# Patient Record
Sex: FEMALE | Race: BLACK OR AFRICAN AMERICAN | Employment: FULL TIME | URBAN - METROPOLITAN AREA
[De-identification: names, ages, dates, MRNs, and addresses within clinical notes are randomized per-mention and may not be internally consistent; named-entity substitution may affect disease eponyms.]

---

## 2018-08-30 ENCOUNTER — HOSPITAL ENCOUNTER (OUTPATIENT)
Dept: ULTRASOUND IMAGING | Age: 58
Discharge: HOME | End: 2018-08-30
Attending: FAMILY MEDICINE
Payer: COMMERCIAL

## 2018-08-30 DIAGNOSIS — R10.13 ABDOMINAL PAIN, EPIGASTRIC: ICD-10-CM

## 2018-08-30 PROCEDURE — 76700 US EXAM ABDOM COMPLETE: CPT

## 2021-07-29 ENCOUNTER — TRANSCRIBE ORDER (OUTPATIENT)
Dept: SCHEDULING | Age: 61
End: 2021-07-29

## 2021-07-29 DIAGNOSIS — R10.13 ABDOMINAL PAIN, EPIGASTRIC: Primary | ICD-10-CM

## 2022-08-23 ENCOUNTER — TELEPHONE (OUTPATIENT)
Dept: ORTHOPEDIC SURGERY | Age: 62
End: 2022-08-23

## 2022-08-23 NOTE — TELEPHONE ENCOUNTER
Workers Comp Information:    AdjusterLyndee Stamp  [id number]  [id number]    [address] Case MgrPmisty Vivar  [id number] or [id number]    DOI-04/22/22  Injury Type: Back  Claim# [id number]    Ashanti Turner To:   [address] [facility], [address] [place] [address]

## 2022-08-24 ENCOUNTER — NURSE NAVIGATOR (OUTPATIENT)
Dept: CASE MANAGEMENT | Age: 62
End: 2022-08-24

## 2022-08-24 NOTE — PROGRESS NOTES
[address]  Breast Navigator Encounter    Name:    Daniel Noguera  Age:    59 y.o. Diagnosis:    LEFT breast cancer    Interdisciplinary Team:  Med-Onc:      Surg-Onc:    Dr. Mccord Cross:      Plastics:      :      Nurse Navigator:  Yumiko Deshpande, RN, BSN, [facility]      Encounter type:  []Patient Initiated  []Navigator Follow-up []Pre-op  []Post-op  []Check-in Prior to First Treatment []Treatment Modality Change  [x]Initial Navigator Encounter []Other:       Narrative: Attempted to reach out to patient prior to her appointment next week. She was not available, so I left a message asking her to call me back at her convenience. I L/M that I was trying to get her scheduled for a breast MRI on Friday.                                 Yumiko Deshpande RN, BSN, [facility]  Oncology Breast Navigator     [facility] [address]  [address] [facility] 22.  W: [phone]  F: [phone]  [email or web address]  Good Help to Those in [facility]

## 2022-08-29 ENCOUNTER — NURSE NAVIGATOR (OUTPATIENT)
Dept: CASE MANAGEMENT | Age: 62
End: 2022-08-29

## 2022-08-29 ENCOUNTER — OFFICE VISIT (OUTPATIENT)
Dept: SURGERY | Age: 62
End: 2022-08-29
Payer: COMMERCIAL

## 2022-08-29 VITALS
SYSTOLIC BLOOD PRESSURE: 139 MMHG | HEART RATE: 81 BPM | BODY MASS INDEX: 26.62 KG/M2 | WEIGHT: 141 LBS | DIASTOLIC BLOOD PRESSURE: 67 MMHG | HEIGHT: 61 IN

## 2022-08-29 DIAGNOSIS — C50.412 MALIGNANT NEOPLASM OF UPPER-OUTER QUADRANT OF LEFT BREAST IN FEMALE, ESTROGEN RECEPTOR NEGATIVE (HCC): Primary | ICD-10-CM

## 2022-08-29 DIAGNOSIS — Z17.1 MALIGNANT NEOPLASM OF UPPER-OUTER QUADRANT OF LEFT BREAST IN FEMALE, ESTROGEN RECEPTOR NEGATIVE (HCC): Primary | ICD-10-CM

## 2022-08-29 PROCEDURE — 99205 OFFICE O/P NEW HI 60 MIN: CPT | Performed by: SURGERY

## 2022-08-29 PROCEDURE — 76642 ULTRASOUND BREAST LIMITED: CPT | Performed by: SURGERY

## 2022-08-29 RX ORDER — PAROXETINE HYDROCHLORIDE 20 MG/1
TABLET, FILM COATED ORAL
COMMUNITY

## 2022-08-29 RX ORDER — HYDROCHLOROTHIAZIDE 25 MG/1
12.5 TABLET ORAL DAILY
COMMUNITY
Start: 2022-08-15 | End: 2022-10-26

## 2022-08-29 RX ORDER — GUAIFENESIN 100 MG/5ML
81 LIQUID (ML) ORAL DAILY
COMMUNITY
End: 2022-09-27

## 2022-08-29 RX ORDER — AMLODIPINE BESYLATE 5 MG/1
5 TABLET ORAL EVERY EVENING
COMMUNITY
Start: 2022-06-28 | End: 2022-10-26

## 2022-08-29 RX ORDER — GLUCOSAMINE/CHONDR SU A SOD 750-600 MG
10000 TABLET ORAL
COMMUNITY
End: 2022-09-26

## 2022-08-29 RX ORDER — ROSUVASTATIN CALCIUM 10 MG/1
10 TABLET, COATED ORAL
COMMUNITY
Start: 2022-08-15

## 2022-08-29 NOTE — H&P (VIEW-ONLY)
HISTORY OF PRESENT ILLNESS  Bree Pineda is a 59 y.o. female. HPI  NEW patient consult referred by  Dr. Adán Martínez for LEFT breast cancer. Cancer found on screening mammogram. She is able to feel a lump and pain since her biopsy. She is able to feel multiple little knots that are painful. Breast cancer:  22 - LEFT breast biopsy @3:00, LEFT IDC, gr 2-3, ER 0%, OH 0%, HER2  negative. Ki-67 85%      Family History:  Denies FH of breast or ovarian cancer.       Mammogram, [facility],  22, BIRADS 0  1 cm irregular shaped mass, LEFT breast @ 3:00, middle depth     Past Medical History:   Diagnosis Date    HTN (hypertension) 2011     Past Surgical History:   Procedure Laterality Date    HX MYOMECTOMY  5/2/96    x 1     Social History     Socioeconomic History    Marital status: SINGLE     Spouse name: Not on file    Number of children: Not on file    Years of education: Not on file    Highest education level: Not on file   Occupational History    Not on file   Tobacco Use    Smoking status: Never    Smokeless tobacco: Not on file   Substance and Sexual Activity    Alcohol use: No    Drug use: Not on file    Sexual activity: Not on file   Other Topics Concern    Not on file   Social History Narrative    Not on file     Social Determinants of Health     Financial Resource Strain: Not on file   Food Insecurity: Not on file   Transportation Needs: Not on file   Physical Activity: Not on file   Stress: Not on file   Social Connections: Not on file   Intimate Partner Violence: Not on file   Housing Stability: Not on file     OB History          2    Para   0    Term                AB   2    Living             SAB        IAB        Ectopic        Molar        Multiple        Live Births              Obstetric Comments   Menarche , LMP 35, # of children 0, age of 1st delivery na, Hysterectomy/oophorectomy no/no, Breast bx yes, history of breast feeding na, BCP yes, Hormone therapy yes                Current Outpatient Medications:     amLODIPine (NORVASC) 5 mg tablet, , Disp: , Rfl:     hydroCHLOROthiazide (HYDRODIURIL) 25 mg tablet, 12.5 mg., Disp: , Rfl:     PARoxetine (PAXIL) 20 mg tablet, paroxetine 20 mg tablet  Take 1 tablet every day by oral route., Disp: , Rfl:     rosuvastatin (CRESTOR) 10 mg tablet, , Disp: , Rfl:     aspirin 81 mg chewable tablet, Take 81 mg by mouth daily. , Disp: , Rfl:     Biotin 2,500 mcg cap, Take 10,000 mcg by mouth., Disp: , Rfl:     multivitamin, tx-iron-ca-min (THERA-M w/ IRON) 9 mg iron-400 mcg tab tablet, Take 1 Tablet by mouth daily. , Disp: , Rfl:     meclizine (ANTIVERT) 25 mg tablet, Take 1 Tab by mouth three (3) times daily as needed for Dizziness. , Disp: 20 Tab, Rfl: 0    FLUoxetine (PROZAC) 10 mg capsule, , Disp: , Rfl: 1  Allergies   Allergen Reactions    Sulfa (Sulfonamide Antibiotics) Unknown (comments)        Review of Systems   Constitutional:  Positive for weight loss. Musculoskeletal:  Positive for back pain. Psychiatric/Behavioral:  The patient is nervous/anxious. All other systems reviewed and are negative. Physical Exam  Vitals and nursing note reviewed. Chest:   Breasts:     Right: No swelling, bleeding, inverted nipple, mass, nipple discharge, skin change, tenderness or axillary adenopathy. Left: No swelling, bleeding, inverted nipple, mass, nipple discharge, skin change, tenderness or axillary adenopathy. Lymphadenopathy:      Upper Body:      Right upper body: No axillary adenopathy. Left upper body: No axillary adenopathy. BREAST ULTRASOUND, Preop planning  Indication:preop planning  left Breast 3:00    Technique:   The area was scanned using a high-frequency linear-array near-field transducer  Findings: 1 cm  irregular mass with dropout  Impression: Biopsy site visible with ultrasound  Disposition:  Will schedule lumpectomy with sentinel lymph node biopsy   ASSESSMENT and PLAN    ICD-10-CM ICD-9-CM    1. Malignant neoplasm of upper-outer quadrant of left breast in female, estrogen receptor negative ([facility] [address]  C50.412 174.4 MRI BREAST BI W WO CONT    Z17.1 V86.1       58 yo female with T1 N0 triple negative  I have reviewed the imaging and pathology with her and she was given copies of these reports. 90 minutes were spent face-to-face with the patient during this encounter and 90% of that time was spent on counseling and coordination of care. 1. Discussed lumpectomy and radiation vs mastectomy. Discussed reconstruction. MRI ordered to see if patient is a candidate for a lumpectomy. 2. Discussed sentinel lymph node biopsy. 3. Discussed external beam radiation. 4. Discussed hormone therapy. 5. Discussed the possibility of chemotherapy.       Refer to med onc may need adjuvant chemo  Plan is mri, left us guided lumpectomy, sln biopsy

## 2022-08-29 NOTE — PROGRESS NOTES
HISTORY OF PRESENT ILLNESS  Yamilet Zurita is a 59 y.o. female. HPI  NEW patient consult referred by  Dr. Peri Ramos for LEFT breast cancer. Cancer found on screening mammogram. She is able to feel a lump and pain since her biopsy. She is able to feel multiple little knots that are painful. Breast cancer:  22 - LEFT breast biopsy @3:00, LEFT IDC, gr 2-3, ER 0%, RI 0%, HER2  negative. Ki-67 85%      Family History:  Denies FH of breast or ovarian cancer.       Mammogram, [facility],  22, BIRADS 0  1 cm irregular shaped mass, LEFT breast @ 3:00, middle depth     Past Medical History:   Diagnosis Date    HTN (hypertension) 2011     Past Surgical History:   Procedure Laterality Date    HX MYOMECTOMY  5/2/96    x 1     Social History     Socioeconomic History    Marital status: SINGLE     Spouse name: Not on file    Number of children: Not on file    Years of education: Not on file    Highest education level: Not on file   Occupational History    Not on file   Tobacco Use    Smoking status: Never    Smokeless tobacco: Not on file   Substance and Sexual Activity    Alcohol use: No    Drug use: Not on file    Sexual activity: Not on file   Other Topics Concern    Not on file   Social History Narrative    Not on file     Social Determinants of Health     Financial Resource Strain: Not on file   Food Insecurity: Not on file   Transportation Needs: Not on file   Physical Activity: Not on file   Stress: Not on file   Social Connections: Not on file   Intimate Partner Violence: Not on file   Housing Stability: Not on file     OB History          2    Para   0    Term                AB   2    Living             SAB        IAB        Ectopic        Molar        Multiple        Live Births              Obstetric Comments   Menarche , LMP 35, # of children 0, age of 1st delivery na, Hysterectomy/oophorectomy no/no, Breast bx yes, history of breast feeding na, BCP yes, Hormone therapy yes                Current Outpatient Medications:     amLODIPine (NORVASC) 5 mg tablet, , Disp: , Rfl:     hydroCHLOROthiazide (HYDRODIURIL) 25 mg tablet, 12.5 mg., Disp: , Rfl:     PARoxetine (PAXIL) 20 mg tablet, paroxetine 20 mg tablet  Take 1 tablet every day by oral route., Disp: , Rfl:     rosuvastatin (CRESTOR) 10 mg tablet, , Disp: , Rfl:     aspirin 81 mg chewable tablet, Take 81 mg by mouth daily. , Disp: , Rfl:     Biotin 2,500 mcg cap, Take 10,000 mcg by mouth., Disp: , Rfl:     multivitamin, tx-iron-ca-min (THERA-M w/ IRON) 9 mg iron-400 mcg tab tablet, Take 1 Tablet by mouth daily. , Disp: , Rfl:     meclizine (ANTIVERT) 25 mg tablet, Take 1 Tab by mouth three (3) times daily as needed for Dizziness. , Disp: 20 Tab, Rfl: 0    FLUoxetine (PROZAC) 10 mg capsule, , Disp: , Rfl: 1  Allergies   Allergen Reactions    Sulfa (Sulfonamide Antibiotics) Unknown (comments)        Review of Systems   Constitutional:  Positive for weight loss. Musculoskeletal:  Positive for back pain. Psychiatric/Behavioral:  The patient is nervous/anxious. All other systems reviewed and are negative. Physical Exam  Vitals and nursing note reviewed. Chest:   Breasts:     Right: No swelling, bleeding, inverted nipple, mass, nipple discharge, skin change, tenderness or axillary adenopathy. Left: No swelling, bleeding, inverted nipple, mass, nipple discharge, skin change, tenderness or axillary adenopathy. Lymphadenopathy:      Upper Body:      Right upper body: No axillary adenopathy. Left upper body: No axillary adenopathy. BREAST ULTRASOUND, Preop planning  Indication:preop planning  left Breast 3:00    Technique:   The area was scanned using a high-frequency linear-array near-field transducer  Findings: 1 cm  irregular mass with dropout  Impression: Biopsy site visible with ultrasound  Disposition:  Will schedule lumpectomy with sentinel lymph node biopsy   ASSESSMENT and PLAN    ICD-10-CM ICD-9-CM    1. Malignant neoplasm of upper-outer quadrant of left breast in female, estrogen receptor negative ([facility] [address]  C50.412 174.4 MRI BREAST BI W WO CONT    Z17.1 V86.1       60 yo female with T1 N0 triple negative  I have reviewed the imaging and pathology with her and she was given copies of these reports. 90 minutes were spent face-to-face with the patient during this encounter and 90% of that time was spent on counseling and coordination of care. 1. Discussed lumpectomy and radiation vs mastectomy. Discussed reconstruction. MRI ordered to see if patient is a candidate for a lumpectomy. 2. Discussed sentinel lymph node biopsy. 3. Discussed external beam radiation. 4. Discussed hormone therapy. 5. Discussed the possibility of chemotherapy.       Refer to med onc may need adjuvant chemo  Plan is mri, left us guided lumpectomy, sln biopsy

## 2022-08-30 ENCOUNTER — NURSE NAVIGATOR (OUTPATIENT)
Dept: CASE MANAGEMENT | Age: 62
End: 2022-08-30

## 2022-08-30 NOTE — PROGRESS NOTES
[address] [facility]   Breast Navigator Encounter        Name:    Dom Delay  Age:    59 y.o. Diagnosis:     LEFT breast cancer    E-mailed to patient at [email or web address] multiple resources including sisters network, triple negative breast cancer foundation, support groups, information on VBCF, etc.                               Monica Rivera RN, BSN, [address] [place] [address], [facility] 22.  W: [phone]  F: [phone]  [email or web address]  Good Help to Those in [facility]

## 2022-08-30 NOTE — PROGRESS NOTES
[address]  Breast Navigator Encounter      Name:    Delmar Hutchins  Age:    59 y.o. Diagnosis:    LEFT breast cancer    Attempted to reach patient today to assist with MRI scheduling. She was not available. I left a message with options of 9/1 at [address] [place] [address] Road at 12:45 or 9/6 at 10:30 at [address] [place] [address]. I asked her to call [address]. RN back at [phone] and she can assist with scheduling her MRI.                               Misty Etienne RN, BSN, [facility]  Oncology Breast Navigator     [address]  [address] [facility] 22.  W: [phone]  F: [phone]  [email or web address]  Good Help to Those in [facility]

## 2022-08-30 NOTE — PROGRESS NOTES
[address]  Breast Navigator Encounter    Name:    Rick Hartmann  Age:    59 y.o. Diagnosis:     LEFT breast cancer    Interdisciplinary Team:  Med-Onc:    Dr. Ruby Barbour  Surg-Onc:    Dr. Lana Ahumada:      Plastics:      :      Nurse Navigator:  Alena Frank RN, BSN, [facility]      Encounter type:  []Patient Initiated  [x]Navigator Follow-up []Pre-op  []Post-op  []Check-in Prior to First Treatment []Treatment Modality Change  []Initial Navigator Encounter []Other:       Narrative:      Met patient when she was in the office today for her breast talk. She felt like all of her questions were answered by Dr. Christina Mishra. Went over the plan of lumpectomy, but consult with Dr. Ruby Barbour prior to her surgery to discuss adjuvant treatment. She understands that she will hear from a  with this appt. Also told that she will receive a call from Gary Friedman regarding setting up her breast MRI. She has not told any family about her diagnosis. Pointed out the article, \"How to Tell Friends and Family About your Breast Cancer Diagnosis. \"  I told her to keep her explanation simple in that she has stage 1 breast cancer, will have surgery, may need further treatment, but should be fine. She likes this idea of just presenting the facts and what she knows for now. Provided the patient with my contact information and discussed my role in her care. I will continue to follow the patient. Referrals/Handouts:     Business card, [employer].                             Alena Frank RN, BSN, [facility]  Oncology Breast Navigator     [address]  [address] [facility].  W: [phone]  F: [phone]  [email or web address]  Good Help to Those in [facility]

## 2022-09-01 ENCOUNTER — DOCUMENTATION ONLY (OUTPATIENT)
Dept: SURGERY | Age: 62
End: 2022-09-01

## 2022-09-01 NOTE — PROGRESS NOTES
I called Dr. Manuelito Chacon office ([phone]) for appointment consult, I had to leave a voicemail message for them to return my call.

## 2022-09-07 ENCOUNTER — OFFICE VISIT (OUTPATIENT)
Dept: ORTHOPEDIC SURGERY | Age: 62
End: 2022-09-07
Payer: OTHER MISCELLANEOUS

## 2022-09-07 VITALS — BODY MASS INDEX: 26.62 KG/M2 | WEIGHT: 141 LBS | HEIGHT: 61 IN

## 2022-09-07 DIAGNOSIS — M54.50 CHRONIC BILATERAL LOW BACK PAIN WITHOUT SCIATICA: Primary | ICD-10-CM

## 2022-09-07 DIAGNOSIS — G89.29 CHRONIC BILATERAL LOW BACK PAIN WITHOUT SCIATICA: Primary | ICD-10-CM

## 2022-09-07 PROCEDURE — 99204 OFFICE O/P NEW MOD 45 MIN: CPT | Performed by: STUDENT IN AN ORGANIZED HEALTH CARE EDUCATION/TRAINING PROGRAM
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
## 2022-09-08 ENCOUNTER — DOCUMENTATION ONLY (OUTPATIENT)
Dept: SURGERY | Age: 62
End: 2022-09-08

## 2022-09-08 NOTE — PROGRESS NOTES
Patient has an appointment with Dr. Chevy Nazario on 9/16/2022 at 2:00 pm. Dr. Stella Rider office will call patient with appointment details.

## 2022-09-13 ENCOUNTER — NURSE NAVIGATOR (OUTPATIENT)
Dept: CASE MANAGEMENT | Age: 62
End: 2022-09-13

## 2022-09-13 NOTE — PROGRESS NOTES
[employer]  Breast Navigator Encounter    Name:    Conor Cordero  Age:    59 y.o. Diagnosis:     LEFT breast cancer    Interdisciplinary Team:  Med-Onc:     Dr. Guicho Scherer  Surg-Onc:     Dr. Stephenie Nicolew:        Plastics:        :        Nurse Navigator:  Alberta Dixon, RN, BSN, [facility]      Encounter type:  []Patient Initiated  [x]Navigator Follow-up []Pre-op  []Post-op  []Check-in Prior to First Treatment []Treatment Modality Change  []Initial Navigator Encounter []Other:       Narrative:     Saw patient calling in, but I could not get to the phone in time. She did not leave a message. I called her back, but she did not answer. I L/M confirming her two upcoming appts - Friday 9/16 at 2:00 pm with Dr. Guicho Scherer, medical oncology and Monday 9/19 at 12:15 pm at [facility] for her breast MRI. I provided the locations and times for her. I asked her to call me back if she had further questions. ADDENDUM:  Patient called back asking for who would complete paperwork for \"leave from work. \"  I called her back, but she did not answer. I left a message letting her know that her Dr. Guicho Scherer would complete this for any time of for chemotherapy and Dr. Lenny Cleary would complete this for any time off for surgery.                       Alberta Dixon RN, BSN, [facility]  Oncology Breast Navigator     [place]  [address] [place] [address] 22.  W: [phone]  F: [phone]  [email or web address]  Good Help to Those in [facility]

## 2022-09-15 NOTE — PROGRESS NOTES
Cancer Ida at [address], [address], [address] [facility]: [phone]  F: [phone]    Reason for Visit:   Rick Hartmann is a 59 y.o. female who is seen in consultation at the request of Dr. Christina Mishra for evaluation of left breast cancer-ER 0%, MS 0%, HER2/radha negative. Treatment History:   None from us yet    History of Present Illness:   Patient is a 61-year-old female seen for left breast cancer. She had a screening mammogram on 7/20/2022 which showed a 1 cm irregular mass in the left breast at 3 o'clock position. 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 Ki-67 85%. No FH of breast, ovarian, pancreatic or Prostate malignancies  She takes Paxil for depression. No new HA, has no SOB. She is scheduled for surgery on Oct 4. She works with special needs     Past Medical History:   Diagnosis Date    HTN (hypertension) 7/18/2011      Past Surgical History:   Procedure Laterality Date    HX MYOMECTOMY  5/2/96    x 1      Social History     Tobacco Use    Smoking status: Never     Passive exposure: Never    Smokeless tobacco: Never   Substance Use Topics    Alcohol use: Never      No family history on file. Current Outpatient Medications   Medication Sig    amLODIPine (NORVASC) 5 mg tablet     hydroCHLOROthiazide (HYDRODIURIL) 25 mg tablet 12.5 mg. PARoxetine (PAXIL) 20 mg tablet paroxetine 20 mg tablet   Take 1 tablet every day by oral route. rosuvastatin (CRESTOR) 10 mg tablet     aspirin 81 mg chewable tablet Take 81 mg by mouth daily. Biotin 2,500 mcg cap Take 10,000 mcg by mouth.    multivitamin, tx-iron-ca-min (THERA-M w/ IRON) 9 mg iron-400 mcg tab tablet Take 1 Tablet by mouth daily. FLUoxetine (PROZAC) 10 mg capsule  (Patient not taking: Reported on 8/29/2022)    meclizine (ANTIVERT) 25 mg tablet Take 1 Tab by mouth three (3) times daily as needed for Dizziness.      No current facility-administered medications for this visit. Allergies   Allergen Reactions    Sulfa (Sulfonamide Antibiotics) Unknown (comments)        Review of Systems: A complete review of systems was obtained, negative except as described above. Physical Exam:   Visit Vitals  /80   Pulse 77   Temp 97 °F (36.1 °C)   Resp 17   Ht 5' 1\" (1.549 m)   Wt 144 lb (65.3 kg)   SpO2 97%   BMI 27.21 kg/m²     ECOG PS: 0  General: No distress  Eyes: PERRLA, anicteric sclerae  HENT: Atraumatic  Neck: Supple  Lymphatic: No cervical, supraclavicular, or inguinal adenopathy  Respiratory: normal respiratory effort  Breast: Left breast with no masses, adenopathy, R with inverted nipple. CV: Normal rate, regular rhythm, no murmurs, no peripheral edema  GI: Soft, nontender  no splenomegaly  MS: Normal gait and station. Digits without clubbing or cyanosis. Skin: No rashes, ecchymoses, or petechiae. Normal temperature, turgor, and texture. Psych: Alert, oriented, appropriate affect, normal judgment/insight    Results:     Lab Results   Component Value Date/Time    WBC 6.8 08/04/2015 12:46 AM    HGB 14.0 08/04/2015 12:46 AM    HCT 40.6 08/04/2015 12:46 AM    PLATELET 723 89/33/9412 12:46 AM    MCV 87.1 08/04/2015 12:46 AM    ABS. NEUTROPHILS 2.3 08/04/2015 12:46 AM     Lab Results   Component Value Date/Time    Sodium 139 08/04/2015 12:46 AM    Potassium 3.5 08/04/2015 12:46 AM    Chloride 102 08/04/2015 12:46 AM    CO2 26 08/04/2015 12:46 AM    Glucose 120 (H) 08/04/2015 12:46 AM    BUN 16 08/04/2015 12:46 AM    Creatinine 0.76 08/04/2015 12:46 AM    GFR est AA >60 08/04/2015 12:46 AM    GFR est non-AA >60 08/04/2015 12:46 AM    Calcium 9.0 08/04/2015 12:46 AM     No results found for: TBILI, ALT, AP, TP, ALB, GLOB    External   Records reviewed and summarized above. Pathology report(s) reviewed above. Radiology report(s) reviewed above.   MRI pending     Assessment:   1) Left breast cancer-    1 cm mass on mammogram  Palpated a lump  ER 0%, CA 0%, HER2/radha negative  Ki 67 was 85%. MRI breast pending  Genetic testing pending    [id number]  AJCC 8th ed-Stage IB    Discussed pathology and the natural history of triple negative breast cancer. Characterized by higher relapse rates after the first 3 years of Dx, can recur locally or in distant organs. Adjuvant chemotherapy is estimated to decrease the risk of recurrence by 25-30%, breast cancer mortality by 20% and overall mortality by 20%. In general for most TNBC that are > 1 cm or N+ after surgery I recommend AC-T though for lower risk disease that is < 1 cm and node negative TC is comparable ( ABC trial). Clinical trial list reviewed- none applicable  Genetic testing recommended     2) HTN    3) Depression    4) Hyperlipidemia    5) Psychosocial  Coping well       Plan:     MRI pending  [facility] genetics  Surgery with marie Rodrigues Mc at the time of surgery  She will go ahead and take the new COVID booster  See me 3 weeks post op      I appreciate the opportunity to participate in [address]. [address] [facility].     Signed By: Giuseppe Thompson MD

## 2022-09-16 ENCOUNTER — OFFICE VISIT (OUTPATIENT)
Dept: ONCOLOGY | Age: 62
End: 2022-09-16
Payer: COMMERCIAL

## 2022-09-16 ENCOUNTER — TELEPHONE (OUTPATIENT)
Dept: SURGERY | Age: 62
End: 2022-09-16

## 2022-09-16 VITALS
SYSTOLIC BLOOD PRESSURE: 137 MMHG | OXYGEN SATURATION: 97 % | WEIGHT: 144 LBS | HEART RATE: 77 BPM | RESPIRATION RATE: 17 BRPM | TEMPERATURE: 97 F | DIASTOLIC BLOOD PRESSURE: 80 MMHG | HEIGHT: 61 IN | BODY MASS INDEX: 27.19 KG/M2

## 2022-09-16 DIAGNOSIS — C50.012 BILATERAL MALIGNANT NEOPLASM INVOLVING BOTH NIPPLE AND AREOLA IN FEMALE, UNSPECIFIED ESTROGEN RECEPTOR STATUS (HCC): Primary | ICD-10-CM

## 2022-09-16 DIAGNOSIS — C50.011 BILATERAL MALIGNANT NEOPLASM INVOLVING BOTH NIPPLE AND AREOLA IN FEMALE, UNSPECIFIED ESTROGEN RECEPTOR STATUS (HCC): Primary | ICD-10-CM

## 2022-09-16 PROCEDURE — 99205 OFFICE O/P NEW HI 60 MIN: CPT | Performed by: INTERNAL MEDICINE

## 2022-09-16 NOTE — PROGRESS NOTES
Elizabeth Wilson is a 59 y.o. female  Chief Complaint   Patient presents with    New Patient    Breast Cancer     1. Have you been to the ER, urgent care clinic since your last visit? Hospitalized since your last visit? No    2. Have you seen or consulted any other health care providers outside of the [address] [place] [address] since your last visit? Include any pap smears or colon screening.  No

## 2022-09-16 NOTE — TELEPHONE ENCOUNTER
Called patient back. She needs letter for work. Letter written and she will  at [facility] on Monday.

## 2022-09-19 ENCOUNTER — TELEPHONE (OUTPATIENT)
Dept: ONCOLOGY | Age: 62
End: 2022-09-19

## 2022-09-19 ENCOUNTER — HOSPITAL ENCOUNTER (OUTPATIENT)
Dept: MRI IMAGING | Age: 62
Discharge: HOME | End: 2022-09-19
Attending: SURGERY
Payer: COMMERCIAL

## 2022-09-19 VITALS — WEIGHT: 144 LBS | BODY MASS INDEX: 27.21 KG/M2

## 2022-09-19 DIAGNOSIS — Z17.1 MALIGNANT NEOPLASM OF UPPER-OUTER QUADRANT OF LEFT BREAST IN FEMALE, ESTROGEN RECEPTOR NEGATIVE (HCC): ICD-10-CM

## 2022-09-19 DIAGNOSIS — C50.412 MALIGNANT NEOPLASM OF UPPER-OUTER QUADRANT OF LEFT BREAST IN FEMALE, ESTROGEN RECEPTOR NEGATIVE (HCC): ICD-10-CM

## 2022-09-19 PROCEDURE — A9576 INJ PROHANCE MULTIPACK: HCPCS

## 2022-09-19 PROCEDURE — 74011250636 HC RX REV CODE- 250/636

## 2022-09-19 PROCEDURE — 74011000250 HC RX REV CODE- 250: Performed by: SURGERY

## 2022-09-19 PROCEDURE — 77049 MRI BREAST C-+ W/CAD BI: CPT

## 2022-09-19 PROCEDURE — 74011000258 HC RX REV CODE- 258: Performed by: SURGERY

## 2022-09-19 RX ORDER — SODIUM CHLORIDE 0.9 % (FLUSH) 0.9 %
10 SYRINGE (ML) INJECTION
Status: COMPLETED | OUTPATIENT
Start: 2022-09-19 | End: 2022-09-19

## 2022-09-19 RX ADMIN — SODIUM CHLORIDE 100 ML: 9 INJECTION, SOLUTION INTRAVENOUS at 13:00

## 2022-09-19 RX ADMIN — SODIUM CHLORIDE, PRESERVATIVE FREE 10 ML: 5 INJECTION INTRAVENOUS at 13:00

## 2022-09-19 RX ADMIN — GADOTERIDOL 13 ML: 279.3 INJECTION, SOLUTION INTRAVENOUS at 13:00

## 2022-09-19 NOTE — TELEPHONE ENCOUNTER
Patient arrived to office   Patient requesting cancellation of Ambry kit; she does not wish to have this done   Test cancelled per patient request

## 2022-09-20 ENCOUNTER — NURSE NAVIGATOR (OUTPATIENT)
Dept: CASE MANAGEMENT | Age: 62
End: 2022-09-20

## 2022-09-20 ENCOUNTER — DOCUMENTATION ONLY (OUTPATIENT)
Dept: SURGERY | Age: 62
End: 2022-09-20

## 2022-09-20 NOTE — PROGRESS NOTES
The patient called stating she received a call yesterday from Dr. Sydni Dobbins and was not able to answer it. The patient is requesting a return call. I did not see where a phone call was made to the patient?

## 2022-09-21 ENCOUNTER — TELEPHONE (OUTPATIENT)
Dept: ONCOLOGY | Age: 62
End: 2022-09-21

## 2022-09-21 NOTE — TELEPHONE ENCOUNTER
Called Ailyn Solorio  Discussed the MRI results and recommendations for Neoadjuvant chemotherapy instead  She was appreciative and is hoping to hear from Dr. Sabino Medina about a port placement

## 2022-09-21 NOTE — PROGRESS NOTES
[address]  Breast Navigator Encounter    Name:    Richard Leiva  Age:    59 y.o. Diagnosis:    LEFT breast cancer    Patient called upset because she missed Dr. Paul Barrera call today with results of her MRI. I let her know that she would reach out to her again tomorrow. Says that Dr. Dinora Pena office also called her and moved her appointment up, so she thinks something is wrong. I told her I would check with Dr. Dinora Pena office tomorrow to see the reason for the change. I suggested that it might be to go over a different plan for her. Decided not to do genetic testing because she has no children. I told her there were other reasons to do the genetic testing, namely to find out if she has a mutation that would make her change her surgical decision. I also told her that sometimes with triple negative breast cancer a mutation may change the systemic treatment. Says that she will consider this. She was tearful on the phone. I told her to check in with me later tomorrow if she has not heard anything from me or the physicians' offices. She was appreciative.                               Meghan Hummel, RN, BSN, [facility]  Oncology Breast Navigator     [address]  [address] [facility], [facility], [address]  W: [phone]  F: [phone]  [email or web address]  Good Help to Those in [facility]

## 2022-09-22 ENCOUNTER — DOCUMENTATION ONLY (OUTPATIENT)
Dept: SURGERY | Age: 62
End: 2022-09-22

## 2022-09-22 NOTE — PROGRESS NOTES
UPDATED Patient Surgery Information Sheet        Patient Name:  Jorge Luis Salgado  Surgery Date:  September 27, 2022  Type of Surgery:  PORT PLACEMENT   Time of Surgery:  1:30 PM  Pre-Procedure required? No    Pre-Operative Testing Department will call prior to surgery to determine if you need to come in and will schedule if needed. Arrival Time on the day of Surgery:    Hospital:  [facility] [address] [address]    Check in is located:  Tricia [address] entrance and make an immediate left and check in at patient access. YOU MAY NOT GET A REMINDER CALL FROM THE HOSPITAL    Pre-Operative Instructions:      \" Nothing to eat or drink after midnight the night before surgery  \" Do not wear makeup, lotion, deodorant, perfume  \" Please have a  to take you home from the hospital, failure to have a  could result in canceled surgery  \" All valuables should be left at home, the hospital Is not responsible for valuables  \" Special Instructions if needed:     **SPOKE WITH PATIENT PRE-OP INSTRUCTIONS GIVEN, STATED SHE WANTED TO SPEAK WITH SOMEONE WHO'S HAD THIS PROCEDURE DONE. INSTRUCTED HER TO CALL NURSE NAVIGATOR JUANIS, PATIENT OKAYED.     Follow up appointment for post-operative visit with provider :   October 11, 2022    [facility] [address] [facility] [place]  [phone]

## 2022-09-23 ENCOUNTER — NURSE NAVIGATOR (OUTPATIENT)
Dept: CASE MANAGEMENT | Age: 62
End: 2022-09-23

## 2022-09-23 NOTE — PROGRESS NOTES
[facility]  Breast Navigator Encounter    Name:    Sergei Rea  Age:    59 y.o. Diagnosis:    LEFT breast cancer    Interdisciplinary Team:  Med-Onc:    Dr. Wenceslao Ko   Surg-Onc:    Dr. Rakan Solorio:        Plastics:        :        Nurse Navigator:  Heidi Romeo RN, BSN, CBCN      Encounter type:  [x]Patient Initiated  []Navigator Follow-up []Pre-op  []Post-op  []Check-in Prior to First Treatment []Treatment Modality Change  []Initial Navigator Encounter []Other:       Narrative:    Called today to discuss plan. This changed after her MRI and her initial appt with Dr. Wenceslao Ko. MRI unfortunately showed another suspicious area and the initial tumor measured larger than initially thought. I explained that chemotherapy is done first because she needs to have chemotherapy, and we don't want to delay this in any way if there are issues with surgical healing, etc.  I discussed with her that chemotherapy would be 4-5 months, and that she may also have some immunotherapy. I told her some treatments could be weekly with others being a couple of weeks apart. She will see Dr. Severa Marking about [facility] through treatment. I told her the surgery in early October has been cancelled; lumpectomy will come later after chemotherapy. She has been very anxious about the changes to treatment. I told her that sometimes after MRI that plans will change because of additional findings on the MRI. She understands. Went over what happens at the hospital when she gets her port placed. It is currently scheduled for 1:30 pm on Tuesday, but I told her to expect a call from pre-op on Monday afternoon with the time to arrive at the hospital.                  She will bring her sister with her to the appt with Dr. Wenceslao Ko on Wed, and her sister will take her to the hospital on Tuesday for her PAC insertion.       I had previously sent her information about support groups, etc., and she states that she definitely needs support and to talk to other breast cancer survivors. I re-sent the e-mail to her with the information on the support groups, Living Beyond Breast Cancer and the Triple Negative Breast [address] [facility] [address]. She felt much better after our conversation. I told her to call me anytime she has any questions or concerns. Referrals/Handouts:      Information on support groups, Living Beyond Breast Cancer, Triple Negative Breast Cancer Foundation.                            Dominik Sharp RN, BSN, [facility]  Oncology Breast Navigator     [address] [place] [address], [facility].  W: [phone]  F: [phone]  [email or web address]  Good Help to Those in [facility]

## 2022-09-26 ENCOUNTER — HOSPITAL ENCOUNTER (OUTPATIENT)
Dept: PREADMISSION TESTING | Age: 62
Discharge: HOME | End: 2022-09-26
Payer: COMMERCIAL

## 2022-09-26 ENCOUNTER — ANESTHESIA EVENT (OUTPATIENT)
Dept: MEDSURG UNIT | Age: 62
End: 2022-09-26
Payer: COMMERCIAL

## 2022-09-26 VITALS
DIASTOLIC BLOOD PRESSURE: 74 MMHG | WEIGHT: 143.3 LBS | HEIGHT: 60 IN | RESPIRATION RATE: 18 BRPM | SYSTOLIC BLOOD PRESSURE: 120 MMHG | TEMPERATURE: 98.6 F | BODY MASS INDEX: 28.13 KG/M2 | HEART RATE: 71 BPM

## 2022-09-26 LAB
ANION GAP SERPL CALC-SCNC: 6 MMOL/L (ref 5–15)
BASOPHILS # BLD: 0 K/UL (ref 0–0.1)
BASOPHILS NFR BLD: 1 % (ref 0–1)
BUN SERPL-MCNC: 17 MG/DL (ref 6–20)
BUN/CREAT SERPL: 18 (ref 12–20)
CALCIUM SERPL-MCNC: 9.6 MG/DL (ref 8.5–10.1)
CHLORIDE SERPL-SCNC: 106 MMOL/L (ref 97–108)
CO2 SERPL-SCNC: 28 MMOL/L (ref 21–32)
CREAT SERPL-MCNC: 0.92 MG/DL (ref 0.55–1.02)
DIFFERENTIAL METHOD BLD: NORMAL
EOSINOPHIL # BLD: 0.2 K/UL (ref 0–0.4)
EOSINOPHIL NFR BLD: 5 % (ref 0–7)
ERYTHROCYTE [DISTWIDTH] IN BLOOD BY AUTOMATED COUNT: 12.5 % (ref 11.5–14.5)
GLUCOSE SERPL-MCNC: 130 MG/DL (ref 65–100)
HCT VFR BLD AUTO: 40.7 % (ref 35–47)
HGB BLD-MCNC: 13.2 G/DL (ref 11.5–16)
IMM GRANULOCYTES # BLD AUTO: 0 K/UL (ref 0–0.04)
IMM GRANULOCYTES NFR BLD AUTO: 0 % (ref 0–0.5)
LYMPHOCYTES # BLD: 2 K/UL (ref 0.8–3.5)
LYMPHOCYTES NFR BLD: 43 % (ref 12–49)
MCH RBC QN AUTO: 28.8 PG (ref 26–34)
MCHC RBC AUTO-ENTMCNC: 32.4 G/DL (ref 30–36.5)
MCV RBC AUTO: 88.9 FL (ref 80–99)
MONOCYTES # BLD: 0.4 K/UL (ref 0–1)
MONOCYTES NFR BLD: 9 % (ref 5–13)
NEUTS SEG # BLD: 2 K/UL (ref 1.8–8)
NEUTS SEG NFR BLD: 42 % (ref 32–75)
NRBC # BLD: 0 K/UL (ref 0–0.01)
NRBC BLD-RTO: 0 PER 100 WBC
PLATELET # BLD AUTO: 271 K/UL (ref 150–400)
PMV BLD AUTO: 9.4 FL (ref 8.9–12.9)
POTASSIUM SERPL-SCNC: 3.6 MMOL/L (ref 3.5–5.1)
RBC # BLD AUTO: 4.58 M/UL (ref 3.8–5.2)
SODIUM SERPL-SCNC: 140 MMOL/L (ref 136–145)
WBC # BLD AUTO: 4.7 K/UL (ref 3.6–11)

## 2022-09-26 PROCEDURE — 36415 COLL VENOUS BLD VENIPUNCTURE: CPT

## 2022-09-26 PROCEDURE — 80048 BASIC METABOLIC PNL TOTAL CA: CPT

## 2022-09-26 PROCEDURE — 93005 ELECTROCARDIOGRAM TRACING: CPT

## 2022-09-26 PROCEDURE — 85025 COMPLETE CBC W/AUTO DIFF WBC: CPT

## 2022-09-26 NOTE — PERIOP NOTES
[address] INSTRUCTIONS    Surgery Date:   9/27/22    Your surgeon's office or [facility] staff will call you between 4 PM- 8 PM the day before surgery with your arrival time. If your surgery is on a Monday, you will receive a call the preceding Friday. Please report to [address] Patient Access/Admitting on the 1st floor. Bring your insurance card, photo identification, and any copayment ( if applicable). If you are going home the same day of your surgery, you must have a responsible adult to drive you home. You need to have a responsible adult to stay with you the first 24 hours after surgery and you should not drive a car for 24 hours following your surgery. Do NOT eat any solid foods after midnight the night before surgery including candy, mint or gum. You may drink clear liquids from midnight until 1 hour prior to your arrival. You may drink up to 12 ounces at one time every 4 hours. Please note special instructions, if applicable, below for medications. Do NOT drink alcohol or smoke 24 hours before surgery. STOP smoking for 14 days prior as it helps with breathing and healing after surgery. If you are being admitted to the hospital, please leave personal belongings/luggage in your car until you have an assigned hospital room number. Please wear comfortable clothes. Wear your glasses instead of contacts. We ask that all money, jewelry and valuables be left at home. Wear no make up, particularly mascara, the day of surgery. All body piercings, rings, and jewelry need to be removed and left at home. Please remove any nail polish or artifical nails from your fingernails. Please wear your hair loose or down. Please no pony-tails, buns, or any metal hair accessories. If you shower the morning of surgery, please do not apply any lotions or powders afterwards. You may wear deodorant, unless having breast surgery. Do not shave any body area within 24 hours of your surgery.   Please follow all instructions to avoid any potential surgical cancellation. Should your physical condition change, (i.e. fever, cold, flu, etc.) please notify your surgeon as soon as possible. It is important to be on time. If a situation occurs where you may be delayed, please call:  [phone] / [phone] on the day of surgery. The Preadmission Testing staff can be reached at [phone]. Special instructions: NONE      Current Outpatient Medications   Medication Sig    amLODIPine (NORVASC) 5 mg tablet Take 5 mg by mouth every evening. hydroCHLOROthiazide (HYDRODIURIL) 25 mg tablet Take 12.5 mg by mouth daily. PARoxetine (PAXIL) 20 mg tablet paroxetine 20 mg tablet   Take 1 tablet every day by oral route. rosuvastatin (CRESTOR) 10 mg tablet Take 10 mg by mouth nightly. aspirin 81 mg chewable tablet Take 81 mg by mouth daily. multivitamin, tx-iron-ca-min (THERA-M w/ IRON) 9 mg iron-400 mcg tab tablet Take 1 Tablet by mouth daily. No current facility-administered medications for this encounter. YOU MUST ONLY TAKE THESE MEDICATIONS THE MORNING OF SURGERY WITH A SIP OF WATER: PAXIL  MEDICATIONS TO TAKE THE MORNING OF SURGERY ONLY IF NEEDED: NONE  HOLD these prescription medications BEFORE Surgery: HOLD ASPIRIN STARTING Friday PER MD, DO NOT TAKE HYDROCHLOROTHIAZIDE MORNING OF SURGERY  Ask your surgeon/prescribing physician about when/if to STOP taking these medications: NONE  Stop all vitamins, herbal medicines and Aspirin containing products 7 days prior to surgery. Stop any non-steroidal anti-inflammatory drugs (i.e. Ibuprofen, Naproxen, Advil, Aleve) 3 days before surgery. You may take Tylenol. If you are currently taking Plavix, Coumadin,or any other blood-thinning/anticoagulant medication contact your prescribing physician for instructions. Eating and Drinking Before Surgery    You may eat a regular dinner at the usual time on the day before your surgery.   Do NOT eat any solid foods after midnight unless your arrival time at the hospital is 3pm or later. You may drink clear liquids only from 12 midnight until 1 hours prior to your arrival time at the hospital on the day of your surgery. Do NOT drink alcohol. Clear liquids include:  Water  Fruit juices without pulp( i.e. apple juice)  Carbonated beverages  Black coffee (no cream/milk)  Tea (no cream/milk)  Gatorade  You may drink up to 12-16 ounces at one time every 4 hours between the hours of midnight and 1 hour before your arrival time at the hospital. Example- if your arrival time at the hospital is 6am, you may drink 12-16 ounces of clear liquids no later than 5am.  If your arrival time at the hospital is 3pm or later, you may eat a light breakfast before 8am.  A light breakfast includes: Toast or bagel (no butter)  Black coffee (no cream/milk)  Tea (no cream/milk)  Fruit juices without pulp ( i.e. apple juice)  Do NOT eat meat, eggs, vegetables or fruit  If you have any questions, please contact your surgeon's office. Preventing Infections Before and After - Your Surgery    IMPORTANT INSTRUCTIONS    You play an important role in your health and preparation for surgery. To reduce the germs on your skin you will need to shower with CHG soap (Chorhexidine gluconate 4%) two times before surgery. CHG soap (Hibiclens, Hex-A-Clens or store brand)  CHG soap will be provided at your Preadmission Testing (PAT) appointment. If you do not have a PAT appointment before surgery, you may arrange to  CHG soap from our office or purchase CHG soap at a pharmacy, grocery or department store. You need to purchase TWO 4 ounce bottles to use for your 2 showers. Steps to follow:  Charlynn Britany your hair with your normal shampoo and your body with regular soap and rinse well to remove shampoo and soap from your skin. Wet a clean washcloth and turn off the shower.   Put CHG soap on washcloth and apply to your entire body from the neck down. Do not use on your head, face or private parts(genitals). Do not use CHG soap on open sores, wounds or areas of skin irritation. Wash you body gently for 5 minutes. Do not wash your skin too hard. This soap does not create lather. Pay special attention to your underarms and from your belly button to your feet. Turn the shower back on and rinse well to get CHG soap off your body. Pat your skin dry with a clean, dry towel. Do not apply lotions or moisturizer. Put on clean clothes and sleep on fresh bed sheets and do not allow pets to sleep with you. Shower with CHG soap 2 times before your surgery  The evening before your surgery  The morning of your surgery      Tips to help prevent infections after your surgery:  Protect your surgical wound from germs:  Hand washing is the most important thing you and your caregivers can do to prevent infections. Keep your bandage clean and dry! Do not touch your surgical wound. Use clean, freshly washed towels and washcloths every time you shower; do not share bath linens with others. Until your surgical wound is healed, wear clothing and sleep on bed linens each day that are clean and freshly washed. Do not allow pets to sleep in your bed with you or touch your surgical wound. Do not smoke - smoking delays wound healing. This may be a good time to stop smoking. If you have diabetes, it is important for you to manage your blood sugar levels properly before your surgery as well as after your surgery. Poorly managed blood sugar levels slow down wound healing and prevent you from healing completely. Patient Information Regarding COVID Restrictions      Day of Procedure    Please park in the parking deck or any designated visitor parking lot.   Enter the facility through the Main Entrance of the hospital.  On the day of surgery, please provide the cell phone number of the person who will be waiting for you to the Patient Access representative at the time of registration. Please wear a mask on the day of your procedure. We are now allowing two designated visitors per stay. Pediatric patients may have 2 designated visitors. These two people may come in with you on the day of your procedure. The designated visitor must also wear a mask. Once your procedure and the immediate recovery period is completed, a nurse in the recovery area will contact your designated visitor to inform them of your room number or to otherwise review other pertinent information regarding your care. Social distancing practices are to be adhered to in waiting areas and the cafeteria. The patient was contacted  in person. She verbalized understanding of all instructions does not  need reinforcement.

## 2022-09-26 NOTE — PROGRESS NOTES
[facility] at [address], [address] [place] [address] Road, [place]: [phone]  F: [phone]    Reason for Visit:   Daniel Noguera is a 59 y.o. female who is seen for follow up of left breast cancer-ER 0%, LA 0%, HER2/radha negative. Treatment History:   None from us yet    History of Present Illness:   Patient is a 67-year-old female seen for left breast cancer. She had a screening mammogram on 7/20/2022 which showed a 1 cm irregular mass in the left breast at 3 o'clock position. Biopsy on 8/2/2022 showed invasive ductal carcinoma, grade 2-3, ER 0%, LA 0%, HER2/radha negative. Ki-67 85%. She is seen after MRI breast , declined genetic testing. No FH of breast, ovarian, pancreatic or Prostate malignancies  She takes Paxil for depression. No new HA, has no SOB. She works with special needs     Past Medical History:   Diagnosis Date    HTN (hypertension) 7/18/2011      Past Surgical History:   Procedure Laterality Date    HX MYOMECTOMY  5/2/96    x 1      Social History     Tobacco Use    Smoking status: Never     Passive exposure: Never    Smokeless tobacco: Never   Substance Use Topics    Alcohol use: Never      No family history on file. Current Outpatient Medications   Medication Sig    amLODIPine (NORVASC) 5 mg tablet     hydroCHLOROthiazide (HYDRODIURIL) 25 mg tablet 12.5 mg. PARoxetine (PAXIL) 20 mg tablet paroxetine 20 mg tablet   Take 1 tablet every day by oral route. rosuvastatin (CRESTOR) 10 mg tablet     aspirin 81 mg chewable tablet Take 81 mg by mouth daily. Biotin 2,500 mcg cap Take 10,000 mcg by mouth.    multivitamin, tx-iron-ca-min (THERA-M w/ IRON) 9 mg iron-400 mcg tab tablet Take 1 Tablet by mouth daily. FLUoxetine (PROZAC) 10 mg capsule  (Patient not taking: No sig reported)    meclizine (ANTIVERT) 25 mg tablet Take 1 Tab by mouth three (3) times daily as needed for Dizziness.      No current facility-administered medications for this visit. Allergies   Allergen Reactions    Sulfa (Sulfonamide Antibiotics) Unknown (comments)        Review of Systems: A complete review of systems was obtained, negative except as described above. Physical Exam:   Visit Vitals  BP (!) 149/80 (BP 1 Location: Left upper arm, BP Patient Position: Sitting)   Pulse 69   Temp 97.7 °F (36.5 °C) (Temporal)   Ht 5' (1.524 m)   Wt 145 lb (65.8 kg)   SpO2 94%   BMI 28.32 kg/m²       ECOG PS: 0  General: No distress  Eyes: PERRLA, anicteric sclerae  HENT: Atraumatic  Neck: Supple  Lymphatic: No cervical, supraclavicular, or inguinal adenopathy  Respiratory: normal respiratory effort  Breast: Left breast with no masses, adenopathy, R with inverted nipple. CV: Normal rate, regular rhythm, no murmurs, no peripheral edema  GI: Soft, nontender  no splenomegaly  MS: Normal gait and station. Digits without clubbing or cyanosis. Skin: No rashes, ecchymoses, or petechiae. Normal temperature, turgor, and texture. Psych: Alert, oriented, appropriate affect, normal judgment/insight    Results:     Lab Results   Component Value Date/Time    WBC 6.8 08/04/2015 12:46 AM    HGB 14.0 08/04/2015 12:46 AM    HCT 40.6 08/04/2015 12:46 AM    PLATELET 230 81/74/3972 12:46 AM    MCV 87.1 08/04/2015 12:46 AM    ABS. NEUTROPHILS 2.3 08/04/2015 12:46 AM     Lab Results   Component Value Date/Time    Sodium 139 08/04/2015 12:46 AM    Potassium 3.5 08/04/2015 12:46 AM    Chloride 102 08/04/2015 12:46 AM    CO2 26 08/04/2015 12:46 AM    Glucose 120 (H) 08/04/2015 12:46 AM    BUN 16 08/04/2015 12:46 AM    Creatinine 0.76 08/04/2015 12:46 AM    GFR est AA >60 08/04/2015 12:46 AM    GFR est non-AA >60 08/04/2015 12:46 AM    Calcium 9.0 08/04/2015 12:46 AM     No results found for: TBILI, ALT, AP, TP, ALB, GLOB    External   Records reviewed and summarized above. Pathology report(s) reviewed above. Radiology report(s) reviewed above.   MRI  9/2022    LEFT BREAST: Conglomerate mass enhancement in the left breast at 4-5 o'clock,  combined dimension 3.7 x 1.9 x 1.4 cm. This is slightly more extensive than the  sonographic and mammographic findings. No lymphadenopathy. Assessment:   1) Left breast cancer-    1 cm mass on mammogram  Palpated a lump  ER 0%, OH 0%, HER2/radha negative  Ki 67 was 85%. MRI breast with a 3.7 cm Left breast mass  Genetic testing pending    [id number]  AJCC 8th ed-Stage IIA    Discussed pathology and the natural history of triple negative breast cancer. Characterized by higher relapse rates after the first 3 years of Dx, can recur locally or in distant organs. Adjuvant chemotherapy is estimated to decrease the risk of recurrence by 25-30%, breast cancer mortality by 20% and overall mortality by 20%. Review of MRI shows a T2 primary. We discussed NACT. Recommended chemotherapy + Keytruda based on the KEYNOTE-522 trial ( taxol, carboplatin and Gerre Labrador followed by [facility] as preoperative treatment, followed by surgery, and then adjuvant pembrolizumab for another nine cycles (27 weeks) after surgery). The trial demonstrated that the addition of keytuda  to NACT raised the overall pCR rate from 51 to 65 percent,  improved 36-month EFS (85 percent with pembrolizumab versus 77 percent with placebo), with a 37 percent reduction in events (HR 0.63, 95% CI 0.48-0.82)  We discussed the chemotherapy regimen, it's logistics, and potential toxicities in detail. Potential side effects include, but are not limited to, nausea, vomiting, diarrhea, taste changes, myelosuppression, infection, fatigue, allergic reactions, rash, edema, neuropathy, and rarely, death. The patient asked several well thought out questions which I answered to the best of my ability and to their apparent satisfaction. The patient has given consent for chemotherapy.     I also discussed the potential for severe immune mediated side effects such as colitis, dermatitis, hepatitis, pneumonitis, hemolysis, Adrenal crisis. Most reversible with steroids but some can be life threatening. Clinical trial list reviewed- none applicable  Genetic testing recommended     2) HTN    3) Depression    4) Hyperlipidemia    5) Psychosocial  Coping well       Plan:     Approval for Carboplatin taxol and keytruda followed by ddAC and Keytruda   Antiemetics per protocol  ECHO  [facility]  She will go ahead and take the new COVID booster    RTC 1 week for C1 Day 1     I appreciate the opportunity to participate in [facility].     Signed By: Mainor Lai MD

## 2022-09-27 ENCOUNTER — APPOINTMENT (OUTPATIENT)
Dept: GENERAL RADIOLOGY | Age: 62
End: 2022-09-27
Attending: SURGERY
Payer: COMMERCIAL

## 2022-09-27 ENCOUNTER — DOCUMENTATION ONLY (OUTPATIENT)
Dept: SURGERY | Age: 62
End: 2022-09-27

## 2022-09-27 ENCOUNTER — HOSPITAL ENCOUNTER (OUTPATIENT)
Age: 62
Setting detail: OUTPATIENT SURGERY
Discharge: HOME | End: 2022-09-27
Attending: SURGERY | Admitting: SURGERY
Payer: COMMERCIAL

## 2022-09-27 ENCOUNTER — ANESTHESIA (OUTPATIENT)
Dept: MEDSURG UNIT | Age: 62
End: 2022-09-27
Payer: COMMERCIAL

## 2022-09-27 VITALS
DIASTOLIC BLOOD PRESSURE: 63 MMHG | HEART RATE: 72 BPM | SYSTOLIC BLOOD PRESSURE: 121 MMHG | BODY MASS INDEX: 28.13 KG/M2 | RESPIRATION RATE: 13 BRPM | WEIGHT: 143.3 LBS | HEIGHT: 60 IN | OXYGEN SATURATION: 99 % | TEMPERATURE: 97.7 F

## 2022-09-27 DIAGNOSIS — C50.912 MALIGNANT NEOPLASM OF LEFT FEMALE BREAST, UNSPECIFIED ESTROGEN RECEPTOR STATUS, UNSPECIFIED SITE OF BREAST (HCC): ICD-10-CM

## 2022-09-27 DIAGNOSIS — G89.18 PAIN FOLLOWING SURGERY OR PROCEDURE: Primary | ICD-10-CM

## 2022-09-27 LAB
ATRIAL RATE: 66 BPM
CALCULATED P AXIS, ECG09: 60 DEGREES
CALCULATED R AXIS, ECG10: -10 DEGREES
CALCULATED T AXIS, ECG11: 77 DEGREES
DIAGNOSIS, 93000: NORMAL
P-R INTERVAL, ECG05: 164 MS
Q-T INTERVAL, ECG07: 402 MS
QRS DURATION, ECG06: 82 MS
QTC CALCULATION (BEZET), ECG08: 421 MS
VENTRICULAR RATE, ECG03: 66 BPM

## 2022-09-27 PROCEDURE — 76937 US GUIDE VASCULAR ACCESS: CPT | Performed by: SURGERY

## 2022-09-27 PROCEDURE — 74011250636 HC RX REV CODE- 250/636: Performed by: SURGERY

## 2022-09-27 PROCEDURE — 77030010507 HC ADH SKN DERMBND J&J -B: Performed by: SURGERY

## 2022-09-27 PROCEDURE — C1788 PORT, INDWELLING, IMP: HCPCS | Performed by: SURGERY

## 2022-09-27 PROCEDURE — 77001 FLUOROGUIDE FOR VEIN DEVICE: CPT | Performed by: SURGERY

## 2022-09-27 PROCEDURE — 76210000036 HC AMBSU PH I REC 1.5 TO 2 HR: Performed by: SURGERY

## 2022-09-27 PROCEDURE — 71045 X-RAY EXAM CHEST 1 VIEW: CPT

## 2022-09-27 PROCEDURE — 77030002986 HC SUT PROL J&J -A: Performed by: SURGERY

## 2022-09-27 PROCEDURE — 2709999900 HC NON-CHARGEABLE SUPPLY: Performed by: SURGERY

## 2022-09-27 PROCEDURE — 77030011267 HC ELECTRD BLD COVD -A: Performed by: SURGERY

## 2022-09-27 PROCEDURE — 74011250636 HC RX REV CODE- 250/636: Performed by: ANESTHESIOLOGY

## 2022-09-27 PROCEDURE — 74011000250 HC RX REV CODE- 250: Performed by: SURGERY

## 2022-09-27 PROCEDURE — 76030000001 HC AMB SURG OR TIME 1 TO 1.5: Performed by: SURGERY

## 2022-09-27 PROCEDURE — 77030031139 HC SUT VCRL2 J&J -A: Performed by: SURGERY

## 2022-09-27 PROCEDURE — 74011000250 HC RX REV CODE- 250: Performed by: NURSE ANESTHETIST, CERTIFIED REGISTERED

## 2022-09-27 PROCEDURE — 36561 INSERT TUNNELED CV CATH: CPT | Performed by: SURGERY

## 2022-09-27 PROCEDURE — 77030002933 HC SUT MCRYL J&J -A: Performed by: SURGERY

## 2022-09-27 PROCEDURE — 74011250637 HC RX REV CODE- 250/637: Performed by: ANESTHESIOLOGY

## 2022-09-27 PROCEDURE — 74011250636 HC RX REV CODE- 250/636: Performed by: NURSE ANESTHETIST, CERTIFIED REGISTERED

## 2022-09-27 PROCEDURE — 76060000062 HC AMB SURG ANES 1 TO 1.5 HR: Performed by: SURGERY

## 2022-09-27 DEVICE — POWERPORT IMPLANTABLE PORT WITH ATTACHABLE 8F CHRONOFLEX OPEN-ENDED SINGLE-LUMEN VENOUS CATHETER INTERMEDIATE KIT (WITH SUTURE PLUGS)
Type: IMPLANTABLE DEVICE | Site: CHEST | Status: FUNCTIONAL
Brand: POWERPORT, CHRONOFLEX

## 2022-09-27 RX ORDER — SODIUM CHLORIDE, SODIUM LACTATE, POTASSIUM CHLORIDE, CALCIUM CHLORIDE 600; 310; 30; 20 MG/100ML; MG/100ML; MG/100ML; MG/100ML
1000 INJECTION, SOLUTION INTRAVENOUS CONTINUOUS
Status: DISCONTINUED | OUTPATIENT
Start: 2022-09-27 | End: 2022-09-27 | Stop reason: HOSPADM

## 2022-09-27 RX ORDER — SODIUM CHLORIDE, SODIUM LACTATE, POTASSIUM CHLORIDE, CALCIUM CHLORIDE 600; 310; 30; 20 MG/100ML; MG/100ML; MG/100ML; MG/100ML
INJECTION, SOLUTION INTRAVENOUS
Status: DISCONTINUED | OUTPATIENT
Start: 2022-09-27 | End: 2022-09-27 | Stop reason: HOSPADM

## 2022-09-27 RX ORDER — CEFAZOLIN SODIUM 1 G/3ML
INJECTION, POWDER, FOR SOLUTION INTRAMUSCULAR; INTRAVENOUS AS NEEDED
Status: DISCONTINUED | OUTPATIENT
Start: 2022-09-27 | End: 2022-09-27 | Stop reason: HOSPADM

## 2022-09-27 RX ORDER — MORPHINE SULFATE 2 MG/ML
2 INJECTION, SOLUTION INTRAMUSCULAR; INTRAVENOUS
Status: DISCONTINUED | OUTPATIENT
Start: 2022-09-27 | End: 2022-09-27 | Stop reason: HOSPADM

## 2022-09-27 RX ORDER — HYDROMORPHONE HYDROCHLORIDE 2 MG/1
2 TABLET ORAL
Qty: 15 TABLET | Refills: 0 | Status: SHIPPED | OUTPATIENT
Start: 2022-09-27 | End: 2022-10-04

## 2022-09-27 RX ORDER — FENTANYL CITRATE 50 UG/ML
50 INJECTION, SOLUTION INTRAMUSCULAR; INTRAVENOUS AS NEEDED
Status: DISCONTINUED | OUTPATIENT
Start: 2022-09-27 | End: 2022-09-27 | Stop reason: HOSPADM

## 2022-09-27 RX ORDER — DIPHENHYDRAMINE HYDROCHLORIDE 50 MG/ML
12.5 INJECTION, SOLUTION INTRAMUSCULAR; INTRAVENOUS AS NEEDED
Status: DISCONTINUED | OUTPATIENT
Start: 2022-09-27 | End: 2022-09-27 | Stop reason: HOSPADM

## 2022-09-27 RX ORDER — ONDANSETRON 2 MG/ML
4 INJECTION INTRAMUSCULAR; INTRAVENOUS AS NEEDED
Status: DISCONTINUED | OUTPATIENT
Start: 2022-09-27 | End: 2022-09-27 | Stop reason: HOSPADM

## 2022-09-27 RX ORDER — HYDROCODONE BITARTRATE AND ACETAMINOPHEN 5; 325 MG/1; MG/1
1 TABLET ORAL AS NEEDED
Status: DISCONTINUED | OUTPATIENT
Start: 2022-09-27 | End: 2022-09-27 | Stop reason: HOSPADM

## 2022-09-27 RX ORDER — PROPOFOL 10 MG/ML
INJECTION, EMULSION INTRAVENOUS AS NEEDED
Status: DISCONTINUED | OUTPATIENT
Start: 2022-09-27 | End: 2022-09-27 | Stop reason: HOSPADM

## 2022-09-27 RX ORDER — SODIUM CHLORIDE 9 MG/ML
25 INJECTION, SOLUTION INTRAVENOUS CONTINUOUS
Status: DISCONTINUED | OUTPATIENT
Start: 2022-09-27 | End: 2022-09-27 | Stop reason: HOSPADM

## 2022-09-27 RX ORDER — ALBUTEROL SULFATE 0.83 MG/ML
2.5 SOLUTION RESPIRATORY (INHALATION) AS NEEDED
Status: DISCONTINUED | OUTPATIENT
Start: 2022-09-27 | End: 2022-09-27 | Stop reason: HOSPADM

## 2022-09-27 RX ORDER — GLYCOPYRROLATE 0.2 MG/ML
0.2 INJECTION INTRAMUSCULAR; INTRAVENOUS
Status: DISCONTINUED | OUTPATIENT
Start: 2022-09-27 | End: 2022-09-27 | Stop reason: HOSPADM

## 2022-09-27 RX ORDER — HEPARIN SODIUM 200 [USP'U]/100ML
INJECTION, SOLUTION INTRAVENOUS
Status: COMPLETED | OUTPATIENT
Start: 2022-09-27 | End: 2022-09-27

## 2022-09-27 RX ORDER — MIDAZOLAM HYDROCHLORIDE 1 MG/ML
1 INJECTION, SOLUTION INTRAMUSCULAR; INTRAVENOUS AS NEEDED
Status: DISCONTINUED | OUTPATIENT
Start: 2022-09-27 | End: 2022-09-27 | Stop reason: HOSPADM

## 2022-09-27 RX ORDER — MIDAZOLAM HYDROCHLORIDE 1 MG/ML
0.5 INJECTION, SOLUTION INTRAMUSCULAR; INTRAVENOUS
Status: DISCONTINUED | OUTPATIENT
Start: 2022-09-27 | End: 2022-09-27 | Stop reason: HOSPADM

## 2022-09-27 RX ORDER — HYDROMORPHONE HYDROCHLORIDE 1 MG/ML
0.2 INJECTION, SOLUTION INTRAMUSCULAR; INTRAVENOUS; SUBCUTANEOUS
Status: DISCONTINUED | OUTPATIENT
Start: 2022-09-27 | End: 2022-09-27 | Stop reason: HOSPADM

## 2022-09-27 RX ORDER — LIDOCAINE HYDROCHLORIDE 20 MG/ML
INJECTION, SOLUTION EPIDURAL; INFILTRATION; INTRACAUDAL; PERINEURAL AS NEEDED
Status: DISCONTINUED | OUTPATIENT
Start: 2022-09-27 | End: 2022-09-27 | Stop reason: HOSPADM

## 2022-09-27 RX ORDER — ACETAMINOPHEN 325 MG/1
650 TABLET ORAL ONCE
Status: COMPLETED | OUTPATIENT
Start: 2022-09-27 | End: 2022-09-27

## 2022-09-27 RX ORDER — LIDOCAINE HYDROCHLORIDE 10 MG/ML
0.1 INJECTION, SOLUTION EPIDURAL; INFILTRATION; INTRACAUDAL; PERINEURAL AS NEEDED
Status: DISCONTINUED | OUTPATIENT
Start: 2022-09-27 | End: 2022-09-27 | Stop reason: HOSPADM

## 2022-09-27 RX ORDER — FENTANYL CITRATE 50 UG/ML
25 INJECTION, SOLUTION INTRAMUSCULAR; INTRAVENOUS
Status: DISCONTINUED | OUTPATIENT
Start: 2022-09-27 | End: 2022-09-27 | Stop reason: HOSPADM

## 2022-09-27 RX ORDER — PROPOFOL 10 MG/ML
INJECTION, EMULSION INTRAVENOUS
Status: DISCONTINUED | OUTPATIENT
Start: 2022-09-27 | End: 2022-09-27 | Stop reason: HOSPADM

## 2022-09-27 RX ORDER — ROPIVACAINE HYDROCHLORIDE 5 MG/ML
30 INJECTION, SOLUTION EPIDURAL; INFILTRATION; PERINEURAL AS NEEDED
Status: DISCONTINUED | OUTPATIENT
Start: 2022-09-27 | End: 2022-09-27 | Stop reason: HOSPADM

## 2022-09-27 RX ADMIN — PROPOFOL 20 MG: 10 INJECTION, EMULSION INTRAVENOUS at 14:27

## 2022-09-27 RX ADMIN — PROPOFOL 20 MG: 10 INJECTION, EMULSION INTRAVENOUS at 14:25

## 2022-09-27 RX ADMIN — PROPOFOL 75 MCG/KG/MIN: 10 INJECTION, EMULSION INTRAVENOUS at 14:05

## 2022-09-27 RX ADMIN — PROPOFOL 50 MG: 10 INJECTION, EMULSION INTRAVENOUS at 14:05

## 2022-09-27 RX ADMIN — CEFAZOLIN 2 G: 330 INJECTION, POWDER, FOR SOLUTION INTRAMUSCULAR; INTRAVENOUS at 14:13

## 2022-09-27 RX ADMIN — MIDAZOLAM 2 MG: 1 INJECTION INTRAMUSCULAR; INTRAVENOUS at 13:12

## 2022-09-27 RX ADMIN — SODIUM CHLORIDE, POTASSIUM CHLORIDE, SODIUM LACTATE AND CALCIUM CHLORIDE: 600; 310; 30; 20 INJECTION, SOLUTION INTRAVENOUS at 13:58

## 2022-09-27 RX ADMIN — ACETAMINOPHEN 650 MG: 325 TABLET, FILM COATED ORAL at 12:38

## 2022-09-27 RX ADMIN — LIDOCAINE HYDROCHLORIDE 60 MG: 20 INJECTION, SOLUTION EPIDURAL; INFILTRATION; INTRACAUDAL; PERINEURAL at 14:05

## 2022-09-27 NOTE — ANESTHESIA PREPROCEDURE EVALUATION
Relevant Problems   PERSONAL HX & FAMILY HX OF CANCER   (+) 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 [facility])       Anesthetic History   No history of anesthetic complications            Review of Systems / Medical History  Patient summary reviewed, nursing notes reviewed and pertinent labs reviewed    Pulmonary  Within defined limits                 Neuro/Psych         Psychiatric history     Cardiovascular    Hypertension              Exercise tolerance: >4 METS     GI/Hepatic/Renal     GERD: well controlled           Endo/Other        Cancer     Other Findings              Physical Exam    Airway  Mallampati: II  TM Distance: > 6 cm  Neck ROM: normal range of motion   Mouth opening: Normal     Cardiovascular  Regular rate and rhythm,  S1 and S2 normal,  no murmur, click, rub, or gallop             Dental  No notable dental hx       Pulmonary  Breath sounds clear to auscultation               Abdominal  GI exam deferred       Other Findings            Anesthetic Plan    ASA: 2  Anesthesia type: MAC            Anesthetic plan and risks discussed with: Patient

## 2022-09-27 NOTE — ANESTHESIA POSTPROCEDURE EVALUATION
Post-Anesthesia Evaluation and Assessment    Patient: Neelima Cain MRN: [id number]  SSN: [id number]    YOB: 1960  Age: 59 y.o. Sex: female      I have evaluated the patient and they are stable and ready for discharge from the PACU. Cardiovascular Function/Vital Signs  Visit Vitals  /66   Pulse 64   Temp 36.5 °C (97.7 °F)   Resp 14   Ht 5' (1.524 m)   Wt 65 kg (143 lb 4.8 oz)   SpO2 98%   BMI 27.99 kg/m²       Patient is status post MAC anesthesia for Procedure(s):  PORT PLACEMENT. Nausea/Vomiting: None    Postoperative hydration reviewed and adequate. Pain:  Pain Scale 1: Numeric (0 - 10) (09/27/22 1210)  Pain Intensity 1: 0 (09/27/22 1210)   Managed    Neurological Status:   Neuro (WDL): Within Defined Limits (09/27/22 1217)   At baseline    Mental Status, Level of Consciousness: Alert and  oriented to person, place, and time    Pulmonary Status:   O2 Device: None (09/27/22 1510)   Adequate oxygenation and airway patent    Complications related to anesthesia: None    Post-anesthesia assessment completed. No concerns    Signed By: Neil Swain MD     September 27, 2022              Procedure(s):  PORT PLACEMENT. MAC    <[email or web address]>    INITIAL Post-op Vital signs:   Vitals Value Taken Time   /61 09/27/22 1525   Temp 36.5 °C (97.7 °F) 09/27/22 1510   Pulse 74 09/27/22 1528   Resp 16 09/27/22 1528   SpO2 98 % 09/27/22 1528   Vitals shown include unvalidated device data.

## 2022-09-27 NOTE — DISCHARGE INSTRUCTIONS
Discharge Instructions from Dr. Raj Caldera may shower, but no hot tubs, swimming pools, or baths until your incision is healed. No heavy lifting with the affected extremity (nothing greater than 5 pounds), and limit its use for the next 4-5 days. You may use an ice pack for comfort for the next couple of days, but do not place ice directly on the skin. Rather, use a towel or clothing to serve as a barrier between skin and ice to prevent injury. If I placed a drain, follow the drain instructions provided, especially as you keep a record of the drain output. Follow medication instructions carefully. No aspirin, ibuprofen or aleve. May take tylenol. You will have bruising and swelling  Watch for signs of infection as listed below. Redness  Swelling  Drainage from the incision or from your nipple that appears infected  Fever over 101.5 degrees for consecutive readings, or over 99.5 if you are currently undergoing chemotherapy. Call our office (number is below) for a follow-up appointment.   If you have any problems, our phone number is [phone]

## 2022-09-27 NOTE — BRIEF OP NOTE
Brief Postoperative Note    Patient: Delmar Hutchins  YOB: 1960  MRN: [id number]    Date of Procedure: 9/27/2022     Pre-Op Diagnosis: LEFT BREAST CANCER    Post-Op Diagnosis: Same as preoperative diagnosis. Procedure(s):  PORT PLACEMENT    Surgeon(s): Lakshmi Soares MD    Surgical Assistant: None    Anesthesia: MAC     Estimated Blood Loss (mL): Minimal    Complications: None    Specimens: * No specimens in log *     Implants:   Implant Name Type Inv.  Item Serial No.  Lot No. LRB No. Used Action   PORT ATTACH CATH POWERPORT 8FR --  - [id number]  PORT ATTACH CATH POWERPORT 8FR --   [employer]WD [id number] Right 1 Implanted       Drains: * No LDAs found *    Findings: port in position    Electronically Signed by Gretel Soares MD on 9/27/2022 at 3:04 PM  Dictated stat

## 2022-09-27 NOTE — INTERVAL H&P NOTE
Update History & Physical    The Patient's History and Physical of August 29, 2022  Port insertion was reviewed with the patient and I examined the patient. There was no change. The surgical site was confirmed by the patient and me. Plan:  The risk, benefits, expected outcome, and alternative to the recommended procedure have been discussed with the patient. Patient understands and wants to proceed with the procedure.     Electronically signed by Lorenzo Brown MD on 9/27/2022 at 1:45 PM

## 2022-09-27 NOTE — ROUTINE PROCESS
Patient: Laura Gupta MRN: [id number]  SSN: [id number]   YOB: 1960  Age: 59 y.o. Sex: female     Patient is status post Procedure(s):  PORT PLACEMENT. Surgeon(s) and Role:     Donald Meraz MD - Primary    Local/Dose/Irrigation:  SEE MAR                  Peripheral IV 09/27/22 Posterior;Right Hand (Active)   Site Assessment Clean, dry, & intact 09/27/22 1305   Phlebitis Assessment 0 09/27/22 1305   Infiltration Assessment 0 09/27/22 1305   Dressing Status Clean, dry, & intact 09/27/22 1305   Dressing Type Transparent 09/27/22 1305   Hub Color/Line Status Blue; Infusing 09/27/22 1305                           Dressing/Packing:  Incision 09/27/22 Chest Right-Dressing/Treatment:  (DERMABOND) (09/27/22 1300)    Splint/Cast:  ]    Other:

## 2022-09-27 NOTE — PROGRESS NOTES
[address] [facility] called stating the patient's hydromorphone needs to be changed to every 4-6 hours not to exceed 6 tabs per day due to the insurance guidelines. This change was given to the pharmacy in order for the patient to receive her pain medication. Dr. Gómez Leone made aware.

## 2022-09-27 NOTE — PERIOP NOTES
Pt anxious and tearful in preop. 2 mg of versed given per Dr. Gabino Wilkerson order. See MAR. Monitoring patient until she goes to the OR.

## 2022-09-27 NOTE — OP NOTES
[address]   OPERATIVE REPORT    Name:  Margarette Timmons  MR#:  [id number]  :  1960  ACCOUNT #:  [de-identified]  DATE OF SERVICE:  2022    PREOPERATIVE DIAGNOSIS:  Left breast cancer, need intravenous access for neoadjuvant chemotherapy. POSTOPERATIVE DIAGNOSIS:  Left breast cancer, need intravenous access for neoadjuvant chemotherapy. PROCEDURE PERFORMED:  Right IJ ultrasound-guided port placement. SURGEON:  Radha Miranda MD    ASSISTANT:  None. ANESTHESIA:  MAC.    COMPLICATIONS:  None. SPECIMENS REMOVED:  None. IMPLANTS:  8-[employer]. DRAINS:  None. ESTIMATED BLOOD LOSS:  Minimal.    FINDINGS:  Port in good position. INDICATIONS:  This is a 68-year-old female who needed a port for neoadjuvant chemotherapy. PROCEDURE:  The patient was seen in the preop holding area where surgical site was marked by surgeon. Informed consent was obtained. She was taken to the operating room and laid in supine position where MAC anesthesia was induced. The patient was prepped and draped in the usual fashion. She was placed in Trendelenburg position. Attempt was made to access the right subclavian with 18-gauge introducer needle. This was unable to be done and therefore was converted to a right IJ ultrasound-guided port placement. 5 mL of local anesthetic was injected into the neck skin. 18-gauge introducer needle was used to access the right internal jugular vein under direct ultrasound guidance. The syringe was removed. The wires threaded through the needle and the needle was removed. The wire was going toward the SVC and right atrium on fluoro. Next, incision was made inferior to the clavicle with a 15-blade after injection of 20 mL of local anesthetic. Bovie cautery was used to create a port pocket. The tunneling device was used to tunnel up to the wire insertion site. A stab incision was made with an 11-blade.   The dilator sheath was threaded over the wire. The inner cannula wire removed. The catheter was clamped and threaded through the sheath and torn away, and then the catheter was tunneled down to the port pocket site and the tip was adjusted to be at the SVC and right atrium on fluoroscopy. Catheter was cut, threaded onto the port, and then the port was secured in the pocket on either side with 3-0 Prolene. This aspirated and flushed well with injectable saline solution and final heparin flush. Bovie cautery was used to obtain hemostasis. Another 10 mL of local anesthetic was injected into the port site and neck. The incision was closed with interrupted 3-0 Vicryl and the skin with 4-0 subcuticular Monocryl. Skin glue was placed on the incision. All sponge and needle counts were correct. The patient went to Recovery in stable condition.       MD MIGUEL [email or web address]  D:  09/27/2022 15:07  T:  09/27/2022 15:34  JOB #:  [id number]

## 2022-09-28 ENCOUNTER — OFFICE VISIT (OUTPATIENT)
Dept: ONCOLOGY | Age: 62
End: 2022-09-28
Payer: COMMERCIAL

## 2022-09-28 ENCOUNTER — TELEPHONE (OUTPATIENT)
Dept: SURGERY | Age: 62
End: 2022-09-28

## 2022-09-28 ENCOUNTER — DOCUMENTATION ONLY (OUTPATIENT)
Dept: ONCOLOGY | Age: 62
End: 2022-09-28

## 2022-09-28 ENCOUNTER — NURSE NAVIGATOR (OUTPATIENT)
Dept: CASE MANAGEMENT | Age: 62
End: 2022-09-28

## 2022-09-28 VITALS
SYSTOLIC BLOOD PRESSURE: 149 MMHG | HEIGHT: 60 IN | HEART RATE: 69 BPM | DIASTOLIC BLOOD PRESSURE: 80 MMHG | BODY MASS INDEX: 28.47 KG/M2 | TEMPERATURE: 97.7 F | WEIGHT: 145 LBS | OXYGEN SATURATION: 94 %

## 2022-09-28 DIAGNOSIS — C50.011 BILATERAL MALIGNANT NEOPLASM INVOLVING BOTH NIPPLE AND AREOLA IN FEMALE, UNSPECIFIED ESTROGEN RECEPTOR STATUS (HCC): Primary | ICD-10-CM

## 2022-09-28 DIAGNOSIS — C50.012 BILATERAL MALIGNANT NEOPLASM INVOLVING BOTH NIPPLE AND AREOLA IN FEMALE, UNSPECIFIED ESTROGEN RECEPTOR STATUS (HCC): Primary | ICD-10-CM

## 2022-09-28 PROCEDURE — 99215 OFFICE O/P EST HI 40 MIN: CPT | Performed by: INTERNAL MEDICINE

## 2022-09-28 RX ORDER — PROCHLORPERAZINE MALEATE 5 MG
5 TABLET ORAL
Qty: 30 TABLET | Refills: 3 | Status: SHIPPED | OUTPATIENT
Start: 2022-09-28

## 2022-09-28 RX ORDER — ONDANSETRON 8 MG/1
8 TABLET, ORALLY DISINTEGRATING ORAL
Qty: 30 TABLET | Refills: 3 | Status: SHIPPED | OUTPATIENT
Start: 2022-09-28

## 2022-09-28 RX ORDER — LIDOCAINE AND PRILOCAINE 25; 25 MG/G; MG/G
CREAM TOPICAL AS NEEDED
Qty: 30 G | Refills: 3 | Status: SHIPPED | OUTPATIENT
Start: 2022-09-28

## 2022-09-28 RX ORDER — DEXAMETHASONE 4 MG/1
TABLET ORAL
Qty: 30 TABLET | Refills: 0 | Status: SHIPPED | OUTPATIENT
Start: 2022-09-28

## 2022-09-28 NOTE — PROGRESS NOTES
Pharmacy Note- Chemotherapy Education    Ajay Almanzar is a  59 y. o.female  diagnosed with breast cancer here today for chemotherapy counseling and consent. Ms. Boston Moore is being treated with CARBOplatin, PACLitaxel, DOXOrubicin, cyclophosphamide, pembrolizumab. Provided education on CARBOplatin, PACLitaxel, DOXOrubicin, cyclophosphamide, pembrolizumab and premedications - fosaprepitant, palonosetron, dexamethasone, diphenhydramine, famotidine. Provided Ms. Boston Moore with a handout on use of cryotherapy for prevention of mouth sores and peripheral neuropathy. Side effects of chemotherapy reviewed included s/s infection, anemia, appetite changes, thrombocytopenia, fatigue, hair loss/alopecia, bone pain, skin and nail changes, diarrhea/constipation, infusion reactions, peripheral neuropathy, urine discoloration and cardiac toxicity. Ms. Boston Moore was provided information regarding the risks of immune-mediated adverse reactions secondary to pembrolizumab that may require interruption/delay of treatment and possible use of corticosteroids. These reactions include, but are not limited to: colitis (diarrhea or severe abdominal pain); hepatitis (jaundice, severe nausea, or vomiting, easy bruising, and/or bleeding); hypophysitis (persistent or unusual headache, extreme weakness, dizziness/fainting, and/or vision changes); dermatitis (skin rash, mouth sores, skin blisters, and/or skin peeling); ocular toxicity (uveitis, iritis, and/or episcleritis); neuropathy (motor or sensory); hyper/hypothyroidism. Patient given ways to manage these side effects and when to contact office. [employer] Handout of medications provided to patient. Ms. Boston Moore verbalized understanding of the information presented and all of the patient's questions were answered.     Catrachito Bañuelos, PharmD, [facility], [address] in place: No  Recommendation Provided To: Patient/Caregiver: 1 via In person    Intervention Accepted By: Patient/Caregiver: 1  Time Spent (min): 45

## 2022-09-28 NOTE — TELEPHONE ENCOUNTER
Patient came into [facility] office requesting cream for her port site because she is having discomfort. I tried to call patient but no one answered. She does not have a voice mail set up.       She already has EMLA cream prescribed my the medical oncologist.

## 2022-09-28 NOTE — PROGRESS NOTES
Dom Grider is a 59 y.o. female  Chief Complaint   Patient presents with    Follow-up      left breast cancer-ER 0%, NC 0%, HER2/radha negative. 1. Have you been to the ER, urgent care clinic since your last visit? Hospitalized since your last visit? No    2. Have you seen or consulted any other health care providers outside of the [address] [place] [address] since your last visit? Include any pap smears or colon screening.  No

## 2022-09-29 NOTE — PROGRESS NOTES
[employer]  Breast Navigator Encounter    Name:    Jody Stanley  Age:    59 y.o. Diagnosis:    LEFT breast cancer    Interdisciplinary Team:  Med-Onc:     Dr. Chevy Nazario  Surg-Onc:     Dr. Jean-Pierre Sifuentes:        Plastics:        :        Nurse Navigator:  Fatoumata Sosa RN, BSN, [facility]      Encounter type:  []Patient Initiated  [x]Navigator Follow-up []Pre-op  []Post-op  []Check-in Prior to First Treatment []Treatment Modality Change  []Initial Navigator Encounter []Other:       Narrative:     Saw patient at the time of her visit with Dr. Chevy Nazario. She is very anxious about chemo and about her port, which was just inserted yesterday. Having  a lot of pain at the port site. Wants to make sure that it looks okay. I looked at the port site, and it looks great. Surgical glue is intact. Recommended ice to the port site, pain med which she now has on hand. I reassured her that the pain will get better and better each day. Talked briefly about chemotherapy, and provided support. She is worried about the treatment and also trying to work during treatment. I let her know that she will have to start treatment and see how she does because every patient is different. Her sister Garrick Phillips) could not come with her today. She asked if I can speak to her, and I advised her to have her sister call, and I will be happy to answer her questions. I will continue to follow her. Referrals/Handouts:     Breast cancer pins for patient and her sister.                             Fatoumata Sosa RN, BSN, [facility]  Oncology Breast Navigator     [employer]  [address] [place] [address] 22.  W: [phone]  F: [phone]  [email or web address]  Good Help to Those in [facility]

## 2022-09-30 ENCOUNTER — TELEPHONE (OUTPATIENT)
Dept: SURGERY | Age: 62
End: 2022-09-30

## 2022-09-30 ENCOUNTER — TELEPHONE (OUTPATIENT)
Dept: ONCOLOGY | Age: 62
End: 2022-09-30

## 2022-09-30 ENCOUNTER — TELEPHONE (OUTPATIENT)
Dept: ORTHOPEDIC SURGERY | Age: 62
End: 2022-09-30

## 2022-09-30 NOTE — TELEPHONE ENCOUNTER
[facility] pt HIPAA verified. Made pt aware of ECHO appt scheduled at [facility] per pt request. 10/5 arrival time of 0830. Pt would also like information on cold cap and support groups. I advised pt that I will send a message to our SW and also have information sent to her on the cold cap. Pt voiced understanding and has no questions or concerns at this time.

## 2022-09-30 NOTE — TELEPHONE ENCOUNTER
[employer]  Oncology Social Work  Encounter     Patient Name:      Medical History:     Advance Directives:    Narrative: SW reviewed [address] who has provided pt with email regarding 2 support groups- SW will see if she received and get email for upcoming [employer]. Per 9/23/22 ONN note - Xochilt Parra had previously sent her information about support groups, etc., and she states that she definitely needs support and to talk to other breast cancer survivors. I re-sent the e-mail to her with the information on the support groups, Living Beyond Breast Cancer and the Triple Negative Breast [address].     Barriers to Care:     Plan:    Referral/Handouts:   Support Groups referral

## 2022-09-30 NOTE — TELEPHONE ENCOUNTER
[address]  Oncology Social Work  Encounter     Patient Name:  Ember Barclay    Medical History: dx TNBC    Advance Directives:    Narrative: PT anxious-not enough $ for anti nausea meds. SW will provide her with gas cards when she comes to ED [facility] for Echo on Oct 5- pt has SW direct number.        Barriers to Care:     Plan:    Referral/Handouts:     Financial/Medication assistance referral     Support Groups referral

## 2022-09-30 NOTE — TELEPHONE ENCOUNTER
Called patient because she left a message with the hello messages. She wanted to someone to call her back about her surgery. I called her back and there was no answer. Advised her to call back if it was urgent. If not, to call us back Monday morning.

## 2022-10-03 ENCOUNTER — TELEPHONE (OUTPATIENT)
Dept: ORTHOPEDIC SURGERY | Age: 62
End: 2022-10-03

## 2022-10-03 NOTE — TELEPHONE ENCOUNTER
Followed up with Mrs. Emiliana Kramer about her physical therapy visits and if she went to get her MRI done. Mrs. Emiliana Kramer stated that she hasn't been able to do either one yet. Informed patient to contact her Lonie Hidden , Aguila Haines, to get those things completed. Patient acknowledged.

## 2022-10-05 ENCOUNTER — HOSPITAL ENCOUNTER (OUTPATIENT)
Dept: NON INVASIVE DIAGNOSTICS | Age: 62
Discharge: HOME | End: 2022-10-05
Attending: INTERNAL MEDICINE
Payer: COMMERCIAL

## 2022-10-05 VITALS
DIASTOLIC BLOOD PRESSURE: 80 MMHG | SYSTOLIC BLOOD PRESSURE: 149 MMHG | HEIGHT: 60 IN | WEIGHT: 145.06 LBS | BODY MASS INDEX: 28.48 KG/M2

## 2022-10-05 DIAGNOSIS — C50.011 BILATERAL MALIGNANT NEOPLASM INVOLVING BOTH NIPPLE AND AREOLA IN FEMALE, UNSPECIFIED ESTROGEN RECEPTOR STATUS (HCC): ICD-10-CM

## 2022-10-05 DIAGNOSIS — C50.012 BILATERAL MALIGNANT NEOPLASM INVOLVING BOTH NIPPLE AND AREOLA IN FEMALE, UNSPECIFIED ESTROGEN RECEPTOR STATUS (HCC): ICD-10-CM

## 2022-10-05 LAB
ECHO AV AREA PEAK VELOCITY: 1.8 CM2
ECHO AV AREA VTI: 2 CM2
ECHO AV AREA/BSA PEAK VELOCITY: 1.1 CM2/M2
ECHO AV AREA/BSA VTI: 1.2 CM2/M2
ECHO AV MEAN GRADIENT: 2 MMHG
ECHO AV MEAN VELOCITY: 0.6 M/S
ECHO AV PEAK GRADIENT: 3 MMHG
ECHO AV PEAK VELOCITY: 0.9 M/S
ECHO AV VELOCITY RATIO: 1
ECHO AV VTI: 17.2 CM
ECHO LA DIAMETER INDEX: 1.96 CM/M2
ECHO LA DIAMETER: 3.2 CM
ECHO LA VOL 4C: 25 ML (ref 22–52)
ECHO LA VOLUME INDEX A4C: 15 ML/M2 (ref 16–34)
ECHO LV E' LATERAL VELOCITY: 9 CM/S
ECHO LV E' SEPTAL VELOCITY: 6 CM/S
ECHO LV EDV A4C: 41 ML
ECHO LV EDV INDEX A4C: 25 ML/M2
ECHO LV EJECTION FRACTION A4C: 37 %
ECHO LV ESV A4C: 26 ML
ECHO LV ESV INDEX A4C: 16 ML/M2
ECHO LV FRACTIONAL SHORTENING: 23 % (ref 28–44)
ECHO LV INTERNAL DIMENSION DIASTOLE INDEX: 2.64 CM/M2
ECHO LV INTERNAL DIMENSION DIASTOLIC: 4.3 CM (ref 3.9–5.3)
ECHO LV INTERNAL DIMENSION SYSTOLIC INDEX: 2.02 CM/M2
ECHO LV INTERNAL DIMENSION SYSTOLIC: 3.3 CM
ECHO LV IVSD: 0.8 CM (ref 0.6–0.9)
ECHO LV MASS 2D: 105.3 G (ref 67–162)
ECHO LV MASS INDEX 2D: 64.6 G/M2 (ref 43–95)
ECHO LV POSTERIOR WALL DIASTOLIC: 0.8 CM (ref 0.6–0.9)
ECHO LV RELATIVE WALL THICKNESS RATIO: 0.37
ECHO LVOT AREA: 2 CM2
ECHO LVOT AV VTI INDEX: 1.06
ECHO LVOT DIAM: 1.6 CM
ECHO LVOT MEAN GRADIENT: 2 MMHG
ECHO LVOT PEAK GRADIENT: 3 MMHG
ECHO LVOT PEAK VELOCITY: 0.9 M/S
ECHO LVOT STROKE VOLUME INDEX: 22.4 ML/M2
ECHO LVOT SV: 36.6 ML
ECHO LVOT VTI: 18.2 CM
ECHO MV A VELOCITY: 0.8 M/S
ECHO MV AREA PHT: 4.6 CM2
ECHO MV AREA VTI: 1.7 CM2
ECHO MV E DECELERATION TIME (DT): 208 MS
ECHO MV E VELOCITY: 0.64 M/S
ECHO MV E/A RATIO: 0.8
ECHO MV E/E' LATERAL: 7.11
ECHO MV E/E' RATIO (AVERAGED): 8.89
ECHO MV E/E' SEPTAL: 10.67
ECHO MV LVOT VTI INDEX: 1.18
ECHO MV MAX VELOCITY: 0.8 M/S
ECHO MV MEAN GRADIENT: 1 MMHG
ECHO MV MEAN VELOCITY: 0.5 M/S
ECHO MV PEAK GRADIENT: 2 MMHG
ECHO MV PRESSURE HALF TIME (PHT): 47.4 MS
ECHO MV VTI: 21.5 CM
ECHO PV MAX VELOCITY: 1 M/S
ECHO PV PEAK GRADIENT: 4 MMHG
ECHO RV FREE WALL PEAK S': 10 CM/S
ECHO RV TAPSE: 1.3 CM (ref 1.7–?)

## 2022-10-05 PROCEDURE — 93306 TTE W/DOPPLER COMPLETE: CPT

## 2022-10-11 ENCOUNTER — OFFICE VISIT (OUTPATIENT)
Dept: SURGERY | Age: 62
End: 2022-10-11
Payer: COMMERCIAL

## 2022-10-11 VITALS — BODY MASS INDEX: 28.51 KG/M2 | WEIGHT: 146 LBS

## 2022-10-11 DIAGNOSIS — C50.412 MALIGNANT NEOPLASM OF UPPER-OUTER QUADRANT OF LEFT BREAST IN FEMALE, ESTROGEN RECEPTOR NEGATIVE (HCC): Primary | ICD-10-CM

## 2022-10-11 DIAGNOSIS — Z17.1 MALIGNANT NEOPLASM OF UPPER-OUTER QUADRANT OF LEFT BREAST IN FEMALE, ESTROGEN RECEPTOR NEGATIVE (HCC): Primary | ICD-10-CM

## 2022-10-11 PROCEDURE — 99024 POSTOP FOLLOW-UP VISIT: CPT | Performed by: NURSE PRACTITIONER

## 2022-10-11 NOTE — PROGRESS NOTES
[facility] [place] at [address], [address], [facility]: [phone]  F: [phone]    Reason for Visit:   Dania Gonzalez is a 59 y.o. female who is seen for follow up of left breast cancer-ER 0%, FL 0%, HER2/radha negative. Treatment History:   10/1/22: Carboplatin taxol and keytruda followed by ddAC and Keytruda     History of Present Illness:   Patient is a 49-year-old female seen for left breast cancer. She had a screening mammogram on 7/20/2022 which showed a 1 cm irregular mass in the left breast at 3 o'clock position. 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 Ki-67 85%. Declined genetic testing. She comes today for cycle 1 day 1 of Carboplatin taxol and keytruda followed by ddAC and Keytruda. She feels well. No complaints today. No FH of breast, ovarian, pancreatic or Prostate malignancies  She works with special needs     Past Medical History:   Diagnosis Date    Cancer ([facility] [address]     BREAST CANCER    GERD (gastroesophageal reflux disease)     HTN (hypertension) 07/18/2011    Psychiatric disorder     ANIXETY AND DEPRESSION    Thyroid disease     HYPOTHYROID      Past Surgical History:   Procedure Laterality Date    HX COLONOSCOPY      HX MYOMECTOMY  05/02/1996    x 1    HX WISDOM TEETH EXTRACTION        Social History     Tobacco Use    Smoking status: Never     Passive exposure: Never    Smokeless tobacco: Never   Substance Use Topics    Alcohol use: Never      Family History   Problem Relation Age of Onset    Heart Failure Mother     Cancer Father     Diabetes Sister     Kidney Disease Sister     Anesth Problems Neg Hx      Current Outpatient Medications   Medication Sig    dexAMETHasone (DECADRON) 4 mg tablet Take 2 tabs (8mg) once daily on days 2 & 3 of chemotherapy    ondansetron (ZOFRAN ODT) 8 mg disintegrating tablet Take 1 Tablet by mouth every eight (8) hours as needed for Nausea or Vomiting. prochlorperazine (Compazine) 5 mg tablet Take 1 Tablet by mouth every six (6) hours as needed for Nausea or Vomiting.    lidocaine-prilocaine (EMLA) topical cream Apply  to affected area as needed for Pain. Apply a thick layer over port a cath 30-60 minutes prior to port access    amLODIPine (NORVASC) 5 mg tablet Take 5 mg by mouth every evening. hydroCHLOROthiazide (HYDRODIURIL) 25 mg tablet Take 12.5 mg by mouth daily. PARoxetine (PAXIL) 20 mg tablet paroxetine 20 mg tablet   Take 1 tablet every day by oral route. rosuvastatin (CRESTOR) 10 mg tablet Take 10 mg by mouth nightly. multivitamin, tx-iron-ca-min (THERA-M w/ IRON) 9 mg iron-400 mcg tab tablet Take 1 Tablet by mouth daily. No current facility-administered medications for this visit. Allergies   Allergen Reactions    Sulfa (Sulfonamide Antibiotics) Itching, Swelling and Unknown (comments)        Review of Systems: A complete review of systems was obtained, negative except as described above. Physical Exam:   Visit Vitals  BP (!) 145/74   Pulse 68   Temp 97 °F (36.1 °C)   Resp 17   Ht 5' (1.524 m)   Wt 147 lb (66.7 kg)   BMI 28.71 kg/m²       ECOG PS: 0  General: No distress  Eyes: PERRL, anicteric sclerae  HENT: Atraumatic  Neck: Supple  Lymphatic: No cervical, supraclavicular, or inguinal adenopathy  Respiratory: normal respiratory effort  Breast: Left breast with no masses, adenopathy, R with inverted nipple. CV: Normal rate, regular rhythm, no murmurs, no peripheral edema  GI: Soft, nontender  no splenomegaly  MS: Normal gait and station. Digits without clubbing or cyanosis. Skin: No rashes, ecchymoses, or petechiae. Normal temperature, turgor, and texture.   Psych: Alert, oriented, appropriate affect, normal judgment/insight    Results:     Lab Results   Component Value Date/Time    WBC 4.5 10/12/2022 09:02 AM    HGB 12.6 10/12/2022 09:02 AM    HCT 38.9 10/12/2022 09:02 AM    PLATELET 228 58/65/1058 09:02 AM    MCV 90.5 10/12/2022 09:02 AM    ABS. NEUTROPHILS 2.2 10/12/2022 09:02 AM     Lab Results   Component Value Date/Time    Sodium 140 09/26/2022 03:26 PM    Potassium 3.6 09/26/2022 03:26 PM    Chloride 106 09/26/2022 03:26 PM    CO2 28 09/26/2022 03:26 PM    Glucose 130 (H) 09/26/2022 03:26 PM    BUN 17 09/26/2022 03:26 PM    Creatinine 0.92 09/26/2022 03:26 PM    GFR est AA >60 09/26/2022 03:26 PM    GFR est non-AA >60 09/26/2022 03:26 PM    Calcium 9.6 09/26/2022 03:26 PM     No results found for: TBILI, ALT, AP, TP, ALB, GLOB    External   Records reviewed and summarized above. Pathology report(s) reviewed above. Radiology report(s) reviewed above. MRI  9/2022    LEFT BREAST: Conglomerate mass enhancement in the left breast at 4-5 o'clock,  combined dimension 3.7 x 1.9 x 1.4 cm. This is slightly more extensive than the  sonographic and mammographic findings. No lymphadenopathy. Assessment:   1) Left breast cancer  1 cm mass on mammogram  Palpated a lump  ER 0%, ME 0%, HER2/radha negative  Ki 67 was 85%. MRI breast with a 3.7 cm Left breast mass    [id number]  AJCC 8th ed-Stage IIA    Discussed pathology and the natural history of triple negative breast cancer. Characterized by higher relapse rates after the first 3 years of Dx, can recur locally or in distant organs. Adjuvant chemotherapy is estimated to decrease the risk of recurrence by 25-30%, breast cancer mortality by 20% and overall mortality by 20%. Review of MRI shows a T2 primary. We discussed NACT. Recommended chemotherapy + Keytruda based on the KEYNOTE-522 trial (taxol, carboplatin and [place] followed by [facility] as preoperative treatment, followed by surgery, and then adjuvant pembrolizumab for another nine cycles (27 weeks) after surgery). Today is cycle 1. See below regarding low EF.      Clinical trial list reviewed- none applicable  Genetic testing recommended     2) HTN    3) Depression    4) Hyperlipidemia    5) Low EF   She will start [facility] 1/2023 and will need a repeat ECHO prior to this with EF at least > 50%   Dr David Lamar is her cardiologist - will make an urgent apt for evaluation     6) Encounter for high risk medications like chemotherapy and immunotherapy  Reviewed side effects, anti-emetics  Labs pending   ECHO reviewed - see # 5     Plan:     Proceed today with cycle 1 day 1 of Carboplatin AUC 5 taxol and keytruda followed by ddAC (pending repeat ECHO) and Keytruda   Labs to include cbc with diff, cmp prior to infusions; TSH every 6 weeks  Zofran / compazine PRN  Dexamethasone on days 2 & 3 of chemotherapy   [facility]    RTC in 2 weeks     I appreciate the opportunity to participate in Ms. [address] [facility]. I personally saw and evaluated the patient and performed the key components of medical decision making. The history, physical exam, and documentation were performed by Cruz Crawley NP. I reviewed and verified the above documentation and modified it as needed.     Signed By: Dolores Foster MD

## 2022-10-11 NOTE — PROGRESS NOTES
HISTORY OF PRESENT ILLNESS  Brooklyn Albert is a 59 y.o. female. HPI Established patient presents for a post-op visit. Breast history -  Referring - Dr. Anaya December - [address]  7/20/2022 - BSmammogram 3D at [address] - showed a 1cm irregular mass in the LEFT breast at 3 o'clock position. 8/2/2022 - LEFT breast core biopsy - invasive ductal carcinoma, grade 2-3, ER 0%, MN 0%, HER2/radha negative. 9/27/22 - port placement - Dr. Manuelito Erazo - Dr. Buckley Sacks    Family history -   No FH of breast, ovarian, pancreatic or prostate malignancies  Patient declined genetic testing. ROS    Physical Exam  Chest:         Visit Vitals  Wt 146 lb (66.2 kg)   BMI 28.51 kg/m²       ASSESSMENT and PLAN    ICD-10-CM ICD-9-CM    1. Malignant neoplasm of upper-outer quadrant of left breast in female, estrogen receptor negative (HCC)  C50.412 174.4     Z17.1 V86.1           Port site - healing well. Has follow-up with Dr. Buckley Sacks to start infusions. Follow-up with Dr. Jim Rea in 6-8 weeks to assess response. She is comfortable with this plan. All questions answered and she stated understanding.

## 2022-10-12 ENCOUNTER — HOSPITAL ENCOUNTER (OUTPATIENT)
Dept: INFUSION THERAPY | Age: 62
Discharge: HOME | End: 2022-10-12
Payer: COMMERCIAL

## 2022-10-12 ENCOUNTER — DOCUMENTATION ONLY (OUTPATIENT)
Dept: ONCOLOGY | Age: 62
End: 2022-10-12

## 2022-10-12 ENCOUNTER — OFFICE VISIT (OUTPATIENT)
Dept: ONCOLOGY | Age: 62
End: 2022-10-12
Payer: COMMERCIAL

## 2022-10-12 VITALS
WEIGHT: 147 LBS | BODY MASS INDEX: 28.86 KG/M2 | HEIGHT: 60 IN | DIASTOLIC BLOOD PRESSURE: 74 MMHG | RESPIRATION RATE: 17 BRPM | SYSTOLIC BLOOD PRESSURE: 145 MMHG | HEART RATE: 68 BPM | TEMPERATURE: 97 F

## 2022-10-12 VITALS
BODY MASS INDEX: 28.86 KG/M2 | DIASTOLIC BLOOD PRESSURE: 76 MMHG | TEMPERATURE: 97.3 F | SYSTOLIC BLOOD PRESSURE: 115 MMHG | HEART RATE: 86 BPM | RESPIRATION RATE: 18 BRPM | HEIGHT: 60 IN | WEIGHT: 147 LBS

## 2022-10-12 DIAGNOSIS — C50.011 BILATERAL MALIGNANT NEOPLASM INVOLVING BOTH NIPPLE AND AREOLA IN FEMALE, UNSPECIFIED ESTROGEN RECEPTOR STATUS (HCC): Primary | ICD-10-CM

## 2022-10-12 DIAGNOSIS — Z51.11 ENCOUNTER FOR ANTINEOPLASTIC CHEMOTHERAPY: ICD-10-CM

## 2022-10-12 DIAGNOSIS — Z95.828 PORT-A-CATH IN PLACE: ICD-10-CM

## 2022-10-12 DIAGNOSIS — C50.012 BILATERAL MALIGNANT NEOPLASM INVOLVING BOTH NIPPLE AND AREOLA IN FEMALE, UNSPECIFIED ESTROGEN RECEPTOR STATUS (HCC): Primary | ICD-10-CM

## 2022-10-12 LAB
ALBUMIN SERPL-MCNC: 3.9 G/DL (ref 3.5–5)
ALBUMIN/GLOB SERPL: 1.1 {RATIO} (ref 1.1–2.2)
ALP SERPL-CCNC: 138 U/L (ref 45–117)
ALT SERPL-CCNC: 61 U/L (ref 12–78)
ANION GAP SERPL CALC-SCNC: 5 MMOL/L (ref 5–15)
AST SERPL-CCNC: 57 U/L (ref 15–37)
BASOPHILS # BLD: 0 K/UL (ref 0–0.1)
BASOPHILS NFR BLD: 0 % (ref 0–1)
BILIRUB SERPL-MCNC: 0.3 MG/DL (ref 0.2–1)
BUN SERPL-MCNC: 16 MG/DL (ref 6–20)
BUN/CREAT SERPL: 19 (ref 12–20)
CALCIUM SERPL-MCNC: 9.1 MG/DL (ref 8.5–10.1)
CHLORIDE SERPL-SCNC: 107 MMOL/L (ref 97–108)
CO2 SERPL-SCNC: 28 MMOL/L (ref 21–32)
CORTIS SERPL-MCNC: 6.5 UG/DL
CREAT SERPL-MCNC: 0.84 MG/DL (ref 0.55–1.02)
DIFFERENTIAL METHOD BLD: NORMAL
EOSINOPHIL # BLD: 0.2 K/UL (ref 0–0.4)
EOSINOPHIL NFR BLD: 5 % (ref 0–7)
ERYTHROCYTE [DISTWIDTH] IN BLOOD BY AUTOMATED COUNT: 12.4 % (ref 11.5–14.5)
GLOBULIN SER CALC-MCNC: 3.5 G/DL (ref 2–4)
GLUCOSE SERPL-MCNC: 96 MG/DL (ref 65–100)
HBV SURFACE AB SER QL: NONREACTIVE
HBV SURFACE AB SER-ACNC: <3.1 MIU/ML
HBV SURFACE AG SER QL: <0.1 INDEX
HBV SURFACE AG SER QL: NEGATIVE
HCT VFR BLD AUTO: 38.9 % (ref 35–47)
HGB BLD-MCNC: 12.6 G/DL (ref 11.5–16)
IMM GRANULOCYTES # BLD AUTO: 0 K/UL (ref 0–0.04)
IMM GRANULOCYTES NFR BLD AUTO: 0 % (ref 0–0.5)
LYMPHOCYTES # BLD: 1.4 K/UL (ref 0.8–3.5)
LYMPHOCYTES NFR BLD: 32 % (ref 12–49)
MCH RBC QN AUTO: 29.3 PG (ref 26–34)
MCHC RBC AUTO-ENTMCNC: 32.4 G/DL (ref 30–36.5)
MCV RBC AUTO: 90.5 FL (ref 80–99)
MONOCYTES # BLD: 0.5 K/UL (ref 0–1)
MONOCYTES NFR BLD: 12 % (ref 5–13)
NEUTS SEG # BLD: 2.2 K/UL (ref 1.8–8)
NEUTS SEG NFR BLD: 51 % (ref 32–75)
NRBC # BLD: 0 K/UL (ref 0–0.01)
NRBC BLD-RTO: 0 PER 100 WBC
PLATELET # BLD AUTO: 255 K/UL (ref 150–400)
PMV BLD AUTO: 9.2 FL (ref 8.9–12.9)
POTASSIUM SERPL-SCNC: 3.9 MMOL/L (ref 3.5–5.1)
PROT SERPL-MCNC: 7.4 G/DL (ref 6.4–8.2)
RBC # BLD AUTO: 4.3 M/UL (ref 3.8–5.2)
SODIUM SERPL-SCNC: 140 MMOL/L (ref 136–145)
T3FREE SERPL-MCNC: 3.7 PG/ML (ref 2.2–4)
T4 FREE SERPL-MCNC: 1.1 NG/DL (ref 0.8–1.5)
TSH SERPL DL<=0.05 MIU/L-ACNC: 0.26 UIU/ML (ref 0.36–3.74)
WBC # BLD AUTO: 4.5 K/UL (ref 3.6–11)

## 2022-10-12 PROCEDURE — 77030012965 HC NDL HUBR BBMI -A

## 2022-10-12 PROCEDURE — 82533 TOTAL CORTISOL: CPT

## 2022-10-12 PROCEDURE — 96375 TX/PRO/DX INJ NEW DRUG ADDON: CPT

## 2022-10-12 PROCEDURE — 86706 HEP B SURFACE ANTIBODY: CPT

## 2022-10-12 PROCEDURE — 74011250636 HC RX REV CODE- 250/636: Performed by: INTERNAL MEDICINE

## 2022-10-12 PROCEDURE — 84439 ASSAY OF FREE THYROXINE: CPT

## 2022-10-12 PROCEDURE — 36415 COLL VENOUS BLD VENIPUNCTURE: CPT

## 2022-10-12 PROCEDURE — 96413 CHEMO IV INFUSION 1 HR: CPT

## 2022-10-12 PROCEDURE — 99215 OFFICE O/P EST HI 40 MIN: CPT | Performed by: INTERNAL MEDICINE

## 2022-10-12 PROCEDURE — 96417 CHEMO IV INFUS EACH ADDL SEQ: CPT

## 2022-10-12 PROCEDURE — 87340 HEPATITIS B SURFACE AG IA: CPT

## 2022-10-12 PROCEDURE — 96367 TX/PROPH/DG ADDL SEQ IV INF: CPT

## 2022-10-12 PROCEDURE — 86704 HEP B CORE ANTIBODY TOTAL: CPT

## 2022-10-12 PROCEDURE — 74011000250 HC RX REV CODE- 250: Performed by: INTERNAL MEDICINE

## 2022-10-12 PROCEDURE — 74011000258 HC RX REV CODE- 258: Performed by: INTERNAL MEDICINE

## 2022-10-12 PROCEDURE — 84481 FREE ASSAY (FT-3): CPT

## 2022-10-12 PROCEDURE — 85025 COMPLETE CBC W/AUTO DIFF WBC: CPT

## 2022-10-12 PROCEDURE — 80053 COMPREHEN METABOLIC PANEL: CPT

## 2022-10-12 PROCEDURE — 84443 ASSAY THYROID STIM HORMONE: CPT

## 2022-10-12 RX ORDER — HEPARIN 100 UNIT/ML
500 SYRINGE INTRAVENOUS AS NEEDED
Status: CANCELLED
Start: 2022-10-12

## 2022-10-12 RX ORDER — SODIUM CHLORIDE 9 MG/ML
5-250 INJECTION, SOLUTION INTRAVENOUS AS NEEDED
Status: DISPENSED | OUTPATIENT
Start: 2022-10-12 | End: 2022-10-12

## 2022-10-12 RX ORDER — PALONOSETRON 0.05 MG/ML
0.25 INJECTION, SOLUTION INTRAVENOUS ONCE
Status: COMPLETED | OUTPATIENT
Start: 2022-10-12 | End: 2022-10-12

## 2022-10-12 RX ORDER — ACETAMINOPHEN 325 MG/1
650 TABLET ORAL AS NEEDED
Status: CANCELLED
Start: 2022-10-12

## 2022-10-12 RX ORDER — EPINEPHRINE 1 MG/ML
0.3 INJECTION, SOLUTION, CONCENTRATE INTRAVENOUS AS NEEDED
Status: ACTIVE | OUTPATIENT
Start: 2022-10-12 | End: 2022-10-12

## 2022-10-12 RX ORDER — DIPHENHYDRAMINE HYDROCHLORIDE 50 MG/ML
25 INJECTION, SOLUTION INTRAMUSCULAR; INTRAVENOUS AS NEEDED
Status: ACTIVE | OUTPATIENT
Start: 2022-10-12 | End: 2022-10-12

## 2022-10-12 RX ORDER — DIPHENHYDRAMINE HYDROCHLORIDE 50 MG/ML
50 INJECTION, SOLUTION INTRAMUSCULAR; INTRAVENOUS ONCE
Status: CANCELLED
Start: 2022-10-12 | End: 2022-10-12

## 2022-10-12 RX ORDER — HYDROCORTISONE SODIUM SUCCINATE 100 MG/2ML
100 INJECTION, POWDER, FOR SOLUTION INTRAMUSCULAR; INTRAVENOUS AS NEEDED
Status: CANCELLED | OUTPATIENT
Start: 2022-10-12

## 2022-10-12 RX ORDER — SODIUM CHLORIDE 0.9 % (FLUSH) 0.9 %
5-40 SYRINGE (ML) INJECTION AS NEEDED
Status: CANCELLED | OUTPATIENT
Start: 2022-10-12

## 2022-10-12 RX ORDER — ACETAMINOPHEN 325 MG/1
650 TABLET ORAL AS NEEDED
Status: ACTIVE | OUTPATIENT
Start: 2022-10-12 | End: 2022-10-12

## 2022-10-12 RX ORDER — PALONOSETRON 0.05 MG/ML
0.25 INJECTION, SOLUTION INTRAVENOUS ONCE
Status: CANCELLED | OUTPATIENT
Start: 2022-10-12 | End: 2022-10-12

## 2022-10-12 RX ORDER — SODIUM CHLORIDE 9 MG/ML
5-40 INJECTION INTRAMUSCULAR; INTRAVENOUS; SUBCUTANEOUS AS NEEDED
Status: ACTIVE | OUTPATIENT
Start: 2022-10-12 | End: 2022-10-12

## 2022-10-12 RX ORDER — DIPHENHYDRAMINE HYDROCHLORIDE 50 MG/ML
50 INJECTION, SOLUTION INTRAMUSCULAR; INTRAVENOUS AS NEEDED
Status: ACTIVE | OUTPATIENT
Start: 2022-10-12 | End: 2022-10-12

## 2022-10-12 RX ORDER — DIPHENHYDRAMINE HYDROCHLORIDE 50 MG/ML
50 INJECTION, SOLUTION INTRAMUSCULAR; INTRAVENOUS AS NEEDED
Status: CANCELLED
Start: 2022-10-12

## 2022-10-12 RX ORDER — SODIUM CHLORIDE 9 MG/ML
5-40 INJECTION INTRAMUSCULAR; INTRAVENOUS; SUBCUTANEOUS AS NEEDED
Status: CANCELLED | OUTPATIENT
Start: 2022-10-12

## 2022-10-12 RX ORDER — ALBUTEROL SULFATE 0.83 MG/ML
2.5 SOLUTION RESPIRATORY (INHALATION) AS NEEDED
Status: CANCELLED
Start: 2022-10-12

## 2022-10-12 RX ORDER — SODIUM CHLORIDE 9 MG/ML
5-250 INJECTION, SOLUTION INTRAVENOUS AS NEEDED
Status: CANCELLED | OUTPATIENT
Start: 2022-10-12

## 2022-10-12 RX ORDER — DIPHENHYDRAMINE HYDROCHLORIDE 50 MG/ML
50 INJECTION, SOLUTION INTRAMUSCULAR; INTRAVENOUS ONCE
Status: COMPLETED | OUTPATIENT
Start: 2022-10-12 | End: 2022-10-12

## 2022-10-12 RX ORDER — HYDROCORTISONE SODIUM SUCCINATE 100 MG/2ML
100 INJECTION, POWDER, FOR SOLUTION INTRAMUSCULAR; INTRAVENOUS AS NEEDED
Status: ACTIVE | OUTPATIENT
Start: 2022-10-12 | End: 2022-10-12

## 2022-10-12 RX ORDER — DIPHENHYDRAMINE HYDROCHLORIDE 50 MG/ML
25 INJECTION, SOLUTION INTRAMUSCULAR; INTRAVENOUS AS NEEDED
Status: CANCELLED
Start: 2022-10-12

## 2022-10-12 RX ORDER — ONDANSETRON 2 MG/ML
8 INJECTION INTRAMUSCULAR; INTRAVENOUS AS NEEDED
Status: CANCELLED | OUTPATIENT
Start: 2022-10-12

## 2022-10-12 RX ORDER — SODIUM CHLORIDE 0.9 % (FLUSH) 0.9 %
5-40 SYRINGE (ML) INJECTION AS NEEDED
Status: DISPENSED | OUTPATIENT
Start: 2022-10-12 | End: 2022-10-12

## 2022-10-12 RX ORDER — EPINEPHRINE 1 MG/ML
0.3 INJECTION, SOLUTION, CONCENTRATE INTRAVENOUS AS NEEDED
Status: CANCELLED | OUTPATIENT
Start: 2022-10-12

## 2022-10-12 RX ORDER — ONDANSETRON 2 MG/ML
8 INJECTION INTRAMUSCULAR; INTRAVENOUS AS NEEDED
Status: ACTIVE | OUTPATIENT
Start: 2022-10-12 | End: 2022-10-12

## 2022-10-12 RX ORDER — ALBUTEROL SULFATE 0.83 MG/ML
2.5 SOLUTION RESPIRATORY (INHALATION) AS NEEDED
Status: ACTIVE | OUTPATIENT
Start: 2022-10-12 | End: 2022-10-12

## 2022-10-12 RX ORDER — HEPARIN 100 UNIT/ML
500 SYRINGE INTRAVENOUS AS NEEDED
Status: DISPENSED | OUTPATIENT
Start: 2022-10-12 | End: 2022-10-12

## 2022-10-12 RX ADMIN — FAMOTIDINE 20 MG: 10 INJECTION INTRAVENOUS at 13:13

## 2022-10-12 RX ADMIN — DIPHENHYDRAMINE HYDROCHLORIDE 50 MG: 50 INJECTION, SOLUTION INTRAMUSCULAR; INTRAVENOUS at 13:13

## 2022-10-12 RX ADMIN — PALONOSETRON 0.25 MG: 0.05 INJECTION, SOLUTION INTRAVENOUS at 13:13

## 2022-10-12 RX ADMIN — SODIUM CHLORIDE 200 MG: 9 INJECTION, SOLUTION INTRAVENOUS at 12:40

## 2022-10-12 RX ADMIN — PACLITAXEL 134 MG: 6 INJECTION, SOLUTION INTRAVENOUS at 14:19

## 2022-10-12 RX ADMIN — CARBOPLATIN 486 MG: 10 INJECTION, SOLUTION INTRAVENOUS at 15:24

## 2022-10-12 RX ADMIN — FOSAPREPITANT DIMEGLUMINE 150 MG: 150 INJECTION, POWDER, LYOPHILIZED, FOR SOLUTION INTRAVENOUS at 13:35

## 2022-10-12 RX ADMIN — DEXAMETHASONE SODIUM PHOSPHATE 12 MG: 4 INJECTION, SOLUTION INTRAMUSCULAR; INTRAVENOUS at 14:00

## 2022-10-12 NOTE — PROGRESS NOTES
Dakota Cardenas is a 59 y.o. female  Chief Complaint   Patient presents with    Follow-up    Breast Cancer     1. Have you been to the ER, urgent care clinic since your last visit? Hospitalized since your last visit? No    2. Have you seen or consulted any other health care providers outside of the [address] [place] [address] since your last visit? Include any pap smears or colon screening.  No

## 2022-10-12 NOTE — Clinical Note
Rosanne - Pt needs urgent apt with her cardiologist Dr. Syed Finnegan due to her recent ECHO showing low EF and us trying to give her cardiotoxic med. Can you send them our office note as well as her recent ECHO she had with us? Thanks!  Then call pt with apt date/ time

## 2022-10-12 NOTE — PROGRESS NOTES
Pharmacy Note- Chemotherapy Education     Nancykenyetta Rai is a  59 y. o.female  diagnosed with breast cancer here today for Cycle 1, Day 1 chemotherapy counseling. Ms. Bonnie Carreon is being treated with CARBOplatin, PACLitaxel, DOXOrubicin, cyclophosphamide, pembrolizumab. Provided education on CARBOplatin, PACLitaxel, DOXOrubicin, cyclophosphamide, pembrolizumab and premedications - fosaprepitant, palonosetron, dexamethasone, diphenhydramine, famotidine. Provided Ms. Bonnie Carreon with a handout and calendar and reviewed home supportive care regimen. Reviewed side effects of chemotherapy to included s/s infection, anemia, appetite changes, thrombocytopenia, fatigue, hair loss/alopecia, bone pain, skin and nail changes, diarrhea/constipation, infusion reactions, peripheral neuropathy, urine discoloration and cardiac toxicity. Ms. Bonnie Carreon was provided information regarding the risks of immune-mediated adverse reactions secondary to pembrolizumab that may require interruption/delay of treatment and possible use of corticosteroids. These reactions include, but are not limited to: colitis (diarrhea or severe abdominal pain); hepatitis (jaundice, severe nausea, or vomiting, easy bruising, and/or bleeding); hypophysitis (persistent or unusual headache, extreme weakness, dizziness/fainting, and/or vision changes); dermatitis (skin rash, mouth sores, skin blisters, and/or skin peeling); ocular toxicity (uveitis, iritis, and/or episcleritis); neuropathy (motor or sensory); hyper/hypothyroidism. Patient given ways to manage these side effects and when to contact office. Ms. Bonnie Carreon verbalized understanding of the information presented and all of the patient's questions were answered.      Balbir Parada, PharmD, [facility], [address] [place] [address] in place: No  Recommendation Provided To: Patient/Caregiver: 1 via In person    Intervention Accepted By: Patient/Caregiver: 1  Time Spent (min): 30

## 2022-10-12 NOTE — PROGRESS NOTES
1036  Appt with cardiologist made for tomorrow 10/13 arrive at 1400 with Dr. Pearley Boxer. Appt info given to Meliza ([facility]) to give to pt.

## 2022-10-12 NOTE — PROGRESS NOTES
[facility] Chemo Progress Note    Date: 2022    Name: Joaquin Marshall    MRN: [id number]         : 1960    Ms. Ignacio Ramirez Arrived ambulatory and in no distress for cycle 1 day 1 of Keytruda/Carbo/taxol. Assessment was completed and documented in flowsheets. No acute concerns at this time. Port accessed without difficulty by access RN, labs drawn and processed. Follow Up: Proceed with treatment    Chemotherapy Flowsheet 10/12/2022   Cycle C1   Date 10/12/2022   Drug / Regimen Taxol/Carbo/Keytruda   Pre Meds given   Notes given         Ms. Obrien's vitals were reviewed. Patient Vitals for the past 12 hrs:   Temp Pulse Resp BP   10/12/22 1601 -- 86 -- 115/76   10/12/22 0859 97.3 °F (36.3 °C) 68 18 (!) 145/74         Lab results were obtained and reviewed. Labs within parameter for treatment. Recent Results (from the past 12 hour(s))   TSH 3RD GENERATION    Collection Time: 10/12/22  9:02 AM   Result Value Ref Range    TSH 0.26 (L) 0.36 - 3.74 uIU/mL   HEP B SURFACE AG    Collection Time: 10/12/22  9:02 AM   Result Value Ref Range    Hepatitis B surface Ag <0.10 Index    Hep B surface Ag Interp. Negative NEG     HEP B SURFACE AB    Collection Time: 10/12/22  9:02 AM   Result Value Ref Range    Hepatitis B surface Ab <3.10 mIU/mL    Hep B surface Ab Interp. NONREACTIVE NR     CBC WITH AUTOMATED DIFF    Collection Time: 10/12/22  9:02 AM   Result Value Ref Range    WBC 4.5 3.6 - 11.0 K/uL    RBC 4.30 3.80 - 5.20 M/uL    HGB 12.6 11.5 - 16.0 g/dL    HCT 38.9 35.0 - 47.0 %    MCV 90.5 80.0 - 99.0 FL    MCH 29.3 26.0 - 34.0 PG    MCHC 32.4 30.0 - 36.5 g/dL    RDW 12.4 11.5 - 14.5 %    PLATELET 504 213 - 943 K/uL    MPV 9.2 8.9 - 12.9 FL    NRBC 0.0 0  WBC    ABSOLUTE NRBC 0.00 0.00 - 0.01 K/uL    NEUTROPHILS 51 32 - 75 %    LYMPHOCYTES 32 12 - 49 %    MONOCYTES 12 5 - 13 %    EOSINOPHILS 5 0 - 7 %    BASOPHILS 0 0 - 1 %    IMMATURE GRANULOCYTES 0 0.0 - 0.5 %    ABS.  NEUTROPHILS 2.2 1.8 - 8.0 K/UL ABS. LYMPHOCYTES 1.4 0.8 - 3.5 K/UL    ABS. MONOCYTES 0.5 0.0 - 1.0 K/UL    ABS. EOSINOPHILS 0.2 0.0 - 0.4 K/UL    ABS. BASOPHILS 0.0 0.0 - 0.1 K/UL    ABS. IMM. GRANS. 0.0 0.00 - 0.04 K/UL    DF AUTOMATED     METABOLIC PANEL, COMPREHENSIVE    Collection Time: 10/12/22  9:02 AM   Result Value Ref Range    Sodium 140 136 - 145 mmol/L    Potassium 3.9 3.5 - 5.1 mmol/L    Chloride 107 97 - 108 mmol/L    CO2 28 21 - 32 mmol/L    Anion gap 5 5 - 15 mmol/L    Glucose 96 65 - 100 mg/dL    BUN 16 6 - 20 MG/DL    Creatinine 0.84 0.55 - 1.02 MG/DL    BUN/Creatinine ratio 19 12 - 20      eGFR >60 >60 ml/min/1.73m2    Calcium 9.1 8.5 - 10.1 MG/DL    Bilirubin, total 0.3 0.2 - 1.0 MG/DL    ALT (SGPT) 61 12 - 78 U/L    AST (SGOT) 57 (H) 15 - 37 U/L    Alk. phosphatase 138 (H) 45 - 117 U/L    Protein, total 7.4 6.4 - 8.2 g/dL    Albumin 3.9 3.5 - 5.0 g/dL    Globulin 3.5 2.0 - 4.0 g/dL    A-G Ratio 1.1 1.1 - 2.2     CORTISOL    Collection Time: 10/12/22  9:02 AM   Result Value Ref Range    Cortisol, random 6.5 ug/dL   T4, FREE    Collection Time: 10/12/22  9:02 AM   Result Value Ref Range    T4, Free 1.1 0.8 - 1.5 NG/DL   T3, FREE    Collection Time: 10/12/22  9:02 AM   Result Value Ref Range    Free Triiodothyronine (T3) 3.7 2.2 - 4.0 pg/mL       Pre-medications  were administered as ordered and chemotherapy was initiated.   Medications Administered       CARBOplatin (PARAPLATIN) 486 mg in 0.9% sodium chloride 250 mL, overfill volume 25 mL chemo infusion       Admin Date  10/12/2022 Action  New Bag Dose  486 mg Rate  647.2 mL/hr Route  IntraVENous Administered By  Hyacinth Durant RN              dexamethasone (DECADRON) 12 mg in 0.9% sodium chloride 50 mL, overfill volume 5 mL IVPB       Admin Date  10/12/2022 Action  New Bag Dose  12 mg Rate  232 mL/hr Route  IntraVENous Administered By  Hyacinth Durant RN              diphenhydrAMINE (BENADRYL) injection 50 mg       Admin Date  10/12/2022 Action  Given Dose  50 mg Route  IntraVENous Administered By  Geovany Bird RN              famotidine (PF) (PEPCID) 20 mg in 0.9% sodium chloride 10 mL injection       Admin Date  10/12/2022 Action  Given Dose  20 mg Route  IntraVENous Administered By  Geovany Bird RN              fosaprepitant (EMEND) 150 mg in 0.9% sodium chloride 150 mL IVPB       Admin Date  10/12/2022 Action  New Bag Dose  150 mg Rate  450 mL/hr Route  IntraVENous Administered By  Geovany Bird RN              PACLitaxeL (TAXOL) 134 mg in 0.9% sodium chloride 250 mL, overfill volume 25 mL chemo infusion       Admin Date  10/12/2022 Action  New Bag Dose  134 mg Rate  297.3 mL/hr Route  IntraVENous Administered By  Geovany Bird RN              palonosetron HCl (ALOXI) injection 0.25 mg       Admin Date  10/12/2022 Action  Given Dose  0.25 mg Route  IntraVENous Administered By  Geovany Bird RN              pembrolizumab [facility]) 200 mg in 0.9% sodium chloride 100 mL, overfill volume 10 mL IVPB       Admin Date  10/12/2022 Action  New Bag Dose  200 mg Rate  236 mL/hr Route  IntraVENous Administered By  Geovany Bird RN                    Two nurses verified prior to administering:  Drug name, Drug dose, Infusion volume or drug volume when prepared in a syringe, Rate of administration, Route of administration, Expiration dates and/or times, Appearance and physical integrity of the drugs, Rate set on infusion pump, when used, and Sequencing of drug administration. Ms. Darien Vilchis tolerated treatment well, port flushed and de-accessed per protocol. Patient was discharged from [place] [address] in stable condition. Patient was discharged from [facility] in stable condition. Patient aware of next appointment.      Future Appointments   Date Time Provider Linnea Yuen   10/19/2022 10:00 AM E3 ISABEL MED [facility] [address]   10/26/2022 10:00 AM C1 ISABEL MED [facility] [address]   10/26/2022 10:15 AM Derik Rosario  [address] BS AMB   11/2/2022  8:00 AM [facility] [place] [facility] 11/9/2022 10:00 AM C1 ISABEL MED [facility] [facility] H   11/16/2022  8:00 AM B3 ISABEL MED [facility]   11/16/2022  2:15 PM Nichelle Ashford MD [facility] BS AMB   11/23/2022  7:30 AM F3 ISABEL [place] [facility]   11/30/2022 10:00 AM A1 ISABEL MED [address] [place] [address]   12/7/2022 10:00 AM C1 ISABEL MED [address] [place] [address]   12/14/2022  7:30 AM F3 ISABEL LONG [place] [facility]   12/21/2022 10:00 AM C1 ISABEL MED [place] [facility]   12/28/2022 10:00 AM C1 ISABEL MED [facility], RN  October 12, 2022

## 2022-10-13 LAB
HBV CORE AB SERPL QL IA: NEGATIVE
HBV CORE AB SERPL QL IA: NEGATIVE
HBV CORE IGM SERPL QL IA: NEGATIVE

## 2022-10-13 RX ORDER — DIPHENHYDRAMINE HYDROCHLORIDE 50 MG/ML
50 INJECTION, SOLUTION INTRAMUSCULAR; INTRAVENOUS ONCE
Status: CANCELLED
Start: 2022-10-19 | End: 2022-10-19

## 2022-10-13 RX ORDER — SODIUM CHLORIDE 0.9 % (FLUSH) 0.9 %
5-40 SYRINGE (ML) INJECTION AS NEEDED
Status: CANCELLED | OUTPATIENT
Start: 2022-10-19

## 2022-10-13 RX ORDER — HEPARIN 100 UNIT/ML
500 SYRINGE INTRAVENOUS AS NEEDED
Status: CANCELLED
Start: 2022-10-19

## 2022-10-13 RX ORDER — DIPHENHYDRAMINE HYDROCHLORIDE 50 MG/ML
25 INJECTION, SOLUTION INTRAMUSCULAR; INTRAVENOUS AS NEEDED
Status: CANCELLED
Start: 2022-10-19

## 2022-10-13 RX ORDER — DEXAMETHASONE SODIUM PHOSPHATE 100 MG/10ML
10 INJECTION INTRAMUSCULAR; INTRAVENOUS ONCE
Status: CANCELLED | OUTPATIENT
Start: 2022-10-19 | End: 2022-10-19

## 2022-10-13 RX ORDER — HYDROCORTISONE SODIUM SUCCINATE 100 MG/2ML
100 INJECTION, POWDER, FOR SOLUTION INTRAMUSCULAR; INTRAVENOUS AS NEEDED
Status: CANCELLED | OUTPATIENT
Start: 2022-10-19

## 2022-10-13 RX ORDER — ALBUTEROL SULFATE 0.83 MG/ML
2.5 SOLUTION RESPIRATORY (INHALATION) AS NEEDED
Status: CANCELLED
Start: 2022-10-19

## 2022-10-13 RX ORDER — SODIUM CHLORIDE 9 MG/ML
5-250 INJECTION, SOLUTION INTRAVENOUS AS NEEDED
Status: CANCELLED | OUTPATIENT
Start: 2022-10-19

## 2022-10-13 RX ORDER — EPINEPHRINE 1 MG/ML
0.3 INJECTION, SOLUTION, CONCENTRATE INTRAVENOUS AS NEEDED
Status: CANCELLED | OUTPATIENT
Start: 2022-10-19

## 2022-10-13 RX ORDER — ONDANSETRON 2 MG/ML
8 INJECTION INTRAMUSCULAR; INTRAVENOUS AS NEEDED
Status: CANCELLED | OUTPATIENT
Start: 2022-10-19

## 2022-10-13 RX ORDER — DIPHENHYDRAMINE HYDROCHLORIDE 50 MG/ML
50 INJECTION, SOLUTION INTRAMUSCULAR; INTRAVENOUS AS NEEDED
Status: CANCELLED
Start: 2022-10-19

## 2022-10-13 RX ORDER — SODIUM CHLORIDE 9 MG/ML
5-40 INJECTION INTRAMUSCULAR; INTRAVENOUS; SUBCUTANEOUS AS NEEDED
Status: CANCELLED | OUTPATIENT
Start: 2022-10-19

## 2022-10-13 RX ORDER — ACETAMINOPHEN 325 MG/1
650 TABLET ORAL AS NEEDED
Status: CANCELLED
Start: 2022-10-19

## 2022-10-18 ENCOUNTER — HOSPITAL ENCOUNTER (OUTPATIENT)
Dept: INFUSION THERAPY | Age: 62
End: 2022-10-18

## 2022-10-19 ENCOUNTER — HOSPITAL ENCOUNTER (OUTPATIENT)
Dept: INFUSION THERAPY | Age: 62
Discharge: HOME | End: 2022-10-19
Payer: COMMERCIAL

## 2022-10-19 ENCOUNTER — NURSE NAVIGATOR (OUTPATIENT)
Dept: CASE MANAGEMENT | Age: 62
End: 2022-10-19

## 2022-10-19 VITALS
TEMPERATURE: 97.7 F | BODY MASS INDEX: 28.12 KG/M2 | SYSTOLIC BLOOD PRESSURE: 143 MMHG | RESPIRATION RATE: 16 BRPM | DIASTOLIC BLOOD PRESSURE: 60 MMHG | WEIGHT: 144 LBS | HEART RATE: 79 BPM | OXYGEN SATURATION: 98 %

## 2022-10-19 DIAGNOSIS — C50.012 BILATERAL MALIGNANT NEOPLASM INVOLVING BOTH NIPPLE AND AREOLA IN FEMALE, UNSPECIFIED ESTROGEN RECEPTOR STATUS (HCC): Primary | ICD-10-CM

## 2022-10-19 DIAGNOSIS — C50.011 BILATERAL MALIGNANT NEOPLASM INVOLVING BOTH NIPPLE AND AREOLA IN FEMALE, UNSPECIFIED ESTROGEN RECEPTOR STATUS (HCC): Primary | ICD-10-CM

## 2022-10-19 LAB
BASOPHILS # BLD: 0 K/UL (ref 0–0.1)
BASOPHILS NFR BLD: 1 % (ref 0–1)
DIFFERENTIAL METHOD BLD: ABNORMAL
EOSINOPHIL # BLD: 0.2 K/UL (ref 0–0.4)
EOSINOPHIL NFR BLD: 4 % (ref 0–7)
ERYTHROCYTE [DISTWIDTH] IN BLOOD BY AUTOMATED COUNT: 12.2 % (ref 11.5–14.5)
HCT VFR BLD AUTO: 35.3 % (ref 35–47)
HGB BLD-MCNC: 11.3 G/DL (ref 11.5–16)
IMM GRANULOCYTES # BLD AUTO: 0 K/UL (ref 0–0.04)
IMM GRANULOCYTES NFR BLD AUTO: 1 % (ref 0–0.5)
LYMPHOCYTES # BLD: 1.8 K/UL (ref 0.8–3.5)
LYMPHOCYTES NFR BLD: 44 % (ref 12–49)
MCH RBC QN AUTO: 28.3 PG (ref 26–34)
MCHC RBC AUTO-ENTMCNC: 32 G/DL (ref 30–36.5)
MCV RBC AUTO: 88.5 FL (ref 80–99)
MONOCYTES # BLD: 0.3 K/UL (ref 0–1)
MONOCYTES NFR BLD: 6 % (ref 5–13)
NEUTS SEG # BLD: 1.8 K/UL (ref 1.8–8)
NEUTS SEG NFR BLD: 44 % (ref 32–75)
NRBC # BLD: 0 K/UL (ref 0–0.01)
NRBC BLD-RTO: 0 PER 100 WBC
PLATELET # BLD AUTO: 245 K/UL (ref 150–400)
PMV BLD AUTO: 9.3 FL (ref 8.9–12.9)
RBC # BLD AUTO: 3.99 M/UL (ref 3.8–5.2)
WBC # BLD AUTO: 4.1 K/UL (ref 3.6–11)

## 2022-10-19 PROCEDURE — 96413 CHEMO IV INFUSION 1 HR: CPT

## 2022-10-19 PROCEDURE — 74011000250 HC RX REV CODE- 250: Performed by: INTERNAL MEDICINE

## 2022-10-19 PROCEDURE — 85025 COMPLETE CBC W/AUTO DIFF WBC: CPT

## 2022-10-19 PROCEDURE — 96375 TX/PRO/DX INJ NEW DRUG ADDON: CPT

## 2022-10-19 PROCEDURE — 74011250636 HC RX REV CODE- 250/636: Performed by: INTERNAL MEDICINE

## 2022-10-19 PROCEDURE — 36415 COLL VENOUS BLD VENIPUNCTURE: CPT

## 2022-10-19 PROCEDURE — 77030012965 HC NDL HUBR BBMI -A

## 2022-10-19 RX ORDER — DIPHENHYDRAMINE HYDROCHLORIDE 50 MG/ML
50 INJECTION, SOLUTION INTRAMUSCULAR; INTRAVENOUS ONCE
Status: COMPLETED | OUTPATIENT
Start: 2022-10-19 | End: 2022-10-19

## 2022-10-19 RX ORDER — SODIUM CHLORIDE 0.9 % (FLUSH) 0.9 %
5-40 SYRINGE (ML) INJECTION AS NEEDED
Status: DISCONTINUED | OUTPATIENT
Start: 2022-10-19 | End: 2022-10-20 | Stop reason: HOSPADM

## 2022-10-19 RX ORDER — SODIUM CHLORIDE 9 MG/ML
5-40 INJECTION INTRAMUSCULAR; INTRAVENOUS; SUBCUTANEOUS AS NEEDED
Status: DISCONTINUED | OUTPATIENT
Start: 2022-10-19 | End: 2022-10-20 | Stop reason: HOSPADM

## 2022-10-19 RX ORDER — HEPARIN 100 UNIT/ML
500 SYRINGE INTRAVENOUS AS NEEDED
Status: DISCONTINUED | OUTPATIENT
Start: 2022-10-19 | End: 2022-10-20 | Stop reason: HOSPADM

## 2022-10-19 RX ORDER — SODIUM CHLORIDE 9 MG/ML
5-250 INJECTION, SOLUTION INTRAVENOUS AS NEEDED
Status: DISCONTINUED | OUTPATIENT
Start: 2022-10-19 | End: 2022-10-20 | Stop reason: HOSPADM

## 2022-10-19 RX ORDER — SODIUM CHLORIDE 9 MG/ML
5-250 INJECTION, SOLUTION INTRAVENOUS AS NEEDED
Status: DISPENSED | OUTPATIENT
Start: 2022-10-19 | End: 2022-10-19

## 2022-10-19 RX ORDER — DEXAMETHASONE SODIUM PHOSPHATE 10 MG/ML
10 INJECTION INTRAMUSCULAR; INTRAVENOUS ONCE
Status: COMPLETED | OUTPATIENT
Start: 2022-10-19 | End: 2022-10-19

## 2022-10-19 RX ADMIN — PACLITAXEL 134 MG: 6 INJECTION, SOLUTION INTRAVENOUS at 14:04

## 2022-10-19 RX ADMIN — SODIUM CHLORIDE 25 ML/HR: 9 INJECTION, SOLUTION INTRAVENOUS at 13:20

## 2022-10-19 RX ADMIN — SODIUM CHLORIDE, PRESERVATIVE FREE 10 ML: 5 INJECTION INTRAVENOUS at 15:07

## 2022-10-19 RX ADMIN — DEXAMETHASONE SODIUM PHOSPHATE 10 MG: 10 INJECTION, SOLUTION INTRAMUSCULAR; INTRAVENOUS at 13:21

## 2022-10-19 RX ADMIN — FAMOTIDINE 20 MG: 10 INJECTION, SOLUTION INTRAVENOUS at 13:25

## 2022-10-19 RX ADMIN — DIPHENHYDRAMINE HYDROCHLORIDE 50 MG: 50 INJECTION, SOLUTION INTRAMUSCULAR; INTRAVENOUS at 13:28

## 2022-10-19 RX ADMIN — Medication 500 UNITS: at 15:07

## 2022-10-19 NOTE — PROGRESS NOTES
[address]  Breast Navigator Encounter    Name:    Ira Vitale  Age:    59 y.o. Diagnosis:    RIGHT breast cancer    Returned call to patient. Called while I was on vacation just to check in. Had a chemo treatment today. She was not available, so I L/M for her to call me back at her convenience.                                Jaysno Cuellar, RN, BSN, [facility]  Oncology Breast Navigator     [address]  [address] [place] [address], Antelmo Machado  22.  W: [phone]  F: [phone]  [email or web address]  Good Help to Those in [facility]

## 2022-10-19 NOTE — PROGRESS NOTES
[facility] Chemo Progress Note    Date: 2022    Name: Suzi Kuhn    MRN: [id number]         : 1960    Ms. Tammy Sanchez Arrived ambulatory and in no distress for cycle 1 day 8 of Taxol. Assessment was completed and documented in flowsheets. No acute concerns at this time. Port accessed without difficulty, labs drawn and processed. Follow Up: Proceed with treatment    Chemotherapy Flowsheet 10/19/2022   Cycle C1D8   Date 10/19/2022   Drug / Regimen Taxol   Pre Meds given   Notes -         Ms. Obrien's vitals were reviewed. Patient Vitals for the past 12 hrs:   Temp Pulse Resp BP SpO2   10/19/22 1519 -- 79 -- (!) 143/60 --   10/19/22 1128 97.7 °F (36.5 °C) (!) 59 16 128/75 98 %         Lab results were obtained and reviewed. Labs within parameter for treatment. Recent Results (from the past 12 hour(s))   CBC WITH AUTOMATED DIFF    Collection Time: 10/19/22 11:33 AM   Result Value Ref Range    WBC 4.1 3.6 - 11.0 K/uL    RBC 3.99 3.80 - 5.20 M/uL    HGB 11.3 (L) 11.5 - 16.0 g/dL    HCT 35.3 35.0 - 47.0 %    MCV 88.5 80.0 - 99.0 FL    MCH 28.3 26.0 - 34.0 PG    MCHC 32.0 30.0 - 36.5 g/dL    RDW 12.2 11.5 - 14.5 %    PLATELET 867 895 - 911 K/uL    MPV 9.3 8.9 - 12.9 FL    NRBC 0.0 0  WBC    ABSOLUTE NRBC 0.00 0.00 - 0.01 K/uL    NEUTROPHILS 44 32 - 75 %    LYMPHOCYTES 44 12 - 49 %    MONOCYTES 6 5 - 13 %    EOSINOPHILS 4 0 - 7 %    BASOPHILS 1 0 - 1 %    IMMATURE GRANULOCYTES 1 (H) 0.0 - 0.5 %    ABS. NEUTROPHILS 1.8 1.8 - 8.0 K/UL    ABS. LYMPHOCYTES 1.8 0.8 - 3.5 K/UL    ABS. MONOCYTES 0.3 0.0 - 1.0 K/UL    ABS. EOSINOPHILS 0.2 0.0 - 0.4 K/UL    ABS. BASOPHILS 0.0 0.0 - 0.1 K/UL    ABS. IMM. GRANS. 0.0 0.00 - 0.04 K/UL    DF AUTOMATED         Pre-medications  were administered as ordered and chemotherapy was initiated.   Medications Administered       0.9% sodium chloride infusion       Admin Date  10/19/2022 Action  New Bag Dose  25 mL/hr Rate  25 mL/hr Route  IntraVENous Administered By  Stacie Del Toro, TOM Moses              dexamethasone (PF) (DECADRON) 10 mg/mL injection 10 mg       Admin Date  10/19/2022 Action  Given Dose  10 mg Route  IntraVENous Administered By  Jose M Garcia RN              diphenhydrAMINE (BENADRYL) injection 50 mg       Admin Date  10/19/2022 Action  Given Dose  50 mg Route  IntraVENous Administered By  Jose M Garcia RN              famotidine (PF) (PEPCID) 20 mg in 0.9% sodium chloride 10 mL injection       Admin Date  10/19/2022 Action  Given Dose  20 mg Route  IntraVENous Administered By  Jose M Garcia RN              heparin (porcine) pf 500 Units       Admin Date  10/19/2022 Action  Given Dose  500 Units Route  InterCATHeter Administered By  Jose M Garcia RN              PACLitaxeL (TAXOL) 134 mg in 0.9% sodium chloride 250 mL, overfill volume 25 mL chemo infusion       Admin Date  10/19/2022 Action  New Bag Dose  134 mg Rate  297.3 mL/hr Route  IntraVENous Administered By  Jose M Garcia RN              sodium chloride (NS) flush 5-40 mL       Admin Date  10/19/2022 Action  Given Dose  10 mL Route  IntraVENous Administered By  Jose M Garcia RN                    Two nurses verified prior to administering:  Drug name, Drug dose, Infusion volume or drug volume when prepared in a syringe, Rate of administration, Route of administration, Expiration dates and/or times, Appearance and physical integrity of the drugs, Rate set on infusion pump, when used, and Sequencing of drug administration. Ms. Kelsy Lyons tolerated treatment well, port flushed and de-accessed per protocol. Patient was discharged from [place] [address] in stable condition. Patient was discharged from [facility] in stable condition. Patient aware of next appointment.      Future Appointments   Date Time Provider Linnea Yuen   10/26/2022 10:00 AM C1 ISABEL MED [address] [place] [address]   10/26/2022 10:15 AM Kaitlin Kebede  [address] BS AMB   11/2/2022  8:00 AM B2 ISABEL LONG [address] [place] [address] H   11/9/2022 10:00 AM C1 ISABEL MED [facility]   11/16/2022  8:00 AM B3 ISABEL MED [facility]   11/16/2022  2:15 PM Rodger Thomson MD [facility] BS AMB   11/23/2022  7:30 AM F3 ISABEL LONG [place] [facility] H   11/30/2022 10:00 AM A1 ISABEL MED [facility] [facility] H   12/7/2022 10:00 AM C1 ISABEL MED [facility]   12/14/2022  7:30 AM F3 [place]ABEL [place] [facility] H   12/21/2022 10:00 AM C1 [facility] MED [place] [facility]   12/28/2022 10:00 AM C1 ISABEL MED [address] RN  October 19, 2022

## 2022-10-25 ENCOUNTER — APPOINTMENT (OUTPATIENT)
Dept: INFUSION THERAPY | Age: 62
End: 2022-10-25

## 2022-10-26 ENCOUNTER — OFFICE VISIT (OUTPATIENT)
Dept: ONCOLOGY | Age: 62
End: 2022-10-26

## 2022-10-26 ENCOUNTER — HOSPITAL ENCOUNTER (OUTPATIENT)
Dept: INFUSION THERAPY | Age: 62
Discharge: HOME | End: 2022-10-26
Payer: COMMERCIAL

## 2022-10-26 VITALS
DIASTOLIC BLOOD PRESSURE: 65 MMHG | BODY MASS INDEX: 28.39 KG/M2 | WEIGHT: 144.62 LBS | OXYGEN SATURATION: 98 % | SYSTOLIC BLOOD PRESSURE: 138 MMHG | RESPIRATION RATE: 16 BRPM | HEIGHT: 60 IN | TEMPERATURE: 97.6 F

## 2022-10-26 VITALS
OXYGEN SATURATION: 98 % | DIASTOLIC BLOOD PRESSURE: 78 MMHG | HEART RATE: 70 BPM | BODY MASS INDEX: 28.37 KG/M2 | SYSTOLIC BLOOD PRESSURE: 136 MMHG | TEMPERATURE: 97.6 F | HEIGHT: 60 IN | WEIGHT: 144.5 LBS

## 2022-10-26 DIAGNOSIS — C50.012 BILATERAL MALIGNANT NEOPLASM INVOLVING BOTH NIPPLE AND AREOLA IN FEMALE, UNSPECIFIED ESTROGEN RECEPTOR STATUS (HCC): Primary | ICD-10-CM

## 2022-10-26 DIAGNOSIS — C50.011 BILATERAL MALIGNANT NEOPLASM INVOLVING BOTH NIPPLE AND AREOLA IN FEMALE, UNSPECIFIED ESTROGEN RECEPTOR STATUS (HCC): Primary | ICD-10-CM

## 2022-10-26 DIAGNOSIS — Z95.828 PORT-A-CATH IN PLACE: ICD-10-CM

## 2022-10-26 DIAGNOSIS — Z51.11 ENCOUNTER FOR ANTINEOPLASTIC CHEMOTHERAPY: ICD-10-CM

## 2022-10-26 LAB
BASOPHILS # BLD: 0 K/UL (ref 0–0.1)
BASOPHILS NFR BLD: 1 % (ref 0–1)
COMMENT, HOLDF: NORMAL
DIFFERENTIAL METHOD BLD: ABNORMAL
EOSINOPHIL # BLD: 0.2 K/UL (ref 0–0.4)
EOSINOPHIL NFR BLD: 9 % (ref 0–7)
ERYTHROCYTE [DISTWIDTH] IN BLOOD BY AUTOMATED COUNT: 12.5 % (ref 11.5–14.5)
HCT VFR BLD AUTO: 34.9 % (ref 35–47)
HGB BLD-MCNC: 11.1 G/DL (ref 11.5–16)
IMM GRANULOCYTES # BLD AUTO: 0 K/UL
IMM GRANULOCYTES NFR BLD AUTO: 0 %
LYMPHOCYTES # BLD: 0.8 K/UL (ref 0.8–3.5)
LYMPHOCYTES NFR BLD: 33 % (ref 12–49)
MCH RBC QN AUTO: 28.5 PG (ref 26–34)
MCHC RBC AUTO-ENTMCNC: 31.8 G/DL (ref 30–36.5)
MCV RBC AUTO: 89.7 FL (ref 80–99)
MONOCYTES # BLD: 0.1 K/UL (ref 0–1)
MONOCYTES NFR BLD: 4 % (ref 5–13)
NEUTS SEG # BLD: 1.3 K/UL (ref 1.8–8)
NEUTS SEG NFR BLD: 53 % (ref 32–75)
NRBC # BLD: 0 K/UL (ref 0–0.01)
NRBC BLD-RTO: 0 PER 100 WBC
PLATELET # BLD AUTO: 254 K/UL (ref 150–400)
PLATELET COMMENTS,PCOM: ABNORMAL
PMV BLD AUTO: 9.1 FL (ref 8.9–12.9)
RBC # BLD AUTO: 3.89 M/UL (ref 3.8–5.2)
RBC MORPH BLD: ABNORMAL
SAMPLES BEING HELD,HOLD: NORMAL
WBC # BLD AUTO: 2.4 K/UL (ref 3.6–11)

## 2022-10-26 PROCEDURE — 85025 COMPLETE CBC W/AUTO DIFF WBC: CPT

## 2022-10-26 PROCEDURE — 99214 OFFICE O/P EST MOD 30 MIN: CPT | Performed by: INTERNAL MEDICINE

## 2022-10-26 PROCEDURE — 96413 CHEMO IV INFUSION 1 HR: CPT

## 2022-10-26 PROCEDURE — 96375 TX/PRO/DX INJ NEW DRUG ADDON: CPT

## 2022-10-26 PROCEDURE — 74011250636 HC RX REV CODE- 250/636: Performed by: INTERNAL MEDICINE

## 2022-10-26 PROCEDURE — 36415 COLL VENOUS BLD VENIPUNCTURE: CPT

## 2022-10-26 PROCEDURE — 74011000250 HC RX REV CODE- 250: Performed by: INTERNAL MEDICINE

## 2022-10-26 PROCEDURE — 77030012965 HC NDL HUBR BBMI -A

## 2022-10-26 RX ORDER — EPINEPHRINE 1 MG/ML
0.3 INJECTION, SOLUTION, CONCENTRATE INTRAVENOUS AS NEEDED
Status: CANCELLED | OUTPATIENT
Start: 2022-11-02

## 2022-10-26 RX ORDER — ACETAMINOPHEN 325 MG/1
650 TABLET ORAL AS NEEDED
Status: CANCELLED
Start: 2022-11-09

## 2022-10-26 RX ORDER — ALBUTEROL SULFATE 0.83 MG/ML
2.5 SOLUTION RESPIRATORY (INHALATION) AS NEEDED
Status: DISCONTINUED | OUTPATIENT
Start: 2022-10-26 | End: 2022-10-27 | Stop reason: HOSPADM

## 2022-10-26 RX ORDER — ONDANSETRON 2 MG/ML
8 INJECTION INTRAMUSCULAR; INTRAVENOUS AS NEEDED
Status: DISCONTINUED | OUTPATIENT
Start: 2022-10-26 | End: 2022-10-27 | Stop reason: HOSPADM

## 2022-10-26 RX ORDER — DEXAMETHASONE SODIUM PHOSPHATE 100 MG/10ML
10 INJECTION INTRAMUSCULAR; INTRAVENOUS ONCE
Status: CANCELLED | OUTPATIENT
Start: 2022-11-16 | End: 2022-11-16

## 2022-10-26 RX ORDER — SODIUM CHLORIDE 9 MG/ML
5-250 INJECTION, SOLUTION INTRAVENOUS AS NEEDED
Status: CANCELLED | OUTPATIENT
Start: 2022-11-16

## 2022-10-26 RX ORDER — DIPHENHYDRAMINE HYDROCHLORIDE 50 MG/ML
50 INJECTION, SOLUTION INTRAMUSCULAR; INTRAVENOUS AS NEEDED
Status: CANCELLED
Start: 2022-11-02

## 2022-10-26 RX ORDER — HYDROCORTISONE SODIUM SUCCINATE 100 MG/2ML
100 INJECTION, POWDER, FOR SOLUTION INTRAMUSCULAR; INTRAVENOUS AS NEEDED
Status: CANCELLED | OUTPATIENT
Start: 2022-11-09

## 2022-10-26 RX ORDER — SODIUM CHLORIDE 9 MG/ML
5-250 INJECTION, SOLUTION INTRAVENOUS AS NEEDED
Status: DISPENSED | OUTPATIENT
Start: 2022-10-26 | End: 2022-10-26

## 2022-10-26 RX ORDER — HEPARIN 100 UNIT/ML
500 SYRINGE INTRAVENOUS AS NEEDED
Status: CANCELLED
Start: 2022-11-09

## 2022-10-26 RX ORDER — EPINEPHRINE 1 MG/ML
0.3 INJECTION, SOLUTION, CONCENTRATE INTRAVENOUS AS NEEDED
Status: DISCONTINUED | OUTPATIENT
Start: 2022-10-26 | End: 2022-10-27 | Stop reason: HOSPADM

## 2022-10-26 RX ORDER — SODIUM CHLORIDE 9 MG/ML
5-40 INJECTION INTRAMUSCULAR; INTRAVENOUS; SUBCUTANEOUS AS NEEDED
Status: ACTIVE | OUTPATIENT
Start: 2022-10-26 | End: 2022-10-26

## 2022-10-26 RX ORDER — ALBUTEROL SULFATE 0.83 MG/ML
2.5 SOLUTION RESPIRATORY (INHALATION) AS NEEDED
Status: CANCELLED
Start: 2022-11-09

## 2022-10-26 RX ORDER — SODIUM CHLORIDE 9 MG/ML
5-250 INJECTION, SOLUTION INTRAVENOUS AS NEEDED
Status: CANCELLED | OUTPATIENT
Start: 2022-11-09

## 2022-10-26 RX ORDER — SODIUM CHLORIDE 0.9 % (FLUSH) 0.9 %
5-40 SYRINGE (ML) INJECTION AS NEEDED
Status: DISPENSED | OUTPATIENT
Start: 2022-10-26 | End: 2022-10-26

## 2022-10-26 RX ORDER — DIPHENHYDRAMINE HYDROCHLORIDE 50 MG/ML
50 INJECTION, SOLUTION INTRAMUSCULAR; INTRAVENOUS AS NEEDED
Status: DISCONTINUED | OUTPATIENT
Start: 2022-10-26 | End: 2022-10-27 | Stop reason: HOSPADM

## 2022-10-26 RX ORDER — HYDROCORTISONE SODIUM SUCCINATE 100 MG/2ML
100 INJECTION, POWDER, FOR SOLUTION INTRAMUSCULAR; INTRAVENOUS AS NEEDED
Status: CANCELLED | OUTPATIENT
Start: 2022-11-02

## 2022-10-26 RX ORDER — HYDROCORTISONE SODIUM SUCCINATE 100 MG/2ML
100 INJECTION, POWDER, FOR SOLUTION INTRAMUSCULAR; INTRAVENOUS AS NEEDED
Status: DISCONTINUED | OUTPATIENT
Start: 2022-10-26 | End: 2022-10-27 | Stop reason: HOSPADM

## 2022-10-26 RX ORDER — ALBUTEROL SULFATE 0.83 MG/ML
2.5 SOLUTION RESPIRATORY (INHALATION) AS NEEDED
Status: CANCELLED
Start: 2022-11-02

## 2022-10-26 RX ORDER — EPINEPHRINE 1 MG/ML
0.3 INJECTION, SOLUTION, CONCENTRATE INTRAVENOUS AS NEEDED
Status: CANCELLED | OUTPATIENT
Start: 2022-11-16

## 2022-10-26 RX ORDER — ONDANSETRON 2 MG/ML
8 INJECTION INTRAMUSCULAR; INTRAVENOUS AS NEEDED
Status: CANCELLED | OUTPATIENT
Start: 2022-11-09

## 2022-10-26 RX ORDER — SODIUM CHLORIDE 0.9 % (FLUSH) 0.9 %
5-40 SYRINGE (ML) INJECTION AS NEEDED
Status: CANCELLED | OUTPATIENT
Start: 2022-11-09

## 2022-10-26 RX ORDER — DIPHENHYDRAMINE HYDROCHLORIDE 50 MG/ML
50 INJECTION, SOLUTION INTRAMUSCULAR; INTRAVENOUS ONCE
Status: CANCELLED
Start: 2022-11-09 | End: 2022-11-09

## 2022-10-26 RX ORDER — HEPARIN 100 UNIT/ML
500 SYRINGE INTRAVENOUS AS NEEDED
Status: CANCELLED
Start: 2022-11-16

## 2022-10-26 RX ORDER — DIPHENHYDRAMINE HYDROCHLORIDE 50 MG/ML
25 INJECTION, SOLUTION INTRAMUSCULAR; INTRAVENOUS AS NEEDED
Status: DISCONTINUED | OUTPATIENT
Start: 2022-10-26 | End: 2022-10-27 | Stop reason: HOSPADM

## 2022-10-26 RX ORDER — DIPHENHYDRAMINE HYDROCHLORIDE 50 MG/ML
50 INJECTION, SOLUTION INTRAMUSCULAR; INTRAVENOUS AS NEEDED
Status: CANCELLED
Start: 2022-11-09

## 2022-10-26 RX ORDER — HYDROCORTISONE SODIUM SUCCINATE 100 MG/2ML
100 INJECTION, POWDER, FOR SOLUTION INTRAMUSCULAR; INTRAVENOUS AS NEEDED
Status: CANCELLED | OUTPATIENT
Start: 2022-11-16

## 2022-10-26 RX ORDER — HEPARIN 100 UNIT/ML
500 SYRINGE INTRAVENOUS AS NEEDED
Status: DISPENSED | OUTPATIENT
Start: 2022-10-26 | End: 2022-10-26

## 2022-10-26 RX ORDER — DIPHENHYDRAMINE HYDROCHLORIDE 50 MG/ML
25 INJECTION, SOLUTION INTRAMUSCULAR; INTRAVENOUS AS NEEDED
Status: CANCELLED
Start: 2022-11-09

## 2022-10-26 RX ORDER — ACETAMINOPHEN 325 MG/1
650 TABLET ORAL AS NEEDED
Status: DISCONTINUED | OUTPATIENT
Start: 2022-10-26 | End: 2022-10-27 | Stop reason: HOSPADM

## 2022-10-26 RX ORDER — SODIUM CHLORIDE 9 MG/ML
5-40 INJECTION INTRAMUSCULAR; INTRAVENOUS; SUBCUTANEOUS AS NEEDED
Status: CANCELLED | OUTPATIENT
Start: 2022-11-16

## 2022-10-26 RX ORDER — DEXAMETHASONE SODIUM PHOSPHATE 100 MG/10ML
10 INJECTION INTRAMUSCULAR; INTRAVENOUS ONCE
Status: CANCELLED | OUTPATIENT
Start: 2022-11-09 | End: 2022-11-09

## 2022-10-26 RX ORDER — ALBUTEROL SULFATE 0.83 MG/ML
2.5 SOLUTION RESPIRATORY (INHALATION) AS NEEDED
Status: CANCELLED
Start: 2022-11-16

## 2022-10-26 RX ORDER — DIPHENHYDRAMINE HYDROCHLORIDE 50 MG/ML
25 INJECTION, SOLUTION INTRAMUSCULAR; INTRAVENOUS AS NEEDED
Status: CANCELLED
Start: 2022-11-16

## 2022-10-26 RX ORDER — SODIUM CHLORIDE 9 MG/ML
5-250 INJECTION, SOLUTION INTRAVENOUS AS NEEDED
Status: DISCONTINUED | OUTPATIENT
Start: 2022-10-26 | End: 2022-10-27 | Stop reason: HOSPADM

## 2022-10-26 RX ORDER — SODIUM CHLORIDE 9 MG/ML
5-40 INJECTION INTRAMUSCULAR; INTRAVENOUS; SUBCUTANEOUS AS NEEDED
Status: CANCELLED | OUTPATIENT
Start: 2022-11-09

## 2022-10-26 RX ORDER — EPINEPHRINE 1 MG/ML
0.3 INJECTION, SOLUTION, CONCENTRATE INTRAVENOUS AS NEEDED
Status: CANCELLED | OUTPATIENT
Start: 2022-11-09

## 2022-10-26 RX ORDER — DIPHENHYDRAMINE HYDROCHLORIDE 50 MG/ML
50 INJECTION, SOLUTION INTRAMUSCULAR; INTRAVENOUS ONCE
Status: CANCELLED
Start: 2022-11-16 | End: 2022-11-16

## 2022-10-26 RX ORDER — CARVEDILOL 3.12 MG/1
6.25 TABLET ORAL 2 TIMES DAILY WITH MEALS
COMMUNITY

## 2022-10-26 RX ORDER — SODIUM CHLORIDE 0.9 % (FLUSH) 0.9 %
5-40 SYRINGE (ML) INJECTION AS NEEDED
Status: CANCELLED | OUTPATIENT
Start: 2022-11-16

## 2022-10-26 RX ORDER — ACETAMINOPHEN 325 MG/1
650 TABLET ORAL AS NEEDED
Status: CANCELLED
Start: 2022-11-16

## 2022-10-26 RX ORDER — DEXAMETHASONE SODIUM PHOSPHATE 10 MG/ML
10 INJECTION INTRAMUSCULAR; INTRAVENOUS ONCE
Status: COMPLETED | OUTPATIENT
Start: 2022-10-26 | End: 2022-10-26

## 2022-10-26 RX ORDER — DIPHENHYDRAMINE HYDROCHLORIDE 50 MG/ML
50 INJECTION, SOLUTION INTRAMUSCULAR; INTRAVENOUS AS NEEDED
Status: CANCELLED
Start: 2022-11-16

## 2022-10-26 RX ORDER — DIPHENHYDRAMINE HYDROCHLORIDE 50 MG/ML
50 INJECTION, SOLUTION INTRAMUSCULAR; INTRAVENOUS ONCE
Status: COMPLETED | OUTPATIENT
Start: 2022-10-26 | End: 2022-10-26

## 2022-10-26 RX ORDER — ONDANSETRON 2 MG/ML
8 INJECTION INTRAMUSCULAR; INTRAVENOUS AS NEEDED
Status: CANCELLED | OUTPATIENT
Start: 2022-11-16

## 2022-10-26 RX ORDER — LOSARTAN POTASSIUM 25 MG/1
25 TABLET ORAL DAILY
COMMUNITY

## 2022-10-26 RX ADMIN — FAMOTIDINE 20 MG: 10 INJECTION, SOLUTION INTRAVENOUS at 12:57

## 2022-10-26 RX ADMIN — PACLITAXEL 134 MG: 6 INJECTION, SOLUTION INTRAVENOUS at 13:42

## 2022-10-26 RX ADMIN — HEPARIN 500 UNITS: 100 SYRINGE at 15:04

## 2022-10-26 RX ADMIN — SODIUM CHLORIDE 25 ML/HR: 9 INJECTION, SOLUTION INTRAVENOUS at 12:39

## 2022-10-26 RX ADMIN — SODIUM CHLORIDE, PRESERVATIVE FREE 20 ML: 5 INJECTION INTRAVENOUS at 10:57

## 2022-10-26 RX ADMIN — DEXAMETHASONE SODIUM PHOSPHATE 10 MG: 10 INJECTION, SOLUTION INTRAMUSCULAR; INTRAVENOUS at 13:00

## 2022-10-26 RX ADMIN — SODIUM CHLORIDE, PRESERVATIVE FREE 10 ML: 5 INJECTION INTRAVENOUS at 15:04

## 2022-10-26 RX ADMIN — DIPHENHYDRAMINE HYDROCHLORIDE 50 MG: 50 INJECTION, SOLUTION INTRAMUSCULAR; INTRAVENOUS at 13:06

## 2022-10-26 NOTE — PROGRESS NOTES
[facility] [place] at [address], [id number] [place] [address] Road, [facility] [phone]  F: [phone]    Reason for Visit:   Danelle Ramirez is a 59 y.o. female who is seen for follow up of left breast cancer-ER 0%, NM 0%, HER2/radha negative. Treatment History:   10/1/22: Carboplatin taxol and keytruda followed by ddAC and Keytruda     History of Present Illness:   Patient is a 20-year-old female seen for left breast cancer. She had a screening mammogram on 7/20/2022 which showed a 1 cm irregular mass in the left breast at 3 o'clock position. Biopsy on 8/2/2022 showed invasive ductal carcinoma, grade 2-3, ER 0%, NM 0%, HER2/radha negative. Ki-67 85%. Declined genetic testing. She comes today for cycle 1 day 15 of Carboplatin taxol and keytruda followed by ddAC and Keytruda. She feels well. She did well with chemo. Had an episode of nausea that resolved on its own. Denies diarrhea/constipation. No mouth sores. Now on losartan and coreg. Has another ECHO again next month. Had one episode of light headedness that resolved. No falls. No headaches. No other complaints.     No FH of breast, ovarian, pancreatic or Prostate malignancies  She works with special needs     Past Medical History:   Diagnosis Date    Cancer ([facility] [address]     BREAST CANCER    GERD (gastroesophageal reflux disease)     HTN (hypertension) 07/18/2011    Psychiatric disorder     ANIXETY AND DEPRESSION    Thyroid disease     HYPOTHYROID      Past Surgical History:   Procedure Laterality Date    HX COLONOSCOPY      HX MYOMECTOMY  05/02/1996    x 1    HX WISDOM TEETH EXTRACTION        Social History     Tobacco Use    Smoking status: Never     Passive exposure: Never    Smokeless tobacco: Never   Substance Use Topics    Alcohol use: Never      Family History   Problem Relation Age of Onset    Heart Failure Mother     Cancer Father     Diabetes Sister     Kidney Disease Sister     Anesth Problems Neg Hx      Current Outpatient Medications   Medication Sig    dexAMETHasone (DECADRON) 4 mg tablet Take 2 tabs (8mg) once daily on days 2 & 3 of chemotherapy    ondansetron (ZOFRAN ODT) 8 mg disintegrating tablet Take 1 Tablet by mouth every eight (8) hours as needed for Nausea or Vomiting. prochlorperazine (Compazine) 5 mg tablet Take 1 Tablet by mouth every six (6) hours as needed for Nausea or Vomiting.    lidocaine-prilocaine (EMLA) topical cream Apply  to affected area as needed for Pain. Apply a thick layer over port a cath 30-60 minutes prior to port access    amLODIPine (NORVASC) 5 mg tablet Take 5 mg by mouth every evening. hydroCHLOROthiazide (HYDRODIURIL) 25 mg tablet Take 12.5 mg by mouth daily. PARoxetine (PAXIL) 20 mg tablet paroxetine 20 mg tablet   Take 1 tablet every day by oral route. rosuvastatin (CRESTOR) 10 mg tablet Take 10 mg by mouth nightly. multivitamin, tx-iron-ca-min (THERA-M w/ IRON) 9 mg iron-400 mcg tab tablet Take 1 Tablet by mouth daily. No current facility-administered medications for this visit. Facility-Administered Medications Ordered in Other Visits   Medication Dose Route Frequency    sodium chloride (NS) flush 5-40 mL  5-40 mL IntraVENous PRN    0.9% sodium chloride injection 5-40 mL  5-40 mL IntraVENous PRN    0.9% sodium chloride infusion  5-250 mL/hr IntraVENous PRN    heparin (porcine) pf 500 Units  500 Units InterCATHeter PRN    alteplase (CATHFLO) 2 mg in sterile water (preservative free) 2 mL injection  2 mg InterCATHeter PRN      Allergies   Allergen Reactions    Sulfa (Sulfonamide Antibiotics) Itching, Swelling and Unknown (comments)        Review of Systems: A complete review of systems was obtained, negative except as described above.     Physical Exam:   Visit Vitals  Ht 5' (1.524 m)   Wt 144 lb 8 oz (65.5 kg)   BMI 28.22 kg/m²       ECOG PS: 0  General: No distress  Eyes: PERRL, anicteric sclerae  HENT: Atraumatic  Neck: Supple  Lymphatic: No cervical, supraclavicular, or inguinal adenopathy  Respiratory: normal respiratory effort  Breast: deferred today  CV: Normal rate, regular rhythm, no murmurs, no peripheral edema  GI: Soft, nontender  no splenomegaly  MS: Normal gait and station. Digits without clubbing or cyanosis. Skin: No rashes, ecchymoses, or petechiae. Normal temperature, turgor, and texture. Psych: Alert, oriented, appropriate affect, normal judgment/insight    Results:     Lab Results   Component Value Date/Time    WBC 4.1 10/19/2022 11:33 AM    HGB 11.3 (L) 10/19/2022 11:33 AM    HCT 35.3 10/19/2022 11:33 AM    PLATELET 409 52/67/0975 11:33 AM    MCV 88.5 10/19/2022 11:33 AM    ABS. NEUTROPHILS 1.8 10/19/2022 11:33 AM     Lab Results   Component Value Date/Time    Sodium 140 10/12/2022 09:02 AM    Potassium 3.9 10/12/2022 09:02 AM    Chloride 107 10/12/2022 09:02 AM    CO2 28 10/12/2022 09:02 AM    Glucose 96 10/12/2022 09:02 AM    BUN 16 10/12/2022 09:02 AM    Creatinine 0.84 10/12/2022 09:02 AM    GFR est AA >60 09/26/2022 03:26 PM    GFR est non-AA >60 09/26/2022 03:26 PM    Calcium 9.1 10/12/2022 09:02 AM     Lab Results   Component Value Date/Time    Bilirubin, total 0.3 10/12/2022 09:02 AM    ALT (SGPT) 61 10/12/2022 09:02 AM    Alk. phosphatase 138 (H) 10/12/2022 09:02 AM    Protein, total 7.4 10/12/2022 09:02 AM    Albumin 3.9 10/12/2022 09:02 AM    Globulin 3.5 10/12/2022 09:02 AM       External   Records reviewed and summarized above. Pathology report(s) reviewed above. Radiology report(s) reviewed above. MRI  9/2022    LEFT BREAST: Conglomerate mass enhancement in the left breast at 4-5 o'clock,  combined dimension 3.7 x 1.9 x 1.4 cm. This is slightly more extensive than the  sonographic and mammographic findings. No lymphadenopathy. Assessment:   1) Left breast cancer  1 cm mass on mammogram  Palpated a lump  ER 0%, IL 0%, HER2/radha negative  Ki 67 was 85%.   MRI breast with a 3.7 cm Left breast mass    [id number]  AJCC 8th ed-Stage IIA  Genetic testing negative    Discussed pathology and the natural history of triple negative breast cancer. Characterized by higher relapse rates after the first 3 years of Dx, can recur locally or in distant organs. Adjuvant chemotherapy is estimated to decrease the risk of recurrence by 25-30%, breast cancer mortality by 20% and overall mortality by 20%. Review of MRI shows a T2 primary. We discussed NACT. Recommended chemotherapy + Keytruda based on the KEYNOTE-522 trial (taxol, carboplatin and [place] followed by [facility] as preoperative treatment, followed by surgery, and then adjuvant pembrolizumab for another nine cycles (27 weeks) after surgery). Today is cycle 1 day 15. Clinical trial list reviewed- none applicable    2) HTN    3) Depression    4) Hyperlipidemia    5) Low EF   She will start [facility] 1/2023 and will need a repeat ECHO prior to this with EF at least > 50%   Dr Diaz Mendoza is her cardiologist - notes reviewed  Repeat ECHO next month ordered by cardiologist , will follow    6) Encounter for high risk medications like chemotherapy and immunotherapy  Tolerating well   Labs pending   ECHO reviewed - see # 5     Plan:     Proceed today with cycle 1 day 15 of Carboplatin AUC 5 taxol and keytruda followed by ddAC (pending repeat ECHO) and Keytruda   Labs to include cbc with diff, cmp prior to infusions; TSH every 6 weeks  Zofran / compazine PRN  Dexamethasone on days 2 & 3 of chemotherapy  Follow with cardiology as scheduled; repeat ECHO next month     RTC in 2 weeks     I appreciate the opportunity to participate in Ms. [facility]. I personally saw and evaluated the patient and performed the key components of medical decision making. The history, physical exam, and documentation were performed by Katty Strong NP. I reviewed and verified the above documentation and modified it as needed.     Signed By: Vani Olivera MD

## 2022-10-26 NOTE — PROGRESS NOTES
Joaquin Marshall is a 59 y.o. female  Chief Complaint   Patient presents with    Follow-up    Breast Cancer   1. Have you been to the ER, urgent care clinic since your last visit? Hospitalized since your last visit? No    2. Have you seen or consulted any other health care providers outside of the [address] [place] [address] since your last visit? Include any pap smears or colon screening.  No

## 2022-10-26 NOTE — PROGRESS NOTES
[facility] Chemo Progress Note    Date: October 26, 2022        1000: Ms. James Nuñez Arrived to [facility] for  Taxol C1D15 ambulatory in stable condition. Assessment was completed and port accessed by access RN. Labs drawn and sent for processing. Went to provider appointment with Medical Oncology. Returned from provider appointment. Labs reviewed. Criteria for treatment was met. Ms. Sasha Gan vitals were reviewed. Patient Vitals for the past 12 hrs:   Temp Resp BP SpO2   10/26/22 1455 -- -- 138/65 --   10/26/22 1027 97.6 °F (36.4 °C) 16 134/61 98 %         Lab results were obtained and reviewed. Pre-medications  were administered as ordered and chemotherapy was initiated. Two nurses verified prior to administering: Drug name, Drug dose, Infusion volume or drug volume when prepared in a syringe, Rate of administration, Route of administration, Expiration dates and/or times, Appearance and physical integrity of the drugs, Rate set on infusion pump, when used, and Sequencing of drug administration. 1507 Patient tolerated treatment well. Port maintained positive blood return throughout treatment. Port flushed, heparinized and de accessed per protocol. Patient was discharged in stable condition. Patient is aware of next scheduled [facility] appointment.     Future Appointments   Date Time Provider Port [place]   11/2/2022  8:00 AM B2 ISABEL [place] [facility]   11/9/2022 10:00 AM C1 ISABEL MED [facility]   11/9/2022 10:15 AM Iona Rosario  N Roman  BS AMB   11/16/2022  8:00 AM B3 ISABEL MED [facility]   11/16/2022  2:15 PM Kate Haines MD [facility] BS AMB   11/23/2022  7:30 AM F3 ISABEL LONG [facility]   11/30/2022 10:00 AM A1 ISABEL MED [facility]   12/7/2022 10:00 AM C1 ISABEL MED [facility]   12/14/2022  7:30 AM F3 IASBEL LONG [place] [facility]   12/21/2022 10:00 AM C1 ISABEL MED [facility]   12/28/2022 10:00 AM C1 ISABEL MED [place] [facility] Sherren Pierce, RN, RN  October 26, 2022

## 2022-11-01 ENCOUNTER — APPOINTMENT (OUTPATIENT)
Dept: INFUSION THERAPY | Age: 62
End: 2022-11-01

## 2022-11-02 ENCOUNTER — HOSPITAL ENCOUNTER (OUTPATIENT)
Dept: INFUSION THERAPY | Age: 62
Discharge: HOME | End: 2022-11-02
Payer: COMMERCIAL

## 2022-11-02 VITALS
SYSTOLIC BLOOD PRESSURE: 151 MMHG | BODY MASS INDEX: 28.83 KG/M2 | WEIGHT: 147.6 LBS | DIASTOLIC BLOOD PRESSURE: 74 MMHG | TEMPERATURE: 97.7 F | RESPIRATION RATE: 18 BRPM | HEART RATE: 61 BPM | OXYGEN SATURATION: 97 %

## 2022-11-02 DIAGNOSIS — C50.012 BILATERAL MALIGNANT NEOPLASM INVOLVING BOTH NIPPLE AND AREOLA IN FEMALE, UNSPECIFIED ESTROGEN RECEPTOR STATUS (HCC): Primary | ICD-10-CM

## 2022-11-02 DIAGNOSIS — C50.011 BILATERAL MALIGNANT NEOPLASM INVOLVING BOTH NIPPLE AND AREOLA IN FEMALE, UNSPECIFIED ESTROGEN RECEPTOR STATUS (HCC): Primary | ICD-10-CM

## 2022-11-02 LAB
ALBUMIN SERPL-MCNC: 3.3 G/DL (ref 3.5–5)
ALBUMIN/GLOB SERPL: 1 {RATIO} (ref 1.1–2.2)
ALP SERPL-CCNC: 103 U/L (ref 45–117)
ALT SERPL-CCNC: 61 U/L (ref 12–78)
ANION GAP SERPL CALC-SCNC: 4 MMOL/L (ref 5–15)
AST SERPL-CCNC: 35 U/L (ref 15–37)
BASOPHILS # BLD: 0 K/UL (ref 0–0.1)
BASOPHILS NFR BLD: 1 % (ref 0–1)
BILIRUB SERPL-MCNC: 0.3 MG/DL (ref 0.2–1)
BUN SERPL-MCNC: 9 MG/DL (ref 6–20)
BUN/CREAT SERPL: 10 (ref 12–20)
CALCIUM SERPL-MCNC: 8.8 MG/DL (ref 8.5–10.1)
CHLORIDE SERPL-SCNC: 108 MMOL/L (ref 97–108)
CO2 SERPL-SCNC: 29 MMOL/L (ref 21–32)
CREAT SERPL-MCNC: 0.91 MG/DL (ref 0.55–1.02)
DIFFERENTIAL METHOD BLD: ABNORMAL
EOSINOPHIL # BLD: 0 K/UL (ref 0–0.4)
EOSINOPHIL NFR BLD: 1 % (ref 0–7)
ERYTHROCYTE [DISTWIDTH] IN BLOOD BY AUTOMATED COUNT: 13.2 % (ref 11.5–14.5)
GLOBULIN SER CALC-MCNC: 3.4 G/DL (ref 2–4)
GLUCOSE SERPL-MCNC: 98 MG/DL (ref 65–100)
HCT VFR BLD AUTO: 34.4 % (ref 35–47)
HGB BLD-MCNC: 11.3 G/DL (ref 11.5–16)
IMM GRANULOCYTES # BLD AUTO: 0 K/UL (ref 0–0.04)
IMM GRANULOCYTES NFR BLD AUTO: 1 % (ref 0–0.5)
LYMPHOCYTES # BLD: 1.2 K/UL (ref 0.8–3.5)
LYMPHOCYTES NFR BLD: 44 % (ref 12–49)
MCH RBC QN AUTO: 29.5 PG (ref 26–34)
MCHC RBC AUTO-ENTMCNC: 32.8 G/DL (ref 30–36.5)
MCV RBC AUTO: 89.8 FL (ref 80–99)
MONOCYTES # BLD: 0.2 K/UL (ref 0–1)
MONOCYTES NFR BLD: 7 % (ref 5–13)
NEUTS SEG # BLD: 1.3 K/UL (ref 1.8–8)
NEUTS SEG NFR BLD: 46 % (ref 32–75)
NRBC # BLD: 0 K/UL (ref 0–0.01)
NRBC BLD-RTO: 0 PER 100 WBC
PLATELET # BLD AUTO: 255 K/UL (ref 150–400)
PMV BLD AUTO: 9.2 FL (ref 8.9–12.9)
POTASSIUM SERPL-SCNC: 3.9 MMOL/L (ref 3.5–5.1)
PROT SERPL-MCNC: 6.7 G/DL (ref 6.4–8.2)
RBC # BLD AUTO: 3.83 M/UL (ref 3.8–5.2)
SODIUM SERPL-SCNC: 141 MMOL/L (ref 136–145)
WBC # BLD AUTO: 2.7 K/UL (ref 3.6–11)

## 2022-11-02 PROCEDURE — 74011000250 HC RX REV CODE- 250: Performed by: INTERNAL MEDICINE

## 2022-11-02 PROCEDURE — 74011250636 HC RX REV CODE- 250/636: Performed by: INTERNAL MEDICINE

## 2022-11-02 PROCEDURE — 80053 COMPREHEN METABOLIC PANEL: CPT

## 2022-11-02 PROCEDURE — 96417 CHEMO IV INFUS EACH ADDL SEQ: CPT

## 2022-11-02 PROCEDURE — 96375 TX/PRO/DX INJ NEW DRUG ADDON: CPT

## 2022-11-02 PROCEDURE — 36415 COLL VENOUS BLD VENIPUNCTURE: CPT

## 2022-11-02 PROCEDURE — 77030012965 HC NDL HUBR BBMI -A

## 2022-11-02 PROCEDURE — 96367 TX/PROPH/DG ADDL SEQ IV INF: CPT

## 2022-11-02 PROCEDURE — 74011000258 HC RX REV CODE- 258: Performed by: INTERNAL MEDICINE

## 2022-11-02 PROCEDURE — 96413 CHEMO IV INFUSION 1 HR: CPT

## 2022-11-02 PROCEDURE — 85025 COMPLETE CBC W/AUTO DIFF WBC: CPT

## 2022-11-02 RX ORDER — HEPARIN 100 UNIT/ML
500 SYRINGE INTRAVENOUS AS NEEDED
Status: ACTIVE | OUTPATIENT
Start: 2022-11-02 | End: 2022-11-02

## 2022-11-02 RX ORDER — ONDANSETRON 2 MG/ML
8 INJECTION INTRAMUSCULAR; INTRAVENOUS AS NEEDED
Status: ACTIVE | OUTPATIENT
Start: 2022-11-02 | End: 2022-11-02

## 2022-11-02 RX ORDER — SODIUM CHLORIDE 9 MG/ML
5-250 INJECTION, SOLUTION INTRAVENOUS AS NEEDED
Status: DISPENSED | OUTPATIENT
Start: 2022-11-02 | End: 2022-11-02

## 2022-11-02 RX ORDER — SODIUM CHLORIDE 9 MG/ML
5-40 INJECTION INTRAMUSCULAR; INTRAVENOUS; SUBCUTANEOUS AS NEEDED
Status: ACTIVE | OUTPATIENT
Start: 2022-11-02 | End: 2022-11-02

## 2022-11-02 RX ORDER — DIPHENHYDRAMINE HYDROCHLORIDE 50 MG/ML
50 INJECTION, SOLUTION INTRAMUSCULAR; INTRAVENOUS ONCE
Status: COMPLETED | OUTPATIENT
Start: 2022-11-02 | End: 2022-11-02

## 2022-11-02 RX ORDER — SODIUM CHLORIDE 0.9 % (FLUSH) 0.9 %
5-40 SYRINGE (ML) INJECTION AS NEEDED
Status: DISPENSED | OUTPATIENT
Start: 2022-11-02 | End: 2022-11-02

## 2022-11-02 RX ORDER — DIPHENHYDRAMINE HYDROCHLORIDE 50 MG/ML
25 INJECTION, SOLUTION INTRAMUSCULAR; INTRAVENOUS AS NEEDED
Status: ACTIVE | OUTPATIENT
Start: 2022-11-02 | End: 2022-11-02

## 2022-11-02 RX ORDER — PALONOSETRON 0.05 MG/ML
0.25 INJECTION, SOLUTION INTRAVENOUS ONCE
Status: COMPLETED | OUTPATIENT
Start: 2022-11-02 | End: 2022-11-02

## 2022-11-02 RX ORDER — ACETAMINOPHEN 325 MG/1
650 TABLET ORAL AS NEEDED
Status: ACTIVE | OUTPATIENT
Start: 2022-11-02 | End: 2022-11-02

## 2022-11-02 RX ADMIN — FOSAPREPITANT DIMEGLUMINE 150 MG: 150 INJECTION, POWDER, LYOPHILIZED, FOR SOLUTION INTRAVENOUS at 11:13

## 2022-11-02 RX ADMIN — DEXAMETHASONE SODIUM PHOSPHATE 12 MG: 4 INJECTION, SOLUTION INTRAMUSCULAR; INTRAVENOUS at 11:41

## 2022-11-02 RX ADMIN — SODIUM CHLORIDE 200 MG: 9 INJECTION, SOLUTION INTRAVENOUS at 10:39

## 2022-11-02 RX ADMIN — DIPHENHYDRAMINE HYDROCHLORIDE 50 MG: 50 INJECTION, SOLUTION INTRAMUSCULAR; INTRAVENOUS at 11:55

## 2022-11-02 RX ADMIN — SODIUM CHLORIDE 25 ML/HR: 9 INJECTION, SOLUTION INTRAVENOUS at 10:00

## 2022-11-02 RX ADMIN — SODIUM CHLORIDE 10 ML: 9 INJECTION INTRAMUSCULAR; INTRAVENOUS; SUBCUTANEOUS at 11:57

## 2022-11-02 RX ADMIN — CARBOPLATIN 460 MG: 10 INJECTION, SOLUTION INTRAVENOUS at 13:29

## 2022-11-02 RX ADMIN — FAMOTIDINE 20 MG: 10 INJECTION, SOLUTION INTRAVENOUS at 11:58

## 2022-11-02 RX ADMIN — PACLITAXEL 134 MG: 6 INJECTION, SOLUTION INTRAVENOUS at 12:17

## 2022-11-02 RX ADMIN — PALONOSETRON 0.25 MG: 0.05 INJECTION, SOLUTION INTRAVENOUS at 11:59

## 2022-11-02 RX ADMIN — SODIUM CHLORIDE 25 ML/HR: 9 INJECTION, SOLUTION INTRAVENOUS at 11:14

## 2022-11-02 NOTE — PROGRESS NOTES
[facility] Short Note                       Date: 2022    Name: Fabby Noble    MRN: [id number]         : 1960      0800 Pt admit to [facility] for Keytruda/Taxol/Carboplatin ambulatory in stable condition. Assessment completed. No new concerns voiced. Port accessed  A Wayne - pos blood return noted    Ms. Obrien's vitals were reviewed prior to and after treatment. Patient Vitals for the past 12 hrs:   Temp Pulse Resp BP SpO2   22 1410 -- 61 -- (!) 151/74 --   22 0823 97.7 °F (36.5 °C) 65 18 (!) 143/72 97 %         Lab results were obtained and reviewed. Recent Results (from the past 12 hour(s))   CBC WITH AUTOMATED DIFF    Collection Time: 22  8:38 AM   Result Value Ref Range    WBC 2.7 (L) 3.6 - 11.0 K/uL    RBC 3.83 3.80 - 5.20 M/uL    HGB 11.3 (L) 11.5 - 16.0 g/dL    HCT 34.4 (L) 35.0 - 47.0 %    MCV 89.8 80.0 - 99.0 FL    MCH 29.5 26.0 - 34.0 PG    MCHC 32.8 30.0 - 36.5 g/dL    RDW 13.2 11.5 - 14.5 %    PLATELET 097 489 - 657 K/uL    MPV 9.2 8.9 - 12.9 FL    NRBC 0.0 0  WBC    ABSOLUTE NRBC 0.00 0.00 - 0.01 K/uL    NEUTROPHILS 46 32 - 75 %    LYMPHOCYTES 44 12 - 49 %    MONOCYTES 7 5 - 13 %    EOSINOPHILS 1 0 - 7 %    BASOPHILS 1 0 - 1 %    IMMATURE GRANULOCYTES 1 (H) 0.0 - 0.5 %    ABS. NEUTROPHILS 1.3 (L) 1.8 - 8.0 K/UL    ABS. LYMPHOCYTES 1.2 0.8 - 3.5 K/UL    ABS. MONOCYTES 0.2 0.0 - 1.0 K/UL    ABS. EOSINOPHILS 0.0 0.0 - 0.4 K/UL    ABS. BASOPHILS 0.0 0.0 - 0.1 K/UL    ABS. IMM.  GRANS. 0.0 0.00 - 0.04 K/UL    DF AUTOMATED     METABOLIC PANEL, COMPREHENSIVE    Collection Time: 22  8:38 AM   Result Value Ref Range    Sodium 141 136 - 145 mmol/L    Potassium 3.9 3.5 - 5.1 mmol/L    Chloride 108 97 - 108 mmol/L    CO2 29 21 - 32 mmol/L    Anion gap 4 (L) 5 - 15 mmol/L    Glucose 98 65 - 100 mg/dL    BUN 9 6 - 20 MG/DL    Creatinine 0.91 0.55 - 1.02 MG/DL    BUN/Creatinine ratio 10 (L) 12 - 20      eGFR >60 >60 ml/min/1.73m2    Calcium 8.8 8.5 - 10.1 MG/DL Bilirubin, total 0.3 0.2 - 1.0 MG/DL    ALT (SGPT) 61 12 - 78 U/L    AST (SGOT) 35 15 - 37 U/L    Alk.  phosphatase 103 45 - 117 U/L    Protein, total 6.7 6.4 - 8.2 g/dL    Albumin 3.3 (L) 3.5 - 5.0 g/dL    Globulin 3.4 2.0 - 4.0 g/dL    A-G Ratio 1.0 (L) 1.1 - 2.2         Medications given:   Medications Administered       0.9% sodium chloride infusion       Admin Date  11/02/2022 Action  New Bag Dose  25 mL/hr Rate  25 mL/hr Route  IntraVENous Administered By  Sarina Bis Date  11/02/2022 Action  New Bag Dose  25 mL/hr Rate  25 mL/hr Route  IntraVENous Administered By  Tommye Bone              0.9% sodium chloride injection 5-40 mL       Admin Date  11/02/2022 Action  Given Dose  10 mL Route  IntraVENous Administered By  Tommye Bone              CARBOplatin (PARAPLATIN) 460 mg in 0.9% sodium chloride 250 mL, overfill volume 25 mL chemo infusion       Admin Date  11/02/2022 Action  New Bag Dose  460 mg Rate  642 mL/hr Route  IntraVENous Administered By  Tommye Bone              dexamethasone (DECADRON) 12 mg in 0.9% sodium chloride 50 mL, overfill volume 5 mL IVPB       Admin Date  11/02/2022 Action  New Bag Dose  12 mg Rate  232 mL/hr Route  IntraVENous Administered By  Tommye Bone              diphenhydrAMINE (BENADRYL) injection 50 mg       Admin Date  11/02/2022 Action  Given Dose  50 mg Route  IntraVENous Administered By  Tommye Bone              famotidine (PF) (PEPCID) 20 mg in 0.9% sodium chloride 10 mL injection       Admin Date  11/02/2022 Action  Given Dose  20 mg Route  IntraVENous Administered By  Tommye Bone              fosaprepitant (EMEND) 150 mg in 0.9% sodium chloride 150 mL IVPB       Admin Date  11/02/2022 Action  New Bag Dose  150 mg Rate  450 mL/hr Route  IntraVENous Administered By  Tommye Bone              PACLitaxeL (TAXOL) 134 mg in 0.9% sodium chloride 250 mL, overfill volume 25 mL chemo infusion       Admin Date  11/02/2022 Action  New Bag Dose  134 mg Rate  297.3 mL/hr Route  IntraVENous Administered By  Murl Solar              palonosetron HCl (ALOXI) injection 0.25 mg       Admin Date  11/02/2022 Action  Given Dose  0.25 mg Route  IntraVENous Administered By  Murl Solar              pembrolizumab [facility]) 200 mg in 0.9% sodium chloride 100 mL, overfill volume 10 mL IVPB       Admin Date  11/02/2022 Action  New Bag Dose  200 mg Rate  236 mL/hr Route  IntraVENous Administered By  Radha Arnold accessed with positive blood return. Port flushed, heparinized and de-accessed per protocol. Ms. Adi Barnes tolerated the infusion and had no complaints. Ms. Adi Barnes was discharged from [place] [address] in stable condition.    Pt aware of next appt    Future Appointments   Date Time Provider Linnea Yuen   11/9/2022 10:00 AM C1 ISABEL MED [address] [place] [address]   11/9/2022 10:15 AM Luis Muller  N Broad  BS [facility]   11/16/2022  8:00 AM B3 ISABEL MED [facility]   11/16/2022  2:15 PM Gita Andrews MD [facility]   11/23/2022  7:30 AM F3 ISABEL LONG [place] [facility]   11/30/2022 10:00 AM A1 ISABEL MED [address] [place] [address]   12/7/2022 10:00 AM C1 ISABEL MED [facility]   12/14/2022  7:30 AM F3 ISABEL LONG [facility]   12/21/2022 10:00 AM C1 ISABEL MED [facility]   12/28/2022 10:00 AM C1 ISABEL MED [address] [place] [address]  November 2, 2022  2:25 PM

## 2022-11-08 ENCOUNTER — APPOINTMENT (OUTPATIENT)
Dept: INFUSION THERAPY | Age: 62
End: 2022-11-08

## 2022-11-09 ENCOUNTER — HOSPITAL ENCOUNTER (OUTPATIENT)
Dept: INFUSION THERAPY | Age: 62
Discharge: HOME | End: 2022-11-09
Payer: COMMERCIAL

## 2022-11-09 ENCOUNTER — OFFICE VISIT (OUTPATIENT)
Dept: ONCOLOGY | Age: 62
End: 2022-11-09
Payer: COMMERCIAL

## 2022-11-09 VITALS
BODY MASS INDEX: 28.12 KG/M2 | TEMPERATURE: 96.9 F | SYSTOLIC BLOOD PRESSURE: 160 MMHG | DIASTOLIC BLOOD PRESSURE: 77 MMHG | RESPIRATION RATE: 18 BRPM | WEIGHT: 144 LBS | HEART RATE: 65 BPM

## 2022-11-09 DIAGNOSIS — C50.012 BILATERAL MALIGNANT NEOPLASM INVOLVING BOTH NIPPLE AND AREOLA IN FEMALE, UNSPECIFIED ESTROGEN RECEPTOR STATUS (HCC): Primary | ICD-10-CM

## 2022-11-09 DIAGNOSIS — C50.011 BILATERAL MALIGNANT NEOPLASM INVOLVING BOTH NIPPLE AND AREOLA IN FEMALE, UNSPECIFIED ESTROGEN RECEPTOR STATUS (HCC): Primary | ICD-10-CM

## 2022-11-09 LAB
BASOPHILS # BLD: 0 K/UL (ref 0–0.1)
BASOPHILS NFR BLD: 1 % (ref 0–1)
DIFFERENTIAL METHOD BLD: ABNORMAL
EOSINOPHIL # BLD: 0.1 K/UL (ref 0–0.4)
EOSINOPHIL NFR BLD: 2 % (ref 0–7)
ERYTHROCYTE [DISTWIDTH] IN BLOOD BY AUTOMATED COUNT: 13.2 % (ref 11.5–14.5)
HCT VFR BLD AUTO: 34.2 % (ref 35–47)
HGB BLD-MCNC: 11 G/DL (ref 11.5–16)
IMM GRANULOCYTES # BLD AUTO: 0 K/UL (ref 0–0.04)
IMM GRANULOCYTES NFR BLD AUTO: 0 % (ref 0–0.5)
LYMPHOCYTES # BLD: 1.2 K/UL (ref 0.8–3.5)
LYMPHOCYTES NFR BLD: 49 % (ref 12–49)
MCH RBC QN AUTO: 29.1 PG (ref 26–34)
MCHC RBC AUTO-ENTMCNC: 32.2 G/DL (ref 30–36.5)
MCV RBC AUTO: 90.5 FL (ref 80–99)
MONOCYTES # BLD: 0.2 K/UL (ref 0–1)
MONOCYTES NFR BLD: 9 % (ref 5–13)
NEUTS SEG # BLD: 1 K/UL (ref 1.8–8)
NEUTS SEG NFR BLD: 39 % (ref 32–75)
NRBC # BLD: 0 K/UL (ref 0–0.01)
NRBC BLD-RTO: 0 PER 100 WBC
PLATELET # BLD AUTO: 208 K/UL (ref 150–400)
PMV BLD AUTO: 9.5 FL (ref 8.9–12.9)
RBC # BLD AUTO: 3.78 M/UL (ref 3.8–5.2)
WBC # BLD AUTO: 2.5 K/UL (ref 3.6–11)

## 2022-11-09 PROCEDURE — 74011000250 HC RX REV CODE- 250: Performed by: INTERNAL MEDICINE

## 2022-11-09 PROCEDURE — 96375 TX/PRO/DX INJ NEW DRUG ADDON: CPT

## 2022-11-09 PROCEDURE — 77030012965 HC NDL HUBR BBMI -A

## 2022-11-09 PROCEDURE — 74011250636 HC RX REV CODE- 250/636: Performed by: INTERNAL MEDICINE

## 2022-11-09 PROCEDURE — 85025 COMPLETE CBC W/AUTO DIFF WBC: CPT

## 2022-11-09 PROCEDURE — 99215 OFFICE O/P EST HI 40 MIN: CPT | Performed by: INTERNAL MEDICINE

## 2022-11-09 PROCEDURE — 96413 CHEMO IV INFUSION 1 HR: CPT

## 2022-11-09 PROCEDURE — 36415 COLL VENOUS BLD VENIPUNCTURE: CPT

## 2022-11-09 RX ORDER — DEXAMETHASONE SODIUM PHOSPHATE 10 MG/ML
10 INJECTION INTRAMUSCULAR; INTRAVENOUS ONCE
Status: COMPLETED | OUTPATIENT
Start: 2022-11-09 | End: 2022-11-09

## 2022-11-09 RX ORDER — DIPHENHYDRAMINE HYDROCHLORIDE 50 MG/ML
50 INJECTION, SOLUTION INTRAMUSCULAR; INTRAVENOUS ONCE
Status: COMPLETED | OUTPATIENT
Start: 2022-11-09 | End: 2022-11-09

## 2022-11-09 RX ORDER — SODIUM CHLORIDE 0.9 % (FLUSH) 0.9 %
5-40 SYRINGE (ML) INJECTION AS NEEDED
Status: DISPENSED | OUTPATIENT
Start: 2022-11-09 | End: 2022-11-09

## 2022-11-09 RX ORDER — SODIUM CHLORIDE 9 MG/ML
5-250 INJECTION, SOLUTION INTRAVENOUS AS NEEDED
Status: DISPENSED | OUTPATIENT
Start: 2022-11-09 | End: 2022-11-09

## 2022-11-09 RX ORDER — SODIUM CHLORIDE 9 MG/ML
5-40 INJECTION INTRAMUSCULAR; INTRAVENOUS; SUBCUTANEOUS AS NEEDED
Status: ACTIVE | OUTPATIENT
Start: 2022-11-09 | End: 2022-11-09

## 2022-11-09 RX ORDER — HEPARIN 100 UNIT/ML
500 SYRINGE INTRAVENOUS AS NEEDED
Status: ACTIVE | OUTPATIENT
Start: 2022-11-09 | End: 2022-11-09

## 2022-11-09 RX ADMIN — SODIUM CHLORIDE 50 ML/HR: 9 INJECTION, SOLUTION INTRAVENOUS at 12:13

## 2022-11-09 RX ADMIN — DEXAMETHASONE SODIUM PHOSPHATE 10 MG: 10 INJECTION, SOLUTION INTRAMUSCULAR; INTRAVENOUS at 12:17

## 2022-11-09 RX ADMIN — HEPARIN 500 UNITS: 100 SYRINGE at 14:22

## 2022-11-09 RX ADMIN — SODIUM CHLORIDE, PRESERVATIVE FREE 10 ML: 5 INJECTION INTRAVENOUS at 14:22

## 2022-11-09 RX ADMIN — FAMOTIDINE 20 MG: 10 INJECTION, SOLUTION INTRAVENOUS at 12:15

## 2022-11-09 RX ADMIN — DIPHENHYDRAMINE HYDROCHLORIDE 50 MG: 50 INJECTION, SOLUTION INTRAMUSCULAR; INTRAVENOUS at 12:25

## 2022-11-09 RX ADMIN — PACLITAXEL 134 MG: 6 INJECTION, SOLUTION INTRAVENOUS at 13:16

## 2022-11-09 NOTE — PROGRESS NOTES
[facility] Short Note                       Date: 2022    Name: Nga Lizarraga    MRN: [id number]         : 1960      1010 Pt admit to [facility] for Taxol ambulatory in stable condition. Assessment completed. No new concerns voiced. Port accessed with no difficulty. Positive blood return noted and labs drawn. Ms. Srinivasa Osuna vitals were reviewed prior to and after treatment. Patient Vitals for the past 12 hrs:   Temp Pulse Resp BP   22 1419 -- 65 -- (!) 160/77   22 1035 96.9 °F (36.1 °C) 70 18 134/80       Lab results were obtained and reviewed. Recent Results (from the past 12 hour(s))   CBC WITH AUTOMATED DIFF    Collection Time: 22 10:32 AM   Result Value Ref Range    WBC 2.5 (L) 3.6 - 11.0 K/uL    RBC 3.78 (L) 3.80 - 5.20 M/uL    HGB 11.0 (L) 11.5 - 16.0 g/dL    HCT 34.2 (L) 35.0 - 47.0 %    MCV 90.5 80.0 - 99.0 FL    MCH 29.1 26.0 - 34.0 PG    MCHC 32.2 30.0 - 36.5 g/dL    RDW 13.2 11.5 - 14.5 %    PLATELET 918 348 - 448 K/uL    MPV 9.5 8.9 - 12.9 FL    NRBC 0.0 0  WBC    ABSOLUTE NRBC 0.00 0.00 - 0.01 K/uL    NEUTROPHILS 39 32 - 75 %    LYMPHOCYTES 49 12 - 49 %    MONOCYTES 9 5 - 13 %    EOSINOPHILS 2 0 - 7 %    BASOPHILS 1 0 - 1 %    IMMATURE GRANULOCYTES 0 0.0 - 0.5 %    ABS. NEUTROPHILS 1.0 (L) 1.8 - 8.0 K/UL    ABS. LYMPHOCYTES 1.2 0.8 - 3.5 K/UL    ABS. MONOCYTES 0.2 0.0 - 1.0 K/UL    ABS. EOSINOPHILS 0.1 0.0 - 0.4 K/UL    ABS. BASOPHILS 0.0 0.0 - 0.1 K/UL    ABS. IMM.  GRANS. 0.0 0.00 - 0.04 K/UL    DF AUTOMATED         Medications given:   Medications Administered       0.9% sodium chloride infusion       Admin Date  2022 Action  New Bag Dose  50 mL/hr Rate  50 mL/hr Route  IntraVENous Administered By  Alexsandra Torres, RN              dexamethasone (PF) (DECADRON) 10 mg/mL injection 10 mg       Admin Date  2022 Action  Given Dose  10 mg Route  IntraVENous Administered By  Alexsandra Torres, RN              diphenhydrAMINE (BENADRYL) injection 50 mg       Admin Date  11/09/2022 Action  Given Dose  50 mg Route  IntraVENous Administered By  Muriel Funez RN              famotidine (PF) (PEPCID) 20 mg in 0.9% sodium chloride 10 mL injection       Admin Date  11/09/2022 Action  Given Dose  20 mg Route  IntraVENous Administered By  Muriel Funez RN              heparin (porcine) pf 500 Units       Admin Date  11/09/2022 Action  Given Dose  500 Units Route  InterCATHeter Administered By  Muriel Funez RN              PACLitaxeL (TAXOL) 134 mg in 0.9% sodium chloride 250 mL, overfill volume 25 mL chemo infusion       Admin Date  11/09/2022 Action  New Bag Dose  134 mg Rate  297.3 mL/hr Route  IntraVENous Administered By  Muriel Funez RN              sodium chloride (NS) flush 5-40 mL       Admin Date  11/09/2022 Action  Given Dose  10 mL Route  IntraVENous Administered By  Muriel Funez RN                        Port accessed with positive blood return. Port flushed, heparinized and de-accessed per protocol. Ms. Srinivasa Brown tolerated the infusion and had no complaints. Ms. Srinivasa Brown was discharged from [place] [address] in stable condition.    Pt aware of next appt    Future Appointments   Date Time Provider Linnea Alpa   11/16/2022  8:00 AM B3 ISABEL MED [address]   11/16/2022 10:00 AM Margaret Beltran MD [facility] BS AMB   11/23/2022  7:30 AM F3 ISABEL LONG [address] [facility]   11/30/2022 10:00 AM A1 ISABEL MED [address]   11/30/2022 10:15 AM Sadie Rosario  N [facility] BS AMB   12/7/2022 10:00 AM C1 ISABEL MED [facility]   12/14/2022  7:30 AM F3 ISABEL LONG [place] [facility]   12/21/2022 10:00 AM C1 ISABEL MED [facility]   12/28/2022 10:00 AM C1 ISABEL  Hosea Sears RN  November 9, 2022  2:25 PM

## 2022-11-09 NOTE — PROGRESS NOTES
[facility] [place] [facility] [place] [address] [address], [address], [facility]: [phone]  F: [phone]    Reason for Visit:   Sedrick Og is a 59 y.o. female who is seen for follow up of left breast cancer-ER 0%, GA 0%, HER2/radha negative. Treatment History:   10/1/22: Carboplatin taxol and keytruda followed by ddAC and Keytruda     History of Present Illness:   Patient is a 29-year-old female seen for left breast cancer. She had a screening mammogram on 7/20/2022 which showed a 1 cm irregular mass in the left breast at 3 o'clock position. Biopsy on 8/2/2022 showed invasive ductal carcinoma, grade 2-3, ER 0%, GA 0%, HER2/radha negative. Ki-67 85%. Declined genetic testing. She comes today for cycle  2 day 8 of Carboplatin taxol and keytruda followed by ddAC and Keytruda. Been stressful  This past weekend she had nausea, diarrhea and fatigue. She did not get a fever. She got some chills. Most symptoms have resolved    No other complains    No FH of breast, ovarian, pancreatic or Prostate malignancies  She works with special needs     Past Medical History:   Diagnosis Date    Cancer ([facility] [address]     BREAST CANCER    GERD (gastroesophageal reflux disease)     HTN (hypertension) 07/18/2011    Psychiatric disorder     ANIXETY AND DEPRESSION    Thyroid disease     HYPOTHYROID      Past Surgical History:   Procedure Laterality Date    HX COLONOSCOPY      HX MYOMECTOMY  05/02/1996    x 1    HX WISDOM TEETH EXTRACTION        Social History     Tobacco Use    Smoking status: Never     Passive exposure: Never    Smokeless tobacco: Never   Substance Use Topics    Alcohol use: Never      Family History   Problem Relation Age of Onset    Heart Failure Mother     Cancer Father     Diabetes Sister     Kidney Disease Sister     Anesth Problems Neg Hx      Current Outpatient Medications   Medication Sig    carvediloL (COREG) 3.125 mg tablet Take 6.25 mg by mouth two (2) times daily (with meals). losartan (COZAAR) 25 mg tablet Take 25 mg by mouth daily. dexAMETHasone (DECADRON) 4 mg tablet Take 2 tabs (8mg) once daily on days 2 & 3 of chemotherapy    ondansetron (ZOFRAN ODT) 8 mg disintegrating tablet Take 1 Tablet by mouth every eight (8) hours as needed for Nausea or Vomiting. prochlorperazine (Compazine) 5 mg tablet Take 1 Tablet by mouth every six (6) hours as needed for Nausea or Vomiting.    lidocaine-prilocaine (EMLA) topical cream Apply  to affected area as needed for Pain. Apply a thick layer over port a cath 30-60 minutes prior to port access    PARoxetine (PAXIL) 20 mg tablet paroxetine 20 mg tablet   Take 1 tablet every day by oral route. rosuvastatin (CRESTOR) 10 mg tablet Take 10 mg by mouth nightly. multivitamin, tx-iron-ca-min (THERA-M w/ IRON) 9 mg iron-400 mcg tab tablet Take 1 Tablet by mouth daily. No current facility-administered medications for this visit. Allergies   Allergen Reactions    Sulfa (Sulfonamide Antibiotics) Itching, Swelling and Unknown (comments)        Review of Systems: A complete review of systems was obtained, negative except as described above. Physical Exam:   Vitals reviewed     ECOG PS: 0  General: No distress  Eyes: PERRL, anicteric sclerae  HENT: Atraumatic  Neck: Supple  Lymphatic: No cervical, supraclavicular, or inguinal adenopathy  Respiratory: normal respiratory effort  Breast: deferred today  CV: Normal rate, regular rhythm, no murmurs, no peripheral edema  GI: Soft, nontender  no splenomegaly  MS: Normal gait and station. Digits without clubbing or cyanosis. Skin: No rashes, ecchymoses, or petechiae. Normal temperature, turgor, and texture.   Psych: Alert, oriented, appropriate affect, normal judgment/insight    Results:     Lab Results   Component Value Date/Time    WBC 2.7 (L) 11/02/2022 08:38 AM    HGB 11.3 (L) 11/02/2022 08:38 AM    HCT 34.4 (L) 11/02/2022 08:38 AM    PLATELET 650 02/86/0674 08:38 AM    MCV 89.8 11/02/2022 08:38 AM    ABS. NEUTROPHILS 1.3 (L) 11/02/2022 08:38 AM     Lab Results   Component Value Date/Time    Sodium 141 11/02/2022 08:38 AM    Potassium 3.9 11/02/2022 08:38 AM    Chloride 108 11/02/2022 08:38 AM    CO2 29 11/02/2022 08:38 AM    Glucose 98 11/02/2022 08:38 AM    BUN 9 11/02/2022 08:38 AM    Creatinine 0.91 11/02/2022 08:38 AM    GFR est AA >60 09/26/2022 03:26 PM    GFR est non-AA >60 09/26/2022 03:26 PM    Calcium 8.8 11/02/2022 08:38 AM     Lab Results   Component Value Date/Time    Bilirubin, total 0.3 11/02/2022 08:38 AM    ALT (SGPT) 61 11/02/2022 08:38 AM    Alk. phosphatase 103 11/02/2022 08:38 AM    Protein, total 6.7 11/02/2022 08:38 AM    Albumin 3.3 (L) 11/02/2022 08:38 AM    Globulin 3.4 11/02/2022 08:38 AM       External   Records reviewed and summarized above. Pathology report(s) reviewed above. Radiology report(s) reviewed above. MRI  9/2022    LEFT BREAST: Conglomerate mass enhancement in the left breast at 4-5 o'clock,  combined dimension 3.7 x 1.9 x 1.4 cm. This is slightly more extensive than the  sonographic and mammographic findings. No lymphadenopathy. Assessment:   1) Left breast cancer  1 cm mass on mammogram  Palpated a lump  ER 0%, DE 0%, HER2/radha negative  Ki 67 was 85%. MRI breast with a 3.7 cm Left breast mass    [id number]  AJCC 8th ed-Stage IIA  Genetic testing negative    Discussed pathology and the natural history of triple negative breast cancer. Characterized by higher relapse rates after the first 3 years of Dx, can recur locally or in distant organs. Adjuvant chemotherapy is estimated to decrease the risk of recurrence by 25-30%, breast cancer mortality by 20% and overall mortality by 20%. Review of MRI shows a T2 primary. We discussed NACT.     Recommended chemotherapy + Keytruda based on the KEYNOTE-522 trial (taxol, carboplatin and [place] followed by [facility] as preoperative treatment, followed by surgery, and then adjuvant pembrolizumab for another nine cycles (27 weeks) after surgery). Today is cycle 2 day 8  Tolerating well  Grade 1 neutropenia    2) HTN    3) Depression    4) Hyperlipidemia    5) Low EF   She will start [facility] [place] by [facility] 1/2023 and will need a repeat ECHO prior to this with EF at least > 50%   Dr Malissa Johnson is her cardiologist - notes reviewed  Repeat ECHO next month ordered by cardiologist , will follow    6) Encounter for high risk medications like chemotherapy and immunotherapy  Tolerating well   Labs pending     Plan:     Proceed today with cycle 2 day 8 of Carboplatin AUC 5 taxol and keytruda followed by ddAC (pending repeat ECHO) and Keytruda   Labs to include cbc with diff, cmp prior to infusions; TSH every 6 weeks  Zofran / compazine PRN  Dexamethasone on days 2 & 3 of chemotherapy  Follow with cardiology as scheduled; repeat ECHO next month     RTC in 3 weeks     I appreciate the opportunity to participate in [address]. [address] [facility].         Signed By: Luz Pan MD

## 2022-11-15 ENCOUNTER — APPOINTMENT (OUTPATIENT)
Dept: INFUSION THERAPY | Age: 62
End: 2022-11-15

## 2022-11-16 ENCOUNTER — HOSPITAL ENCOUNTER (OUTPATIENT)
Dept: INFUSION THERAPY | Age: 62
Discharge: HOME | End: 2022-11-16
Payer: COMMERCIAL

## 2022-11-16 VITALS
RESPIRATION RATE: 18 BRPM | HEART RATE: 65 BPM | TEMPERATURE: 97.6 F | DIASTOLIC BLOOD PRESSURE: 70 MMHG | WEIGHT: 145.6 LBS | SYSTOLIC BLOOD PRESSURE: 139 MMHG | BODY MASS INDEX: 28.44 KG/M2

## 2022-11-16 DIAGNOSIS — C50.012 BILATERAL MALIGNANT NEOPLASM INVOLVING BOTH NIPPLE AND AREOLA IN FEMALE, UNSPECIFIED ESTROGEN RECEPTOR STATUS (HCC): Primary | ICD-10-CM

## 2022-11-16 DIAGNOSIS — C50.011 BILATERAL MALIGNANT NEOPLASM INVOLVING BOTH NIPPLE AND AREOLA IN FEMALE, UNSPECIFIED ESTROGEN RECEPTOR STATUS (HCC): Primary | ICD-10-CM

## 2022-11-16 PROCEDURE — 85025 COMPLETE CBC W/AUTO DIFF WBC: CPT

## 2022-11-16 PROCEDURE — 74011250636 HC RX REV CODE- 250/636: Performed by: INTERNAL MEDICINE

## 2022-11-16 PROCEDURE — 36591 DRAW BLOOD OFF VENOUS DEVICE: CPT

## 2022-11-16 PROCEDURE — 74011000250 HC RX REV CODE- 250: Performed by: INTERNAL MEDICINE

## 2022-11-16 PROCEDURE — 77030012965 HC NDL HUBR BBMI -A

## 2022-11-16 RX ORDER — EPINEPHRINE 1 MG/ML
0.3 INJECTION, SOLUTION, CONCENTRATE INTRAVENOUS AS NEEDED
Status: CANCELLED | OUTPATIENT
Start: 2022-11-23

## 2022-11-16 RX ORDER — SODIUM CHLORIDE 9 MG/ML
5-250 INJECTION, SOLUTION INTRAVENOUS AS NEEDED
Status: CANCELLED | OUTPATIENT
Start: 2022-11-30

## 2022-11-16 RX ORDER — DIPHENHYDRAMINE HYDROCHLORIDE 50 MG/ML
50 INJECTION, SOLUTION INTRAMUSCULAR; INTRAVENOUS ONCE
Status: CANCELLED
Start: 2022-11-30 | End: 2022-11-30

## 2022-11-16 RX ORDER — DIPHENHYDRAMINE HYDROCHLORIDE 50 MG/ML
25 INJECTION, SOLUTION INTRAMUSCULAR; INTRAVENOUS AS NEEDED
Status: CANCELLED
Start: 2022-11-30

## 2022-11-16 RX ORDER — DIPHENHYDRAMINE HYDROCHLORIDE 50 MG/ML
50 INJECTION, SOLUTION INTRAMUSCULAR; INTRAVENOUS AS NEEDED
Status: CANCELLED
Start: 2022-11-23

## 2022-11-16 RX ORDER — ALBUTEROL SULFATE 0.83 MG/ML
2.5 SOLUTION RESPIRATORY (INHALATION) AS NEEDED
Status: CANCELLED
Start: 2022-11-30

## 2022-11-16 RX ORDER — DEXAMETHASONE SODIUM PHOSPHATE 100 MG/10ML
10 INJECTION INTRAMUSCULAR; INTRAVENOUS ONCE
OUTPATIENT
Start: 2022-12-07 | End: 2022-12-07

## 2022-11-16 RX ORDER — DIPHENHYDRAMINE HYDROCHLORIDE 50 MG/ML
25 INJECTION, SOLUTION INTRAMUSCULAR; INTRAVENOUS AS NEEDED
Start: 2022-12-07

## 2022-11-16 RX ORDER — PALONOSETRON 0.05 MG/ML
0.25 INJECTION, SOLUTION INTRAVENOUS ONCE
Status: CANCELLED | OUTPATIENT
Start: 2022-11-23 | End: 2022-11-23

## 2022-11-16 RX ORDER — DIPHENHYDRAMINE HYDROCHLORIDE 50 MG/ML
50 INJECTION, SOLUTION INTRAMUSCULAR; INTRAVENOUS AS NEEDED
Start: 2022-12-07

## 2022-11-16 RX ORDER — ALBUTEROL SULFATE 0.83 MG/ML
2.5 SOLUTION RESPIRATORY (INHALATION) AS NEEDED
Start: 2022-12-07

## 2022-11-16 RX ORDER — SODIUM CHLORIDE 9 MG/ML
5-40 INJECTION INTRAMUSCULAR; INTRAVENOUS; SUBCUTANEOUS AS NEEDED
OUTPATIENT
Start: 2022-12-07

## 2022-11-16 RX ORDER — SODIUM CHLORIDE 0.9 % (FLUSH) 0.9 %
5-40 SYRINGE (ML) INJECTION AS NEEDED
OUTPATIENT
Start: 2022-12-07

## 2022-11-16 RX ORDER — SODIUM CHLORIDE 9 MG/ML
5-250 INJECTION, SOLUTION INTRAVENOUS AS NEEDED
Status: CANCELLED | OUTPATIENT
Start: 2022-11-23

## 2022-11-16 RX ORDER — ACETAMINOPHEN 325 MG/1
650 TABLET ORAL AS NEEDED
Start: 2022-12-07

## 2022-11-16 RX ORDER — HYDROCORTISONE SODIUM SUCCINATE 100 MG/2ML
100 INJECTION, POWDER, FOR SOLUTION INTRAMUSCULAR; INTRAVENOUS AS NEEDED
OUTPATIENT
Start: 2022-12-07

## 2022-11-16 RX ORDER — HEPARIN 100 UNIT/ML
500 SYRINGE INTRAVENOUS AS NEEDED
Start: 2022-12-07

## 2022-11-16 RX ORDER — DIPHENHYDRAMINE HYDROCHLORIDE 50 MG/ML
50 INJECTION, SOLUTION INTRAMUSCULAR; INTRAVENOUS AS NEEDED
Status: CANCELLED
Start: 2022-11-30

## 2022-11-16 RX ORDER — SODIUM CHLORIDE 9 MG/ML
5-40 INJECTION INTRAMUSCULAR; INTRAVENOUS; SUBCUTANEOUS AS NEEDED
Status: CANCELLED | OUTPATIENT
Start: 2022-11-23

## 2022-11-16 RX ORDER — SODIUM CHLORIDE 0.9 % (FLUSH) 0.9 %
5-40 SYRINGE (ML) INJECTION AS NEEDED
Status: CANCELLED | OUTPATIENT
Start: 2022-11-23

## 2022-11-16 RX ORDER — DIPHENHYDRAMINE HYDROCHLORIDE 50 MG/ML
25 INJECTION, SOLUTION INTRAMUSCULAR; INTRAVENOUS AS NEEDED
Status: CANCELLED
Start: 2022-11-23

## 2022-11-16 RX ORDER — HEPARIN 100 UNIT/ML
500 SYRINGE INTRAVENOUS AS NEEDED
Status: CANCELLED
Start: 2022-11-23

## 2022-11-16 RX ORDER — ALBUTEROL SULFATE 0.83 MG/ML
2.5 SOLUTION RESPIRATORY (INHALATION) AS NEEDED
Status: CANCELLED
Start: 2022-11-23

## 2022-11-16 RX ORDER — SODIUM CHLORIDE 9 MG/ML
5-250 INJECTION, SOLUTION INTRAVENOUS AS NEEDED
OUTPATIENT
Start: 2022-12-07

## 2022-11-16 RX ORDER — ACETAMINOPHEN 325 MG/1
650 TABLET ORAL AS NEEDED
Status: CANCELLED
Start: 2022-11-23

## 2022-11-16 RX ORDER — DIPHENHYDRAMINE HYDROCHLORIDE 50 MG/ML
50 INJECTION, SOLUTION INTRAMUSCULAR; INTRAVENOUS ONCE
Start: 2022-12-07 | End: 2022-12-07

## 2022-11-16 RX ORDER — EPINEPHRINE 1 MG/ML
0.3 INJECTION, SOLUTION, CONCENTRATE INTRAVENOUS AS NEEDED
Status: CANCELLED | OUTPATIENT
Start: 2022-11-30

## 2022-11-16 RX ORDER — SODIUM CHLORIDE 9 MG/ML
5-40 INJECTION INTRAMUSCULAR; INTRAVENOUS; SUBCUTANEOUS AS NEEDED
Status: CANCELLED | OUTPATIENT
Start: 2022-11-30

## 2022-11-16 RX ORDER — ONDANSETRON 2 MG/ML
8 INJECTION INTRAMUSCULAR; INTRAVENOUS AS NEEDED
Status: CANCELLED | OUTPATIENT
Start: 2022-11-30

## 2022-11-16 RX ORDER — DIPHENHYDRAMINE HYDROCHLORIDE 50 MG/ML
50 INJECTION, SOLUTION INTRAMUSCULAR; INTRAVENOUS ONCE
Status: CANCELLED
Start: 2022-11-23 | End: 2022-11-23

## 2022-11-16 RX ORDER — HEPARIN 100 UNIT/ML
500 SYRINGE INTRAVENOUS AS NEEDED
Status: CANCELLED
Start: 2022-11-30

## 2022-11-16 RX ORDER — SODIUM CHLORIDE 0.9 % (FLUSH) 0.9 %
5-40 SYRINGE (ML) INJECTION AS NEEDED
Status: CANCELLED | OUTPATIENT
Start: 2022-11-30

## 2022-11-16 RX ORDER — HEPARIN 100 UNIT/ML
500 SYRINGE INTRAVENOUS AS NEEDED
Status: DISPENSED | OUTPATIENT
Start: 2022-11-16 | End: 2022-11-16

## 2022-11-16 RX ORDER — ONDANSETRON 2 MG/ML
8 INJECTION INTRAMUSCULAR; INTRAVENOUS AS NEEDED
Status: CANCELLED | OUTPATIENT
Start: 2022-11-23

## 2022-11-16 RX ORDER — ONDANSETRON 2 MG/ML
8 INJECTION INTRAMUSCULAR; INTRAVENOUS AS NEEDED
OUTPATIENT
Start: 2022-12-07

## 2022-11-16 RX ORDER — SODIUM CHLORIDE 9 MG/ML
5-40 INJECTION INTRAMUSCULAR; INTRAVENOUS; SUBCUTANEOUS AS NEEDED
Status: ACTIVE | OUTPATIENT
Start: 2022-11-16 | End: 2022-11-16

## 2022-11-16 RX ORDER — DEXAMETHASONE SODIUM PHOSPHATE 100 MG/10ML
10 INJECTION INTRAMUSCULAR; INTRAVENOUS ONCE
Status: CANCELLED | OUTPATIENT
Start: 2022-11-30 | End: 2022-11-30

## 2022-11-16 RX ORDER — HYDROCORTISONE SODIUM SUCCINATE 100 MG/2ML
100 INJECTION, POWDER, FOR SOLUTION INTRAMUSCULAR; INTRAVENOUS AS NEEDED
Status: CANCELLED | OUTPATIENT
Start: 2022-11-23

## 2022-11-16 RX ORDER — EPINEPHRINE 1 MG/ML
0.3 INJECTION, SOLUTION, CONCENTRATE INTRAVENOUS AS NEEDED
OUTPATIENT
Start: 2022-12-07

## 2022-11-16 RX ORDER — HYDROCORTISONE SODIUM SUCCINATE 100 MG/2ML
100 INJECTION, POWDER, FOR SOLUTION INTRAMUSCULAR; INTRAVENOUS AS NEEDED
Status: CANCELLED | OUTPATIENT
Start: 2022-11-30

## 2022-11-16 RX ORDER — ACETAMINOPHEN 325 MG/1
650 TABLET ORAL AS NEEDED
Status: CANCELLED
Start: 2022-11-30

## 2022-11-16 RX ADMIN — SODIUM CHLORIDE, PRESERVATIVE FREE 10 ML: 5 INJECTION INTRAVENOUS at 10:39

## 2022-11-16 RX ADMIN — HEPARIN 500 UNITS: 100 SYRINGE at 10:40

## 2022-11-16 NOTE — PROGRESS NOTES
[facility] Progress Note    Date: 2022    Name: Ezra Marrufo    MRN: [id number]         : 1960    Ms. Mae Francis Arrived ambulatory and in no distress for cycle 2 day 15 of Taxol regimen. Follow Up: HOLD treatment--ANC 0.8    Assessment was completed and documented in flowsheets. No acute concerns at this time. Port accessed without difficulty, labs drawn and processed. Chemotherapy Flowsheet 2022   Cycle C2D15   Date 2022   Drug / Regimen Taxol   Pre Meds -   Notes HELD     Ms. Obrien's vitals were reviewed. Patient Vitals for the past 12 hrs:   Temp Pulse Resp BP   22 0806 97.6 °F (36.4 °C) 65 18 139/70     Lines:   Venous Access Device 10/12/22 (Active)   Central Line Being Utilized Yes 22 0819   Criteria for Appropriate Use Irritant/vesicant medication 22 0819   Site Assessment Clean, dry, & intact 22 0819   Date of Last Dressing Change 22 0819   Dressing Status New 22 1041   Dressing Type Bandaid 22 1041   Action Taken Blood drawn 22 0819   Date Accessed (Medial Site) 22 0819   Access Time (Medial Site) 0820 22 0819   Access Needle Size (Site #1) 20 G 22 0819   Access Needle Length (Medial Site) 0.75 inches 22 0819   Positive Blood Return (Medial Site) Yes 22 5318   Action Taken (Medial Site) Flushed; De-accessed 22 1041   Action Taken (Lateral Site) Flushed; De-accessed 10/12/22 1600   Alcohol Cap Used Yes 22 0819      Lab results were obtained and reviewed. Labs not within parameters for treatment.  ANC 0.8--treatment held per Dr. Fuad Dallas  Recent Results (from the past 12 hour(s))   CBC WITH AUTOMATED DIFF    Collection Time: 22  8:14 AM   Result Value Ref Range    WBC 2.3 (L) 3.6 - 11.0 K/uL    RBC 3.62 (L) 3.80 - 5.20 M/uL    HGB 10.5 (L) 11.5 - 16.0 g/dL    HCT 32.5 (L) 35.0 - 47.0 %    MCV 89.8 80.0 - 99.0 FL    MCH 29.0 26.0 - 34.0 PG    MCHC 32.3 30.0 - 36.5 g/dL RDW 14.0 11.5 - 14.5 %    PLATELET 778 700 - 570 K/uL    MPV 9.0 8.9 - 12.9 FL    NRBC 0.0 0  WBC    ABSOLUTE NRBC 0.00 0.00 - 0.01 K/uL    NEUTROPHILS 33 32 - 75 %    LYMPHOCYTES 59 (H) 12 - 49 %    MONOCYTES 7 5 - 13 %    EOSINOPHILS 0 0 - 7 %    BASOPHILS 1 0 - 1 %    IMMATURE GRANULOCYTES 0 %    ABS. NEUTROPHILS 0.8 (L) 1.8 - 8.0 K/UL    ABS. LYMPHOCYTES 1.3 0.8 - 3.5 K/UL    ABS. MONOCYTES 0.2 0.0 - 1.0 K/UL    ABS. EOSINOPHILS 0.0 0.0 - 0.4 K/UL    ABS. BASOPHILS 0.0 0.0 - 0.1 K/UL    ABS. IMM. GRANS. 0.0 K/UL    DF MANUAL      PLATELET COMMENTS Large Platelets      RBC COMMENTS NORMOCYTIC, NORMOCHROMIC      WBC COMMENTS Pathology Review Requested       Medications Administered       0.9% sodium chloride injection 5-40 mL       Admin Date  11/16/2022 Action  Given Dose  10 mL Route  IntraVENous Administered By  Lela Soares              heparin (porcine) pf 500 Units       Admin Date  11/16/2022 Action  Given Dose  500 Units Route  InterCATHeter Administered By  Lela Soares                  Ms. Brandt Mccoy port was flushed and de accessed, patient was discharged from [place] [address] in stable condition. Patient is aware of upcoming appointments.       Future Appointments   Date Time Provider Linnea Alpa   11/23/2022  7:30 AM F3 ISABEL LONG [address] [facility]   11/30/2022 10:00 AM A1 ISABEL MED [address] [facility]   11/30/2022 10:15 AM Yo Rosario  [address] BS AMB   12/7/2022 10:00 AM C1 ISABEL MED [facility]   12/14/2022  7:30 AM F3 ISABEL LONG [facility]   12/21/2022 10:00 AM C1 ISABEL MED [facility]   12/28/2022 10:00 AM C1 ISABEL MED [facility]     [facility]  November 16, 2022

## 2022-11-17 LAB
BASOPHILS # BLD: 0 K/UL (ref 0–0.1)
BASOPHILS NFR BLD: 1 % (ref 0–1)
DIFFERENTIAL METHOD BLD: ABNORMAL
EOSINOPHIL # BLD: 0 K/UL (ref 0–0.4)
EOSINOPHIL NFR BLD: 0 % (ref 0–7)
ERYTHROCYTE [DISTWIDTH] IN BLOOD BY AUTOMATED COUNT: 14 % (ref 11.5–14.5)
HCT VFR BLD AUTO: 32.5 % (ref 35–47)
HGB BLD-MCNC: 10.5 G/DL (ref 11.5–16)
IMM GRANULOCYTES # BLD AUTO: 0 K/UL
IMM GRANULOCYTES NFR BLD AUTO: 0 %
LYMPHOCYTES # BLD: 1.3 K/UL (ref 0.8–3.5)
LYMPHOCYTES NFR BLD: 59 % (ref 12–49)
MCH RBC QN AUTO: 29 PG (ref 26–34)
MCHC RBC AUTO-ENTMCNC: 32.3 G/DL (ref 30–36.5)
MCV RBC AUTO: 89.8 FL (ref 80–99)
MONOCYTES # BLD: 0.2 K/UL (ref 0–1)
MONOCYTES NFR BLD: 7 % (ref 5–13)
NEUTS SEG # BLD: 0.8 K/UL (ref 1.8–8)
NEUTS SEG NFR BLD: 33 % (ref 32–75)
NRBC # BLD: 0 K/UL (ref 0–0.01)
NRBC BLD-RTO: 0 PER 100 WBC
PATH REV BLD -IMP: ABNORMAL
PLATELET # BLD AUTO: 174 K/UL (ref 150–400)
PLATELET COMMENTS,PCOM: ABNORMAL
PMV BLD AUTO: 9 FL (ref 8.9–12.9)
RBC # BLD AUTO: 3.62 M/UL (ref 3.8–5.2)
RBC MORPH BLD: ABNORMAL
WBC # BLD AUTO: 2.3 K/UL (ref 3.6–11)
WBC MORPH BLD: ABNORMAL

## 2022-11-18 ENCOUNTER — NURSE NAVIGATOR (OUTPATIENT)
Dept: CASE MANAGEMENT | Age: 62
End: 2022-11-18

## 2022-11-18 NOTE — PROGRESS NOTES
1315:    Called patient in regards to symptoms discussed with Mrs. Forrest  No answer   LVM to call office back

## 2022-11-18 NOTE — PROGRESS NOTES
[address]   Breast Navigator Encounter    Name:    Ryan Lawrence  Age:    59 y.o. Diagnosis:     RIGHT breast cancer    Returned patient's call. She was unable to have her chemo this week because her \"labs were not good. \"  Asked what this meant. I explained that her WBCs, particularly her neutrophils were low. These are responsible for helping to fight infection. I explained that when they are low she is more susceptible to getting infections. I told her when they drop below a certain level that she becomes immunocompromised. I told her that holding her chemo for one week would help her WBCs climb back to a more normal level. She wanted to know if there was anything that she can do to help, and I told her that I was not aware of anything to help increase WBCs. She had a diarrheal illness a couple of weeks ago and was pretty sick. She did not reach out to Dr. Keyla Desouza office because she presumed that this was normal with chemo. I encouraged her to reach out to them if she has diarrhea, vomiting, fever or other symptoms that she experiences that are different. I told her she may need fluids, and they can arrange that for her. Says that she is very nauseated. Even trying to drink water sometimes causes this to get worse. I encouraged room temperature liquids and using a straw if this helps her to take smaller more frequent sips. She also will not have to put the glass to her mouth, which she says is when she starts to become nauseated. She sees Dr. Sarina Morales on Monday. I explained that this is for monitoring response of her tumor to her chemotherapy. I will plan on seeing her when she comes in for that appt. She was very appreciative of the return call and understood what we discussed above. I encouraged her to get on Kiddy to communicate with her providers/staff, but she prefers not to at this time.   She will call me back if she wants information for the [employer] help desk.                           Jayson Cuellar, RN, BSN, [facility]  Oncology Breast Navigator     [address]  [address] [facility] [place]  [address].  W: [phone]  F: [phone]  [email or web address]  Good Help to Those in [facility]

## 2022-11-21 ENCOUNTER — OFFICE VISIT (OUTPATIENT)
Dept: SURGERY | Age: 62
End: 2022-11-21
Payer: COMMERCIAL

## 2022-11-21 VITALS — HEIGHT: 60 IN | BODY MASS INDEX: 28.47 KG/M2 | WEIGHT: 145 LBS

## 2022-11-21 DIAGNOSIS — C50.012 BILATERAL MALIGNANT NEOPLASM INVOLVING BOTH NIPPLE AND AREOLA IN FEMALE, UNSPECIFIED ESTROGEN RECEPTOR STATUS (HCC): Primary | ICD-10-CM

## 2022-11-21 DIAGNOSIS — C50.011 BILATERAL MALIGNANT NEOPLASM INVOLVING BOTH NIPPLE AND AREOLA IN FEMALE, UNSPECIFIED ESTROGEN RECEPTOR STATUS (HCC): Primary | ICD-10-CM

## 2022-11-21 PROCEDURE — 99214 OFFICE O/P EST MOD 30 MIN: CPT | Performed by: SURGERY

## 2022-11-21 PROCEDURE — 76642 ULTRASOUND BREAST LIMITED: CPT | Performed by: SURGERY

## 2022-11-21 NOTE — PROGRESS NOTES
HISTORY OF PRESENT ILLNESS  To Left is a 59 y.o. female. HPI ESTABLISHED Patient here for follow up LEFT breast cancer. Has been seeing Dr. Fabiana Vivar. 10/1/22: Carboplatin taxol and keytruda followed by ddAC and Keytruda     Breast history -  Referring - Dr. Holly Molina - [address]  7/20/2022 - BSmammogram 3D at [address] - showed a 1cm irregular mass in the LEFT breast at 3 o'clock position. 8/2/2022 - LEFT breast core biopsy - invasive ductal carcinoma, grade 2-3, ER 0%, MD 0%, HER2/radha negative. 9/27/22 - port placement - Dr. Jenny Duarte - Dr. Fabiana Vivar     Family history -   No FH of breast, ovarian, pancreatic or prostate malignancies  Patient declined genetic testing    Review of Systems   All other systems reviewed and are negative. Physical Exam  Vitals and nursing note reviewed. Chest:   Breasts:     Left: No swelling, bleeding, inverted nipple, mass, nipple discharge, skin change or tenderness. Lymphadenopathy:      Upper Body:      Left upper body: No axillary adenopathy. BREAST ULTRASOUND, Preop planning  Indication:preop planning  left Breast 3:00   Technique: The area was scanned using a high-frequency linear-array near-field transducer  Findings:  irregular mass with dropout half the size   Impression: Biopsy site visible with ultrasound  Disposition:  continue chemo    ASSESSMENT and PLAN    ICD-10-CM ICD-9-CM    1. 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, unspecified estrogen receptor status (HCC)  C50.011 174.0     C50.012        - responding to chemo  - rtc 6 weeks  30 minutes was spent with patient on counseling and coordination of care.

## 2022-11-22 ENCOUNTER — APPOINTMENT (OUTPATIENT)
Dept: INFUSION THERAPY | Age: 62
End: 2022-11-22

## 2022-11-23 ENCOUNTER — HOSPITAL ENCOUNTER (OUTPATIENT)
Dept: INFUSION THERAPY | Age: 62
Discharge: HOME | End: 2022-11-23
Payer: COMMERCIAL

## 2022-11-23 VITALS
SYSTOLIC BLOOD PRESSURE: 146 MMHG | BODY MASS INDEX: 29.02 KG/M2 | WEIGHT: 147.8 LBS | TEMPERATURE: 97.6 F | HEART RATE: 65 BPM | DIASTOLIC BLOOD PRESSURE: 76 MMHG | RESPIRATION RATE: 18 BRPM | HEIGHT: 60 IN

## 2022-11-23 DIAGNOSIS — C50.012 BILATERAL MALIGNANT NEOPLASM INVOLVING BOTH NIPPLE AND AREOLA IN FEMALE, UNSPECIFIED ESTROGEN RECEPTOR STATUS (HCC): Primary | ICD-10-CM

## 2022-11-23 DIAGNOSIS — C50.011 BILATERAL MALIGNANT NEOPLASM INVOLVING BOTH NIPPLE AND AREOLA IN FEMALE, UNSPECIFIED ESTROGEN RECEPTOR STATUS (HCC): Primary | ICD-10-CM

## 2022-11-23 LAB
ALBUMIN SERPL-MCNC: 3.3 G/DL (ref 3.5–5)
ALBUMIN/GLOB SERPL: 1 {RATIO} (ref 1.1–2.2)
ALP SERPL-CCNC: 106 U/L (ref 45–117)
ALT SERPL-CCNC: 45 U/L (ref 12–78)
ANION GAP SERPL CALC-SCNC: 5 MMOL/L (ref 5–15)
AST SERPL-CCNC: 27 U/L (ref 15–37)
BASOPHILS # BLD: 0 K/UL (ref 0–0.1)
BASOPHILS NFR BLD: 1 % (ref 0–1)
BILIRUB SERPL-MCNC: 0.3 MG/DL (ref 0.2–1)
BUN SERPL-MCNC: 12 MG/DL (ref 6–20)
BUN/CREAT SERPL: 15 (ref 12–20)
CALCIUM SERPL-MCNC: 8.8 MG/DL (ref 8.5–10.1)
CHLORIDE SERPL-SCNC: 109 MMOL/L (ref 97–108)
CO2 SERPL-SCNC: 29 MMOL/L (ref 21–32)
CREAT SERPL-MCNC: 0.79 MG/DL (ref 0.55–1.02)
DIFFERENTIAL METHOD BLD: ABNORMAL
EOSINOPHIL # BLD: 0 K/UL (ref 0–0.4)
EOSINOPHIL NFR BLD: 1 % (ref 0–7)
ERYTHROCYTE [DISTWIDTH] IN BLOOD BY AUTOMATED COUNT: 15 % (ref 11.5–14.5)
GLOBULIN SER CALC-MCNC: 3.3 G/DL (ref 2–4)
GLUCOSE SERPL-MCNC: 104 MG/DL (ref 65–100)
HCT VFR BLD AUTO: 34.4 % (ref 35–47)
HGB BLD-MCNC: 10.8 G/DL (ref 11.5–16)
IMM GRANULOCYTES # BLD AUTO: 0 K/UL (ref 0–0.04)
IMM GRANULOCYTES NFR BLD AUTO: 0 % (ref 0–0.5)
LYMPHOCYTES # BLD: 1.4 K/UL (ref 0.8–3.5)
LYMPHOCYTES NFR BLD: 40 % (ref 12–49)
MCH RBC QN AUTO: 29 PG (ref 26–34)
MCHC RBC AUTO-ENTMCNC: 31.4 G/DL (ref 30–36.5)
MCV RBC AUTO: 92.5 FL (ref 80–99)
MONOCYTES # BLD: 0.6 K/UL (ref 0–1)
MONOCYTES NFR BLD: 16 % (ref 5–13)
NEUTS SEG # BLD: 1.5 K/UL (ref 1.8–8)
NEUTS SEG NFR BLD: 42 % (ref 32–75)
NRBC # BLD: 0 K/UL (ref 0–0.01)
NRBC BLD-RTO: 0 PER 100 WBC
PLATELET # BLD AUTO: 209 K/UL (ref 150–400)
PMV BLD AUTO: 9.1 FL (ref 8.9–12.9)
POTASSIUM SERPL-SCNC: 4.1 MMOL/L (ref 3.5–5.1)
PROT SERPL-MCNC: 6.6 G/DL (ref 6.4–8.2)
RBC # BLD AUTO: 3.72 M/UL (ref 3.8–5.2)
SODIUM SERPL-SCNC: 143 MMOL/L (ref 136–145)
TSH SERPL DL<=0.05 MIU/L-ACNC: 0.36 UIU/ML (ref 0.36–3.74)
WBC # BLD AUTO: 3.4 K/UL (ref 3.6–11)

## 2022-11-23 PROCEDURE — 74011000258 HC RX REV CODE- 258: Performed by: REGISTERED NURSE

## 2022-11-23 PROCEDURE — 96375 TX/PRO/DX INJ NEW DRUG ADDON: CPT

## 2022-11-23 PROCEDURE — 36415 COLL VENOUS BLD VENIPUNCTURE: CPT

## 2022-11-23 PROCEDURE — 84443 ASSAY THYROID STIM HORMONE: CPT

## 2022-11-23 PROCEDURE — 96417 CHEMO IV INFUS EACH ADDL SEQ: CPT

## 2022-11-23 PROCEDURE — 85025 COMPLETE CBC W/AUTO DIFF WBC: CPT

## 2022-11-23 PROCEDURE — 96413 CHEMO IV INFUSION 1 HR: CPT

## 2022-11-23 PROCEDURE — 77030012965 HC NDL HUBR BBMI -A

## 2022-11-23 PROCEDURE — 74011250636 HC RX REV CODE- 250/636: Performed by: REGISTERED NURSE

## 2022-11-23 PROCEDURE — 74011000250 HC RX REV CODE- 250: Performed by: REGISTERED NURSE

## 2022-11-23 PROCEDURE — 96367 TX/PROPH/DG ADDL SEQ IV INF: CPT

## 2022-11-23 PROCEDURE — 80053 COMPREHEN METABOLIC PANEL: CPT

## 2022-11-23 RX ORDER — SODIUM CHLORIDE 9 MG/ML
5-40 INJECTION INTRAMUSCULAR; INTRAVENOUS; SUBCUTANEOUS AS NEEDED
Status: ACTIVE | OUTPATIENT
Start: 2022-11-23 | End: 2022-11-23

## 2022-11-23 RX ORDER — SODIUM CHLORIDE 9 MG/ML
5-250 INJECTION, SOLUTION INTRAVENOUS AS NEEDED
Status: DISPENSED | OUTPATIENT
Start: 2022-11-23 | End: 2022-11-23

## 2022-11-23 RX ORDER — SODIUM CHLORIDE 0.9 % (FLUSH) 0.9 %
5-40 SYRINGE (ML) INJECTION AS NEEDED
Status: DISPENSED | OUTPATIENT
Start: 2022-11-23 | End: 2022-11-23

## 2022-11-23 RX ORDER — DIPHENHYDRAMINE HYDROCHLORIDE 50 MG/ML
25 INJECTION, SOLUTION INTRAMUSCULAR; INTRAVENOUS AS NEEDED
Status: ACTIVE | OUTPATIENT
Start: 2022-11-23 | End: 2022-11-23

## 2022-11-23 RX ORDER — PALONOSETRON 0.05 MG/ML
0.25 INJECTION, SOLUTION INTRAVENOUS ONCE
Status: COMPLETED | OUTPATIENT
Start: 2022-11-23 | End: 2022-11-23

## 2022-11-23 RX ORDER — DIPHENHYDRAMINE HYDROCHLORIDE 50 MG/ML
50 INJECTION, SOLUTION INTRAMUSCULAR; INTRAVENOUS ONCE
Status: COMPLETED | OUTPATIENT
Start: 2022-11-23 | End: 2022-11-23

## 2022-11-23 RX ORDER — ONDANSETRON 2 MG/ML
8 INJECTION INTRAMUSCULAR; INTRAVENOUS AS NEEDED
Status: ACTIVE | OUTPATIENT
Start: 2022-11-23 | End: 2022-11-23

## 2022-11-23 RX ORDER — HEPARIN 100 UNIT/ML
500 SYRINGE INTRAVENOUS AS NEEDED
Status: DISPENSED | OUTPATIENT
Start: 2022-11-23 | End: 2022-11-23

## 2022-11-23 RX ORDER — ACETAMINOPHEN 325 MG/1
650 TABLET ORAL AS NEEDED
Status: ACTIVE | OUTPATIENT
Start: 2022-11-23 | End: 2022-11-23

## 2022-11-23 RX ADMIN — SODIUM CHLORIDE 200 MG: 9 INJECTION, SOLUTION INTRAVENOUS at 10:00

## 2022-11-23 RX ADMIN — PALONOSETRON 0.25 MG: 0.05 INJECTION, SOLUTION INTRAVENOUS at 11:48

## 2022-11-23 RX ADMIN — FOSAPREPITANT DIMEGLUMINE 150 MG: 150 INJECTION, POWDER, LYOPHILIZED, FOR SOLUTION INTRAVENOUS at 10:39

## 2022-11-23 RX ADMIN — SODIUM CHLORIDE 134 MG: 9 INJECTION, SOLUTION INTRAVENOUS at 12:15

## 2022-11-23 RX ADMIN — DIPHENHYDRAMINE HYDROCHLORIDE 50 MG: 50 INJECTION, SOLUTION INTRAMUSCULAR; INTRAVENOUS at 11:54

## 2022-11-23 RX ADMIN — CARBOPLATIN 511 MG: 10 INJECTION, SOLUTION INTRAVENOUS at 13:37

## 2022-11-23 RX ADMIN — FAMOTIDINE 20 MG: 10 INJECTION, SOLUTION INTRAVENOUS at 11:50

## 2022-11-23 RX ADMIN — SODIUM CHLORIDE 25 ML/HR: 9 INJECTION, SOLUTION INTRAVENOUS at 10:00

## 2022-11-23 RX ADMIN — DEXAMETHASONE SODIUM PHOSPHATE 12 MG: 4 INJECTION, SOLUTION INTRAMUSCULAR; INTRAVENOUS at 11:05

## 2022-11-23 NOTE — PROGRESS NOTES
[facility] Chemotherapy Progress Note    Date: 2022    Name: Coco Butler    MRN: [id number]         : 1960      0730 Pt admit to [facility] for C3 Keytruda/Taxol/Carboplatin ambulatory in stable condition. Assessment completed. No new concerns voiced. Port with positive blood return. Labs sent for processing. Chemotherapy Flowsheet 2022   Cycle C3D1   Date 2022   Drug / Regimen Taxol/Carbo   Pre Meds -   Notes given         Ms. Obrien's vitals were reviewed. Patient Vitals for the past 12 hrs:   Temp Pulse Resp BP   22 1421 -- 65 18 (!) 146/76   22 0746 97.6 °F (36.4 °C) 62 18 (!) 159/62         Lab results were obtained and reviewed. Recent Results (from the past 12 hour(s))   TSH 3RD GENERATION    Collection Time: 22  7:55 AM   Result Value Ref Range    TSH 0.36 0.36 - 3.74 uIU/mL   CBC WITH AUTOMATED DIFF    Collection Time: 22  7:55 AM   Result Value Ref Range    WBC 3.4 (L) 3.6 - 11.0 K/uL    RBC 3.72 (L) 3.80 - 5.20 M/uL    HGB 10.8 (L) 11.5 - 16.0 g/dL    HCT 34.4 (L) 35.0 - 47.0 %    MCV 92.5 80.0 - 99.0 FL    MCH 29.0 26.0 - 34.0 PG    MCHC 31.4 30.0 - 36.5 g/dL    RDW 15.0 (H) 11.5 - 14.5 %    PLATELET 486 235 - 811 K/uL    MPV 9.1 8.9 - 12.9 FL    NRBC 0.0 0  WBC    ABSOLUTE NRBC 0.00 0.00 - 0.01 K/uL    NEUTROPHILS 42 32 - 75 %    LYMPHOCYTES 40 12 - 49 %    MONOCYTES 16 (H) 5 - 13 %    EOSINOPHILS 1 0 - 7 %    BASOPHILS 1 0 - 1 %    IMMATURE GRANULOCYTES 0 0.0 - 0.5 %    ABS. NEUTROPHILS 1.5 (L) 1.8 - 8.0 K/UL    ABS. LYMPHOCYTES 1.4 0.8 - 3.5 K/UL    ABS. MONOCYTES 0.6 0.0 - 1.0 K/UL    ABS. EOSINOPHILS 0.0 0.0 - 0.4 K/UL    ABS. BASOPHILS 0.0 0.0 - 0.1 K/UL    ABS. IMM.  GRANS. 0.0 0.00 - 0.04 K/UL    DF AUTOMATED     METABOLIC PANEL, COMPREHENSIVE    Collection Time: 22  7:55 AM   Result Value Ref Range    Sodium 143 136 - 145 mmol/L    Potassium 4.1 3.5 - 5.1 mmol/L    Chloride 109 (H) 97 - 108 mmol/L    CO2 29 21 - 32 mmol/L Anion gap 5 5 - 15 mmol/L    Glucose 104 (H) 65 - 100 mg/dL    BUN 12 6 - 20 MG/DL    Creatinine 0.79 0.55 - 1.02 MG/DL    BUN/Creatinine ratio 15 12 - 20      eGFR >60 >60 ml/min/1.73m2    Calcium 8.8 8.5 - 10.1 MG/DL    Bilirubin, total 0.3 0.2 - 1.0 MG/DL    ALT (SGPT) 45 12 - 78 U/L    AST (SGOT) 27 15 - 37 U/L    Alk. phosphatase 106 45 - 117 U/L    Protein, total 6.6 6.4 - 8.2 g/dL    Albumin 3.3 (L) 3.5 - 5.0 g/dL    Globulin 3.3 2.0 - 4.0 g/dL    A-G Ratio 1.0 (L) 1.1 - 2.2         Pre-medications  were administered as ordered and chemotherapy was initiated.   Medications Administered       0.9% sodium chloride infusion       Admin Date  11/23/2022 Action  New Bag Dose  25 mL/hr Rate  25 mL/hr Route  IntraVENous Administered By  Elissa Trotter RN              CARBOplatin (PARAPLATIN) 511 mg in 0.9% sodium chloride 250 mL, overfill volume 25 mL chemo infusion       Admin Date  11/23/2022 Action  New Bag Dose  511 mg Rate  652.2 mL/hr Route  IntraVENous Administered By  Elissa Trotter RN              dexamethasone (DECADRON) 12 mg in 0.9% sodium chloride 50 mL, overfill volume 5 mL IVPB       Admin Date  11/23/2022 Action  New Bag Dose  12 mg Rate  232 mL/hr Route  IntraVENous Administered By  Elissa Trotter RN              diphenhydrAMINE (BENADRYL) injection 50 mg       Admin Date  11/23/2022 Action  Given Dose  50 mg Route  IntraVENous Administered By  Elissa Trotter RN              famotidine (PF) (PEPCID) 20 mg in 0.9% sodium chloride 10 mL injection       Admin Date  11/23/2022 Action  Given Dose  20 mg Route  IntraVENous Administered By  Elissa Trotter RN              fosaprepitant (EMEND) 150 mg in 0.9% sodium chloride 150 mL IVPB       Admin Date  11/23/2022 Action  New Bag Dose  150 mg Rate  450 mL/hr Route  IntraVENous Administered By  Elissa Trotter RN              PACLitaxeL (TAXOL) 134 mg in 0.9% sodium chloride 250 mL, overfill volume 25 mL chemo infusion       Admin Date  11/23/2022 Action  New Bag Dose  134 mg Rate  297.3 mL/hr Route  IntraVENous Administered By  Elissa Trotter, TOM              palonosetron HCl (ALOXI) injection 0.25 mg       Admin Date  11/23/2022 Action  Given Dose  0.25 mg Route  IntraVENous Administered By  Elissa Trotter RN              pembrolizumab [facility]) 200 mg in 0.9% sodium chloride 100 mL, overfill volume 10 mL IVPB       Admin Date  11/23/2022 Action  New Bag Dose  200 mg Rate  236 mL/hr Route  IntraVENous Administered By  Elissa Trotter RN                    Two nurses verified prior to administering:Drug name, Drug doseInfusion volume or drug volume when prepared in a syringe, Rate of administration, Route of administration, Expiration dates and/or times, Appearance and physical integrity of the drugs, Rate set on infusion pump, when used, Sequencing of drug administration. 1430 Pt tolerated treatment well. Port maintained positive blood return throughout treatment. Flushed, heparinized and de-accessed per protocol. D/c home ambulatory in no distress. Pt aware of next appointment scheduled for .     Future Appointments   Date Time Provider Linnea Yuen   11/30/2022 10:00 AM A1 ISABEL MED [address] [place] [address]   11/30/2022 10:15 AM Lexii Mayo  [address] BS AMB   12/7/2022 10:00 [facility]   12/14/2022  7:30 AM [facility]   12/21/2022 10:00 AM C1 ISABEL MED [facility]   12/28/2022 10:00 AM C1 ISABEL  [facility], RN  November 23, 2022

## 2022-11-29 ENCOUNTER — APPOINTMENT (OUTPATIENT)
Dept: INFUSION THERAPY | Age: 62
End: 2022-11-29

## 2022-11-30 ENCOUNTER — OFFICE VISIT (OUTPATIENT)
Dept: ONCOLOGY | Age: 62
End: 2022-11-30
Payer: COMMERCIAL

## 2022-11-30 ENCOUNTER — HOSPITAL ENCOUNTER (OUTPATIENT)
Dept: INFUSION THERAPY | Age: 62
Discharge: HOME | End: 2022-11-30
Payer: COMMERCIAL

## 2022-11-30 VITALS
HEART RATE: 73 BPM | SYSTOLIC BLOOD PRESSURE: 153 MMHG | HEIGHT: 61 IN | RESPIRATION RATE: 18 BRPM | WEIGHT: 143.6 LBS | BODY MASS INDEX: 27.11 KG/M2 | TEMPERATURE: 96.8 F | DIASTOLIC BLOOD PRESSURE: 75 MMHG

## 2022-11-30 VITALS
SYSTOLIC BLOOD PRESSURE: 162 MMHG | TEMPERATURE: 96.8 F | WEIGHT: 147 LBS | HEIGHT: 61 IN | BODY MASS INDEX: 27.75 KG/M2 | OXYGEN SATURATION: 97 % | RESPIRATION RATE: 18 BRPM | HEART RATE: 67 BPM | DIASTOLIC BLOOD PRESSURE: 79 MMHG

## 2022-11-30 DIAGNOSIS — C50.012 BILATERAL MALIGNANT NEOPLASM INVOLVING BOTH NIPPLE AND AREOLA IN FEMALE, UNSPECIFIED ESTROGEN RECEPTOR STATUS (HCC): Primary | ICD-10-CM

## 2022-11-30 DIAGNOSIS — C50.011 BILATERAL MALIGNANT NEOPLASM INVOLVING BOTH NIPPLE AND AREOLA IN FEMALE, UNSPECIFIED ESTROGEN RECEPTOR STATUS (HCC): Primary | ICD-10-CM

## 2022-11-30 DIAGNOSIS — L65.8 ALOPECIA DUE TO CYTOTOXIC DRUG: ICD-10-CM

## 2022-11-30 DIAGNOSIS — T45.1X5A ALOPECIA DUE TO CYTOTOXIC DRUG: ICD-10-CM

## 2022-11-30 LAB
BASOPHILS # BLD: 0 K/UL (ref 0–0.1)
BASOPHILS NFR BLD: 0 % (ref 0–1)
DIFFERENTIAL METHOD BLD: ABNORMAL
EOSINOPHIL # BLD: 0.1 K/UL (ref 0–0.4)
EOSINOPHIL NFR BLD: 3 % (ref 0–7)
ERYTHROCYTE [DISTWIDTH] IN BLOOD BY AUTOMATED COUNT: 14.7 % (ref 11.5–14.5)
HCT VFR BLD AUTO: 36.3 % (ref 35–47)
HGB BLD-MCNC: 11.7 G/DL (ref 11.5–16)
IMM GRANULOCYTES # BLD AUTO: 0 K/UL (ref 0–0.04)
IMM GRANULOCYTES NFR BLD AUTO: 0 % (ref 0–0.5)
LYMPHOCYTES # BLD: 1.8 K/UL (ref 0.8–3.5)
LYMPHOCYTES NFR BLD: 60 % (ref 12–49)
MCH RBC QN AUTO: 29.3 PG (ref 26–34)
MCHC RBC AUTO-ENTMCNC: 32.2 G/DL (ref 30–36.5)
MCV RBC AUTO: 91 FL (ref 80–99)
MONOCYTES # BLD: 0.2 K/UL (ref 0–1)
MONOCYTES NFR BLD: 7 % (ref 5–13)
NEUTS SEG # BLD: 0.9 K/UL (ref 1.8–8)
NEUTS SEG NFR BLD: 30 % (ref 32–75)
NRBC # BLD: 0 K/UL (ref 0–0.01)
NRBC BLD-RTO: 0 PER 100 WBC
PLATELET # BLD AUTO: 204 K/UL (ref 150–400)
PMV BLD AUTO: 9.7 FL (ref 8.9–12.9)
RBC # BLD AUTO: 3.99 M/UL (ref 3.8–5.2)
RBC MORPH BLD: ABNORMAL
WBC # BLD AUTO: 3 K/UL (ref 3.6–11)

## 2022-11-30 PROCEDURE — 74011000250 HC RX REV CODE- 250: Performed by: REGISTERED NURSE

## 2022-11-30 PROCEDURE — 96413 CHEMO IV INFUSION 1 HR: CPT

## 2022-11-30 PROCEDURE — 74011250636 HC RX REV CODE- 250/636: Performed by: REGISTERED NURSE

## 2022-11-30 PROCEDURE — 36415 COLL VENOUS BLD VENIPUNCTURE: CPT

## 2022-11-30 PROCEDURE — 74011250636 HC RX REV CODE- 250/636: Performed by: INTERNAL MEDICINE

## 2022-11-30 PROCEDURE — 99215 OFFICE O/P EST HI 40 MIN: CPT | Performed by: INTERNAL MEDICINE

## 2022-11-30 PROCEDURE — 77030012965 HC NDL HUBR BBMI -A

## 2022-11-30 PROCEDURE — 96375 TX/PRO/DX INJ NEW DRUG ADDON: CPT

## 2022-11-30 PROCEDURE — 85025 COMPLETE CBC W/AUTO DIFF WBC: CPT

## 2022-11-30 RX ORDER — SODIUM CHLORIDE 9 MG/ML
5-40 INJECTION INTRAMUSCULAR; INTRAVENOUS; SUBCUTANEOUS AS NEEDED
Status: DISCONTINUED | OUTPATIENT
Start: 2022-11-30 | End: 2022-12-01 | Stop reason: HOSPADM

## 2022-11-30 RX ORDER — GABAPENTIN 100 MG/1
100 CAPSULE ORAL 3 TIMES DAILY
Qty: 90 CAPSULE | Refills: 3 | Status: SHIPPED | OUTPATIENT
Start: 2022-11-30

## 2022-11-30 RX ORDER — HEPARIN 100 UNIT/ML
500 SYRINGE INTRAVENOUS AS NEEDED
Status: DISCONTINUED | OUTPATIENT
Start: 2022-11-30 | End: 2022-12-01 | Stop reason: HOSPADM

## 2022-11-30 RX ORDER — HYDROCORTISONE SODIUM SUCCINATE 100 MG/2ML
100 INJECTION, POWDER, FOR SOLUTION INTRAMUSCULAR; INTRAVENOUS AS NEEDED
Status: DISCONTINUED | OUTPATIENT
Start: 2022-11-30 | End: 2022-12-01 | Stop reason: HOSPADM

## 2022-11-30 RX ORDER — DEXAMETHASONE SODIUM PHOSPHATE 10 MG/ML
10 INJECTION INTRAMUSCULAR; INTRAVENOUS ONCE
Status: COMPLETED | OUTPATIENT
Start: 2022-11-30 | End: 2022-11-30

## 2022-11-30 RX ORDER — DIPHENHYDRAMINE HYDROCHLORIDE 50 MG/ML
25 INJECTION, SOLUTION INTRAMUSCULAR; INTRAVENOUS AS NEEDED
Status: DISCONTINUED | OUTPATIENT
Start: 2022-11-30 | End: 2022-12-01 | Stop reason: HOSPADM

## 2022-11-30 RX ORDER — ALBUTEROL SULFATE 0.83 MG/ML
2.5 SOLUTION RESPIRATORY (INHALATION) AS NEEDED
Status: DISCONTINUED | OUTPATIENT
Start: 2022-11-30 | End: 2022-12-01 | Stop reason: HOSPADM

## 2022-11-30 RX ORDER — SODIUM CHLORIDE 9 MG/ML
5-250 INJECTION, SOLUTION INTRAVENOUS AS NEEDED
Status: DISCONTINUED | OUTPATIENT
Start: 2022-11-30 | End: 2022-12-01 | Stop reason: HOSPADM

## 2022-11-30 RX ORDER — DEXAMETHASONE SODIUM PHOSPHATE 100 MG/10ML
10 INJECTION INTRAMUSCULAR; INTRAVENOUS ONCE
Status: DISCONTINUED | OUTPATIENT
Start: 2022-11-30 | End: 2022-11-30 | Stop reason: SDUPTHER

## 2022-11-30 RX ORDER — ONDANSETRON 2 MG/ML
8 INJECTION INTRAMUSCULAR; INTRAVENOUS AS NEEDED
Status: DISCONTINUED | OUTPATIENT
Start: 2022-11-30 | End: 2022-12-01 | Stop reason: HOSPADM

## 2022-11-30 RX ORDER — FAMOTIDINE 40 MG/5ML
20 POWDER, FOR SUSPENSION ORAL 2 TIMES DAILY
Qty: 50 ML | Refills: 3 | Status: SHIPPED | OUTPATIENT
Start: 2022-11-30

## 2022-11-30 RX ORDER — ACETAMINOPHEN 325 MG/1
650 TABLET ORAL AS NEEDED
Status: DISCONTINUED | OUTPATIENT
Start: 2022-11-30 | End: 2022-12-01 | Stop reason: HOSPADM

## 2022-11-30 RX ORDER — SODIUM CHLORIDE 0.9 % (FLUSH) 0.9 %
5-40 SYRINGE (ML) INJECTION AS NEEDED
Status: DISCONTINUED | OUTPATIENT
Start: 2022-11-30 | End: 2022-12-01 | Stop reason: HOSPADM

## 2022-11-30 RX ORDER — EPINEPHRINE 1 MG/ML
0.3 INJECTION, SOLUTION, CONCENTRATE INTRAVENOUS AS NEEDED
Status: DISCONTINUED | OUTPATIENT
Start: 2022-11-30 | End: 2022-12-01 | Stop reason: HOSPADM

## 2022-11-30 RX ORDER — DIPHENHYDRAMINE HYDROCHLORIDE 50 MG/ML
50 INJECTION, SOLUTION INTRAMUSCULAR; INTRAVENOUS AS NEEDED
Status: DISCONTINUED | OUTPATIENT
Start: 2022-11-30 | End: 2022-12-01 | Stop reason: HOSPADM

## 2022-11-30 RX ORDER — DIPHENHYDRAMINE HYDROCHLORIDE 50 MG/ML
50 INJECTION, SOLUTION INTRAMUSCULAR; INTRAVENOUS ONCE
Status: COMPLETED | OUTPATIENT
Start: 2022-11-30 | End: 2022-11-30

## 2022-11-30 RX ADMIN — SODIUM CHLORIDE 50 ML/HR: 9 INJECTION, SOLUTION INTRAVENOUS at 12:57

## 2022-11-30 RX ADMIN — FAMOTIDINE 20 MG: 10 INJECTION, SOLUTION INTRAVENOUS at 13:00

## 2022-11-30 RX ADMIN — SODIUM CHLORIDE, PRESERVATIVE FREE 10 ML: 5 INJECTION INTRAVENOUS at 10:20

## 2022-11-30 RX ADMIN — HEPARIN 500 UNITS: 100 SYRINGE at 14:58

## 2022-11-30 RX ADMIN — DIPHENHYDRAMINE HYDROCHLORIDE 50 MG: 50 INJECTION, SOLUTION INTRAMUSCULAR; INTRAVENOUS at 13:17

## 2022-11-30 RX ADMIN — SODIUM CHLORIDE, PRESERVATIVE FREE 10 ML: 5 INJECTION INTRAVENOUS at 14:58

## 2022-11-30 RX ADMIN — DEXAMETHASONE SODIUM PHOSPHATE 10 MG: 10 INJECTION INTRAMUSCULAR; INTRAVENOUS at 13:12

## 2022-11-30 RX ADMIN — PACLITAXEL 134 MG: 6 INJECTION, SOLUTION INTRAVENOUS at 13:52

## 2022-11-30 NOTE — PROGRESS NOTES
[facility] [place] [address] [address], [address], [place]: [phone]  F: [phone]    Reason for Visit:   Tien Barboza is a 57 y.o. female who is seen for follow up of left breast cancer-ER 0%, CT 0%, HER2/radha negative. Treatment History:   10/1/22: Carboplatin taxol and keytruda followed by ddAC and Keytruda     History of Present Illness:   Patient is a 78-year-old female seen for left breast cancer. She had a screening mammogram on 7/20/2022 which showed a 1 cm irregular mass in the left breast at 3 o'clock position. Biopsy on 8/2/2022 showed invasive ductal carcinoma, grade 2-3, ER 0%, CT 0%, HER2/radha negative. Ki-67 85%. Declined genetic testing. She comes today for cycle 3 day 8 of Carboplatin taxol and keytruda followed by ddAC and Keytruda. She feels like her stomach is on fire. She has some nausea but does not like taking anti-emetics. No diarrhea. ECHO is on the 5th. No other complaints. No FH of breast, ovarian, pancreatic or Prostate malignancies  She works with special needs     Past Medical History:   Diagnosis Date    Cancer ([address]     BREAST CANCER    GERD (gastroesophageal reflux disease)     HTN (hypertension) 07/18/2011    Psychiatric disorder     ANIXETY AND DEPRESSION    Thyroid disease     HYPOTHYROID      Past Surgical History:   Procedure Laterality Date    HX COLONOSCOPY      HX MYOMECTOMY  05/02/1996    x 1    HX WISDOM TEETH EXTRACTION        Social History     Tobacco Use    Smoking status: Never     Passive exposure: Never    Smokeless tobacco: Never   Substance Use Topics    Alcohol use: Never      Family History   Problem Relation Age of Onset    Heart Failure Mother     Cancer Father     Diabetes Sister     Kidney Disease Sister     Anesth Problems Neg Hx      Current Outpatient Medications   Medication Sig    carvediloL (COREG) 3.125 mg tablet Take 6.25 mg by mouth two (2) times daily (with meals).     losartan (COZAAR) 25 mg tablet Take 25 mg by mouth daily. dexAMETHasone (DECADRON) 4 mg tablet Take 2 tabs (8mg) once daily on days 2 & 3 of chemotherapy    ondansetron (ZOFRAN ODT) 8 mg disintegrating tablet Take 1 Tablet by mouth every eight (8) hours as needed for Nausea or Vomiting. prochlorperazine (Compazine) 5 mg tablet Take 1 Tablet by mouth every six (6) hours as needed for Nausea or Vomiting.    lidocaine-prilocaine (EMLA) topical cream Apply  to affected area as needed for Pain. Apply a thick layer over port a cath 30-60 minutes prior to port access    PARoxetine (PAXIL) 20 mg tablet paroxetine 20 mg tablet   Take 1 tablet every day by oral route. rosuvastatin (CRESTOR) 10 mg tablet Take 10 mg by mouth nightly. multivitamin, tx-iron-ca-min (THERA-M w/ IRON) 9 mg iron-400 mcg tab tablet Take 1 Tablet by mouth daily. No current facility-administered medications for this visit. Allergies   Allergen Reactions    Sulfa (Sulfonamide Antibiotics) Itching, Swelling and Unknown (comments)        Review of Systems: A complete review of systems was obtained, negative except as described above. Physical Exam:   Visit Vitals  BP (!) 162/79   Pulse 67   Temp 96.8 °F (36 °C)   Resp 18   Ht 5' 1\" (1.549 m)   Wt 147 lb (66.7 kg)   SpO2 97%   BMI 27.78 kg/m²     ECOG PS: 0  General: No distress  Eyes: PERRL, anicteric sclerae  HENT: Atraumatic  Neck: Supple  Lymphatic: No cervical, supraclavicular, or inguinal adenopathy  Respiratory: normal respiratory effort  Breast: deferred today  CV: Normal rate, regular rhythm, no murmurs, no peripheral edema  GI: Soft, nontender  no splenomegaly  MS: Normal gait and station. Digits without clubbing or cyanosis. Skin: No rashes, ecchymoses, or petechiae. Normal temperature, turgor, and texture.   Psych: Alert, oriented, appropriate affect, normal judgment/insight    Results:     Lab Results   Component Value Date/Time    WBC 3.4 (L) 11/23/2022 07:55 AM    HGB 10.8 (L) 11/23/2022 07:55 AM    HCT 34.4 (L) 11/23/2022 07:55 AM    PLATELET 587 99/54/3347 07:55 AM    MCV 92.5 11/23/2022 07:55 AM    ABS. NEUTROPHILS 1.5 (L) 11/23/2022 07:55 AM     Lab Results   Component Value Date/Time    Sodium 143 11/23/2022 07:55 AM    Potassium 4.1 11/23/2022 07:55 AM    Chloride 109 (H) 11/23/2022 07:55 AM    CO2 29 11/23/2022 07:55 AM    Glucose 104 (H) 11/23/2022 07:55 AM    BUN 12 11/23/2022 07:55 AM    Creatinine 0.79 11/23/2022 07:55 AM    GFR est AA >60 09/26/2022 03:26 PM    GFR est non-AA >60 09/26/2022 03:26 PM    Calcium 8.8 11/23/2022 07:55 AM     Lab Results   Component Value Date/Time    Bilirubin, total 0.3 11/23/2022 07:55 AM    ALT (SGPT) 45 11/23/2022 07:55 AM    Alk. phosphatase 106 11/23/2022 07:55 AM    Protein, total 6.6 11/23/2022 07:55 AM    Albumin 3.3 (L) 11/23/2022 07:55 AM    Globulin 3.3 11/23/2022 07:55 AM       External   Records reviewed and summarized above. Pathology report(s) reviewed above. Radiology report(s) reviewed above. MRI  9/2022    LEFT BREAST: Conglomerate mass enhancement in the left breast at 4-5 o'clock,  combined dimension 3.7 x 1.9 x 1.4 cm. This is slightly more extensive than the  sonographic and mammographic findings. No lymphadenopathy. Assessment:   1) Left breast cancer  1 cm mass on mammogram  Palpated a lump  ER 0%, MA 0%, HER2/radha negative  Ki 67 was 85%. MRI breast with a 3.7 cm Left breast mass    [id number]  AJCC 8th ed-Stage IIA  Genetic testing negative    Discussed pathology and the natural history of triple negative breast cancer. Characterized by higher relapse rates after the first 3 years of Dx, can recur locally or in distant organs. Adjuvant chemotherapy is estimated to decrease the risk of recurrence by 25-30%, breast cancer mortality by 20% and overall mortality by 20%. Review of MRI shows a T2 primary. We discussed NACT.     Recommended chemotherapy + Keytruda based on the KEYNOTE-522 trial (taxol, carboplatin and [place] followed by [facility] and [place] as preoperative treatment, followed by surgery, and then adjuvant pembrolizumab for another nine cycles (27 weeks) after surgery). Today is cycle 3 day 8  Grade 1 neutropenia  Grade 1 neuropathy      2) HTN    3) Depression    4) Hyperlipidemia    5) Low EF   She will start [facility] 1/2023 and will need a repeat ECHO prior to this with EF at least > 50%   Dr Vishal Pérez is her cardiologist - notes reviewed  Repeat ECHO next month ordered by cardiologist , will follow    6) Chemotherapy induced  Is grade 1   On paxil  Start Gabapentin     7) Encounter for high risk medications like chemotherapy and immunotherapy  Tolerating well   Grade 3 neutropenia, will proceed today   Grade 1 neuropathy     Plan:     Proceed today with cycle 3 day 8 of Carboplatin AUC 5 taxol and keytruda followed by ddAC (pending repeat ECHO) and Keytruda   Labs to include cbc with diff, cmp prior to infusions; TSH every 6 weeks  Zofran / compazine PRN  Dexamethasone on days 2 & 3 of chemotherapy  Follow with cardiology as scheduled; repeat ECHO next month   Gabapentin TID     RTC in 3 weeks     I appreciate the opportunity to participate in [facility]. I personally saw and evaluated the patient and performed the key components of medical decision making. The history, physical exam, and documentation were performed by Mana Toney NP. I reviewed and verified the above documentation and modified it as needed.     Orpah Ast is ok  Grade 1 toxicities  See plan above  ECHO prior to [facility]    Signed By: Alcides Ceja MD

## 2022-11-30 NOTE — PROGRESS NOTES
Roseanne Cortés is a 57 y.o. female who presents for follow up of   Chief Complaint   Patient presents with    Follow-up     left breast cancer-ER 0%, RI 0%, HER2/radha negative. The patient reports no new clinical symptoms or new complaints since last clinic evaluation. No interval hospitalizations reported    No interval surgery or procedures reported    No reported new medication changes reported       Medications reviewed with the patient, and chart updated to reflect changes.

## 2022-11-30 NOTE — PROGRESS NOTES
[facility] Progress Note    34 Ms. Sriram Weaver Arrived ambulatory and in no distress for cycle 3 day 8 of Taxol regimen. Assessment was completed, no acute issues at this time, no new complaints voiced. Port accessed without difficulty. Chemotherapy Flowsheet 11/30/2022   Cycle C3D8   Date 11/30/2022   Drug / Regimen Taxol   Pre Meds given   Notes givrn     Patient Vitals for the past 12 hrs:   Temp Pulse Resp BP   11/30/22 1500 -- 73 -- (!) 153/75   11/30/22 1023 96.8 °F (36 °C) 67 18 (!) 162/79        Ms. Obrien's vitals were reviewed. Patient Vitals for the past 12 hrs:   Temp Pulse Resp BP   11/30/22 1500 -- 73 -- (!) 153/75   11/30/22 1023 96.8 °F (36 °C) 67 18 (!) 162/79         Lab results were obtained and reviewed. Recent Results (from the past 12 hour(s))   CBC WITH AUTOMATED DIFF    Collection Time: 11/30/22 10:23 AM   Result Value Ref Range    WBC 3.0 (L) 3.6 - 11.0 K/uL    RBC 3.99 3.80 - 5.20 M/uL    HGB 11.7 11.5 - 16.0 g/dL    HCT 36.3 35.0 - 47.0 %    MCV 91.0 80.0 - 99.0 FL    MCH 29.3 26.0 - 34.0 PG    MCHC 32.2 30.0 - 36.5 g/dL    RDW 14.7 (H) 11.5 - 14.5 %    PLATELET 898 648 - 126 K/uL    MPV 9.7 8.9 - 12.9 FL    NRBC 0.0 0  WBC    ABSOLUTE NRBC 0.00 0.00 - 0.01 K/uL    NEUTROPHILS 30 (L) 32 - 75 %    LYMPHOCYTES 60 (H) 12 - 49 %    MONOCYTES 7 5 - 13 %    EOSINOPHILS 3 0 - 7 %    BASOPHILS 0 0 - 1 %    IMMATURE GRANULOCYTES 0 0.0 - 0.5 %    ABS. NEUTROPHILS 0.9 (L) 1.8 - 8.0 K/UL    ABS. LYMPHOCYTES 1.8 0.8 - 3.5 K/UL    ABS. MONOCYTES 0.2 0.0 - 1.0 K/UL    ABS. EOSINOPHILS 0.1 0.0 - 0.4 K/UL    ABS. BASOPHILS 0.0 0.0 - 0.1 K/UL    ABS. IMM. GRANS. 0.0 0.00 - 0.04 K/UL    DF SMEAR SCANNED      RBC COMMENTS NORMOCYTIC, NORMOCHROMIC         Pre-medications  were administered as ordered and chemotherapy was initiated.   Medications Administered       0.9% sodium chloride infusion       Admin Date  11/30/2022 Action  New Bag Dose  50 mL/hr Rate  50 mL/hr Route  IntraVENous Administered By  Attila Mosher, RN              dexamethasone (PF) (DECADRON) 10 mg/mL injection 10 mg       Admin Date  11/30/2022 Action  Given Dose  10 mg Route  IntraVENous Administered By  Attila Mosher, RN              diphenhydrAMINE (BENADRYL) injection 50 mg       Admin Date  11/30/2022 Action  Given Dose  50 mg Route  IntraVENous Administered By  Attila Sanchezt, RN              famotidine (PF) (PEPCID) 20 mg in 0.9% sodium chloride 10 mL injection       Admin Date  11/30/2022 Action  Given Dose  20 mg Route  IntraVENous Administered By  Attila Sanchezt, RN              PACLitaxeL (TAXOL) 134 mg in 0.9% sodium chloride 250 mL, overfill volume 25 mL chemo infusion       Admin Date  11/30/2022 Action  New Bag Dose  134 mg Rate  297.3 mL/hr Route  IntraVENous Administered By  Attila Mosher, RN              sodium chloride (NS) flush 5-40 mL       Admin Date  11/30/2022 Action  Given Dose  10 mL Route  IntraVENous Administered By  Jenna Azul RN                    Two nurses verified prior to administering: Drug name, Drug dose, Infusion volume or drug volume when prepared in a syringe, Rate of administration, Route of administration, Expiration dates and/or times, Appearance and physical integrity of the drugs, Rate set on infusion pump, when used, and Sequencing of drug administration. Ms. Kelsy Lyons tolerated treatment well, port flushed and de accessed, patient was discharged from [place] [address] in stable condition at 1500.      Future Appointments   Date Time Provider Linnea Alpa   12/7/2022 10:00 AM C1 ISABEL MED [address] [place] [address]   12/14/2022  7:30 AM F3 [facility]ABEL [place]ONY [facility]   12/14/2022  8:00 AM Chai Rosario  [address] BS AMB   12/21/2022 10:00 AM C1 ISABEL MED [facility]   12/28/2022 10:00 AM C1 ISABEL MED [address] [facility], RN  November 30, 2022

## 2022-12-02 ENCOUNTER — DOCUMENTATION ONLY (OUTPATIENT)
Dept: ONCOLOGY | Age: 62
End: 2022-12-02

## 2022-12-02 NOTE — PROGRESS NOTES
Called patient and confirmed x2 identifiers     Informed patient office will send a an update list of wig companies and other resources in the mail.      Patient verbalized understanding   No new questions or concerns at this time

## 2022-12-02 NOTE — PROGRESS NOTES
[employer] called. They do not do wigs and she is unsure where pt can go to get one. She had no additional questions/concerns.

## 2022-12-06 ENCOUNTER — APPOINTMENT (OUTPATIENT)
Dept: INFUSION THERAPY | Age: 62
End: 2022-12-06

## 2022-12-07 ENCOUNTER — HOSPITAL ENCOUNTER (OUTPATIENT)
Dept: INFUSION THERAPY | Age: 62
Discharge: HOME | End: 2022-12-07
Payer: COMMERCIAL

## 2022-12-07 VITALS
TEMPERATURE: 97.6 F | DIASTOLIC BLOOD PRESSURE: 63 MMHG | WEIGHT: 144.8 LBS | BODY MASS INDEX: 27.34 KG/M2 | HEART RATE: 70 BPM | SYSTOLIC BLOOD PRESSURE: 142 MMHG | RESPIRATION RATE: 18 BRPM | HEIGHT: 61 IN

## 2022-12-07 DIAGNOSIS — C50.011 BILATERAL MALIGNANT NEOPLASM INVOLVING BOTH NIPPLE AND AREOLA IN FEMALE, UNSPECIFIED ESTROGEN RECEPTOR STATUS (HCC): Primary | ICD-10-CM

## 2022-12-07 DIAGNOSIS — C50.012 BILATERAL MALIGNANT NEOPLASM INVOLVING BOTH NIPPLE AND AREOLA IN FEMALE, UNSPECIFIED ESTROGEN RECEPTOR STATUS (HCC): Primary | ICD-10-CM

## 2022-12-07 LAB
BASOPHILS # BLD: 0 K/UL (ref 0–0.1)
BASOPHILS NFR BLD: 0 % (ref 0–1)
DIFFERENTIAL METHOD BLD: ABNORMAL
EOSINOPHIL # BLD: 0 K/UL (ref 0–0.4)
EOSINOPHIL NFR BLD: 1 % (ref 0–7)
ERYTHROCYTE [DISTWIDTH] IN BLOOD BY AUTOMATED COUNT: 14.8 % (ref 11.5–14.5)
HCT VFR BLD AUTO: 34.3 % (ref 35–47)
HGB BLD-MCNC: 10.9 G/DL (ref 11.5–16)
IMM GRANULOCYTES # BLD AUTO: 0 K/UL (ref 0–0.04)
IMM GRANULOCYTES NFR BLD AUTO: 0 % (ref 0–0.5)
LYMPHOCYTES # BLD: 1.3 K/UL (ref 0.8–3.5)
LYMPHOCYTES NFR BLD: 50 % (ref 12–49)
MCH RBC QN AUTO: 29.7 PG (ref 26–34)
MCHC RBC AUTO-ENTMCNC: 31.8 G/DL (ref 30–36.5)
MCV RBC AUTO: 93.5 FL (ref 80–99)
MONOCYTES # BLD: 0.3 K/UL (ref 0–1)
MONOCYTES NFR BLD: 12 % (ref 5–13)
NEUTS SEG # BLD: 1 K/UL (ref 1.8–8)
NEUTS SEG NFR BLD: 37 % (ref 32–75)
NRBC # BLD: 0 K/UL (ref 0–0.01)
NRBC BLD-RTO: 0 PER 100 WBC
PLATELET # BLD AUTO: 171 K/UL (ref 150–400)
PMV BLD AUTO: 9.1 FL (ref 8.9–12.9)
RBC # BLD AUTO: 3.67 M/UL (ref 3.8–5.2)
RBC MORPH BLD: ABNORMAL
RBC MORPH BLD: ABNORMAL
WBC # BLD AUTO: 2.6 K/UL (ref 3.6–11)

## 2022-12-07 PROCEDURE — 74011250636 HC RX REV CODE- 250/636: Performed by: REGISTERED NURSE

## 2022-12-07 PROCEDURE — 36415 COLL VENOUS BLD VENIPUNCTURE: CPT

## 2022-12-07 PROCEDURE — 85025 COMPLETE CBC W/AUTO DIFF WBC: CPT

## 2022-12-07 PROCEDURE — 96375 TX/PRO/DX INJ NEW DRUG ADDON: CPT

## 2022-12-07 PROCEDURE — 77030012965 HC NDL HUBR BBMI -A

## 2022-12-07 PROCEDURE — 74011000250 HC RX REV CODE- 250: Performed by: REGISTERED NURSE

## 2022-12-07 PROCEDURE — 96413 CHEMO IV INFUSION 1 HR: CPT

## 2022-12-07 RX ORDER — ACETAMINOPHEN 325 MG/1
650 TABLET ORAL AS NEEDED
Status: CANCELLED
Start: 2022-12-21

## 2022-12-07 RX ORDER — DEXAMETHASONE SODIUM PHOSPHATE 10 MG/ML
10 INJECTION INTRAMUSCULAR; INTRAVENOUS ONCE
Status: COMPLETED | OUTPATIENT
Start: 2022-12-07 | End: 2022-12-07

## 2022-12-07 RX ORDER — DIPHENHYDRAMINE HYDROCHLORIDE 50 MG/ML
50 INJECTION, SOLUTION INTRAMUSCULAR; INTRAVENOUS ONCE
Status: CANCELLED
Start: 2022-12-21 | End: 2022-12-21

## 2022-12-07 RX ORDER — SODIUM CHLORIDE 9 MG/ML
5-40 INJECTION INTRAMUSCULAR; INTRAVENOUS; SUBCUTANEOUS AS NEEDED
OUTPATIENT
Start: 2022-12-28

## 2022-12-07 RX ORDER — SODIUM CHLORIDE 9 MG/ML
5-40 INJECTION INTRAMUSCULAR; INTRAVENOUS; SUBCUTANEOUS AS NEEDED
Status: CANCELLED | OUTPATIENT
Start: 2022-12-21

## 2022-12-07 RX ORDER — ALBUTEROL SULFATE 0.83 MG/ML
2.5 SOLUTION RESPIRATORY (INHALATION) AS NEEDED
Start: 2022-12-28

## 2022-12-07 RX ORDER — SODIUM CHLORIDE 9 MG/ML
5-250 INJECTION, SOLUTION INTRAVENOUS AS NEEDED
Status: DISPENSED | OUTPATIENT
Start: 2022-12-07 | End: 2022-12-07

## 2022-12-07 RX ORDER — ACETAMINOPHEN 325 MG/1
650 TABLET ORAL AS NEEDED
Status: ACTIVE | OUTPATIENT
Start: 2022-12-07 | End: 2022-12-07

## 2022-12-07 RX ORDER — ONDANSETRON 2 MG/ML
8 INJECTION INTRAMUSCULAR; INTRAVENOUS AS NEEDED
Status: ACTIVE | OUTPATIENT
Start: 2022-12-07 | End: 2022-12-07

## 2022-12-07 RX ORDER — SODIUM CHLORIDE 0.9 % (FLUSH) 0.9 %
5-40 SYRINGE (ML) INJECTION AS NEEDED
Status: CANCELLED | OUTPATIENT
Start: 2022-12-21

## 2022-12-07 RX ORDER — HEPARIN 100 UNIT/ML
500 SYRINGE INTRAVENOUS AS NEEDED
Start: 2022-12-28

## 2022-12-07 RX ORDER — HEPARIN 100 UNIT/ML
500 SYRINGE INTRAVENOUS AS NEEDED
Status: CANCELLED
Start: 2022-12-21

## 2022-12-07 RX ORDER — DIPHENHYDRAMINE HYDROCHLORIDE 50 MG/ML
25 INJECTION, SOLUTION INTRAMUSCULAR; INTRAVENOUS AS NEEDED
Status: CANCELLED
Start: 2022-12-21

## 2022-12-07 RX ORDER — EPINEPHRINE 1 MG/ML
0.3 INJECTION, SOLUTION, CONCENTRATE INTRAVENOUS AS NEEDED
Status: CANCELLED | OUTPATIENT
Start: 2022-12-21

## 2022-12-07 RX ORDER — SODIUM CHLORIDE 9 MG/ML
5-250 INJECTION, SOLUTION INTRAVENOUS AS NEEDED
Status: CANCELLED | OUTPATIENT
Start: 2022-12-21

## 2022-12-07 RX ORDER — SODIUM CHLORIDE 0.9 % (FLUSH) 0.9 %
5-40 SYRINGE (ML) INJECTION AS NEEDED
OUTPATIENT
Start: 2022-12-28

## 2022-12-07 RX ORDER — ACETAMINOPHEN 325 MG/1
650 TABLET ORAL AS NEEDED
Start: 2022-12-28

## 2022-12-07 RX ORDER — HEPARIN 100 UNIT/ML
500 SYRINGE INTRAVENOUS AS NEEDED
Status: DISPENSED | OUTPATIENT
Start: 2022-12-07 | End: 2022-12-07

## 2022-12-07 RX ORDER — DIPHENHYDRAMINE HYDROCHLORIDE 50 MG/ML
50 INJECTION, SOLUTION INTRAMUSCULAR; INTRAVENOUS ONCE
Start: 2022-12-28 | End: 2022-12-28

## 2022-12-07 RX ORDER — ONDANSETRON 2 MG/ML
8 INJECTION INTRAMUSCULAR; INTRAVENOUS AS NEEDED
Status: CANCELLED | OUTPATIENT
Start: 2022-12-21

## 2022-12-07 RX ORDER — HYDROCORTISONE SODIUM SUCCINATE 100 MG/2ML
100 INJECTION, POWDER, FOR SOLUTION INTRAMUSCULAR; INTRAVENOUS AS NEEDED
OUTPATIENT
Start: 2022-12-28

## 2022-12-07 RX ORDER — DEXAMETHASONE SODIUM PHOSPHATE 100 MG/10ML
10 INJECTION INTRAMUSCULAR; INTRAVENOUS ONCE
Status: CANCELLED | OUTPATIENT
Start: 2022-12-21 | End: 2022-12-21

## 2022-12-07 RX ORDER — DIPHENHYDRAMINE HYDROCHLORIDE 50 MG/ML
25 INJECTION, SOLUTION INTRAMUSCULAR; INTRAVENOUS AS NEEDED
Start: 2022-12-28

## 2022-12-07 RX ORDER — ALBUTEROL SULFATE 0.83 MG/ML
2.5 SOLUTION RESPIRATORY (INHALATION) AS NEEDED
Status: ACTIVE | OUTPATIENT
Start: 2022-12-07 | End: 2022-12-07

## 2022-12-07 RX ORDER — SODIUM CHLORIDE 9 MG/ML
5-250 INJECTION, SOLUTION INTRAVENOUS AS NEEDED
OUTPATIENT
Start: 2022-12-28

## 2022-12-07 RX ORDER — DIPHENHYDRAMINE HYDROCHLORIDE 50 MG/ML
50 INJECTION, SOLUTION INTRAMUSCULAR; INTRAVENOUS AS NEEDED
Status: CANCELLED
Start: 2022-12-21

## 2022-12-07 RX ORDER — ONDANSETRON 2 MG/ML
8 INJECTION INTRAMUSCULAR; INTRAVENOUS AS NEEDED
OUTPATIENT
Start: 2022-12-28

## 2022-12-07 RX ORDER — DIPHENHYDRAMINE HYDROCHLORIDE 50 MG/ML
50 INJECTION, SOLUTION INTRAMUSCULAR; INTRAVENOUS AS NEEDED
Status: ACTIVE | OUTPATIENT
Start: 2022-12-07 | End: 2022-12-07

## 2022-12-07 RX ORDER — DIPHENHYDRAMINE HYDROCHLORIDE 50 MG/ML
50 INJECTION, SOLUTION INTRAMUSCULAR; INTRAVENOUS AS NEEDED
Start: 2022-12-28

## 2022-12-07 RX ORDER — DIPHENHYDRAMINE HYDROCHLORIDE 50 MG/ML
50 INJECTION, SOLUTION INTRAMUSCULAR; INTRAVENOUS ONCE
Status: COMPLETED | OUTPATIENT
Start: 2022-12-07 | End: 2022-12-07

## 2022-12-07 RX ORDER — DIPHENHYDRAMINE HYDROCHLORIDE 50 MG/ML
25 INJECTION, SOLUTION INTRAMUSCULAR; INTRAVENOUS AS NEEDED
Status: ACTIVE | OUTPATIENT
Start: 2022-12-07 | End: 2022-12-07

## 2022-12-07 RX ORDER — HYDROCORTISONE SODIUM SUCCINATE 100 MG/2ML
100 INJECTION, POWDER, FOR SOLUTION INTRAMUSCULAR; INTRAVENOUS AS NEEDED
Status: ACTIVE | OUTPATIENT
Start: 2022-12-07 | End: 2022-12-07

## 2022-12-07 RX ORDER — HYDROCORTISONE SODIUM SUCCINATE 100 MG/2ML
100 INJECTION, POWDER, FOR SOLUTION INTRAMUSCULAR; INTRAVENOUS AS NEEDED
Status: CANCELLED | OUTPATIENT
Start: 2022-12-21

## 2022-12-07 RX ORDER — ALBUTEROL SULFATE 0.83 MG/ML
2.5 SOLUTION RESPIRATORY (INHALATION) AS NEEDED
Status: CANCELLED
Start: 2022-12-21

## 2022-12-07 RX ORDER — EPINEPHRINE 1 MG/ML
0.3 INJECTION, SOLUTION, CONCENTRATE INTRAVENOUS AS NEEDED
OUTPATIENT
Start: 2022-12-28

## 2022-12-07 RX ORDER — SODIUM CHLORIDE 0.9 % (FLUSH) 0.9 %
5-40 SYRINGE (ML) INJECTION AS NEEDED
Status: DISPENSED | OUTPATIENT
Start: 2022-12-07 | End: 2022-12-07

## 2022-12-07 RX ORDER — SODIUM CHLORIDE 9 MG/ML
5-40 INJECTION INTRAMUSCULAR; INTRAVENOUS; SUBCUTANEOUS AS NEEDED
Status: ACTIVE | OUTPATIENT
Start: 2022-12-07 | End: 2022-12-07

## 2022-12-07 RX ORDER — DEXAMETHASONE SODIUM PHOSPHATE 100 MG/10ML
10 INJECTION INTRAMUSCULAR; INTRAVENOUS ONCE
OUTPATIENT
Start: 2022-12-28 | End: 2022-12-28

## 2022-12-07 RX ORDER — EPINEPHRINE 1 MG/ML
0.3 INJECTION, SOLUTION, CONCENTRATE INTRAVENOUS AS NEEDED
Status: ACTIVE | OUTPATIENT
Start: 2022-12-07 | End: 2022-12-07

## 2022-12-07 RX ADMIN — FAMOTIDINE 20 MG: 10 INJECTION, SOLUTION INTRAVENOUS at 13:05

## 2022-12-07 RX ADMIN — DEXAMETHASONE SODIUM PHOSPHATE 10 MG: 10 INJECTION INTRAMUSCULAR; INTRAVENOUS at 13:05

## 2022-12-07 RX ADMIN — SODIUM CHLORIDE 25 ML/HR: 9 INJECTION, SOLUTION INTRAVENOUS at 13:01

## 2022-12-07 RX ADMIN — DIPHENHYDRAMINE HYDROCHLORIDE 50 MG: 50 INJECTION, SOLUTION INTRAMUSCULAR; INTRAVENOUS at 13:05

## 2022-12-07 RX ADMIN — PACLITAXEL 134 MG: 6 INJECTION, SOLUTION INTRAVENOUS at 13:26

## 2022-12-07 RX ADMIN — Medication 500 UNITS: at 14:37

## 2022-12-07 NOTE — PROGRESS NOTES
[facility] Chemo Progress Note    Date: 2022    Name: Owen Rai    MRN: [id number]         : 1960    Ms. Bonnie Carreon Arrived ambulatory and in no distress for cycle 3 day 15 of Taxol. Assessment was completed and documented in flowsheets. No acute concerns at this time. Port accessed without difficulty by access RN, labs drawn and processed. Follow Up: Proceed with treatment    Chemotherapy Flowsheet 2022   Cycle C3D15   Date 2022   Drug / Regimen Taxol   Pre Meds given   Notes given         Ms. Obrien's vitals were reviewed. Patient Vitals for the past 12 hrs:   Temp Pulse Resp BP   22 1436 -- 70 -- (!) 142/63   22 1023 97.6 °F (36.4 °C) 74 18 126/70         Lab results were obtained and reviewed. Labs within parameter for treatment. Recent Results (from the past 12 hour(s))   CBC WITH AUTOMATED DIFF    Collection Time: 22 10:24 AM   Result Value Ref Range    WBC 2.6 (L) 3.6 - 11.0 K/uL    RBC 3.67 (L) 3.80 - 5.20 M/uL    HGB 10.9 (L) 11.5 - 16.0 g/dL    HCT 34.3 (L) 35.0 - 47.0 %    MCV 93.5 80.0 - 99.0 FL    MCH 29.7 26.0 - 34.0 PG    MCHC 31.8 30.0 - 36.5 g/dL    RDW 14.8 (H) 11.5 - 14.5 %    PLATELET 254 943 - 893 K/uL    MPV 9.1 8.9 - 12.9 FL    NRBC 0.0 0  WBC    ABSOLUTE NRBC 0.00 0.00 - 0.01 K/uL    NEUTROPHILS 37 32 - 75 %    LYMPHOCYTES 50 (H) 12 - 49 %    MONOCYTES 12 5 - 13 %    EOSINOPHILS 1 0 - 7 %    BASOPHILS 0 0 - 1 %    IMMATURE GRANULOCYTES 0 0.0 - 0.5 %    ABS. NEUTROPHILS 1.0 (L) 1.8 - 8.0 K/UL    ABS. LYMPHOCYTES 1.3 0.8 - 3.5 K/UL    ABS. MONOCYTES 0.3 0.0 - 1.0 K/UL    ABS. EOSINOPHILS 0.0 0.0 - 0.4 K/UL    ABS. BASOPHILS 0.0 0.0 - 0.1 K/UL    ABS. IMM. GRANS. 0.0 0.00 - 0.04 K/UL    DF SMEAR SCANNED      RBC COMMENTS OVALOCYTES  PRESENT        RBC COMMENTS FRANCISCO CELLS  PRESENT           Pre-medications  were administered as ordered and chemotherapy was initiated.   Medications Administered       0.9% sodium chloride infusion       Admin Date  12/07/2022 Action  New Bag Dose  25 mL/hr Rate  25 mL/hr Route  IntraVENous Administered By  Alexa Topete RN              dexamethasone (PF) (DECADRON) 10 mg/mL injection 10 mg       Admin Date  12/07/2022 Action  Given Dose  10 mg Route  IntraVENous Administered By  Alexa Topete RN              diphenhydrAMINE (BENADRYL) injection 50 mg       Admin Date  12/07/2022 Action  Given Dose  50 mg Route  IntraVENous Administered By  Alexa Topete RN              famotidine (PF) (PEPCID) 20 mg in 0.9% sodium chloride 10 mL injection       Admin Date  12/07/2022 Action  Given Dose  20 mg Route  IntraVENous Administered By  Alexa Topete RN              heparin (porcine) pf 500 Units       Admin Date  12/07/2022 Action  Given Dose  500 Units Route  InterCATHeter Administered By  Alexa Topete RN              PACLitaxeL (TAXOL) 134 mg in 0.9% sodium chloride 250 mL, overfill volume 25 mL chemo infusion       Admin Date  12/07/2022 Action  New Bag Dose  134 mg Rate  297.3 mL/hr Route  IntraVENous Administered By  Alexa Topete RN                    Two nurses verified prior to administering:  Drug name, Drug dose, Infusion volume or drug volume when prepared in a syringe, Rate of administration, Route of administration, Expiration dates and/or times, Appearance and physical integrity of the drugs, Rate set on infusion pump, when used, and Sequencing of drug administration. Ms. Kala Malin tolerated treatment well, port flushed and de-accessed per protocol. Patient was discharged from [place] [address] in stable condition. Patient was discharged from [facility] in stable condition. Patient aware of next appointment.      Future Appointments   Date Time Provider Linnea Yuen   12/13/2022 11:00 AM DO SHANDRA [place] BS AMB   12/14/2022  7:30 [facility]   12/14/2022  8:15 AM Chase Hannon  [address] BS AMB   12/21/2022 10:00 AM C1 [facility] MED [facility]   12/28/2022 10:00 AM C1 ISABEL JOSE ANGEL Crews RN  December 7, 2022

## 2022-12-13 ENCOUNTER — OFFICE VISIT (OUTPATIENT)
Dept: ORTHOPEDIC SURGERY | Age: 62
End: 2022-12-13
Payer: OTHER MISCELLANEOUS

## 2022-12-13 ENCOUNTER — APPOINTMENT (OUTPATIENT)
Dept: INFUSION THERAPY | Age: 62
End: 2022-12-13

## 2022-12-13 VITALS — BODY MASS INDEX: 27.19 KG/M2 | WEIGHT: 144 LBS | HEIGHT: 61 IN

## 2022-12-13 DIAGNOSIS — G89.29 CHRONIC BILATERAL LOW BACK PAIN WITHOUT SCIATICA: Primary | ICD-10-CM

## 2022-12-13 DIAGNOSIS — M54.50 CHRONIC BILATERAL LOW BACK PAIN WITHOUT SCIATICA: Primary | ICD-10-CM

## 2022-12-13 PROCEDURE — 99214 OFFICE O/P EST MOD 30 MIN: CPT | Performed by: STUDENT IN AN ORGANIZED HEALTH CARE EDUCATION/TRAINING PROGRAM

## 2022-12-13 RX ORDER — CONJUGATED ESTROGENS 0.62 MG/G
CREAM VAGINAL
COMMUNITY

## 2022-12-13 RX ORDER — CARVEDILOL 6.25 MG/1
TABLET ORAL
COMMUNITY
Start: 2022-11-03

## 2022-12-13 NOTE — PROGRESS NOTES
Cb Winter (: 1960) is a 57 y.o. female here for evaluation of the following chief complaint(s):  Back Pain       ASSESSMENT/PLAN:  Below is the assessment and plan developed based on review of pertinent history, physical exam, labs, studies, and medications. 1. Chronic bilateral low back pain without sciatica  -     REFERRAL TO PAIN MANAGEMENT; Future    Return for Follow up after Lumbar MBB Injection. Plan for referral to interventional pain management for lumbar medial branch injections. Follow-up 4 to 6 weeks later. SUBJECTIVE/OBJECTIVE:  HPI  Patient is a pleasant 78-year-old female with chronic mechanical low back pain. At last visit we prescribed physical therapy and anti-inflammatories. Her pain improved somewhat after physical therapy but is now back. She underwent MRI lumbar spine at outside facility. Patient did not bring CD today. Radiology report is being faxed over. Review of Systems  See above HPI and prior clinic notes for full ROS     Ht 5' 1\" (1.549 m)   Wt 144 lb (65.3 kg)   BMI 27.21 kg/m²    Physical Exam  No interval change in PE, grossly motor/sensory intact, no new neurological deficits    IMAGING:  MRI lumbar spine report reviewed. Imaging not available for review by me personally. Imaging performed at outside facility, patient did not bring CD today. See attached radiology report for official details. An electronic signature was used to authenticate this note.   -- Kelsi Awan DO

## 2022-12-14 ENCOUNTER — OFFICE VISIT (OUTPATIENT)
Dept: ONCOLOGY | Age: 62
End: 2022-12-14
Payer: COMMERCIAL

## 2022-12-14 ENCOUNTER — HOSPITAL ENCOUNTER (OUTPATIENT)
Dept: INFUSION THERAPY | Age: 62
Discharge: HOME | End: 2022-12-14
Payer: COMMERCIAL

## 2022-12-14 VITALS
HEART RATE: 71 BPM | TEMPERATURE: 97.6 F | OXYGEN SATURATION: 98 % | WEIGHT: 147 LBS | SYSTOLIC BLOOD PRESSURE: 143 MMHG | RESPIRATION RATE: 18 BRPM | BODY MASS INDEX: 27.78 KG/M2 | DIASTOLIC BLOOD PRESSURE: 71 MMHG

## 2022-12-14 VITALS
RESPIRATION RATE: 18 BRPM | WEIGHT: 147 LBS | BODY MASS INDEX: 27.78 KG/M2 | TEMPERATURE: 97.6 F | SYSTOLIC BLOOD PRESSURE: 149 MMHG | DIASTOLIC BLOOD PRESSURE: 69 MMHG | HEART RATE: 94 BPM

## 2022-12-14 DIAGNOSIS — C50.012 BILATERAL MALIGNANT NEOPLASM INVOLVING BOTH NIPPLE AND AREOLA IN FEMALE, UNSPECIFIED ESTROGEN RECEPTOR STATUS (HCC): Primary | ICD-10-CM

## 2022-12-14 DIAGNOSIS — C50.011 BILATERAL MALIGNANT NEOPLASM INVOLVING BOTH NIPPLE AND AREOLA IN FEMALE, UNSPECIFIED ESTROGEN RECEPTOR STATUS (HCC): Primary | ICD-10-CM

## 2022-12-14 DIAGNOSIS — Z51.11 ENCOUNTER FOR ANTINEOPLASTIC CHEMOTHERAPY: ICD-10-CM

## 2022-12-14 DIAGNOSIS — Z95.828 PORT-A-CATH IN PLACE: ICD-10-CM

## 2022-12-14 LAB
ALBUMIN SERPL-MCNC: 3.3 G/DL (ref 3.5–5)
ALBUMIN/GLOB SERPL: 1 {RATIO} (ref 1.1–2.2)
ALP SERPL-CCNC: 101 U/L (ref 45–117)
ALT SERPL-CCNC: 44 U/L (ref 12–78)
ANION GAP SERPL CALC-SCNC: 4 MMOL/L (ref 5–15)
AST SERPL-CCNC: 21 U/L (ref 15–37)
BASOPHILS # BLD: 0 K/UL (ref 0–0.1)
BASOPHILS NFR BLD: 0 % (ref 0–1)
BILIRUB SERPL-MCNC: 0.3 MG/DL (ref 0.2–1)
BUN SERPL-MCNC: 10 MG/DL (ref 6–20)
BUN/CREAT SERPL: 15 (ref 12–20)
CALCIUM SERPL-MCNC: 8.9 MG/DL (ref 8.5–10.1)
CHLORIDE SERPL-SCNC: 108 MMOL/L (ref 97–108)
CO2 SERPL-SCNC: 28 MMOL/L (ref 21–32)
CREAT SERPL-MCNC: 0.68 MG/DL (ref 0.55–1.02)
DIFFERENTIAL METHOD BLD: ABNORMAL
EOSINOPHIL # BLD: 0 K/UL (ref 0–0.4)
EOSINOPHIL NFR BLD: 1 % (ref 0–7)
ERYTHROCYTE [DISTWIDTH] IN BLOOD BY AUTOMATED COUNT: 15.8 % (ref 11.5–14.5)
GLOBULIN SER CALC-MCNC: 3.3 G/DL (ref 2–4)
GLUCOSE SERPL-MCNC: 111 MG/DL (ref 65–100)
HCT VFR BLD AUTO: 32.9 % (ref 35–47)
HGB BLD-MCNC: 10.4 G/DL (ref 11.5–16)
IMM GRANULOCYTES # BLD AUTO: 0 K/UL (ref 0–0.04)
IMM GRANULOCYTES NFR BLD AUTO: 1 % (ref 0–0.5)
LYMPHOCYTES # BLD: 1.2 K/UL (ref 0.8–3.5)
LYMPHOCYTES NFR BLD: 42 % (ref 12–49)
MCH RBC QN AUTO: 29.7 PG (ref 26–34)
MCHC RBC AUTO-ENTMCNC: 31.6 G/DL (ref 30–36.5)
MCV RBC AUTO: 94 FL (ref 80–99)
MONOCYTES # BLD: 0.3 K/UL (ref 0–1)
MONOCYTES NFR BLD: 9 % (ref 5–13)
NEUTS SEG # BLD: 1.4 K/UL (ref 1.8–8)
NEUTS SEG NFR BLD: 47 % (ref 32–75)
NRBC # BLD: 0 K/UL (ref 0–0.01)
NRBC BLD-RTO: 0 PER 100 WBC
PLATELET # BLD AUTO: 234 K/UL (ref 150–400)
PMV BLD AUTO: 9.1 FL (ref 8.9–12.9)
POTASSIUM SERPL-SCNC: 4 MMOL/L (ref 3.5–5.1)
PROT SERPL-MCNC: 6.6 G/DL (ref 6.4–8.2)
RBC # BLD AUTO: 3.5 M/UL (ref 3.8–5.2)
SODIUM SERPL-SCNC: 140 MMOL/L (ref 136–145)
WBC # BLD AUTO: 2.9 K/UL (ref 3.6–11)

## 2022-12-14 PROCEDURE — 36415 COLL VENOUS BLD VENIPUNCTURE: CPT

## 2022-12-14 PROCEDURE — 80053 COMPREHEN METABOLIC PANEL: CPT

## 2022-12-14 PROCEDURE — 74011000258 HC RX REV CODE- 258: Performed by: INTERNAL MEDICINE

## 2022-12-14 PROCEDURE — 96417 CHEMO IV INFUS EACH ADDL SEQ: CPT

## 2022-12-14 PROCEDURE — 77030012965 HC NDL HUBR BBMI -A

## 2022-12-14 PROCEDURE — 74011250636 HC RX REV CODE- 250/636: Performed by: INTERNAL MEDICINE

## 2022-12-14 PROCEDURE — 96375 TX/PRO/DX INJ NEW DRUG ADDON: CPT

## 2022-12-14 PROCEDURE — 85025 COMPLETE CBC W/AUTO DIFF WBC: CPT

## 2022-12-14 PROCEDURE — 99212 OFFICE O/P EST SF 10 MIN: CPT | Performed by: REGISTERED NURSE

## 2022-12-14 PROCEDURE — 96413 CHEMO IV INFUSION 1 HR: CPT

## 2022-12-14 PROCEDURE — 74011000250 HC RX REV CODE- 250: Performed by: INTERNAL MEDICINE

## 2022-12-14 PROCEDURE — 96367 TX/PROPH/DG ADDL SEQ IV INF: CPT

## 2022-12-14 RX ORDER — EPINEPHRINE 1 MG/ML
0.3 INJECTION, SOLUTION, CONCENTRATE INTRAVENOUS AS NEEDED
Status: ACTIVE | OUTPATIENT
Start: 2022-12-14 | End: 2022-12-14

## 2022-12-14 RX ORDER — ALBUTEROL SULFATE 0.83 MG/ML
2.5 SOLUTION RESPIRATORY (INHALATION) AS NEEDED
Status: ACTIVE | OUTPATIENT
Start: 2022-12-14 | End: 2022-12-14

## 2022-12-14 RX ORDER — DIPHENHYDRAMINE HYDROCHLORIDE 50 MG/ML
25 INJECTION, SOLUTION INTRAMUSCULAR; INTRAVENOUS AS NEEDED
Status: ACTIVE | OUTPATIENT
Start: 2022-12-14 | End: 2022-12-14

## 2022-12-14 RX ORDER — SODIUM CHLORIDE 9 MG/ML
5-40 INJECTION INTRAMUSCULAR; INTRAVENOUS; SUBCUTANEOUS AS NEEDED
Status: ACTIVE | OUTPATIENT
Start: 2022-12-14 | End: 2022-12-14

## 2022-12-14 RX ORDER — ONDANSETRON 2 MG/ML
8 INJECTION INTRAMUSCULAR; INTRAVENOUS AS NEEDED
Status: ACTIVE | OUTPATIENT
Start: 2022-12-14 | End: 2022-12-14

## 2022-12-14 RX ORDER — SODIUM CHLORIDE 9 MG/ML
5-250 INJECTION, SOLUTION INTRAVENOUS AS NEEDED
Status: DISPENSED | OUTPATIENT
Start: 2022-12-14 | End: 2022-12-14

## 2022-12-14 RX ORDER — SODIUM CHLORIDE 0.9 % (FLUSH) 0.9 %
5-40 SYRINGE (ML) INJECTION AS NEEDED
Status: DISPENSED | OUTPATIENT
Start: 2022-12-14 | End: 2022-12-14

## 2022-12-14 RX ORDER — HEPARIN 100 UNIT/ML
500 SYRINGE INTRAVENOUS AS NEEDED
Status: DISPENSED | OUTPATIENT
Start: 2022-12-14 | End: 2022-12-14

## 2022-12-14 RX ORDER — ACETAMINOPHEN 325 MG/1
650 TABLET ORAL AS NEEDED
Status: ACTIVE | OUTPATIENT
Start: 2022-12-14 | End: 2022-12-14

## 2022-12-14 RX ORDER — DIPHENHYDRAMINE HYDROCHLORIDE 50 MG/ML
50 INJECTION, SOLUTION INTRAMUSCULAR; INTRAVENOUS ONCE
Status: COMPLETED | OUTPATIENT
Start: 2022-12-14 | End: 2022-12-14

## 2022-12-14 RX ORDER — PALONOSETRON 0.05 MG/ML
0.25 INJECTION, SOLUTION INTRAVENOUS ONCE
Status: COMPLETED | OUTPATIENT
Start: 2022-12-14 | End: 2022-12-14

## 2022-12-14 RX ORDER — DIPHENHYDRAMINE HYDROCHLORIDE 50 MG/ML
50 INJECTION, SOLUTION INTRAMUSCULAR; INTRAVENOUS AS NEEDED
Status: ACTIVE | OUTPATIENT
Start: 2022-12-14 | End: 2022-12-14

## 2022-12-14 RX ORDER — HYDROCORTISONE SODIUM SUCCINATE 100 MG/2ML
100 INJECTION, POWDER, FOR SOLUTION INTRAMUSCULAR; INTRAVENOUS AS NEEDED
Status: ACTIVE | OUTPATIENT
Start: 2022-12-14 | End: 2022-12-14

## 2022-12-14 RX ADMIN — PALONOSETRON 0.25 MG: 0.05 INJECTION, SOLUTION INTRAVENOUS at 11:54

## 2022-12-14 RX ADMIN — SODIUM CHLORIDE 150 MG: 900 INJECTION, SOLUTION INTRAVENOUS at 10:19

## 2022-12-14 RX ADMIN — SODIUM CHLORIDE 200 MG: 9 INJECTION, SOLUTION INTRAVENOUS at 10:51

## 2022-12-14 RX ADMIN — DEXAMETHASONE SODIUM PHOSPHATE 12 MG: 4 INJECTION, SOLUTION INTRAMUSCULAR; INTRAVENOUS at 11:33

## 2022-12-14 RX ADMIN — DIPHENHYDRAMINE HYDROCHLORIDE 50 MG: 50 INJECTION, SOLUTION INTRAMUSCULAR; INTRAVENOUS at 12:01

## 2022-12-14 RX ADMIN — SODIUM CHLORIDE, PRESERVATIVE FREE 10 ML: 5 INJECTION INTRAVENOUS at 14:22

## 2022-12-14 RX ADMIN — PACLITAXEL 134 MG: 6 INJECTION, SOLUTION INTRAVENOUS at 12:25

## 2022-12-14 RX ADMIN — SODIUM CHLORIDE 25 ML/HR: 9 INJECTION, SOLUTION INTRAVENOUS at 10:13

## 2022-12-14 RX ADMIN — FAMOTIDINE 20 MG: 10 INJECTION, SOLUTION INTRAVENOUS at 11:58

## 2022-12-14 RX ADMIN — CARBOPLATIN 565 MG: 10 INJECTION, SOLUTION INTRAVENOUS at 13:40

## 2022-12-14 RX ADMIN — HEPARIN 500 UNITS: 100 SYRINGE at 14:22

## 2022-12-14 RX ADMIN — SODIUM CHLORIDE, PRESERVATIVE FREE 10 ML: 5 INJECTION INTRAVENOUS at 10:13

## 2022-12-14 NOTE — PROGRESS NOTES
\[facility]\"" Chemo Progress Note    Date: 2022    Name: Alberto Casiano    MRN: [id number]         : 1960    Ms. Lacey Ryan Arrived ambulatory and in no distress for C4D1 Taxol. Assessment was completed and documented in flowsheets. No acute concerns at this time. Port accessed without difficulty by access RN, labs drawn and processed. Follow Up: Proceed with treatment    Chemotherapy Flowsheet 2022   Cycle C4D1   Date 2022   Drug / Regimen Keytruda/Taxol/Carbo   Pre Meds given   Notes given         Ms. Obrien's vitals were reviewed. Patient Vitals for the past 12 hrs:   Temp Pulse Resp BP   22 1422 -- 94 -- (!) 149/69   22 0830 97.6 °F (36.4 °C) 71 18 (!) 143/71           Lab results were obtained and reviewed. Labs within parameter for treatment. Recent Results (from the past 12 hour(s))   CBC WITH AUTOMATED DIFF    Collection Time: 22  8:35 AM   Result Value Ref Range    WBC 2.9 (L) 3.6 - 11.0 K/uL    RBC 3.50 (L) 3.80 - 5.20 M/uL    HGB 10.4 (L) 11.5 - 16.0 g/dL    HCT 32.9 (L) 35.0 - 47.0 %    MCV 94.0 80.0 - 99.0 FL    MCH 29.7 26.0 - 34.0 PG    MCHC 31.6 30.0 - 36.5 g/dL    RDW 15.8 (H) 11.5 - 14.5 %    PLATELET 228 472 - 398 K/uL    MPV 9.1 8.9 - 12.9 FL    NRBC 0.0 0  WBC    ABSOLUTE NRBC 0.00 0.00 - 0.01 K/uL    NEUTROPHILS 47 32 - 75 %    LYMPHOCYTES 42 12 - 49 %    MONOCYTES 9 5 - 13 %    EOSINOPHILS 1 0 - 7 %    BASOPHILS 0 0 - 1 %    IMMATURE GRANULOCYTES 1 (H) 0.0 - 0.5 %    ABS. NEUTROPHILS 1.4 (L) 1.8 - 8.0 K/UL    ABS. LYMPHOCYTES 1.2 0.8 - 3.5 K/UL    ABS. MONOCYTES 0.3 0.0 - 1.0 K/UL    ABS. EOSINOPHILS 0.0 0.0 - 0.4 K/UL    ABS. BASOPHILS 0.0 0.0 - 0.1 K/UL    ABS. IMM.  GRANS. 0.0 0.00 - 0.04 K/UL    DF AUTOMATED     METABOLIC PANEL, COMPREHENSIVE    Collection Time: 22  8:35 AM   Result Value Ref Range    Sodium 140 136 - 145 mmol/L    Potassium 4.0 3.5 - 5.1 mmol/L    Chloride 108 97 - 108 mmol/L    CO2 28 21 - 32 mmol/L    Anion gap 4 (L) 5 - 15 mmol/L    Glucose 111 (H) 65 - 100 mg/dL    BUN 10 6 - 20 MG/DL    Creatinine 0.68 0.55 - 1.02 MG/DL    BUN/Creatinine ratio 15 12 - 20      eGFR >60 >60 ml/min/1.73m2    Calcium 8.9 8.5 - 10.1 MG/DL    Bilirubin, total 0.3 0.2 - 1.0 MG/DL    ALT (SGPT) 44 12 - 78 U/L    AST (SGOT) 21 15 - 37 U/L    Alk. phosphatase 101 45 - 117 U/L    Protein, total 6.6 6.4 - 8.2 g/dL    Albumin 3.3 (L) 3.5 - 5.0 g/dL    Globulin 3.3 2.0 - 4.0 g/dL    A-G Ratio 1.0 (L) 1.1 - 2.2         Pre-medications  were administered as ordered and chemotherapy was initiated.   Medications Administered       0.9% sodium chloride infusion       Admin Date  12/14/2022 Action  New Bag Dose  25 mL/hr Rate  25 mL/hr Route  IntraVENous Administered By  Pk Medleys, RN              CARBOplatin (PARAPLATIN) 565 mg in 0.9% sodium chloride 250 mL, overfill volume 25 mL chemo infusion       Admin Date  12/14/2022 Action  New Bag Dose  565 mg Rate  663 mL/hr Route  IntraVENous Administered By  Pk Medleys, RN              dexamethasone (DECADRON) 12 mg in 0.9% sodium chloride 50 mL, overfill volume 5 mL IVPB       Admin Date  12/14/2022 Action  New Bag Dose  12 mg Rate  232 mL/hr Route  IntraVENous Administered By  Pk Medleys, RN              diphenhydrAMINE (BENADRYL) injection 50 mg       Admin Date  12/14/2022 Action  Given Dose  50 mg Route  IntraVENous Administered By  Pk Jasmynes, RN              famotidine (PF) (PEPCID) 20 mg in 0.9% sodium chloride 10 mL injection       Admin Date  12/14/2022 Action  Given Dose  20 mg Route  IntraVENous Administered By  Pk Jasmynes, RN              fosaprepitant (EMEND) 150 mg in 0.9% sodium chloride 150 mL IVPB       Admin Date  12/14/2022 Action  New Bag Dose  150 mg Rate  450 mL/hr Route  IntraVENous Administered By  Pk Jasmynes, RN              heparin (porcine) pf 500 Units       Admin Date  12/14/2022 Action  Given Dose  500 Units Route  InterCATHeter Administered By  Kassandra Crigler TOM BYRD              PACLitaxeL (TAXOL) 134 mg in 0.9% sodium chloride 250 mL, overfill volume 25 mL chemo infusion       Admin Date  12/14/2022 Action  New Bag Dose  134 mg Rate  297.3 mL/hr Route  IntraVENous Administered By  Sujata Blue RN              palonosetron HCl (ALOXI) injection 0.25 mg       Admin Date  12/14/2022 Action  Given Dose  0.25 mg Route  IntraVENous Administered By  Sujata Blue RN              pembrolizumab [facility]) 200 mg in 0.9% sodium chloride 100 mL, overfill volume 10 mL IVPB       Admin Date  12/14/2022 Action  New Bag Dose  200 mg Rate  236 mL/hr Route  IntraVENous Administered By  Sujata Blue RN              sodium chloride (NS) flush 5-40 mL       Admin Date  12/14/2022 Action  Given Dose  10 mL Route  IntraVENous Administered By  Sujata Blue RN               Admin Date  12/14/2022 Action  Given Dose  10 mL Route  IntraVENous Administered By  Sujata Blue RN                    Two nurses verified prior to administering:  Drug name, Drug dose, Infusion volume or drug volume when prepared in a syringe, Rate of administration, Route of administration, Expiration dates and/or times, Appearance and physical integrity of the drugs, Rate set on infusion pump, when used, and Sequencing of drug administration. 1425  Ms. Obrien tolerated treatment well, port flushed and de-accessed per protocol. Patient was discharged from [place] [address] in stable condition. Patient was discharged from [facility] in stable condition. Patient aware of next appointment.      Future Appointments   Date Time Provider Linnea Yuen   12/21/2022 10:00 AM  [facility]   12/28/2022 10:00 AM  ISABEL MED [address] RN  December 14, 2022

## 2022-12-14 NOTE — PROGRESS NOTES
[facility] [place] [place] [address] [address], [address], [place]: [phone]  F: [phone]    Reason for Visit:   Munira Lombardo is a 57 y.o. female who is seen for follow up of left breast cancer-ER 0%, NV 0%, HER2/radha negative. Treatment History:   10/1/22: Carboplatin taxol and keytruda followed by ddAC and Keytruda     History of Present Illness:   Patient is a 77-year-old female seen for left breast cancer. She had a screening mammogram on 7/20/2022 which showed a 1 cm irregular mass in the left breast at 3 o'clock position. Biopsy on 8/2/2022 showed invasive ductal carcinoma, grade 2-3, ER 0%, NV 0%, HER2/radha negative. Ki-67 85%. Declined genetic testing. She comes today for cycle 4 day 1 of Carboplatin taxol and keytruda followed by ddAC and Keytruda. Nausea was improved. No diarrhea or constipation. No mouth sores. Neuropathy is stable today. ECHO was better per patient - results have not been sent. No other complaints.      No FH of breast, ovarian, pancreatic or Prostate malignancies  She works with special needs     Past Medical History:   Diagnosis Date    Cancer ([address]     BREAST CANCER    GERD (gastroesophageal reflux disease)     HTN (hypertension) 07/18/2011    Psychiatric disorder     ANIXETY AND DEPRESSION    Thyroid disease     HYPOTHYROID      Past Surgical History:   Procedure Laterality Date    HX COLONOSCOPY      HX MYOMECTOMY  05/02/1996    x 1    HX WISDOM TEETH EXTRACTION        Social History     Tobacco Use    Smoking status: Never     Passive exposure: Never    Smokeless tobacco: Never   Substance Use Topics    Alcohol use: Never      Family History   Problem Relation Age of Onset    Heart Failure Mother     Cancer Father     Diabetes Sister     Kidney Disease Sister     Anesth Problems Neg Hx      Current Outpatient Medications   Medication Sig    carvediloL (COREG) 6.25 mg tablet     conjugated estrogens (Premarin) 0.625 mg/gram vaginal cream Premarin 0.625 mg/gram vaginal cream   Insert 0.5 applicatorsful twice a week by vaginal route. famotidine (PEPCID) 40 mg/5 mL (8 mg/mL) suspension Take 2.5 mL by mouth two (2) times a day. magic mouthwash solution Magic mouth wash Maalox Lidocaine 2% viscous Diphenhydramine oral solution Pharmacy to mix equal portions of ingredients to a total volume as indicated in the dispense amount. Swish & swallow 5mL (1 tsp) 4 times daily as needed for mouth sores    gabapentin (NEURONTIN) 100 mg capsule Take 1 Capsule by mouth three (3) times daily. Max Daily Amount: 300 mg.    losartan (COZAAR) 25 mg tablet Take 25 mg by mouth daily. dexAMETHasone (DECADRON) 4 mg tablet Take 2 tabs (8mg) once daily on days 2 & 3 of chemotherapy    ondansetron (ZOFRAN ODT) 8 mg disintegrating tablet Take 1 Tablet by mouth every eight (8) hours as needed for Nausea or Vomiting. prochlorperazine (Compazine) 5 mg tablet Take 1 Tablet by mouth every six (6) hours as needed for Nausea or Vomiting.    lidocaine-prilocaine (EMLA) topical cream Apply  to affected area as needed for Pain. Apply a thick layer over port a cath 30-60 minutes prior to port access    PARoxetine (PAXIL) 20 mg tablet paroxetine 20 mg tablet   Take 1 tablet every day by oral route. rosuvastatin (CRESTOR) 10 mg tablet Take 10 mg by mouth nightly. multivitamin, tx-iron-ca-min (THERA-M w/ IRON) 9 mg iron-400 mcg tab tablet Take 1 Tablet by mouth daily. No current facility-administered medications for this visit. Allergies   Allergen Reactions    Sulfa (Sulfonamide Antibiotics) Itching, Swelling and Unknown (comments)        Review of Systems: A complete review of systems was obtained, negative except as described above.     Physical Exam:   Visit Vitals  BP (!) 143/71 (BP 1 Location: Left upper arm, BP Patient Position: Sitting)   Pulse 71   Temp 97.6 °F (36.4 °C)   Resp 18   Wt 147 lb (66.7 kg)   SpO2 98%   BMI 27.78 kg/m²     ECOG PS: 0  General: No distress  Eyes: PERRL, anicteric sclerae  HENT: Atraumatic  Neck: Supple  Respiratory: normal respiratory effort  Breast: deferred today  CV: Normal rate, regular rhythm, no murmurs, no peripheral edema  MS: Normal gait and station. Digits without clubbing or cyanosis. Skin: No rashes, ecchymoses, or petechiae. Normal temperature, turgor, and texture. Psych: Alert, oriented, appropriate affect, normal judgment/insight    Results:     Lab Results   Component Value Date/Time    WBC 2.6 (L) 12/07/2022 10:24 AM    HGB 10.9 (L) 12/07/2022 10:24 AM    HCT 34.3 (L) 12/07/2022 10:24 AM    PLATELET 080 78/90/4579 10:24 AM    MCV 93.5 12/07/2022 10:24 AM    ABS. NEUTROPHILS 1.0 (L) 12/07/2022 10:24 AM     Lab Results   Component Value Date/Time    Sodium 143 11/23/2022 07:55 AM    Potassium 4.1 11/23/2022 07:55 AM    Chloride 109 (H) 11/23/2022 07:55 AM    CO2 29 11/23/2022 07:55 AM    Glucose 104 (H) 11/23/2022 07:55 AM    BUN 12 11/23/2022 07:55 AM    Creatinine 0.79 11/23/2022 07:55 AM    GFR est AA >60 09/26/2022 03:26 PM    GFR est non-AA >60 09/26/2022 03:26 PM    Calcium 8.8 11/23/2022 07:55 AM     Lab Results   Component Value Date/Time    Bilirubin, total 0.3 11/23/2022 07:55 AM    ALT (SGPT) 45 11/23/2022 07:55 AM    Alk. phosphatase 106 11/23/2022 07:55 AM    Protein, total 6.6 11/23/2022 07:55 AM    Albumin 3.3 (L) 11/23/2022 07:55 AM    Globulin 3.3 11/23/2022 07:55 AM       External   Records reviewed and summarized above. Pathology report(s) reviewed above. Radiology report(s) reviewed above. MRI  9/2022    LEFT BREAST: Conglomerate mass enhancement in the left breast at 4-5 o'clock,  combined dimension 3.7 x 1.9 x 1.4 cm. This is slightly more extensive than the  sonographic and mammographic findings. No lymphadenopathy. Assessment:   1) Left breast cancer  1 cm mass on mammogram  Palpated a lump  ER 0%, RI 0%, HER2/radha negative  Ki 67 was 85%.   MRI breast with a 3.7 cm Left breast mass    [id number]  AJCC 8th ed-Stage IIA  Genetic testing negative    Discussed pathology and the natural history of triple negative breast cancer. Characterized by higher relapse rates after the first 3 years of Dx, can recur locally or in distant organs. Adjuvant chemotherapy is estimated to decrease the risk of recurrence by 25-30%, breast cancer mortality by 20% and overall mortality by 20%. Review of MRI shows a T2 primary. We discussed NACT. Recommended chemotherapy + Keytruda based on the KEYNOTE-522 trial (taxol, carboplatin and [place] followed by [facility] as preoperative treatment, followed by surgery, and then adjuvant pembrolizumab for another nine cycles (27 weeks) after surgery). Today is cycle 4 day 1     2) HTN    3) Depression    4) Hyperlipidemia    5) Low EF   She will start [facility] 1/2023 and will need a repeat ECHO prior to this with EF at least > 50%   Dr Linda Tabares is her cardiologist - notes reviewed  Repeat ECHO better per patient - will request results     6) Chemotherapy induced neuropathy   Is grade 1   On paxil  Continue Gabapentin     7) Encounter for high risk medications like chemotherapy and immunotherapy  Tolerating well   Grade 2 neutropenia, will proceed today   Grade 1 neuropathy     Plan:     Proceed today with cycle 4 day 1 of Carboplatin AUC 5 taxol and keytruda followed by ddAC (pending repeat ECHO) and Keytruda   Labs to include cbc with diff, cmp prior to infusions; TSH every 6 weeks  Zofran / compazine PRN  Dexamethasone on days 2 & 3 of chemotherapy  Follow with cardiology as scheduled; requested ECHO results   Gabapentin TID     RTC in 3 weeks     I appreciate the opportunity to participate in [facility]. I personally saw and evaluated the patient and performed the key components of medical decision making. The history, physical exam, and documentation were performed by So Hayward NP.   I reviewed and verified the above documentation and modified it as needed.     Signed By: Iris Peñaloza NP

## 2022-12-14 NOTE — PROGRESS NOTES
Nicole Multani is a 57 y.o. female    Chief Complaint   Patient presents with    Follow-up      left breast cancer-ER 0%, RI 0%, HER2/radha negative. 1. Have you been to the ER, urgent care clinic since your last visit? Hospitalized since your last visit? No    2. Have you seen or consulted any other health care providers outside of the [address] [place] [address] since your last visit? Include any pap smears or colon screening.  Yes, Dr. Maine Lainez specialist

## 2022-12-16 ENCOUNTER — TELEPHONE (OUTPATIENT)
Dept: ONCOLOGY | Age: 62
End: 2022-12-16

## 2022-12-20 ENCOUNTER — APPOINTMENT (OUTPATIENT)
Dept: INFUSION THERAPY | Age: 62
End: 2022-12-20

## 2022-12-21 ENCOUNTER — HOSPITAL ENCOUNTER (OUTPATIENT)
Dept: INFUSION THERAPY | Age: 62
Discharge: HOME | End: 2022-12-21
Payer: COMMERCIAL

## 2022-12-21 VITALS
DIASTOLIC BLOOD PRESSURE: 73 MMHG | TEMPERATURE: 97.2 F | RESPIRATION RATE: 16 BRPM | WEIGHT: 144.6 LBS | SYSTOLIC BLOOD PRESSURE: 134 MMHG | BODY MASS INDEX: 27.32 KG/M2 | HEART RATE: 99 BPM

## 2022-12-21 DIAGNOSIS — C50.011 BILATERAL MALIGNANT NEOPLASM INVOLVING BOTH NIPPLE AND AREOLA IN FEMALE, UNSPECIFIED ESTROGEN RECEPTOR STATUS (HCC): Primary | ICD-10-CM

## 2022-12-21 DIAGNOSIS — C50.012 BILATERAL MALIGNANT NEOPLASM INVOLVING BOTH NIPPLE AND AREOLA IN FEMALE, UNSPECIFIED ESTROGEN RECEPTOR STATUS (HCC): Primary | ICD-10-CM

## 2022-12-21 LAB
BASOPHILS # BLD: 0 K/UL (ref 0–0.1)
BASOPHILS NFR BLD: 0 % (ref 0–1)
DIFFERENTIAL METHOD BLD: ABNORMAL
EOSINOPHIL # BLD: 0 K/UL (ref 0–0.4)
EOSINOPHIL NFR BLD: 0 % (ref 0–7)
ERYTHROCYTE [DISTWIDTH] IN BLOOD BY AUTOMATED COUNT: 15.4 % (ref 11.5–14.5)
HCT VFR BLD AUTO: 30.9 % (ref 35–47)
HGB BLD-MCNC: 10.3 G/DL (ref 11.5–16)
IMM GRANULOCYTES # BLD AUTO: 0 K/UL (ref 0–0.04)
IMM GRANULOCYTES NFR BLD AUTO: 1 % (ref 0–0.5)
LYMPHOCYTES # BLD: 0.9 K/UL (ref 0.8–3.5)
LYMPHOCYTES NFR BLD: 30 % (ref 12–49)
MCH RBC QN AUTO: 29.9 PG (ref 26–34)
MCHC RBC AUTO-ENTMCNC: 33.3 G/DL (ref 30–36.5)
MCV RBC AUTO: 89.8 FL (ref 80–99)
MONOCYTES # BLD: 0.2 K/UL (ref 0–1)
MONOCYTES NFR BLD: 6 % (ref 5–13)
NEUTS SEG # BLD: 1.9 K/UL (ref 1.8–8)
NEUTS SEG NFR BLD: 62 % (ref 32–75)
NRBC # BLD: 0 K/UL (ref 0–0.01)
NRBC BLD-RTO: 0 PER 100 WBC
PLATELET # BLD AUTO: 260 K/UL (ref 150–400)
PMV BLD AUTO: 9.3 FL (ref 8.9–12.9)
RBC # BLD AUTO: 3.44 M/UL (ref 3.8–5.2)
WBC # BLD AUTO: 3 K/UL (ref 3.6–11)

## 2022-12-21 PROCEDURE — 96413 CHEMO IV INFUSION 1 HR: CPT

## 2022-12-21 PROCEDURE — 74011000250 HC RX REV CODE- 250: Performed by: INTERNAL MEDICINE

## 2022-12-21 PROCEDURE — 96375 TX/PRO/DX INJ NEW DRUG ADDON: CPT

## 2022-12-21 PROCEDURE — 36415 COLL VENOUS BLD VENIPUNCTURE: CPT

## 2022-12-21 PROCEDURE — 74011250636 HC RX REV CODE- 250/636: Performed by: INTERNAL MEDICINE

## 2022-12-21 PROCEDURE — 85025 COMPLETE CBC W/AUTO DIFF WBC: CPT

## 2022-12-21 PROCEDURE — 77030012965 HC NDL HUBR BBMI -A

## 2022-12-21 RX ORDER — DEXAMETHASONE SODIUM PHOSPHATE 10 MG/ML
10 INJECTION INTRAMUSCULAR; INTRAVENOUS ONCE
Status: COMPLETED | OUTPATIENT
Start: 2022-12-21 | End: 2022-12-21

## 2022-12-21 RX ORDER — HEPARIN 100 UNIT/ML
500 SYRINGE INTRAVENOUS AS NEEDED
Status: ACTIVE | OUTPATIENT
Start: 2022-12-21 | End: 2022-12-21

## 2022-12-21 RX ORDER — DIPHENHYDRAMINE HYDROCHLORIDE 50 MG/ML
50 INJECTION, SOLUTION INTRAMUSCULAR; INTRAVENOUS ONCE
Status: COMPLETED | OUTPATIENT
Start: 2022-12-21 | End: 2022-12-21

## 2022-12-21 RX ORDER — ACETAMINOPHEN 325 MG/1
650 TABLET ORAL AS NEEDED
Status: ACTIVE | OUTPATIENT
Start: 2022-12-21 | End: 2022-12-21

## 2022-12-21 RX ORDER — SODIUM CHLORIDE 9 MG/ML
5-250 INJECTION, SOLUTION INTRAVENOUS AS NEEDED
Status: DISPENSED | OUTPATIENT
Start: 2022-12-21 | End: 2022-12-21

## 2022-12-21 RX ORDER — DIPHENHYDRAMINE HYDROCHLORIDE 50 MG/ML
25 INJECTION, SOLUTION INTRAMUSCULAR; INTRAVENOUS AS NEEDED
Status: ACTIVE | OUTPATIENT
Start: 2022-12-21 | End: 2022-12-21

## 2022-12-21 RX ORDER — ONDANSETRON 2 MG/ML
8 INJECTION INTRAMUSCULAR; INTRAVENOUS AS NEEDED
Status: ACTIVE | OUTPATIENT
Start: 2022-12-21 | End: 2022-12-21

## 2022-12-21 RX ORDER — SODIUM CHLORIDE 9 MG/ML
5-40 INJECTION INTRAMUSCULAR; INTRAVENOUS; SUBCUTANEOUS AS NEEDED
Status: ACTIVE | OUTPATIENT
Start: 2022-12-21 | End: 2022-12-21

## 2022-12-21 RX ORDER — SODIUM CHLORIDE 0.9 % (FLUSH) 0.9 %
5-40 SYRINGE (ML) INJECTION AS NEEDED
Status: DISPENSED | OUTPATIENT
Start: 2022-12-21 | End: 2022-12-21

## 2022-12-21 RX ADMIN — PACLITAXEL 134 MG: 6 INJECTION, SOLUTION INTRAVENOUS at 12:25

## 2022-12-21 RX ADMIN — SODIUM CHLORIDE 25 ML/HR: 9 INJECTION, SOLUTION INTRAVENOUS at 11:18

## 2022-12-21 RX ADMIN — SODIUM CHLORIDE, PRESERVATIVE FREE 10 ML: 5 INJECTION INTRAVENOUS at 10:08

## 2022-12-21 RX ADMIN — DIPHENHYDRAMINE HYDROCHLORIDE 50 MG: 50 INJECTION, SOLUTION INTRAMUSCULAR; INTRAVENOUS at 11:21

## 2022-12-21 RX ADMIN — FAMOTIDINE 20 MG: 10 INJECTION, SOLUTION INTRAVENOUS at 11:21

## 2022-12-21 RX ADMIN — DEXAMETHASONE SODIUM PHOSPHATE 10 MG: 10 INJECTION INTRAMUSCULAR; INTRAVENOUS at 11:21

## 2022-12-21 NOTE — PROGRESS NOTES
[facility] [place] Chemo Progress Note    Date: 2022    Name: Tien Barboza    MRN: [id number]         : 1960    Ms. Saran Ribeiro Arrived ambulatory and in no distress for cycle 4 day 8 of Taxol. Assessment was completed and documented in flowsheets. No acute concerns at this time. Port accessed without difficulty by access RN, labs drawn and processed. Follow Up: Proceed with treatment    Chemotherapy Flowsheet 2022   Cycle C4D8   Date 2022   Drug / Regimen Taxol   Pre Meds given   Notes given         Ms. Obrien's vitals were reviewed. Patient Vitals for the past 12 hrs:   Temp Pulse Resp BP   22 1329 -- 99 -- 134/73   22 0955 97.2 °F (36.2 °C) 80 16 132/87         Lab results were obtained and reviewed. Labs within parameter for treatment. Recent Results (from the past 12 hour(s))   CBC WITH AUTOMATED DIFF    Collection Time: 22  9:58 AM   Result Value Ref Range    WBC 3.0 (L) 3.6 - 11.0 K/uL    RBC 3.44 (L) 3.80 - 5.20 M/uL    HGB 10.3 (L) 11.5 - 16.0 g/dL    HCT 30.9 (L) 35.0 - 47.0 %    MCV 89.8 80.0 - 99.0 FL    MCH 29.9 26.0 - 34.0 PG    MCHC 33.3 30.0 - 36.5 g/dL    RDW 15.4 (H) 11.5 - 14.5 %    PLATELET 281 346 - 924 K/uL    MPV 9.3 8.9 - 12.9 FL    NRBC 0.0 0  WBC    ABSOLUTE NRBC 0.00 0.00 - 0.01 K/uL    NEUTROPHILS 62 32 - 75 %    LYMPHOCYTES 30 12 - 49 %    MONOCYTES 6 5 - 13 %    EOSINOPHILS 0 0 - 7 %    BASOPHILS 0 0 - 1 %    IMMATURE GRANULOCYTES 1 (H) 0.0 - 0.5 %    ABS. NEUTROPHILS 1.9 1.8 - 8.0 K/UL    ABS. LYMPHOCYTES 0.9 0.8 - 3.5 K/UL    ABS. MONOCYTES 0.2 0.0 - 1.0 K/UL    ABS. EOSINOPHILS 0.0 0.0 - 0.4 K/UL    ABS. BASOPHILS 0.0 0.0 - 0.1 K/UL    ABS. IMM. GRANS. 0.0 0.00 - 0.04 K/UL    DF AUTOMATED         Pre-medications  were administered as ordered and chemotherapy was initiated.   Medications Administered       0.9% sodium chloride infusion       Admin Date  2022 Action  New Bag Dose  25 mL/hr Rate  25 mL/hr Route  IntraVENous Administered By  Angus Hackett RN              0.9% sodium chloride injection 5-40 mL       Admin Date  12/21/2022 Action  Given Dose  10 mL Route  IntraVENous Administered By  Ha Fields RN              dexamethasone (PF) (DECADRON) 10 mg/mL injection 10 mg       Admin Date  12/21/2022 Action  Given Dose  10 mg Route  IntraVENous Administered By  Angus Hackett RN              diphenhydrAMINE (BENADRYL) injection 50 mg       Admin Date  12/21/2022 Action  Given Dose  50 mg Route  IntraVENous Administered By  Angus Hackett RN              famotidine (PF) (PEPCID) 20 mg in 0.9% sodium chloride 10 mL injection       Admin Date  12/21/2022 Action  Given Dose  20 mg Route  IntraVENous Administered By  Angus Hackett RN              PACLitaxeL (TAXOL) 134 mg in 0.9% sodium chloride 250 mL, overfill volume 25 mL chemo infusion       Admin Date  12/21/2022 Action  New Bag Dose  134 mg Rate  297.3 mL/hr Route  IntraVENous Administered By  Angus Hackett RN              sodium chloride (NS) flush 5-40 mL       Admin Date  12/21/2022 Action  Given Dose  10 mL Route  IntraVENous Administered By  Ha Fields RN                    Two nurses verified prior to administering:  Drug name, Drug dose, Infusion volume or drug volume when prepared in a syringe, Rate of administration, Route of administration, Expiration dates and/or times, Appearance and physical integrity of the drugs, Rate set on infusion pump, when used, and Sequencing of drug administration. Ms. Marva Caputo tolerated treatment well, port flushed and de-accessed per protocol. Patient was discharged from [place] [address] in stable condition. Patient was discharged from [facility] in [facility] condition. Patient aware of next appointment.      Future Appointments   Date Time Provider Linnea Yuen   12/28/2022 10:00 AM C1 [facility] [facility] H   1/4/2023  1:30 PM E3 IS[facility]EL MED [place] [facility] H   1/4/2023  1:45 PM Trevon Calabrese  N Roman Alex BS AMB   1/10/2023 10:00 AM A1 [facility] 1370 [facility] H   1/17/2023 10:00 AM [facility]   1/24/2023  9:00 AM E3 IS[facility] TOM Crews  December 21, 2022

## 2022-12-22 ENCOUNTER — NURSE NAVIGATOR (OUTPATIENT)
Dept: CASE MANAGEMENT | Age: 62
End: 2022-12-22

## 2022-12-22 ENCOUNTER — TELEPHONE (OUTPATIENT)
Dept: ONCOLOGY | Age: 62
End: 2022-12-22

## 2022-12-22 NOTE — TELEPHONE ENCOUNTER
Patient called and would like a call back. She is experiencing Nausea, lightheaded, not sure if its a cold or a reaction to treatment. Please call the patient at [phone].

## 2022-12-22 NOTE — PROGRESS NOTES
[address]  Breast Navigator Encounter    Name:    Anastasiya García  Age:    57 y.o. Diagnosis:   RIGHT breast cancer     Returned patient's call. She is tearful on the phone. Is asking for me to help her find a place to stay temporarily where she \"can rest.\"  Is sick right now with a URI, on her way to the PCP right now. Also getting neoadjuvant chemotherapy with Dr. Adriana Newman. Is living with her nephew, and she says that she has to get out. 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 She would like a clean place to stay, but it does not have to be an apartment. It can be a room, just a place for her to rest for a while. She may try to go back to live with him again later. Says that she stays in her room and is not a bother. Tries to do everything while he is gone and then just stay in her room and sleep when he is there. I advised her that I would contact a , Chloe Felty, and have her get back in touch with her. She was very appreciative.                           Gianluca Rubin, RN, BSN, [facility]  Oncology Breast Navigator     [address]  [address] [facility] [address].  W: [phone]  F: [phone]  [email or web address]  [facility] Help to Those in [facility]

## 2022-12-23 NOTE — TELEPHONE ENCOUNTER
[address] call, No answer.  left for pt to r/t call to the office at her convenience. Number to office left on .

## 2022-12-26 ENCOUNTER — HOSPITAL ENCOUNTER (INPATIENT)
Age: 62
LOS: 2 days | Discharge: ANOTHER HOSPITAL | DRG: 871 | End: 2022-12-28
Attending: EMERGENCY MEDICINE | Admitting: STUDENT IN AN ORGANIZED HEALTH CARE EDUCATION/TRAINING PROGRAM
Payer: COMMERCIAL

## 2022-12-26 ENCOUNTER — APPOINTMENT (OUTPATIENT)
Dept: NUCLEAR MEDICINE | Age: 62
DRG: 871 | End: 2022-12-26
Attending: PHYSICIAN ASSISTANT
Payer: COMMERCIAL

## 2022-12-26 ENCOUNTER — APPOINTMENT (OUTPATIENT)
Dept: CT IMAGING | Age: 62
DRG: 871 | End: 2022-12-26
Attending: PHYSICIAN ASSISTANT
Payer: COMMERCIAL

## 2022-12-26 ENCOUNTER — APPOINTMENT (OUTPATIENT)
Dept: GENERAL RADIOLOGY | Age: 62
DRG: 871 | End: 2022-12-26
Attending: PHYSICIAN ASSISTANT
Payer: COMMERCIAL

## 2022-12-26 DIAGNOSIS — E87.6 HYPOKALEMIA: ICD-10-CM

## 2022-12-26 DIAGNOSIS — E87.1 HYPONATREMIA: ICD-10-CM

## 2022-12-26 DIAGNOSIS — N17.9 AKI (ACUTE KIDNEY INJURY) (HCC): ICD-10-CM

## 2022-12-26 DIAGNOSIS — R77.8 TROPONIN LEVEL ELEVATED: ICD-10-CM

## 2022-12-26 DIAGNOSIS — U07.1 COVID-19: ICD-10-CM

## 2022-12-26 DIAGNOSIS — J18.9 PNEUMONIA OF BOTH LOWER LOBES DUE TO INFECTIOUS ORGANISM: Primary | ICD-10-CM

## 2022-12-26 LAB
ALBUMIN SERPL-MCNC: 2.7 G/DL (ref 3.5–5)
ALBUMIN/GLOB SERPL: 0.5 {RATIO} (ref 1.1–2.2)
ALP SERPL-CCNC: 83 U/L (ref 45–117)
ALT SERPL-CCNC: 111 U/L (ref 12–78)
ANION GAP SERPL CALC-SCNC: 16 MMOL/L (ref 5–15)
AST SERPL-CCNC: 173 U/L (ref 15–37)
ATRIAL RATE: 127 BPM
BASOPHILS # BLD: 0.1 K/UL (ref 0–0.1)
BASOPHILS NFR BLD: 2 % (ref 0–1)
BILIRUB SERPL-MCNC: 0.9 MG/DL (ref 0.2–1)
BNP SERPL-MCNC: 866 PG/ML (ref 0–125)
BUN SERPL-MCNC: 29 MG/DL (ref 6–20)
BUN/CREAT SERPL: 10 (ref 12–20)
CALCIUM SERPL-MCNC: 9 MG/DL (ref 8.5–10.1)
CALCULATED P AXIS, ECG09: 70 DEGREES
CALCULATED R AXIS, ECG10: -16 DEGREES
CALCULATED T AXIS, ECG11: 98 DEGREES
CHLORIDE SERPL-SCNC: 96 MMOL/L (ref 97–108)
CO2 SERPL-SCNC: 23 MMOL/L (ref 21–32)
CREAT SERPL-MCNC: 2.91 MG/DL (ref 0.55–1.02)
D DIMER PPP FEU-MCNC: 4.87 MG/L FEU (ref 0–0.65)
DIAGNOSIS, 93000: NORMAL
DIFFERENTIAL METHOD BLD: ABNORMAL
EOSINOPHIL # BLD: 0 K/UL (ref 0–0.4)
EOSINOPHIL NFR BLD: 0 % (ref 0–7)
ERYTHROCYTE [DISTWIDTH] IN BLOOD BY AUTOMATED COUNT: 16.2 % (ref 11.5–14.5)
FLUAV RNA SPEC QL NAA+PROBE: NOT DETECTED
FLUBV RNA SPEC QL NAA+PROBE: NOT DETECTED
GLOBULIN SER CALC-MCNC: 5.3 G/DL (ref 2–4)
GLUCOSE BLD STRIP.AUTO-MCNC: 110 MG/DL (ref 65–117)
GLUCOSE SERPL-MCNC: 186 MG/DL (ref 65–100)
HCT VFR BLD AUTO: 33.2 % (ref 35–47)
HGB BLD-MCNC: 11.4 G/DL (ref 11.5–16)
IMM GRANULOCYTES # BLD AUTO: 0 K/UL
IMM GRANULOCYTES NFR BLD AUTO: 0 %
LACTATE BLD-SCNC: 1.92 MMOL/L (ref 0.4–2)
LYMPHOCYTES # BLD: 0.7 K/UL (ref 0.8–3.5)
LYMPHOCYTES NFR BLD: 15 % (ref 12–49)
MAGNESIUM SERPL-MCNC: 2 MG/DL (ref 1.6–2.4)
MCH RBC QN AUTO: 30.3 PG (ref 26–34)
MCHC RBC AUTO-ENTMCNC: 34.3 G/DL (ref 30–36.5)
MCV RBC AUTO: 88.3 FL (ref 80–99)
MONOCYTES # BLD: 0.4 K/UL (ref 0–1)
MONOCYTES NFR BLD: 9 % (ref 5–13)
NEUTS SEG # BLD: 3.6 K/UL (ref 1.8–8)
NEUTS SEG NFR BLD: 74 % (ref 32–75)
NRBC # BLD: 0 K/UL (ref 0–0.01)
NRBC BLD-RTO: 0 PER 100 WBC
P-R INTERVAL, ECG05: 160 MS
PLATELET # BLD AUTO: 175 K/UL (ref 150–400)
PMV BLD AUTO: 10.2 FL (ref 8.9–12.9)
POTASSIUM SERPL-SCNC: 2.8 MMOL/L (ref 3.5–5.1)
PROT SERPL-MCNC: 8 G/DL (ref 6.4–8.2)
Q-T INTERVAL, ECG07: 404 MS
QRS DURATION, ECG06: 76 MS
QTC CALCULATION (BEZET), ECG08: 587 MS
RBC # BLD AUTO: 3.76 M/UL (ref 3.8–5.2)
RBC MORPH BLD: ABNORMAL
SARS-COV-2, COV2: DETECTED
SERVICE CMNT-IMP: NORMAL
SODIUM SERPL-SCNC: 135 MMOL/L (ref 136–145)
TROPONIN-HIGH SENSITIVITY: 1007 NG/L (ref 0–51)
TROPONIN-HIGH SENSITIVITY: 845 NG/L (ref 0–51)
VENTRICULAR RATE, ECG03: 127 BPM
WBC # BLD AUTO: 4.8 K/UL (ref 3.6–11)

## 2022-12-26 PROCEDURE — 65270000029 HC RM PRIVATE

## 2022-12-26 PROCEDURE — 85379 FIBRIN DEGRADATION QUANT: CPT

## 2022-12-26 PROCEDURE — 96366 THER/PROPH/DIAG IV INF ADDON: CPT

## 2022-12-26 PROCEDURE — 99285 EMERGENCY DEPT VISIT HI MDM: CPT

## 2022-12-26 PROCEDURE — 83735 ASSAY OF MAGNESIUM: CPT

## 2022-12-26 PROCEDURE — 83880 ASSAY OF NATRIURETIC PEPTIDE: CPT

## 2022-12-26 PROCEDURE — 85025 COMPLETE CBC W/AUTO DIFF WBC: CPT

## 2022-12-26 PROCEDURE — 96361 HYDRATE IV INFUSION ADD-ON: CPT

## 2022-12-26 PROCEDURE — 87040 BLOOD CULTURE FOR BACTERIA: CPT

## 2022-12-26 PROCEDURE — 74176 CT ABD & PELVIS W/O CONTRAST: CPT

## 2022-12-26 PROCEDURE — 74011250637 HC RX REV CODE- 250/637: Performed by: STUDENT IN AN ORGANIZED HEALTH CARE EDUCATION/TRAINING PROGRAM

## 2022-12-26 PROCEDURE — 74011250636 HC RX REV CODE- 250/636: Performed by: PHYSICIAN ASSISTANT

## 2022-12-26 PROCEDURE — 96375 TX/PRO/DX INJ NEW DRUG ADDON: CPT

## 2022-12-26 PROCEDURE — 74011250636 HC RX REV CODE- 250/636: Performed by: STUDENT IN AN ORGANIZED HEALTH CARE EDUCATION/TRAINING PROGRAM

## 2022-12-26 PROCEDURE — 96365 THER/PROPH/DIAG IV INF INIT: CPT

## 2022-12-26 PROCEDURE — 82962 GLUCOSE BLOOD TEST: CPT

## 2022-12-26 PROCEDURE — 36415 COLL VENOUS BLD VENIPUNCTURE: CPT

## 2022-12-26 PROCEDURE — 87636 SARSCOV2 & INF A&B AMP PRB: CPT

## 2022-12-26 PROCEDURE — A9540 TC99M MAA: HCPCS

## 2022-12-26 PROCEDURE — 74011000258 HC RX REV CODE- 258: Performed by: STUDENT IN AN ORGANIZED HEALTH CARE EDUCATION/TRAINING PROGRAM

## 2022-12-26 PROCEDURE — 74011000250 HC RX REV CODE- 250: Performed by: PHYSICIAN ASSISTANT

## 2022-12-26 PROCEDURE — 83605 ASSAY OF LACTIC ACID: CPT

## 2022-12-26 PROCEDURE — 71045 X-RAY EXAM CHEST 1 VIEW: CPT

## 2022-12-26 PROCEDURE — 84484 ASSAY OF TROPONIN QUANT: CPT

## 2022-12-26 PROCEDURE — 80053 COMPREHEN METABOLIC PANEL: CPT

## 2022-12-26 PROCEDURE — 74011000250 HC RX REV CODE- 250: Performed by: STUDENT IN AN ORGANIZED HEALTH CARE EDUCATION/TRAINING PROGRAM

## 2022-12-26 PROCEDURE — 74011250637 HC RX REV CODE- 250/637: Performed by: PHYSICIAN ASSISTANT

## 2022-12-26 RX ORDER — SODIUM CHLORIDE, SODIUM LACTATE, POTASSIUM CHLORIDE, CALCIUM CHLORIDE 600; 310; 30; 20 MG/100ML; MG/100ML; MG/100ML; MG/100ML
100 INJECTION, SOLUTION INTRAVENOUS CONTINUOUS
Status: DISCONTINUED | OUTPATIENT
Start: 2022-12-26 | End: 2022-12-28 | Stop reason: HOSPADM

## 2022-12-26 RX ORDER — SODIUM CHLORIDE 0.9 % (FLUSH) 0.9 %
5-40 SYRINGE (ML) INJECTION EVERY 8 HOURS
Status: DISCONTINUED | OUTPATIENT
Start: 2022-12-26 | End: 2022-12-28 | Stop reason: HOSPADM

## 2022-12-26 RX ORDER — GABAPENTIN 100 MG/1
100 CAPSULE ORAL 2 TIMES DAILY
Status: DISCONTINUED | OUTPATIENT
Start: 2022-12-26 | End: 2022-12-28 | Stop reason: HOSPADM

## 2022-12-26 RX ORDER — GUAIFENESIN/DEXTROMETHORPHAN 100-10MG/5
5 SYRUP ORAL
Status: DISCONTINUED | OUTPATIENT
Start: 2022-12-26 | End: 2022-12-28 | Stop reason: HOSPADM

## 2022-12-26 RX ORDER — OXYCODONE HYDROCHLORIDE 5 MG/1
10 TABLET ORAL
Status: DISCONTINUED | OUTPATIENT
Start: 2022-12-26 | End: 2022-12-28 | Stop reason: HOSPADM

## 2022-12-26 RX ORDER — SODIUM CHLORIDE 0.9 % (FLUSH) 0.9 %
5-10 SYRINGE (ML) INJECTION AS NEEDED
Status: DISCONTINUED | OUTPATIENT
Start: 2022-12-26 | End: 2022-12-28 | Stop reason: HOSPADM

## 2022-12-26 RX ORDER — KETOROLAC TROMETHAMINE 30 MG/ML
30 INJECTION, SOLUTION INTRAMUSCULAR; INTRAVENOUS ONCE
Status: COMPLETED | OUTPATIENT
Start: 2022-12-26 | End: 2022-12-26

## 2022-12-26 RX ORDER — SODIUM CHLORIDE 0.9 % (FLUSH) 0.9 %
5-40 SYRINGE (ML) INJECTION AS NEEDED
Status: DISCONTINUED | OUTPATIENT
Start: 2022-12-26 | End: 2022-12-28 | Stop reason: HOSPADM

## 2022-12-26 RX ORDER — ACETAMINOPHEN 325 MG/1
650 TABLET ORAL
Status: DISCONTINUED | OUTPATIENT
Start: 2022-12-26 | End: 2022-12-28 | Stop reason: HOSPADM

## 2022-12-26 RX ORDER — LEVOFLOXACIN 750 MG/1
750 TABLET ORAL
Status: DISCONTINUED | OUTPATIENT
Start: 2022-12-26 | End: 2022-12-26

## 2022-12-26 RX ORDER — ONDANSETRON 4 MG/1
4 TABLET, ORALLY DISINTEGRATING ORAL
Status: DISCONTINUED | OUTPATIENT
Start: 2022-12-26 | End: 2022-12-28 | Stop reason: HOSPADM

## 2022-12-26 RX ORDER — POLYETHYLENE GLYCOL 3350 17 G/17G
17 POWDER, FOR SOLUTION ORAL DAILY PRN
Status: DISCONTINUED | OUTPATIENT
Start: 2022-12-26 | End: 2022-12-28 | Stop reason: HOSPADM

## 2022-12-26 RX ORDER — ONDANSETRON 2 MG/ML
4 INJECTION INTRAMUSCULAR; INTRAVENOUS
Status: DISCONTINUED | OUTPATIENT
Start: 2022-12-26 | End: 2022-12-28 | Stop reason: HOSPADM

## 2022-12-26 RX ORDER — DOXYCYCLINE HYCLATE 100 MG
100 TABLET ORAL EVERY 12 HOURS
Status: DISCONTINUED | OUTPATIENT
Start: 2022-12-26 | End: 2022-12-28 | Stop reason: HOSPADM

## 2022-12-26 RX ORDER — POTASSIUM CHLORIDE 750 MG/1
40 TABLET, FILM COATED, EXTENDED RELEASE ORAL
Status: COMPLETED | OUTPATIENT
Start: 2022-12-26 | End: 2022-12-26

## 2022-12-26 RX ORDER — ROSUVASTATIN CALCIUM 10 MG/1
10 TABLET, COATED ORAL
Status: DISCONTINUED | OUTPATIENT
Start: 2022-12-26 | End: 2022-12-28 | Stop reason: HOSPADM

## 2022-12-26 RX ORDER — ACETAMINOPHEN 650 MG/1
650 SUPPOSITORY RECTAL
Status: DISCONTINUED | OUTPATIENT
Start: 2022-12-26 | End: 2022-12-28 | Stop reason: HOSPADM

## 2022-12-26 RX ADMIN — SODIUM CHLORIDE, PRESERVATIVE FREE 10 ML: 5 INJECTION INTRAVENOUS at 15:29

## 2022-12-26 RX ADMIN — DOXYCYCLINE HYCLATE 100 MG: 100 TABLET, COATED ORAL at 21:51

## 2022-12-26 RX ADMIN — VANCOMYCIN HYDROCHLORIDE 1750 MG: 1 INJECTION, POWDER, LYOPHILIZED, FOR SOLUTION INTRAVENOUS at 18:54

## 2022-12-26 RX ADMIN — GUAIFENESIN AND DEXTROMETHORPHAN 5 ML: 100; 10 SYRUP ORAL at 22:01

## 2022-12-26 RX ADMIN — SODIUM CHLORIDE, PRESERVATIVE FREE 10 ML: 5 INJECTION INTRAVENOUS at 21:51

## 2022-12-26 RX ADMIN — ROSUVASTATIN CALCIUM 10 MG: 10 TABLET, FILM COATED ORAL at 21:51

## 2022-12-26 RX ADMIN — SODIUM CHLORIDE, POTASSIUM CHLORIDE, SODIUM LACTATE AND CALCIUM CHLORIDE 100 ML/HR: 600; 310; 30; 20 INJECTION, SOLUTION INTRAVENOUS at 21:47

## 2022-12-26 RX ADMIN — CEFEPIME 2 G: 2 INJECTION, POWDER, FOR SOLUTION INTRAVENOUS at 22:58

## 2022-12-26 RX ADMIN — GABAPENTIN 100 MG: 100 CAPSULE ORAL at 21:51

## 2022-12-26 RX ADMIN — SODIUM CHLORIDE 1000 ML: 9 INJECTION, SOLUTION INTRAVENOUS at 14:22

## 2022-12-26 RX ADMIN — SODIUM CHLORIDE 1000 ML: 9 INJECTION, SOLUTION INTRAVENOUS at 12:39

## 2022-12-26 RX ADMIN — POTASSIUM CHLORIDE 40 MEQ: 750 TABLET, FILM COATED, EXTENDED RELEASE ORAL at 14:22

## 2022-12-26 RX ADMIN — KETOROLAC TROMETHAMINE 30 MG: 30 INJECTION, SOLUTION INTRAMUSCULAR; INTRAVENOUS at 12:39

## 2022-12-26 RX ADMIN — AZITHROMYCIN FOR INJECTION INJECTION, POWDER, LYOPHILIZED, FOR SOLUTION 500 MG: 500 INJECTION INTRAVENOUS at 15:49

## 2022-12-26 RX ADMIN — CEFTRIAXONE 2 G: 2 INJECTION, POWDER, FOR SOLUTION INTRAMUSCULAR; INTRAVENOUS at 15:29

## 2022-12-26 NOTE — ED TRIAGE NOTES
Pt reports left sided flank pain x1 day. Pt denies urinary frequency.  Pt also reports having breast cancer in left breast.

## 2022-12-26 NOTE — CONSULTS
Called regarding elevated troponin this patient. 57years old lady with the past medical history remarkable for breast CA on chemotherapy presents with left flank pain    Discussed case with FAZAL Cowan    I am told the patient is completely asymptomatic cardiac wise. She is COVID-positive. EKG with no acute ischemic changes    Troponin elevated    At this point if further increasing troponin of significance then transferred to [facility] for further evaluation. Obtain as soon as possible echocardiogram to rule out underlying cardiomyopathy-myocarditis-stress cardiomyopathy in this patient also is undergoing chemotherapy. If echocardiogram normal I would recommend transfer to [facility]    I will remain available if any question or issue should arise with this patient.

## 2022-12-26 NOTE — PROGRESS NOTES
JENA: follow-up     CM opened case for assessment of D/C planning needs, CM reviewed chart. Pt reports left sided flank pain x1 day. Pt denies urinary frequency. Pt also reports having breast cancer in left breast.    CM called office left VM to return call to patient and assist with appointment.     Makenzie Montana CM

## 2022-12-26 NOTE — H&P
Hospitalist Admission Note    NAME: Brandin Salmeron   :  1960   MRN:  [id number]   Room Number: GS21/77  @ [facility]     Date/Time:  2022 6:18 PM    Patient PCP: Pat Azul MD    Please note that this dictation was completed with [employer], the computer voice recognition software. Quite often unanticipated grammatical, syntax, homophones, and other interpretive errors are inadvertently transcribed by the computer software. Please disregard these errors. Please excuse any errors that have escaped final proofreading.  ______________________________________________________________________  Given the patient's current clinical presentation, I have a high level of concern for decompensation if discharged from the emergency department. Complex decision making was performed, which includes reviewing the patient's available past medical records, laboratory results, and x-ray films. My assessment of this patient's clinical condition and my plan of care is as follows. Assessment / Plan:  Anticipated discharge date : TBD  Anticipated disposition :   Barriers to discharge : Medical stability        Active Problems:    Pneumonia (2022)      CHRISTA (acute kidney injury) ([facility] [address] (2022)      COVID-19 (2022)        #Multifocal pneumonia in the setting of immunocompromise patient due to chemotherapy POA  #COVID-19 infection POA  #Sepsis due to above POA  -Chest x-ray reviewed dependently bilateral airspace disease.   -COVID-19 PCR positive  -On admission heart rate 136 respiratory rate 27  -Lactic acid within normal limit  -Received sepsis protocol fluid in the ER        -Chowdhury cefepime and Doxy to cover healthcare associated pneumonia given patient receives regular chemotherapy  -Sputum culture and blood culture strep pneumo antigen Legionella  -MRSA nares  -IVF    #Acute kidney injury POA  -Serum creatinine 2.91 baseline 0.68  -Suspect prerenal in the setting of sepsis  -CT abdomen pelvis no obstruction    -Continue IVF  -Trend creatinine    #Elevated troponin  #Type II troponin elevation  --Troponin 1007 > 845  -EKG reviewed independentlyNo acute ischemic process nonspecific T wave changes  -Chart reviewed by cardiologist on-call given troponin is going down,# does not recommend transfer at this point      -Telemetry  -Check BNP  -Check TTE  -Trend troponin  -Cardiology consulted    #Elevated D-dimer  -Cardio multifactorial due to underlying 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  -VQ scan pending    #Left flank pain  -? Due to UTI  -Check UA      #Peripheral neuropathy in the setting of chemotherapy  -Continue gabapentin    #Prolonged QTC POA  -EKG reviewed apparently QTC greater than 500  -Keep mag above 2 potassium above 4  -Hold QT prolonging medication      #Hypokalemia  -Replete    #Invasive ductal carcinoma POA  -Received chemotherapy  -Last chemo December 21, 2022    CRITICAL CARE WAS PERFORMED FOR THIS ENCOUNTER: YES. I had a face to face encounter with the patient, reviewed and interpreted patient data including clinical events, labs, images, vital signs, I/O's, and examined patient. I have discussed the case and the plan and management of the patient's care with the consulting services, the bedside nurses and necessary ancillary providers. This patient has a high probability of imminent, clinically significant deterioration, which requires the highest level of preparedness to intervene urgently. I participated in the decision-making and personally managed or directed the management of the following life and organ supporting interventions that required my frequent assessment to treat or prevent imminent deterioration. I personally spent 45  minutes of critical care time. This is time spent at this critically ill patient's bedside actively involved in patient care as well as the coordination of care and discussions with the patient's family.   This does not include any procedural time which has been billed separately. IMPENDING DETERIORATION - Respiratory Cardiovascular   ASSOCIATED RISK FACTORS -Hypoxia, Dysrhythmia, Metabolic changes, Dehydration, and Vascular Compromise  MANAGEMENT- Bedside Assessment and Supervision of Care  INTERPRETATION -  Xrays, CT Scan, ECG, Blood Pressure, and Cardiac Output Measures   INTERVENTIONS - hemodynamic mngmt and Metobolic interventions  CASE REVIEW - ER/ Consulting services  Nursing, and Family  TREATMENT RESPONSE -Improved  PERFORMED BY - Self      Body mass index is 28.12 kg/m². Code Status: Full  Surrogate Decision Maker:    DVT Prophylaxis: Hep SQ  GI Prophylaxis: not indicated        Presenting complaint left flank pain      HISTORY OF PRESENT ILLNESS:     Brandin Salmeron is a 57 y.o.  female with PMH of above-mentioned problem who presents to ED with c/o left flank pain the past couple of days. Patient denied any fever or chills difficulty with urination he denied any burning sensation in urination. Not any white pus in urine or blood. Patient stated that she tested positive for COVID 3 days ago and has been having diarrhea since then. Patient is also active receiving chemotherapy last chemotherapy was in December 25, 2022 for left breast cancer. Denies alcohol use tobacco use recreational drug use    We were asked to admit for work up and evaluation of the above problems.      Past Medical History:   Diagnosis Date    Cancer [facility])     BREAST CANCER    GERD (gastroesophageal reflux disease)     HTN (hypertension) 07/18/2011    Psychiatric disorder     ANIXETY AND DEPRESSION    Thyroid disease     HYPOTHYROID        Past Surgical History:   Procedure Laterality Date    HX COLONOSCOPY      HX MYOMECTOMY  05/02/1996    x 1    HX WISDOM TEETH EXTRACTION         Social History     Tobacco Use    Smoking status: Never     Passive exposure: Never    Smokeless tobacco: Never   Substance Use Topics    Alcohol use: Never Family History   Problem Relation Age of Onset    Heart Failure Mother     Cancer Father     Diabetes Sister     Kidney Disease Sister     Anesth Problems Neg Hx      Allergies   Allergen Reactions    Sulfa (Sulfonamide Antibiotics) Itching, Swelling and Unknown (comments)        Prior to Admission medications    Medication Sig Start Date End Date Taking? Authorizing Provider   carvediloL (COREG) 6.25 mg tablet  11/3/22  Yes Provider, Historical   famotidine (PEPCID) 40 mg/5 mL (8 mg/mL) suspension Take 2.5 mL by mouth two (2) times a day. 11/30/22  Yes Nicholas Stephens NP   losartan (COZAAR) 25 mg tablet Take 25 mg by mouth daily. Yes Provider, Historical   PARoxetine (PAXIL) 20 mg tablet paroxetine 20 mg tablet   Take 1 tablet every day by oral route. Yes Provider, Historical   rosuvastatin (CRESTOR) 10 mg tablet Take 10 mg by mouth nightly. 8/15/22  Yes Provider, Historical   conjugated estrogens (Premarin) 0.625 mg/gram vaginal cream Premarin 0.625 mg/gram vaginal cream   Insert 0.5 applicatorsful twice a week by vaginal route. Provider, Historical   magic mouthwash solution Magic mouth wash Maalox Lidocaine 2% viscous Diphenhydramine oral solution Pharmacy to mix equal portions of ingredients to a total volume as indicated in the dispense amount. Swish & swallow 5mL (1 tsp) 4 times daily as needed for mouth sores 11/30/22   Nicholas Stephens NP   gabapentin (NEURONTIN) 100 mg capsule Take 1 Capsule by mouth three (3) times daily.  Max Daily Amount: 300 mg. 11/30/22   Nicholas Stephens NP   dexAMETHasone (DECADRON) 4 mg tablet Take 2 tabs (8mg) once daily on days 2 & 3 of chemotherapy 9/28/22   Nicholas Stephens NP   ondansetron (ZOFRAN ODT) 8 mg disintegrating tablet Take 1 Tablet by mouth every eight (8) hours as needed for Nausea or Vomiting. 9/28/22   Nicholas Stephens NP   prochlorperazine (Compazine) 5 mg tablet Take 1 Tablet by mouth every six (6) hours as needed for Nausea or Vomiting. 9/28/22   Nicholas Stephens NP lidocaine-prilocaine (EMLA) topical cream Apply  to affected area as needed for Pain. Apply a thick layer over port a cath 30-60 minutes prior to port access 9/28/22   Nicki Kennedy, STANLEY   multivitamin, tx-iron-ca-min (THERA-M w/ IRON) 9 mg iron-400 mcg tab tablet Take 1 Tablet by mouth daily. Provider, Historical       REVIEW OF SYSTEMS:     I am not able to complete the review of systems because:    The patient is intubated and sedated    The patient has altered mental status due to his acute medical problems    The patient has baseline aphasia from prior stroke(s)    The patient has baseline dementia and is not reliable historian    The patient is in acute medical distress and unable to provide information           Total of 12 systems reviewed as follows:       POSITIVE= underlined text  Negative = text not underlined  General:  fever, chills, sweats, generalized weakness, weight loss/gain,      loss of appetite   Eyes:    blurred vision, eye pain, loss of vision, double vision  ENT:    rhinorrhea, pharyngitis   Respiratory:   cough, sputum production, SOB, MCCALL, wheezing, pleuritic pain   Cardiology:   chest pain, palpitations, orthopnea, PND, edema, syncope   Gastrointestinal:  abdominal pain , N/V, diarrhea, dysphagia, constipation, bleeding   Genitourinary:  frequency, urgency, dysuria, hematuria, incontinence   Muskuloskeletal :  arthralgia, myalgia, back pain left flank pain  Hematology:  easy bruising, nose or gum bleeding, lymphadenopathy   Dermatological: rash, ulceration, pruritis, color change / jaundice  Endocrine:   hot flashes or polydipsia   Neurological:  headache, dizziness, confusion, focal weakness, paresthesia,     Speech difficulties, memory loss, gait difficulty  Psychological: Feelings of anxiety, depression, agitation    Objective:   VITALS:    Visit Vitals  /78   Pulse 97   Temp 98.3 °F (36.8 °C)   Resp 21   Ht 5' (1.524 m)   Wt 65.3 kg (144 lb)   SpO2 99%   BMI 28.12 kg/m² PHYSICAL EXAM:    General:    Alert, cooperative, no distress, appears stated age. HEENT: Atraumatic, anicteric sclerae, pink conjunctivae     No oral ulcers, mucosa moist, throat clear, dentition fair  Neck:  Supple, symmetrical,  thyroid: non tender  Lungs:   Clear to auscultation bilaterally. No Wheezing or Rhonchi. No rales. Chest wall:  No tenderness  No Accessory muscle use. Heart:   Regular  rhythm,  No  murmur   No edema  Abdomen:   Soft, non-tender. Not distended. Bowel sounds normal.  Left flank tender to palpate  Extremities: No cyanosis. No clubbing,      Skin turgor normal, Capillary refill normal, Radial dial pulse 2+  Skin:     Not pale. Not Jaundiced  No rashes   Psych:  Good insight. Not depressed. Not anxious or agitated. Neurologic: EOMs intact. No facial asymmetry. No aphasia or slurred speech. Symmetrical strength, Sensation grossly intact. Alert and oriented X 4.     ______________________________________________________________________    Care Plan discussed with:  Patient/Family    Expected  Disposition:  Home w/Family  ________________________________________________________________________  TOTAL TIME:  54 Minutes    Critical Care Provided     Minutes non procedure based      Comments    x Reviewed previous records   >50% of visit spent in counseling and coordination of care x Discussion with patient and/or family and questions answered       ________________________________________________________________________  Signed: Kaylah Lemons MD    Procedures: see electronic medical records for all procedures/Xrays and details which were not copied into this note but were reviewed prior to creation of Plan.     LAB DATA REVIEWED:    Recent Results (from the past 24 hour(s))   EKG, 12 LEAD, INITIAL    Collection Time: 12/26/22 12:36 PM   Result Value Ref Range    Ventricular Rate 127 BPM    Atrial Rate 127 BPM    P-R Interval 160 ms    QRS Duration 76 ms    Q-T Interval 404 ms QTC Calculation (Bezet) 587 ms    Calculated P Axis 70 degrees    Calculated R Axis -16 degrees    Calculated T Axis 98 degrees    Diagnosis       Sinus tachycardia  Left ventricular hypertrophy with repolarization abnormality  Abnormal ECG  When compared with ECG of 26-SEP-2022 16:04,  Vent. rate has increased BY  61 BPM  Nonspecific T wave abnormality no longer evident in Inferior leads  Nonspecific T wave abnormality, improved in Anterior leads  Inverted T waves have replaced nonspecific T wave abnormality in Lateral   leads     CBC WITH AUTOMATED DIFF    Collection Time: 12/26/22 12:41 PM   Result Value Ref Range    WBC 4.8 3.6 - 11.0 K/uL    RBC 3.76 (L) 3.80 - 5.20 M/uL    HGB 11.4 (L) 11.5 - 16.0 g/dL    HCT 33.2 (L) 35.0 - 47.0 %    MCV 88.3 80.0 - 99.0 FL    MCH 30.3 26.0 - 34.0 PG    MCHC 34.3 30.0 - 36.5 g/dL    RDW 16.2 (H) 11.5 - 14.5 %    PLATELET 306 147 - 224 K/uL    MPV 10.2 8.9 - 12.9 FL    NRBC 0.0 0  WBC    ABSOLUTE NRBC 0.00 0.00 - 0.01 K/uL    NEUTROPHILS 74 32 - 75 %    LYMPHOCYTES 15 12 - 49 %    MONOCYTES 9 5 - 13 %    EOSINOPHILS 0 0 - 7 %    BASOPHILS 2 (H) 0 - 1 %    IMMATURE GRANULOCYTES 0 %    ABS. NEUTROPHILS 3.6 1.8 - 8.0 K/UL    ABS. LYMPHOCYTES 0.7 (L) 0.8 - 3.5 K/UL    ABS. MONOCYTES 0.4 0.0 - 1.0 K/UL    ABS. EOSINOPHILS 0.0 0.0 - 0.4 K/UL    ABS. BASOPHILS 0.1 0.0 - 0.1 K/UL    ABS. IMM.  GRANS. 0.0 K/UL    DF SMEAR SCANNED      RBC COMMENTS ANISOCYTOSIS  2+       METABOLIC PANEL, COMPREHENSIVE    Collection Time: 12/26/22 12:41 PM   Result Value Ref Range    Sodium 135 (L) 136 - 145 mmol/L    Potassium 2.8 (L) 3.5 - 5.1 mmol/L    Chloride 96 (L) 97 - 108 mmol/L    CO2 23 21 - 32 mmol/L    Anion gap 16 (H) 5 - 15 mmol/L    Glucose 186 (H) 65 - 100 mg/dL    BUN 29 (H) 6 - 20 MG/DL    Creatinine 2.91 (H) 0.55 - 1.02 MG/DL    BUN/Creatinine ratio 10 (L) 12 - 20      eGFR 17 (L) >60 ml/min/1.73m2    Calcium 9.0 8.5 - 10.1 MG/DL    Bilirubin, total 0.9 0.2 - 1.0 MG/DL    ALT (SGPT) 111 (H) 12 - 78 U/L    AST (SGOT) 173 (H) 15 - 37 U/L    Alk.  phosphatase 83 45 - 117 U/L    Protein, total 8.0 6.4 - 8.2 g/dL    Albumin 2.7 (L) 3.5 - 5.0 g/dL    Globulin 5.3 (H) 2.0 - 4.0 g/dL    A-G Ratio 0.5 (L) 1.1 - 2.2     D DIMER    Collection Time: 12/26/22 12:41 PM   Result Value Ref Range    D-dimer 4.87 (H) 0.00 - 0.65 mg/L FEU   POC LACTIC ACID    Collection Time: 12/26/22  2:37 PM   Result Value Ref Range    Lactic Acid (POC) 1.92 0.40 - 2.00 mmol/L   GLUCOSE, POC    Collection Time: 12/26/22  2:38 PM   Result Value Ref Range    Glucose (POC) 110 65 - 117 mg/dL    Performed by Bambi Plascencia RN    TROPONIN-HIGH SENSITIVITY    Collection Time: 12/26/22  2:41 PM   Result Value Ref Range    Troponin-High Sensitivity 1,007 (HH) 0 - 51 ng/L   COVID-19 WITH INFLUENZA A/B    Collection Time: 12/26/22  3:51 PM   Result Value Ref Range    SARS-CoV-2 by PCR Detected (A) NOTD      Influenza A by PCR Not detected      Influenza B by PCR Not detected     TROPONIN-HIGH SENSITIVITY    Collection Time: 12/26/22  5:19 PM   Result Value Ref Range    Troponin-High Sensitivity 845 (HH) 0 - 51 ng/L

## 2022-12-26 NOTE — PROGRESS NOTES
Pharmacy Note - Cefepime    2000 mg IVPB q 24 h (4 hr infusion) ordered for treatment of Pneumonia (HAP). Per [address] Renal / Extended Infusion B Lactam Policy, Cefepime will be changed to 2000 mg IVPB x 1 over 30 min to be followed by 1000 mg IVPB q 12 h (4 hr infusion). Estimated Creatinine Clearance: Estimated Creatinine Clearance: 16.5 mL/min (A) (based on SCr of 2.91 mg/dL (H)). Dialysis Status, CHRISTA, CKD: CHRISTA    BMI:  Body mass index is 28.12 kg/m². Recent Labs     22  1241   WBC 4.8     Temp (24hrs), Av.3 °F (36.8 °C), Min:98.3 °F (36.8 °C), Max:98.3 °F (36.8 °C)      Rationale for Adjustment:  Renal dosing is because drug is renally eliminated. CrCl 16.5 ml/min / Extended infusion dosing strategy aims to enhance microbiology and clinical efficacy. Pharmacy will continue to monitor and adjust dose as necessary. Please call Inpatient Pharmacy with any questions.     Thank you,  Stefan Sun MS R.PH

## 2022-12-26 NOTE — ED NOTES
TRANSFER - OUT REPORT:    Verbal report given to Chago Dee RN (name) on Everardo Eduardo  being transferred to PCU 2 (unit) for routine progression of care       Report consisted of patients Situation, Background, Assessment and   Recommendations(SBAR). Information from the following report(s) SBAR, Kardex, ED Summary, Procedure Summary, MAR and Recent Results was reviewed with the receiving nurse. Lines:   Venous Access Device 10/12/22 (Active)       Peripheral IV 12/26/22 Left Antecubital (Active)   Site Assessment Clean, dry, & intact 12/26/22 1241   Phlebitis Assessment 0 12/26/22 1241   Infiltration Assessment 0 12/26/22 1241   Dressing Status Clean, dry, & intact 12/26/22 1241   Dressing Type Transparent 12/26/22 1241   Hub Color/Line Status Pink;Patent; Flushed 12/26/22 1241   Action Taken Blood drawn 12/26/22 1241        Opportunity for questions and clarification was provided.       Patient transported with:   Monitor  Registered Nurse

## 2022-12-26 NOTE — PROGRESS NOTES
TRANSFER - IN REPORT:    Verbal report received from Tova Davidson RN (name) on Rachna Felipe  being received from ED (unit) for routine progression of care      Report consisted of patients Situation, Background, Assessment and   Recommendations(SBAR). Information from the following report(s) SBAR, Kardex, ED Summary, Intake/Output, MAR, Recent Results, and Cardiac Rhythm ST  was reviewed with the receiving nurse. Opportunity for questions and clarification was provided. Assessment completed upon patients arrival to unit and care assumed.

## 2022-12-26 NOTE — PROGRESS NOTES
Code sepsis was called on Nicole Multani    Sepsis present due to SIRS at least 2/4 HR >90 and RR>20  Sepsis Source  Pneumonia  Lactic Acid Greater > 2, Repeat Lactic Acid ordered within 4 hrs NO  Severe? No  Shock present? No  Sepsis OrderSet Used?  Yes     Sepsis Re-Assessment Documentation:     Date: 12/26/2022   Time: 6:33 PM    The sepsis reassessment was performed at 35 time

## 2022-12-26 NOTE — CONSULTS
- Troponin 1007, discussed with ER provider that in order to admit to [facility] medical floor,  recommend to have repeat troponin to make sure is not trending up and a BNP.   Cardiology curbside would be appreciated      Shahnaz Blount MD

## 2022-12-26 NOTE — ED PROVIDER NOTES
EMERGENCY DEPARTMENT HISTORY AND PHYSICAL EXAM      Date: 12/26/2022  Patient Name: Nicole Multani    History of Presenting Illness     Chief Complaint   Patient presents with    Flank Pain       History Provided By: Patient    Additional History (Context): Nicole Multani is a 57 y.o. female with hypertension, GERD, hypothyroidism, breast CVA, anxiety, depression who presents with left flank pain that started yesterday. Patient denies any injury or trauma, denies any urinary frequency or dysuria, no known fevers or chills, no cough, no chest pain or shortness of breath. Pt rates pain 9/10. She has not taking anything for symptoms PTA.     PCP: Jerica Muniz MD    Current Facility-Administered Medications   Medication Dose Route Frequency Provider Last Rate Last Admin    sodium chloride (NS) flush 5-10 mL  5-10 mL IntraVENous PRN Kulwinder Cowan PA-C   10 mL at 12/26/22 1529    sodium chloride (NS) flush 5-40 mL  5-40 mL IntraVENous Q8H Teddy Morrell MD        sodium chloride (NS) flush 5-40 mL  5-40 mL IntraVENous PRN Rosa Naidu MD        acetaminophen (TYLENOL) tablet 650 mg  650 mg Oral Q6H PRN oRsa Naidu MD        Or    acetaminophen (TYLENOL) suppository 650 mg  650 mg Rectal Q6H PRN Rosa Naidu MD        polyethylene glycol (MIRALAX) packet 17 g  17 g Oral DAILY PRN Rosa Naidu MD        [Held by provider] ondansetron (ZOFRAN ODT) tablet 4 mg  4 mg Oral Q8H PRN Rosa Naidu MD        Or    [Held by provider] ondansetron (ZOFRAN) injection 4 mg  4 mg IntraVENous Q6H PRN Rosa Naidu MD        rosuvastatin (CRESTOR) tablet 10 mg  10 mg Oral QHS Rosa Naidu MD        gabapentin (NEURONTIN) capsule 100 mg  100 mg Oral BID Rosa Naidu MD        lactated Ringers infusion  100 mL/hr IntraVENous CONTINUOUS Rosa Naidu MD        oxyCODONE IR (ROXICODONE) tablet 10 mg  10 mg Oral Q4H PRN Rosa Naidu MD        doxycycline (VIBRA-TABS) tablet 100 mg  100 mg Oral Q12H Toña Montoya MD        cefepime (MAXIPIME) 2 g in 0.9% sodium chloride (MBP/ADV) 100 mL MBP  2 g IntraVENous NOW Toña Montoya MD        Followed by    Renay Flowers ON 12/27/2022] cefepime (MAXIPIME) 1 g in 0.9% sodium chloride (MBP/ADV) 50 mL MBP  1 g IntraVENous Q12H Toña Montoya MD        vancomycin (VANCOCIN) 1,750 mg in 0.9% sodium chloride 500 mL IVPB  1,750 mg IntraVENous ONCE Toña Montoya MD         Current Outpatient Medications   Medication Sig Dispense Refill    carvediloL (COREG) 6.25 mg tablet       famotidine (PEPCID) 40 mg/5 mL (8 mg/mL) suspension Take 2.5 mL by mouth two (2) times a day. 50 mL 3    losartan (COZAAR) 25 mg tablet Take 25 mg by mouth daily. PARoxetine (PAXIL) 20 mg tablet paroxetine 20 mg tablet   Take 1 tablet every day by oral route. rosuvastatin (CRESTOR) 10 mg tablet Take 10 mg by mouth nightly. conjugated estrogens (Premarin) 0.625 mg/gram vaginal cream Premarin 0.625 mg/gram vaginal cream   Insert 0.5 applicatorsful twice a week by vaginal route.      magic mouthwash solution Magic mouth wash Maalox Lidocaine 2% viscous Diphenhydramine oral solution Pharmacy to mix equal portions of ingredients to a total volume as indicated in the dispense amount. Swish & swallow 5mL (1 tsp) 4 times daily as needed for mouth sores 480 mL 3    gabapentin (NEURONTIN) 100 mg capsule Take 1 Capsule by mouth three (3) times daily. Max Daily Amount: 300 mg. 90 Capsule 3    dexAMETHasone (DECADRON) 4 mg tablet Take 2 tabs (8mg) once daily on days 2 & 3 of chemotherapy 30 Tablet 0    ondansetron (ZOFRAN ODT) 8 mg disintegrating tablet Take 1 Tablet by mouth every eight (8) hours as needed for Nausea or Vomiting. 30 Tablet 3    prochlorperazine (Compazine) 5 mg tablet Take 1 Tablet by mouth every six (6) hours as needed for Nausea or Vomiting. 30 Tablet 3    lidocaine-prilocaine (EMLA) topical cream Apply  to affected area as needed for Pain.  Apply a thick layer over port a cath 30-60 minutes prior to port access 30 g 3    multivitamin, tx-iron-ca-min (THERA-M w/ IRON) 9 mg iron-400 mcg tab tablet Take 1 Tablet by mouth daily. Past History     Past Medical History:  Past Medical History:   Diagnosis Date    Cancer ([address]     BREAST CANCER    GERD (gastroesophageal reflux disease)     HTN (hypertension) 07/18/2011    Psychiatric disorder     ANIXETY AND DEPRESSION    Thyroid disease     HYPOTHYROID       Past Surgical History:  Past Surgical History:   Procedure Laterality Date    HX COLONOSCOPY      HX MYOMECTOMY  05/02/1996    x 1    HX WISDOM TEETH EXTRACTION         Family History:  Family History   Problem Relation Age of Onset    Heart Failure Mother     Cancer Father     Diabetes Sister     Kidney Disease Sister     Anesth Problems Neg Hx        Social History:  Social History     Tobacco Use    Smoking status: Never     Passive exposure: Never    Smokeless tobacco: Never   Vaping Use    Vaping Use: Never used   Substance Use Topics    Alcohol use: Never    Drug use: Not Currently     Types: Marijuana     Comment: IN HIGH SCHOOL ONLY       Allergies: Allergies   Allergen Reactions    Sulfa (Sulfonamide Antibiotics) Itching, Swelling and Unknown (comments)         Review of Systems   Review of Systems   Constitutional:  Negative for chills and fever. Respiratory:  Negative for cough and shortness of breath. Cardiovascular:  Negative for chest pain. Gastrointestinal:  Negative for abdominal pain and vomiting. Genitourinary:  Positive for flank pain. Allergic/Immunologic: Positive for immunocompromised state. Neurological:  Negative for weakness. Physical Exam     Vitals:    12/26/22 1259 12/26/22 1313 12/26/22 1445 12/26/22 1448   BP: (!) 142/76  120/78    Pulse: (!) 116 (!) 110 (!) 101 97   Resp: 27 24 28 21   Temp:       SpO2: 97% 95% 98% 99%   Weight:       Height:         Physical Exam  Vitals and nursing note reviewed.    Constitutional:       General: She is in acute distress (pt unable to speak in complete sentences secondary to pain). Appearance: She is well-developed. HENT:      Head: Normocephalic and atraumatic. Eyes:      Conjunctiva/sclera: Conjunctivae normal.   Cardiovascular:      Rate and Rhythm: Regular rhythm. Tachycardia present. Heart sounds: Normal heart sounds. Pulmonary:      Effort: Pulmonary effort is normal. Tachypnea present. No respiratory distress. Breath sounds: Normal breath sounds. No wheezing or rales. Abdominal:      General: Bowel sounds are normal.      Palpations: Abdomen is soft. Tenderness: There is left CVA tenderness. Skin:     General: Skin is warm and dry. Neurological:      Mental Status: She is alert and oriented to person, place, and time. Psychiatric:         Behavior: Behavior normal.         Thought Content: Thought content normal.         Judgment: Judgment normal.         Diagnostic Study Results     Labs -     Recent Results (from the past 12 hour(s))   EKG, 12 LEAD, INITIAL    Collection Time: 12/26/22 12:36 PM   Result Value Ref Range    Ventricular Rate 127 BPM    Atrial Rate 127 BPM    P-R Interval 160 ms    QRS Duration 76 ms    Q-T Interval 404 ms    QTC Calculation (Bezet) 587 ms    Calculated P Axis 70 degrees    Calculated R Axis -16 degrees    Calculated T Axis 98 degrees    Diagnosis       Sinus tachycardia  Left ventricular hypertrophy with repolarization abnormality  Abnormal ECG  When compared with ECG of 26-SEP-2022 16:04,  Vent.  rate has increased BY  61 BPM  Nonspecific T wave abnormality no longer evident in Inferior leads  Nonspecific T wave abnormality, improved in Anterior leads  Inverted T waves have replaced nonspecific T wave abnormality in Lateral   leads     CBC WITH AUTOMATED DIFF    Collection Time: 12/26/22 12:41 PM   Result Value Ref Range    WBC 4.8 3.6 - 11.0 K/uL    RBC 3.76 (L) 3.80 - 5.20 M/uL    HGB 11.4 (L) 11.5 - 16.0 g/dL    HCT 33.2 (L) 35.0 - 47.0 %    MCV 88.3 80.0 - 99.0 FL    MCH 30.3 26.0 - 34.0 PG    MCHC 34.3 30.0 - 36.5 g/dL    RDW 16.2 (H) 11.5 - 14.5 %    PLATELET 727 604 - 645 K/uL    MPV 10.2 8.9 - 12.9 FL    NRBC 0.0 0  WBC    ABSOLUTE NRBC 0.00 0.00 - 0.01 K/uL    NEUTROPHILS 74 32 - 75 %    LYMPHOCYTES 15 12 - 49 %    MONOCYTES 9 5 - 13 %    EOSINOPHILS 0 0 - 7 %    BASOPHILS 2 (H) 0 - 1 %    IMMATURE GRANULOCYTES 0 %    ABS. NEUTROPHILS 3.6 1.8 - 8.0 K/UL    ABS. LYMPHOCYTES 0.7 (L) 0.8 - 3.5 K/UL    ABS. MONOCYTES 0.4 0.0 - 1.0 K/UL    ABS. EOSINOPHILS 0.0 0.0 - 0.4 K/UL    ABS. BASOPHILS 0.1 0.0 - 0.1 K/UL    ABS. IMM. GRANS. 0.0 K/UL    DF SMEAR SCANNED      RBC COMMENTS ANISOCYTOSIS  2+       METABOLIC PANEL, COMPREHENSIVE    Collection Time: 12/26/22 12:41 PM   Result Value Ref Range    Sodium 135 (L) 136 - 145 mmol/L    Potassium 2.8 (L) 3.5 - 5.1 mmol/L    Chloride 96 (L) 97 - 108 mmol/L    CO2 23 21 - 32 mmol/L    Anion gap 16 (H) 5 - 15 mmol/L    Glucose 186 (H) 65 - 100 mg/dL    BUN 29 (H) 6 - 20 MG/DL    Creatinine 2.91 (H) 0.55 - 1.02 MG/DL    BUN/Creatinine ratio 10 (L) 12 - 20      eGFR 17 (L) >60 ml/min/1.73m2    Calcium 9.0 8.5 - 10.1 MG/DL    Bilirubin, total 0.9 0.2 - 1.0 MG/DL    ALT (SGPT) 111 (H) 12 - 78 U/L    AST (SGOT) 173 (H) 15 - 37 U/L    Alk.  phosphatase 83 45 - 117 U/L    Protein, total 8.0 6.4 - 8.2 g/dL    Albumin 2.7 (L) 3.5 - 5.0 g/dL    Globulin 5.3 (H) 2.0 - 4.0 g/dL    A-G Ratio 0.5 (L) 1.1 - 2.2     D DIMER    Collection Time: 12/26/22 12:41 PM   Result Value Ref Range    D-dimer 4.87 (H) 0.00 - 0.65 mg/L FEU   MAGNESIUM    Collection Time: 12/26/22 12:41 PM   Result Value Ref Range    Magnesium 2.0 1.6 - 2.4 mg/dL   POC LACTIC ACID    Collection Time: 12/26/22  2:37 PM   Result Value Ref Range    Lactic Acid (POC) 1.92 0.40 - 2.00 mmol/L   GLUCOSE, POC    Collection Time: 12/26/22  2:38 PM   Result Value Ref Range    Glucose (POC) 110 65 - 117 mg/dL    Performed by Valdemar Garnica RN    TROPONIN-HIGH SENSITIVITY    Collection Time: 12/26/22  2:41 PM   Result Value Ref Range    Troponin-High Sensitivity 1,007 (HH) 0 - 51 ng/L   COVID-19 WITH INFLUENZA A/B    Collection Time: 12/26/22  3:51 PM   Result Value Ref Range    SARS-CoV-2 by PCR Detected (A) NOTD      Influenza A by PCR Not detected      Influenza B by PCR Not detected     TROPONIN-HIGH SENSITIVITY    Collection Time: 12/26/22  5:19 PM   Result Value Ref Range    Troponin-High Sensitivity 845 (HH) 0 - 51 ng/L       Radiologic Studies -   XR CHEST PORT   Final Result   Bilateral airspace disease as above. Follow-up to complete resolution in 6-8   weeks recommended. CT ABD PELV WO CONT   Final Result   1. No renal calculi or evidence of obstructive uropathy. 2. Bibasilar multifocal airspace disease. NM LUNG SCAN PERF    (Results Pending)   DUPLEX LOWER EXT VENOUS BILAT    (Results Pending)     CT Results  (Last 48 hours)                 12/26/22 1408  CT ABD PELV WO CONT Final result    Impression:  1. No renal calculi or evidence of obstructive uropathy. 2. Bibasilar multifocal airspace disease. Narrative:  INDICATION: Left flank pain. Exam: Noncontrast CT of the abdomen and pelvis is performed with 5 mm   collimation. Sagittal and coronal reformatted images were also performed. CT dose reduction was achieved through the use of a standardized protocol   tailored for this examination and automatic exposure control for dose   modulation. There is no prior study for direct comparison. FINDINGS:       There is bibasilar patchy multifocal airspace disease. Abdomen:        Liver: The liver is normal on noncontrast images. Spleen: The spleen is normal on noncontrast images. Adrenals: The adrenals are normal on noncontrast images. Pancreas: The pancreas is normal on noncontrast images. Gallbladder: The gallbladder is normal on noncontrast images. Kidneys:  There is no perinephric stranding, hydronephrosis or hydroureter. No   renal, ureteral bladder calculus is visualized. Bowel: No thickened or dilated loop of large or small bowel seen. Appendix: The appendix is normal.       Pelvis: Urinary bladder is partially filled and grossly normal.       Miscellaneous: There is no free intraperitoneal gas or fluid. There is no focal   fluid collection to suggest abscess. Calcified and noncalcified fibroids are   noted within the uterus. CXR Results  (Last 48 hours)                 12/26/22 1431  XR CHEST PORT Final result    Impression:  Bilateral airspace disease as above. Follow-up to complete resolution in 6-8   weeks recommended. Narrative:  INDICATION: L flank pain, evaluate for PNA       EXAM:  AP CHEST RADIOGRAPH       COMPARISON: 9/27/2022       FINDINGS:       AP portable view of the chest demonstrates unchanged right IJ Port-A-Cath. Airspace disease in the right midlung and bilateral lower lung zones. Slight   elevation left hemidiaphragm. No pneumothorax. The osseous structures are   unremarkable. Medical Decision Making   I am the first provider for this patient. I reviewed the vital signs, available nursing notes, past medical history, past surgical history, family history and social history. Vital Signs-Reviewed the patient's vital signs. Cardiac Monitor:  Rate: 136  Rhythm:sinus tach    Records Reviewed: Nursing Notes and Old Medical Records    Provider Notes (Medical Decision Making):   Patient presents to the ED with left flank pain that started yesterday rating it 9 out of 10. On exam patient appears uncomfortable and speaking in short phrases. She is tachycardic on exam and favoring her left side. She has pain to palpation of the left flank area, slightly hypertensive but O2 sats WNL.   Patient presentation is worrisome for pneumonia versus PE however also considering nephrolithiasis, pyelonephritis, electrolyte imbalance, dehydration. Will get basic labs, chest x-ray, D-dimer to determine PE study versus dry scan for nephrolithiasis. ED Course:   ED Course as of 12/26/22 1832   Mon Dec 26, 2022   1242 EKG, 12 LEAD, INITIAL  ED EKG interpretation:  Rhythm: sinus tachycardia; and regular . Rate (approx.): 127; Axis: normal; P wave: normal; QRS interval: normal ; ST/T wave: normal.  This EKG was interpreted by ED attending, Dr Mihaela Hartley and Adriano Blake PA-C,ED Provider.     Clarejessi Calderon   7011 Discussed case with ED attending, Dr. Mihaela Hartley who advises to get dry CT scan of abdomen pelvis as PE is less likely. Patient does have new onset ARF and with a elevated creatinine so would be unable to get a CTA for PE. We will need to get a VQ scan since D-dimer is also elevated although low likelihood of PE [AH]   1425 Wet read of CT by Dr Mihaela Hartley - ED attending states L lower airspace, perinephric stranding but no obvious nephrolithiasis or hydronephrosis. He advises will need a dedicated CXR based on CT and agrees pt will need admission. SIRS criteria met - sepsis order set initiated [AH]   1536 RN went in to swab pt for COVID/flu and she states she tested positive for COVID 3 days ago [AH]   1550 Pt seen and evaluated by Dr Savita Man in ED [AH]   [facility](!!):    Troponin-High Sensitivity 1,007(!!)  ED attending aware, Dr Franco Vuong called and requested cardiology consult to see if pt ok for admission here and repeat trop to make sure not trending up. Pt has no CP and non ischemic EKG  [AH]   0210 I spoke with Dr. Gosia Zee, Consult for Cardiology. Discussed available diagnostic tests and clinical findings. He advises pt will need ECHO-can be done in a.m.- to evaluate for cardiomyopathy. If repeat trop stays the same pt can be admitted here otherwise if significantly elevated or pt has an abnormal ECHO pt will need to be transferred.   Adriano Blake PA-C   []   0540 Troponin-High Sensitivity(!!): 845  Repeat trop decreased, will inform hospitalist and wait for bed assignment [AH]      ED Course User Index  [AH] Peng Mcgee PA-C         procedures:  Procedures    Core Measures:    Critical Care Time:   35 minutes of critical care time provided, exclusive of separately billable procedures. For Hospitalized Patients:    1. Hospitalization Decision Time:  The decision to hospitalize the patient was made by Dr. Saskia Rangel at  on 12/26/2022    Disposition:  Admitted to step down    Diagnosis     Clinical Impression:   1. Pneumonia of both lower lobes due to infectious organism    2. Hyponatremia    3. Hypokalemia    4. CHRISTA (acute kidney injury) ([facility] [address]    5. COVID-19    6.  Troponin level elevated

## 2022-12-27 ENCOUNTER — APPOINTMENT (OUTPATIENT)
Dept: VASCULAR SURGERY | Age: 62
DRG: 871 | End: 2022-12-27
Attending: STUDENT IN AN ORGANIZED HEALTH CARE EDUCATION/TRAINING PROGRAM
Payer: COMMERCIAL

## 2022-12-27 ENCOUNTER — APPOINTMENT (OUTPATIENT)
Dept: NON INVASIVE DIAGNOSTICS | Age: 62
DRG: 871 | End: 2022-12-27
Attending: STUDENT IN AN ORGANIZED HEALTH CARE EDUCATION/TRAINING PROGRAM
Payer: COMMERCIAL

## 2022-12-27 ENCOUNTER — TELEPHONE (OUTPATIENT)
Dept: ONCOLOGY | Age: 62
End: 2022-12-27

## 2022-12-27 ENCOUNTER — APPOINTMENT (OUTPATIENT)
Dept: INFUSION THERAPY | Age: 62
End: 2022-12-27

## 2022-12-27 PROBLEM — R77.8 TROPONIN LEVEL ELEVATED: Status: ACTIVE | Noted: 2022-12-27

## 2022-12-27 PROBLEM — R79.89 ELEVATED LFTS: Status: ACTIVE | Noted: 2022-12-27

## 2022-12-27 PROBLEM — R79.89 TROPONIN LEVEL ELEVATED: Status: ACTIVE | Noted: 2022-12-27

## 2022-12-27 LAB
25(OH)D3 SERPL-MCNC: 22.6 NG/ML (ref 30–100)
ALBUMIN SERPL-MCNC: 2.1 G/DL (ref 3.5–5)
ALBUMIN/GLOB SERPL: 0.5 {RATIO} (ref 1.1–2.2)
ALP SERPL-CCNC: 80 U/L (ref 45–117)
ALT SERPL-CCNC: 113 U/L (ref 12–78)
ANION GAP SERPL CALC-SCNC: 10 MMOL/L (ref 5–15)
AST SERPL-CCNC: 165 U/L (ref 15–37)
BASOPHILS # BLD: 0 K/UL (ref 0–0.1)
BASOPHILS NFR BLD: 0 % (ref 0–1)
BILIRUB SERPL-MCNC: 0.4 MG/DL (ref 0.2–1)
BUN SERPL-MCNC: 34 MG/DL (ref 6–20)
BUN/CREAT SERPL: 11 (ref 12–20)
CALCIUM SERPL-MCNC: 8.2 MG/DL (ref 8.5–10.1)
CHLORIDE SERPL-SCNC: 104 MMOL/L (ref 97–108)
CO2 SERPL-SCNC: 22 MMOL/L (ref 21–32)
CREAT SERPL-MCNC: 3.09 MG/DL (ref 0.55–1.02)
CRP SERPL-MCNC: 37.4 MG/DL (ref 0–0.6)
DIFFERENTIAL METHOD BLD: ABNORMAL
ECHO AO ROOT DIAM: 2.1 CM
ECHO AO ROOT INDEX: 1.3 CM/M2
ECHO LV EDV INDEX TEICHHOLZ: 48 ML/M2
ECHO LV EDV TEICHHOLZ: 78 ML
ECHO LV ESV INDEX TEICHHOLZ: 28 ML/M2
ECHO LV ESV TEICHHOLZ: 45 ML
ECHO LV FRACTIONAL SHORTENING: 20 % (ref 28–44)
ECHO LV INTERNAL DIMENSION DIASTOLE INDEX: 2.53 CM/M2
ECHO LV INTERNAL DIMENSION DIASTOLIC: 4.1 CM (ref 3.9–5.3)
ECHO LV INTERNAL DIMENSION SYSTOLIC INDEX: 2.04 CM/M2
ECHO LV INTERNAL DIMENSION SYSTOLIC: 3.3 CM
ECHO LV IVSD: 0.9 CM (ref 0.6–0.9)
ECHO LV MASS 2D: 114.1 G (ref 67–162)
ECHO LV MASS INDEX 2D: 70.5 G/M2 (ref 43–95)
ECHO LV POSTERIOR WALL DIASTOLIC: 0.9 CM (ref 0.6–0.9)
ECHO LV RELATIVE WALL THICKNESS RATIO: 0.44
ECHO LVOT AREA: 1.5 CM2
ECHO LVOT DIAM: 1.4 CM
EOSINOPHIL # BLD: 0 K/UL (ref 0–0.4)
EOSINOPHIL NFR BLD: 0 % (ref 0–7)
ERYTHROCYTE [DISTWIDTH] IN BLOOD BY AUTOMATED COUNT: 16.7 % (ref 11.5–14.5)
GLOBULIN SER CALC-MCNC: 4.2 G/DL (ref 2–4)
GLUCOSE BLD STRIP.AUTO-MCNC: 103 MG/DL (ref 65–117)
GLUCOSE SERPL-MCNC: 81 MG/DL (ref 65–100)
HCT VFR BLD AUTO: 26.8 % (ref 35–47)
HGB BLD-MCNC: 9.3 G/DL (ref 11.5–16)
IMM GRANULOCYTES # BLD AUTO: 0 K/UL
IMM GRANULOCYTES NFR BLD AUTO: 0 %
LYMPHOCYTES # BLD: 0.7 K/UL (ref 0.8–3.5)
LYMPHOCYTES NFR BLD: 16 % (ref 12–49)
MCH RBC QN AUTO: 30.7 PG (ref 26–34)
MCHC RBC AUTO-ENTMCNC: 34.7 G/DL (ref 30–36.5)
MCV RBC AUTO: 88.4 FL (ref 80–99)
MONOCYTES # BLD: 0.2 K/UL (ref 0–1)
MONOCYTES NFR BLD: 4 % (ref 5–13)
NEUTS BAND NFR BLD MANUAL: 24 % (ref 0–6)
NEUTS SEG # BLD: 3.6 K/UL (ref 1.8–8)
NEUTS SEG NFR BLD: 56 % (ref 32–75)
NRBC # BLD: 0 K/UL (ref 0–0.01)
NRBC BLD-RTO: 0 PER 100 WBC
PLATELET # BLD AUTO: 158 K/UL (ref 150–400)
PMV BLD AUTO: 10.9 FL (ref 8.9–12.9)
POTASSIUM SERPL-SCNC: 2.8 MMOL/L (ref 3.5–5.1)
PROCALCITONIN SERPL-MCNC: 15.1 NG/ML
PROT SERPL-MCNC: 6.3 G/DL (ref 6.4–8.2)
RBC # BLD AUTO: 3.03 M/UL (ref 3.8–5.2)
RBC MORPH BLD: ABNORMAL
SERVICE CMNT-IMP: NORMAL
SODIUM SERPL-SCNC: 136 MMOL/L (ref 136–145)
TROPONIN-HIGH SENSITIVITY: 652 NG/L (ref 0–51)
VANCOMYCIN SERPL-MCNC: 19.3 UG/ML
WBC # BLD AUTO: 4.5 K/UL (ref 3.6–11)
WBC MORPH BLD: ABNORMAL

## 2022-12-27 PROCEDURE — 74011000258 HC RX REV CODE- 258: Performed by: STUDENT IN AN ORGANIZED HEALTH CARE EDUCATION/TRAINING PROGRAM

## 2022-12-27 PROCEDURE — 74011250636 HC RX REV CODE- 250/636: Performed by: STUDENT IN AN ORGANIZED HEALTH CARE EDUCATION/TRAINING PROGRAM

## 2022-12-27 PROCEDURE — 87899 AGENT NOS ASSAY W/OPTIC: CPT

## 2022-12-27 PROCEDURE — 82306 VITAMIN D 25 HYDROXY: CPT

## 2022-12-27 PROCEDURE — 87077 CULTURE AEROBIC IDENTIFY: CPT

## 2022-12-27 PROCEDURE — 93970 EXTREMITY STUDY: CPT | Performed by: INTERNAL MEDICINE

## 2022-12-27 PROCEDURE — 84145 PROCALCITONIN (PCT): CPT

## 2022-12-27 PROCEDURE — 84484 ASSAY OF TROPONIN QUANT: CPT

## 2022-12-27 PROCEDURE — 74011000250 HC RX REV CODE- 250: Performed by: STUDENT IN AN ORGANIZED HEALTH CARE EDUCATION/TRAINING PROGRAM

## 2022-12-27 PROCEDURE — 80053 COMPREHEN METABOLIC PANEL: CPT

## 2022-12-27 PROCEDURE — 85025 COMPLETE CBC W/AUTO DIFF WBC: CPT

## 2022-12-27 PROCEDURE — 74011250637 HC RX REV CODE- 250/637: Performed by: STUDENT IN AN ORGANIZED HEALTH CARE EDUCATION/TRAINING PROGRAM

## 2022-12-27 PROCEDURE — 77010033678 HC OXYGEN DAILY

## 2022-12-27 PROCEDURE — 93970 EXTREMITY STUDY: CPT

## 2022-12-27 PROCEDURE — 87449 NOS EACH ORGANISM AG IA: CPT

## 2022-12-27 PROCEDURE — 87186 SC STD MICRODIL/AGAR DIL: CPT

## 2022-12-27 PROCEDURE — 99222 1ST HOSP IP/OBS MODERATE 55: CPT | Performed by: SPECIALIST

## 2022-12-27 PROCEDURE — 87070 CULTURE OTHR SPECIMN AEROBIC: CPT

## 2022-12-27 PROCEDURE — 93308 TTE F-UP OR LMTD: CPT

## 2022-12-27 PROCEDURE — 80202 ASSAY OF VANCOMYCIN: CPT

## 2022-12-27 PROCEDURE — 65270000029 HC RM PRIVATE

## 2022-12-27 PROCEDURE — 86140 C-REACTIVE PROTEIN: CPT

## 2022-12-27 PROCEDURE — 93308 TTE F-UP OR LMTD: CPT | Performed by: INTERNAL MEDICINE

## 2022-12-27 PROCEDURE — 82962 GLUCOSE BLOOD TEST: CPT

## 2022-12-27 RX ORDER — NALOXONE HYDROCHLORIDE 0.4 MG/ML
0.4 INJECTION, SOLUTION INTRAMUSCULAR; INTRAVENOUS; SUBCUTANEOUS
Status: DISCONTINUED | OUTPATIENT
Start: 2022-12-27 | End: 2022-12-28 | Stop reason: HOSPADM

## 2022-12-27 RX ORDER — HEPARIN SODIUM 5000 [USP'U]/ML
5000 INJECTION, SOLUTION INTRAVENOUS; SUBCUTANEOUS EVERY 8 HOURS
Status: DISCONTINUED | OUTPATIENT
Start: 2022-12-27 | End: 2022-12-28 | Stop reason: HOSPADM

## 2022-12-27 RX ORDER — GUAIFENESIN 600 MG/1
600 TABLET, EXTENDED RELEASE ORAL EVERY 12 HOURS
Status: DISCONTINUED | OUTPATIENT
Start: 2022-12-27 | End: 2022-12-28 | Stop reason: HOSPADM

## 2022-12-27 RX ADMIN — GUAIFENESIN AND DEXTROMETHORPHAN 5 ML: 100; 10 SYRUP ORAL at 10:57

## 2022-12-27 RX ADMIN — GUAIFENESIN 600 MG: 600 TABLET, EXTENDED RELEASE ORAL at 22:47

## 2022-12-27 RX ADMIN — HEPARIN SODIUM 5000 UNITS: 5000 INJECTION INTRAVENOUS; SUBCUTANEOUS at 10:57

## 2022-12-27 RX ADMIN — HEPARIN SODIUM 5000 UNITS: 5000 INJECTION INTRAVENOUS; SUBCUTANEOUS at 18:21

## 2022-12-27 RX ADMIN — GABAPENTIN 100 MG: 100 CAPSULE ORAL at 08:53

## 2022-12-27 RX ADMIN — CEFEPIME 1 G: 1 INJECTION, POWDER, FOR SOLUTION INTRAMUSCULAR; INTRAVENOUS at 10:19

## 2022-12-27 RX ADMIN — DOXYCYCLINE HYCLATE 100 MG: 100 TABLET, COATED ORAL at 08:53

## 2022-12-27 RX ADMIN — SODIUM CHLORIDE, POTASSIUM CHLORIDE, SODIUM LACTATE AND CALCIUM CHLORIDE 100 ML/HR: 600; 310; 30; 20 INJECTION, SOLUTION INTRAVENOUS at 10:20

## 2022-12-27 RX ADMIN — POTASSIUM BICARBONATE 40 MEQ: 782 TABLET, EFFERVESCENT ORAL at 10:19

## 2022-12-27 RX ADMIN — SODIUM CHLORIDE, PRESERVATIVE FREE 10 ML: 5 INJECTION INTRAVENOUS at 08:53

## 2022-12-27 RX ADMIN — POTASSIUM BICARBONATE 40 MEQ: 782 TABLET, EFFERVESCENT ORAL at 18:21

## 2022-12-27 RX ADMIN — ACETAMINOPHEN 650 MG: 325 TABLET, FILM COATED ORAL at 04:26

## 2022-12-27 RX ADMIN — SODIUM CHLORIDE, PRESERVATIVE FREE 10 ML: 5 INJECTION INTRAVENOUS at 22:55

## 2022-12-27 RX ADMIN — CEFEPIME 1 G: 1 INJECTION, POWDER, FOR SOLUTION INTRAMUSCULAR; INTRAVENOUS at 22:47

## 2022-12-27 RX ADMIN — SODIUM CHLORIDE, PRESERVATIVE FREE 10 ML: 5 INJECTION INTRAVENOUS at 04:28

## 2022-12-27 RX ADMIN — DOXYCYCLINE HYCLATE 100 MG: 100 TABLET, COATED ORAL at 22:47

## 2022-12-27 NOTE — PROGRESS NOTES
0730: Report from [place], [address] [place] [address]. Overnight events, results, and MAR reviewed. 0800: Pt placed on 2LNC for O2 86% at rest. K = 2.8, Dr Bk Awad notified to replete. 1000: Up ad al to bathroom. BM x1. Voided 400 mL yellow urine. 1145: MRSA swab sent. IV continuously occluding, unable to gain additional access, pt refusing to have her port-a-cath accessed. 1400: Urine and sputum sent. 1500: Port accessed by Nallely Raygoza RN.    1630: MEWS = 4 due to tachycardia after activity. 1730: Up to bathroom, diarrhea    1830: Sleeping. 1915: Report to Nichole reynolds RN.

## 2022-12-27 NOTE — PROGRESS NOTES
JENA    RUR-19%      Plan  Disposition-home  PCP-  Transportation-      Discussed in IDRs with MD and team this AM      Reason for Admission:   Pneumonia of both lower lobes due to infectious organism, COVID                    RUR Score:  19%                PCP: First and Last name:   Ash Acosta MD     Name of Practice: [address]   Are you a current patient: Yes/No: yes   Approximate date of last visit:    Can you participate in a virtual visit if needed: N/A    Do you (patient/family) have any concerns for transition/discharge? N/A              Plan for utilizing home health:   N/A    Current Advanced Directive/Advance Care Plan:  Full Code      Healthcare Decision Maker:   Click here to complete [address] including selection of the Healthcare Decision Maker Relationship (ie \"Primary\")  Pt did not wish to list anyone as ACP            Transition of Care Plan:          CM called pt via phone and introduced self/role and  conducted initial assessment. Pt provided demographic info-utilizes [facility] pharmacy on [address] [place] [address]. Pt lives in home alone. Pt stated her PCP is Dr. Teresa Goznales and is also \"seeing so many doctors, she can't keep up with them\". Chart notes state pt is seen by Dr. Santos Andrews for chemotherapy. Pt stated she ambulates with a cane daily, does not have a nebulizer at home. Pt stated she drives herself to appts and will have someone to pick her up but unsure who at this time-pt has house keys with her. Pt did not wish to list anyone for ACP and removed Hussain Najera [phone] as EC. CM left HIPPA compliant VM for PCP nurses to schedule follow up appt, to call CM and pt directly.       Lino Morales, [address] [place] [address] Road

## 2022-12-27 NOTE — PROGRESS NOTES
[facility] Dosing Services: Vancomycin Daily Dosing Note  Consult for dosing of vancomycin by Dr. Christie Bennett   Indication: HAP/sepsis   Day of Therapy: 2    Ht Readings from Last 1 Encounters:   12/26/22 152.4 cm (60\")     Wt Readings from Last 1 Encounters:   12/26/22 65.3 kg (144 lb)       Vancomycin:   Current maintenance dose: vancomycin 500 mg IV every 24 hours    Last level 19.3 mcg/mL 9.5 hours post dose    Dose calculated to approximate a target AUC/SHAMAR of 400-600, trough 15-20 mcg/ml. Plan: Adjust vancomycin dosing to 750 mg q 36 hours to allow more time to clear. Expected  with current regimen. Daily Scr ordered (CHRISTA). MRSA nares pending. Dose administration notes:  Doses given appropriately as scheduled    Date Dose & Interval Measured level AUC/SHAMAR and Extrapolated trough (mcg/mL)   12/27 1750 mg x 1 19.3 (~9.5 hrs post dose)                    Other Antimicrobial   (not dosed by pharmacist) Doxycycline  Cefepmie    Cultures 12/26 BCX: in process   Legionella: in process  Strep pneumo: in process    Serum Creatinine Lab Results   Component Value Date/Time    Creatinine 3.09 (H) 12/27/2022 04:40 AM       Creatinine Clearance Estimated Creatinine Clearance: 15.5 mL/min (A) (based on SCr of 3.09 mg/dL (H)). Temp Temp: 97.8 °F (36.6 °C)     WBC Lab Results   Component Value Date/Time    WBC 4.5 12/27/2022 04:40 AM      Procalcitonin  No results found for: PCT     Pharmacy will follow the patient on a daily basis and make adjustments based on patient's clinical status.     Thank you,     So Boudreaux, [address]

## 2022-12-27 NOTE — PROGRESS NOTES
Primary Nurse Ashley Benson RN and Ada Luther, RN performed a dual skin assessment on this patient No impairment noted  William score is 20  2230 pt resting with eyes closed. Asessment performed . IVF infusing.  0320 Pt stuck twice to get labs without success due to pulling her arm. Notified supervisor.

## 2022-12-27 NOTE — TELEPHONE ENCOUNTER
Patient called to let the providers know that she is in the hospital.  She also wants to talk with someone from the office- she has lots of questions about what these folks want to do with her and her port.   She wasn't going to let them do anything unless she talk with our office first.    Please give her a call on [phone]

## 2022-12-27 NOTE — PROGRESS NOTES
[facility] Dosing Services: Vancomycin Dosing Progress Note    Consult for antibiotic dosing of vancomycin by Dr. Demond Coelho  Indication: HAP/Sepsis  Day of Therapy: 1    Plan:  Vancomycin therapy:   Start with loading dose of vancomycin 1750 mg IV (25 mg/kg, max 2500 mg)   Follow with maintenance dose of vancomycin 500 mg IV every 24 hours at 1900 on 12/27/22 if Scr improves. Dose calculated to approximate a   Target AUC/SHAMAR of 400-600   Plan: Start loading dose of 1750 mg. Patient in CHRISTA. Baseline Scr 0.68 and on IVF. Will order maintenance dose for now and trend Scr. Will order level for 12/27 AM to assess clearance and adjust as necessary. Date Dose & Interval Measured (mcg/mL) Extrapolated (mcg/mL)   ? ? ? ?   ? ? ? ?   ? ? ? ? Other Antimicrobial  (not dosed by pharmacist)   Cefepime   Doxycyline   Cultures     12/26:S. Pneumo Cx  12/26: Legionella Cx  12/26: Respiratory Cx   Serum Creatinine     Lab Results   Component Value Date/Time    Creatinine 2.91 (H) 12/26/2022 12:41 PM       Creatinine Clearance Estimated Creatinine Clearance: 16.5 mL/min (A) (based on SCr of 2.91 mg/dL (H)). Temp   98.3 °F (36.8 °C)    WBC   Lab Results   Component Value Date/Time    WBC 4.8 12/26/2022 12:41 PM         Pharmacy to follow daily and will make changes to dose and/or frequency based on clinical status.     Thank you,     Karen Orourke, PharmD   Contact: [phone]

## 2022-12-27 NOTE — PROGRESS NOTES
[facility]: MED RECONCILIATION    Comments/Recommendations:   Med rec performed via patient interview, chart review and refill history. Patient stopped interview due to staff being in room. Patient states she is no longer taking amlodipine or HCTZ. Patient's last chemotherapy was on 12/21/22 with Paclitaxel. Patient's current regimen is: Paclitaxel + carboplatin + pembrolizumab then DDAC + pembrolizumab followed by adjuvant pembrolizumab. Patient is due for therapy on 12/28, 1/4, 1/18, 1/25, 2/1, 2/15 and 3/8. Allergies: Sulfa (sulfonamide antibiotics)    Prior to Admission Medications:     Prior to Admission Medications   Prescriptions Last Dose Informant Patient Reported? Taking? OTHER,NON-FORMULARY,   Yes Yes   Sig: IV chemotherapy   PARoxetine (PAXIL) 20 mg tablet   Yes Yes   Sig: Take 20 mg by mouth daily. carvediloL (COREG) 6.25 mg tablet 12/19/2022  Yes Yes   Sig: Take 6.25 mg by mouth two (2) times daily (with meals). dexAMETHasone (DECADRON) 4 mg tablet   No Yes   Sig: Take 2 tabs (8mg) once daily on days 2 & 3 of chemotherapy   famotidine (PEPCID) 40 mg/5 mL (8 mg/mL) suspension   No Yes   Sig: Take 2.5 mL by mouth two (2) times a day.   gabapentin (NEURONTIN) 100 mg capsule   No Yes   Sig: Take 1 Capsule by mouth three (3) times daily. Max Daily Amount: 300 mg.   lidocaine-prilocaine (EMLA) topical cream   No Yes   Sig: Apply  to affected area as needed for Pain. Apply a thick layer over port a cath 30-60 minutes prior to port access   losartan (COZAAR) 25 mg tablet 12/19/2022  Yes Yes   Sig: Take 25 mg by mouth daily. multivitamin, tx-iron-ca-min (THERA-M w/ IRON) 9 mg iron-400 mcg tab tablet   Yes Yes   Sig: Take 1 Tablet by mouth daily. ondansetron (ZOFRAN ODT) 8 mg disintegrating tablet   No Yes   Sig: Take 1 Tablet by mouth every eight (8) hours as needed for Nausea or Vomiting.    prochlorperazine (Compazine) 5 mg tablet   No Yes   Sig: Take 1 Tablet by mouth every six (6) hours as needed for Nausea or Vomiting. rosuvastatin (CRESTOR) 10 mg tablet   Yes Yes   Sig: Take 10 mg by mouth nightly.       Facility-Administered Medications: None             MAJOR Quinonez   Contact: [phone]

## 2022-12-27 NOTE — CONSULTS
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 Prior echo reviewed, EF about 50% in setting of chemotherapy. Now with probable Covid pneumonia and CHRISTA, elevated troponin and LFT's, low albumin. EKG shows LVH no acute changes no chest pain. Suspect troponin elevation due to Covid, CHRISTA, possible sepsis with tachycardia. Will consider eventual stress test if echo suggests CAD. Lacey Ma MD  [phone]  Rod Casiano is a 57 y.o. female who presented 12/26/2022 with complaints of shortness of breath, dizziness, and weakness. The symptoms began approximately 3 days ago. She has no history of cardiac disease including CAD, CABG, CHF, and atrial fib. Examination by the attending physician suggested the possibility of CAD due to troponin elevation. At present the patient has slightly improved since initial presentation. Therapy thus far has included O2 and antibiotics, IV fluid . Cardiology has been consulted to assist in the management of this patient.     Current Facility-Administered Medications   Medication Dose Route Frequency    potassium bicarb-citric acid (EFFER-K) tablet 40 mEq  40 mEq Oral BID    naloxone (NARCAN) injection 0.4 mg  0.4 mg IntraVENous EVERY 2 MINUTES AS NEEDED    [START ON 12/28/2022] vancomycin (VANCOCIN) 750 mg in 0.9% sodium chloride 250 mL (VIAL-MATE)  750 mg IntraVENous Q36H    sodium chloride (NS) flush 5-10 mL  5-10 mL IntraVENous PRN    sodium chloride (NS) flush 5-40 mL  5-40 mL IntraVENous Q8H    sodium chloride (NS) flush 5-40 mL  5-40 mL IntraVENous PRN    acetaminophen (TYLENOL) tablet 650 mg  650 mg Oral Q6H PRN    Or acetaminophen (TYLENOL) suppository 650 mg  650 mg Rectal Q6H PRN    polyethylene glycol (MIRALAX) packet 17 g  17 g Oral DAILY PRN    [Held by provider] ondansetron (ZOFRAN ODT) tablet 4 mg  4 mg Oral Q8H PRN    Or    [Held by provider] ondansetron (ZOFRAN) injection 4 mg  4 mg IntraVENous Q6H PRN    [Held by provider] rosuvastatin (CRESTOR) tablet 10 mg  10 mg Oral QHS    gabapentin (NEURONTIN) capsule 100 mg  100 mg Oral BID    lactated Ringers infusion  100 mL/hr IntraVENous CONTINUOUS    oxyCODONE IR (ROXICODONE) tablet 10 mg  10 mg Oral Q4H PRN    doxycycline (VIBRA-TABS) tablet 100 mg  100 mg Oral Q12H    cefepime (MAXIPIME) 1 g in 0.9% sodium chloride (MBP/ADV) 50 mL MBP  1 g IntraVENous Q12H    guaiFENesin-dextromethorphan (ROBITUSSIN DM) 100-10 mg/5 mL syrup 5 mL  5 mL Oral Q4H PRN     Past Medical History:   Diagnosis Date    Cancer ([facility]     BREAST CANCER    GERD (gastroesophageal reflux disease)     HTN (hypertension) 07/18/2011    Psychiatric disorder     ANIXETY AND DEPRESSION    Thyroid disease     HYPOTHYROID     Patient Active Problem List   Diagnosis Code    Menopausal symptoms N95.1    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 ([facility] C50.011, C50.012    Pneumonia J18.9    CHRISTA (acute kidney injury) ([facility] N17.9    COVID-19 U07.1    Troponin level elevated R77.8    Elevated LFTs R79.89     Allergies   Allergen Reactions    Sulfa (Sulfonamide Antibiotics) Itching, Swelling and Unknown (comments)     Social History     Tobacco Use    Smoking status: Never     Passive exposure: Never    Smokeless tobacco: Never   Vaping Use    Vaping Use: Never used   Substance Use Topics    Alcohol use: Never    Drug use: Not Currently     Types: Marijuana     Comment: IN HIGH SCHOOL ONLY     Family History   Problem Relation Age of Onset    Heart Failure Mother     Cancer Father     Diabetes Sister     Kidney Disease Sister     Anesth Problems Neg Hx        Review of Symptoms:  A comprehensive review of systems was negative except for that written in the HPI. Objective:      Visit Vitals  /72   Pulse 76   Temp 97.8 °F (36.6 °C)   Resp 21   Ht 5' (1.524 m)   Wt 144 lb (65.3 kg)   SpO2 100%   Breastfeeding No   BMI 28.12 kg/m²      Physical Exam    Visit Vitals  /72   Pulse 76   Temp 97.8 °F (36.6 °C)   Resp 21   Ht 5' (1.524 m)   Wt 144 lb (65.3 kg)   SpO2 100%   Breastfeeding No   BMI 28.12 kg/m²     General Appearance:  Well developed, well nourished,alert and oriented x 3, individual in mild acute distress. Ears/Nose/Mouth/Throat:   Hearing grossly normal.         Neck: Supple. Chest:   Lungs with bibasilar crackles to auscultation bilaterally. Cardiovascular:  Regular rate and rhythm, S1, S2 normal, no murmur. Abdomen:   Soft, non-tender, bowel sounds are active. Extremities: No edema bilaterally. Skin: Warm and dry. Cardiographics    Telemetry: normal sinus rhythm  ECG: normal sinus rhythm, nonspecific ST and T waves changes  Echocardiogram: Not done    Labs:   Recent Results (from the past 24 hour(s))   EKG, 12 LEAD, INITIAL    Collection Time: 12/26/22 12:36 PM   Result Value Ref Range    Ventricular Rate 127 BPM    Atrial Rate 127 BPM    P-R Interval 160 ms    QRS Duration 76 ms    Q-T Interval 404 ms    QTC Calculation (Bezet) 587 ms    Calculated P Axis 70 degrees    Calculated R Axis -16 degrees    Calculated T Axis 98 degrees    Diagnosis       Sinus tachycardia  Left ventricular hypertrophy with repolarization abnormality  Abnormal ECG  When compared with ECG of 26-SEP-2022 16:04,  Vent.  rate has increased BY  61 BPM  Nonspecific T wave abnormality no longer evident in Inferior leads  Nonspecific T wave abnormality, improved in Anterior leads  Inverted T waves have replaced nonspecific T wave abnormality in Lateral   leads     CBC WITH AUTOMATED DIFF    Collection Time: 12/26/22 12:41 PM   Result Value Ref Range    WBC 4.8 3.6 - 11.0 K/uL    RBC 3.76 (L) 3.80 - 5.20 M/uL    HGB 11.4 (L) 11.5 - 16.0 g/dL    HCT 33.2 (L) 35.0 - 47.0 %    MCV 88.3 80.0 - 99.0 FL    MCH 30.3 26.0 - 34.0 PG    MCHC 34.3 30.0 - 36.5 g/dL    RDW 16.2 (H) 11.5 - 14.5 %    PLATELET 145 788 - 468 K/uL    MPV 10.2 8.9 - 12.9 FL    NRBC 0.0 0  WBC    ABSOLUTE NRBC 0.00 0.00 - 0.01 K/uL    NEUTROPHILS 74 32 - 75 %    LYMPHOCYTES 15 12 - 49 %    MONOCYTES 9 5 - 13 %    EOSINOPHILS 0 0 - 7 %    BASOPHILS 2 (H) 0 - 1 %    IMMATURE GRANULOCYTES 0 %    ABS. NEUTROPHILS 3.6 1.8 - 8.0 K/UL    ABS. LYMPHOCYTES 0.7 (L) 0.8 - 3.5 K/UL    ABS. MONOCYTES 0.4 0.0 - 1.0 K/UL    ABS. EOSINOPHILS 0.0 0.0 - 0.4 K/UL    ABS. BASOPHILS 0.1 0.0 - 0.1 K/UL    ABS. IMM. GRANS. 0.0 K/UL    DF SMEAR SCANNED      RBC COMMENTS ANISOCYTOSIS  2+       METABOLIC PANEL, COMPREHENSIVE    Collection Time: 12/26/22 12:41 PM   Result Value Ref Range    Sodium 135 (L) 136 - 145 mmol/L    Potassium 2.8 (L) 3.5 - 5.1 mmol/L    Chloride 96 (L) 97 - 108 mmol/L    CO2 23 21 - 32 mmol/L    Anion gap 16 (H) 5 - 15 mmol/L    Glucose 186 (H) 65 - 100 mg/dL    BUN 29 (H) 6 - 20 MG/DL    Creatinine 2.91 (H) 0.55 - 1.02 MG/DL    BUN/Creatinine ratio 10 (L) 12 - 20      eGFR 17 (L) >60 ml/min/1.73m2    Calcium 9.0 8.5 - 10.1 MG/DL    Bilirubin, total 0.9 0.2 - 1.0 MG/DL    ALT (SGPT) 111 (H) 12 - 78 U/L    AST (SGOT) 173 (H) 15 - 37 U/L    Alk.  phosphatase 83 45 - 117 U/L    Protein, total 8.0 6.4 - 8.2 g/dL    Albumin 2.7 (L) 3.5 - 5.0 g/dL    Globulin 5.3 (H) 2.0 - 4.0 g/dL    A-G Ratio 0.5 (L) 1.1 - 2.2     D DIMER    Collection Time: 12/26/22 12:41 PM   Result Value Ref Range    D-dimer 4.87 (H) 0.00 - 0.65 mg/L FEU   NT-PRO BNP    Collection Time: 12/26/22 12:41 PM   Result Value Ref Range    NT pro- (H) 0 - 125 PG/ML   MAGNESIUM    Collection Time: 12/26/22 12:41 PM   Result Value Ref Range    Magnesium 2.0 1.6 - 2.4 mg/dL   POC LACTIC ACID    Collection Time: 12/26/22  2:37 PM   Result Value Ref Range Lactic Acid (POC) 1.92 0.40 - 2.00 mmol/L   GLUCOSE, POC    Collection Time: 12/26/22  2:38 PM   Result Value Ref Range    Glucose (POC) 110 65 - 117 mg/dL    Performed by Shira De aL Paz RN    CULTURE, BLOOD    Collection Time: 12/26/22  2:41 PM    Specimen: Blood   Result Value Ref Range    Special Requests: NO SPECIAL REQUESTS      Culture result: NO GROWTH AFTER 10 HOURS     CULTURE, BLOOD    Collection Time: 12/26/22  2:41 PM    Specimen: Blood   Result Value Ref Range    Special Requests: NO SPECIAL REQUESTS      Culture result: NO GROWTH AFTER 10 HOURS     TROPONIN-HIGH SENSITIVITY    Collection Time: 12/26/22  2:41 PM   Result Value Ref Range    Troponin-High Sensitivity 1,007 (HH) 0 - 51 ng/L   COVID-19 WITH INFLUENZA A/B    Collection Time: 12/26/22  3:51 PM   Result Value Ref Range    SARS-CoV-2 by PCR Detected (A) NOTD      Influenza A by PCR Not detected      Influenza B by PCR Not detected     TROPONIN-HIGH SENSITIVITY    Collection Time: 12/26/22  5:19 PM   Result Value Ref Range    Troponin-High Sensitivity 845 (HH) 0 - 51 ng/L   PROCALCITONIN    Collection Time: 12/27/22  4:40 AM   Result Value Ref Range    Procalcitonin 15.10 ng/mL   CBC WITH AUTOMATED DIFF    Collection Time: 12/27/22  4:40 AM   Result Value Ref Range    WBC 4.5 3.6 - 11.0 K/uL    RBC 3.03 (L) 3.80 - 5.20 M/uL    HGB 9.3 (L) 11.5 - 16.0 g/dL    HCT 26.8 (L) 35.0 - 47.0 %    MCV 88.4 80.0 - 99.0 FL    MCH 30.7 26.0 - 34.0 PG    MCHC 34.7 30.0 - 36.5 g/dL    RDW 16.7 (H) 11.5 - 14.5 %    PLATELET 548 475 - 078 K/uL    MPV 10.9 8.9 - 12.9 FL    NRBC 0.0 0  WBC    ABSOLUTE NRBC 0.00 0.00 - 0.01 K/uL    NEUTROPHILS 56 32 - 75 %    BAND NEUTROPHILS 24 (H) 0 - 6 %    LYMPHOCYTES 16 12 - 49 %    MONOCYTES 4 (L) 5 - 13 %    EOSINOPHILS 0 0 - 7 %    BASOPHILS 0 0 - 1 %    IMMATURE GRANULOCYTES 0 %    ABS. NEUTROPHILS 3.6 1.8 - 8.0 K/UL    ABS. LYMPHOCYTES 0.7 (L) 0.8 - 3.5 K/UL    ABS. MONOCYTES 0.2 0.0 - 1.0 K/UL    ABS. EOSINOPHILS 0.0 0.0 - 0.4 K/UL    ABS. BASOPHILS 0.0 0.0 - 0.1 K/UL    ABS. IMM. GRANS. 0.0 K/UL    DF MANUAL      RBC COMMENTS ANISOCYTOSIS  1+        WBC COMMENTS TOXIC GRANULATION     METABOLIC PANEL, COMPREHENSIVE    Collection Time: 12/27/22  4:40 AM   Result Value Ref Range    Sodium 136 136 - 145 mmol/L    Potassium 2.8 (L) 3.5 - 5.1 mmol/L    Chloride 104 97 - 108 mmol/L    CO2 22 21 - 32 mmol/L    Anion gap 10 5 - 15 mmol/L    Glucose 81 65 - 100 mg/dL    BUN 34 (H) 6 - 20 MG/DL    Creatinine 3.09 (H) 0.55 - 1.02 MG/DL    BUN/Creatinine ratio 11 (L) 12 - 20      eGFR 16 (L) >60 ml/min/1.73m2    Calcium 8.2 (L) 8.5 - 10.1 MG/DL    Bilirubin, total 0.4 0.2 - 1.0 MG/DL    ALT (SGPT) 113 (H) 12 - 78 U/L    AST (SGOT) 165 (H) 15 - 37 U/L    Alk.  phosphatase 80 45 - 117 U/L    Protein, total 6.3 (L) 6.4 - 8.2 g/dL    Albumin 2.1 (L) 3.5 - 5.0 g/dL    Globulin 4.2 (H) 2.0 - 4.0 g/dL    A-G Ratio 0.5 (L) 1.1 - 2.2     VITAMIN D, 25 HYDROXY    Collection Time: 12/27/22  4:40 AM   Result Value Ref Range    Vitamin D 25-Hydroxy 22.6 (L) 30 - 100 ng/mL   C REACTIVE PROTEIN, QT    Collection Time: 12/27/22  4:40 AM   Result Value Ref Range    C-Reactive protein 37.40 (H) 0.00 - 0.60 mg/dL   VANCOMYCIN, RANDOM    Collection Time: 12/27/22  4:40 AM   Result Value Ref Range    Vancomycin, random 19.3 UG/ML   TROPONIN-HIGH SENSITIVITY    Collection Time: 12/27/22  4:40 AM   Result Value Ref Range    Troponin-High Sensitivity 652 (HH) 0 - 51 ng/L       Blenda Severe, MD

## 2022-12-27 NOTE — PROGRESS NOTES
Hospitalist Progress Note    NAME: Eliana Dodd   :  1960   MRN:  [id number]   Room Number:  DDG0/10  @ [facility]     Please note that this dictation was completed with Princess Salazar, the computer voice recognition software. Quite often unanticipated grammatical, syntax, homophones, and other interpretive errors are inadvertently transcribed by the computer software. Please disregard these errors. Please excuse any errors that have escaped final proofreading. Interim Hospital Summary: 57 y.o. female whom presented on 2022 with      Assessment / Plan:  Anticipated discharge date : TBD  Anticipated disposition : Home   Barriers to discharge : Medical stability         #Multifocal pneumonia in the setting of immunocompromise patient due to chemotherapy POA  #COVID-19 infection POA  #Sepsis due to above POA  -Chest x-ray reviewed dependently bilateral airspace disease.   -COVID-19 PCR positive  -On admission heart rate 136 respiratory rate 27  -Lactic acid within normal limit  -Received sepsis protocol fluid in the ER  -CRP 37 procalcitonin 15  -Culture data pending           -Chowdhury cefepime and Doxy to cover healthcare associated pneumonia given patient receives regular chemotherapy  -IVF  -Follow-up blood cultures sputum culture and MRSA culture strep and Legionella antigen     #Acute kidney injury POA  -Serum creatinine 2.91 >3.09 (   baseline 0.68)   -Suspect prerenal in the setting of sepsis  -CT abdomen pelvis no obstruction     -Continue IVF  -Trend creatinine     #Elevated troponin- trend flat   #Type II troponin elevation flat trend   --Troponin 1007 > 845 > 652   -    -EKG reviewed independentlyNo acute ischemic process nonspecific T wave changes  -Chart reviewed by cardiologist on-call given troponin is going down,# does not recommend transfer at this point        -Telemetry  - TTE pending  -Cardiology consulted     #Elevated D-dimer  -Cardio multifactorial due to underlying 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  -VQ scan low probability for PE     #Left flank pain  -? Due to UTI  -Check UA        #Peripheral neuropathy in the setting of chemotherapy  -Continue gabapentin     #Prolonged QTC POA  -EKG reviewed apparently QTC greater than 500  -Keep mag above 2 potassium above 4  -Hold QT prolonging medication        #Hypokalemia  -Replete     #Invasive ductal carcinoma POA  -Received chemotherapy  -Last chemo December 21, 2022        Code status: Full  Prophylaxis: heparin C   Recommended Disposition: Home w/Family     Subjective:     Chief Complaint / Reason for Physician Visit  \"Cough and lower back pain\". Discussed with RN events overnight. Review of Systems:  No fevers, chills, appetite change,  sputum production, shortness of breath, dyspnea on exertion, nausea, vomitting, diarrhea, constipation, chest pain, leg edema, abdominal pain, joint pain, rash, itching. Tolerating PT/OT. Tolerating diet. Objective:     VITALS:   Last 24hrs VS reviewed since prior progress note. Most recent are:  Patient Vitals for the past 24 hrs:   Temp Pulse Resp BP SpO2   12/27/22 0800 97.8 °F (36.6 °C) 76 21 116/72 100 %   12/27/22 0335 100.1 °F (37.8 °C) (!) 105 19 (!) 140/60 100 %   12/26/22 2330 98 °F (36.7 °C) 88 24 (!) 121/90 97 %   12/26/22 2030 97.7 °F (36.5 °C) 91 25 138/71 97 %   12/26/22 2000 -- -- 29 136/72 100 %   12/26/22 1448 -- 97 21 -- 99 %   12/26/22 1445 -- (!) 101 28 120/78 98 %   12/26/22 1313 -- (!) 110 24 -- 95 %   12/26/22 1259 -- (!) 116 27 (!) 142/76 97 %   12/26/22 1214 -- -- -- (!) 148/89 --   12/26/22 1151 98.3 °F (36.8 °C) (!) 136 20 (!) 149/53 100 %       Intake/Output Summary (Last 24 hours) at 12/27/2022 0954  Last data filed at 12/27/2022 0800  Gross per 24 hour   Intake 3271.67 ml   Output --   Net 3271.67 ml        PHYSICAL EXAM:  General: WD, WN. Alert, cooperative, no acute distress    EENT:  EOMI. Anicteric sclerae.  MMM  Resp:  Coarse breathing bilaterally  CV:  Regular  rhythm,  normal S1/S2, no murmurs rubs gallops, No edema  GI:  Soft, Non distended, Non tender. +Bowel sounds  Neurologic:  Alert and oriented X 3, normal speech,   Psych:   Good insight. Not anxious nor agitated  Skin:  No rashes. No jaundice    Reviewed most current lab test results and cultures  YES  Reviewed most current radiology test results   YES  Review and summation of old records today    NO  Reviewed patient's current orders and MAR    YES  PMH/SH reviewed - no change compared to H&P  ________________________________________________________________________  Care Plan discussed with:    Comments   Patient x    Family      RN x    Care Manager x    Consultant                       x Multidiciplinary team rounds were held today with , nursing, pharmacist and clinical coordinator. Patient's plan of care was discussed; medications were reviewed and discharge planning was addressed. ________________________________________________________________________  Total NON critical care TIME:  35   Minutes    Total CRITICAL CARE TIME Spent:   Minutes non procedure based      Comments   >50% of visit spent in counseling and coordination of care x    ________________________________________________________________________  Lilian Maldonado MD     Procedures: see electronic medical records for all procedures/Xrays and details which were not copied into this note but were reviewed prior to creation of Plan. LABS:  I reviewed today's most current labs and imaging studies.   Pertinent labs include:  Recent Labs     12/27/22  0440 12/26/22  1241   WBC 4.5 4.8   HGB 9.3* 11.4*   HCT 26.8* 33.2*    175     Recent Labs     12/27/22  0440 12/26/22  1241    135*   K 2.8* 2.8*    96*   CO2 22 23   GLU 81 186*   BUN 34* 29*   CREA 3.09* 2.91*   CA 8.2* 9.0   MG  --  2.0   ALB 2.1* 2.7*   TBILI 0.4 0.9   * 111*       Signed: Lilian Maldonado MD

## 2022-12-28 ENCOUNTER — APPOINTMENT (OUTPATIENT)
Dept: CT IMAGING | Age: 62
DRG: 871 | End: 2022-12-28
Attending: STUDENT IN AN ORGANIZED HEALTH CARE EDUCATION/TRAINING PROGRAM
Payer: COMMERCIAL

## 2022-12-28 ENCOUNTER — DOCUMENTATION ONLY (OUTPATIENT)
Dept: ONCOLOGY | Age: 62
End: 2022-12-28

## 2022-12-28 ENCOUNTER — HOSPITAL ENCOUNTER (INPATIENT)
Age: 62
LOS: 4 days | Discharge: HOME | DRG: 871 | End: 2023-01-01
Attending: INTERNAL MEDICINE | Admitting: INTERNAL MEDICINE
Payer: COMMERCIAL

## 2022-12-28 ENCOUNTER — HOSPITAL ENCOUNTER (OUTPATIENT)
Dept: INFUSION THERAPY | Age: 62
End: 2022-12-28

## 2022-12-28 ENCOUNTER — NURSE NAVIGATOR (OUTPATIENT)
Dept: CASE MANAGEMENT | Age: 62
End: 2022-12-28

## 2022-12-28 VITALS
SYSTOLIC BLOOD PRESSURE: 129 MMHG | HEART RATE: 82 BPM | HEIGHT: 60 IN | DIASTOLIC BLOOD PRESSURE: 69 MMHG | RESPIRATION RATE: 17 BRPM | BODY MASS INDEX: 28.27 KG/M2 | WEIGHT: 144 LBS | TEMPERATURE: 97.2 F | OXYGEN SATURATION: 100 %

## 2022-12-28 PROBLEM — J15.9 BACTERIAL PNEUMONIA: Status: ACTIVE | Noted: 2022-12-28

## 2022-12-28 LAB
ALBUMIN SERPL-MCNC: 1.8 G/DL (ref 3.5–5)
ALBUMIN/GLOB SERPL: 0.5 {RATIO} (ref 1.1–2.2)
ALP SERPL-CCNC: 115 U/L (ref 45–117)
ALT SERPL-CCNC: 169 U/L (ref 12–78)
ANION GAP SERPL CALC-SCNC: 10 MMOL/L (ref 5–15)
AST SERPL-CCNC: 269 U/L (ref 15–37)
BACTERIA SPEC CULT: NORMAL
BACTERIA SPEC CULT: NORMAL
BASOPHILS # BLD: 0.2 K/UL (ref 0–0.1)
BASOPHILS NFR BLD: 2 % (ref 0–1)
BILIRUB SERPL-MCNC: 0.4 MG/DL (ref 0.2–1)
BUN SERPL-MCNC: 38 MG/DL (ref 6–20)
BUN/CREAT SERPL: 11 (ref 12–20)
CALCIUM SERPL-MCNC: 8.4 MG/DL (ref 8.5–10.1)
CHLORIDE SERPL-SCNC: 101 MMOL/L (ref 97–108)
CO2 SERPL-SCNC: 23 MMOL/L (ref 21–32)
CREAT SERPL-MCNC: 3.39 MG/DL (ref 0.55–1.02)
CRP SERPL-MCNC: 24.1 MG/DL (ref 0–0.6)
DATE LAST DOSE: ABNORMAL
DIFFERENTIAL METHOD BLD: ABNORMAL
EOSINOPHIL # BLD: 0 K/UL (ref 0–0.4)
EOSINOPHIL NFR BLD: 0 % (ref 0–7)
ERYTHROCYTE [DISTWIDTH] IN BLOOD BY AUTOMATED COUNT: 16.9 % (ref 11.5–14.5)
FLUID CULTURE, SPNG2: NORMAL
GLOBULIN SER CALC-MCNC: 4 G/DL (ref 2–4)
GLUCOSE SERPL-MCNC: 101 MG/DL (ref 65–100)
HCT VFR BLD AUTO: 24.1 % (ref 35–47)
HGB BLD-MCNC: 8.3 G/DL (ref 11.5–16)
IMM GRANULOCYTES # BLD AUTO: 0 K/UL
IMM GRANULOCYTES NFR BLD AUTO: 0 %
L PNEUMO1 AG UR QL IA: NEGATIVE
LYMPHOCYTES # BLD: 1.1 K/UL (ref 0.8–3.5)
LYMPHOCYTES NFR BLD: 13 % (ref 12–49)
MAGNESIUM SERPL-MCNC: 1.5 MG/DL (ref 1.6–2.4)
MCH RBC QN AUTO: 30.2 PG (ref 26–34)
MCHC RBC AUTO-ENTMCNC: 34.4 G/DL (ref 30–36.5)
MCV RBC AUTO: 87.6 FL (ref 80–99)
METAMYELOCYTES NFR BLD MANUAL: 1 %
MONOCYTES # BLD: 0.3 K/UL (ref 0–1)
MONOCYTES NFR BLD: 4 % (ref 5–13)
MYELOCYTES NFR BLD MANUAL: 4 %
NEUTS BAND NFR BLD MANUAL: 10 % (ref 0–6)
NEUTS SEG # BLD: 6.5 K/UL (ref 1.8–8)
NEUTS SEG NFR BLD: 66 % (ref 32–75)
NRBC # BLD: 0.02 K/UL (ref 0–0.01)
NRBC BLD-RTO: 0.2 PER 100 WBC
ORGANISM ID, SPNG3: NORMAL
PLATELET # BLD AUTO: 169 K/UL (ref 150–400)
PLEASE NOTE, SPNG4: NORMAL
PMV BLD AUTO: 10.6 FL (ref 8.9–12.9)
POTASSIUM SERPL-SCNC: 2.8 MMOL/L (ref 3.5–5.1)
PROT SERPL-MCNC: 5.8 G/DL (ref 6.4–8.2)
RBC # BLD AUTO: 2.75 M/UL (ref 3.8–5.2)
RBC MORPH BLD: ABNORMAL
REPORTED DOSE,DOSE: ABNORMAL UNITS
REPORTED DOSE/TIME,TMG: ABNORMAL
S PNEUM AG SPEC QL LA: NEGATIVE
SERVICE CMNT-IMP: NORMAL
SODIUM SERPL-SCNC: 134 MMOL/L (ref 136–145)
SPECIMEN SOURCE: NORMAL
SPECIMEN SOURCE: NORMAL
SPECIMEN, SPNG1: NORMAL
VANCOMYCIN TROUGH SERPL-MCNC: 11.2 UG/ML (ref 5–10)
WBC # BLD AUTO: 8.5 K/UL (ref 3.6–11)
WBC MORPH BLD: ABNORMAL

## 2022-12-28 PROCEDURE — 80053 COMPREHEN METABOLIC PANEL: CPT

## 2022-12-28 PROCEDURE — 86140 C-REACTIVE PROTEIN: CPT

## 2022-12-28 PROCEDURE — 74011000250 HC RX REV CODE- 250: Performed by: STUDENT IN AN ORGANIZED HEALTH CARE EDUCATION/TRAINING PROGRAM

## 2022-12-28 PROCEDURE — 36415 COLL VENOUS BLD VENIPUNCTURE: CPT

## 2022-12-28 PROCEDURE — 83735 ASSAY OF MAGNESIUM: CPT

## 2022-12-28 PROCEDURE — 74011250636 HC RX REV CODE- 250/636: Performed by: STUDENT IN AN ORGANIZED HEALTH CARE EDUCATION/TRAINING PROGRAM

## 2022-12-28 PROCEDURE — 71250 CT THORAX DX C-: CPT

## 2022-12-28 PROCEDURE — 74011250637 HC RX REV CODE- 250/637: Performed by: STUDENT IN AN ORGANIZED HEALTH CARE EDUCATION/TRAINING PROGRAM

## 2022-12-28 PROCEDURE — 65270000046 HC RM TELEMETRY

## 2022-12-28 PROCEDURE — 85025 COMPLETE CBC W/AUTO DIFF WBC: CPT

## 2022-12-28 PROCEDURE — 80202 ASSAY OF VANCOMYCIN: CPT

## 2022-12-28 PROCEDURE — 99233 SBSQ HOSP IP/OBS HIGH 50: CPT | Performed by: SPECIALIST

## 2022-12-28 PROCEDURE — 74011000258 HC RX REV CODE- 258: Performed by: STUDENT IN AN ORGANIZED HEALTH CARE EDUCATION/TRAINING PROGRAM

## 2022-12-28 PROCEDURE — 77010033678 HC OXYGEN DAILY

## 2022-12-28 RX ORDER — DOXYCYCLINE HYCLATE 100 MG
100 TABLET ORAL EVERY 12 HOURS
Status: CANCELLED | OUTPATIENT
Start: 2022-12-28 | End: 2022-12-31

## 2022-12-28 RX ORDER — SODIUM CHLORIDE 0.9 % (FLUSH) 0.9 %
5-10 SYRINGE (ML) INJECTION AS NEEDED
Status: DISCONTINUED | OUTPATIENT
Start: 2022-12-28 | End: 2022-12-29

## 2022-12-28 RX ORDER — LOPERAMIDE HYDROCHLORIDE 2 MG/1
2 CAPSULE ORAL
Status: CANCELLED | OUTPATIENT
Start: 2022-12-28

## 2022-12-28 RX ORDER — SODIUM CHLORIDE 0.9 % (FLUSH) 0.9 %
5-40 SYRINGE (ML) INJECTION EVERY 8 HOURS
Status: DISCONTINUED | OUTPATIENT
Start: 2022-12-28 | End: 2022-12-29

## 2022-12-28 RX ORDER — SODIUM CHLORIDE 0.9 % (FLUSH) 0.9 %
5-10 SYRINGE (ML) INJECTION AS NEEDED
Status: CANCELLED | OUTPATIENT
Start: 2022-12-28

## 2022-12-28 RX ORDER — LOPERAMIDE HYDROCHLORIDE 2 MG/1
2 CAPSULE ORAL
Status: DISCONTINUED | OUTPATIENT
Start: 2022-12-28 | End: 2022-12-29

## 2022-12-28 RX ORDER — SODIUM CHLORIDE 0.9 % (FLUSH) 0.9 %
5-40 SYRINGE (ML) INJECTION AS NEEDED
Status: CANCELLED | OUTPATIENT
Start: 2022-12-28

## 2022-12-28 RX ORDER — OXYCODONE HYDROCHLORIDE 5 MG/1
10 TABLET ORAL
Status: CANCELLED | OUTPATIENT
Start: 2022-12-28

## 2022-12-28 RX ORDER — ACETAMINOPHEN 325 MG/1
650 TABLET ORAL
Status: CANCELLED | OUTPATIENT
Start: 2022-12-28

## 2022-12-28 RX ORDER — ACETAMINOPHEN 650 MG/1
650 SUPPOSITORY RECTAL
Status: CANCELLED | OUTPATIENT
Start: 2022-12-28

## 2022-12-28 RX ORDER — POLYETHYLENE GLYCOL 3350 17 G/17G
17 POWDER, FOR SOLUTION ORAL DAILY PRN
Status: CANCELLED | OUTPATIENT
Start: 2022-12-28

## 2022-12-28 RX ORDER — GUAIFENESIN 600 MG/1
600 TABLET, EXTENDED RELEASE ORAL EVERY 12 HOURS
Status: CANCELLED | OUTPATIENT
Start: 2022-12-28

## 2022-12-28 RX ORDER — LOPERAMIDE HYDROCHLORIDE 2 MG/1
2 CAPSULE ORAL
Status: DISCONTINUED | OUTPATIENT
Start: 2022-12-28 | End: 2022-12-28 | Stop reason: HOSPADM

## 2022-12-28 RX ORDER — SODIUM CHLORIDE 0.9 % (FLUSH) 0.9 %
5-40 SYRINGE (ML) INJECTION AS NEEDED
Status: DISCONTINUED | OUTPATIENT
Start: 2022-12-28 | End: 2022-12-29

## 2022-12-28 RX ORDER — DEXAMETHASONE SODIUM PHOSPHATE 4 MG/ML
6 INJECTION, SOLUTION INTRA-ARTICULAR; INTRALESIONAL; INTRAMUSCULAR; INTRAVENOUS; SOFT TISSUE EVERY 24 HOURS
Status: DISCONTINUED | OUTPATIENT
Start: 2022-12-29 | End: 2022-12-29

## 2022-12-28 RX ORDER — DEXAMETHASONE SODIUM PHOSPHATE 4 MG/ML
6 INJECTION, SOLUTION INTRA-ARTICULAR; INTRALESIONAL; INTRAMUSCULAR; INTRAVENOUS; SOFT TISSUE EVERY 24 HOURS
Status: CANCELLED | OUTPATIENT
Start: 2022-12-29 | End: 2023-01-07

## 2022-12-28 RX ORDER — NALOXONE HYDROCHLORIDE 0.4 MG/ML
0.4 INJECTION, SOLUTION INTRAMUSCULAR; INTRAVENOUS; SUBCUTANEOUS
Status: DISCONTINUED | OUTPATIENT
Start: 2022-12-28 | End: 2022-12-29

## 2022-12-28 RX ORDER — ACETAMINOPHEN 325 MG/1
650 TABLET ORAL
Status: DISCONTINUED | OUTPATIENT
Start: 2022-12-28 | End: 2023-01-02 | Stop reason: HOSPADM

## 2022-12-28 RX ORDER — DOXYCYCLINE HYCLATE 100 MG
100 TABLET ORAL EVERY 12 HOURS
Status: DISCONTINUED | OUTPATIENT
Start: 2022-12-28 | End: 2022-12-29

## 2022-12-28 RX ORDER — GUAIFENESIN/DEXTROMETHORPHAN 100-10MG/5
5 SYRUP ORAL
Status: CANCELLED | OUTPATIENT
Start: 2022-12-28

## 2022-12-28 RX ORDER — SODIUM CHLORIDE 0.9 % (FLUSH) 0.9 %
5-40 SYRINGE (ML) INJECTION EVERY 8 HOURS
Status: CANCELLED | OUTPATIENT
Start: 2022-12-28

## 2022-12-28 RX ORDER — GUAIFENESIN 600 MG/1
600 TABLET, EXTENDED RELEASE ORAL EVERY 12 HOURS
Status: DISCONTINUED | OUTPATIENT
Start: 2022-12-28 | End: 2022-12-29

## 2022-12-28 RX ORDER — SODIUM CHLORIDE, SODIUM LACTATE, POTASSIUM CHLORIDE, CALCIUM CHLORIDE 600; 310; 30; 20 MG/100ML; MG/100ML; MG/100ML; MG/100ML
100 INJECTION, SOLUTION INTRAVENOUS CONTINUOUS
Status: DISCONTINUED | OUTPATIENT
Start: 2022-12-28 | End: 2022-12-29

## 2022-12-28 RX ORDER — GUAIFENESIN/DEXTROMETHORPHAN 100-10MG/5
5 SYRUP ORAL
Status: DISCONTINUED | OUTPATIENT
Start: 2022-12-28 | End: 2022-12-29

## 2022-12-28 RX ORDER — MAGNESIUM SULFATE HEPTAHYDRATE 40 MG/ML
2 INJECTION, SOLUTION INTRAVENOUS ONCE
Status: COMPLETED | OUTPATIENT
Start: 2022-12-28 | End: 2022-12-28

## 2022-12-28 RX ORDER — NALOXONE HYDROCHLORIDE 0.4 MG/ML
0.4 INJECTION, SOLUTION INTRAMUSCULAR; INTRAVENOUS; SUBCUTANEOUS
Status: CANCELLED | OUTPATIENT
Start: 2022-12-28

## 2022-12-28 RX ORDER — OXYCODONE HYDROCHLORIDE 5 MG/1
10 TABLET ORAL
Status: DISCONTINUED | OUTPATIENT
Start: 2022-12-28 | End: 2022-12-29

## 2022-12-28 RX ORDER — DEXAMETHASONE SODIUM PHOSPHATE 4 MG/ML
6 INJECTION, SOLUTION INTRA-ARTICULAR; INTRALESIONAL; INTRAMUSCULAR; INTRAVENOUS; SOFT TISSUE EVERY 24 HOURS
Status: DISCONTINUED | OUTPATIENT
Start: 2022-12-28 | End: 2022-12-28 | Stop reason: HOSPADM

## 2022-12-28 RX ORDER — ACETAMINOPHEN 650 MG/1
650 SUPPOSITORY RECTAL
Status: DISCONTINUED | OUTPATIENT
Start: 2022-12-28 | End: 2023-01-02 | Stop reason: HOSPADM

## 2022-12-28 RX ORDER — SODIUM CHLORIDE, SODIUM LACTATE, POTASSIUM CHLORIDE, CALCIUM CHLORIDE 600; 310; 30; 20 MG/100ML; MG/100ML; MG/100ML; MG/100ML
100 INJECTION, SOLUTION INTRAVENOUS CONTINUOUS
Status: CANCELLED | OUTPATIENT
Start: 2022-12-28

## 2022-12-28 RX ORDER — POLYETHYLENE GLYCOL 3350 17 G/17G
17 POWDER, FOR SOLUTION ORAL DAILY PRN
Status: DISCONTINUED | OUTPATIENT
Start: 2022-12-28 | End: 2022-12-29

## 2022-12-28 RX ADMIN — DOXYCYCLINE HYCLATE 100 MG: 100 TABLET, COATED ORAL at 22:20

## 2022-12-28 RX ADMIN — DOXYCYCLINE HYCLATE 100 MG: 100 TABLET, COATED ORAL at 08:57

## 2022-12-28 RX ADMIN — VANCOMYCIN HYDROCHLORIDE 750 MG: 750 INJECTION, POWDER, LYOPHILIZED, FOR SOLUTION INTRAVENOUS at 14:16

## 2022-12-28 RX ADMIN — SODIUM CHLORIDE, PRESERVATIVE FREE 10 ML: 5 INJECTION INTRAVENOUS at 22:21

## 2022-12-28 RX ADMIN — CEFEPIME 1 G: 1 INJECTION, POWDER, FOR SOLUTION INTRAMUSCULAR; INTRAVENOUS at 15:30

## 2022-12-28 RX ADMIN — LOPERAMIDE HYDROCHLORIDE 2 MG: 2 CAPSULE ORAL at 09:07

## 2022-12-28 RX ADMIN — GUAIFENESIN 600 MG: 600 TABLET, EXTENDED RELEASE ORAL at 08:57

## 2022-12-28 RX ADMIN — POTASSIUM BICARBONATE 40 MEQ: 782 TABLET, EFFERVESCENT ORAL at 10:11

## 2022-12-28 RX ADMIN — DEXAMETHASONE SODIUM PHOSPHATE 6 MG: 4 INJECTION, SOLUTION INTRAMUSCULAR; INTRAVENOUS at 08:57

## 2022-12-28 RX ADMIN — GUAIFENESIN 600 MG: 600 TABLET, EXTENDED RELEASE ORAL at 22:20

## 2022-12-28 RX ADMIN — SODIUM CHLORIDE, PRESERVATIVE FREE 10 ML: 5 INJECTION INTRAVENOUS at 05:44

## 2022-12-28 RX ADMIN — SODIUM CHLORIDE, POTASSIUM CHLORIDE, SODIUM LACTATE AND CALCIUM CHLORIDE 100 ML/HR: 600; 310; 30; 20 INJECTION, SOLUTION INTRAVENOUS at 22:20

## 2022-12-28 RX ADMIN — MAGNESIUM SULFATE HEPTAHYDRATE 2 G: 2 INJECTION, SOLUTION INTRAVENOUS at 12:30

## 2022-12-28 RX ADMIN — SODIUM CHLORIDE, POTASSIUM CHLORIDE, SODIUM LACTATE AND CALCIUM CHLORIDE 100 ML/HR: 600; 310; 30; 20 INJECTION, SOLUTION INTRAVENOUS at 05:45

## 2022-12-28 NOTE — PROGRESS NOTES
[address]  Breast Navigator Encounter    Name:    Fabby Noble  Age:    57 y.o. Diagnosis:     RIGHT breast cancer    Received message from patient's sister, Denise Huff, asking about plans for patient. Will she have chemo tomorrow, etc?  Went to see her yesterday, and she seemed a little confused. I confirmed that the patient had signed a permission to release info form at [facility] which had Lizz's name as a person to receive information. I explained that I am sure that the patient will not have her chemo tomorrow since she is ill. I asked her to call Dr. Jeb Loza office for an update. I let her know that Dr. Yanira Ward is out of town, however her NP, Juan Allred is working. I had reached out to Juan Allred to let her know that Cara's sister had called me. Denise Huff is trying to assist Jesse with getting a new living situation. I told her that I had reached out to a  before the holiday, so she is aware that the patient needs help. I will reach out to her again. Denise Huff was very appreciative. She will call Dr. Jeb Loza office for an update.                                Michele Hdez, RN, BSN, [facility]  Oncology Breast Navigator     [address]  [address] [facility]  W: [phone]  F: [phone]  [email or web address]  Good Help to Those in [facility]

## 2022-12-28 NOTE — PROGRESS NOTES
[facility] Dosing Services: Vancomycin Daily Dosing Note  Consult for dosing of vancomycin by Dr. Saskia Rangel   Indication: HAP/sepsis   Day of Therapy: 3    Ht Readings from Last 1 Encounters:   12/26/22 152.4 cm (60\")     Wt Readings from Last 1 Encounters:   12/27/22 65.3 kg (144 lb)       Vancomycin:   Current maintenance dose: dose by levels    Last level 11.2 mcg/ml approximately 38 hours post dose    Dose calculated to approximate a target AUC/SHAMAR of 400-600, trough 15-20 mcg/ml. Plan: ~36 hour level < 15 mcg/ml. Will dose 750 mg  x 1 and continue to dose by levels due to worsening CHRISTA. Dose administration notes:  Doses given appropriately as scheduled    Date Dose & Interval Measured level AUC/SHAMAR and Extrapolated trough (mcg/mL)   12/27 1750 mg x 1 19.3 (~9.5 hrs post dose)    12/28  11.2 (~38 hrs post load)               Other Antimicrobial   (not dosed by pharmacist) Doxycycline  Cefepmie    Cultures 12/26 BCX: in process   12/27: Legionella: in process  12/27: Strep pneumo: in process   12/27 MRSA nares: in process  12/27 sputum culture: pending    Serum Creatinine Lab Results   Component Value Date/Time    Creatinine 3.39 (H) 12/28/2022 03:20 AM       Creatinine Clearance Estimated Creatinine Clearance: 14.1 mL/min (A) (based on SCr of 3.39 mg/dL (H)). Temp Temp: 98.2 °F (36.8 °C)     WBC Lab Results   Component Value Date/Time    WBC 8.5 12/28/2022 03:20 AM      Procalcitonin  Lab Results   Component Value Date/Time    Procalcitonin 15.10 12/27/2022 04:40 AM        Pharmacy will follow the patient on a daily basis and make adjustments based on patient's clinical status.     Thank you,     Jimmy Thorne, [address]

## 2022-12-28 NOTE — CONSULTS
Acute Kidney Insufficiency Consult        Reason for consultation: CHRISTA      Assessment:   Non oliguric CHRISTA stage 3  Hypokalemia  Hypoalbuminemia  Hemoptysis  COVID pneumonia  Multifocal pneumonia    -Suspecting her CHRISTA is likely from ATN related to sepsis/pneumonia, hypoxia, r/o pre renal CHRISTA. Also was on ACEI at home which can put him at risk of pre renal CHRISTA. Ketrolac on 12/26 may add up to it.  -r/o Glomerulonephritis ivo hemoptysis  -no obstruction on CT abd/pelvis      Plan:   -urinalysis to check for RBC, microscopy  -obtain urine electrolytes  -Awaiting CT chest being done for hemoptysis, r/o DAH/vasculitis  -on decadron for COVID pneumonia  -Cont empiric antibiotics and other supportive care  -may give additional fluid resuscitation based on urine electrolytes. Will follow up with you. Subjective:   HPI:  A 57year old woman with hx of ductal cell breast ca on ?immunotherapy/chemotherapy, presented with left flank pain, positive COVID few days ago, currently being managed for multifocal pneumonia. Has tachycardia. She has also hemoptysis. Creatinine on 12/26 was 2.9, went up to 3.39 this morning. Baseline creatinine on 12/14/22 was 0.6  Received 2 liter fluid 02 days ago. Making good amount of urine.   We're consulted for evaluation of CHRISTA    Patient Active Problem List    Diagnosis Date Noted    Troponin level elevated 12/27/2022    Elevated LFTs 12/27/2022    Pneumonia 12/26/2022    CHRISTA (acute kidney injury) ([facility] [address] 12/26/2022    COVID-19 12/26/2022    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 [facility]) 09/16/2022    Menopausal symptoms 07/18/2011     Past Medical History:   Diagnosis Date    Cancer [facility])     BREAST CANCER    GERD (gastroesophageal reflux disease)     HTN (hypertension) 07/18/2011    Psychiatric disorder     ANIXETY AND DEPRESSION    Thyroid disease     HYPOTHYROID      Past Surgical History:   Procedure Laterality Date    HX COLONOSCOPY      HX MYOMECTOMY  05/02/1996    x 1    HX WISDOM TEETH EXTRACTION       Family History   Problem Relation Age of Onset    Heart Failure Mother     Cancer Father     Diabetes Sister     Kidney Disease Sister     Anesth Problems Neg Hx       Social History     Tobacco Use    Smoking status: Never     Passive exposure: Never    Smokeless tobacco: Never   Substance Use Topics    Alcohol use: Never      Allergies   Allergen Reactions    Sulfa (Sulfonamide Antibiotics) Itching, Swelling and Unknown (comments)      Prior to Admission medications    Medication Sig Start Date End Date Taking? Authorizing Provider   Deann Tobar, IV chemotherapy   Yes Provider, Historical   carvediloL (COREG) 6.25 mg tablet Take 6.25 mg by mouth two (2) times daily (with meals). 11/3/22  Yes Provider, Historical   famotidine (PEPCID) 40 mg/5 mL (8 mg/mL) suspension Take 2.5 mL by mouth two (2) times a day. 11/30/22  Yes Laly Forte NP   gabapentin (NEURONTIN) 100 mg capsule Take 1 Capsule by mouth three (3) times daily. Max Daily Amount: 300 mg. 11/30/22  Yes Laly Forte NP   losartan (COZAAR) 25 mg tablet Take 25 mg by mouth daily. Yes Provider, Historical   dexAMETHasone (DECADRON) 4 mg tablet Take 2 tabs (8mg) once daily on days 2 & 3 of chemotherapy 9/28/22  Yes Xochitl Mccracken NP   ondansetron (ZOFRAN ODT) 8 mg disintegrating tablet Take 1 Tablet by mouth every eight (8) hours as needed for Nausea or Vomiting. 9/28/22  Yes Xochitl Mccracken NP   prochlorperazine (Compazine) 5 mg tablet Take 1 Tablet by mouth every six (6) hours as needed for Nausea or Vomiting. 9/28/22  Yes Laly Forte NP   lidocaine-prilocaine (EMLA) topical cream Apply  to affected area as needed for Pain. Apply a thick layer over port a cath 30-60 minutes prior to port access 9/28/22  Yes Laly Forte NP   PARoxetine (PAXIL) 20 mg tablet Take 20 mg by mouth daily. Yes Provider, Historical   rosuvastatin (CRESTOR) 10 mg tablet Take 10 mg by mouth nightly.  8/15/22  Yes Provider, Historical   multivitamin, tx-iron-ca-min (THERA-M w/ IRON) 9 mg iron-400 mcg tab tablet Take 1 Tablet by mouth daily. Yes Provider, Historical     Current Facility-Administered Medications   Medication Dose Route Frequency    vancomycin: pharmacy dosing by levels    Other Rx Dosing/Monitoring    vancomycin: please obtain a vancomycin level this morning.  thanks   Other ONCE    dexamethasone (DECADRON) 4 mg/mL injection 6 mg  6 mg IntraVENous Q24H    loperamide (IMODIUM) capsule 2 mg  2 mg Oral Q4H PRN    potassium bicarb-citric acid (EFFER-K) tablet 40 mEq  40 mEq Oral BID    naloxone (NARCAN) injection 0.4 mg  0.4 mg IntraVENous EVERY 2 MINUTES AS NEEDED    [Held by provider] heparin (porcine) injection 5,000 Units  5,000 Units SubCUTAneous Q8H    guaiFENesin ER (MUCINEX) tablet 600 mg  600 mg Oral Q12H    sodium chloride (NS) flush 5-10 mL  5-10 mL IntraVENous PRN    sodium chloride (NS) flush 5-40 mL  5-40 mL IntraVENous Q8H    sodium chloride (NS) flush 5-40 mL  5-40 mL IntraVENous PRN    acetaminophen (TYLENOL) tablet 650 mg  650 mg Oral Q6H PRN    Or    acetaminophen (TYLENOL) suppository 650 mg  650 mg Rectal Q6H PRN    polyethylene glycol (MIRALAX) packet 17 g  17 g Oral DAILY PRN    [Held by provider] ondansetron (ZOFRAN ODT) tablet 4 mg  4 mg Oral Q8H PRN    Or    [Held by provider] ondansetron (ZOFRAN) injection 4 mg  4 mg IntraVENous Q6H PRN    [Held by provider] rosuvastatin (CRESTOR) tablet 10 mg  10 mg Oral QHS    [Held by provider] gabapentin (NEURONTIN) capsule 100 mg  100 mg Oral BID    lactated Ringers infusion  100 mL/hr IntraVENous CONTINUOUS    oxyCODONE IR (ROXICODONE) tablet 10 mg  10 mg Oral Q4H PRN    doxycycline (VIBRA-TABS) tablet 100 mg  100 mg Oral Q12H    cefepime (MAXIPIME) 1 g in 0.9% sodium chloride (MBP/ADV) 50 mL MBP  1 g IntraVENous Q12H    guaiFENesin-dextromethorphan (ROBITUSSIN DM) 100-10 mg/5 mL syrup 5 mL  5 mL Oral Q4H PRN       Review of Systems:  Not obtained (e-consult)    Objective:     Patient Vitals for the past 8 hrs:   BP Temp Pulse Resp SpO2   22 0850 (!) 140/71 98.2 °F (36.8 °C) 77 19 93 %   22 0800 -- -- 83 (!) 32 97 %   22 0730 -- -- (!) 102 16 96 %   22 0604 -- -- 75 22 100 %   22 0430 -- -- (!) 104 24 100 %   22 0400 -- -- 94 19 98 %   22 0326 (!) 132/55 100.2 °F (37.9 °C) 94 (!) 39 93 %      Temp (24hrs), Av.6 °F (37.6 °C), Min:98.2 °F (36.8 °C), Max:101.2 °F (38.4 °C)    Intake and Output:   1901 -  0700  In: 3626.7 [P.O.:1000; I.V.:2626.7]  Out: 1000 [Urine:1000]  No intake/output data recorded. Physical Exam:  This is e-consult, not examined. Per discussion with attending MD, awake and alert, mild distress        Data Review: CBC:   Lab Results   Component Value Date/Time    WBC 8.5 2022 03:20 AM    RBC 2.75 (L) 2022 03:20 AM    HGB 8.3 (L) 2022 03:20 AM    HCT 24.1 (L) 2022 03:20 AM    PLATELET 360  03:20 AM   , BMP:   Lab Results   Component Value Date/Time    Glucose 101 (H) 2022 03:20 AM    Sodium 134 (L) 2022 03:20 AM    Potassium 2.8 (L) 2022 03:20 AM    Chloride 101 2022 03:20 AM    CO2 23 2022 03:20 AM    BUN 38 (H) 2022 03:20 AM    Creatinine 3.39 (H) 2022 03:20 AM    Calcium 8.4 (L) 2022 03:20 AM       Reviewed: CT scan, x-rays, and labs      Signed By: Roslyn Toth MD, FASN                      2022       [email or web address]

## 2022-12-28 NOTE — DISCHARGE SUMMARY
Hospitalist Discharge Summary     Patient ID:  Sedrick Og  [id number]  57 y.o.  1960    PCP on record: Delta Wang MD    Admit date: 12/26/2022  Discharge date and time: 12/28/2022    Please note that this dictation was completed with [employer], the [employer] voice recognition software. Quite often unanticipated grammatical, syntax, homophones, and other interpretive errors are inadvertently transcribed by the computer software. Please disregard these errors. Please excuse any errors that have escaped final proofreading. Admission Diagnoses: Pneumonia [J18.9]  COVID-19 [U07.1]  CHRISTA (acute kidney injury) ([facility] [address] [N17.9]    Discharge Diagnoses: Active Problems:    Pneumonia (12/26/2022)      CHRISTA (acute kidney injury) ([facility] [address] (12/26/2022)      COVID-19 (12/26/2022)      Troponin level elevated (12/27/2022)      Elevated LFTs (12/27/2022)           Hospital Course:   #Multifocal pneumonia in the setting of immunocompromise patient due to chemotherapy POA worsening  #Acute hypoxic respiratory failure due to above  #Moderate left pleural effusion POA  #Bandemia  #Hemoptysis not POA  #COVID-19 infection/pneumonia POA  #Sepsis due to above POA  -Chest x-ray reviewed dependently bilateral airspace disease.  -Chest CT with multifocal bilateral airspace opacity concerning for multifocal pneumonia. Focus on dependent left lower lobe may reflect organizing pneumonia.   Moderate left and right pleural effusion noted  -Patient dropping down to 70% on room air  -COVID-19 PCR positive  -On admission heart rate 136 respiratory rate 27  -Lactic acid within normal limit  -Received sepsis protocol fluid in the ER  -CRP 37 > 24  procalcitonin 15  -Sputum culture with Staph aureus sensitivities pending  -MRSA nares pending  -Blood culture no growth to date  -Legionella and strep pneumo antigen pending  -Band neutrophils 10%  today           -Vanc cefepime and Doxy to cover healthcare associated pneumonia given patient receives regular chemotherapy  - Dexamethasone IV 6 mg for 10 days  - Not a candidate for barcitinib d/t CHRISTA   -Hold heparin  -Trend hemoglobin  -Supplemental oxygen  -Patient was transferred to [facility] for higher level of care given multilevel organ failure including elevated troponin and elevated creatinine hypoxia requiring oxygen. She would benefit from pulmonology nephrology and cardiology consultation. May need ID services if sputum grows MRSA.   Given patient has pleural effusion that might require drainage  -  patient accepted by hospitalist service appreciate help        #Blood loss anemia POA  -Due to hemoptysis  -Hemoglobin down to 8.3  - trend H/H   - Hold         #Acute kidney injury POA worsening   -Serum creatinine 2.91 >3.09 (   baseline 0.68)   -Suspect prerenal in the setting of sepsis  -CT abdomen pelvis no obstruction     -Continue IVF  -Trend creatinine  - nephrology consulted  - UA to look for hematuria      #Elevated troponin- trend flat   #Type II troponin elevation flat trend   --Troponin 1007 > 845 > 652   -    -EKG reviewed independentlyNo acute ischemic process nonspecific T wave changes  -TTE with hypokinesis noted Dr. Maria Schumacher from cardiology updated about changes        -Telemetry  -Cardiology consulted     #Elevated D-dimer  -Cardio multifactorial due to underlying 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  -VQ scan low probability for PE     #Left flank pain resolved          #Peripheral neuropathy in the setting of chemotherapy  -Continue gabapentin     #Prolonged QTC POA  -EKG reviewed apparently QTC greater than 500  -Keep mag above 2 potassium above 4  -Hold QT prolonging medication        #Hypokalemia  -Replete     #Invasive ductal carcinoma POA  -Received chemotherapy  -Last chemo December 21, 2022           CONSULTATIONS:  IP CONSULT TO HOSPITALIST  IP CONSULT TO CARDIOLOGY  IP CONSULT TO NEPHROLOGY  IP CONSULT TO CARDIOLOGY  IP CONSULT TO NEPHROLOGY    Excerpted HPI from H&P of Charley Rowland MD:    Munira Lombardo is a 57 y.o.  female with PMH of above-mentioned problem who presents to ED with c/o left flank pain the past couple of days. Patient denied any fever or chills difficulty with urination he denied any burning sensation in urination. Not any white pus in urine or blood. Patient stated that she tested positive for COVID 3 days ago and has been having diarrhea since then. Patient is also active receiving chemotherapy last chemotherapy was in December 25, 2022 for left breast cancer. Denies alcohol use tobacco use recreational drug use  ______________________________________________________________________  DISCHARGE SUMMARY/HOSPITAL COURSE:  for full details see H&P, daily progress notes, labs, consult notes. _______________________________________________________________________  Patient seen and examined by me on discharge day. Pertinent Findings:  Gen:    Not in distress  Chest breathing bilaterally  CVS:   Regular rhythm. No edema  Abd:  Soft, not distended, not tender  Neuro:  Alert with good insight. Oriented to person, place, and time   _______________________________________________________________________  DISCHARGE MEDICATIONS:   Current Discharge Medication List          My Recommended Diet, Activity, Wound Care, and follow-up labs are listed in the patient's Discharge Insturctions which I have personally completed and reviewed.     _______________________________________________________________________  DISPOSITION:     Home with Family:    Home with HH/PT/OT/RN:    SNF/LTC:    BENEDICT:    OTHER:        Condition at Discharge: Offered to [facility] for higher level of care  _______________________________________________________________________  Follow up with:   PCP : Yamilet Kebede MD  Follow-up Information       Follow up With Specialties Details Why Contact Info    Yamilet Kebede MD Family Medicine Follow up on 1/1/2023 PCP- As discussed, please call no later than 1 day after discharge and schedule appointment. [id number] [place] [address]   [phone]      [facility] Follow up on 1/1/2023 Please call to schedule a home visit after discharge. A provider will come to the home and report information to your PCP. Please call and check if your insurance is accepted.  [address] [place] [address] Ave    Alena Lipoma Priti Mccoy[address]  [facility].  [phone]                  Total time in minutes spent coordinating this discharge (includes going over instructions, follow-up, prescriptions, and preparing report for sign off to her PCP) :  120 minutes    Signed:  Miguel Medley MD    '

## 2022-12-28 NOTE — PROGRESS NOTES
Cardiology Progress Note  12/28/2022 11:20 AM    Admit Date: 12/26/2022    Admit Diagnosis: Pneumonia [J18.9];COVID-19 [U07.1]; CHRISTA (acute kidney injury) ([facility] [address] [N17.9]      Assessment:     Active Problems:    Pneumonia (12/26/2022)      CHRISTA (acute kidney injury) ([facility] [address] (12/26/2022)      COVID-19 (12/26/2022)      Troponin level elevated (12/27/2022)      Elevated LFTs (12/27/2022)        Plan:   Echo shows EF about 45%, possible inferior hypokinesis but unclear due to technical limitations of study. Despite IV hydration her renal function is worse, anemia worse also, now with hemoptysis according to hospitalist.  She seems to be worsening, arrangements being made for transfer to [facility] today. Hafsa Bellamy MD  [phone]  Cell        Subjective:     Danelle Ramirez admits to chest pressure/discomfort, dyspnea. She reports symptoms are unchanged since yesterday. ROS: negative except as noted above.     Objective:       Visit Vitals  BP (!) 140/71 (BP 1 Location: Right upper arm)   Pulse 77   Temp 98.2 °F (36.8 °C)   Resp 19   Ht 5' (1.524 m)   Wt 144 lb (65.3 kg)   SpO2 93%   Breastfeeding No   BMI 28.12 kg/m²       Current Facility-Administered Medications   Medication Dose Route Frequency    vancomycin: pharmacy dosing by levels    Other Rx Dosing/Monitoring    dexamethasone (DECADRON) 4 mg/mL injection 6 mg  6 mg IntraVENous Q24H    loperamide (IMODIUM) capsule 2 mg  2 mg Oral Q4H PRN    cefepime (MAXIPIME) 1 g in 0.9% sodium chloride (MBP/ADV) 50 mL MBP  1 g IntraVENous Q12H    potassium bicarb-citric acid (EFFER-K) tablet 40 mEq  40 mEq Oral BID    naloxone (NARCAN) injection 0.4 mg  0.4 mg IntraVENous EVERY 2 MINUTES AS NEEDED    [Held by provider] heparin (porcine) injection 5,000 Units  5,000 Units SubCUTAneous Q8H    guaiFENesin ER (MUCINEX) tablet 600 mg  600 mg Oral Q12H    sodium chloride (NS) flush 5-10 mL  5-10 mL IntraVENous PRN    sodium chloride (NS) flush 5-40 mL  5-40 mL IntraVENous Q8H    sodium chloride (NS) flush 5-40 mL  5-40 mL IntraVENous PRN    acetaminophen (TYLENOL) tablet 650 mg  650 mg Oral Q6H PRN    Or    acetaminophen (TYLENOL) suppository 650 mg  650 mg Rectal Q6H PRN    polyethylene glycol (MIRALAX) packet 17 g  17 g Oral DAILY PRN    [Held by provider] ondansetron (ZOFRAN ODT) tablet 4 mg  4 mg Oral Q8H PRN    Or    [Held by provider] ondansetron (ZOFRAN) injection 4 mg  4 mg IntraVENous Q6H PRN    [Held by provider] rosuvastatin (CRESTOR) tablet 10 mg  10 mg Oral QHS    [Held by provider] gabapentin (NEURONTIN) capsule 100 mg  100 mg Oral BID    lactated Ringers infusion  100 mL/hr IntraVENous CONTINUOUS    oxyCODONE IR (ROXICODONE) tablet 10 mg  10 mg Oral Q4H PRN    doxycycline (VIBRA-TABS) tablet 100 mg  100 mg Oral Q12H    guaiFENesin-dextromethorphan (ROBITUSSIN DM) 100-10 mg/5 mL syrup 5 mL  5 mL Oral Q4H PRN         Physical Exam:  Visit Vitals  BP (!) 140/71 (BP 1 Location: Right upper arm)   Pulse 77   Temp 98.2 °F (36.8 °C)   Resp 19   Ht 5' (1.524 m)   Wt 144 lb (65.3 kg)   SpO2 93%   Breastfeeding No   BMI 28.12 kg/m²     General Appearance:  Well developed, well nourished,alert and oriented x 3, individual in mild acute distress. Ears/Nose/Mouth/Throat:   Hearing grossly normal.         Neck: Supple. Chest:   Lungs with basilar crackles to auscultation bilaterally. Cardiovascular:  Regular rate and rhythm, S1, S2 normal, no murmur. Abdomen:   Soft, non-tender, bowel sounds are active. Extremities: No edema bilaterally. Skin: Warm and dry.                Telemetry: normal sinus rhythm    Data Review:     Labs:    Recent Results (from the past 24 hour(s))   ECHO ADULT FOLLOW-UP OR LIMITED    Collection Time: 12/27/22 11:46 AM   Result Value Ref Range    Fractional Shortening 2D 20 28 - 44 %    LV ESV Teich 45 mL    LV ESV Index Teich 28 mL/m2    LV EDV Teich 78 mL    LV EDV Index Teich 48 mL/m2    LVIDd 4.1 3.9 - 5.3 cm    LVIDd Index 2.53 cm/m2    LVIDs 3.3 cm    LVIDs Index 2.04 cm/m2    IVSd 0.9 0.6 - 0.9 cm    LVPWd 0.9 0.6 - 0.9 cm    LV RWT Ratio 0.44     LV Mass 2D 114.1 67 - 162 g    LV Mass 2D Index 70.5 43 - 95 g/m2    LVOT Diameter 1.4 cm    LVOT Area 1.5 cm2    Aortic Root 2.1 cm    Ao Root Index 1.30 cm/m2   CULTURE, RESPIRATORY/SPUTUM/BRONCH W GRAM STAIN    Collection Time: 12/27/22  1:15 PM    Specimen: Sputum   Result Value Ref Range    Special Requests: NO SPECIAL REQUESTS      GRAM STAIN OCCASIONAL WBCS SEEN      GRAM STAIN RARE EPITHELIAL CELLS SEEN      GRAM STAIN NO ORGANISMS SEEN      Culture result: PENDING    GLUCOSE, POC    Collection Time: 12/27/22  9:30 PM   Result Value Ref Range    Glucose (POC) 103 65 - 117 mg/dL    Performed by Joseph Gonzales    CBC WITH AUTOMATED DIFF    Collection Time: 12/28/22  3:20 AM   Result Value Ref Range    WBC 8.5 3.6 - 11.0 K/uL    RBC 2.75 (L) 3.80 - 5.20 M/uL    HGB 8.3 (L) 11.5 - 16.0 g/dL    HCT 24.1 (L) 35.0 - 47.0 %    MCV 87.6 80.0 - 99.0 FL    MCH 30.2 26.0 - 34.0 PG    MCHC 34.4 30.0 - 36.5 g/dL    RDW 16.9 (H) 11.5 - 14.5 %    PLATELET 794 634 - 768 K/uL    MPV 10.6 8.9 - 12.9 FL    NRBC 0.2 (H) 0  WBC    ABSOLUTE NRBC 0.02 (H) 0.00 - 0.01 K/uL    NEUTROPHILS 66 32 - 75 %    BAND NEUTROPHILS 10 (H) 0 - 6 %    LYMPHOCYTES 13 12 - 49 %    MONOCYTES 4 (L) 5 - 13 %    EOSINOPHILS 0 0 - 7 %    BASOPHILS 2 (H) 0 - 1 %    METAMYELOCYTES 1 (H) 0 %    MYELOCYTES 4 (H) 0 %    IMMATURE GRANULOCYTES 0 %    ABS. NEUTROPHILS 6.5 1.8 - 8.0 K/UL    ABS. LYMPHOCYTES 1.1 0.8 - 3.5 K/UL    ABS. MONOCYTES 0.3 0.0 - 1.0 K/UL    ABS. EOSINOPHILS 0.0 0.0 - 0.4 K/UL    ABS. BASOPHILS 0.2 (H) 0.0 - 0.1 K/UL    ABS. IMM.  GRANS. 0.0 K/UL    DF MANUAL      RBC COMMENTS ANISOCYTOSIS  1+        WBC COMMENTS TOXIC GRANULATION     METABOLIC PANEL, COMPREHENSIVE    Collection Time: 12/28/22  3:20 AM   Result Value Ref Range    Sodium 134 (L) 136 - 145 mmol/L    Potassium 2.8 (L) 3.5 - 5.1 mmol/L    Chloride 101 97 - 108 mmol/L    CO2 23 21 - 32 mmol/L    Anion gap 10 5 - 15 mmol/L    Glucose 101 (H) 65 - 100 mg/dL    BUN 38 (H) 6 - 20 MG/DL    Creatinine 3.39 (H) 0.55 - 1.02 MG/DL    BUN/Creatinine ratio 11 (L) 12 - 20      eGFR 15 (L) >60 ml/min/1.73m2    Calcium 8.4 (L) 8.5 - 10.1 MG/DL    Bilirubin, total 0.4 0.2 - 1.0 MG/DL    ALT (SGPT) 169 (H) 12 - 78 U/L    AST (SGOT) 269 (H) 15 - 37 U/L    Alk.  phosphatase 115 45 - 117 U/L    Protein, total 5.8 (L) 6.4 - 8.2 g/dL    Albumin 1.8 (L) 3.5 - 5.0 g/dL    Globulin 4.0 2.0 - 4.0 g/dL    A-G Ratio 0.5 (L) 1.1 - 2.2     C REACTIVE PROTEIN, QT    Collection Time: 12/28/22  3:20 AM   Result Value Ref Range    C-Reactive protein 24.10 (H) 0.00 - 0.60 mg/dL   MAGNESIUM    Collection Time: 12/28/22  9:08 AM   Result Value Ref Range    Magnesium 1.5 (L) 1.6 - 2.4 mg/dL       Steve Luna MD

## 2022-12-28 NOTE — PROGRESS NOTES
Problem: Airway Clearance - Ineffective  Goal: Achieve or maintain patent airway  Outcome: Progressing Towards Goal     Problem: Gas Exchange - Impaired  Goal: Absence of hypoxia  Outcome: Progressing Towards Goal  Goal: Promote optimal lung function  Outcome: Progressing Towards Goal     Problem: Breathing Pattern - Ineffective  Goal: Ability to achieve and maintain a regular respiratory rate  Outcome: Progressing Towards Goal     Problem: Body Temperature -  Risk of, Imbalanced  Goal: Ability to maintain a body temperature within defined limits  Outcome: Progressing Towards Goal  Goal: Will regain or maintain usual level of consciousness  Outcome: Progressing Towards Goal  Goal: Complications related to the disease process, condition or treatment will be avoided or minimized  Outcome: Progressing Towards Goal     Problem: Isolation Precautions - Risk of Spread of Infection  Goal: Prevent transmission of infectious organism to others  Outcome: Progressing Towards Goal     Problem: Nutrition Deficits  Goal: Optimize nutrtional status  Outcome: Progressing Towards Goal     Problem: Risk for Fluid Volume Deficit  Goal: Maintain normal heart rhythm  Outcome: Progressing Towards Goal  Goal: Maintain absence of muscle cramping  Outcome: Progressing Towards Goal  Goal: Maintain normal serum potassium, sodium, calcium, phosphorus, and pH  Outcome: Progressing Towards Goal     Problem: Loneliness or Risk for Loneliness  Goal: Demonstrate positive use of time alone when socialization is not possible  Outcome: Progressing Towards Goal     Problem: Fatigue  Goal: Verbalize increase energy and improved vitality  Outcome: Progressing Towards Goal     Problem: Patient Education: Go to Patient Education Activity  Goal: Patient/Family Education  Outcome: Progressing Towards Goal     Problem: Falls - Risk of  Goal: *Absence of Falls  Description: Document Rubina Fall Risk and appropriate interventions in the flowsheet.   Outcome: Progressing Towards Goal  Note: Fall Risk Interventions:            Medication Interventions: Evaluate medications/consider consulting pharmacy, Patient to call before getting OOB                   Problem: Patient Education: Go to Patient Education Activity  Goal: Patient/Family Education  Outcome: Progressing Towards Goal     Problem: Pain  Goal: *Control of Pain  Outcome: Progressing Towards Goal     Problem: Patient Education: Go to Patient Education Activity  Goal: Patient/Family Education  Outcome: Progressing Towards Goal

## 2022-12-28 NOTE — PROGRESS NOTES
1920- Bedside and Verbal shift change report given to Umesh Liriano RN   (oncoming nurse) by Bard Margarette RN (offgoing nurse). Report included the following information SBAR, Kardex, Intake/Output, MAR, and Cardiac Rhythm      2200- Pt resting in bed. Appeared angry and agitated when I walked into the room. She was shivering and complained of being cold. \"Pt stated I'm cold and want my heater back. \" Pt was made aware that it is a hazard to have a portable heater in the room per engineering and that they had discussed it with her yesterday. The heat was on 73 degrees and she was given 2 extra blankets on top of the 3 she already had on the bed covering her. Pt began rolling her eyes and when she was asked a question while doing her assessment, the pt would not answer and after several times of asking the same question she would finally answer in a loud tone. Pt was short of breath when talking, tachypniec with a RR in the mid 20's-30's, her oxygenation was 91-93% on room air, she was encouraged to wear the oxygenation until she settled down but the pt refused. \"Pt stated, just let me pass out then. \" After much encouragement and the reason explained in great length the benefits of the oxygen, she finally agreed to wear the oxygen. Pt coughed up a moderate amount of thick brown sputum and spit it out on the bedside table, she was given a green bag and paper towels and instructed to spit her secretions in them and not on the table. Pt was incontinent of a moderate amount of loose brown stool in the bed but did not want me to clean her up, instead she put a white tova pad on top it. This nurse made the pt aware that I couldn't leave her lying in stool and I could clean her up in the bed, she continued to roll her eyes. This nurse changed all the linen on the bed and the pt did her melissa care.  Pt appears weak and was instructed to call for assistance to get out of bed for safety, her bed alarm was put on and call bell is in reach. 2300- The pt requested to take a shower but became angry when this nurse told her that she could not. This nurse made the pt aware that she was on a continuous cardiac monitor and that she was too short of breath to get in the shower. The pt grabbed her cell phone and called someone. The pt stated \"i'm sick of this, you may have to come up here and woop some ass. \"    0100- Pt continues to cough up small amounts of thick tan/brown sputum that is now streaked with a small amount of blood, no blood clots seen. Pt continues to desat to the 70-80's on room air when up out of bed. She was encouraged to wear the oxygen especially when up out of bed.    0300- Pt febrile at 101.2, no chills or rigors noted. Dr. Cindy Carlson was made aware and ordered blood cx's. The pt was blood cx'd x 1 from the port but refused to be stuck for more blood work. She also refused to take the Tylenol that was brought in to help bring down her temp. She refused to her heparin shot as well. Pt continues to be incontinent of stool in the bed.     0500- Pt had a moderate amount of bloody sputum on a paper towel on the bedside table, no blood clots seen. 0600- Dr. Cindy Carlson was made aware of the pt coughing up secretions mixed with blood. He was also made aware of the pts morning lab results. Will continue to monitor.

## 2022-12-28 NOTE — PROGRESS NOTES
[facility]   Oncology Social Work  Encounter     Patient Name: Bethanie Dandy      Medical History: breast cancer     Advance Directives: none on file    Narrative: Social work referral received from [facility] Breast Nurse Navigator regarding housing barriers. Patient is currently admitted to hospital with [facility]. [facility] patient and requested a return call. Spoke with Alice Samson and provided active listening as she shared with me patient has an issue with hoarding. The home patient rents and has lived in for many years is filled with clutter. Patient's nephew offered for patient to stay with him a few days following chemotherapy so she has a safe/clean place to rest. Patient is now refusing to return to her home and has started hoarding at his house. As a result he asked to her to leave 3 weeks ago but she has refused. Patient also does not want to return to her house and is asking for temporary housing. It is also noted a  reported the condition of the house to the landlord who has agreed to give patient time to clean out the house before taking more action. Alice Samson shared patient shuts down whenever approached about her hoarding. She believes patient is overwhelmed which is why she has not been able to make any progress in cleaning to home. She reports patient is very worried about her housing situation. Alice Samson would like to help her sister but she is unsure what to do and also feeling very overwhelmed. She contacted a hoarding organization for support and is waiting for them to call her back. She does not feel moving will resolve the issue of hoarding. Plan: This  will contact patient to assess insight and explore readiness to charge and provide appropriate resources.      Referral/Handouts:   Lodging/housing/Environmental Concerns referral    Thank you,  Phong Quigley LCSW

## 2022-12-28 NOTE — PROGRESS NOTES
Problem: Airway Clearance - Ineffective  Goal: Achieve or maintain patent airway  12/28/2022 0949 by Chela Alvarez RN  Outcome: Progressing Towards Goal  12/28/2022 0947 by Chela Alvarez RN  Outcome: Progressing Towards Goal     Problem: Gas Exchange - Impaired  Goal: Absence of hypoxia  12/28/2022 0949 by Chela Alvarez RN  Outcome: Progressing Towards Goal  12/28/2022 0947 by Chela Alvarez RN  Outcome: Progressing Towards Goal  Goal: Promote optimal lung function  12/28/2022 0949 by Chela Alvarez RN  Outcome: Progressing Towards Goal  12/28/2022 0947 by Chela Alvarez RN  Outcome: Progressing Towards Goal     Problem: Breathing Pattern - Ineffective  Goal: Ability to achieve and maintain a regular respiratory rate  12/28/2022 0949 by Chela Alvarez RN  Outcome: Progressing Towards Goal  12/28/2022 0947 by Chela Alvarez RN  Outcome: Progressing Towards Goal     Problem:  Body Temperature -  Risk of, Imbalanced  Goal: Ability to maintain a body temperature within defined limits  12/28/2022 0949 by Chela Alvarez RN  Outcome: Progressing Towards Goal  12/28/2022 0947 by Chela Alvarez RN  Outcome: Progressing Towards Goal  Goal: Will regain or maintain usual level of consciousness  12/28/2022 0949 by Chela Alvarez RN  Outcome: Progressing Towards Goal  12/28/2022 0947 by Chela Alvarez RN  Outcome: Progressing Towards Goal  Goal: Complications related to the disease process, condition or treatment will be avoided or minimized  12/28/2022 0949 by Chela Alvarez RN  Outcome: Progressing Towards Goal  12/28/2022 0947 by Chela Alvarez RN  Outcome: Progressing Towards Goal     Problem: Isolation Precautions - Risk of Spread of Infection  Goal: Prevent transmission of infectious organism to others  12/28/2022 0949 by Chela Alvarez RN  Outcome: Progressing Towards Goal  12/28/2022 0947 by Chela Alvarez RN  Outcome: Progressing Towards Goal     Problem: Nutrition Deficits  Goal: Optimize nutrtional status  12/28/2022 0949 by Leander Mock RN  Outcome: Progressing Towards Goal  12/28/2022 0947 by Leander Mock RN  Outcome: Progressing Towards Goal     Problem: Risk for Fluid Volume Deficit  Goal: Maintain normal heart rhythm  12/28/2022 0949 by Leander Mock RN  Outcome: Progressing Towards Goal  12/28/2022 0947 by Leander Mock RN  Outcome: Progressing Towards Goal  Goal: Maintain absence of muscle cramping  12/28/2022 0949 by Leander Mock RN  Outcome: Progressing Towards Goal  12/28/2022 0947 by Leander Mock RN  Outcome: Progressing Towards Goal  Goal: Maintain normal serum potassium, sodium, calcium, phosphorus, and pH  12/28/2022 0949 by Leander Mock RN  Outcome: Progressing Towards Goal  12/28/2022 0947 by Leander Mock RN  Outcome: Progressing Towards Goal     Problem: Loneliness or Risk for Loneliness  Goal: Demonstrate positive use of time alone when socialization is not possible  12/28/2022 0949 by Leander Mock RN  Outcome: Progressing Towards Goal  12/28/2022 0947 by Leander Mock RN  Outcome: Progressing Towards Goal     Problem: Fatigue  Goal: Verbalize increase energy and improved vitality  12/28/2022 0949 by Leander Mock RN  Outcome: Progressing Towards Goal  12/28/2022 0947 by Leander Mock RN  Outcome: Progressing Towards Goal     Problem: Patient Education: Go to Patient Education Activity  Goal: Patient/Family Education  Outcome: Progressing Towards Goal     Problem: Falls - Risk of  Goal: *Absence of Falls  Description: Document August Davin Fall Risk and appropriate interventions in the flowsheet.   12/28/2022 0949 by Leander Mock RN  Outcome: Progressing Towards Goal  Note: Fall Risk Interventions:            Medication Interventions: Assess postural VS orthostatic hypotension, Evaluate medications/consider consulting pharmacy, Patient to call before getting OOB, Teach patient to arise slowly, Utilize gait belt for transfers/ambulation                12/28/2022 0947 by Sherran Hamman, RN  Note: Fall Risk Interventions:            Medication Interventions: Assess postural VS orthostatic hypotension, Evaluate medications/consider consulting pharmacy, Patient to call before getting OOB, Teach patient to arise slowly, Utilize gait belt for transfers/ambulation                   Problem: Patient Education: Go to Patient Education Activity  Goal: Patient/Family Education  12/28/2022 0949 by Sherran Hamman, RN  Outcome: Progressing Towards Goal  12/28/2022 0947 by Sherran Hamman, RN  Outcome: Progressing Towards Goal     Problem: Pain  Goal: *Control of Pain  12/28/2022 0949 by Sherran Hamman, RN  Outcome: Progressing Towards Goal  12/28/2022 0947 by Sherran Hamman, RN  Outcome: Progressing Towards Goal     Problem: Patient Education: Go to Patient Education Activity  Goal: Patient/Family Education  12/28/2022 0949 by Sherran Hamman, RN  Outcome: Progressing Towards Goal  12/28/2022 0947 by Sherran Hamman, RN  Outcome: Progressing Towards Goal     Problem: Gas Exchange - Impaired  Goal: *Absence of hypoxia  Outcome: Progressing Towards Goal     Problem: Patient Education: Go to Patient Education Activity  Goal: Patient/Family Education  Outcome: Progressing Towards Goal     Problem: Patient Education: Go to Patient Education Activity  Goal: Patient/Family Education  Outcome: Progressing Towards Goal     Problem: Fluid Volume - Risk of, Imbalanced  Goal: *Balanced intake and output  Outcome: Progressing Towards Goal     Problem: Patient Education: Go to Patient Education Activity  Goal: Patient/Family Education  Outcome: Progressing Towards Goal

## 2022-12-28 NOTE — PROGRESS NOTES
[place] [facility] or Facility: [facility] From: Ad[facility] Canas RE: Lelia Deem 1960 RM: [id number] 70FT admitted for Covid, pneumonia, CHRISTA. Pt has breast Ca and getting chemo treatments. She has a temp of 101.2. She is on Vancomycin, Cefepime and Doxycyline. Any interventions? Need Callback: NO CALLBACK REQ PCU FYI    Read 12:50 AM  12/28/22 12:54 AM  When were her last blood cultures? 12/28/22 1:00 AM  on 12/26 @ 1441. She has a port, would you like for me to draw a set of blood cx    Read 1:26 AM  12/28/22 1:01 AM  Do you want a lactate as well? Read 1:26 AM  12/28/22 1:40 AM  Just repeat cultures    12/28/22 1:48 AM  Thanks    Read 2:19 AM  12/28/22 2:45 AM  Please enter the order for the blood cx    Read 3:33 AM  12/28/22 3:33 AM  Ok    12/28/22 4:39 AM  Pt is coughing up thick tan secretions mixed with blood. No blood clots seen. Read 4:44 AM  12/28/22 6:00 AM  Pt refused to be stuck peripherally for the second set of blood cx. I got one set from her port. Do you want me to draw the second set from her port again? Also, her K+ is 2.8 but her BUN is 38 and creatinine is 3.39    Read 6:20 AM  12/28/22 6:04 AM  She has been uncooperative all shift. Pt refused to take the Tylenol for her fever earlier and the Heparin SQ. She gets really short of breath when getting up and her oxygenation drops down to 70-80% on room air. I had to do a lot of encouraging to get her to wear the oxygen. She is on 2L/NC and her oxygenation is 96-99%.     Read 6:20 AM  12/28/22 6:08 AM  FYI: Her CBC just resulted and her HBG is 8.3 down from 9.3 yesterday and HCT is 24.1 down from 26.8 yesterday    Read 6:20 AM  12/28/22 6:20 AM  Noted

## 2022-12-28 NOTE — PROGRESS NOTES
0730- Bedside and Verbal shift change report given to cammie (oncoming nurse) by Mike Rondon (offgoing nurse). Report included the following information SBAR, Kardex, ED Summary, MAR, Accordion, and Recent Results. Patient assessment completed. Patient is having diarrhea sheets changed and assisted back to bed from bedside commode      0718-page to MD richard- patient is coughing up bloody sputum just fyi. It went from small amount to moderate amount. Do you want to do a repeat chest x-ray. When patient takes off 02 she drops into the 70s. MD ordered a chest CT. Also notified MD regarding low K+ 2.8. Discussed patient refusing to be peripherally stuck for blood cultures. The lab will not run the single blood culture drawn from port-per MD blood cultures are not needed. Discussed that patient is having diarrhea and we are unable to obtain UA d/t to mixing. Pt is refusing to be straight cathed.

## 2022-12-29 ENCOUNTER — NURSE NAVIGATOR (OUTPATIENT)
Dept: CASE MANAGEMENT | Age: 62
End: 2022-12-29

## 2022-12-29 ENCOUNTER — APPOINTMENT (OUTPATIENT)
Dept: ULTRASOUND IMAGING | Age: 62
DRG: 871 | End: 2022-12-29
Attending: INTERNAL MEDICINE
Payer: COMMERCIAL

## 2022-12-29 LAB
ALBUMIN SERPL-MCNC: 2.2 G/DL (ref 3.5–5)
ALBUMIN/GLOB SERPL: 0.5 {RATIO} (ref 1.1–2.2)
ALP SERPL-CCNC: 118 U/L (ref 45–117)
ALT SERPL-CCNC: 177 U/L (ref 12–78)
ANION GAP SERPL CALC-SCNC: 11 MMOL/L (ref 5–15)
AST SERPL-CCNC: 224 U/L (ref 15–37)
BASOPHILS # BLD: 0 K/UL (ref 0–0.1)
BASOPHILS NFR BLD: 0 % (ref 0–1)
BILIRUB SERPL-MCNC: 0.3 MG/DL (ref 0.2–1)
BUN SERPL-MCNC: 47 MG/DL (ref 6–20)
BUN/CREAT SERPL: 14 (ref 12–20)
CALCIUM SERPL-MCNC: 9.8 MG/DL (ref 8.5–10.1)
CHLORIDE SERPL-SCNC: 106 MMOL/L (ref 97–108)
CO2 SERPL-SCNC: 22 MMOL/L (ref 21–32)
CREAT SERPL-MCNC: 3.34 MG/DL (ref 0.55–1.02)
CRP SERPL-MCNC: 15.9 MG/DL (ref 0–0.6)
DIFFERENTIAL METHOD BLD: ABNORMAL
EOSINOPHIL # BLD: 0 K/UL (ref 0–0.4)
EOSINOPHIL NFR BLD: 0 % (ref 0–7)
ERYTHROCYTE [DISTWIDTH] IN BLOOD BY AUTOMATED COUNT: 17.7 % (ref 11.5–14.5)
ERYTHROCYTE [SEDIMENTATION RATE] IN BLOOD: 126 MM/HR (ref 0–30)
GLOBULIN SER CALC-MCNC: 4.4 G/DL (ref 2–4)
GLUCOSE SERPL-MCNC: 170 MG/DL (ref 65–100)
HCT VFR BLD AUTO: 24.7 % (ref 35–47)
HGB BLD-MCNC: 8.5 G/DL (ref 11.5–16)
IMM GRANULOCYTES # BLD AUTO: 0 K/UL
IMM GRANULOCYTES NFR BLD AUTO: 0 %
LYMPHOCYTES # BLD: 1.8 K/UL (ref 0.8–3.5)
LYMPHOCYTES NFR BLD: 13 % (ref 12–49)
MAGNESIUM SERPL-MCNC: 2.4 MG/DL (ref 1.6–2.4)
MCH RBC QN AUTO: 30.1 PG (ref 26–34)
MCHC RBC AUTO-ENTMCNC: 34.4 G/DL (ref 30–36.5)
MCV RBC AUTO: 87.6 FL (ref 80–99)
MONOCYTES # BLD: 0.8 K/UL (ref 0–1)
MONOCYTES NFR BLD: 6 % (ref 5–13)
MYELOCYTES NFR BLD MANUAL: 1 %
NEUTS BAND NFR BLD MANUAL: 3 % (ref 0–6)
NEUTS SEG # BLD: 11 K/UL (ref 1.8–8)
NEUTS SEG NFR BLD: 77 % (ref 32–75)
NRBC # BLD: 0.03 K/UL (ref 0–0.01)
NRBC BLD-RTO: 0.2 PER 100 WBC
PLATELET # BLD AUTO: 221 K/UL (ref 150–400)
PMV BLD AUTO: 10.3 FL (ref 8.9–12.9)
POTASSIUM SERPL-SCNC: 3.3 MMOL/L (ref 3.5–5.1)
PROT SERPL-MCNC: 6.6 G/DL (ref 6.4–8.2)
RBC # BLD AUTO: 2.82 M/UL (ref 3.8–5.2)
RBC MORPH BLD: ABNORMAL
RBC MORPH BLD: ABNORMAL
RHEUMATOID FACT SERPL-ACNC: <10 IU/ML
SODIUM SERPL-SCNC: 139 MMOL/L (ref 136–145)
VIT B12 SERPL-MCNC: >2000 PG/ML (ref 193–986)
WBC # BLD AUTO: 13.8 K/UL (ref 3.6–11)

## 2022-12-29 PROCEDURE — 80053 COMPREHEN METABOLIC PANEL: CPT

## 2022-12-29 PROCEDURE — 74011250636 HC RX REV CODE- 250/636: Performed by: INTERNAL MEDICINE

## 2022-12-29 PROCEDURE — 74011250637 HC RX REV CODE- 250/637: Performed by: INTERNAL MEDICINE

## 2022-12-29 PROCEDURE — 82607 VITAMIN B-12: CPT

## 2022-12-29 PROCEDURE — 86140 C-REACTIVE PROTEIN: CPT

## 2022-12-29 PROCEDURE — 85652 RBC SED RATE AUTOMATED: CPT

## 2022-12-29 PROCEDURE — 65270000046 HC RM TELEMETRY

## 2022-12-29 PROCEDURE — 76700 US EXAM ABDOM COMPLETE: CPT

## 2022-12-29 PROCEDURE — 74011000250 HC RX REV CODE- 250: Performed by: INTERNAL MEDICINE

## 2022-12-29 PROCEDURE — 82164 ANGIOTENSIN I ENZYME TEST: CPT

## 2022-12-29 PROCEDURE — 86431 RHEUMATOID FACTOR QUANT: CPT

## 2022-12-29 PROCEDURE — 86037 ANCA TITER EACH ANTIBODY: CPT

## 2022-12-29 PROCEDURE — 83735 ASSAY OF MAGNESIUM: CPT

## 2022-12-29 PROCEDURE — 86606 ASPERGILLUS ANTIBODY: CPT

## 2022-12-29 PROCEDURE — 85025 COMPLETE CBC W/AUTO DIFF WBC: CPT

## 2022-12-29 PROCEDURE — 86738 MYCOPLASMA ANTIBODY: CPT

## 2022-12-29 PROCEDURE — 74011000258 HC RX REV CODE- 258: Performed by: INTERNAL MEDICINE

## 2022-12-29 PROCEDURE — 86200 CCP ANTIBODY: CPT

## 2022-12-29 PROCEDURE — 86038 ANTINUCLEAR ANTIBODIES: CPT

## 2022-12-29 PROCEDURE — 36415 COLL VENOUS BLD VENIPUNCTURE: CPT

## 2022-12-29 RX ORDER — ROSUVASTATIN CALCIUM 10 MG/1
10 TABLET, COATED ORAL
Status: DISCONTINUED | OUTPATIENT
Start: 2022-12-29 | End: 2023-01-02 | Stop reason: HOSPADM

## 2022-12-29 RX ORDER — GUAIFENESIN 600 MG/1
600 TABLET, EXTENDED RELEASE ORAL EVERY 12 HOURS
Status: DISCONTINUED | OUTPATIENT
Start: 2022-12-29 | End: 2023-01-02 | Stop reason: HOSPADM

## 2022-12-29 RX ORDER — GABAPENTIN 100 MG/1
100 CAPSULE ORAL 3 TIMES DAILY
Status: DISCONTINUED | OUTPATIENT
Start: 2022-12-29 | End: 2023-01-02 | Stop reason: HOSPADM

## 2022-12-29 RX ORDER — SODIUM CHLORIDE, SODIUM LACTATE, POTASSIUM CHLORIDE, CALCIUM CHLORIDE 600; 310; 30; 20 MG/100ML; MG/100ML; MG/100ML; MG/100ML
100 INJECTION, SOLUTION INTRAVENOUS CONTINUOUS
Status: DISCONTINUED | OUTPATIENT
Start: 2022-12-29 | End: 2023-01-01

## 2022-12-29 RX ORDER — PAROXETINE HYDROCHLORIDE 20 MG/1
20 TABLET, FILM COATED ORAL DAILY
Status: DISCONTINUED | OUTPATIENT
Start: 2022-12-30 | End: 2023-01-02 | Stop reason: HOSPADM

## 2022-12-29 RX ORDER — DEXAMETHASONE SODIUM PHOSPHATE 4 MG/ML
6 INJECTION, SOLUTION INTRA-ARTICULAR; INTRALESIONAL; INTRAMUSCULAR; INTRAVENOUS; SOFT TISSUE EVERY 24 HOURS
Status: DISCONTINUED | OUTPATIENT
Start: 2022-12-29 | End: 2023-01-02 | Stop reason: HOSPADM

## 2022-12-29 RX ORDER — SODIUM CHLORIDE 0.9 % (FLUSH) 0.9 %
5-10 SYRINGE (ML) INJECTION AS NEEDED
Status: DISCONTINUED | OUTPATIENT
Start: 2022-12-29 | End: 2023-01-02 | Stop reason: HOSPADM

## 2022-12-29 RX ORDER — POLYETHYLENE GLYCOL 3350 17 G/17G
17 POWDER, FOR SOLUTION ORAL DAILY PRN
Status: DISCONTINUED | OUTPATIENT
Start: 2022-12-29 | End: 2023-01-02 | Stop reason: HOSPADM

## 2022-12-29 RX ORDER — CARVEDILOL 6.25 MG/1
6.25 TABLET ORAL 2 TIMES DAILY WITH MEALS
Status: DISCONTINUED | OUTPATIENT
Start: 2022-12-29 | End: 2023-01-02 | Stop reason: HOSPADM

## 2022-12-29 RX ORDER — SODIUM CHLORIDE 0.9 % (FLUSH) 0.9 %
5-40 SYRINGE (ML) INJECTION AS NEEDED
Status: DISCONTINUED | OUTPATIENT
Start: 2022-12-29 | End: 2023-01-02 | Stop reason: HOSPADM

## 2022-12-29 RX ORDER — OXYCODONE HYDROCHLORIDE 5 MG/1
10 TABLET ORAL
Status: DISCONTINUED | OUTPATIENT
Start: 2022-12-29 | End: 2023-01-02 | Stop reason: HOSPADM

## 2022-12-29 RX ORDER — DOXYCYCLINE HYCLATE 100 MG
100 TABLET ORAL EVERY 12 HOURS
Status: DISPENSED | OUTPATIENT
Start: 2022-12-29 | End: 2022-12-31

## 2022-12-29 RX ORDER — GUAIFENESIN/DEXTROMETHORPHAN 100-10MG/5
5 SYRUP ORAL
Status: DISCONTINUED | OUTPATIENT
Start: 2022-12-29 | End: 2023-01-02 | Stop reason: HOSPADM

## 2022-12-29 RX ORDER — LOPERAMIDE HYDROCHLORIDE 2 MG/1
2 CAPSULE ORAL
Status: DISCONTINUED | OUTPATIENT
Start: 2022-12-29 | End: 2023-01-02 | Stop reason: HOSPADM

## 2022-12-29 RX ORDER — NALOXONE HYDROCHLORIDE 0.4 MG/ML
0.4 INJECTION, SOLUTION INTRAMUSCULAR; INTRAVENOUS; SUBCUTANEOUS
Status: DISCONTINUED | OUTPATIENT
Start: 2022-12-29 | End: 2023-01-02 | Stop reason: HOSPADM

## 2022-12-29 RX ORDER — SODIUM CHLORIDE 0.9 % (FLUSH) 0.9 %
5-40 SYRINGE (ML) INJECTION EVERY 8 HOURS
Status: DISCONTINUED | OUTPATIENT
Start: 2022-12-29 | End: 2023-01-02 | Stop reason: HOSPADM

## 2022-12-29 RX ADMIN — GUAIFENESIN 600 MG: 600 TABLET, EXTENDED RELEASE ORAL at 21:24

## 2022-12-29 RX ADMIN — CEFEPIME 1 G: 1 INJECTION, POWDER, FOR SOLUTION INTRAMUSCULAR; INTRAVENOUS at 04:54

## 2022-12-29 RX ADMIN — GUAIFENESIN AND DEXTROMETHORPHAN 5 ML: 100; 10 SYRUP ORAL at 04:59

## 2022-12-29 RX ADMIN — POTASSIUM BICARBONATE 40 MEQ: 782 TABLET, EFFERVESCENT ORAL at 08:42

## 2022-12-29 RX ADMIN — SODIUM CHLORIDE, PRESERVATIVE FREE 10 ML: 5 INJECTION INTRAVENOUS at 05:02

## 2022-12-29 RX ADMIN — SODIUM CHLORIDE, PRESERVATIVE FREE 10 ML: 5 INJECTION INTRAVENOUS at 21:23

## 2022-12-29 RX ADMIN — CARVEDILOL 6.25 MG: 6.25 TABLET, FILM COATED ORAL at 16:53

## 2022-12-29 RX ADMIN — CARVEDILOL 6.25 MG: 6.25 TABLET, FILM COATED ORAL at 11:48

## 2022-12-29 RX ADMIN — DEXAMETHASONE SODIUM PHOSPHATE 6 MG: 4 INJECTION, SOLUTION INTRAMUSCULAR; INTRAVENOUS at 08:42

## 2022-12-29 RX ADMIN — SODIUM CHLORIDE, POTASSIUM CHLORIDE, SODIUM LACTATE AND CALCIUM CHLORIDE 100 ML/HR: 600; 310; 30; 20 INJECTION, SOLUTION INTRAVENOUS at 04:54

## 2022-12-29 RX ADMIN — SODIUM CHLORIDE, POTASSIUM CHLORIDE, SODIUM LACTATE AND CALCIUM CHLORIDE 100 ML/HR: 600; 310; 30; 20 INJECTION, SOLUTION INTRAVENOUS at 21:23

## 2022-12-29 RX ADMIN — CEFEPIME 1 G: 1 INJECTION, POWDER, FOR SOLUTION INTRAMUSCULAR; INTRAVENOUS at 16:53

## 2022-12-29 RX ADMIN — GABAPENTIN 100 MG: 100 CAPSULE ORAL at 16:53

## 2022-12-29 RX ADMIN — DOXYCYCLINE HYCLATE 100 MG: 100 TABLET, COATED ORAL at 08:42

## 2022-12-29 RX ADMIN — GUAIFENESIN 600 MG: 600 TABLET, EXTENDED RELEASE ORAL at 08:42

## 2022-12-29 RX ADMIN — SODIUM CHLORIDE, PRESERVATIVE FREE 10 ML: 5 INJECTION INTRAVENOUS at 16:53

## 2022-12-29 NOTE — PROGRESS NOTES
[facility]  Hospitalist Group                                                                                          Hospitalist Progress Note  [facility],   Answering service: [phone] OR [phone] from in house phone        Date of Service:  2022  NAME:  Jorge Luis Salgado  :  1960  MRN:  [id number]      Admission Summary:   \"57year-old woman with past medical history significant for hypertension and breast cancer presented at [facility] a couple of days ago because of cough and back pain. The pain is located at the lower back with no radiation. No known aggravating or relieving factors. The cough is productive of yellowish sputum. When the patient arrived at the emergency room at [facility], the patient was found to have markedly elevated troponin level. Chest x-ray is suggestive of pneumonia. The patient was admitted to [facility].  The hospitalization is significant for code sepsis which was called during the hospitalization and the patient was started on broad-spectrum antibiotics for sepsis, most likely coming from the pneumonia. The patient was seen by cardiologist for the elevated troponin level. She was also COVID-19 virus positive and requiring oxygen. The patient presented with abnormal renal function which seems to be getting worse. Because of these multiple medical problems, the patient was transferred to [facility] for higher level of care. There was no fever, rigors or chills reported. \"          Interval history / Subjective: Follow up COVID, pneumonia. Patient seen and examined. Off oxygen. Cr persistently elevated. Hgb stable. BP elevated.       Assessment & Plan:     #Multifocal pneumonia in the setting of immunocompromise patient due to chemotherapy POA worsening  #Acute hypoxic respiratory failure due to above  #Moderate left pleural effusion POA  #Bandemia  #Hemoptysis not POA  #COVID-19 infection/pneumonia POA  #Sepsis due to above POA  -Chest x-ray reviewed dependently bilateral airspace disease.  -Chest CT with multifocal bilateral airspace opacity concerning for multifocal pneumonia. Focus on dependent left lower lobe may reflect organizing pneumonia. Moderate left and right pleural effusion noted  -previously hypoxic at [facility], now on room air   -COVID-19 PCR positive  -On admission heart rate 136 respiratory rate 27 ([facility]_  -CRP 37 > 24  procalcitonin 15  -Sputum culture with Staph aureus sensitivities pending, MRSA swab negative, will continue cefepime, d/c vancomycin   -Band neutrophils 10%  today    Acute kidney injury   -Suspect prerenal in the setting of sepsis  -CT abdomen pelvis no obstruction  -nephrology consulted  -discontinue vancomycin    #Blood loss anemia POA  -Due to hemoptysis  -Hemoglobin stable in low/mid 8  -trend H/H     NSTEMI:   -cardiology consulted  -EKG reviewed No acute ischemic process nonspecific T wave changes    HTN: home carvedilol     #Elevated D-dimer  -Cardio multifactorial due to underlying 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  -VQ scan low probability for PE    #Peripheral neuropathy in the setting of chemotherapy  -Continue gabapentin     #Prolonged QTC POA  -EKG reviewed apparently QTC greater than 500  -Keep mag above 2 potassium above 4  -Hold QT prolonging medication     #Hypokalemia  -Replete     #Invasive ductal carcinoma POA  -Received chemotherapy  -Last chemo December 21, 2022          Code status: full   Prophylaxis: holding   Care Plan discussed with: patient  Anticipated Disposition: >48 hours     Hospital Problems  Date Reviewed: 12/29/2022            Codes Class Noted POA    * (Principal) Bacterial pneumonia ICD-10-CM: J15.9  ICD-9-CM: 482.9  12/28/2022 Yes             Review of Systems:   Negative unless stated above       Vital Signs:    Last 24hrs VS reviewed since prior progress note.  Most recent are:  Visit Vitals  BP (!) 181/65 (BP 1 Location: Right upper arm, BP Patient Position: At rest;Sitting)   Pulse 89   Temp 97.2 °F (36.2 °C)   Resp 18   Wt 65.3 kg (144 lb)   SpO2 100%   BMI 28.12 kg/m²       No intake or output data in the 24 hours ending 12/29/22 1222     Physical Examination:     I had a face to face encounter with this patient and independently examined them on 12/29/2022 as outlined below:          Constitutional:  No acute distress, cooperative, pleasant    ENT:  Oral mucosa moist, oropharynx benign. Resp:  Crackles left lower lung bases. No accessory muscle use. CV:  Regular rhythm, normal rate, no murmurs, gallops, rubs    GI:  Soft, non distended, non tender. normoactive bowel sounds, no hepatosplenomegaly     Musculoskeletal:  No edema, warm, 2+ pulses throughout    Neurologic:  Moves all extremities            Data Review:    Review and/or order of clinical lab test  Review and/or order of tests in the radiology section of CPT  Review and/or order of tests in the medicine section of CPT      Labs:     Recent Labs     12/29/22 0511 12/28/22 0320   WBC 13.8* 8.5   HGB 8.5* 8.3*   HCT 24.7* 24.1*    169     Recent Labs     12/29/22  0511 12/28/22  0908 12/28/22 0320 12/27/22  0440 12/26/22  1241     --  134* 136 135*   K 3.3*  --  2.8* 2.8* 2.8*     --  101 104 96*   CO2 22  --  23 22 23   BUN 47*  --  38* 34* 29*   CREA 3.34*  --  3.39* 3.09* 2.91*   *  --  101* 81 186*   CA 9.8  --  8.4* 8.2* 9.0   MG 2.4 1.5*  --   --  2.0     Recent Labs     12/29/22  0511 12/28/22 0320 12/27/22  0440   * 169* 113*   * 115 80   TBILI 0.3 0.4 0.4   TP 6.6 5.8* 6.3*   ALB 2.2* 1.8* 2.1*   GLOB 4.4* 4.0 4.2*     No results for input(s): INR, PTP, APTT, INREXT in the last 72 hours. No results for input(s): FE, TIBC, PSAT, FERR in the last 72 hours. No results found for: FOL, RBCF   No results for input(s): PH, PCO2, PO2 in the last 72 hours.   No results for input(s): CPK, CKNDX, TROIQ in the last 72 hours.     No lab exists for component: CPKMB  No results found for: CHOL, CHOLX, CHLST, CHOLV, HDL, HDLP, LDL, LDLC, DLDLP, TGLX, TRIGL, TRIGP, CHHD, CHHDX  Lab Results   Component Value Date/Time    Glucose (POC) 103 12/27/2022 09:30 PM    Glucose (POC) 110 12/26/2022 02:38 PM     No results found for: COLOR, APPRN, SPGRU, REFSG, SALLIE, PROTU, GLUCU, KETU, BILU, UROU, ODALIS, LEUKU, GLUKE, EPSU, BACTU, WBCU, RBCU, CASTS, UCRY      Medications Reviewed:     Current Facility-Administered Medications   Medication Dose Route Frequency    guaiFENesin ER (MUCINEX) tablet 600 mg  600 mg Oral Q12H    doxycycline (VIBRA-TABS) tablet 100 mg  100 mg Oral Q12H    cefepime (MAXIPIME) 1 g in 0.9% sodium chloride (MBP/ADV) 50 mL MBP  1 g IntraVENous Q12H    dexamethasone (DECADRON) 4 mg/mL injection 6 mg  6 mg IntraVENous Q24H    guaiFENesin-dextromethorphan (ROBITUSSIN DM) 100-10 mg/5 mL syrup 5 mL  5 mL Oral Q4H PRN    lactated Ringers infusion  100 mL/hr IntraVENous CONTINUOUS    loperamide (IMODIUM) capsule 2 mg  2 mg Oral Q4H PRN    naloxone (NARCAN) injection 0.4 mg  0.4 mg IntraVENous EVERY 2 MINUTES AS NEEDED    oxyCODONE IR (ROXICODONE) tablet 10 mg  10 mg Oral Q4H PRN    polyethylene glycol (MIRALAX) packet 17 g  17 g Oral DAILY PRN    sodium chloride (NS) flush 5-10 mL  5-10 mL IntraVENous PRN    sodium chloride (NS) flush 5-40 mL  5-40 mL IntraVENous Q8H    sodium chloride (NS) flush 5-40 mL  5-40 mL IntraVENous PRN    carvediloL (COREG) tablet 6.25 mg  6.25 mg Oral BID WITH MEALS    acetaminophen (TYLENOL) tablet 650 mg  650 mg Oral Q6H PRN    Or    acetaminophen (TYLENOL) suppository 650 mg  650 mg Rectal Q6H PRN     ______________________________________________________________________  EXPECTED LENGTH OF STAY: - - -  ACTUAL LENGTH OF STAY:          1                 Mercedes Rodriguez DO

## 2022-12-29 NOTE — H&P
[address]   HISTORY AND PHYSICAL    Name:  Rudy Tyson  MR#:  [id number]  :  1960  ACCOUNT #:  [de-identified]  ADMIT DATE:  2022      The patient was seen, evaluated, and admitted by me on 2022. PRIMARY CARE PHYSICIAN:  Wilfredo Mojica MD    SOURCE OF INFORMATION:  The patient who is not a good historian and review of ED and electronic medical record from [facility]:  Cough and back pain. HISTORY OF PRESENT ILLNESS:  This 59-year-old woman with past medical history significant for hypertension and breast cancer presented at [facility] a couple of days ago because of cough and back pain. The pain is located at the lower back with no radiation. No known aggravating or relieving factors. The cough is productive of yellowish sputum. When the patient arrived at the emergency room at [facility], the patient was found to have markedly elevated troponin level. Chest x-ray is suggestive of pneumonia. The patient was admitted to [facility].  The hospitalization is significant for code sepsis which was called during the hospitalization and the patient was started on broad-spectrum antibiotics for sepsis, most likely coming from the pneumonia. The patient was seen by cardiologist for the elevated troponin level. She was also COVID-19 virus positive and requiring oxygen. The patient presented with abnormal renal function which seems to be getting worse. Because of these multiple medical problems, the patient was transferred to [facility] for higher level of care. There was no fever, rigors or chills reported. PAST MEDICAL HISTORY:  Hypertension and breast cancer. ALLERGIES:  THE PATIENT IS ALLERGIC TO SULFA. MEDICATIONS PRIOR TO ADMISSION:  1. Coreg 6.25 mg twice daily. 2.  Pepcid, dosage as directed. 3.  Neurontin 100 mg three times daily.   4.  Losartan 25 mg daily.  5.  Decadron 8 mg once daily. 6.  Zofran 8 mg every 8 hours as needed for nausea and vomiting. 7.  Compazine 1 tablet every 6 hours for nausea and vomiting. 8.  Paxil 20 mg daily. 9.  Crestor 10 mg daily at bedtime. FAMILY HISTORY:  This was reviewed. Her father had cancer, the type of cancer is not known. Mother had heart failure. PAST SURGICAL HISTORY:  This is significant for myomectomy and wisdom teeth extraction. SOCIAL HISTORY:  No history of alcohol or tobacco abuse. REVIEW OF SYSTEMS:  HEAD, EYES, EARS, NOSE AND THROAT:  This is positive for headache. No blurring of vision and no photophobia. RESPIRATORY SYSTEM. This is positive for shortness of breath, hemoptysis, and cough. CARDIOVASCULAR SYSTEM:  No chest pain, no orthopnea, and no palpitation. GASTROINTESTINAL SYSTEM:  No nausea or vomiting. No diarrhea and no constipation. GENITOURINARY SYSTEM:  No dysuria, no urgency and no frequency. All other systems are reviewed and they are negative. PHYSICAL EXAMINATION:  GENERAL APPEARANCE:  The patient appeared ill and in moderate distress. VITAL SIGNS:  On arrival to the floor; temperature 97.7, pulse 80, blood pressure 152/83, and oxygen saturation 100%. HEAD:  Normocephalic, atraumatic. EYES:  Normal eye movement. No redness, no drainage, and no discharge. EARS:  Normal external ears with no obvious drainage. NOSE:  No deformity and no drainage. MOUTH AND THROAT:  No visible oral lesion. Dry oral mucosa. NECK:  Neck is supple. No JVD and no thyromegaly. CHEST:  Clear breath sounds. No wheezing and no crackles. HEART:  Normal S1 and S2, regular. No clinically appreciable murmur. ABDOMEN:  Soft and nontender. Normal bowel sounds. CNS:  Alert and oriented x3. No gross focal neurological deficit. EXTREMITIES:  No edema. Pulses 2+ bilaterally. MUSCULOSKELETAL SYSTEM:  No obvious joint deformity and swelling.   SKIN:  No active skin lesions seen in the exposed part of the body. PSYCHIATRY:  Normal mood and affect. LYMPHATIC SYSTEM:  No cervical lymphadenopathy. DIAGNOSTIC DATA:  The CT scan of the chest without contrast shows multifocal heterogeneous bilateral airspace opacities concerning for multifocal pneumonia. LABORATORY DATA:  Chemistry; sodium 134, potassium 2.8, chloride 101, CO2 is 23, glucose 101, BUN 38, creatinine 3.39, calcium 8.4, total bilirubin 0.4, , , alkaline phosphatase 115, total protein 5.8, albumin level 1.8, and globulin 4.0. Hematology; WBC 8.5, hemoglobin 8.3, hematocrit 24.1, and platelets 617. Magnesium level 1.5.  C-reactive protein level 24.10.    ASSESSMENT:  1.  Bacterial pneumonia. 2.  COVID-19 virus infection. 3.  Acute kidney injury. 4.  Suspected non-ST elevation myocardial infarction. 5.  Hypertension. 6.  Severe sepsis without septic shock. 7.  Abnormal liver function test.  8.  Hemoptysis. 9.  Anemia. 10.  Hypomagnesemia. 11.  Hypokalemia. 12.  Breast cancer. PLAN:  1. Bacterial pneumonia. We will admit the patient for further evaluation and treatment. We will continue with the broad-spectrum antibiotics started at [facility].  These include cefepime, vancomycin, and doxycycline to cover atypical organism. Pneumonia was thought to be the source of the patient's sepsis. We will monitor the patient closely. 2.  COVID-19 virus infection. We will continue with Decadron and supportive treatment. Consider the addition of interleukin inhibitor if indicated. 3.  Acute kidney injury. This is most likely due to volume depletion. We will carry out fluid therapy and monitor the patient's renal function closely. Nephrology consult will be requested to assist in further evaluation and treatment. 4.  Suspected non-ST elevation myocardial infarction.   The patient has already been seen by the cardiologist.  We will await further recommendation from the cardiologist. Anticoagulation was not advised by the cardiologist.  We will await further recommendation from the cardiologist.  5.  Hypertension. We will monitor the patient's blood pressure closely. Losartan is on hold because of the patient's acute kidney injury. 6.  Severe sepsis without septic shock. We will continue broad-spectrum antibiotics. We will await blood culture result. Since this is a transfer and the diagnosis of sepsis is not new, there is no need to do sepsis reassessment at this time. 7.  Abnormal liver function tests. We will check lipase level. We will check abdominal ultrasound to evaluate the patient for acute cholecystitis. 8.  Hemoptysis. The patient developed hemoptysis during the hospitalization. We will monitor the patient closely. Pulmonology consult will be requested to assist in further evaluation and treatment. 9.  Anemia. This is most likely due to chronic disease. We will carry out anemia workup including checking stool guaiac to rule out occult GI bleed. 10.  Hypomagnesemia. We will replete and repeat level. 11.  Hypokalemia. We will also replete and repeat level, and check magnesium level. 12.  Breast cancer. The patient has history of breast cancer and according to the patient, this is in remission. The patient will continue to follow up with the outpatient provider. 13.  Code status, the patient is a full code. We will request SCD for DVT prophylaxis. The patient is not on heparin or Lovenox for DVT prophylaxis because of the patient's history of hemoptysis. FUNCTIONAL STATUS PRIOR TO ADMISSION:  The patient came from home. The patient is ambulatory with no assistive device. COVID PRECAUTION:  The patient was wearing a face mask. I was wearing a face mask and gloves for this patient's encounter.       Beverly Dawkins MD      [id number]  D:  12/29/2022 6:22  T:  12/29/2022 7:43  JOB #:  [id number]  CC:  Xuan Cardoza MD

## 2022-12-29 NOTE — PROGRESS NOTES
JENA     IDR Rounds this am with MD and team   ALEXANDRA to transfer to [facility] to higher level of care   COVID Positive/PNA  other medical issues     Sent message to  CM Supervisor at [facility] for follow-up for coordination of transition plan. Reviewed note from [facility] Morse- Outpatient Encounter today with patient's sister. See brief information patient shared yesterday.        [facility] MSW RN   [phone]

## 2022-12-29 NOTE — PROGRESS NOTES
Problem: Airway Clearance - Ineffective  Goal: Achieve or maintain patent airway  Outcome: Progressing Towards Goal     Problem: Gas Exchange - Impaired  Goal: Absence of hypoxia  Outcome: Progressing Towards Goal  Goal: Promote optimal lung function  Outcome: Progressing Towards Goal     Problem: Breathing Pattern - Ineffective  Goal: Ability to achieve and maintain a regular respiratory rate  Outcome: Progressing Towards Goal     Problem: Body Temperature -  Risk of, Imbalanced  Goal: Ability to maintain a body temperature within defined limits  Outcome: Progressing Towards Goal  Goal: Will regain or maintain usual level of consciousness  Outcome: Progressing Towards Goal  Goal: Complications related to the disease process, condition or treatment will be avoided or minimized  Outcome: Progressing Towards Goal     Problem: Isolation Precautions - Risk of Spread of Infection  Goal: Prevent transmission of infectious organism to others  Outcome: Progressing Towards Goal     Problem: Nutrition Deficits  Goal: Optimize nutrtional status  Outcome: Progressing Towards Goal     Problem: Risk for Fluid Volume Deficit  Goal: Maintain normal heart rhythm  Outcome: Progressing Towards Goal  Goal: Maintain absence of muscle cramping  Outcome: Progressing Towards Goal  Goal: Maintain normal serum potassium, sodium, calcium, phosphorus, and pH  Outcome: Progressing Towards Goal     Problem: Loneliness or Risk for Loneliness  Goal: Demonstrate positive use of time alone when socialization is not possible  Outcome: Progressing Towards Goal     Problem: Fatigue  Goal: Verbalize increase energy and improved vitality  Outcome: Progressing Towards Goal     Problem: Patient Education: Go to Patient Education Activity  Goal: Patient/Family Education  Outcome: Progressing Towards Goal     Problem: Falls - Risk of  Goal: *Absence of Falls  Description: Document Rubina Fall Risk and appropriate interventions in the flowsheet.   Outcome: Progressing Towards Goal  Note: Fall Risk Interventions:            Medication Interventions: Teach patient to arise slowly                   Problem: Patient Education: Go to Patient Education Activity  Goal: Patient/Family Education  Outcome: Progressing Towards Goal     Problem: Discharge Planning  Goal: *Discharge to safe environment  Outcome: Progressing Towards Goal

## 2022-12-29 NOTE — PROGRESS NOTES
Reason for Admission:   Patient admitted to [facility] on 12/26/22 for left flank pain. Patient transferred to [facility] 12/28/22 for cough and back pain. History of hypertension and breast cancer-receiving chemo-last treatment 12/21/22. RUR Score:     19% Moderate             PCP: First and Last name:   Tae Constantino MD     Name of Practice: [facility]   Are you a current patient: Yes/No: Yes   Approximate date of last visit: NA   Can you participate in a virtual visit if needed: NA    Do you (patient/family) have any concerns for transition/discharge? It has been documented that the patient has been staying with her nephew temporarily as family was concerned about her returning to her home due to poor living conditions due to hoarding behavior. The patient, now, is refusing to leave her nephew's home and has started hoarding at his home. Patient's sister Stewart Redman has contacted an organization for hoarding support. Patient's landlord reportedly is giving the patient time to clean the home. Plan for utilizing home health:  TBD     Current Advanced Directive/Advance Care Plan:  Full Code      Healthcare Decision Maker: NA  Click here to complete [address] including selection of the Healthcare Decision Maker Relationship (ie \"Primary\")              Transition of Care Plan:   CM contacted patient via mobile phone to complete initial assessment. The patient was irritable and did not answer most questions directly. CM referenced CM assessment completed at [facility] on 12/27/22. Patient uses a cane a baseline. CM asked patient what her plan was for DC. Patient replied \"everything I thought I know and tried to follow through on has turned negative. \" CM asked for clarity but patient could not/would not provide. It seems patient is apathetic and may be dealing with some level of depression or other mental health issues.  CM to continue to monitor and assist with transitional care planning needs.      Sanna Rivera MS

## 2022-12-29 NOTE — PROGRESS NOTES
Bedside shift change report given to TOM Diaz (oncoming nurse) by TOM Figueroa (offgoing nurse). Report included the following information SBAR, Kardex, Intake/Output, MAR, Accordion, and Cardiac Rhythm NSR .

## 2022-12-29 NOTE — CONSULTS
Pulmonary, Critical Care, and Sleep Medicine~Consult Note    Name: Néstor Pruett MRN: [id number]   : 1960 Hospital: [facility] [address]   Date: 2022 11:52 AM Admission: 2022     Impression Plan   Acute hypoxic resp failure, now improved. COVID19+  Abnormal CT scan: CAP; query coexisting inflammatory lung disease, COVID19? HF? Hemoptysis, improving. Likely due to above. Pleural effusion, right > left   NSTEMI  CHRISTA on CKD? Known breast cancer hx; in remission   Repeat chest film tomorrow   CTD labs; CRP on admission was 37, down to 24 yesterday   On decadron   Agree with cefepime, doxy, vanc  Judicious fluids   O2 titration above 90%   SCDs for DVT proph   Check atypical pnas      Daily Progression:    Consult Note requested by hospitalist service. Patient started last week with worsening cough and congestion with back pains last week. Was found to be covid19+ upon ER admission at [facility]. She initially required O2, but now is on room air. Has had intermittent blood tinged sputum for several days now, but has since transition to darker sputum without residual bright reddness. No prior pulmonary issues, never smoker, no asthma hx. Known breast cancer hx. V/q scan showed low prob for PE.     CT scan  IMPRESSION  1. Multifocal heterogeneous bilateral airspace opacities concerning for multifocal pneumonia. A focus in the dependent left lower lobe may reflect organizing pneumonia. Follow-up CT in 3 months is recommended to document improvement/resolution. 2.  Moderate left and small right pleural effusions. Attention on follow-up. ECHO   Result status: Final result       Left Ventricle: Normal left ventricular systolic function with a visually estimated EF of 50 - 55%. Left ventricle size is normal. Normal wall thickness.  Mild hypokinesis of the following segments: basal inferolateral and mid inferolateral.Unfortunately, imaging quality limis the accuracy Technical qualifiers: Echo study was limited due to patient tolerance    I have reviewed the labs and previous days notes. Pertinent items are noted in HPI. Past Medical History:   Diagnosis Date    Cancer [facility])     BREAST CANCER    GERD (gastroesophageal reflux disease)     HTN (hypertension) 07/18/2011    Psychiatric disorder     ANIXETY AND DEPRESSION    Thyroid disease     HYPOTHYROID      Past Surgical History:   Procedure Laterality Date    HX COLONOSCOPY      HX MYOMECTOMY  05/02/1996    x 1    HX WISDOM TEETH EXTRACTION        Prior to Admission medications    Medication Sig Start Date End Date Taking? Authorizing Provider   Sunday Ale, IV chemotherapy    Provider, Historical   carvediloL (COREG) 6.25 mg tablet Take 6.25 mg by mouth two (2) times daily (with meals). 11/3/22   Provider, Historical   famotidine (PEPCID) 40 mg/5 mL (8 mg/mL) suspension Take 2.5 mL by mouth two (2) times a day. 11/30/22   Chandler García NP   gabapentin (NEURONTIN) 100 mg capsule Take 1 Capsule by mouth three (3) times daily. Max Daily Amount: 300 mg. 11/30/22   Chandler García NP   losartan (COZAAR) 25 mg tablet Take 25 mg by mouth daily. Provider, Historical   dexAMETHasone (DECADRON) 4 mg tablet Take 2 tabs (8mg) once daily on days 2 & 3 of chemotherapy 9/28/22   Chandler García NP   ondansetron (ZOFRAN ODT) 8 mg disintegrating tablet Take 1 Tablet by mouth every eight (8) hours as needed for Nausea or Vomiting. 9/28/22   Chandler García NP   prochlorperazine (Compazine) 5 mg tablet Take 1 Tablet by mouth every six (6) hours as needed for Nausea or Vomiting. 9/28/22   Chandler García NP   lidocaine-prilocaine (EMLA) topical cream Apply  to affected area as needed for Pain. Apply a thick layer over port a cath 30-60 minutes prior to port access 9/28/22   Chandler García NP   PARoxetine (PAXIL) 20 mg tablet Take 20 mg by mouth daily. Provider, Historical   rosuvastatin (CRESTOR) 10 mg tablet Take 10 mg by mouth nightly. 8/15/22   Provider, Historical   multivitamin, tx-iron-ca-min (THERA-M w/ IRON) 9 mg iron-400 mcg tab tablet Take 1 Tablet by mouth daily. Provider, Historical     Allergies   Allergen Reactions    Sulfa (Sulfonamide Antibiotics) Itching, Swelling and Unknown (comments)      Social History     Tobacco Use    Smoking status: Never     Passive exposure: Never    Smokeless tobacco: Never   Substance Use Topics    Alcohol use: Never      Family History   Problem Relation Age of Onset    Heart Failure Mother     Cancer Father     Diabetes Sister     Kidney Disease Sister     Anesth Problems Neg Hx      OBJECTIVE:     Vital Signs:     Visit Vitals  BP (!) 181/65 (BP 1 Location: Right upper arm, BP Patient Position: At rest;Sitting)   Pulse 67   Temp 97.2 °F (36.2 °C)   Resp 18   Wt 65.3 kg (144 lb)   SpO2 100%   BMI 28.12 kg/m²      Temp (24hrs), Av.7 °F (36.5 °C), Min:97.2 °F (36.2 °C), Max:98.9 °F (37.2 °C)     Intake/Output:     Last shift: No intake/output data recorded. Last 3 shifts: No intake/output data recorded.       No intake or output data in the 24 hours ending 22 1152    Physical Exam:                                        Exam Findings Other   General: No resp distress noted, appears stated age    [de-identified]:  No ulcers, JVD not elevated, no cervical LAD    Chest: No pectus deformity, normal chest rise b/l    HEART:  RRR, no murmurs/rubs/gallops    Lungs:  CTA b/l, no rhonchi/crackles/wheeze, diminished BS at bases    ABD: Soft/NT, non rigid mildly distended    EXT: No cyanosis/clubbing/edema, normal peripheral pulses    Skin: No rashes or ulcers, no mottling    Neuro: A/O x 3        Medications:  Current Facility-Administered Medications   Medication Dose Route Frequency    guaiFENesin ER (MUCINEX) tablet 600 mg  600 mg Oral Q12H    doxycycline (VIBRA-TABS) tablet 100 mg  100 mg Oral Q12H    cefepime (MAXIPIME) 1 g in 0.9% sodium chloride (MBP/ADV) 50 mL MBP  1 g IntraVENous Q12H dexamethasone (DECADRON) 4 mg/mL injection 6 mg  6 mg IntraVENous Q24H    guaiFENesin-dextromethorphan (ROBITUSSIN DM) 100-10 mg/5 mL syrup 5 mL  5 mL Oral Q4H PRN    lactated Ringers infusion  100 mL/hr IntraVENous CONTINUOUS    loperamide (IMODIUM) capsule 2 mg  2 mg Oral Q4H PRN    naloxone (NARCAN) injection 0.4 mg  0.4 mg IntraVENous EVERY 2 MINUTES AS NEEDED    oxyCODONE IR (ROXICODONE) tablet 10 mg  10 mg Oral Q4H PRN    polyethylene glycol (MIRALAX) packet 17 g  17 g Oral DAILY PRN    sodium chloride (NS) flush 5-10 mL  5-10 mL IntraVENous PRN    sodium chloride (NS) flush 5-40 mL  5-40 mL IntraVENous Q8H    sodium chloride (NS) flush 5-40 mL  5-40 mL IntraVENous PRN    Vancomycin dosing per pharmacy   Other Rx Dosing/Monitoring    [START ON 12/30/2022] vancomycin random level with am labs on 12/30 @ 04:00   Other ONCE    carvediloL (COREG) tablet 6.25 mg  6.25 mg Oral BID WITH MEALS    acetaminophen (TYLENOL) tablet 650 mg  650 mg Oral Q6H PRN    Or    acetaminophen (TYLENOL) suppository 650 mg  650 mg Rectal Q6H PRN       Labs:  ABG No results for input(s): PHI, PCO2I, PO2I, HCO3I, SO2I, FIO2I in the last 72 hours.      CBC Recent Labs     12/29/22  0511 12/28/22  0320 12/27/22  0440   WBC 13.8* 8.5 4.5   HGB 8.5* 8.3* 9.3*   HCT 24.7* 24.1* 26.8*    169 158   MCV 87.6 87.6 88.4   MCH 30.1 30.2 27.6        Metabolic  Panel Recent Labs     12/29/22  0511 12/28/22  0908 12/28/22  0320 12/27/22  0440 12/26/22  1241     --  134* 136 135*   K 3.3*  --  2.8* 2.8* 2.8*     --  101 104 96*   CO2 22  --  23 22 23   *  --  101* 81 186*   BUN 47*  --  38* 34* 29*   CREA 3.34*  --  3.39* 3.09* 2.91*   CA 9.8  --  8.4* 8.2* 9.0   MG 2.4 1.5*  --   --  2.0   ALB 2.2*  --  1.8* 2.1* 2.7*   *  --  169* 113* 111*        Pertinent Labs                Scotty Armando PA-C  12/29/2022

## 2022-12-29 NOTE — PROGRESS NOTES
Received consult for cardiology. Pt was seen by Dr. Ken Andrea at [facility] but pt is actually a patient of Dr. Rod Villarreal of [facility] (last saw in 10/2022). Have asked unit secretary to contact [facility]. Thank you.

## 2022-12-30 ENCOUNTER — APPOINTMENT (OUTPATIENT)
Dept: GENERAL RADIOLOGY | Age: 62
DRG: 871 | End: 2022-12-30
Attending: PHYSICIAN ASSISTANT
Payer: COMMERCIAL

## 2022-12-30 LAB
ACE SERPL-CCNC: 24 U/L (ref 14–82)
ALBUMIN SERPL-MCNC: 2.1 G/DL (ref 3.5–5)
ALBUMIN/GLOB SERPL: 0.5 {RATIO} (ref 1.1–2.2)
ALP SERPL-CCNC: 109 U/L (ref 45–117)
ALT SERPL-CCNC: 171 U/L (ref 12–78)
ANA SER QL: POSITIVE
ANION GAP SERPL CALC-SCNC: 8 MMOL/L (ref 5–15)
AST SERPL-CCNC: 165 U/L (ref 15–37)
BACTERIA SPEC CULT: ABNORMAL
BACTERIA SPEC CULT: ABNORMAL
BASOPHILS # BLD: 0 K/UL (ref 0–0.1)
BASOPHILS NFR BLD: 0 % (ref 0–1)
BILIRUB SERPL-MCNC: 0.3 MG/DL (ref 0.2–1)
BUN SERPL-MCNC: 46 MG/DL (ref 6–20)
BUN/CREAT SERPL: 15 (ref 12–20)
C-ANCA TITR SER IF: NORMAL TITER
CALCIUM SERPL-MCNC: 9.8 MG/DL (ref 8.5–10.1)
CHLORIDE SERPL-SCNC: 110 MMOL/L (ref 97–108)
CO2 SERPL-SCNC: 20 MMOL/L (ref 21–32)
CREAT SERPL-MCNC: 3 MG/DL (ref 0.55–1.02)
DIFFERENTIAL METHOD BLD: ABNORMAL
EOSINOPHIL # BLD: 0 K/UL (ref 0–0.4)
EOSINOPHIL NFR BLD: 0 % (ref 0–7)
ERYTHROCYTE [DISTWIDTH] IN BLOOD BY AUTOMATED COUNT: 18.4 % (ref 11.5–14.5)
GLOBULIN SER CALC-MCNC: 4.3 G/DL (ref 2–4)
GLUCOSE SERPL-MCNC: 113 MG/DL (ref 65–100)
GRAM STN SPEC: ABNORMAL
HCT VFR BLD AUTO: 24.1 % (ref 35–47)
HGB BLD-MCNC: 8 G/DL (ref 11.5–16)
IMM GRANULOCYTES # BLD AUTO: 0 K/UL
IMM GRANULOCYTES NFR BLD AUTO: 0 %
LYMPHOCYTES # BLD: 2.4 K/UL (ref 0.8–3.5)
LYMPHOCYTES NFR BLD: 18 % (ref 12–49)
M PNEUMO IGG SER IA-ACNC: <100 U/ML (ref 0–99)
M PNEUMO IGM SER IA-ACNC: <770 U/ML (ref 0–769)
MCH RBC QN AUTO: 29.9 PG (ref 26–34)
MCHC RBC AUTO-ENTMCNC: 33.2 G/DL (ref 30–36.5)
MCV RBC AUTO: 89.9 FL (ref 80–99)
METAMYELOCYTES NFR BLD MANUAL: 4 %
MONOCYTES # BLD: 0.9 K/UL (ref 0–1)
MONOCYTES NFR BLD: 7 % (ref 5–13)
MYELOCYTES NFR BLD MANUAL: 2 %
MYELOPEROXIDASE AB SER IA-ACNC: <0.2 UNITS (ref 0–0.9)
NEUTS BAND NFR BLD MANUAL: 2 % (ref 0–6)
NEUTS SEG # BLD: 9.3 K/UL (ref 1.8–8)
NEUTS SEG NFR BLD: 67 % (ref 32–75)
NRBC # BLD: 0.03 K/UL (ref 0–0.01)
NRBC BLD-RTO: 0.2 PER 100 WBC
P-ANCA ATYPICAL TITR SER IF: NORMAL TITER
P-ANCA TITR SER IF: NORMAL TITER
PLATELET # BLD AUTO: 274 K/UL (ref 150–400)
PMV BLD AUTO: 10.2 FL (ref 8.9–12.9)
POTASSIUM SERPL-SCNC: 2.9 MMOL/L (ref 3.5–5.1)
PROT SERPL-MCNC: 6.4 G/DL (ref 6.4–8.2)
PROTEINASE3 AB SER IA-ACNC: <0.2 UNITS (ref 0–0.9)
RBC # BLD AUTO: 2.68 M/UL (ref 3.8–5.2)
RBC MORPH BLD: ABNORMAL
RBC MORPH BLD: ABNORMAL
SERVICE CMNT-IMP: ABNORMAL
SODIUM SERPL-SCNC: 138 MMOL/L (ref 136–145)
WBC # BLD AUTO: 13.5 K/UL (ref 3.6–11)
WBC MORPH BLD: ABNORMAL

## 2022-12-30 PROCEDURE — 85025 COMPLETE CBC W/AUTO DIFF WBC: CPT

## 2022-12-30 PROCEDURE — 71045 X-RAY EXAM CHEST 1 VIEW: CPT

## 2022-12-30 PROCEDURE — 74011250637 HC RX REV CODE- 250/637: Performed by: INTERNAL MEDICINE

## 2022-12-30 PROCEDURE — 74011250636 HC RX REV CODE- 250/636: Performed by: INTERNAL MEDICINE

## 2022-12-30 PROCEDURE — 80053 COMPREHEN METABOLIC PANEL: CPT

## 2022-12-30 PROCEDURE — 74011000258 HC RX REV CODE- 258: Performed by: INTERNAL MEDICINE

## 2022-12-30 PROCEDURE — 65270000046 HC RM TELEMETRY

## 2022-12-30 PROCEDURE — 36415 COLL VENOUS BLD VENIPUNCTURE: CPT

## 2022-12-30 PROCEDURE — 74011000250 HC RX REV CODE- 250: Performed by: INTERNAL MEDICINE

## 2022-12-30 RX ORDER — POTASSIUM CHLORIDE 750 MG/1
40 TABLET, FILM COATED, EXTENDED RELEASE ORAL
Status: COMPLETED | OUTPATIENT
Start: 2022-12-31 | End: 2022-12-31

## 2022-12-30 RX ORDER — HYDRALAZINE HYDROCHLORIDE 20 MG/ML
10 INJECTION INTRAMUSCULAR; INTRAVENOUS
Status: DISCONTINUED | OUTPATIENT
Start: 2022-12-30 | End: 2023-01-02 | Stop reason: HOSPADM

## 2022-12-30 RX ADMIN — CEFEPIME 1 G: 1 INJECTION, POWDER, FOR SOLUTION INTRAMUSCULAR; INTRAVENOUS at 16:15

## 2022-12-30 RX ADMIN — SODIUM CHLORIDE, PRESERVATIVE FREE 10 ML: 5 INJECTION INTRAVENOUS at 22:17

## 2022-12-30 RX ADMIN — CARVEDILOL 6.25 MG: 6.25 TABLET, FILM COATED ORAL at 16:16

## 2022-12-30 RX ADMIN — CARVEDILOL 6.25 MG: 6.25 TABLET, FILM COATED ORAL at 08:18

## 2022-12-30 RX ADMIN — DOXYCYCLINE HYCLATE 100 MG: 100 TABLET, COATED ORAL at 22:16

## 2022-12-30 RX ADMIN — PAROXETINE HYDROCHLORIDE 20 MG: 20 TABLET, FILM COATED ORAL at 08:18

## 2022-12-30 RX ADMIN — DEXAMETHASONE SODIUM PHOSPHATE 6 MG: 4 INJECTION, SOLUTION INTRAMUSCULAR; INTRAVENOUS at 08:19

## 2022-12-30 RX ADMIN — ROSUVASTATIN 10 MG: 10 TABLET, FILM COATED ORAL at 22:16

## 2022-12-30 RX ADMIN — GABAPENTIN 100 MG: 100 CAPSULE ORAL at 22:16

## 2022-12-30 RX ADMIN — SODIUM CHLORIDE, PRESERVATIVE FREE 10 ML: 5 INJECTION INTRAVENOUS at 08:26

## 2022-12-30 RX ADMIN — GUAIFENESIN 600 MG: 600 TABLET, EXTENDED RELEASE ORAL at 22:16

## 2022-12-30 RX ADMIN — GABAPENTIN 100 MG: 100 CAPSULE ORAL at 16:16

## 2022-12-30 RX ADMIN — SODIUM CHLORIDE, PRESERVATIVE FREE 10 ML: 5 INJECTION INTRAVENOUS at 16:22

## 2022-12-30 RX ADMIN — GABAPENTIN 100 MG: 100 CAPSULE ORAL at 08:18

## 2022-12-30 RX ADMIN — DOXYCYCLINE HYCLATE 100 MG: 100 TABLET, COATED ORAL at 08:18

## 2022-12-30 RX ADMIN — GUAIFENESIN 600 MG: 600 TABLET, EXTENDED RELEASE ORAL at 08:18

## 2022-12-30 NOTE — PROGRESS NOTES
[address]  Breast Navigator Encounter    Name:    Ayana Vu  Age:    57 y.o. Diagnosis:    LEFT breast cancer    Patient L/M on my voicemail for me to call her. She is an inpatient at [facility]. I will call her when she is discharged.                                America Roman RN, BSN, [facility]  Oncology Breast Navigator     [address]  [address] [place] [address], [address].  W: [phone]  F: [phone]  [email or web address]  Good Help to Those in [facility]

## 2022-12-30 NOTE — PROGRESS NOTES
0430  entered room for 0400 vitals, lab draws from port, and abx. Found IV unhooked from port and dripping onto the floor. Pt asked that we leave room now and come back later \"I am coughing too much now\", refused RNs to complete assessment, vitals, med admin, etc.     Jovani Frye  attempted to draw labs from port, reconnect IV, and give overdue abx. Pt refused and asked we leave room stating \"you were in here 2 hours ago, I have not been getting any sleep with you in here so much.  Other people are not in my room this much\"

## 2022-12-30 NOTE — PROGRESS NOTES
17:57 patient has had a nephrology consult, it was put in for a Dr. Doreen Carlos, who is associated with [facility] their offices have been closed, for the holiday, the On Call advises the patient is not listed in their system. Advised Dr. Jerel Nino she wants Dr. Melissa Welch consulted. Making the call now.   Felix Harkins

## 2022-12-30 NOTE — PROGRESS NOTES
Bedside shift change report given to Ovidio 314Justino (oncoming nurse) by Graciela Zacarias (offgoing nurse). Report included the following information SBAR, Kardex, Intake/Output, MAR, and Cardiac Rhythm NSR .

## 2022-12-30 NOTE — PROGRESS NOTES
Pulmonary, Critical Care, and Sleep Medicine~Consult Note    Name: Haley Carbajal MRN: [id number]   : 1960 Hospital: [facility]   Date: 2022 11:52 AM Admission: 2022     Impression Plan   Acute hypoxic resp failure, now improved. [id number]+  Abnormal CT scan: presumed CAP; query coexisting inflammatory lung disease, COVID19?  22 chest film was clear    Hemoptysis, improving. Likely due to above. Pleural effusion, right > left   NSTEMI  CHRISTA on CKD? Breast cancer, on carboplatin, taxol, keytruda followed by ddAC and Keytruda. Last chemo with taxol was 22. Chest film this am was refused by patient   On decadron currently, low threshold to transition to IV solu medrol   Agree with cefepime, doxy  Judicious fluids   O2 titration above 90%   SCDs for DVT proph   Check atypical pnas, pending      Daily Progression:      CTD labs; CRP on admission was 37, now down to 15.9    She is unaware of anymore hemoptysis. I told her to keep an eye out      Consult Note requested by hospitalist service. Patient started last week with worsening cough and congestion with back pains last week. Was found to be covid19+ upon ER admission at [facility]. She initially required O2, but now is on room air. Has had intermittent blood tinged sputum for several days now, but has since transition to darker sputum without residual bright reddness. No prior pulmonary issues, never smoker, no asthma hx. Known breast cancer hx. V/q scan showed low prob for PE.     CT scan  IMPRESSION  1. Multifocal heterogeneous bilateral airspace opacities concerning for multifocal pneumonia. A focus in the dependent left lower lobe may reflect organizing pneumonia. Follow-up CT in 3 months is recommended to document improvement/resolution. 2.  Moderate left and small right pleural effusions. Attention on follow-up.     ECHO   Result status: Final result       Left Ventricle: Normal left ventricular systolic function with a visually estimated EF of 50 - 55%. Left ventricle size is normal. Normal wall thickness. Mild hypokinesis of the following segments: basal inferolateral and mid inferolateral.Unfortunately, imaging quality limis the accuracy    Technical qualifiers: Echo study was limited due to patient tolerance    I have reviewed the labs and previous days notes. Pertinent items are noted in HPI. Past Medical History:   Diagnosis Date    Cancer [facility])     BREAST CANCER    GERD (gastroesophageal reflux disease)     HTN (hypertension) 07/18/2011    Psychiatric disorder     ANIXETY AND DEPRESSION    Thyroid disease     HYPOTHYROID      Past Surgical History:   Procedure Laterality Date    HX COLONOSCOPY      HX MYOMECTOMY  05/02/1996    x 1    HX WISDOM TEETH EXTRACTION        Prior to Admission medications    Medication Sig Start Date End Date Taking? Authorizing Provider   Alexa Momin IV chemotherapy    Provider, Historical   carvediloL (COREG) 6.25 mg tablet Take 6.25 mg by mouth two (2) times daily (with meals). 11/3/22   Provider, Historical   famotidine (PEPCID) 40 mg/5 mL (8 mg/mL) suspension Take 2.5 mL by mouth two (2) times a day. 11/30/22   Buddy Gleason NP   gabapentin (NEURONTIN) 100 mg capsule Take 1 Capsule by mouth three (3) times daily. Max Daily Amount: 300 mg. 11/30/22   Buddy Gleason NP   losartan (COZAAR) 25 mg tablet Take 25 mg by mouth daily.     Provider, Historical   dexAMETHasone (DECADRON) 4 mg tablet Take 2 tabs (8mg) once daily on days 2 & 3 of chemotherapy 9/28/22   Buddy Gleason NP   ondansetron (ZOFRAN ODT) 8 mg disintegrating tablet Take 1 Tablet by mouth every eight (8) hours as needed for Nausea or Vomiting. 9/28/22   Buddy Gleason NP   prochlorperazine (Compazine) 5 mg tablet Take 1 Tablet by mouth every six (6) hours as needed for Nausea or Vomiting. 9/28/22   Buddy Gleason NP   lidocaine-prilocaine (EMLA) topical cream Apply  to affected area as needed for Pain. Apply a thick layer over port a cath 30-60 minutes prior to port access 22   Jose Jenkins, NP   PARoxetine (PAXIL) 20 mg tablet Take 20 mg by mouth daily. Provider, Historical   rosuvastatin (CRESTOR) 10 mg tablet Take 10 mg by mouth nightly. 8/15/22   Provider, Historical   multivitamin, tx-iron-ca-min (THERA-M w/ IRON) 9 mg iron-400 mcg tab tablet Take 1 Tablet by mouth daily. Provider, Historical     Allergies   Allergen Reactions    Sulfa (Sulfonamide Antibiotics) Itching, Swelling and Unknown (comments)      Social History     Tobacco Use    Smoking status: Never     Passive exposure: Never    Smokeless tobacco: Never   Substance Use Topics    Alcohol use: Never      Family History   Problem Relation Age of Onset    Heart Failure Mother     Cancer Father     Diabetes Sister     Kidney Disease Sister     Anesth Problems Neg Hx      OBJECTIVE:     Vital Signs:     Visit Vitals  BP (!) 154/71 (BP 1 Location: Left upper arm, BP Patient Position: At rest;Sitting)   Pulse 65   Temp 98 °F (36.7 °C)   Resp 17   Ht 5' (1.524 m)   Wt 65.7 kg (144 lb 13.5 oz)   SpO2 100%   BMI 28.29 kg/m²      Temp (24hrs), Av.6 °F (36.4 °C), Min:97.3 °F (36.3 °C), Max:98 °F (36.7 °C)     Intake/Output:     Last shift:  0701 -  1900  In: 220 [P.O.:120; I.V.:100]  Out: -     Last 3 shifts: No intake/output data recorded.         Intake/Output Summary (Last 24 hours) at 2022 1132  Last data filed at 2022 0815  Gross per 24 hour   Intake 220 ml   Output --   Net 220 ml         Physical Exam:                                        Exam Findings Other   General: No resp distress noted, appears stated age    HEENT:  No ulcers, JVD not elevated, no cervical LAD    Chest: No pectus deformity, normal chest rise b/l    HEART:  RRR, no murmurs/rubs/gallops    Lungs:  CTA b/l, no rhonchi/crackles/wheeze, diminished BS at bases    ABD: Soft/NT, non rigid mildly distended    EXT: No cyanosis/clubbing/edema, normal peripheral pulses    Skin: No rashes or ulcers, no mottling    Neuro: A/O x 3        Medications:  Current Facility-Administered Medications   Medication Dose Route Frequency    guaiFENesin ER (MUCINEX) tablet 600 mg  600 mg Oral Q12H    doxycycline (VIBRA-TABS) tablet 100 mg  100 mg Oral Q12H    cefepime (MAXIPIME) 1 g in 0.9% sodium chloride (MBP/ADV) 50 mL MBP  1 g IntraVENous Q12H    dexamethasone (DECADRON) 4 mg/mL injection 6 mg  6 mg IntraVENous Q24H    guaiFENesin-dextromethorphan (ROBITUSSIN DM) 100-10 mg/5 mL syrup 5 mL  5 mL Oral Q4H PRN    lactated Ringers infusion  100 mL/hr IntraVENous CONTINUOUS    loperamide (IMODIUM) capsule 2 mg  2 mg Oral Q4H PRN    naloxone (NARCAN) injection 0.4 mg  0.4 mg IntraVENous EVERY 2 MINUTES AS NEEDED    oxyCODONE IR (ROXICODONE) tablet 10 mg  10 mg Oral Q4H PRN    polyethylene glycol (MIRALAX) packet 17 g  17 g Oral DAILY PRN    sodium chloride (NS) flush 5-10 mL  5-10 mL IntraVENous PRN    sodium chloride (NS) flush 5-40 mL  5-40 mL IntraVENous Q8H    sodium chloride (NS) flush 5-40 mL  5-40 mL IntraVENous PRN    carvediloL (COREG) tablet 6.25 mg  6.25 mg Oral BID WITH MEALS    gabapentin (NEURONTIN) capsule 100 mg  100 mg Oral TID    PARoxetine (PAXIL) tablet 20 mg  20 mg Oral DAILY    rosuvastatin (CRESTOR) tablet 10 mg  10 mg Oral QHS    acetaminophen (TYLENOL) tablet 650 mg  650 mg Oral Q6H PRN    Or    acetaminophen (TYLENOL) suppository 650 mg  650 mg Rectal Q6H PRN       Labs:  ABG No results for input(s): PHI, PCO2I, PO2I, HCO3I, SO2I, FIO2I in the last 72 hours.      CBC Recent Labs     12/30/22  0829 12/29/22  0511 12/28/22  0320   WBC 13.5* 13.8* 8.5   HGB 8.0* 8.5* 8.3*   HCT 24.1* 24.7* 24.1*    221 169   MCV 89.9 87.6 87.6   MCH 29.9 30.1 36.1          Metabolic  Panel Recent Labs     12/30/22  0829 12/29/22  0511 12/28/22  0908 12/28/22  0320    139  --  134*   K 2.9* 3.3*  --  2.8*   * 106  --  101 CO2 20* 22  --  23   * 170*  --  101*   BUN 46* 47*  --  38*   CREA 3.00* 3.34*  --  3.39*   CA 9.8 9.8  --  8.4*   MG  --  2.4 1.5*  --    ALB 2.1* 2.2*  --  1.8*   * 177*  --  169*          Pertinent Labs                Scotty Hanna PA-C  12/30/2022

## 2022-12-30 NOTE — PROGRESS NOTES
[facility] at [address], [address] [place]: [phone]  F: [phone]    Reason for Visit:   Elizabeth Wilson is a 57 y.o. female with left breast cancer-ER 0%, FL 0%, HER2/rdaha negative. Treatment History:   10/1/22: Carboplatin taxol and keytruda followed by ddAC and Keytruda     History of Present Illness:   Patient is a 75-year-old female with left breast cancer in hosptial with COVID. She is Dr. Viviana Velez patient. She had a screening mammogram on 7/20/2022 which showed a 1 cm irregular mass in the left breast at 3 o'clock position. 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 Ki-67 85%. Declined genetic testing. Patient presented to ED on 12/26 for left flank pain. Patient test positive for covid. She developed multifocal pneumonia, left pleural effusion, hemoptysis, sepsis, CHRISTA, non ST elevated MI, and anemia. She was transferred to [facility] [place] on 12/28 for further management    Transferred here from [facility]>       Last office visit:    She had a screening mammogram on 7/20/2022 which showed a 1 cm irregular mass in the left breast at 3 o'clock position. 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 Ki-67 85%. Declined genetic testing. She comes today for cycle 4 day 1 of Carboplatin taxol and keytruda followed by ddAC and Keytruda. Nausea was improved. No diarrhea or constipation. No mouth sores. Neuropathy is stable today. ECHO was better per patient - results have not been sent. No other complaints.      No FH of breast, ovarian, pancreatic or Prostate malignancies  She works with special needs     Past Medical History:   Diagnosis Date    Cancer ([address]     BREAST CANCER    GERD (gastroesophageal reflux disease)     HTN (hypertension) 07/18/2011    Psychiatric disorder     ANIXETY AND DEPRESSION    Thyroid disease     HYPOTHYROID      Past Surgical History:   Procedure Laterality Date    HX COLONOSCOPY      HX MYOMECTOMY  05/02/1996    x 1    HX WISDOM TEETH EXTRACTION        Social History     Tobacco Use    Smoking status: Never     Passive exposure: Never    Smokeless tobacco: Never   Substance Use Topics    Alcohol use: Never      Family History   Problem Relation Age of Onset    Heart Failure Mother     Cancer Father     Diabetes Sister     Kidney Disease Sister     Anesth Problems Neg Hx      Current Facility-Administered Medications   Medication Dose Route Frequency    guaiFENesin ER (MUCINEX) tablet 600 mg  600 mg Oral Q12H    doxycycline (VIBRA-TABS) tablet 100 mg  100 mg Oral Q12H    cefepime (MAXIPIME) 1 g in 0.9% sodium chloride (MBP/ADV) 50 mL MBP  1 g IntraVENous Q12H    dexamethasone (DECADRON) 4 mg/mL injection 6 mg  6 mg IntraVENous Q24H    guaiFENesin-dextromethorphan (ROBITUSSIN DM) 100-10 mg/5 mL syrup 5 mL  5 mL Oral Q4H PRN    lactated Ringers infusion  100 mL/hr IntraVENous CONTINUOUS    loperamide (IMODIUM) capsule 2 mg  2 mg Oral Q4H PRN    naloxone (NARCAN) injection 0.4 mg  0.4 mg IntraVENous EVERY 2 MINUTES AS NEEDED    oxyCODONE IR (ROXICODONE) tablet 10 mg  10 mg Oral Q4H PRN    polyethylene glycol (MIRALAX) packet 17 g  17 g Oral DAILY PRN    sodium chloride (NS) flush 5-10 mL  5-10 mL IntraVENous PRN    sodium chloride (NS) flush 5-40 mL  5-40 mL IntraVENous Q8H    sodium chloride (NS) flush 5-40 mL  5-40 mL IntraVENous PRN    carvediloL (COREG) tablet 6.25 mg  6.25 mg Oral BID WITH MEALS    gabapentin (NEURONTIN) capsule 100 mg  100 mg Oral TID    [START ON 12/30/2022] PARoxetine (PAXIL) tablet 20 mg  20 mg Oral DAILY    rosuvastatin (CRESTOR) tablet 10 mg  10 mg Oral QHS    acetaminophen (TYLENOL) tablet 650 mg  650 mg Oral Q6H PRN    Or    acetaminophen (TYLENOL) suppository 650 mg  650 mg Rectal Q6H PRN      Allergies   Allergen Reactions    Sulfa (Sulfonamide Antibiotics) Itching, Swelling and Unknown (comments) Review of Systems: not obtained    Physical Exam:   Visit Vitals  BP (!) 144/61 (BP 1 Location: Left upper arm, BP Patient Position: At rest)   Pulse 77   Temp 97.3 °F (36.3 °C)   Resp 18   Wt 144 lb (65.3 kg)   SpO2 100%   BMI 28.12 kg/m²     Not seen due to COVID isolation    Results:     Lab Results   Component Value Date/Time    WBC 13.8 (H) 12/29/2022 05:11 AM    HGB 8.5 (L) 12/29/2022 05:11 AM    HCT 24.7 (L) 12/29/2022 05:11 AM    PLATELET 910 28/48/5869 05:11 AM    MCV 87.6 12/29/2022 05:11 AM    ABS. NEUTROPHILS 11.0 (H) 12/29/2022 05:11 AM     Lab Results   Component Value Date/Time    Sodium 139 12/29/2022 05:11 AM    Potassium 3.3 (L) 12/29/2022 05:11 AM    Chloride 106 12/29/2022 05:11 AM    CO2 22 12/29/2022 05:11 AM    Glucose 170 (H) 12/29/2022 05:11 AM    BUN 47 (H) 12/29/2022 05:11 AM    Creatinine 3.34 (H) 12/29/2022 05:11 AM    GFR est AA >60 09/26/2022 03:26 PM    GFR est non-AA >60 09/26/2022 03:26 PM    Calcium 9.8 12/29/2022 05:11 AM    Glucose (POC) 103 12/27/2022 09:30 PM     Lab Results   Component Value Date/Time    Bilirubin, total 0.3 12/29/2022 05:11 AM    ALT (SGPT) 177 (H) 12/29/2022 05:11 AM    Alk. phosphatase 118 (H) 12/29/2022 05:11 AM    Protein, total 6.6 12/29/2022 05:11 AM    Albumin 2.2 (L) 12/29/2022 05:11 AM    Globulin 4.4 (H) 12/29/2022 05:11 AM       External   Records reviewed and summarized above. Pathology report(s) reviewed above. Radiology report(s) reviewed above. MRI  9/2022    LEFT BREAST: Conglomerate mass enhancement in the left breast at 4-5 o'clock,  combined dimension 3.7 x 1.9 x 1.4 cm. This is slightly more extensive than the  sonographic and mammographic findings. No lymphadenopathy. Assessment/PLAN:     1) Left breast cancer  1 cm mass on mammogram  Palpated a lump  ER 0%, AL 0%, HER2/radha negative  Ki 67 was 85%.   MRI breast with a 3.7 cm Left breast mass  [id number]  AJCC 8th ed-Stage IIA  Genetic testing negative  Recommended chemotherapy + Keytruda based on the KEYNOTE-522 trial (taxol, carboplatin and [place] followed by [facility] as preoperative treatment, followed by surgery, and then adjuvant pembrolizumab for another nine cycles (27 weeks) after surgery). Pt has been on outpt neoadjuvant chemo for curable breast cancer. Pt of Dr Guicho Scherer. Patient presented to ED on 12/26 for left flank pain. Patient test positive for COVID. She developed multifocal pneumonia, left pleural effusion, hemoptysis and sepsis. She was transferred to [facility] on 12/28 for further management. She developed CHRISTA, non-ST elevation MI, abnormal LFTs, and anemia. Last chemo with taxol 12/21/22. Last IO 12/14/22. Hold chemo while pt is in hospital.   Attempted to do a virtual visit but could not connect. Hospital plan per IM. Call if questions. Pt will fu with Dr Guicho Scherer at d/c.     2) COVID/ PNA/ effusion/ CHRISTA/abnormal LFTs,  HTN/ Depression. Per IM. 3) anemia likely due to acute illness/ chemo. stable  Monitor. We will follow as needed  Call if questions  Fu with Dr Guicho Scherer at d/c. I appreciate the opportunity to participate in [facility] [facility].     Signed By: Alice Schlatter, DO

## 2022-12-30 NOTE — PROGRESS NOTES
[facility] Adult  Hospitalist Group                                                                                          Hospitalist Progress Note  Dewayne Kennedy MD  Answering service: [phone] or [phone] from in house phone        Date of Service:  2022  NAME:  Jody Stanley  :  1960  MRN:  [id number]      Admission Summary:   \"57year-old woman with past medical history significant for hypertension and breast cancer presented at [facility] a couple of days ago because of cough and back pain. The pain is located at the lower back with no radiation. No known aggravating or relieving factors. The cough is productive of yellowish sputum. When the patient arrived at the emergency room at [facility], the patient was found to have markedly elevated troponin level. Chest x-ray is suggestive of pneumonia. The patient was admitted to [facility].  The hospitalization is significant for code sepsis which was called during the hospitalization and the patient was started on broad-spectrum antibiotics for sepsis, most likely coming from the pneumonia. The patient was seen by cardiologist for the elevated troponin level. She was also COVID-19 virus positive and requiring oxygen. The patient presented with abnormal renal function which seems to be getting worse. Because of these multiple medical problems, the patient was transferred to [facility] for higher level of care. There was no fever, rigors or chills reported. \"          Interval history / Subjective: Follow up COVID, pneumonia. Patient seen and examined. Complains of persistent cough, but otherwise feels well. Discussed elevated Cr and reason for ongoing admission.       Assessment & Plan:     Multifocal pneumonia in the setting of immunocompromise patient due to chemotherapy POA worsening  Acute hypoxic respiratory failure due to above  Moderate left pleural effusion POA  Bandemia  Hemoptysis not POA  COVID-19 infection/pneumonia POA  Sepsis due to above POA  -Chest x-ray reviewed dependently bilateral airspace disease.  -Chest CT with multifocal bilateral airspace opacity concerning for multifocal pneumonia. Focus on dependent left lower lobe may reflect organizing pneumonia.   Moderate left and right pleural effusion noted  -previously hypoxic at [facility], now on room air   -COVID-19 PCR positive  -On admission heart rate 136 respiratory rate 27 ([facility]_  -CRP 37 > 24  procalcitonin 15  -Sputum culture with Staph aureus sensitivities pending, MRSA swab negative, will continue cefepime, d/c vancomycin, may DC on oral antibiotics on DC     Acute kidney injury   -Suspect prerenal in the setting of sepsis  -CT abdomen pelvis no obstruction  -nephrology consulted, have not yet seen   - s/p vancomycin    Blood loss anemia POA  -Due to hemoptysis  -Hemoglobin stable in low/mid 8  -trend H/H     NSTEMI:   -cardiology consulted  -EKG reviewed No acute ischemic process nonspecific T wave changes    HTN: home carvedilol     Elevated D-dimer - PE ruled out   -Cardio multifactorial due to underlying 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  -VQ scan low probability for PE    Peripheral neuropathy in the setting of chemotherapy  -Continue gabapentin     Prolonged QTC POA  -EKG reviewed apparently QTC greater than 500  -Keep mag above 2 potassium above 4  -Hold QT prolonging medication  - repeat EKG today      Hypokalemia  -Replete     Invasive ductal carcinoma POA  -Received chemotherapy  -Last chemo December 21, 2022        Code status: full   Prophylaxis: holding   Care Plan discussed with: patient  Anticipated Disposition: >48 hours     Hospital Problems  Date Reviewed: 12/29/2022            Codes Class Noted POA    * (Principal) Bacterial pneumonia ICD-10-CM: J15.9  ICD-9-CM: 482.9  12/28/2022 Yes           Review of Systems:   Negative unless stated above       Vital Signs:    Last 24hrs VS reviewed since prior progress note. Most recent are:  Visit Vitals  /72 (BP 1 Location: Left upper arm, BP Patient Position: Sitting)   Pulse 76   Temp 98.1 °F (36.7 °C)   Resp 18   Ht 5' (1.524 m)   Wt 65.7 kg (144 lb 13.5 oz)   SpO2 98%   BMI 28.29 kg/m²         Intake/Output Summary (Last 24 hours) at 12/30/2022 1358  Last data filed at 12/30/2022 0815  Gross per 24 hour   Intake 220 ml   Output --   Net 220 ml          Physical Examination:     I had a face to face encounter with this patient and independently examined them on 12/30/2022 as outlined below:          Constitutional:  No acute distress, cooperative,   ENT:  Oral mucosa moist, oropharynx benign. Resp:  CTA b/l no wheezing, rhonchi. No accessory muscle use. On RA    CV:  Regular rhythm, normal rate, no murmurs, gallops, rubs    GI:  Soft, non distended, non tender. normoactive bowel sounds, no hepatosplenomegaly     Musculoskeletal:  No edema, warm, 2+ pulses throughout    Neurologic:  Moves all extremities            Data Review:    Review and/or order of clinical lab test  Review and/or order of tests in the radiology section of CPT  Review and/or order of tests in the medicine section of CPT      Labs:     Recent Labs     12/30/22  0829 12/29/22  0511   WBC 13.5* 13.8*   HGB 8.0* 8.5*   HCT 24.1* 24.7*    221       Recent Labs     12/30/22  0829 12/29/22  0511 12/28/22  0908 12/28/22  0320    139  --  134*   K 2.9* 3.3*  --  2.8*   * 106  --  101   CO2 20* 22  --  23   BUN 46* 47*  --  38*   CREA 3.00* 3.34*  --  3.39*   * 170*  --  101*   CA 9.8 9.8  --  8.4*   MG  --  2.4 1.5*  --        Recent Labs     12/30/22  0829 12/29/22  0511 12/28/22  0320   * 177* 169*    118* 115   TBILI 0.3 0.3 0.4   TP 6.4 6.6 5.8*   ALB 2.1* 2.2* 1.8*   GLOB 4.3* 4.4* 4.0       No results for input(s): INR, PTP, APTT, INREXT, INREXT in the last 72 hours.    No results for input(s): FE, TIBC, PSAT, FERR in the last 72 hours. No results found for: FOL, RBCF   No results for input(s): PH, PCO2, PO2 in the last 72 hours. No results for input(s): CPK, CKNDX, TROIQ in the last 72 hours.     No lab exists for component: CPKMB  No results found for: CHOL, CHOLX, CHLST, CHOLV, HDL, HDLP, LDL, LDLC, DLDLP, TGLX, TRIGL, TRIGP, CHHD, CHHDX  Lab Results   Component Value Date/Time    Glucose (POC) 103 12/27/2022 09:30 PM    Glucose (POC) 110 12/26/2022 02:38 PM     No results found for: COLOR, APPRN, SPGRU, REFSG, SALLIE, PROTU, GLUCU, KETU, BILU, UROU, ODALIS, LEUKU, GLUKE, EPSU, BACTU, WBCU, RBCU, CASTS, UCRY      Medications Reviewed:     Current Facility-Administered Medications   Medication Dose Route Frequency    guaiFENesin ER (MUCINEX) tablet 600 mg  600 mg Oral Q12H    doxycycline (VIBRA-TABS) tablet 100 mg  100 mg Oral Q12H    cefepime (MAXIPIME) 1 g in 0.9% sodium chloride (MBP/ADV) 50 mL MBP  1 g IntraVENous Q12H    dexamethasone (DECADRON) 4 mg/mL injection 6 mg  6 mg IntraVENous Q24H    guaiFENesin-dextromethorphan (ROBITUSSIN DM) 100-10 mg/5 mL syrup 5 mL  5 mL Oral Q4H PRN    lactated Ringers infusion  100 mL/hr IntraVENous CONTINUOUS    loperamide (IMODIUM) capsule 2 mg  2 mg Oral Q4H PRN    naloxone (NARCAN) injection 0.4 mg  0.4 mg IntraVENous EVERY 2 MINUTES AS NEEDED    oxyCODONE IR (ROXICODONE) tablet 10 mg  10 mg Oral Q4H PRN    polyethylene glycol (MIRALAX) packet 17 g  17 g Oral DAILY PRN    sodium chloride (NS) flush 5-10 mL  5-10 mL IntraVENous PRN    sodium chloride (NS) flush 5-40 mL  5-40 mL IntraVENous Q8H    sodium chloride (NS) flush 5-40 mL  5-40 mL IntraVENous PRN    carvediloL (COREG) tablet 6.25 mg  6.25 mg Oral BID WITH MEALS    gabapentin (NEURONTIN) capsule 100 mg  100 mg Oral TID    PARoxetine (PAXIL) tablet 20 mg  20 mg Oral DAILY    rosuvastatin (CRESTOR) tablet 10 mg  10 mg Oral QHS    acetaminophen (TYLENOL) tablet 650 mg  650 mg Oral Q6H PRN    Or    acetaminophen (TYLENOL) suppository 650 mg  650 mg Rectal Q6H PRN     ______________________________________________________________________  EXPECTED LENGTH OF STAY: - - -  ACTUAL LENGTH OF STAY:          2                 Buddy Alvarenga MD

## 2022-12-31 LAB
ANION GAP SERPL CALC-SCNC: 7 MMOL/L (ref 5–15)
APPEARANCE UR: CLEAR
BACTERIA SPEC CULT: NORMAL
BACTERIA SPEC CULT: NORMAL
BACTERIA URNS QL MICRO: NEGATIVE /HPF
BASOPHILS # BLD: 0 K/UL (ref 0–0.1)
BASOPHILS NFR BLD: 0 % (ref 0–1)
BILIRUB UR QL: NEGATIVE
BUN SERPL-MCNC: 50 MG/DL (ref 6–20)
BUN/CREAT SERPL: 17 (ref 12–20)
CALCIUM SERPL-MCNC: 9 MG/DL (ref 8.5–10.1)
CCP IGA+IGG SERPL IA-ACNC: 4 UNITS (ref 0–19)
CHLORIDE SERPL-SCNC: 111 MMOL/L (ref 97–108)
CO2 SERPL-SCNC: 22 MMOL/L (ref 21–32)
COLOR UR: ABNORMAL
CREAT SERPL-MCNC: 3 MG/DL (ref 0.55–1.02)
DIFFERENTIAL METHOD BLD: ABNORMAL
EOSINOPHIL # BLD: 0 K/UL (ref 0–0.4)
EOSINOPHIL NFR BLD: 0 % (ref 0–7)
EPITH CASTS URNS QL MICRO: ABNORMAL /LPF
ERYTHROCYTE [DISTWIDTH] IN BLOOD BY AUTOMATED COUNT: 18.4 % (ref 11.5–14.5)
GLUCOSE SERPL-MCNC: 87 MG/DL (ref 65–100)
GLUCOSE UR STRIP.AUTO-MCNC: NEGATIVE MG/DL
HCT VFR BLD AUTO: 21.4 % (ref 35–47)
HGB BLD-MCNC: 7.2 G/DL (ref 11.5–16)
HGB UR QL STRIP: ABNORMAL
HYALINE CASTS URNS QL MICRO: ABNORMAL /LPF (ref 0–5)
IMM GRANULOCYTES # BLD AUTO: 0 K/UL
IMM GRANULOCYTES NFR BLD AUTO: 0 %
KETONES UR QL STRIP.AUTO: NEGATIVE MG/DL
LEUKOCYTE ESTERASE UR QL STRIP.AUTO: NEGATIVE
LYMPHOCYTES # BLD: 2.3 K/UL (ref 0.8–3.5)
LYMPHOCYTES NFR BLD: 18 % (ref 12–49)
MAGNESIUM SERPL-MCNC: 1.7 MG/DL (ref 1.6–2.4)
MCH RBC QN AUTO: 30.5 PG (ref 26–34)
MCHC RBC AUTO-ENTMCNC: 33.6 G/DL (ref 30–36.5)
MCV RBC AUTO: 90.7 FL (ref 80–99)
MONOCYTES # BLD: 1.4 K/UL (ref 0–1)
MONOCYTES NFR BLD: 11 % (ref 5–13)
MYELOCYTES NFR BLD MANUAL: 1 %
NEUTS BAND NFR BLD MANUAL: 6 % (ref 0–6)
NEUTS SEG # BLD: 8.8 K/UL (ref 1.8–8)
NEUTS SEG NFR BLD: 64 % (ref 32–75)
NITRITE UR QL STRIP.AUTO: NEGATIVE
NRBC # BLD: 0.06 K/UL (ref 0–0.01)
NRBC BLD-RTO: 0.5 PER 100 WBC
PH UR STRIP: 5 [PH] (ref 5–8)
PHOSPHATE SERPL-MCNC: 3.1 MG/DL (ref 2.6–4.7)
PLATELET # BLD AUTO: 239 K/UL (ref 150–400)
PMV BLD AUTO: 9.9 FL (ref 8.9–12.9)
POTASSIUM SERPL-SCNC: 3.4 MMOL/L (ref 3.5–5.1)
PROT UR STRIP-MCNC: 30 MG/DL
RBC # BLD AUTO: 2.36 M/UL (ref 3.8–5.2)
RBC #/AREA URNS HPF: ABNORMAL /HPF (ref 0–5)
RBC MORPH BLD: ABNORMAL
SERVICE CMNT-IMP: NORMAL
SERVICE CMNT-IMP: NORMAL
SODIUM SERPL-SCNC: 140 MMOL/L (ref 136–145)
SP GR UR REFRACTOMETRY: 1.01 (ref 1–1.03)
UR CULT HOLD, URHOLD: NORMAL
UROBILINOGEN UR QL STRIP.AUTO: 0.2 EU/DL (ref 0.2–1)
WBC # BLD AUTO: 12.5 K/UL (ref 3.6–11)
WBC URNS QL MICRO: ABNORMAL /HPF (ref 0–4)

## 2022-12-31 PROCEDURE — 74011000258 HC RX REV CODE- 258: Performed by: INTERNAL MEDICINE

## 2022-12-31 PROCEDURE — 74011000250 HC RX REV CODE- 250: Performed by: INTERNAL MEDICINE

## 2022-12-31 PROCEDURE — 74011250637 HC RX REV CODE- 250/637: Performed by: INTERNAL MEDICINE

## 2022-12-31 PROCEDURE — 65270000032 HC RM SEMIPRIVATE

## 2022-12-31 PROCEDURE — 85025 COMPLETE CBC W/AUTO DIFF WBC: CPT

## 2022-12-31 PROCEDURE — 74011250637 HC RX REV CODE- 250/637: Performed by: NURSE PRACTITIONER

## 2022-12-31 PROCEDURE — 81001 URINALYSIS AUTO W/SCOPE: CPT

## 2022-12-31 PROCEDURE — 84100 ASSAY OF PHOSPHORUS: CPT

## 2022-12-31 PROCEDURE — 74011250636 HC RX REV CODE- 250/636: Performed by: INTERNAL MEDICINE

## 2022-12-31 PROCEDURE — 83735 ASSAY OF MAGNESIUM: CPT

## 2022-12-31 PROCEDURE — 36415 COLL VENOUS BLD VENIPUNCTURE: CPT

## 2022-12-31 PROCEDURE — 80048 BASIC METABOLIC PNL TOTAL CA: CPT

## 2022-12-31 RX ADMIN — GUAIFENESIN 600 MG: 600 TABLET, EXTENDED RELEASE ORAL at 10:15

## 2022-12-31 RX ADMIN — GABAPENTIN 100 MG: 100 CAPSULE ORAL at 10:15

## 2022-12-31 RX ADMIN — SODIUM CHLORIDE, PRESERVATIVE FREE 10 ML: 5 INJECTION INTRAVENOUS at 04:48

## 2022-12-31 RX ADMIN — SODIUM CHLORIDE, PRESERVATIVE FREE 10 ML: 5 INJECTION INTRAVENOUS at 21:07

## 2022-12-31 RX ADMIN — CARVEDILOL 6.25 MG: 6.25 TABLET, FILM COATED ORAL at 16:54

## 2022-12-31 RX ADMIN — PAROXETINE HYDROCHLORIDE 20 MG: 20 TABLET, FILM COATED ORAL at 10:15

## 2022-12-31 RX ADMIN — SODIUM CHLORIDE, POTASSIUM CHLORIDE, SODIUM LACTATE AND CALCIUM CHLORIDE 100 ML/HR: 600; 310; 30; 20 INJECTION, SOLUTION INTRAVENOUS at 15:23

## 2022-12-31 RX ADMIN — GUAIFENESIN 600 MG: 600 TABLET, EXTENDED RELEASE ORAL at 21:06

## 2022-12-31 RX ADMIN — CEFEPIME 1 G: 1 INJECTION, POWDER, FOR SOLUTION INTRAMUSCULAR; INTRAVENOUS at 15:23

## 2022-12-31 RX ADMIN — DEXAMETHASONE SODIUM PHOSPHATE 6 MG: 4 INJECTION, SOLUTION INTRAMUSCULAR; INTRAVENOUS at 10:15

## 2022-12-31 RX ADMIN — ROSUVASTATIN 10 MG: 10 TABLET, FILM COATED ORAL at 21:06

## 2022-12-31 RX ADMIN — GABAPENTIN 100 MG: 100 CAPSULE ORAL at 15:23

## 2022-12-31 RX ADMIN — GABAPENTIN 100 MG: 100 CAPSULE ORAL at 21:06

## 2022-12-31 RX ADMIN — POTASSIUM CHLORIDE 40 MEQ: 750 TABLET, FILM COATED, EXTENDED RELEASE ORAL at 00:32

## 2022-12-31 RX ADMIN — CEFEPIME 1 G: 1 INJECTION, POWDER, FOR SOLUTION INTRAMUSCULAR; INTRAVENOUS at 04:47

## 2022-12-31 RX ADMIN — CARVEDILOL 6.25 MG: 6.25 TABLET, FILM COATED ORAL at 10:15

## 2022-12-31 RX ADMIN — Medication 1 LOZENGE: at 12:20

## 2022-12-31 RX ADMIN — DOXYCYCLINE HYCLATE 100 MG: 100 TABLET, COATED ORAL at 10:15

## 2022-12-31 NOTE — PROGRESS NOTES
[facility] [facility]                                                                                          Hospitalist Progress Note  Pura Scherer MD  Answering service: [phone] OR [phone] from in house phone        Date of Service:  2022  NAME:  Cathie Prieto  :  1960  MRN:  [id number]      Admission Summary:   \"57year-old woman with past medical history significant for hypertension and breast cancer presented at [facility] a couple of days ago because of cough and back pain. The pain is located at the lower back with no radiation. No known aggravating or relieving factors. The cough is productive of yellowish sputum. When the patient arrived at the emergency room at [facility], the patient was found to have markedly elevated troponin level. Chest x-ray is suggestive of pneumonia. The patient was admitted to [facility].  The hospitalization is significant for code sepsis which was called during the hospitalization and the patient was started on broad-spectrum antibiotics for sepsis, most likely coming from the pneumonia. The patient was seen by cardiologist for the elevated troponin level. She was also COVID-19 virus positive and requiring oxygen. The patient presented with abnormal renal function which seems to be getting worse. Because of these multiple medical problems, the patient was transferred to [facility] for higher level of care. There was no fever, rigors or chills reported. \"          Interval history / Subjective: Follow up COVID, pneumonia. Patient seen and examined. Still has a cough and concerned about the appearance of it. Reassurance provided. Unhappy with staff. Cr leveling. Hopefully can go home soon.      Assessment & Plan:     Multifocal pneumonia in the setting of immunocompromise patient due to chemotherapy POA worsening  Acute hypoxic respiratory failure due to above  Moderate left pleural effusion POA  Bandemia  Hemoptysis not POA  COVID-19 infection/pneumonia POA  Sepsis due to above POA  -Chest x-ray reviewed dependently bilateral airspace disease.  -Chest CT with multifocal bilateral airspace opacity concerning for multifocal pneumonia. Focus on dependent left lower lobe may reflect organizing pneumonia.   Moderate left and right pleural effusion noted  -previously hypoxic at [facility], now on room air   -COVID-19 PCR positive  -CRP 37 > 24  procalcitonin 15  -Sputum culture with Staph aureus sensitivities pending, MRSA swab negative, will continue cefepime, d/c vancomycin, may DC on oral antibiotics on DC     Acute kidney injury   -Suspect prerenal in the setting of sepsis, continue IVF  -CT abdomen pelvis no obstruction  -nephrology consulted and following  - UA and Urine lytes pending   - Hopefully can be DC 24-48 hour  - s/p vancomycin    Blood loss anemia POA  -Due to hemoptysis, chronic illness   -trend H/H     NSTEMI:   -cardiology consulted  -EKG reviewed No acute ischemic process nonspecific T wave changes    HTN: home carvedilol     Elevated D-dimer - PE ruled out   -Cardio multifactorial due to underlying 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  -VQ scan low probability for PE    Peripheral neuropathy in the setting of chemotherapy  -Continue gabapentin     Prolonged QTC POA  -EKG reviewed apparently QTC greater than 500  -Keep mag above 2 potassium above 4  -Hold QT prolonging medication  - repeat EKG today      Hypokalemia  -Replete     Invasive ductal carcinoma POA  -Received chemotherapy  -Last chemo December 21, 2022        Code status: full   Prophylaxis: holding   Care Plan discussed with: patient  Anticipated Disposition: 24-48 hour, need clearance from nephrology     Hospital Problems  Date Reviewed: 12/29/2022            Codes Class Noted POA    * (Principal) Bacterial pneumonia ICD-10-CM: J15.9  ICD-9-CM: 482.9  12/28/2022 Yes         Review of Systems:   Negative unless stated above       Vital Signs:    Last 24hrs VS reviewed since prior progress note. Most recent are:  Visit Vitals  BP (!) 160/50   Pulse 64   Temp 98.1 °F (36.7 °C)   Resp 18   Ht 5' (1.524 m)   Wt 65.7 kg (144 lb 13.5 oz)   SpO2 98%   BMI 28.29 kg/m²       No intake or output data in the 24 hours ending 12/31/22 1438       Physical Examination:     I had a face to face encounter with this patient and independently examined them on 12/31/2022 as outlined below:          Constitutional:  No acute distress, cooperative,   ENT:  Oral mucosa moist, oropharynx benign. Resp:  CTA b/l no wheezing, rhonchi. No accessory muscle use. On RA    CV:  Regular rhythm, normal rate, no murmurs, gallops, rubs    GI:  Soft, non distended, non tender. normoactive bowel sounds, no hepatosplenomegaly     Musculoskeletal:  No edema, warm, 2+ pulses throughout    Neurologic:  Moves all extremities            Data Review:    Review and/or order of clinical lab test  Review and/or order of tests in the radiology section of CPT  Review and/or order of tests in the medicine section of CPT      Labs:     Recent Labs     12/31/22 0454 12/30/22  0829   WBC 12.5* 13.5*   HGB 7.2* 8.0*   HCT 21.4* 24.1*    274       Recent Labs     12/31/22  0454 12/30/22  0829 12/29/22  0511    138 139   K 3.4* 2.9* 3.3*   * 110* 106   CO2 22 20* 22   BUN 50* 46* 47*   CREA 3.00* 3.00* 3.34*   GLU 87 113* 170*   CA 9.0 9.8 9.8   MG 1.7  --  2.4   PHOS 3.1  --   --        Recent Labs     12/30/22  0829 12/29/22  0511   * 177*    118*   TBILI 0.3 0.3   TP 6.4 6.6   ALB 2.1* 2.2*   GLOB 4.3* 4.4*       No results for input(s): INR, PTP, APTT, INREXT, INREXT in the last 72 hours. No results for input(s): FE, TIBC, PSAT, FERR in the last 72 hours. No results found for: FOL, RBCF   No results for input(s): PH, PCO2, PO2 in the last 72 hours. No results for input(s): CPK, CKNDX, TROIQ in the last 72 hours.     No lab exists for component: CPKMB  No results found for: CHOL, CHOLX, CHLST, CHOLV, HDL, HDLP, LDL, LDLC, DLDLP, TGLX, TRIGL, TRIGP, CHHD, CHHDX  Lab Results   Component Value Date/Time    Glucose (POC) 103 12/27/2022 09:30 PM    Glucose (POC) 110 12/26/2022 02:38 PM     No results found for: COLOR, APPRN, SPGRU, REFSG, SALLIE, PROTU, GLUCU, KETU, BILU, UROU, ODALIS, LEUKU, GLUKE, EPSU, BACTU, WBCU, RBCU, CASTS, UCRY      Medications Reviewed:     Current Facility-Administered Medications   Medication Dose Route Frequency    benzocaine-menthoL (CEPACOL) lozenge 1 Lozenge  1 Lozenge Mucous Membrane PRN    hydrALAZINE (APRESOLINE) 20 mg/mL injection 10 mg  10 mg IntraVENous Q6H PRN    guaiFENesin ER (MUCINEX) tablet 600 mg  600 mg Oral Q12H    doxycycline (VIBRA-TABS) tablet 100 mg  100 mg Oral Q12H    cefepime (MAXIPIME) 1 g in 0.9% sodium chloride (MBP/ADV) 50 mL MBP  1 g IntraVENous Q12H    dexamethasone (DECADRON) 4 mg/mL injection 6 mg  6 mg IntraVENous Q24H    guaiFENesin-dextromethorphan (ROBITUSSIN DM) 100-10 mg/5 mL syrup 5 mL  5 mL Oral Q4H PRN    lactated Ringers infusion  100 mL/hr IntraVENous CONTINUOUS    loperamide (IMODIUM) capsule 2 mg  2 mg Oral Q4H PRN    naloxone (NARCAN) injection 0.4 mg  0.4 mg IntraVENous EVERY 2 MINUTES AS NEEDED    oxyCODONE IR (ROXICODONE) tablet 10 mg  10 mg Oral Q4H PRN    polyethylene glycol (MIRALAX) packet 17 g  17 g Oral DAILY PRN    sodium chloride (NS) flush 5-10 mL  5-10 mL IntraVENous PRN    sodium chloride (NS) flush 5-40 mL  5-40 mL IntraVENous Q8H    sodium chloride (NS) flush 5-40 mL  5-40 mL IntraVENous PRN    carvediloL (COREG) tablet 6.25 mg  6.25 mg Oral BID WITH MEALS    gabapentin (NEURONTIN) capsule 100 mg  100 mg Oral TID    PARoxetine (PAXIL) tablet 20 mg  20 mg Oral DAILY    rosuvastatin (CRESTOR) tablet 10 mg  10 mg Oral QHS    acetaminophen (TYLENOL) tablet 650 mg  650 mg Oral Q6H PRN    Or    acetaminophen (TYLENOL) suppository 650 mg  650 mg Rectal Q6H PRN ______________________________________________________________________  EXPECTED LENGTH OF STAY: - - -  ACTUAL LENGTH OF STAY:          3                 Mita Conway MD

## 2022-12-31 NOTE — PROGRESS NOTES
Pulmonary, Critical Care, and Sleep Medicine~Consult Note    Name: Shazia Paniagua MRN: [id number]   : 1960 Hospital: [facility]   Date: 2022 11:52 AM Admission: 2022     Impression Plan   Acute hypoxic resp failure - now on 2 lpm.  COVID19+  CAP; query coexisting inflammatory lung disease,  Hemoptysis, improving. Likely due to above. Pleural effusion, right > left   NSTEMI  CHRISTA on CKD? Breast cancer, on carboplatin, taxol, keytruda followed by ddAC and Keytruda. Last chemo with taxol was 22. In view of high PCT and CRP, suspect covid-19/bacterial pneumonia. Possibility of keytruda associated lung toxicity and/or CTD related inflammation can be assessed later. Noted GEM positive. CRP to high to support bacterial pneumonia. On decadron currently. On cefepime, doxy  O2 titration above 90%. SCDs for DVT proph   Check IgG. CXR in am     Daily Progression:      CRP high ESR high PCT high  Improvement on steroids. Cough. Feels better.   CTD labs; CRP on admission was 37, now down to 15.9    She is unaware of anymore hemoptysis. I told her to keep an eye out      Consult Note requested by hospitalist service. Patient started last week with worsening cough and congestion with back pains last week. Was found to be covid19+ upon ER admission at [facility]. She initially required O2, but now is on room air. Has had intermittent blood tinged sputum for several days now, but has since transition to darker sputum without residual bright reddness. No prior pulmonary issues, never smoker, no asthma hx. Known breast cancer hx. V/q scan showed low prob for PE.     CT scan  IMPRESSION  1. Multifocal heterogeneous bilateral airspace opacities concerning for multifocal pneumonia. A focus in the dependent left lower lobe may reflect organizing pneumonia. Follow-up CT in 3 months is recommended to document improvement/resolution.   2.  Moderate left and small right pleural effusions. Attention on follow-up. ECHO 12/27  Result status: Final result       Left Ventricle: Normal left ventricular systolic function with a visually estimated EF of 50 - 55%. Left ventricle size is normal. Normal wall thickness. Mild hypokinesis of the following segments: basal inferolateral and mid inferolateral.Unfortunately, imaging quality limis the accuracy    Technical qualifiers: Echo study was limited due to patient tolerance    I have reviewed the labs and previous days notes. Pertinent items are noted in HPI. Past Medical History:   Diagnosis Date    Cancer [facility])     BREAST CANCER    GERD (gastroesophageal reflux disease)     HTN (hypertension) 07/18/2011    Psychiatric disorder     ANIXETY AND DEPRESSION    Thyroid disease     HYPOTHYROID      Past Surgical History:   Procedure Laterality Date    HX COLONOSCOPY      HX MYOMECTOMY  05/02/1996    x 1    HX WISDOM TEETH EXTRACTION        Prior to Admission medications    Medication Sig Start Date End Date Taking? Authorizing Provider   Juancarlos Winter IV chemotherapy    Provider, Historical   carvediloL (COREG) 6.25 mg tablet Take 6.25 mg by mouth two (2) times daily (with meals). 11/3/22   Provider, Historical   famotidine (PEPCID) 40 mg/5 mL (8 mg/mL) suspension Take 2.5 mL by mouth two (2) times a day. 11/30/22   Luna Singh NP   gabapentin (NEURONTIN) 100 mg capsule Take 1 Capsule by mouth three (3) times daily. Max Daily Amount: 300 mg. 11/30/22   Luna Singh NP   losartan (COZAAR) 25 mg tablet Take 25 mg by mouth daily.     Provider, Historical   dexAMETHasone (DECADRON) 4 mg tablet Take 2 tabs (8mg) once daily on days 2 & 3 of chemotherapy 9/28/22   Luna Singh NP   ondansetron (ZOFRAN ODT) 8 mg disintegrating tablet Take 1 Tablet by mouth every eight (8) hours as needed for Nausea or Vomiting. 9/28/22   Luna Singh NP   prochlorperazine (Compazine) 5 mg tablet Take 1 Tablet by mouth every six (6) hours as needed for Nausea or Vomiting. 22   Susie Burgos, STANLEY   lidocaine-prilocaine (EMLA) topical cream Apply  to affected area as needed for Pain. Apply a thick layer over port a cath 30-60 minutes prior to port access 22   Susie Burgos NP   PARoxetine (PAXIL) 20 mg tablet Take 20 mg by mouth daily. Provider, Historical   rosuvastatin (CRESTOR) 10 mg tablet Take 10 mg by mouth nightly. 8/15/22   Provider, Historical   multivitamin, tx-iron-ca-min (THERA-M w/ IRON) 9 mg iron-400 mcg tab tablet Take 1 Tablet by mouth daily. Provider, Historical     Allergies   Allergen Reactions    Sulfa (Sulfonamide Antibiotics) Itching, Swelling and Unknown (comments)      Social History     Tobacco Use    Smoking status: Never     Passive exposure: Never    Smokeless tobacco: Never   Substance Use Topics    Alcohol use: Never      Family History   Problem Relation Age of Onset    Heart Failure Mother     Cancer Father     Diabetes Sister     Kidney Disease Sister     Anesth Problems Neg Hx      OBJECTIVE:     Vital Signs:     Visit Vitals  BP (!) 167/59 (BP 1 Location: Left upper arm, BP Patient Position: At rest;Lying right side)   Pulse 63   Temp 98.8 °F (37.1 °C)   Resp 20   Ht 5' (1.524 m)   Wt 65.7 kg (144 lb 13.5 oz)   SpO2 93%   BMI 28.29 kg/m²      Temp (24hrs), Av.3 °F (36.8 °C), Min:97.9 °F (36.6 °C), Max:98.8 °F (37.1 °C)     Intake/Output:     Last shift: No intake/output data recorded. Last 3 shifts:  1901 -  0700  In: 220 [P.O.:120;  I.V.:100]  Out: -       No intake or output data in the 24 hours ending 22 1650    Physical Exam:                                        Exam Findings Other   General: No resp distress noted, appears stated age    HEENT:  No ulcers, JVD not elevated, no cervical LAD    Chest: No pectus deformity, normal chest rise b/l    HEART:  RRR, no murmurs/rubs/gallops    Lungs:  CTA b/l, no rhonchi/crackles/wheeze, diminished BS at bases    ABD: Soft/NT, non rigid mildly distended    EXT: No cyanosis/clubbing/edema, normal peripheral pulses    Skin: No rashes or ulcers, no mottling    Neuro: A/O x 3        Medications:  Current Facility-Administered Medications   Medication Dose Route Frequency    benzocaine-menthoL (CEPACOL) lozenge 1 Lozenge  1 Lozenge Mucous Membrane PRN    hydrALAZINE (APRESOLINE) 20 mg/mL injection 10 mg  10 mg IntraVENous Q6H PRN    guaiFENesin ER (MUCINEX) tablet 600 mg  600 mg Oral Q12H    doxycycline (VIBRA-TABS) tablet 100 mg  100 mg Oral Q12H    cefepime (MAXIPIME) 1 g in 0.9% sodium chloride (MBP/ADV) 50 mL MBP  1 g IntraVENous Q12H    dexamethasone (DECADRON) 4 mg/mL injection 6 mg  6 mg IntraVENous Q24H    guaiFENesin-dextromethorphan (ROBITUSSIN DM) 100-10 mg/5 mL syrup 5 mL  5 mL Oral Q4H PRN    lactated Ringers infusion  100 mL/hr IntraVENous CONTINUOUS    loperamide (IMODIUM) capsule 2 mg  2 mg Oral Q4H PRN    naloxone (NARCAN) injection 0.4 mg  0.4 mg IntraVENous EVERY 2 MINUTES AS NEEDED    oxyCODONE IR (ROXICODONE) tablet 10 mg  10 mg Oral Q4H PRN    polyethylene glycol (MIRALAX) packet 17 g  17 g Oral DAILY PRN    sodium chloride (NS) flush 5-10 mL  5-10 mL IntraVENous PRN    sodium chloride (NS) flush 5-40 mL  5-40 mL IntraVENous Q8H    sodium chloride (NS) flush 5-40 mL  5-40 mL IntraVENous PRN    carvediloL (COREG) tablet 6.25 mg  6.25 mg Oral BID WITH MEALS    gabapentin (NEURONTIN) capsule 100 mg  100 mg Oral TID    PARoxetine (PAXIL) tablet 20 mg  20 mg Oral DAILY    rosuvastatin (CRESTOR) tablet 10 mg  10 mg Oral QHS    acetaminophen (TYLENOL) tablet 650 mg  650 mg Oral Q6H PRN    Or    acetaminophen (TYLENOL) suppository 650 mg  650 mg Rectal Q6H PRN       Labs:  ABG No results for input(s): PHI, PCO2I, PO2I, HCO3I, SO2I, FIO2I in the last 72 hours.      CBC Recent Labs     12/31/22  0454 12/30/22  0829 12/29/22  0511   WBC 12.5* 13.5* 13.8*   HGB 7.2* 8.0* 8.5*   HCT 21.4* 24.1* 24.7*    274 221   MCV 90.7 89.9 87.6   MCH 30.5 29.9 31.2        Metabolic  Panel Recent Labs     12/31/22  0454 12/30/22  0829 12/29/22  0511    138 139   K 3.4* 2.9* 3.3*   * 110* 106   CO2 22 20* 22   GLU 87 113* 170*   BUN 50* 46* 47*   CREA 3.00* 3.00* 3.34*   CA 9.0 9.8 9.8   MG 1.7  --  2.4   PHOS 3.1  --   --    ALB  --  2.1* 2.2*   ALT  --  171* 177*        Pertinent Korina Rojas MD  12/31/2022

## 2022-12-31 NOTE — PROGRESS NOTES
Nephrology Progress Note  Ernie Gil  Date of Admission : 12/28/2022    CC:  Follow up for CHRISTA       Assessment and Plan     CHRISTA:  - suspect 2/2 ATN  - Cr leveling off  - imaging neg, serologic w/up showing + GEM (unclear significance), neg ANCA  - cont IVF  - needs to collect accurate I/Os  - UA and urine lytes pending  - daily labs and I/Os     Hypokalemia:  - repleting K     Hypertension:  - Hold ARB     Acute hypoxic respiratory failure  Bacterial Pneumonia   COVID-19 +  - on empiric antibiotics-- Cefepime IV, oral doxycycline   - on room air, supplemental oxygen as needed     Breast cancer:  - last chemotherapy 12/21/22       Interval History:  Not examined in room. Per RN, stable night. On RA, couging. Cr stable at 3. Voiding, but not collecting her UOP. Current Medications: all current  Medications have been eviewed in EPIC  Review of Systems: Pertinent items are noted in HPI. Objective:  Vitals:    Vitals:    12/31/22 0149 12/31/22 0350 12/31/22 0501 12/31/22 0600   BP:   (!) 143/65    Pulse: 79 81 71 64   Resp:   20    Temp:   97.9 °F (36.6 °C)    SpO2:   95%    Weight:       Height:         Intake and Output:  No intake/output data recorded. 12/29 1901 - 12/31 0700  In: 220 [P.O.:120; I.V.:100]  Out: -     Physical Examination:  Not examined in the room due to COVID-19 isolation:    Pt intubated    No  General: NAD  Resp:  Stable O2, on RA  CV:  RRR on monitor, no edema reported  Neurologic:  Non focal  Skin:  No Rashes or lesion  :  No isaac    []    High complexity decision making was performed  []    Patient is at high-risk of decompensation with multiple organ involvement    Lab Data Personally Reviewed: I have reviewed all the pertinent labs, microbiology data and radiology studies during assessment.     Recent Labs     12/31/22  0454 12/30/22  0829 12/29/22  0511 12/28/22  0908    138 139  --    K 3.4* 2.9* 3.3*  --    * 110* 106  --    CO2 22 20* 22  --    GLU 87 113* 170*  --    BUN 50* 46* 47*  --    CREA 3.00* 3.00* 3.34*  --    CA 9.0 9.8 9.8  --    MG 1.7  --  2.4 1.5*   PHOS 3.1  --   --   --    ALB  --  2.1* 2.2*  --    ALT  --  171* 177*  --      Recent Labs     12/31/22  0454 12/30/22  0829 12/29/22  0511   WBC 12.5* 13.5* 13.8*   HGB 7.2* 8.0* 8.5*   HCT 21.4* 24.1* 24.7*    274 221     No results found for: SDES  Lab Results   Component Value Date/Time    Culture result: HEAVY STAPHYLOCOCCUS AUREUS (A) 12/27/2022 01:15 PM    Culture result: SCANT YEAST, (APPARENT CANDIDA ALBICANS) (A) 12/27/2022 01:15 PM    Culture result:  12/27/2022 11:01 AM     MRSA NOT PRESENT. Apparent Staphylococus aureus (not MRSA noted). Culture result:  12/27/2022 11:01 AM     Screening of patient nares for MRSA is for surveillance purposes and, if positive, to facilitate isolation considerations in high risk settings. It is not intended for automatic decolonization interventions per se as regimens are not sufficiently effective to warrant routine use. Recent Results (from the past 24 hour(s))   CBC WITH AUTOMATED DIFF    Collection Time: 12/31/22  4:54 AM   Result Value Ref Range    WBC 12.5 (H) 3.6 - 11.0 K/uL    RBC 2.36 (L) 3.80 - 5.20 M/uL    HGB 7.2 (L) 11.5 - 16.0 g/dL    HCT 21.4 (L) 35.0 - 47.0 %    MCV 90.7 80.0 - 99.0 FL    MCH 30.5 26.0 - 34.0 PG    MCHC 33.6 30.0 - 36.5 g/dL    RDW 18.4 (H) 11.5 - 14.5 %    PLATELET 453 041 - 101 K/uL    MPV 9.9 8.9 - 12.9 FL    NRBC 0.5 (H) 0  WBC    ABSOLUTE NRBC 0.06 (H) 0.00 - 0.01 K/uL    NEUTROPHILS 64 32 - 75 %    BAND NEUTROPHILS 6 0 - 6 %    LYMPHOCYTES 18 12 - 49 %    MONOCYTES 11 5 - 13 %    EOSINOPHILS 0 0 - 7 %    BASOPHILS 0 0 - 1 %    MYELOCYTES 1 (H) 0 %    IMMATURE GRANULOCYTES 0 %    ABS. NEUTROPHILS 8.8 (H) 1.8 - 8.0 K/UL    ABS. LYMPHOCYTES 2.3 0.8 - 3.5 K/UL    ABS. MONOCYTES 1.4 (H) 0.0 - 1.0 K/UL    ABS. EOSINOPHILS 0.0 0.0 - 0.4 K/UL    ABS. BASOPHILS 0.0 0.0 - 0.1 K/UL    ABS. IMM.  GRANS. 0.0 K/UL DF MANUAL      RBC COMMENTS ANISOCYTOSIS  1+        RBC COMMENTS OVALOCYTES  PRESENT        RBC COMMENTS TOXIC GRANULATION     MAGNESIUM    Collection Time: 12/31/22  4:54 AM   Result Value Ref Range    Magnesium 1.7 1.6 - 2.4 mg/dL   METABOLIC PANEL, BASIC    Collection Time: 12/31/22  4:54 AM   Result Value Ref Range    Sodium 140 136 - 145 mmol/L    Potassium 3.4 (L) 3.5 - 5.1 mmol/L    Chloride 111 (H) 97 - 108 mmol/L    CO2 22 21 - 32 mmol/L    Anion gap 7 5 - 15 mmol/L    Glucose 87 65 - 100 mg/dL    BUN 50 (H) 6 - 20 MG/DL    Creatinine 3.00 (H) 0.55 - 1.02 MG/DL    BUN/Creatinine ratio 17 12 - 20      eGFR 17 (L) >60 ml/min/1.73m2    Calcium 9.0 8.5 - 10.1 MG/DL   PHOSPHORUS    Collection Time: 12/31/22  4:54 AM   Result Value Ref Range    Phosphorus 3.1 2.6 - 4.7 MG/DL             Natanael Bojorquez MD  [address]  Phone - [phone]   Fax - [phone]  [email or web address]

## 2022-12-31 NOTE — PROGRESS NOTES
Problem: Airway Clearance - Ineffective  Goal: Achieve or maintain patent airway  Outcome: Progressing Towards Goal     Problem: Gas Exchange - Impaired  Goal: Absence of hypoxia  Outcome: Progressing Towards Goal     Problem: Gas Exchange - Impaired  Goal: Promote optimal lung function  Outcome: Progressing Towards Goal     Problem: Breathing Pattern - Ineffective  Goal: Ability to achieve and maintain a regular respiratory rate  Outcome: Progressing Towards Goal     Problem: Body Temperature -  Risk of, Imbalanced  Goal: Ability to maintain a body temperature within defined limits  Outcome: Progressing Towards Goal  Goal: Will regain or maintain usual level of consciousness  Outcome: Progressing Towards Goal  Goal: Complications related to the disease process, condition or treatment will be avoided or minimized  Outcome: Progressing Towards Goal     Problem: Isolation Precautions - Risk of Spread of Infection  Goal: Prevent transmission of infectious organism to others  Outcome: Progressing Towards Goal     Problem: Nutrition Deficits  Goal: Optimize nutrtional status  Outcome: Progressing Towards Goal     Problem: Risk for Fluid Volume Deficit  Goal: Maintain normal heart rhythm  Outcome: Progressing Towards Goal  Goal: Maintain absence of muscle cramping  Outcome: Progressing Towards Goal  Goal: Maintain normal serum potassium, sodium, calcium, phosphorus, and pH  Outcome: Progressing Towards Goal     Problem: Loneliness or Risk for Loneliness  Goal: Demonstrate positive use of time alone when socialization is not possible  Outcome: Progressing Towards Goal     Problem: Fatigue  Goal: Verbalize increase energy and improved vitality  Outcome: Progressing Towards Goal     Problem: Falls - Risk of  Goal: *Absence of Falls  Description: Document Rubina Fall Risk and appropriate interventions in the flowsheet.   Outcome: Progressing Towards Goal  Note: Fall Risk Interventions:            Medication Interventions: Patient to call before getting OOB, Teach patient to arise slowly

## 2022-12-31 NOTE — CONSULTS
NEPHROLOGY CONSULT NOTE     Patient: John Vail MRN: [id number]  PCP: Kay Colorado MD   :     1960  Age:   57 y.o. Sex:  female      Referring physician: Ankita King*  Reason for consultation: 57 y.o. female with Bacterial pneumonia [I63.2] complicated by CHRISTA   Admission Date: 2022  8:39 PM  LOS: 2 days     DISCUSSION / PLAN :   Non-oliguric CHRISTA  - Presenting serum creatinine on  2.91 mg/dl up from 0.68 mg/dl on 22. Has gone as high as 3.39 mg/dl, now trending down to 3.0 mg/dl today   - Likely pre-renal +/- ATN from bacterial pneumonia   - Abdominal ultrasound negative for hydronephrosis or obstruction   - Continue mIVF- LR at 100 ml/hr  - Check UA, urine studies, lytes  - Strict I/Os for accurate urinary measurement  - Hold ARB  - Renally dose all medication, avoid nephrotoxins  - Trend renal indices daily     Hypokalemia  - Potassium 2.8  - replete judiciously    Hypertension   - Hold ARB in light of CHRISTA  - Continue carvedilol  - prn anti-hypertensive's added    Acute hypoxic respiratory failure  Bacterial Pneumonia   COVID-19 +  - on empiric antibiotics-- Cefepime IV, oral doxycycline   - on room air, supplemental oxygen as needed    Breast cancer   - last chemotherapy 22       Subjective:   HPI: John Vail is a 57 y.o.  female who has a past medical history significant for hypertension, hypothyroidism, breast cancer and GERD. Initially presented to [facility] a few days ago with complaints of cough and back pain. She was found to have an elevated troponin level, chest x-ray consistent with pneumonia. Over the course of her hospitalization patient became septic and was started on broad spectrum antibiotics. She was found to be COVID-19 + and required oxygen supplementation. Patients renal function worsened and she was transferred to [facility] for further evaluation/treatment.    Nephrology was consulted for the assistance in the management of CHRISTA  - Serum creatinine improving s/p IVF's, today 3.0 mg/dl  - Denies any family history of kidney disease, ESRD  - CT of the abd/pel negative for obstruction or hydronephrosis  - NSTEMI- cardiology consulted     Past Medical Hx:   Past Medical History:   Diagnosis Date    Cancer ([facility] [address]     BREAST CANCER    GERD (gastroesophageal reflux disease)     HTN (hypertension) 07/18/2011    Psychiatric disorder     ANIXETY AND DEPRESSION    Thyroid disease     HYPOTHYROID        Past Surgical Hx:     Past Surgical History:   Procedure Laterality Date    HX COLONOSCOPY      HX MYOMECTOMY  05/02/1996    x 1    HX WISDOM TEETH EXTRACTION         Medications:  Prior to Admission medications    Medication Sig Start Date End Date Taking? Authorizing Provider   Felicia Lozano IV chemotherapy    Provider, Historical   carvediloL (COREG) 6.25 mg tablet Take 6.25 mg by mouth two (2) times daily (with meals). 11/3/22   Provider, Historical   famotidine (PEPCID) 40 mg/5 mL (8 mg/mL) suspension Take 2.5 mL by mouth two (2) times a day. 11/30/22   Patrick Diggs NP   gabapentin (NEURONTIN) 100 mg capsule Take 1 Capsule by mouth three (3) times daily. Max Daily Amount: 300 mg. 11/30/22   Patrick Diggs NP   losartan (COZAAR) 25 mg tablet Take 25 mg by mouth daily. Provider, Historical   dexAMETHasone (DECADRON) 4 mg tablet Take 2 tabs (8mg) once daily on days 2 & 3 of chemotherapy 9/28/22   Patrick Diggs NP   ondansetron (ZOFRAN ODT) 8 mg disintegrating tablet Take 1 Tablet by mouth every eight (8) hours as needed for Nausea or Vomiting. 9/28/22   Patrick Diggs NP   prochlorperazine (Compazine) 5 mg tablet Take 1 Tablet by mouth every six (6) hours as needed for Nausea or Vomiting. 9/28/22   Patrick Diggs NP   lidocaine-prilocaine (EMLA) topical cream Apply  to affected area as needed for Pain.  Apply a thick layer over port a cath 30-60 minutes prior to port access 9/28/22   Patrick Diggs NP   PARoxetine (PAXIL) 20 mg tablet Take 20 mg by mouth daily. Provider, Historical   rosuvastatin (CRESTOR) 10 mg tablet Take 10 mg by mouth nightly. 8/15/22   Provider, Historical   multivitamin, tx-iron-ca-min (THERA-M w/ IRON) 9 mg iron-400 mcg tab tablet Take 1 Tablet by mouth daily. Provider, Historical       Allergies   Allergen Reactions    Sulfa (Sulfonamide Antibiotics) Itching, Swelling and Unknown (comments)       Social Hx:  reports that she has never smoked. She has never been exposed to tobacco smoke. She has never used smokeless tobacco. She reports that she does not currently use drugs after having used the following drugs: Marijuana. She reports that she does not drink alcohol. Family History   Problem Relation Age of Onset    Heart Failure Mother     Cancer Father     Diabetes Sister     Kidney Disease Sister     Anesth Problems Neg Hx        Review of Systems:  A twelve point review of system was performed today. Pertinent positives and negatives are mentioned in the HPI. The reminder of the ROS is negative and noncontributory. Objective:    Vitals:    Vitals:    12/30/22 1800 12/30/22 2000 12/30/22 2157 12/30/22 2217   BP:    (!) 152/67   Pulse: 66 80 66 66   Resp:    16   Temp:    98.2 °F (36.8 °C)   SpO2:    98%   Weight:       Height:         I&O's:  No intake/output data recorded. Visit Vitals  BP (!) 152/67 (BP 1 Location: Right upper arm, BP Patient Position: At rest)   Pulse 66   Temp 98.2 °F (36.8 °C)   Resp 16   Ht 5' (1.524 m)   Wt 65.7 kg (144 lb 13.5 oz)   SpO2 98%   BMI 28.29 kg/m²       Physical Exam:  General:Alert, No distress  HEENT: Eyes are PERRL. Conjunctiva without pallor ,erythema. Neck: Supple,no mass palpable  Lungs : Normal respiratory effort  CVS: RRR, S1 S2 normal, No rub, no LE edema  Abdomen: Soft, Non tender, bowel sounds present  Extremities: No cyanosis, No clubbing  Skin: No rash or lesions.   Neurologic: non focal, AAO x 3  Psych: normal affect    Laboratory Results:    Lab Results   Component Value Date    BUN 46 (H) 12/30/2022     12/30/2022    K 2.9 (L) 12/30/2022     (H) 12/30/2022    CO2 20 (L) 12/30/2022       Lab Results   Component Value Date    BUN 46 (H) 12/30/2022    BUN 47 (H) 12/29/2022    BUN 38 (H) 12/28/2022    BUN 34 (H) 12/27/2022    BUN 29 (H) 12/26/2022    K 2.9 (L) 12/30/2022    K 3.3 (L) 12/29/2022    K 2.8 (L) 12/28/2022    K 2.8 (L) 12/27/2022    K 2.8 (L) 12/26/2022       Lab Results   Component Value Date    WBC 13.5 (H) 12/30/2022    RBC 2.68 (L) 12/30/2022    HGB 8.0 (L) 12/30/2022    HCT 24.1 (L) 12/30/2022    MCV 89.9 12/30/2022    MCH 29.9 12/30/2022    RDW 18.4 (H) 12/30/2022     12/30/2022       No results found for: PTH, PHOS    Urine dipstick:   No results found for: COLOR, APPRN, SPGRU, REFSG, SALLIE, PROTU, GLUCU, KETU, BILU, UROU, ODALIS, LEUKU, GLUKE, EPSU, BACTU, WBCU, RBCU, CASTS, UCRY    I have reviewed the following: All pertinent labs, microbiology data, radiology imaging for my assessment     ECG- Rev: Yes / No  Xray/CT/US/MRI REV: Yes/ No    Care Plan discussed with:  patient     Chart reviewed. Medications list Personally Reviewed   [x]      Yes     []               No      Thank you for allowing us to participate in the care of this patient. We will follow patient. Please dont hesitate to call with any questions    Timmy Byrne NP  12/30/2022    [facility]  [place]  Phone - [phone]  Fax - [phone]  [email or web address]

## 2023-01-01 ENCOUNTER — APPOINTMENT (OUTPATIENT)
Dept: GENERAL RADIOLOGY | Age: 63
DRG: 871 | End: 2023-01-01
Attending: INTERNAL MEDICINE
Payer: COMMERCIAL

## 2023-01-01 VITALS
HEART RATE: 62 BPM | HEIGHT: 60 IN | WEIGHT: 144.7 LBS | OXYGEN SATURATION: 97 % | DIASTOLIC BLOOD PRESSURE: 80 MMHG | RESPIRATION RATE: 18 BRPM | BODY MASS INDEX: 28.41 KG/M2 | SYSTOLIC BLOOD PRESSURE: 119 MMHG | TEMPERATURE: 98.6 F

## 2023-01-01 LAB
ANION GAP SERPL CALC-SCNC: 7 MMOL/L (ref 5–15)
BASOPHILS # BLD: 0.1 K/UL (ref 0–0.1)
BASOPHILS NFR BLD: 1 % (ref 0–1)
BUN SERPL-MCNC: 43 MG/DL (ref 6–20)
BUN/CREAT SERPL: 16 (ref 12–20)
CALCIUM SERPL-MCNC: 8.1 MG/DL (ref 8.5–10.1)
CHLORIDE SERPL-SCNC: 111 MMOL/L (ref 97–108)
CO2 SERPL-SCNC: 23 MMOL/L (ref 21–32)
CREAT SERPL-MCNC: 2.77 MG/DL (ref 0.55–1.02)
DIFFERENTIAL METHOD BLD: ABNORMAL
EOSINOPHIL # BLD: 0 K/UL (ref 0–0.4)
EOSINOPHIL NFR BLD: 0 % (ref 0–7)
ERYTHROCYTE [DISTWIDTH] IN BLOOD BY AUTOMATED COUNT: 18.3 % (ref 11.5–14.5)
GLUCOSE SERPL-MCNC: 77 MG/DL (ref 65–100)
HCT VFR BLD AUTO: 20.7 % (ref 35–47)
HGB BLD-MCNC: 7 G/DL (ref 11.5–16)
IMM GRANULOCYTES # BLD AUTO: 0 K/UL
IMM GRANULOCYTES NFR BLD AUTO: 0 %
LYMPHOCYTES # BLD: 2.4 K/UL (ref 0.8–3.5)
LYMPHOCYTES NFR BLD: 18 % (ref 12–49)
MAGNESIUM SERPL-MCNC: 1.6 MG/DL (ref 1.6–2.4)
MCH RBC QN AUTO: 30.3 PG (ref 26–34)
MCHC RBC AUTO-ENTMCNC: 33.8 G/DL (ref 30–36.5)
MCV RBC AUTO: 89.6 FL (ref 80–99)
METAMYELOCYTES NFR BLD MANUAL: 3 %
MONOCYTES # BLD: 0.8 K/UL (ref 0–1)
MONOCYTES NFR BLD: 6 % (ref 5–13)
MYELOCYTES NFR BLD MANUAL: 1 %
NEUTS SEG # BLD: 9.7 K/UL (ref 1.8–8)
NEUTS SEG NFR BLD: 71 % (ref 32–75)
NRBC # BLD: 0.04 K/UL (ref 0–0.01)
NRBC BLD-RTO: 0.3 PER 100 WBC
PHOSPHATE SERPL-MCNC: 3.2 MG/DL (ref 2.6–4.7)
PLATELET # BLD AUTO: 248 K/UL (ref 150–400)
PLATELET COMMENTS,PCOM: ABNORMAL
PMV BLD AUTO: 9.4 FL (ref 8.9–12.9)
POTASSIUM SERPL-SCNC: 3.5 MMOL/L (ref 3.5–5.1)
RBC # BLD AUTO: 2.31 M/UL (ref 3.8–5.2)
RBC MORPH BLD: ABNORMAL
SODIUM SERPL-SCNC: 141 MMOL/L (ref 136–145)
WBC # BLD AUTO: 13.6 K/UL (ref 3.6–11)

## 2023-01-01 PROCEDURE — 84100 ASSAY OF PHOSPHORUS: CPT

## 2023-01-01 PROCEDURE — 80048 BASIC METABOLIC PNL TOTAL CA: CPT

## 2023-01-01 PROCEDURE — 74011250637 HC RX REV CODE- 250/637: Performed by: INTERNAL MEDICINE

## 2023-01-01 PROCEDURE — 74011250636 HC RX REV CODE- 250/636: Performed by: INTERNAL MEDICINE

## 2023-01-01 PROCEDURE — 74011000258 HC RX REV CODE- 258: Performed by: INTERNAL MEDICINE

## 2023-01-01 PROCEDURE — 74011250636 HC RX REV CODE- 250/636: Performed by: NURSE PRACTITIONER

## 2023-01-01 PROCEDURE — 65270000032 HC RM SEMIPRIVATE

## 2023-01-01 PROCEDURE — 71045 X-RAY EXAM CHEST 1 VIEW: CPT

## 2023-01-01 PROCEDURE — 83735 ASSAY OF MAGNESIUM: CPT

## 2023-01-01 PROCEDURE — 74011000250 HC RX REV CODE- 250: Performed by: INTERNAL MEDICINE

## 2023-01-01 PROCEDURE — 36415 COLL VENOUS BLD VENIPUNCTURE: CPT

## 2023-01-01 PROCEDURE — 85025 COMPLETE CBC W/AUTO DIFF WBC: CPT

## 2023-01-01 RX ORDER — GUAIFENESIN 600 MG/1
600 TABLET, EXTENDED RELEASE ORAL EVERY 12 HOURS
Qty: 20 TABLET | Refills: 0 | Status: ON HOLD | OUTPATIENT
Start: 2023-01-01 | End: 2023-01-11

## 2023-01-01 RX ORDER — DEXAMETHASONE 6 MG/1
6 TABLET ORAL DAILY
Qty: 6 TABLET | Refills: 0 | Status: ON HOLD | OUTPATIENT
Start: 2023-01-01 | End: 2023-01-07

## 2023-01-01 RX ORDER — HEPARIN 100 UNIT/ML
500 SYRINGE INTRAVENOUS AS NEEDED
Status: DISCONTINUED | OUTPATIENT
Start: 2023-01-01 | End: 2023-01-02 | Stop reason: HOSPADM

## 2023-01-01 RX ORDER — AMOXICILLIN AND CLAVULANATE POTASSIUM 875; 125 MG/1; MG/1
1 TABLET, FILM COATED ORAL EVERY 12 HOURS
Qty: 10 TABLET | Refills: 0 | Status: SHIPPED | OUTPATIENT
Start: 2023-01-01 | End: 2023-01-04

## 2023-01-01 RX ADMIN — Medication 500 UNITS: at 17:36

## 2023-01-01 RX ADMIN — DEXAMETHASONE SODIUM PHOSPHATE 6 MG: 4 INJECTION, SOLUTION INTRAMUSCULAR; INTRAVENOUS at 06:28

## 2023-01-01 RX ADMIN — PAROXETINE HYDROCHLORIDE 20 MG: 20 TABLET, FILM COATED ORAL at 10:05

## 2023-01-01 RX ADMIN — HYDRALAZINE HYDROCHLORIDE 10 MG: 20 INJECTION INTRAMUSCULAR; INTRAVENOUS at 04:14

## 2023-01-01 RX ADMIN — SODIUM CHLORIDE, PRESERVATIVE FREE 10 ML: 5 INJECTION INTRAVENOUS at 04:09

## 2023-01-01 RX ADMIN — SODIUM CHLORIDE, POTASSIUM CHLORIDE, SODIUM LACTATE AND CALCIUM CHLORIDE 100 ML/HR: 600; 310; 30; 20 INJECTION, SOLUTION INTRAVENOUS at 02:00

## 2023-01-01 RX ADMIN — GABAPENTIN 100 MG: 100 CAPSULE ORAL at 10:05

## 2023-01-01 RX ADMIN — CARVEDILOL 6.25 MG: 6.25 TABLET, FILM COATED ORAL at 10:05

## 2023-01-01 RX ADMIN — GUAIFENESIN 600 MG: 600 TABLET, EXTENDED RELEASE ORAL at 10:05

## 2023-01-01 RX ADMIN — CEFEPIME 1 G: 1 INJECTION, POWDER, FOR SOLUTION INTRAMUSCULAR; INTRAVENOUS at 04:08

## 2023-01-01 NOTE — DISCHARGE SUMMARY
Discharge Summary       PATIENT ID: Sukhjinder Jensen  MRN: [id number]   YOB: 1960    DATE OF ADMISSION: 12/28/2022  8:39 PM    DATE OF DISCHARGE: 01/01/23     PRIMARY CARE PROVIDER: Deandre Gooden MD     DISCHARGING PROVIDER: Vinnie Myers MD    To contact this individual call [phone] and ask the  to page. If unavailable ask to be transferred the Adult Hospitalist Department. CONSULTATIONS: IP CONSULT TO NEPHROLOGY  IP CONSULT TO PULMONOLOGY  IP CONSULT TO CARDIOLOGY    PROCEDURES/SURGERIES: * No surgery found *    DISCHARGE DIAGNOSES:   Multifocal pneumonia  Hemoptysis  COVID 19  Sepsis  CHRISTA  NSTEMI        ADMISSION SUMMARY AND HOSPITAL COURSE:   69-year-old woman with past medical history significant for hypertension and breast cancer presented at [facility] a couple of days ago because of cough and back pain. The pain is located at the lower back with no radiation. No known aggravating or relieving factors. The cough is productive of yellowish sputum. When the patient arrived at the emergency room at [facility], the patient was found to have markedly elevated troponin level. Chest x-ray is suggestive of pneumonia. The patient was admitted to [facility].  The hospitalization is significant for code sepsis which was called during the hospitalization and the patient was started on broad-spectrum antibiotics for sepsis, most likely coming from the pneumonia. The patient was seen by cardiologist for the elevated troponin level. She was also COVID-19 virus positive and requiring oxygen. The patient presented with abnormal renal function which seems to be getting worse. Because of these multiple medical problems, the patient was transferred to [facility] for higher level of care. Pt was admitted and diagnosed with acute bacterial pneumonia and COVID19.  She was started on broad spectrum antibiotics, and decadron. Improved. Course was complicated by CHRISTA, which is now improved with IV fluids. Improved and weaned to RA, no signs of sepsis, fever. WBC improved. Pt renal function improved with IVF and cleared for DC by nephrology. Patient was given the opportunity to leave the hospital today vs. Tomorrow depending on her ability to enter her home. She arranged her own transportation and did not tell any RN or staff about her decision to leave. She abruptly demanded discharged papers, and was angry these were not prepared for her. She was not receptive to [facility], or to discussing disposition with our CM. DISCHARGE DIAGNOSES / PLAN:      Multifocal pneumonia in the setting of immunocompromise patient due to chemotherapy POA worsening  Acute hypoxic respiratory failure due to above  Moderate left pleural effusion POA  Bandemia  Hemoptysis not POA  COVID-19 infection/pneumonia POA  Sepsis due to above POA  -Chest x-ray reviewed dependently bilateral airspace disease.  -Chest CT with multifocal bilateral airspace opacity concerning for multifocal pneumonia. Focus on dependent left lower lobe may reflect organizing pneumonia. Moderate left and right pleural effusion noted  -previously hypoxic at [facility], now on room air   -COVID-19 PCR positive  -CRP 37 > 24  procalcitonin 15  -Sputum culture with Staph aureus sensitivities pending, MRSA swab negative, will continue cefepime, d/c vancomycin, may DC on oral antibiotics on DC      Acute kidney injury  - improving today   -Suspect prerenal in the setting of sepsis, discontinue IVF  -CT abdomen pelvis no obstruction  -nephrology consulted and following  - UA and Urine lytes pending   - Can DC tomorrow  - s/p vancomycin     Blood loss anemia POA  -Due to hemoptysis, chronic illness   -trend H/H      NSTEMI:   -cardiology consulted --> secondary to severe sepsis  - Consider o/p stress test when improved.    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 . This patient: Meets criteria for overweight given BMI >/= 25 and < 30 due to excess calories/nutritional. Weight loss and lifestyle modifications should be encouraged as an outpatient. PENDING TEST RESULTS:   At the time of discharge the following test results are still pending: None     ADDITIONAL CARE RECOMMENDATIONS:   - Please take all medications as prescribed. Note changes as below. **Continue augmentin x 4 days  **Continue decadron for an additional 6 days   - Please make sure to follow up with your primary care physician within 1-2 weeks of discharge for hospital follow up. You should also follow up with cardiology, nephrology, and your oncologist.  - Lord Mclean should have a BMP checked in a few days to monitor you renal function    - Please make sure to continue to monitor for: Chest pain, shortness of breath, high fevers,  and return to the Emergency Department with any of these symptoms.   - Please get up slowly from a seated or laying position, avoid falls. - Avoid tobacco, alcohol and other illicit drug use. - Diet restrictions: None, resume prior   - Activity restrictions: None  - Wound care: None         NOTIFY YOUR PHYSICIAN FOR ANY OF THE FOLLOWING:   Fever over 101 degrees for 24 hours. Chest pain, shortness of breath, fever, chills, nausea, vomiting, diarrhea, change in mentation, falling, weakness, bleeding.  Severe pain or pain not relieved by medications, as well as any other signs or symptoms that you may have questions about. FOLLOW UP APPOINTMENTS:    Follow-up Information       Follow up With Specialties Details Why Contact Info    Ami Mendoza, [address]  [phone]      Dalila Koehler MD Nephrology Follow up  [facility]  [address]  [phone]      Cardiologist  Follow up in 1 week(s)      Oncologist  Follow up in 1 week(s)                 DIET: Regular Diet    ACTIVITY: Activity as tolerated    EQUIPMENT needed: None    DISCHARGE MEDICATIONS:  Current Discharge Medication List        START taking these medications    Details   amoxicillin-clavulanate (Augmentin) 875-125 mg per tablet Take 1 Tablet by mouth every twelve (12) hours for 3 days. Qty: 10 Tablet, Refills: 0  Start date: 1/1/2023, End date: 1/4/2023      !! dexAMETHasone (DECADRON) 6 mg tablet Take 1 Tablet by mouth daily for 6 days. Qty: 6 Tablet, Refills: 0  Start date: 1/1/2023, End date: 1/7/2023      guaiFENesin ER (MUCINEX) 600 mg ER tablet Take 1 Tablet by mouth every twelve (12) hours for 10 days. Qty: 20 Tablet, Refills: 0  Start date: 1/1/2023, End date: 1/11/2023       !! - Potential duplicate medications found. Please discuss with provider. CONTINUE these medications which have NOT CHANGED    Details   OTHER,NON-FORMULARY, IV chemotherapy      carvediloL (COREG) 6.25 mg tablet Take 6.25 mg by mouth two (2) times daily (with meals). famotidine (PEPCID) 40 mg/5 mL (8 mg/mL) suspension Take 2.5 mL by mouth two (2) times a day. Qty: 50 mL, Refills: 3    Associated Diagnoses: 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, unspecified estrogen receptor status (HCC)      gabapentin (NEURONTIN) 100 mg capsule Take 1 Capsule by mouth three (3) times daily. Max Daily Amount: 300 mg.   Qty: 90 Capsule, Refills: 3    Associated Diagnoses: 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, unspecified estrogen receptor status ([facility] [address]      ! ! dexAMETHasone (DECADRON) 4 mg tablet Take 2 tabs (8mg) once daily on days 2 & 3 of chemotherapy  Qty: 30 Tablet, Refills: 0    Associated Diagnoses: 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, unspecified estrogen receptor status (HCC)      ondansetron (ZOFRAN ODT) 8 mg disintegrating tablet Take 1 Tablet by mouth every eight (8) hours as needed for Nausea or Vomiting. Qty: 30 Tablet, Refills: 3    Associated Diagnoses: 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, unspecified estrogen receptor status (HCC)      prochlorperazine (Compazine) 5 mg tablet Take 1 Tablet by mouth every six (6) hours as needed for Nausea or Vomiting. Qty: 30 Tablet, Refills: 3    Associated Diagnoses: 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, unspecified estrogen receptor status (HCC)      lidocaine-prilocaine (EMLA) topical cream Apply  to affected area as needed for Pain. Apply a thick layer over port a cath 30-60 minutes prior to port access  Qty: 30 g, Refills: 3    Associated Diagnoses: 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, unspecified estrogen receptor status (HCC)      PARoxetine (PAXIL) 20 mg tablet Take 20 mg by mouth daily. rosuvastatin (CRESTOR) 10 mg tablet Take 10 mg by mouth nightly. multivitamin, tx-iron-ca-min (THERA-M w/ IRON) 9 mg iron-400 mcg tab tablet Take 1 Tablet by mouth daily. !! - Potential duplicate medications found. Please discuss with provider.         STOP taking these medications       losartan (COZAAR) 25 mg tablet Comments:   Reason for Stopping:               DISPOSITION:   X Home With:   OT  PT  HH  RN       Long term SNF/Inpatient Rehab    Independent/assisted living    Hospice    Other:       PATIENT CONDITION AT DISCHARGE:     Functional status    Poor Deconditioned    X Independent      Cognition    X Lucid     Forgetful     Dementia      Catheters/lines (plus indication)    [place]ons     PICC     PEG    X None      Code status    X Full code     DNR      PHYSICAL EXAMINATION AT DISCHARGE:  Visit Vitals  /80 (BP 1 Location: Right upper arm, BP Patient Position: At rest)   Pulse 62   Temp 98.6 °F (37 °C)   Resp 18   Ht 5' (1.524 m)   Wt 65.6 kg (144 lb 11.2 oz)   SpO2 97%   BMI 28.26 kg/m²     Gen: NAD, awake in bed  HEENT: NC/AT, sclera anicteric, PERRL, EOMI  CV: RRR no m/r/g, normal S1 and S2, no pedal edema, port in place   Resp: CTA b/l no increased work of breathing, no wheezing or rhonchi, speaking in full sentences   Abd: NT/ND, normal bowel sounds, no rebound or guarding  Ext: 2+ pulses, no edema  Skin: No rashes or lesions          CHRONIC MEDICAL DIAGNOSES:  Problem List as of 1/1/2023 Date Reviewed: 1/1/2023            Codes Class Noted - Resolved    * (Principal) Bacterial pneumonia ICD-10-CM: J15.9  ICD-9-CM: 482.9  12/28/2022 - Present        Troponin level elevated ICD-10-CM: R77.8  ICD-9-CM: 790.6  12/27/2022 - Present        Elevated LFTs ICD-10-CM: R79.89  ICD-9-CM: 790.6  12/27/2022 - Present        Pneumonia ICD-10-CM: J18.9  ICD-9-CM: 984  12/26/2022 - Present        [facility] ICD-10-CM: N17.9  ICD-9-CM: 584.9  12/26/2022 - Present        COVID-19 ICD-10-CM: U07.1  ICD-9-CM: 079.89  12/26/2022 - Present        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 [facility]) ICD-10-CM: C50.011, C50.012  ICD-9-CM: 174.0  9/16/2022 - Present        Menopausal symptoms ICD-10-CM: N95.1  ICD-9-CM: 627.2  7/18/2011 - Present           Greater than 30 minutes were spent with the patient on counseling and coordination of care    Signed:   Thea Rascon MD  1/1/2023  5:01 PM

## 2023-01-01 NOTE — PROGRESS NOTES
[facility] Adult  Hospitalist Group                                                                                          Hospitalist Progress Note  Flores Martínez MD  Answering service: [phone] OR [phone] from in house phone        Date of Service:  2023  NAME:  Germán Mckenzie  :  1960  MRN:  [id number]      Admission Summary:   \"57year-old woman with past medical history significant for hypertension and breast cancer presented at [facility] a couple of days ago because of cough and back pain. The pain is located at the lower back with no radiation. No known aggravating or relieving factors. The cough is productive of yellowish sputum. When the patient arrived at the emergency room at [facility], the patient was found to have markedly elevated troponin level. Chest x-ray is suggestive of pneumonia. The patient was admitted to [facility].  The hospitalization is significant for code sepsis which was called during the hospitalization and the patient was started on broad-spectrum antibiotics for sepsis, most likely coming from the pneumonia. The patient was seen by cardiologist for the elevated troponin level. She was also COVID-19 virus positive and requiring oxygen. The patient presented with abnormal renal function which seems to be getting worse. Because of these multiple medical problems, the patient was transferred to [facility] for higher level of care. There was no fever, rigors or chills reported. \"          Interval history / Subjective: Follow up COVID, pneumonia. Patient seen and examined. Upset about being rushed off 4W during transfer. Otherwise ongoing cough.  Discussed improved renal indices     Assessment & Plan:     Multifocal pneumonia in the setting of immunocompromise patient due to chemotherapy POA worsening  Acute hypoxic respiratory failure due to above  Moderate left pleural effusion POA  Bandemia  Hemoptysis not POA  COVID-19 infection/pneumonia POA  Sepsis due to above POA  -Chest x-ray reviewed dependently bilateral airspace disease.  -Chest CT with multifocal bilateral airspace opacity concerning for multifocal pneumonia. Focus on dependent left lower lobe may reflect organizing pneumonia. Moderate left and right pleural effusion noted  -previously hypoxic at [facility], now on room air   -COVID-19 PCR positive  -CRP 37 > 24  procalcitonin 15  -Sputum culture with Staph aureus sensitivities pending, MRSA swab negative, will continue cefepime, d/c vancomycin, may DC on oral antibiotics on DC     Acute kidney injury  - improving today   -Suspect prerenal in the setting of sepsis, discontinue IVF  -CT abdomen pelvis no obstruction  -nephrology consulted and following  - UA and Urine lytes pending   - Can DC tomorrow  - s/p vancomycin    Blood loss anemia POA  -Due to hemoptysis, chronic illness   -trend H/H     NSTEMI:   -cardiology consulted  -EKG reviewed No acute ischemic process nonspecific T wave changes    HTN: home carvedilol     Elevated D-dimer - PE ruled out   -Cardio multifactorial due to 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  Patient may need assistance with CM to enter her home as she states we lost her keys in the ER     Hospital Problems  Date Reviewed: 12/29/2022            Codes Class Noted POA    * (Principal) Bacterial pneumonia ICD-10-CM: J15.9  ICD-9-CM: 482.9  12/28/2022 Yes         Review of Systems:   Negative unless stated above       Vital Signs:    Last 24hrs VS reviewed since prior progress note. Most recent are:  Visit Vitals  /84 (BP 1 Location: Right upper arm, BP Patient Position: At rest)   Pulse 69   Temp 98.9 °F (37.2 °C)   Resp 18   Ht 5' (1.524 m)   Wt 65.6 kg (144 lb 11.2 oz)   SpO2 97%   BMI 28.26 kg/m²         Intake/Output Summary (Last 24 hours) at 1/1/2023 1400  Last data filed at 12/31/2022 1552  Gross per 24 hour   Intake --   Output 600 ml   Net -600 ml          Physical Examination:     I had a face to face encounter with this patient and independently examined them on 1/1/2023 as outlined below:          Constitutional:  No acute distress, cooperative,   ENT:  Oral mucosa moist, oropharynx benign. Resp:  CTA b/l no wheezing, rhonchi. No accessory muscle use. On RA    CV:  Regular rhythm, normal rate, no murmurs, gallops, rubs    GI:  Soft, non distended, non tender. normoactive bowel sounds, no hepatosplenomegaly     Musculoskeletal:  No edema, warm, 2+ pulses throughout    Neurologic:  Moves all extremities            Data Review:    Review and/or order of clinical lab test  Review and/or order of tests in the radiology section of CPT  Review and/or order of tests in the medicine section of CPT      Labs:     Recent Labs     01/01/23  0440 12/31/22  0454   WBC 13.6* 12.5*   HGB 7.0* 7.2*   HCT 20.7* 21.4*    239       Recent Labs     01/01/23 0440 12/31/22  0454 12/30/22  0829    140 138   K 3.5 3.4* 2.9*   * 111* 110*   CO2 23 22 20*   BUN 43* 50* 46*   CREA 2.77* 3.00* 3.00*   GLU 77 87 113*   CA 8.1* 9.0 9.8   MG 1.6 1.7  --    PHOS 3.2 3.1  --        Recent Labs     12/30/22  0829   *      TBILI 0.3   TP 6.4   ALB 2.1*   GLOB 4.3*       No results for input(s): INR, PTP, APTT, INREXT, INREXT in the last 72 hours. No results for input(s): FE, TIBC, PSAT, FERR in the last 72 hours.    No results found for: FOL, RBCF   No results for input(s): PH, PCO2, PO2 in the last 72 hours. No results for input(s): CPK, CKNDX, TROIQ in the last 72 hours.     No lab exists for component: CPKMB  No results found for: CHOL, CHOLX, CHLST, CHOLV, HDL, HDLP, LDL, LDLC, DLDLP, TGLX, TRIGL, TRIGP, CHHD, CHHDX  Lab Results   Component Value Date/Time    Glucose (POC) 103 12/27/2022 09:30 PM    Glucose (POC) 110 12/26/2022 02:38 PM     Lab Results   Component Value Date/Time    Color YELLOW/STRAW 12/31/2022 05:18 PM    Appearance CLEAR 12/31/2022 05:18 PM    Specific gravity 1.012 12/31/2022 05:18 PM    pH (UA) 5.0 12/31/2022 05:18 PM    Protein 30 (A) 12/31/2022 05:18 PM    Glucose Negative 12/31/2022 05:18 PM    Ketone Negative 12/31/2022 05:18 PM    Bilirubin Negative 12/31/2022 05:18 PM    Urobilinogen 0.2 12/31/2022 05:18 PM    Nitrites Negative 12/31/2022 05:18 PM    Leukocyte Esterase Negative 12/31/2022 05:18 PM    Epithelial cells FEW 12/31/2022 05:18 PM    Bacteria Negative 12/31/2022 05:18 PM    WBC 0-4 12/31/2022 05:18 PM    RBC 5-10 12/31/2022 05:18 PM         Medications Reviewed:     Current Facility-Administered Medications   Medication Dose Route Frequency    benzocaine-menthoL (CEPACOL) lozenge 1 Lozenge  1 Lozenge Mucous Membrane PRN    hydrALAZINE (APRESOLINE) 20 mg/mL injection 10 mg  10 mg IntraVENous Q6H PRN    guaiFENesin ER (MUCINEX) tablet 600 mg  600 mg Oral Q12H    cefepime (MAXIPIME) 1 g in 0.9% sodium chloride (MBP/ADV) 50 mL MBP  1 g IntraVENous Q12H    dexamethasone (DECADRON) 4 mg/mL injection 6 mg  6 mg IntraVENous Q24H    guaiFENesin-dextromethorphan (ROBITUSSIN DM) 100-10 mg/5 mL syrup 5 mL  5 mL Oral Q4H PRN    loperamide (IMODIUM) capsule 2 mg  2 mg Oral Q4H PRN    naloxone (NARCAN) injection 0.4 mg  0.4 mg IntraVENous EVERY 2 MINUTES AS NEEDED    oxyCODONE IR (ROXICODONE) tablet 10 mg  10 mg Oral Q4H PRN    polyethylene glycol (MIRALAX) packet 17 g  17 g Oral DAILY PRN    sodium chloride (NS) flush 5-10 mL 5-10 mL IntraVENous PRN    sodium chloride (NS) flush 5-40 mL  5-40 mL IntraVENous Q8H    sodium chloride (NS) flush 5-40 mL  5-40 mL IntraVENous PRN    carvediloL (COREG) tablet 6.25 mg  6.25 mg Oral BID WITH MEALS    gabapentin (NEURONTIN) capsule 100 mg  100 mg Oral TID    PARoxetine (PAXIL) tablet 20 mg  20 mg Oral DAILY    rosuvastatin (CRESTOR) tablet 10 mg  10 mg Oral QHS    acetaminophen (TYLENOL) tablet 650 mg  650 mg Oral Q6H PRN    Or    acetaminophen (TYLENOL) suppository 650 mg  650 mg Rectal Q6H PRN     ______________________________________________________________________  EXPECTED LENGTH OF STAY: - - -  ACTUAL LENGTH OF STAY:          4                 Ha Chávez MD

## 2023-01-01 NOTE — PROGRESS NOTES
Transition of Care Plan  RUR 20%    Disposition   pending   patient is not sure where she will be going when discharged--home vs a family member home    Transportation   not determined   family vs taxi- CM offered to assist with transportation discharge location    Medical follow up  PCP /specialist    CM received call from patient's nurse asking CM to call patient (isolation due to [place]) to discuss transition of care plan as she is ready for discharge today or tomorrow. Patient cannot find her keys but nurse is calling ER to inquire about them. (Note [facility] on 12/28/22)    CM reviewed chart and noted that prior to admission, patient had been staying with her nephew temporarily as patient's was concerned about her retuning to her home due to poor living conditions due to hoarding behavior. (See  CM note 12/29/22)    CM called patient in her room to discuss discharge plan and offer assistance. Patient told CM she does not know where she is going when she is discharged. She said she  does not plan to return to her nephew's home. She told CM that she is working it out. She did  not want CM to call any family members. She said she will let CM know when she knows. She was not receptive to this CM assisting with the discharge plan. She told CM that she cannot find her keys and her nurse is aware. Patient did not seem to want any assistance from CM. Qamar Russo CM will follow and assist with transition of care planning. Patient will be provided with Medicare letter prior to discharge.

## 2023-01-01 NOTE — PROGRESS NOTES
Nephrology Progress Note  Shazia Paniagua  Date of Admission : 12/28/2022    CC:  Follow up for CHRISTA       Assessment and Plan     CHRISTA:  - suspect 2/2 ATN  - Cr improving  - imaging neg, serologic w/up showing + GEM (unclear significance), neg ANCA, UA unremarkable  - d/c fluids and monitor  - daily lab and I/Os while here     Hypokalemia:  - replete PRN    Hypertension:  - Hold ARB for now     Acute hypoxic respiratory failure  Bacterial Pneumonia   COVID-19 +  - on empiric antibiotics-- Cefepime IV, oral doxycycline   - on room air, supplemental oxygen as needed     Breast cancer:  - last chemotherapy 12/21/22       Interval History:  Not examined in room. Per RN, stable night. Having confusion, productive cough. On RA. Cr better. Eating and drinking. UOP of about 1 L reported. Current Medications: all current  Medications have been eviewed in EPIC  Review of Systems: Pertinent items are noted in HPI. Objective:  Vitals:    Vitals:    12/31/22 1456 12/31/22 1742 12/31/22 2020 01/01/23 0222   BP: (!) 167/59 (!) 159/72 (!) 154/71 (!) 177/77   Pulse: 63 67 71 68   Resp: 20 18 18 20   Temp: 98.8 °F (37.1 °C) 98.7 °F (37.1 °C) 98.6 °F (37 °C) 98.2 °F (36.8 °C)   SpO2: 93% 98% 92% 96%   Weight:    65.6 kg (144 lb 11.2 oz)   Height:         Intake and Output:  No intake/output data recorded. 12/30 1901 - 01/01 0700  In: -   Out: 12 [Urine:950]    Physical Examination:  Not examined in the room due to COVID-19 isolation:    Pt intubated    No  General: NAD  Resp:  Stable O2, on RA  CV:  RRR on monitor, no edema reported  Neurologic:  Non focal  Skin:  No Rashes or lesion  :  No isaac    []    High complexity decision making was performed  []    Patient is at high-risk of decompensation with multiple organ involvement    Lab Data Personally Reviewed: I have reviewed all the pertinent labs, microbiology data and radiology studies during assessment.     Recent Labs     01/01/23  0440 12/31/22  0456 12/30/22  3959  140 138   K 3.5 3.4* 2.9*   * 111* 110*   CO2 23 22 20*   GLU 77 87 113*   BUN 43* 50* 46*   CREA 2.77* 3.00* 3.00*   CA 8.1* 9.0 9.8   MG 1.6 1.7  --    PHOS 3.2 3.1  --    ALB  --   --  2.1*   ALT  --   --  171*       Recent Labs     01/01/23  0440 12/31/22  0454 12/30/22  0829   WBC 13.6* 12.5* 13.5*   HGB 7.0* 7.2* 8.0*   HCT 20.7* 21.4* 24.1*    239 274       No results found for: SDES  Lab Results   Component Value Date/Time    Culture result: HEAVY STAPHYLOCOCCUS AUREUS (A) 12/27/2022 01:15 PM    Culture result: SCANT YEAST, (APPARENT CANDIDA ALBICANS) (A) 12/27/2022 01:15 PM    Culture result:  12/27/2022 11:01 AM     MRSA NOT PRESENT. Apparent Staphylococus aureus (not MRSA noted). Culture result:  12/27/2022 11:01 AM     Screening of patient nares for MRSA is for surveillance purposes and, if positive, to facilitate isolation considerations in high risk settings. It is not intended for automatic decolonization interventions per se as regimens are not sufficiently effective to warrant routine use. Recent Results (from the past 24 hour(s))   URINALYSIS W/MICROSCOPIC    Collection Time: 12/31/22  5:18 PM   Result Value Ref Range    Color YELLOW/STRAW      Appearance CLEAR CLEAR      Specific gravity 1.012 1.003 - 1.030      pH (UA) 5.0 5.0 - 8.0      Protein 30 (A) NEG mg/dL    Glucose Negative NEG mg/dL    Ketone Negative NEG mg/dL    Bilirubin Negative NEG      Blood SMALL (A) NEG      Urobilinogen 0.2 0.2 - 1.0 EU/dL    Nitrites Negative NEG      Leukocyte Esterase Negative NEG      WBC 0-4 0 - 4 /hpf    RBC 5-10 0 - 5 /hpf    Epithelial cells FEW FEW /lpf    Bacteria Negative NEG /hpf    Hyaline cast 0-2 0 - 5 /lpf   URINE CULTURE HOLD SAMPLE    Collection Time: 12/31/22  5:18 PM    Specimen: Urine   Result Value Ref Range    Urine culture hold        Urine on hold in Microbiology dept for 2 days.   If unpreserved urine is submitted, it cannot be used for addtional testing after 24 hours, recollection will be required. MAGNESIUM    Collection Time: 01/01/23  4:40 AM   Result Value Ref Range    Magnesium 1.6 1.6 - 2.4 mg/dL   METABOLIC PANEL, BASIC    Collection Time: 01/01/23  4:40 AM   Result Value Ref Range    Sodium 141 136 - 145 mmol/L    Potassium 3.5 3.5 - 5.1 mmol/L    Chloride 111 (H) 97 - 108 mmol/L    CO2 23 21 - 32 mmol/L    Anion gap 7 5 - 15 mmol/L    Glucose 77 65 - 100 mg/dL    BUN 43 (H) 6 - 20 MG/DL    Creatinine 2.77 (H) 0.55 - 1.02 MG/DL    BUN/Creatinine ratio 16 12 - 20      eGFR 19 (L) >60 ml/min/1.73m2    Calcium 8.1 (L) 8.5 - 10.1 MG/DL   PHOSPHORUS    Collection Time: 01/01/23  4:40 AM   Result Value Ref Range    Phosphorus 3.2 2.6 - 4.7 MG/DL   CBC WITH AUTOMATED DIFF    Collection Time: 01/01/23  4:40 AM   Result Value Ref Range    WBC 13.6 (H) 3.6 - 11.0 K/uL    RBC 2.31 (L) 3.80 - 5.20 M/uL    HGB 7.0 (L) 11.5 - 16.0 g/dL    HCT 20.7 (L) 35.0 - 47.0 %    MCV 89.6 80.0 - 99.0 FL    MCH 30.3 26.0 - 34.0 PG    MCHC 33.8 30.0 - 36.5 g/dL    RDW 18.3 (H) 11.5 - 14.5 %    PLATELET 137 889 - 167 K/uL    MPV 9.4 8.9 - 12.9 FL    NRBC 0.3 (H) 0  WBC    ABSOLUTE NRBC 0.04 (H) 0.00 - 0.01 K/uL    NEUTROPHILS 71 32 - 75 %    LYMPHOCYTES 18 12 - 49 %    MONOCYTES 6 5 - 13 %    EOSINOPHILS 0 0 - 7 %    BASOPHILS 1 0 - 1 %    METAMYELOCYTES 3 (H) 0 %    MYELOCYTES 1 (H) 0 %    IMMATURE GRANULOCYTES 0 %    ABS. NEUTROPHILS 9.7 (H) 1.8 - 8.0 K/UL    ABS. LYMPHOCYTES 2.4 0.8 - 3.5 K/UL    ABS. MONOCYTES 0.8 0.0 - 1.0 K/UL    ABS. EOSINOPHILS 0.0 0.0 - 0.4 K/UL    ABS. BASOPHILS 0.1 0.0 - 0.1 K/UL    ABS. IMM. GRANS. 0.0 K/UL    DF MANUAL      PLATELET COMMENTS CLUMPED PLATELETS      RBC COMMENTS ANISOCYTOSIS  1+                 Franck Ellis MD  [facility]   [id number] [place] [address]  Phone - [phone]   Fax - [phone]  [email or web address]

## 2023-01-01 NOTE — PROGRESS NOTES
Tele monitor discontinued per Desmond Kehr, MD (via [employer]). Tele notified and box to be returned.

## 2023-01-01 NOTE — DISCHARGE INSTRUCTIONS
Dear Yamilet Zurita,    Thank you for choosing [address] [place] [address] for your healthcare needs. We strive to provide EXCELLENT care to you and your family. In an effort to explain clearly why you were here in the hospital, I've also written a very brief summary below. Other details including formal diagnosis, medication changes, and follow up appointment recommendations can also be found in this packet. You were admitted for pneumonia due to COVID and bacterial pneumonia for which you were started on IV antibiotics and steorids. You also received care from specialist physicians in the following specialties:  - Nephrology  - Cardiology     Here are the updates to your medication list:  **Continue augmentin x 4 days  **Continue decadron for an additional 6 days     Remember that it is important for you to take your medications exactly as they are prescribed. It is helpful to keep a list of your medication with the names, dosages, and times to be taken in your wallet. Additionally,   - Please make sure to follow up with your primary care physician within 1-2 weeks of discharge for hospital follow up. You should also follow up with cardiology, nephrology, and your oncologist.  - Arley Melba should have a BMP checked in a few days to monitor you renal function    - Please make sure to continue to monitor for: Chest pain, shortness of breath, high fevers,  and return to the Emergency Department with any of these symptoms.   - Please get up slowly from a seated or laying position, avoid falls. - Avoid tobacco, alcohol and other illicit drug use. - Diet restrictions: None, resume prior   - Activity restrictions: None  - Wound care: None    Make sure to also see your primary care doctor for follow-up. Bring these papers with you and be sure to review your medication list with your doctor. I cannot stress the importance of follow up enough.  I've included the information for your follow-up appointments below: FOLLOW UP APPOINTMENTS:    Follow-up Information       Follow up With Specialties Details Why Contact Info    Brittany Carranza 75 [address] [facility]  [phone]      Leonel Sousa MD Nephrology Follow up  [facility]  [phone]      Cardiologist  Follow up in 1 week(s)      Oncologist  Follow up in 1 week(s)                 At this time, the following test results are still pending: ***  Again, please follow-up these results with your primary care provider. Should you have any fever over 101 degrees for 24 hours, chest pain, shortness of breath, fever, chills, nausea, vomiting, diarrhea, change in mentation, falling, weakness, bleeding, or worsening pain, please seek medical attention immediately. Finally, as your discharging physician, you may be receiving a survey regarding my care. I would greatly value and appreciate your input in the survey as we strive for excellence. If you have any questions, I can be reached at [phone].   Thank you so much again for allowing me to care for you at [facility].    Respectfully yours,  Asia Case MD

## 2023-01-03 ENCOUNTER — APPOINTMENT (OUTPATIENT)
Dept: GENERAL RADIOLOGY | Age: 63
DRG: 208 | End: 2023-01-03
Attending: EMERGENCY MEDICINE
Payer: COMMERCIAL

## 2023-01-03 ENCOUNTER — HOSPITAL ENCOUNTER (INPATIENT)
Age: 63
LOS: 20 days | Discharge: NURSING, REHAB OR LONG-TERM CARE | DRG: 208 | End: 2023-01-24
Attending: EMERGENCY MEDICINE | Admitting: STUDENT IN AN ORGANIZED HEALTH CARE EDUCATION/TRAINING PROGRAM
Payer: COMMERCIAL

## 2023-01-03 ENCOUNTER — APPOINTMENT (OUTPATIENT)
Dept: CT IMAGING | Age: 63
DRG: 208 | End: 2023-01-03
Attending: EMERGENCY MEDICINE
Payer: COMMERCIAL

## 2023-01-03 DIAGNOSIS — N18.9 CHRONIC RENAL FAILURE, UNSPECIFIED CKD STAGE: ICD-10-CM

## 2023-01-03 DIAGNOSIS — A41.9 SEPSIS, DUE TO UNSPECIFIED ORGANISM, UNSPECIFIED WHETHER ACUTE ORGAN DYSFUNCTION PRESENT (HCC): ICD-10-CM

## 2023-01-03 DIAGNOSIS — J90 PLEURAL EFFUSION ON LEFT: ICD-10-CM

## 2023-01-03 DIAGNOSIS — C50.012 MALIGNANT NEOPLASM INVOLVING BOTH NIPPLE AND AREOLA IN BOTH BREASTS IN FEMALE, ESTROGEN RECEPTOR NEGATIVE (HCC): Primary | ICD-10-CM

## 2023-01-03 DIAGNOSIS — Z17.1 MALIGNANT NEOPLASM INVOLVING BOTH NIPPLE AND AREOLA IN BOTH BREASTS IN FEMALE, ESTROGEN RECEPTOR NEGATIVE (HCC): Primary | ICD-10-CM

## 2023-01-03 DIAGNOSIS — J18.9 PNEUMONIA OF BOTH LUNGS DUE TO INFECTIOUS ORGANISM, UNSPECIFIED PART OF LUNG: ICD-10-CM

## 2023-01-03 DIAGNOSIS — C50.011 MALIGNANT NEOPLASM INVOLVING BOTH NIPPLE AND AREOLA IN BOTH BREASTS IN FEMALE, ESTROGEN RECEPTOR NEGATIVE (HCC): Primary | ICD-10-CM

## 2023-01-03 DIAGNOSIS — J15.9 BACTERIAL PNEUMONIA: ICD-10-CM

## 2023-01-03 LAB
A FUMIGATUS1 AB SER QL ID: NEGATIVE
A PULLULANS AB SER QL: NEGATIVE
ALBUMIN SERPL-MCNC: 2.3 G/DL (ref 3.5–5)
ALBUMIN/GLOB SERPL: 0.5 (ref 1.1–2.2)
ALP SERPL-CCNC: 111 U/L (ref 45–117)
ALT SERPL-CCNC: 94 U/L (ref 12–78)
ANION GAP SERPL CALC-SCNC: 8 MMOL/L (ref 5–15)
AST SERPL-CCNC: 49 U/L (ref 15–37)
BASOPHILS # BLD: 0 K/UL (ref 0–0.1)
BASOPHILS NFR BLD: 0 % (ref 0–1)
BILIRUB SERPL-MCNC: 0.4 MG/DL (ref 0.2–1)
BNP SERPL-MCNC: 1091 PG/ML
BUN SERPL-MCNC: 38 MG/DL (ref 6–20)
BUN/CREAT SERPL: 13 (ref 12–20)
CALCIUM SERPL-MCNC: 9.1 MG/DL (ref 8.5–10.1)
CHLORIDE SERPL-SCNC: 108 MMOL/L (ref 97–108)
CO2 SERPL-SCNC: 26 MMOL/L (ref 21–32)
COVID-19 RAPID TEST, COVR: NOT DETECTED
CREAT SERPL-MCNC: 3 MG/DL (ref 0.55–1.02)
DIFFERENTIAL METHOD BLD: ABNORMAL
EOSINOPHIL # BLD: 0.1 K/UL (ref 0–0.4)
EOSINOPHIL NFR BLD: 1 % (ref 0–7)
ERYTHROCYTE [DISTWIDTH] IN BLOOD BY AUTOMATED COUNT: 18.6 % (ref 11.5–14.5)
FLUAV AG NPH QL IA: NEGATIVE
FLUBV AG NOSE QL IA: NEGATIVE
GLOBULIN SER CALC-MCNC: 4.8 G/DL (ref 2–4)
GLUCOSE SERPL-MCNC: 113 MG/DL (ref 65–100)
HCT VFR BLD AUTO: 27.5 % (ref 35–47)
HGB BLD-MCNC: 9.1 G/DL (ref 11.5–16)
IMM GRANULOCYTES # BLD AUTO: 0 K/UL (ref 0–0.04)
IMM GRANULOCYTES NFR BLD AUTO: 0 % (ref 0–0.5)
LACEYELLA SACCHARI AB SER QL: NEGATIVE
LACTATE BLD-SCNC: 0.7 MMOL/L (ref 0.4–2)
LYMPHOCYTES # BLD: 1.3 K/UL (ref 0.8–3.5)
LYMPHOCYTES NFR BLD: 10 % (ref 12–49)
MCH RBC QN AUTO: 30.2 PG (ref 26–34)
MCHC RBC AUTO-ENTMCNC: 33.1 G/DL (ref 30–36.5)
MCV RBC AUTO: 91.4 FL (ref 80–99)
METAMYELOCYTES NFR BLD MANUAL: 4 %
MONOCYTES # BLD: 0.3 K/UL (ref 0–1)
MONOCYTES NFR BLD: 2 % (ref 5–13)
NEUTS BAND NFR BLD MANUAL: 3 %
NEUTS SEG # BLD: 11 K/UL (ref 1.8–8)
NEUTS SEG NFR BLD: 80 % (ref 32–75)
NRBC # BLD: 0.02 K/UL (ref 0–0.01)
NRBC BLD-RTO: 0.1 PER 100 WBC
PIGEON SERUM AB QL ID: NEGATIVE
PLATELET # BLD AUTO: 308 K/UL (ref 150–400)
PMV BLD AUTO: 9.2 FL (ref 8.9–12.9)
POTASSIUM SERPL-SCNC: 3.4 MMOL/L (ref 3.5–5.1)
PROT SERPL-MCNC: 7.1 G/DL (ref 6.4–8.2)
RBC # BLD AUTO: 3.01 M/UL (ref 3.8–5.2)
RBC MORPH BLD: ABNORMAL
S RECTIVIRGULA IGG SER QL ID: NEGATIVE
SODIUM SERPL-SCNC: 142 MMOL/L (ref 136–145)
SOURCE, COVRS: NORMAL
T VULGARIS AB SER QL ID: NEGATIVE
TROPONIN-HIGH SENSITIVITY: 107 NG/L (ref 0–51)
TROPONIN-HIGH SENSITIVITY: 116 NG/L (ref 0–51)
WBC # BLD AUTO: 13.3 K/UL (ref 3.6–11)

## 2023-01-03 PROCEDURE — 74011250637 HC RX REV CODE- 250/637: Performed by: EMERGENCY MEDICINE

## 2023-01-03 PROCEDURE — 80053 COMPREHEN METABOLIC PANEL: CPT

## 2023-01-03 PROCEDURE — 71250 CT THORAX DX C-: CPT

## 2023-01-03 PROCEDURE — 99285 EMERGENCY DEPT VISIT HI MDM: CPT

## 2023-01-03 PROCEDURE — 36415 COLL VENOUS BLD VENIPUNCTURE: CPT

## 2023-01-03 PROCEDURE — 93005 ELECTROCARDIOGRAM TRACING: CPT

## 2023-01-03 PROCEDURE — 87635 SARS-COV-2 COVID-19 AMP PRB: CPT

## 2023-01-03 PROCEDURE — 74011000250 HC RX REV CODE- 250: Performed by: EMERGENCY MEDICINE

## 2023-01-03 PROCEDURE — 96375 TX/PRO/DX INJ NEW DRUG ADDON: CPT

## 2023-01-03 PROCEDURE — 83605 ASSAY OF LACTIC ACID: CPT

## 2023-01-03 PROCEDURE — 84484 ASSAY OF TROPONIN QUANT: CPT

## 2023-01-03 PROCEDURE — 96367 TX/PROPH/DG ADDL SEQ IV INF: CPT

## 2023-01-03 PROCEDURE — 74011250636 HC RX REV CODE- 250/636: Performed by: EMERGENCY MEDICINE

## 2023-01-03 PROCEDURE — 83880 ASSAY OF NATRIURETIC PEPTIDE: CPT

## 2023-01-03 PROCEDURE — 87804 INFLUENZA ASSAY W/OPTIC: CPT

## 2023-01-03 PROCEDURE — 85025 COMPLETE CBC W/AUTO DIFF WBC: CPT

## 2023-01-03 PROCEDURE — 96365 THER/PROPH/DIAG IV INF INIT: CPT

## 2023-01-03 PROCEDURE — 94762 N-INVAS EAR/PLS OXIMTRY CONT: CPT

## 2023-01-03 PROCEDURE — 71045 X-RAY EXAM CHEST 1 VIEW: CPT

## 2023-01-03 PROCEDURE — 87040 BLOOD CULTURE FOR BACTERIA: CPT

## 2023-01-03 PROCEDURE — 96366 THER/PROPH/DIAG IV INF ADDON: CPT

## 2023-01-03 RX ORDER — ACETAMINOPHEN 325 MG/1
650 TABLET ORAL AS NEEDED
Start: 2023-01-04

## 2023-01-03 RX ORDER — DIPHENHYDRAMINE HYDROCHLORIDE 50 MG/ML
50 INJECTION, SOLUTION INTRAMUSCULAR; INTRAVENOUS ONCE
Start: 2023-01-04 | End: 2023-01-04

## 2023-01-03 RX ORDER — ONDANSETRON 2 MG/ML
8 INJECTION INTRAMUSCULAR; INTRAVENOUS AS NEEDED
OUTPATIENT
Start: 2023-01-04

## 2023-01-03 RX ORDER — LEVOFLOXACIN 5 MG/ML
750 INJECTION, SOLUTION INTRAVENOUS EVERY 24 HOURS
Status: DISCONTINUED | OUTPATIENT
Start: 2023-01-03 | End: 2023-01-03

## 2023-01-03 RX ORDER — HEPARIN 100 UNIT/ML
500 SYRINGE INTRAVENOUS AS NEEDED
Start: 2023-01-04

## 2023-01-03 RX ORDER — SODIUM CHLORIDE 0.9 % (FLUSH) 0.9 %
5-10 SYRINGE (ML) INJECTION AS NEEDED
Status: DISCONTINUED | OUTPATIENT
Start: 2023-01-03 | End: 2023-01-04

## 2023-01-03 RX ORDER — ALBUTEROL SULFATE 0.83 MG/ML
2.5 SOLUTION RESPIRATORY (INHALATION) AS NEEDED
Start: 2023-01-04

## 2023-01-03 RX ORDER — DIPHENHYDRAMINE HYDROCHLORIDE 50 MG/ML
25 INJECTION, SOLUTION INTRAMUSCULAR; INTRAVENOUS AS NEEDED
Start: 2023-01-04

## 2023-01-03 RX ORDER — EPINEPHRINE 1 MG/ML
0.3 INJECTION, SOLUTION, CONCENTRATE INTRAVENOUS AS NEEDED
OUTPATIENT
Start: 2023-01-04

## 2023-01-03 RX ORDER — ACETAMINOPHEN 500 MG
1000 TABLET ORAL ONCE
Status: COMPLETED | OUTPATIENT
Start: 2023-01-03 | End: 2023-01-03

## 2023-01-03 RX ORDER — DIPHENHYDRAMINE HYDROCHLORIDE 50 MG/ML
50 INJECTION, SOLUTION INTRAMUSCULAR; INTRAVENOUS AS NEEDED
Start: 2023-01-04

## 2023-01-03 RX ORDER — SODIUM CHLORIDE 9 MG/ML
5-250 INJECTION, SOLUTION INTRAVENOUS AS NEEDED
OUTPATIENT
Start: 2023-01-04

## 2023-01-03 RX ORDER — SODIUM CHLORIDE 9 MG/ML
5-40 INJECTION INTRAMUSCULAR; INTRAVENOUS; SUBCUTANEOUS AS NEEDED
OUTPATIENT
Start: 2023-01-04

## 2023-01-03 RX ORDER — LEVOFLOXACIN 5 MG/ML
750 INJECTION, SOLUTION INTRAVENOUS ONCE
Status: COMPLETED | OUTPATIENT
Start: 2023-01-03 | End: 2023-01-04

## 2023-01-03 RX ORDER — HYDROCORTISONE SODIUM SUCCINATE 100 MG/2ML
100 INJECTION, POWDER, FOR SOLUTION INTRAMUSCULAR; INTRAVENOUS AS NEEDED
OUTPATIENT
Start: 2023-01-04

## 2023-01-03 RX ORDER — SODIUM CHLORIDE 0.9 % (FLUSH) 0.9 %
5-40 SYRINGE (ML) INJECTION AS NEEDED
OUTPATIENT
Start: 2023-01-04

## 2023-01-03 RX ORDER — LEVOFLOXACIN 5 MG/ML
500 INJECTION, SOLUTION INTRAVENOUS
Status: DISCONTINUED | OUTPATIENT
Start: 2023-01-05 | End: 2023-01-04

## 2023-01-03 RX ORDER — DEXAMETHASONE SODIUM PHOSPHATE 100 MG/10ML
10 INJECTION INTRAMUSCULAR; INTRAVENOUS ONCE
OUTPATIENT
Start: 2023-01-04 | End: 2023-01-04

## 2023-01-03 RX ORDER — VANCOMYCIN/0.9 % SOD CHLORIDE 1.5G/250ML
1500 PLASTIC BAG, INJECTION (ML) INTRAVENOUS ONCE
Status: COMPLETED | OUTPATIENT
Start: 2023-01-04 | End: 2023-01-04

## 2023-01-03 RX ADMIN — LEVOFLOXACIN 750 MG: 5 INJECTION, SOLUTION INTRAVENOUS at 23:10

## 2023-01-03 RX ADMIN — ACETAMINOPHEN 1000 MG: 500 TABLET ORAL at 23:00

## 2023-01-03 RX ADMIN — SODIUM CHLORIDE 1000 ML: 9 INJECTION, SOLUTION INTRAVENOUS at 23:07

## 2023-01-03 RX ADMIN — CEFEPIME 2 G: 2 INJECTION, POWDER, FOR SOLUTION INTRAVENOUS at 23:06

## 2023-01-03 NOTE — Clinical Note
Status[de-identified] INPATIENT [101]   Type of Bed: Telemetry [19]   Cardiac Monitoring Required?: Yes   Inpatient Hospitalization Certified Necessary for the Following Reasons: 9.  Other (further clarification in H&P documentation)   Admitting Diagnosis: Staphylococcus aureus pneumonia [facility]) [[id number]]   Admitting Physician: Marybeth Perez [[id number]]   Attending Physician: Shari Paget   Estimated Length of Stay: 3-4 Midnights   Discharge Plan[de-identified] Home with Office Follow-up

## 2023-01-04 ENCOUNTER — DOCUMENTATION ONLY (OUTPATIENT)
Dept: ONCOLOGY | Age: 63
End: 2023-01-04

## 2023-01-04 ENCOUNTER — APPOINTMENT (OUTPATIENT)
Dept: ULTRASOUND IMAGING | Age: 63
DRG: 208 | End: 2023-01-04
Attending: STUDENT IN AN ORGANIZED HEALTH CARE EDUCATION/TRAINING PROGRAM
Payer: COMMERCIAL

## 2023-01-04 ENCOUNTER — APPOINTMENT (OUTPATIENT)
Dept: GENERAL RADIOLOGY | Age: 63
DRG: 208 | End: 2023-01-04
Attending: STUDENT IN AN ORGANIZED HEALTH CARE EDUCATION/TRAINING PROGRAM
Payer: COMMERCIAL

## 2023-01-04 PROBLEM — J15.211 STAPHYLOCOCCUS AUREUS PNEUMONIA (HCC): Status: ACTIVE | Noted: 2023-01-04

## 2023-01-04 LAB
AMORPH CRY URNS QL MICRO: ABNORMAL
ANION GAP SERPL CALC-SCNC: 8 MMOL/L (ref 5–15)
APPEARANCE FLD: ABNORMAL
APPEARANCE UR: CLEAR
ATRIAL RATE: 88 BPM
BACTERIA URNS QL MICRO: NEGATIVE /HPF
BILIRUB UR QL: NEGATIVE
BODY FLD TYPE: NORMAL
BUN SERPL-MCNC: 32 MG/DL (ref 6–20)
BUN/CREAT SERPL: 12 (ref 12–20)
CALCIUM SERPL-MCNC: 8.6 MG/DL (ref 8.5–10.1)
CALCULATED P AXIS, ECG09: 41 DEGREES
CALCULATED R AXIS, ECG10: -9 DEGREES
CALCULATED T AXIS, ECG11: 177 DEGREES
CHLORIDE SERPL-SCNC: 111 MMOL/L (ref 97–108)
CO2 SERPL-SCNC: 24 MMOL/L (ref 21–32)
COLOR FLD: ABNORMAL
COLOR UR: ABNORMAL
CREAT SERPL-MCNC: 2.67 MG/DL (ref 0.55–1.02)
DIAGNOSIS, 93000: NORMAL
EPITH CASTS URNS QL MICRO: ABNORMAL /LPF
GLUCOSE FLD-MCNC: 88 MG/DL
GLUCOSE SERPL-MCNC: 100 MG/DL (ref 65–100)
GLUCOSE UR STRIP.AUTO-MCNC: NEGATIVE MG/DL
GRAN CASTS URNS QL MICRO: ABNORMAL /LPF
HGB UR QL STRIP: ABNORMAL
KETONES UR QL STRIP.AUTO: NEGATIVE MG/DL
LACTATE SERPL-SCNC: 2.1 MMOL/L (ref 0.4–2)
LDH FLD L TO P-CCNC: 515 U/L
LEUKOCYTE ESTERASE UR QL STRIP.AUTO: ABNORMAL
LYMPHOCYTES NFR FLD: 37 %
MESOTHL CELL NFR FLD: 2 %
MONOS+MACROS NFR FLD: 15 %
NEUTROPHILS NFR FLD: 46 %
NITRITE UR QL STRIP.AUTO: NEGATIVE
NUC CELL # FLD: 4407 /CU MM
P-R INTERVAL, ECG05: 134 MS
PH FLD: 7.2
PH UR STRIP: 5.5 (ref 5–8)
POTASSIUM SERPL-SCNC: 3.4 MMOL/L (ref 3.5–5.1)
PROT FLD-MCNC: 4.4 G/DL
PROT UR STRIP-MCNC: 30 MG/DL
Q-T INTERVAL, ECG07: 354 MS
QRS DURATION, ECG06: 78 MS
QTC CALCULATION (BEZET), ECG08: 428 MS
RBC # FLD: >100 /CU MM
RBC #/AREA URNS HPF: ABNORMAL /HPF (ref 0–5)
SODIUM SERPL-SCNC: 143 MMOL/L (ref 136–145)
SP GR UR REFRACTOMETRY: 1.01
SPECIMEN SOURCE FLD: ABNORMAL
SPECIMEN SOURCE FLD: NORMAL
UA: UC IF INDICATED,UAUC: ABNORMAL
UROBILINOGEN UR QL STRIP.AUTO: 0.2 EU/DL (ref 0.2–1)
VENTRICULAR RATE, ECG03: 88 BPM
WBC URNS QL MICRO: ABNORMAL /HPF (ref 0–4)

## 2023-01-04 PROCEDURE — 89050 BODY FLUID CELL COUNT: CPT

## 2023-01-04 PROCEDURE — 74011000250 HC RX REV CODE- 250: Performed by: STUDENT IN AN ORGANIZED HEALTH CARE EDUCATION/TRAINING PROGRAM

## 2023-01-04 PROCEDURE — 74011250636 HC RX REV CODE- 250/636: Performed by: STUDENT IN AN ORGANIZED HEALTH CARE EDUCATION/TRAINING PROGRAM

## 2023-01-04 PROCEDURE — 71045 X-RAY EXAM CHEST 1 VIEW: CPT

## 2023-01-04 PROCEDURE — 74011000250 HC RX REV CODE- 250: Performed by: NURSE PRACTITIONER

## 2023-01-04 PROCEDURE — 84157 ASSAY OF PROTEIN OTHER: CPT

## 2023-01-04 PROCEDURE — 99223 1ST HOSP IP/OBS HIGH 75: CPT | Performed by: STUDENT IN AN ORGANIZED HEALTH CARE EDUCATION/TRAINING PROGRAM

## 2023-01-04 PROCEDURE — 0W9B3ZZ DRAINAGE OF LEFT PLEURAL CAVITY, PERCUTANEOUS APPROACH: ICD-10-PCS | Performed by: RADIOLOGY

## 2023-01-04 PROCEDURE — 65270000046 HC RM TELEMETRY

## 2023-01-04 PROCEDURE — 32555 ASPIRATE PLEURA W/ IMAGING: CPT

## 2023-01-04 PROCEDURE — 83605 ASSAY OF LACTIC ACID: CPT

## 2023-01-04 PROCEDURE — 74011000258 HC RX REV CODE- 258: Performed by: STUDENT IN AN ORGANIZED HEALTH CARE EDUCATION/TRAINING PROGRAM

## 2023-01-04 PROCEDURE — 82945 GLUCOSE OTHER FLUID: CPT

## 2023-01-04 PROCEDURE — 83615 LACTATE (LD) (LDH) ENZYME: CPT

## 2023-01-04 PROCEDURE — 74011250636 HC RX REV CODE- 250/636: Performed by: EMERGENCY MEDICINE

## 2023-01-04 PROCEDURE — 83986 ASSAY PH BODY FLUID NOS: CPT

## 2023-01-04 PROCEDURE — 88305 TISSUE EXAM BY PATHOLOGIST: CPT

## 2023-01-04 PROCEDURE — 80048 BASIC METABOLIC PNL TOTAL CA: CPT

## 2023-01-04 PROCEDURE — 36415 COLL VENOUS BLD VENIPUNCTURE: CPT

## 2023-01-04 PROCEDURE — 74011250637 HC RX REV CODE- 250/637: Performed by: STUDENT IN AN ORGANIZED HEALTH CARE EDUCATION/TRAINING PROGRAM

## 2023-01-04 PROCEDURE — 88112 CYTOPATH CELL ENHANCE TECH: CPT

## 2023-01-04 PROCEDURE — 81001 URINALYSIS AUTO W/SCOPE: CPT

## 2023-01-04 RX ORDER — LIDOCAINE 4 G/100G
1 PATCH TOPICAL EVERY 24 HOURS
Status: DISCONTINUED | OUTPATIENT
Start: 2023-01-04 | End: 2023-01-09

## 2023-01-04 RX ORDER — POLYETHYLENE GLYCOL 3350 17 G/17G
17 POWDER, FOR SOLUTION ORAL DAILY PRN
Status: DISCONTINUED | OUTPATIENT
Start: 2023-01-04 | End: 2023-01-24 | Stop reason: HOSPADM

## 2023-01-04 RX ORDER — SODIUM CHLORIDE 0.9 % (FLUSH) 0.9 %
5-40 SYRINGE (ML) INJECTION AS NEEDED
Status: DISCONTINUED | OUTPATIENT
Start: 2023-01-04 | End: 2023-01-24 | Stop reason: HOSPADM

## 2023-01-04 RX ORDER — PAROXETINE HYDROCHLORIDE 20 MG/1
20 TABLET, FILM COATED ORAL DAILY
Status: DISCONTINUED | OUTPATIENT
Start: 2023-01-04 | End: 2023-01-09

## 2023-01-04 RX ORDER — SODIUM CHLORIDE 0.9 % (FLUSH) 0.9 %
5-40 SYRINGE (ML) INJECTION EVERY 8 HOURS
Status: DISCONTINUED | OUTPATIENT
Start: 2023-01-04 | End: 2023-01-24 | Stop reason: HOSPADM

## 2023-01-04 RX ORDER — ONDANSETRON 4 MG/1
4 TABLET, ORALLY DISINTEGRATING ORAL
Status: DISCONTINUED | OUTPATIENT
Start: 2023-01-04 | End: 2023-01-24 | Stop reason: HOSPADM

## 2023-01-04 RX ORDER — ACETAMINOPHEN 325 MG/1
650 TABLET ORAL
Status: DISCONTINUED | OUTPATIENT
Start: 2023-01-04 | End: 2023-01-24 | Stop reason: HOSPADM

## 2023-01-04 RX ORDER — SODIUM CHLORIDE, SODIUM LACTATE, POTASSIUM CHLORIDE, CALCIUM CHLORIDE 600; 310; 30; 20 MG/100ML; MG/100ML; MG/100ML; MG/100ML
100 INJECTION, SOLUTION INTRAVENOUS CONTINUOUS
Status: DISCONTINUED | OUTPATIENT
Start: 2023-01-04 | End: 2023-01-08

## 2023-01-04 RX ORDER — ONDANSETRON 2 MG/ML
4 INJECTION INTRAMUSCULAR; INTRAVENOUS
Status: DISCONTINUED | OUTPATIENT
Start: 2023-01-04 | End: 2023-01-24 | Stop reason: HOSPADM

## 2023-01-04 RX ORDER — ROSUVASTATIN CALCIUM 10 MG/1
10 TABLET, COATED ORAL
Status: DISCONTINUED | OUTPATIENT
Start: 2023-01-04 | End: 2023-01-12

## 2023-01-04 RX ORDER — GABAPENTIN 100 MG/1
100 CAPSULE ORAL 3 TIMES DAILY
Status: DISCONTINUED | OUTPATIENT
Start: 2023-01-04 | End: 2023-01-24 | Stop reason: HOSPADM

## 2023-01-04 RX ORDER — CARVEDILOL 6.25 MG/1
6.25 TABLET ORAL 2 TIMES DAILY WITH MEALS
Status: DISCONTINUED | OUTPATIENT
Start: 2023-01-04 | End: 2023-01-12

## 2023-01-04 RX ORDER — ACETAMINOPHEN 650 MG/1
650 SUPPOSITORY RECTAL
Status: DISCONTINUED | OUTPATIENT
Start: 2023-01-04 | End: 2023-01-24 | Stop reason: HOSPADM

## 2023-01-04 RX ADMIN — SODIUM CHLORIDE, PRESERVATIVE FREE 10 ML: 5 INJECTION INTRAVENOUS at 16:48

## 2023-01-04 RX ADMIN — PAROXETINE 20 MG: 20 TABLET, FILM COATED ORAL at 11:03

## 2023-01-04 RX ADMIN — SODIUM CHLORIDE 365 ML: 9 INJECTION, SOLUTION INTRAVENOUS at 00:38

## 2023-01-04 RX ADMIN — ACETAMINOPHEN 650 MG: 325 TABLET ORAL at 16:48

## 2023-01-04 RX ADMIN — VANCOMYCIN HYDROCHLORIDE 1500 MG: 10 INJECTION, POWDER, LYOPHILIZED, FOR SOLUTION INTRAVENOUS at 01:07

## 2023-01-04 RX ADMIN — CEFEPIME 1 G: 1 INJECTION, POWDER, FOR SOLUTION INTRAMUSCULAR; INTRAVENOUS at 11:04

## 2023-01-04 RX ADMIN — SODIUM CHLORIDE, POTASSIUM CHLORIDE, SODIUM LACTATE AND CALCIUM CHLORIDE 500 ML: 600; 310; 30; 20 INJECTION, SOLUTION INTRAVENOUS at 20:12

## 2023-01-04 RX ADMIN — CARVEDILOL 6.25 MG: 6.25 TABLET, FILM COATED ORAL at 16:48

## 2023-01-04 RX ADMIN — GABAPENTIN 100 MG: 100 CAPSULE ORAL at 16:48

## 2023-01-04 RX ADMIN — GABAPENTIN 100 MG: 100 CAPSULE ORAL at 10:58

## 2023-01-04 RX ADMIN — CARVEDILOL 6.25 MG: 6.25 TABLET, FILM COATED ORAL at 10:58

## 2023-01-04 NOTE — H&P
This note is compiled to communicate with the medical care team. It may contain sensitive and protected information. It is not intended to serve as a source of communication with patients/families/friends; that communication occurs at the bedside or via alternative direct communications means (secure messaging, letters, video/telephone, etc.). Hospitalist Admission Note    NAME: Rick Hartmann   :  1960   MRN:  [id number]     Date/Time:  2023 4:50 AM    Patient PCP: Elliott Segura MD  ______________________________________________________________________  Given the patient's current clinical presentation, I have a high level of concern for decompensation if discharged from the emergency department. Complex decision making was performed, which includes reviewing the patient's available past medical records, laboratory results, and x-ray films. My assessment of this patient's clinical condition and my plan of care is as follows. Subjective:   CHIEF COMPLAINT: Pneumonia    HISTORY OF PRESENT ILLNESS:     Rick Hartmann is a 57 y.o.  female with pertinent past medical history of breast cancer currently off chemotherapy, MDD/TAIWO, hypothyroidism, GERD, and recent hospitalization from - for multifocal pneumonia secondary to MSSA pneumonia superimposed on COVID-19 pneumonia with course complicated by hemoptysis, sepsis, CHRISTA, and type II MI completed course of cefepime and doxycycline transition to Augmentin on time of discharge-patient unsure if she has been taking that who presents with complaints of persistent decline since discharge from OSH. She complains of malaise, fever/chills, cough productive of blood-tinged sputum, shortness of breath/dyspnea on exertion. She reports she does not feel she is doing well at home and think she needs to be in the hospital for further management. She denies tobacco, alcohol, or illicit drug use. ROS otherwise negative.     In the ED, patient febrile to 100.8 °F, hemodynamically stable (hypertensive 150s/60s), saturating mid 90s on room air. ECG demonstrates NSR with LVH criteria, CXR demonstrates increased left pleural effusion and left lower lobe airspace opacity and CT chest demonstrates increased moderately large left pleural effusion with unchanged patchy bilateral consolidation left greater than right compatible with pneumonia. Rapid and flu negative. Point-of-care lactic 0.7, proBNP 1091(Previously 866), high since her troponin 116 down trended to 107 (600s at prior hospitalization, sodium 142, potassium 3.4, BUN 38, creatinine 3.0 (baseline), WBC 13.3 (slightly increased from time of discharge), hemoglobin 9.1 (improved from prior), platelets 867. Given recent hospitalization and concern for hep patient given vancomycin, Levaquin, and cefepime by ED provider. We were asked to admit for work up and evaluation of the above problems. Past Medical History:   Diagnosis Date    Cancer [facility])     BREAST CANCER    GERD (gastroesophageal reflux disease)     HTN (hypertension) 07/18/2011    Psychiatric disorder     ANIXETY AND DEPRESSION    Thyroid disease     HYPOTHYROID        Past Surgical History:   Procedure Laterality Date    HX COLONOSCOPY      HX MYOMECTOMY  05/02/1996    x 1    HX WISDOM TEETH EXTRACTION         Social History     Tobacco Use    Smoking status: Never     Passive exposure: Never    Smokeless tobacco: Never   Substance Use Topics    Alcohol use: Never        Family History   Problem Relation Age of Onset    Heart Failure Mother     Cancer Father     Diabetes Sister     Kidney Disease Sister     Anesth Problems Neg Hx        Allergies   Allergen Reactions    Sulfa (Sulfonamide Antibiotics) Itching, Swelling and Unknown (comments)        Prior to Admission medications    Medication Sig Start Date End Date Taking? Authorizing Provider   carvediloL (COREG) 6.25 mg tablet Take 6.25 mg by mouth two (2) times daily (with meals). 11/3/22  Yes Provider, Historical   famotidine (PEPCID) 40 mg/5 mL (8 mg/mL) suspension Take 2.5 mL by mouth two (2) times a day. 11/30/22  Yes Anastacio Forte NP   gabapentin (NEURONTIN) 100 mg capsule Take 1 Capsule by mouth three (3) times daily. Max Daily Amount: 300 mg. 11/30/22  Yes Anastacio Forte NP   PARoxetine (PAXIL) 20 mg tablet Take 20 mg by mouth daily. Yes Provider, Historical   rosuvastatin (CRESTOR) 10 mg tablet Take 10 mg by mouth nightly. 8/15/22  Yes Provider, Historical   dexAMETHasone (DECADRON) 6 mg tablet Take 1 Tablet by mouth daily for 6 days. Patient not taking: Reported on 1/4/2023 1/1/23 1/7/23  Ba Samson MD   guaiFENesin ER [facility]) 600 mg ER tablet Take 1 Tablet by mouth every twelve (12) hours for 10 days. 1/1/23 1/11/23  Ba Samson MD   OTHER,NON-FORMULARY, IV chemotherapy    Provider, Historical   dexAMETHasone (DECADRON) 4 mg tablet Take 2 tabs (8mg) once daily on days 2 & 3 of chemotherapy  Patient not taking: Reported on 1/4/2023 9/28/22   Andrzej Germain NP   ondansetron (ZOFRAN ODT) 8 mg disintegrating tablet Take 1 Tablet by mouth every eight (8) hours as needed for Nausea or Vomiting. 9/28/22   Andrzej Germain NP   prochlorperazine (Compazine) 5 mg tablet Take 1 Tablet by mouth every six (6) hours as needed for Nausea or Vomiting. 9/28/22   Andrzej Germain NP   lidocaine-prilocaine (EMLA) topical cream Apply  to affected area as needed for Pain. Apply a thick layer over port a cath 30-60 minutes prior to port access 9/28/22   Andrzej Germain NP   multivitamin, tx-iron-ca-min (THERA-M w/ IRON) 9 mg iron-400 mcg tab tablet Take 1 Tablet by mouth daily.     Provider, Historical       REVIEW OF SYSTEMS:       Total of 12 systems reviewed with pertinent positives and negatives noted in HPI      Objective:   VITALS:    Visit Vitals  BP (!) 150/62   Pulse 81   Temp 98.5 °F (36.9 °C)   Resp 22   SpO2 97%       PHYSICAL EXAM:    General:   Alert, cooperative, no distress, elderly -American female        HEENT:  Atraumatic, anicteric sclerae, pink conjunctivae, mucosa moist, dentition fair     Neck:  Supple, symmetrical, trachea midline, no abnormalities on palpation     Lungs:   Scattered rhonchi improved with coughing, dullness to percussion in left lower listening post. Symmetric expansion. Good aeration. Normal respiratory effort. Chest wall:  No tenderness. No Accessory muscle use. Cardiovascular:   RRR, No murmur/rub/gallop. No JVD. Radial/AT/DP pulse 2+     GI/:   Soft, non-tender. Not distended. Bowel sounds normal. No HSM     Extremities Trace pitting edema BLE. No cyanosis. Capillary refill normal     Skin: No significant lesions noted. Not Jaundiced. No rashes      Neurologic: PERRL. EOMI. No facial asymmetry. No aphasia or slurred speech. Moves all extremities. Sensation to light touch grossly intact BUE/BLE. No overt focal deficits appreciated     Psych:  Alert and oriented X 4.   Anxious affect good insight     Other:   N/A         LAB DATA REVIEWED:    Recent Results (from the past 24 hour(s))   EKG, 12 LEAD, INITIAL    Collection Time: 01/03/23  3:50 PM   Result Value Ref Range    Ventricular Rate 88 BPM    Atrial Rate 88 BPM    P-R Interval 134 ms    QRS Duration 78 ms    Q-T Interval 354 ms    QTC Calculation (Bezet) 428 ms    Calculated P Axis 41 degrees    Calculated R Axis -9 degrees    Calculated T Axis 177 degrees    Diagnosis       Normal sinus rhythm  Voltage criteria for left ventricular hypertrophy  Nonspecific T wave abnormality  When compared with ECG of 26-DEC-2022 12:36,  MANUAL COMPARISON REQUIRED, DATA IS UNCONFIRMED     CBC WITH AUTOMATED DIFF    Collection Time: 01/03/23  3:57 PM   Result Value Ref Range    WBC 13.3 (H) 3.6 - 11.0 K/uL    RBC 3.01 (L) 3.80 - 5.20 M/uL    HGB 9.1 (L) 11.5 - 16.0 g/dL    HCT 27.5 (L) 35.0 - 47.0 %    MCV 91.4 80.0 - 99.0 FL    MCH 30.2 26.0 - 34.0 PG    MCHC 33.1 30.0 - 36.5 g/dL    RDW 18.6 (H) 11.5 - 14.5 %    PLATELET 083 335 - 975 K/uL    MPV 9.2 8.9 - 12.9 FL    NRBC 0.1 (H) 0  WBC    ABSOLUTE NRBC 0.02 (H) 0.00 - 0.01 K/uL    NEUTROPHILS 80 (H) 32 - 75 %    BAND NEUTROPHILS 3 %    LYMPHOCYTES 10 (L) 12 - 49 %    MONOCYTES 2 (L) 5 - 13 %    EOSINOPHILS 1 0 - 7 %    BASOPHILS 0 0 - 1 %    METAMYELOCYTES 4 %    IMMATURE GRANULOCYTES 0 0.0 - 0.5 %    ABS. NEUTROPHILS 11.0 (H) 1.8 - 8.0 K/UL    ABS. LYMPHOCYTES 1.3 0.8 - 3.5 K/UL    ABS. MONOCYTES 0.3 0.0 - 1.0 K/UL    ABS. EOSINOPHILS 0.1 0.0 - 0.4 K/UL    ABS. BASOPHILS 0.0 0.0 - 0.1 K/UL    ABS. IMM. GRANS. 0.0 0.00 - 0.04 K/UL    DF MANUAL      RBC COMMENTS ANISOCYTOSIS  1+       METABOLIC PANEL, COMPREHENSIVE    Collection Time: 01/03/23  3:57 PM   Result Value Ref Range    Sodium 142 136 - 145 mmol/L    Potassium 3.4 (L) 3.5 - 5.1 mmol/L    Chloride 108 97 - 108 mmol/L    CO2 26 21 - 32 mmol/L    Anion gap 8 5 - 15 mmol/L    Glucose 113 (H) 65 - 100 mg/dL    BUN 38 (H) 6 - 20 MG/DL    Creatinine 3.00 (H) 0.55 - 1.02 MG/DL    BUN/Creatinine ratio 13 12 - 20      eGFR 17 (L) >60 ml/min/1.73m2    Calcium 9.1 8.5 - 10.1 MG/DL    Bilirubin, total 0.4 0.2 - 1.0 MG/DL    ALT (SGPT) 94 (H) 12 - 78 U/L    AST (SGOT) 49 (H) 15 - 37 U/L    Alk.  phosphatase 111 45 - 117 U/L    Protein, total 7.1 6.4 - 8.2 g/dL    Albumin 2.3 (L) 3.5 - 5.0 g/dL    Globulin 4.8 (H) 2.0 - 4.0 g/dL    A-G Ratio 0.5 (L) 1.1 - 2.2     TROPONIN-HIGH SENSITIVITY    Collection Time: 01/03/23  3:57 PM   Result Value Ref Range    Troponin-High Sensitivity 116 (H) 0 - 51 ng/L   NT-PRO BNP    Collection Time: 01/03/23  3:57 PM   Result Value Ref Range    NT pro-BNP 1,091 (H) <125 PG/ML   COVID-19 RAPID TEST    Collection Time: 01/03/23  9:32 PM   Result Value Ref Range    Specimen source Nasopharyngeal      COVID-19 rapid test Not detected NOTD     INFLUENZA A+B VIRAL AGS    Collection Time: 01/03/23  9:32 PM   Result Value Ref Range    Influenza A Antigen Negative NEG Influenza B Antigen Negative NEG     POC LACTIC ACID    Collection Time: 01/03/23 10:46 PM   Result Value Ref Range    Lactic Acid (POC) 0.70 0.40 - 2.00 mmol/L   TROPONIN-HIGH SENSITIVITY    Collection Time: 01/03/23 10:50 PM   Result Value Ref Range    Troponin-High Sensitivity 107 (H) 0 - 51 ng/L       IMAGING:  CXR:  Increased left pleural effusion and left lower lobe airspace opacity. CT chest:  Increased moderately large left pleural effusion. Unchanged patchy bilateral  consolidation, left greater than right, compatible with pneumonia.     EKG: Normal sinus rhythm with LVH criteria, no definitive ischemic change, rate 88, , QTc 428  ______________________________________________________________________    Assessment / Plan:  Multifocal pneumonia-prior culture positive for pansensitive MSSA  Reported hemoptysis, recurrent from prior hospitalization  Normocytic anemia, improved from prior  Elevated troponin-likely downtrending from prior hospitalization  CKD 4 at baseline  Invasive ductal breast cancer-patient reports she is no longer taking chemotherapy    PLAN:    Multifocal pneumonia-prior culture positive for pansensitive MSSA  Admit to telemetry monitoring  De-escalate to cefepime and vancomycin  -Pharmacy consulted for vancomycin dosing  Suspect this represents persistent MSSA pneumonia now with parapneumonic effusion  Will consult IR for diagnostic and therapeutic thoracentesis  Pulmonary toilet: Flutter valve, incentive spirometry, cough/deep breathe  Pulmonology consulted, greatly appreciate their expertise  -Previously with concerns for underlying ILD versus Keytruda pneumonitis    Reported hemoptysis, recurrent from prior hospitalization  Normocytic anemia, improved from prior  No witnessed episodes of hemoptysis thus far  Will trend hemoglobin and monitor for output  Hold DVT chemoprophylaxis    Elevated troponin-likely downtrending from prior hospitalization  Continue to monitor  Will need outpatient stress test per cardiology recommendations at prior hospitalization    CKD 4 at baseline  Trend renal function  Avoid nephrotoxic medications and renally dose medications as necessary    Invasive ductal breast cancer-patient reports she is no longer taking chemotherapy  Unclear if patient has discussed chemotherapy cessation with her oncologist  There was some concern that Desirae Paez was leading to pneumonitis  We will consult oncology while inpatient  Patient reports she has not been taking dexamethasone    Code Status: Full    DVT Prophylaxis: SCDs  GI Prophylaxis: Not indicated  Diet: Regular       Care Plan discussed with:    Comments   Patient x    Family      RN     Care Manager                    Consultant:      _______________________________________________________________________  Baseline Level of Function: Independent    Expected  Disposition:   Home with Family    HH/PT/OT/RN x   SNF/LTC    BENEDICT    ________________________________________________________________________  TOTAL TIME:  68 Minutes   MEDICAL COMPLEXITY: high  TOBACCO CESSATION COUNSELING: NO        Comments    x Reviewed previous records   >50% of visit spent in counseling and coordination of care x Discussion with patient and/or family and questions answered       ________________________________________________________________________  Signed: Meir Contreras DO  1/4/2023 4:50 AM    Please note that this documentation was completed in part with Dragon dictation software. Occasionally unanticipated grammatical, syntax, homophones, and other interpretive errors are inadvertently transcribed by the computer software. Please excuse and disregard any errors that have escaped final proofreading. If in doubt, please do not hesitate to reach out to myself or the assigned hospitalist via [facility] [employer] system for clarification.

## 2023-01-04 NOTE — PROGRESS NOTES
No show today to [facility]/ OV  Pt recently was d/w with COVID PNA.   Radha can you please call & check on pt?

## 2023-01-04 NOTE — ED PROVIDER NOTES
[facility] EMERGENCY DEPT  EMERGENCY DEPARTMENT ENCOUNTER       Pt Name: Laura Gupta  MRN: [id number]  [place] 1960  Date of evaluation: 1/3/2023  Provider: Sg Lawler MD   PCP: Yvette Whaley MD  Note Started: 4:18 PM 1/4/23     CHIEF COMPLAINT       Chief Complaint   Patient presents with    Chest Pain    Shortness of Breath     Pt arrives via EMS with report of CP and SOB and lower extremity swelling, recently dx with COVID + on 12/28 and admitted. Reports sxs worsening after hospital d/c. Patient reports hx HTN. HISTORY OF PRESENT ILLNESS: 1 or more elements      History From: Patient and EMS       Laura Gupta is a 57 y.o. female with history of breast cancer, GERD, hypertension, anxiety, hypothyroidism and recent admission in 12/22 for COVID with bilateral pleural effusions and bilateral pneumonia who presents by EMS to the ED with chief complaint of left-sided chest pain that radiates to her left back, cough, shortness of breath. Patient reports symptoms have gotten worse since she was discharged. She states she feels like she is \"going downhill\". She reports nausea but denies any vomiting. She reports diarrhea, but denies any black or bloody stools. Denies known fevers or chills. Denies headache. Patient reports increased pain when she coughs or breathes. Denies any history of coronary artery disease. She reports her leg symptoms swelling for the past several days as well. She is followed by Dr. Shanthi Irwin (oncology) at [facility] for breast cancer but has declined further treatment because it is \"taking too much out of me. \".     Nursing Notes were all reviewed and agreed with or any disagreements were addressed in the HPI.        PAST HISTORY     Past Medical History:  Past Medical History:   Diagnosis Date    Cancer ([address]     BREAST CANCER    GERD (gastroesophageal reflux disease)     HTN (hypertension) 07/18/2011    Psychiatric disorder     ANIXETY AND DEPRESSION    Thyroid disease HYPOTHYROID       Past Surgical History:  Past Surgical History:   Procedure Laterality Date    HX COLONOSCOPY      HX MYOMECTOMY  05/02/1996    x 1    HX WISDOM TEETH EXTRACTION         Family History:  Family History   Problem Relation Age of Onset    Heart Failure Mother     Cancer Father     Diabetes Sister     Kidney Disease Sister     Anesth Problems Neg Hx        Social History:  Social History     Tobacco Use    Smoking status: Never     Passive exposure: Never    Smokeless tobacco: Never   Vaping Use    Vaping Use: Never used   Substance Use Topics    Alcohol use: Never    Drug use: Not Currently     Types: Marijuana     Comment: IN HIGH SCHOOL ONLY       Allergies: Allergies   Allergen Reactions    Sulfa (Sulfonamide Antibiotics) Itching, Swelling and Unknown (comments)       CURRENT MEDICATIONS      Previous Medications    CARVEDILOL (COREG) 6.25 MG TABLET    Take 6.25 mg by mouth two (2) times daily (with meals). DEXAMETHASONE (DECADRON) 4 MG TABLET    Take 2 tabs (8mg) once daily on days 2 & 3 of chemotherapy    DEXAMETHASONE (DECADRON) 6 MG TABLET    Take 1 Tablet by mouth daily for 6 days. FAMOTIDINE (PEPCID) 40 MG/5 ML (8 MG/ML) SUSPENSION    Take 2.5 mL by mouth two (2) times a day. GABAPENTIN (NEURONTIN) 100 MG CAPSULE    Take 1 Capsule by mouth three (3) times daily. Max Daily Amount: 300 mg. GUAIFENESIN ER (MUCINEX) 600 MG ER TABLET    Take 1 Tablet by mouth every twelve (12) hours for 10 days. LIDOCAINE-PRILOCAINE (EMLA) TOPICAL CREAM    Apply  to affected area as needed for Pain. Apply a thick layer over port a cath 30-60 minutes prior to port access    MULTIVITAMIN, TX-IRON-CA-MIN (THERA-M W/ IRON) 9 MG IRON-400 MCG TAB TABLET    Take 1 Tablet by mouth daily. ONDANSETRON (ZOFRAN ODT) 8 MG DISINTEGRATING TABLET    Take 1 Tablet by mouth every eight (8) hours as needed for Nausea or Vomiting.     OTHER,NON-FORMULARY,    IV chemotherapy    PAROXETINE (PAXIL) 20 MG TABLET    Take 20 mg by mouth daily. PROCHLORPERAZINE (COMPAZINE) 5 MG TABLET    Take 1 Tablet by mouth every six (6) hours as needed for Nausea or Vomiting. ROSUVASTATIN (CRESTOR) 10 MG TABLET    Take 10 mg by mouth nightly.             PHYSICAL EXAM      ED Triage Vitals [01/03/23 1543]   ED Encounter Vitals Group      BP (!) 166/77      Pulse (Heart Rate) 86      Resp Rate 18      Temp 98.4 °F (36.9 °C)      Temp src       O2 Sat (%) 98 %      Weight       Height         Physical Exam  Well-nourished, moderate pain distress  Alert and oriented x3  Lungs with decreased breath sounds bilateral bases, patient is tachypneic and appears dyspneic.,  Tender to palpation left lateral chest wall, port in right upper chest  Mildly tachycardic on my exam.  Abdomen is soft and nontender  Back-tender to palpation left thoracic back  Bilateral lower extremities with 1+ pitting edema  Neuro-alert and oriented, no motor or sensory deficits, speech is normal.         DIAGNOSTIC RESULTS   LABS:     Recent Results (from the past 12 hour(s))   URINALYSIS W/ REFLEX CULTURE    Collection Time: 01/04/23  7:09 AM    Specimen: Urine   Result Value Ref Range    Color YELLOW/STRAW      Appearance CLEAR CLEAR      Specific gravity 1.013      pH (UA) 5.5 5.0 - 8.0      Protein 30 (A) NEG mg/dL    Glucose Negative NEG mg/dL    Ketone Negative NEG mg/dL    Bilirubin Negative NEG      Blood MODERATE (A) NEG      Urobilinogen 0.2 0.2 - 1.0 EU/dL    Nitrites Negative NEG      Leukocyte Esterase TRACE (A) NEG      WBC 5-10 0 - 4 /hpf    RBC 0-5 0 - 5 /hpf    Epithelial cells FEW FEW /lpf    Bacteria Negative NEG /hpf    UA:UC IF INDICATED CULTURE NOT INDICATED BY UA RESULT CNI      Amorphous Crystals 1+ (A) NEG    Granular cast 0-2 (A) NEG /lpf   CELL COUNT, BODY FLUID    Collection Time: 01/04/23  1:29 PM   Result Value Ref Range    BODY FLUID TYPE PLEURAL FLUID      FLUID COLOR RED      FLUID APPEARANCE CLOUDY      FLUID RBC CT. >100 (H) 0 /cu mm    FLUID NUCLEATED CELLS 4,407 /cu mm    FLD NEUTROPHILS 46 (A) NRRE %    FLD LYMPHS 37 (A) NRRE %    FLD MONO/MACROPHAGES 15 (A) NRRE %    FLUID MESOTHELIAL 2 (A) NRRE %   PH, FLUID    Collection Time: 01/04/23  1:29 PM   Result Value Ref Range    FLUID TYPE(15) PLEURAL FLUID      FLUID PH 7.2          EKG 3:50 PM on 1/3/2023 independently interpreted by me: Normal sinus rhythm, 88 bpm, normal axis, normal WI, QRS, QTc intervals, nonspecific ST changes     RADIOLOGY:  Non-plain film images such as CT, Ultrasound and MRI are read by the radiologist. Plain radiographic images are visualized and preliminarily interpreted by the ED Provider with the below findings:     Portable chest x-ray at 5:53 PM on 1/3/2023 independently interpreted by me shows moderate to large left pleural effusion, no pneumothorax. Interpretation per the Radiologist below, if available at the time of this note:     CT CHEST WO CONT    Result Date: 1/3/2023  INDICATION: Pleural effusion COMPARISON: December 28, 2022 CONTRAST: None. TECHNIQUE:  5 mm axial images were obtained through the chest. Coronal and sagittal reformats were generated. CT dose reduction was achieved through use of a standardized protocol tailored for this examination and automatic exposure control for dose modulation. The absence of intravenous contrast reduces the sensitivity for evaluation of the mediastinum, brigette, vasculature, and upper abdominal organs. FINDINGS: CHEST WALL: No mass or axillary lymphadenopathy. Right internal jugular Port-A-Cath is stable in position. THYROID: No nodule. MEDIASTINUM: No mass or lymphadenopathy. BRIGETTE: No mass or lymphadenopathy. THORACIC AORTA: No aneurysm. MAIN PULMONARY ARTERY: Normal in caliber. TRACHEA/BRONCHI: Patent. ESOPHAGUS: No wall thickening or dilatation. HEART: Normal in size. Coronary artery calcium: absent PLEURA: Increased moderately large left pleural effusion.  LUNGS: Unchanged patchy consolidation in both lungs, left greater than right. INCIDENTALLY IMAGED UPPER ABDOMEN: No significant abnormality in the incidentally imaged upper abdomen. BONES: No destructive bone lesion. Increased moderately large left pleural effusion. Unchanged patchy bilateral consolidation, left greater than right, compatible with pneumonia. XR CHEST PORT    Result Date: 1/3/2023  EXAM:  XR CHEST PORT INDICATION: Shortness of breath COMPARISON: 12/30/2022 TECHNIQUE: Erect portable chest AP view FINDINGS: Right IJ Port-A-Cath is in stable position. The cardiac silhouette is within normal limits. The pulmonary vasculature is within normal limits. Increased left pleural effusion and left lower lobe airspace opacity. The visualized bones and upper abdomen are age-appropriate. Increased left pleural effusion and left lower lobe airspace opacity. EMERGENCY DEPARTMENT COURSE and DIFFERENTIAL DIAGNOSIS/MDM   Vitals:    Vitals:    01/04/23 0940 01/04/23 1325 01/04/23 1335 01/04/23 1340   BP: (!) 156/67 (!) 145/62 (!) 140/56 (!) 151/53   Pulse: 80 97 95 95   Resp: 20 20 20 22   Temp: 98.4 °F (36.9 °C)      SpO2: 96% 95% 95% 95%        Patient was given the following medications:  Medications                                                         sodium chloride 0.9 % bolus infusion 1,000 mL (0 mL IntraVENous IV Completed 1/4/23 0100)     Followed by   sodium chloride 0.9 % bolus infusion 365 mL (0 mL IntraVENous IV Completed 1/4/23 0130)   vancomycin (VANCOCIN) 1500 mg in  ml infusion (0 mg IntraVENous IV Completed 1/4/23 0307)   levoFLOXacin (LEVAQUIN) 750 mg in D5W IVPB (0 mg IntraVENous IV Completed 1/4/23 0040)   acetaminophen (TYLENOL) tablet 1,000 mg (1,000 mg Oral Given 1/3/23 2300)       CONsULTS: (Who and What was discussed)  Case discussed with Dr. Tracy Reyna (hospitalist) who will see and admit patient.     Chronic Conditions: Breast cancer      External records Reviewed (source and summary of external notes): Reviewed echo from 12/27/2022 which showed EF of 50 to 55%    CC/HPI Summary, DDx, ED Course, and Reassessment:  In summary patient with history of bilateral breast cancer and recent admission for COVID with bilateral pneumonia and bilateral pleural effusions presents to the ED with left-sided chest and upper back pain. Noted to have fever on arrival.  Patient appears dyspneic and uncomfortable, although she is not hypoxic. Differential diagnosis includes pneumonia, pleural effusion, congestive heart failure, PE basic labs, troponin, proBNP, chest x-ray obtained. Chest x-ray shows increased left pleural effusion. Chest CT obtained which shows bilateral infiltrates left greater than right. White count is elevated at 13.3. Patient is moderately anemic with hemoglobin of 9.1. Platelets are normal.  CMP shows chronic renal insufficiency with creatinine of 3.0. LFTs are mildly elevated, although improved since admission last week. High-sensitivity troponin is 116 which appears to be trending down from admission last month.  proBNP is moderately elevated at 1091. Suspect symptoms related to enlarging left pleural effusion with underlying infiltrate. Patient febrile so meets sepsis criteria. Treated with broad-spectrum antibiotics (IV cefepime, Levaquin,  vancomycin) and also treated with 30 cc/kg of IV fluid. Will admit to hospital.               FINAL IMPRESSION     1. Malignant neoplasm involving both nipple and areola in both breasts in female, estrogen receptor negative ([facility] [address] [C50.011, C50.012, Z17.1 (ICD-10-CM)]    2. Bacterial pneumonia [J15.9 (ICD-10-CM)]    3. Sepsis, due to unspecified organism, unspecified whether acute organ dysfunction present ([facility] [address]    4. Pneumonia of both lungs due to infectious organism, unspecified part of lung    5. Pleural effusion on left    6. Chronic renal failure, unspecified CKD stage          DISPOSITION/PLAN   Admitted      I am the Primary Clinician of Record.    Gerson Good MD (electronically signed)    (Please note that parts of this dictation were completed with voice recognition software. Quite often unanticipated grammatical, syntax, homophones, and other interpretive errors are inadvertently transcribed by the computer software. Please disregards these errors.  Please excuse any errors that have escaped final proofreading.)

## 2023-01-04 NOTE — CONSULTS
Pulmonary, Critical Care, and Sleep Medicine    Initial Patient Consult    Name: Layla Vann MRN: [id number]   : 1960 Hospital: [address] [facility]   Date: 2023        IMPRESSION:   Pneumonia  Pulmonary edema  Left pleural effusion      RECOMMENDATIONS:   Not hypoxic, no need for O2  Recommend procalcitonin and BNP trending  Started on IV abx per hospitalist team  Agree with left thoracentesis  Diurese??  DVT prophylaxis     Subjective: This patient has been seen and evaluated at the request of Dr. John Gallego for pneumonia. Patient is a 57 y.o. female recently admitted to [facility] for pneumonia  Presented to [facility] with weakness, malaise  Cxr and chest ct abnormal  Not hypoxic  Started on broad spectrum abx      Past Medical History:   Diagnosis Date    Cancer ([address]     BREAST CANCER    GERD (gastroesophageal reflux disease)     HTN (hypertension) 2011    Psychiatric disorder     ANIXETY AND DEPRESSION    Thyroid disease     HYPOTHYROID      Past Surgical History:   Procedure Laterality Date    HX COLONOSCOPY      HX MYOMECTOMY  05/02/1996    x 1    HX WISDOM TEETH EXTRACTION        Prior to Admission medications    Medication Sig Start Date End Date Taking? Authorizing Provider   carvediloL (COREG) 6.25 mg tablet Take 6.25 mg by mouth two (2) times daily (with meals). 11/3/22  Yes Provider, Historical   famotidine (PEPCID) 40 mg/5 mL (8 mg/mL) suspension Take 2.5 mL by mouth two (2) times a day. 22  Yes Sarbjit Forte NP   gabapentin (NEURONTIN) 100 mg capsule Take 1 Capsule by mouth three (3) times daily. Max Daily Amount: 300 mg. 22  Yes Sarbjit Forte NP   PARoxetine (PAXIL) 20 mg tablet Take 20 mg by mouth daily. Yes Provider, Historical   rosuvastatin (CRESTOR) 10 mg tablet Take 10 mg by mouth nightly. 8/15/22  Yes Provider, Historical   dexAMETHasone (DECADRON) 6 mg tablet Take 1 Tablet by mouth daily for 6 days.   Patient not taking: Reported on 2023 1/7/23  Savage WHITE MD   guaiFENesin ER (MUCINEX) 600 mg ER tablet Take 1 Tablet by mouth every twelve (12) hours for 10 days. 1/1/23 1/11/23  Savage Najera MD   OTHER,NON-FORMULARY, IV chemotherapy    Provider, Historical   dexAMETHasone (DECADRON) 4 mg tablet Take 2 tabs (8mg) once daily on days 2 & 3 of chemotherapy  Patient not taking: Reported on 1/4/2023 9/28/22   Chandler García NP   ondansetron (ZOFRAN ODT) 8 mg disintegrating tablet Take 1 Tablet by mouth every eight (8) hours as needed for Nausea or Vomiting. 9/28/22   Chandler García NP   prochlorperazine (Compazine) 5 mg tablet Take 1 Tablet by mouth every six (6) hours as needed for Nausea or Vomiting. 9/28/22   Chandler García NP   lidocaine-prilocaine (EMLA) topical cream Apply  to affected area as needed for Pain. Apply a thick layer over port a cath 30-60 minutes prior to port access 9/28/22   Chandler García NP   multivitamin, tx-iron-ca-min (THERA-M w/ IRON) 9 mg iron-400 mcg tab tablet Take 1 Tablet by mouth daily.     Provider, Historical     Allergies   Allergen Reactions    Sulfa (Sulfonamide Antibiotics) Itching, Swelling and Unknown (comments)      Social History     Tobacco Use    Smoking status: Never     Passive exposure: Never    Smokeless tobacco: Never   Substance Use Topics    Alcohol use: Never      Family History   Problem Relation Age of Onset    Heart Failure Mother     Cancer Father     Diabetes Sister     Kidney Disease Sister     Anesth Problems Neg Hx         Current Facility-Administered Medications   Medication Dose Route Frequency    carvediloL (COREG) tablet 6.25 mg  6.25 mg Oral BID WITH MEALS    gabapentin (NEURONTIN) capsule 100 mg  100 mg Oral TID    PARoxetine (PAXIL) tablet 20 mg  20 mg Oral DAILY    rosuvastatin (CRESTOR) tablet 10 mg  10 mg Oral QHS    sodium chloride (NS) flush 5-40 mL  5-40 mL IntraVENous Q8H    lidocaine 4 % patch 1 Patch  1 Patch TransDERmal Q24H    VANCOMYCIN INFORMATION NOTE Other Rx Dosing/Monitoring    cefepime (MAXIPIME) 1 g in 0.9% sodium chloride (MBP/ADV) 50 mL MBP  1 g IntraVENous Q12H       Review of Systems:  A comprehensive review of systems was negative except for: Constitutional: positive for fatigue, malaise, and anorexia    Objective:   Vital Signs:    Visit Vitals  BP (!) 156/67   Pulse 80   Temp 98.4 °F (36.9 °C)   Resp 20   SpO2 96%       O2 Device: None (Room air)       Temp (24hrs), Av.1 °F (37.3 °C), Min:98.4 °F (36.9 °C), Max:100.8 °F (38.2 °C)       Intake/Output:   Last shift:      No intake/output data recorded. Last 3 shifts:  1901 -  0700  In: 2015 [I.V.:2015]  Out: 450 [Urine:450]    Intake/Output Summary (Last 24 hours) at 2023 1115  Last data filed at 2023 0419  Gross per 24 hour   Intake 2015 ml   Output 450 ml   Net 1565 ml      Physical Exam:   General:  Alert, cooperative, no distress, appears stated age. Head:  Normocephalic, without obvious abnormality, atraumatic. Eyes:  Conjunctivae/corneas clear. PERRL, EOMs intact. Nose: Nares normal. Septum midline. Mucosa normal. No drainage or sinus tenderness. Throat: Lips, mucosa, and tongue normal. Teeth and gums normal.   Neck: Supple, symmetrical, trachea midline, no adenopathy, thyroid: no enlargment/tenderness/nodules, no carotid bruit and no JVD. Back:   Symmetric, no curvature. ROM normal.   Lungs:   Clear to auscultation bilaterally. Chest wall:  No tenderness or deformity. Heart:  Regular rate and rhythm, S1, S2 normal, no murmur, click, rub or gallop. Abdomen:   Soft, non-tender. Bowel sounds normal. No masses,  No organomegaly. Extremities: Extremities normal, atraumatic, no cyanosis or edema. Pulses: 2+ and symmetric all extremities.    Skin: Skin color, texture, turgor normal. No rashes or lesions   Lymph nodes: Cervical, supraclavicular, and axillary nodes normal.   Neurologic: Grossly nonfocal     Data review:     Recent Results (from the past 24 hour(s))   EKG, 12 LEAD, INITIAL    Collection Time: 01/03/23  3:50 PM   Result Value Ref Range    Ventricular Rate 88 BPM    Atrial Rate 88 BPM    P-R Interval 134 ms    QRS Duration 78 ms    Q-T Interval 354 ms    QTC Calculation (Bezet) 428 ms    Calculated P Axis 41 degrees    Calculated R Axis -9 degrees    Calculated T Axis 177 degrees    Diagnosis       Normal sinus rhythm  Voltage criteria for left ventricular hypertrophy  Nonspecific T wave abnormality  Confirmed by Madison Escobar ([id number]) on 1/4/2023 10:13:56 AM     CBC WITH AUTOMATED DIFF    Collection Time: 01/03/23  3:57 PM   Result Value Ref Range    WBC 13.3 (H) 3.6 - 11.0 K/uL    RBC 3.01 (L) 3.80 - 5.20 M/uL    HGB 9.1 (L) 11.5 - 16.0 g/dL    HCT 27.5 (L) 35.0 - 47.0 %    MCV 91.4 80.0 - 99.0 FL    MCH 30.2 26.0 - 34.0 PG    MCHC 33.1 30.0 - 36.5 g/dL    RDW 18.6 (H) 11.5 - 14.5 %    PLATELET 054 180 - 393 K/uL    MPV 9.2 8.9 - 12.9 FL    NRBC 0.1 (H) 0  WBC    ABSOLUTE NRBC 0.02 (H) 0.00 - 0.01 K/uL    NEUTROPHILS 80 (H) 32 - 75 %    BAND NEUTROPHILS 3 %    LYMPHOCYTES 10 (L) 12 - 49 %    MONOCYTES 2 (L) 5 - 13 %    EOSINOPHILS 1 0 - 7 %    BASOPHILS 0 0 - 1 %    METAMYELOCYTES 4 %    IMMATURE GRANULOCYTES 0 0.0 - 0.5 %    ABS. NEUTROPHILS 11.0 (H) 1.8 - 8.0 K/UL    ABS. LYMPHOCYTES 1.3 0.8 - 3.5 K/UL    ABS. MONOCYTES 0.3 0.0 - 1.0 K/UL    ABS. EOSINOPHILS 0.1 0.0 - 0.4 K/UL    ABS. BASOPHILS 0.0 0.0 - 0.1 K/UL    ABS. IMM.  GRANS. 0.0 0.00 - 0.04 K/UL    DF MANUAL      RBC COMMENTS ANISOCYTOSIS  1+       METABOLIC PANEL, COMPREHENSIVE    Collection Time: 01/03/23  3:57 PM   Result Value Ref Range    Sodium 142 136 - 145 mmol/L    Potassium 3.4 (L) 3.5 - 5.1 mmol/L    Chloride 108 97 - 108 mmol/L    CO2 26 21 - 32 mmol/L    Anion gap 8 5 - 15 mmol/L    Glucose 113 (H) 65 - 100 mg/dL    BUN 38 (H) 6 - 20 MG/DL    Creatinine 3.00 (H) 0.55 - 1.02 MG/DL    BUN/Creatinine ratio 13 12 - 20      eGFR 17 (L) >60 ml/min/1.73m2    Calcium 9.1 8.5 - 10.1 MG/DL    Bilirubin, total 0.4 0.2 - 1.0 MG/DL    ALT (SGPT) 94 (H) 12 - 78 U/L    AST (SGOT) 49 (H) 15 - 37 U/L    Alk.  phosphatase 111 45 - 117 U/L    Protein, total 7.1 6.4 - 8.2 g/dL    Albumin 2.3 (L) 3.5 - 5.0 g/dL    Globulin 4.8 (H) 2.0 - 4.0 g/dL    A-G Ratio 0.5 (L) 1.1 - 2.2     TROPONIN-HIGH SENSITIVITY    Collection Time: 01/03/23  3:57 PM   Result Value Ref Range    Troponin-High Sensitivity 116 (H) 0 - 51 ng/L   NT-PRO BNP    Collection Time: 01/03/23  3:57 PM   Result Value Ref Range    NT pro-BNP 1,091 (H) <125 PG/ML   CULTURE, BLOOD, PAIRED    Collection Time: 01/03/23  9:30 PM    Specimen: Blood   Result Value Ref Range    Special Requests: NO SPECIAL REQUESTS      Culture result: NO GROWTH AFTER 9 HOURS     COVID-19 RAPID TEST    Collection Time: 01/03/23  9:32 PM   Result Value Ref Range    Specimen source Nasopharyngeal      COVID-19 rapid test Not detected NOTD     INFLUENZA A+B VIRAL AGS    Collection Time: 01/03/23  9:32 PM   Result Value Ref Range    Influenza A Antigen Negative NEG      Influenza B Antigen Negative NEG     POC LACTIC ACID    Collection Time: 01/03/23 10:46 PM   Result Value Ref Range    Lactic Acid (POC) 0.70 0.40 - 2.00 mmol/L   TROPONIN-HIGH SENSITIVITY    Collection Time: 01/03/23 10:50 PM   Result Value Ref Range    Troponin-High Sensitivity 107 (H) 0 - 51 ng/L   METABOLIC PANEL, BASIC    Collection Time: 01/04/23  4:13 AM   Result Value Ref Range    Sodium 143 136 - 145 mmol/L    Potassium 3.4 (L) 3.5 - 5.1 mmol/L    Chloride 111 (H) 97 - 108 mmol/L    CO2 24 21 - 32 mmol/L    Anion gap 8 5 - 15 mmol/L    Glucose 100 65 - 100 mg/dL    BUN 32 (H) 6 - 20 MG/DL    Creatinine 2.67 (H) 0.55 - 1.02 MG/DL    BUN/Creatinine ratio 12 12 - 20      eGFR 19 (L) >60 ml/min/1.73m2    Calcium 8.6 8.5 - 10.1 MG/DL   URINALYSIS W/ REFLEX CULTURE    Collection Time: 01/04/23  7:09 AM    Specimen: Urine   Result Value Ref Range    Color YELLOW/STRAW      Appearance CLEAR CLEAR Specific gravity 1.013      pH (UA) 5.5 5.0 - 8.0      Protein 30 (A) NEG mg/dL    Glucose Negative NEG mg/dL    Ketone Negative NEG mg/dL    Bilirubin Negative NEG      Blood MODERATE (A) NEG      Urobilinogen 0.2 0.2 - 1.0 EU/dL    Nitrites Negative NEG      Leukocyte Esterase TRACE (A) NEG      WBC 5-10 0 - 4 /hpf    RBC 0-5 0 - 5 /hpf    Epithelial cells FEW FEW /lpf    Bacteria Negative NEG /hpf    UA:UC IF INDICATED CULTURE NOT INDICATED BY UA RESULT CNI      Amorphous Crystals 1+ (A) NEG    Granular cast 0-2 (A) NEG /lpf       Imaging:  I have personally reviewed the patients radiographs and have reviewed the reports:  CXR and chest ct reviewed        Peri Crook MD

## 2023-01-04 NOTE — PROGRESS NOTES
Attempted to see pt for OT services. Pt reports feeling \"horrible. \"  She has been up to the bathroom and currently feels nauseous. Pt is declining activity at this time. Will defer but continue to follow.

## 2023-01-04 NOTE — PROGRESS NOTES
Physical Therapy  Referral received, chart reviewed and attempted to see patient for PT evaluation. Patients reports feeling \"horrible\". She reports nausea from recently getting up to use the restroom. Patient declining activity at this time. Will defer per her request and continue to follow.    Randa Liu, PT, DPT

## 2023-01-04 NOTE — CONSULTS
Cancer [place] at [address] [facility] [address] [place] [address] [address] [facility]  W: [phone] F: [phone]  Reason for Visit:   Elizabeth Wilson is a 57 y.o. female with left breast cancer-ER 0%, DC 0%, HER2/radha negative. Treatment History:   10/1/22: Carboplatin taxol and keytruda followed by ddAC and Keytruda     History of Present Illness:   Mrs. Michael Lozano is a patient of Dr. Buddy Gibson at [facility].  She is followed for treatment of left breast cancer. She was recently admitted to [facility] for sepsis r/t COVID-pneumonia, CHRISTA, NSTEMI, hemoptysis and anemia. Pertinent treatment history as follows: She had a screening mammogram on 7/20/2022 which showed a 1 cm irregular mass in the left breast at 3 o'clock position. Biopsy on 8/2/2022 showed invasive ductal carcinoma, grade 2-3, ER 0%, DC 0%, HER2/radha negative. Ki-67 85%. Declined genetic testing. Recent Keytruda : last dose 12/14/2022  Last Chemotherapy: 12/21/2022     She had a screening mammogram on 7/20/2022 which showed a 1 cm irregular mass in the left breast at 3 o'clock position. Biopsy on 8/2/2022 showed invasive ductal carcinoma, grade 2-3, ER 0%, DC 0%, HER2/radha negative. Ki-67 85%. Declined genetic testing. Patient has now returned to the ED with complaints of fever and shortness of breath. She was found to have a large left pleural effusion and CHRISTA. Patient seen at bedside in hallway bed in ED. Discussed plan for thoracentesis today. Will discuss further plan of care once she has been placed in her room given no privacy in hallway.     Past Medical History:   Diagnosis Date    Cancer [facility])     BREAST CANCER    GERD (gastroesophageal reflux disease)     HTN (hypertension) 07/18/2011    Psychiatric disorder     ANIXETY AND DEPRESSION    Thyroid disease     HYPOTHYROID      Past Surgical History:   Procedure Laterality Date    HX COLONOSCOPY      HX MYOMECTOMY  05/02/1996 x 1    HX WISDOM TEETH EXTRACTION        Social History     Tobacco Use    Smoking status: Never     Passive exposure: Never    Smokeless tobacco: Never   Substance Use Topics    Alcohol use: Never      Family History   Problem Relation Age of Onset    Heart Failure Mother     Cancer Father     Diabetes Sister     Kidney Disease Sister     Anesth Problems Neg Hx      Current Facility-Administered Medications   Medication Dose Route Frequency    carvediloL (COREG) tablet 6.25 mg  6.25 mg Oral BID WITH MEALS    gabapentin (NEURONTIN) capsule 100 mg  100 mg Oral TID    PARoxetine (PAXIL) tablet 20 mg  20 mg Oral DAILY    rosuvastatin (CRESTOR) tablet 10 mg  10 mg Oral QHS    sodium chloride (NS) flush 5-40 mL  5-40 mL IntraVENous Q8H    sodium chloride (NS) flush 5-40 mL  5-40 mL IntraVENous PRN    acetaminophen (TYLENOL) tablet 650 mg  650 mg Oral Q6H PRN    Or    acetaminophen (TYLENOL) suppository 650 mg  650 mg Rectal Q6H PRN    polyethylene glycol (MIRALAX) packet 17 g  17 g Oral DAILY PRN    ondansetron (ZOFRAN ODT) tablet 4 mg  4 mg Oral Q8H PRN    Or    ondansetron (ZOFRAN) injection 4 mg  4 mg IntraVENous Q6H PRN    lidocaine 4 % patch 1 Patch  1 Patch TransDERmal Q24H    VANCOMYCIN INFORMATION NOTE   Other Rx Dosing/Monitoring    cefepime (MAXIPIME) 1 g in 0.9% sodium chloride (MBP/ADV) 50 mL MBP  1 g IntraVENous Q12H     Current Outpatient Medications   Medication Sig    carvediloL (COREG) 6.25 mg tablet Take 6.25 mg by mouth two (2) times daily (with meals). famotidine (PEPCID) 40 mg/5 mL (8 mg/mL) suspension Take 2.5 mL by mouth two (2) times a day.    gabapentin (NEURONTIN) 100 mg capsule Take 1 Capsule by mouth three (3) times daily. Max Daily Amount: 300 mg. PARoxetine (PAXIL) 20 mg tablet Take 20 mg by mouth daily. rosuvastatin (CRESTOR) 10 mg tablet Take 10 mg by mouth nightly. dexAMETHasone (DECADRON) 6 mg tablet Take 1 Tablet by mouth daily for 6 days.  (Patient not taking: Reported on 1/4/2023)    guaiFENesin ER (MUCINEX) 600 mg ER tablet Take 1 Tablet by mouth every twelve (12) hours for 10 days. OTHER,NON-FORMULARY, IV chemotherapy    dexAMETHasone (DECADRON) 4 mg tablet Take 2 tabs (8mg) once daily on days 2 & 3 of chemotherapy (Patient not taking: Reported on 1/4/2023)    ondansetron (ZOFRAN ODT) 8 mg disintegrating tablet Take 1 Tablet by mouth every eight (8) hours as needed for Nausea or Vomiting. prochlorperazine (Compazine) 5 mg tablet Take 1 Tablet by mouth every six (6) hours as needed for Nausea or Vomiting.    lidocaine-prilocaine (EMLA) topical cream Apply  to affected area as needed for Pain. Apply a thick layer over port a cath 30-60 minutes prior to port access    multivitamin, tx-iron-ca-min (THERA-M w/ IRON) 9 mg iron-400 mcg tab tablet Take 1 Tablet by mouth daily. Allergies   Allergen Reactions    Sulfa (Sulfonamide Antibiotics) Itching, Swelling and Unknown (comments)        Review of Systems: not obtained    Physical Exam:   Visit Vitals  BP (!) 151/53 (BP 1 Location: Right upper arm, BP Patient Position: Sitting)   Pulse 95   Temp 98.4 °F (36.9 °C)   Resp 22   SpO2 95%     Not seen due to COVID isolation    Results:     Lab Results   Component Value Date/Time    WBC 13.3 (H) 01/03/2023 03:57 PM    HGB 9.1 (L) 01/03/2023 03:57 PM    HCT 27.5 (L) 01/03/2023 03:57 PM    PLATELET 576 29/81/4486 03:57 PM    MCV 91.4 01/03/2023 03:57 PM    ABS.  NEUTROPHILS 11.0 (H) 01/03/2023 03:57 PM     Lab Results   Component Value Date/Time    Sodium 143 01/04/2023 04:13 AM    Potassium 3.4 (L) 01/04/2023 04:13 AM    Chloride 111 (H) 01/04/2023 04:13 AM    CO2 24 01/04/2023 04:13 AM    Glucose 100 01/04/2023 04:13 AM    BUN 32 (H) 01/04/2023 04:13 AM    Creatinine 2.67 (H) 01/04/2023 04:13 AM    GFR est AA >60 09/26/2022 03:26 PM    GFR est non-AA >60 09/26/2022 03:26 PM    Calcium 8.6 01/04/2023 04:13 AM    Glucose (POC) 103 12/27/2022 09:30 PM     Lab Results   Component Value Date/Time    Bilirubin, total 0.4 01/03/2023 03:57 PM    ALT (SGPT) 94 (H) 01/03/2023 03:57 PM    Alk. phosphatase 111 01/03/2023 03:57 PM    Protein, total 7.1 01/03/2023 03:57 PM    Albumin 2.3 (L) 01/03/2023 03:57 PM    Globulin 4.8 (H) 01/03/2023 03:57 PM       External   Records reviewed and summarized above. Pathology report(s) reviewed above. Radiology report(s) reviewed above. MRI  9/2022    LEFT BREAST: Conglomerate mass enhancement in the left breast at 4-5 o'clock,  combined dimension 3.7 x 1.9 x 1.4 cm. This is slightly more extensive than the  sonographic and mammographic findings. No lymphadenopathy. Physical Exam  Constitutional:       Appearance: She is normal weight. HENT:      Head: Normocephalic. Eyes:      Pupils: Pupils are equal, round, and reactive to light. Pulmonary:      Effort: Pulmonary effort is normal.   Abdominal:      Palpations: Abdomen is soft. Neurological:      General: No focal deficit present. Mental Status: She is alert. Psychiatric:         Mood and Affect: Mood normal.       Assessment/PLAN:     1) Left breast cancer  1 cm mass on mammogram  Palpated a lump  ER 0%, PA 0%, HER2/radha negative  Ki 67 was 85%. MRI breast with a 3.7 cm Left breast mass  [id number]  AJCC 8th ed-Stage IIA  Genetic testing negative  Recommended chemotherapy + Keytruda based on the KEYNOTE-522 trial (taxol, carboplatin and [place] followed by [facility] as preoperative treatment, followed by surgery, and then adjuvant pembrolizumab for another nine cycles (27 weeks) after surgery). Recent admission and discharge from [facility] for COVID-pneumonia, sepsis, CHRISTA, NSTEMI and anemia    Last chemo with taxol 12/21/22. Last Keytruda 12/14/22. CT chest 1/3/2003: Increased moderately large left pleural effusion. Unchanged patchy bilateral  consolidation, left greater than right, compatible with pneumonia.     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 Resumption of chemotherapy will be discussed with primary oncologist after discharge pending this hospitalization. Discussed with Dr. Antoni Izaguirre    Thank you for allowing us to participate in the care of Ms. Miguel Casas. We will continue to follow closely.     Signed By: Daniel Jolly NP

## 2023-01-04 NOTE — ED NOTES
Patient ambulated with assistance to bathroom for urine sample, voided ~450mL. Unable to send urine sample off as patient had a small bowel movement that made it into the commode hat for collection. Still needs urine sample.

## 2023-01-04 NOTE — PROGRESS NOTES
Name of procedure: Left Thoracentesis    Sedation medications given: No    Vital Signs: Stable    Fluids removed: 820 ml red tinged fluid removed    Samples sent to lab: Yes, cytology added    Any complications related to procedure: No    Post Procedure Care Needed/order sets placed in connect care. Bedside report given to Karla Lorenzana.

## 2023-01-04 NOTE — CONSULTS
[address] [facility]  [address], [address] [facility] [address] [place], [address] [facility]  [phone]      Hematology/ Oncology Consult Note      Reason for consult:     Dom Grider is a 57 y.o. female who we have been asked to see by Dr Gabbi Rogel for Pneumonitis. Subjective:     Dom Grider has a past medical hypothyroidism, GERD, DM , HTN, respiratory failure, and recent hospitalization from 12/28-1/1 for multifocal pneumonia secondary to MSSA pneumonia superimposed on COVID-19 pneumonia with course complicated by hemoptysis, sepsis, CHRISTA, and type II diabetes. She is received last chemotherapy for invasive ductal carcinoma  of left breast 12/21/2022. She is seen in the ED with complaints of not feeling very well,  malaise, fever/chills, cough productive of blood-tinged sputum, shortness of breath/dyspnea on exertion. Review of Systems:  Pertinent items are noted in the History of Present Illness.        Past Medical History:   Diagnosis Date    Cancer [facility])     BREAST CANCER    GERD (gastroesophageal reflux disease)     HTN (hypertension) 07/18/2011    Psychiatric disorder     ANIXETY AND DEPRESSION    Thyroid disease     HYPOTHYROID     Past Surgical History:   Procedure Laterality Date    HX COLONOSCOPY      HX MYOMECTOMY  05/02/1996    x 1    HX WISDOM TEETH EXTRACTION        Family History   Problem Relation Age of Onset    Heart Failure Mother     Cancer Father     Diabetes Sister     Kidney Disease Sister     Anesth Problems Neg Hx      Social History     Tobacco Use    Smoking status: Never     Passive exposure: Never    Smokeless tobacco: Never   Substance Use Topics    Alcohol use: Never      Current Facility-Administered Medications   Medication Dose Route Frequency Provider Last Rate Last Admin    carvediloL (COREG) tablet 6.25 mg  6.25 mg Oral BID WITH MEALS Deliah Section, DO   6.25 mg at 01/04/23 1058    gabapentin (NEURONTIN) capsule 100 mg  100 mg Oral TID Brittneydemi Butcherck, DO   100 mg at 01/04/23 1058    PARoxetine (PAXIL) tablet 20 mg  20 mg Oral DAILY Brittney Willis, DO   20 mg at 01/04/23 1103    rosuvastatin (CRESTOR) tablet 10 mg  10 mg Oral QHS Brittney Willis, DO        sodium chloride (NS) flush 5-40 mL  5-40 mL IntraVENous Q8H Brittney Willis, DO        sodium chloride (NS) flush 5-40 mL  5-40 mL IntraVENous PRN Brittney Willis, DO        acetaminophen (TYLENOL) tablet 650 mg  650 mg Oral Q6H PRN Brittney Willis, DO        Or    acetaminophen (TYLENOL) suppository 650 mg  650 mg Rectal Q6H PRN Brittney Willis, DO        polyethylene glycol (MIRALAX) packet 17 g  17 g Oral DAILY PRN Brittney Willis, DO        ondansetron (ZOFRAN ODT) tablet 4 mg  4 mg Oral Q8H PRN Brittney Willis, DO        Or    ondansetron [facility]) injection 4 mg  4 mg IntraVENous Q6H PRN Brittney Willis, DO        lidocaine 4 % patch 1 Patch  1 Patch TransDERmal Q24H Patrice Barrera NP        VANCOMYCIN INFORMATION NOTE   Other Rx Dosing/Monitoring Carolina Turner MD        cefepime (MAXIPIME) 1 g in 0.9% sodium chloride (MBP/ADV) 50 mL MBP  1 g IntraVENous Q12H Brittney Willis, DO 12.5 mL/hr at 01/04/23 1104 1 g at 01/04/23 1104     Current Outpatient Medications   Medication Sig Dispense Refill    carvediloL (COREG) 6.25 mg tablet Take 6.25 mg by mouth two (2) times daily (with meals). famotidine (PEPCID) 40 mg/5 mL (8 mg/mL) suspension Take 2.5 mL by mouth two (2) times a day. 50 mL 3    gabapentin (NEURONTIN) 100 mg capsule Take 1 Capsule by mouth three (3) times daily. Max Daily Amount: 300 mg. 90 Capsule 3    PARoxetine (PAXIL) 20 mg tablet Take 20 mg by mouth daily. rosuvastatin (CRESTOR) 10 mg tablet Take 10 mg by mouth nightly. dexAMETHasone (DECADRON) 6 mg tablet Take 1 Tablet by mouth daily for 6 days.  (Patient not taking: Reported on 1/4/2023) 6 Tablet 0 guaiFENesin ER (MUCINEX) 600 mg ER tablet Take 1 Tablet by mouth every twelve (12) hours for 10 days. 20 Tablet 0    OTHER,NON-FORMULARY, IV chemotherapy      dexAMETHasone (DECADRON) 4 mg tablet Take 2 tabs (8mg) once daily on days 2 & 3 of chemotherapy (Patient not taking: Reported on 1/4/2023) 30 Tablet 0    ondansetron (ZOFRAN ODT) 8 mg disintegrating tablet Take 1 Tablet by mouth every eight (8) hours as needed for Nausea or Vomiting. 30 Tablet 3    prochlorperazine (Compazine) 5 mg tablet Take 1 Tablet by mouth every six (6) hours as needed for Nausea or Vomiting. 30 Tablet 3    lidocaine-prilocaine (EMLA) topical cream Apply  to affected area as needed for Pain. Apply a thick layer over port a cath 30-60 minutes prior to port access 30 g 3    multivitamin, tx-iron-ca-min (THERA-M w/ IRON) 9 mg iron-400 mcg tab tablet Take 1 Tablet by mouth daily. Allergies   Allergen Reactions    Sulfa (Sulfonamide Antibiotics) Itching, Swelling and Unknown (comments)          Objective:     Patient Vitals for the past 8 hrs:   BP Temp Pulse Resp SpO2   01/04/23 0940 (!) 156/67 98.4 °F (36.9 °C) 80 20 96 %   01/04/23 0625 (!) 156/70 98.8 °F (37.1 °C) 92 26 93 %       Lab Results   Component Value Date/Time    WBC 13.3 (H) 01/03/2023 03:57 PM    HGB 9.1 (L) 01/03/2023 03:57 PM    HCT 27.5 (L) 01/03/2023 03:57 PM    PLATELET 806 48/25/3609 03:57 PM    MCV 91.4 01/03/2023 03:57 PM       Physical Exam:   General appearance: alert, cooperative, no distress, appears stated age  Extremities: extremities normal, atraumatic, no cyanosis or edema  Skin: Skin color, texture, turgor normal. No rashes or lesions  Neurologic: Alert and oriented X 3,  Normal symmetric reflexes. Answers appropriately.   Assessment:     Staphylococcus aureus pneumonia 1/4/2023)    1/4/2023 CXR demonstrates increased left pleural effusion and left lower lobe airspace opacity and CT chest demonstrates increased moderately large left pleural effusion with unchanged patchy bilateral consolidation left greater than right compatible with pneumonia. -Rapid and flu negative   -Pneumonitis less likely  -Suspect carcinoma, thoracentesis for fluid and cytology    Invasive ductal carcinoma  of left breast     CKD 4 suspected nephritis with increased creatinine    Trending High sensitive Troponin          Plan:   No role for inpatient systemic chemotherapy  Thoracentesis send fluid to cytology to assess for malignant pleural effusion  Trend renal function may consider steroids if renal function worsens    Appreciate opportunity to participate in the care of Rick Hartmann, will continue to monitor for recommendations.     Lena Wilkerson

## 2023-01-04 NOTE — PROGRESS NOTES
Pt with MEWS of 7. Pt tachypneic labored breathing in the 30-40's. Sinus tachycardia in the 130's. Temp of 102.5, pt desat to 88% on room air. Placed on 2L NC. MD notifed. PRN tylenol administered and coreg. MD Angel Scale to come see pt. Lactic acid ordered. Drawn and sent. Tempt improved. BP trending down. But HR better in the 90's. MD notified. LR bolus ordered. 1930: Bedside and Verbal shift change report given to Emmanuel Gilmore RN (oncoming nurse) by Alfonso Pino (offgoing nurse). Report given with SBAR, Kardex, Intake/Output, MAR and Recent Results.

## 2023-01-04 NOTE — CONSULTS
Cancer [place] at [address], [place], [address] [facility]  W: [phone] F: [phone]    Consult acknowledged by [facility], full note to follow.

## 2023-01-04 NOTE — ED NOTES
Patient requested a lidocaine patch for lower back however when applying patch patient did not want it anymore and stated she felt cold.  Lidocaine patch removed

## 2023-01-04 NOTE — PROGRESS NOTES
1419:    Called patient to assess symptoms from recent visit to the ER   No answer   LVM to call office back

## 2023-01-04 NOTE — PROGRESS NOTES
Hospitalist Progress Note    NAME: Stocktonfarida Jensen   :  1960   MRN:  [id number]            Subjective:     Chief Complaint / Reason for Physician Visit  Discussed with RN events overnight. Pt has no new complaints. Understands current clinical plan. Willing to see all specialists. Review of Systems:  Symptom Y/N Comments  Symptom Y/N Comments   Fever/Chills n   Chest Pain n    Poor Appetite n   Edema n    Cough n   Abdominal Pain n    Sputum n   Joint Pain n    SOB/MCCALL n   Pruritis/Rash n    Nausea/vomit n   Tolerating PT/OT na    Diarrhea n   Tolerating Diet y    Constipation n   Other       Could NOT obtain due to:      Objective:     VITALS:   Last 24hrs VS reviewed since prior progress note. Most recent are:  Patient Vitals for the past 24 hrs:   Temp Pulse Resp BP SpO2   23 0625 98.8 °F (37.1 °C) 92 26 (!) 156/70 93 %   23 0237 -- 81 22 (!) 150/62 97 %   23 0039 98.5 °F (36.9 °C) 94 20 (!) 132/52 95 %   23 2316 -- 96 22 (!) 170/71 95 %   23 2232 (!) 100.8 °F (38.2 °C) -- -- -- --   23 99.5 °F (37.5 °C) (!) 102 -- (!) 145/82 96 %   23 1543 98.4 °F (36.9 °C) 86 18 (!) 166/77 98 %       Intake/Output Summary (Last 24 hours) at 2023 0858  Last data filed at 2023 0419  Gross per 24 hour   Intake 2015 ml   Output 450 ml   Net 1565 ml        PHYSICAL EXAM:  General: WD, WN. Alert, cooperative, no acute distress    EENT:  EOMI. Anicteric sclerae. MMM  Resp:  CTA bilaterally, no wheezing or rales. No accessory muscle use  CV:  Regular  rhythm,  No edema  GI:  Soft, Non distended, Non tender. +Bowel sounds  Neurologic:  Alert and oriented X 3, normal speech,   Psych:   Good insight. Not anxious nor agitated  Skin:  No rashes. No jaundice    Procedures: see electronic medical records for all procedures/Xrays and details which were not copied into this note but were reviewed prior to creation of Plan.       LABS:  I reviewed today's most current labs and imaging studies. Pertinent labs include:  Recent Labs     01/03/23  1557   WBC 13.3*   HGB 9.1*   HCT 27.5*        Recent Labs     01/04/23  0413 01/03/23  1557    142   K 3.4* 3.4*   * 108   CO2 24 26    113*   BUN 32* 38*   CREA 2.67* 3.00*   CA 8.6 9.1   ALB  --  2.3*   TBILI  --  0.4   ALT  --  94*       Signed: Sigifredo Steinberg MD        Reviewed most current lab test results and cultures  YES  Reviewed most current radiology test results   YES  Review and summation of old records today    NO  Reviewed patient's current orders and MAR    YES  PMH/SH reviewed - no change compared to H&P      Assessment / Plan:  Multifocal pneumonia-prior culture positive for pansensitive MSSA  Plan  Continue cefepime and vancomycin  -Pharmacy consulted for vancomycin dosing  Suspect this represents persistent MSSA pneumonia now with parapneumonic effusion  Will consult IR for diagnostic and therapeutic thoracentesis-with cytology and labs  Pulmonary toilet: Flutter valve, incentive spirometry, cough/deep breathe  Pulmonology consulted, greatly appreciate their expertise  -Previously with concerns for underlying ILD versus Keytruda pneumonitis  Trend BNP and procal  Consider adding diuresis?      Reported hemoptysis, recurrent from prior hospitalization  Normocytic anemia, improved from prior  No witnessed episodes of hemoptysis thus far  Will trend hemoglobin and monitor for output  Hold DVT chemoprophylaxis     Elevated troponin-likely downtrending from prior hospitalization  Continue to monitor  Will need outpatient stress test per cardiology recommendations at prior hospitalization     CKD 4 at baseline  Trend renal function  Avoid nephrotoxic medications and renally dose medications as necessary    Invasive ductal breast cancer-patient reports she is no longer taking chemotherapy  Unclear if patient has discussed chemotherapy cessation with her oncologist  There was some concern that [facility] was leading to pneumonitis  Oncology consulted, expertise appreciated  Patient reports she has not been taking dexamethasone    25.0 - 29.9 Overweight / There is no height or weight on file to calculate BMI. Code status: Full  Prophylaxis: SCD's  Recommended Disposition:  Pending work up  ALEXANDRA: >48 hours  Barriers: Clinical improvement,      ________________________________________________________________________  Care Plan discussed with:    Comments   Patient x    Family  x Attempted to call sister Mariaelena Seymour [phone]   RN x    Care Manager x    Consultant                       x Multidiciplinary team rounds were held today with , nursing, pharmacist and clinical coordinator. Patient's plan of care was discussed; medications were reviewed and discharge planning was addressed.      ________________________________________________________________________  Total NON critical care TIME:  Nonbillable note   Minutes    Total CRITICAL CARE TIME Spent:   Minutes non procedure based      Comments   >50% of visit spent in counseling and coordination of care     ________________________________________________________________________  Yash Murillo MD

## 2023-01-05 ENCOUNTER — APPOINTMENT (OUTPATIENT)
Dept: GENERAL RADIOLOGY | Age: 63
DRG: 208 | End: 2023-01-05
Attending: STUDENT IN AN ORGANIZED HEALTH CARE EDUCATION/TRAINING PROGRAM
Payer: COMMERCIAL

## 2023-01-05 ENCOUNTER — HOSPITAL ENCOUNTER (INPATIENT)
Dept: INTERVENTIONAL RADIOLOGY/VASCULAR | Age: 63
Discharge: HOME | DRG: 208 | End: 2023-01-05
Attending: STUDENT IN AN ORGANIZED HEALTH CARE EDUCATION/TRAINING PROGRAM
Payer: COMMERCIAL

## 2023-01-05 ENCOUNTER — APPOINTMENT (OUTPATIENT)
Dept: CT IMAGING | Age: 63
DRG: 208 | End: 2023-01-05
Attending: INTERNAL MEDICINE
Payer: COMMERCIAL

## 2023-01-05 VITALS
SYSTOLIC BLOOD PRESSURE: 147 MMHG | OXYGEN SATURATION: 100 % | RESPIRATION RATE: 35 BRPM | DIASTOLIC BLOOD PRESSURE: 88 MMHG | HEART RATE: 75 BPM

## 2023-01-05 PROBLEM — R06.02 SHORTNESS OF BREATH: Status: ACTIVE | Noted: 2023-01-05

## 2023-01-05 PROBLEM — J94.2 HEMOTHORAX ON LEFT: Status: ACTIVE | Noted: 2023-01-05

## 2023-01-05 PROBLEM — Z51.5 PALLIATIVE CARE BY SPECIALIST: Status: ACTIVE | Noted: 2023-01-05

## 2023-01-05 PROBLEM — D62 ACUTE BLOOD LOSS ANEMIA: Status: ACTIVE | Noted: 2023-01-05

## 2023-01-05 PROBLEM — F41.8 ANXIETY ABOUT HEALTH: Status: ACTIVE | Noted: 2023-01-05

## 2023-01-05 PROBLEM — R45.89 ANXIETY ABOUT HEALTH: Status: ACTIVE | Noted: 2023-01-05

## 2023-01-05 LAB
ANION GAP SERPL CALC-SCNC: 10 MMOL/L (ref 5–15)
BASOPHILS # BLD: 0 K/UL (ref 0–0.1)
BASOPHILS NFR BLD: 0 % (ref 0–1)
BNP SERPL-MCNC: 3723 PG/ML
BUN SERPL-MCNC: 39 MG/DL (ref 6–20)
BUN/CREAT SERPL: 11 (ref 12–20)
CALCIUM SERPL-MCNC: 7.9 MG/DL (ref 8.5–10.1)
CHLORIDE SERPL-SCNC: 111 MMOL/L (ref 97–108)
CO2 SERPL-SCNC: 22 MMOL/L (ref 21–32)
CREAT SERPL-MCNC: 3.41 MG/DL (ref 0.55–1.02)
DIFFERENTIAL METHOD BLD: ABNORMAL
EOSINOPHIL # BLD: 0 K/UL (ref 0–0.4)
EOSINOPHIL NFR BLD: 0 % (ref 0–7)
ERYTHROCYTE [DISTWIDTH] IN BLOOD BY AUTOMATED COUNT: 18.5 % (ref 11.5–14.5)
GLUCOSE SERPL-MCNC: 104 MG/DL (ref 65–100)
HCT VFR BLD AUTO: 17.6 % (ref 35–47)
HGB BLD-MCNC: 5.1 G/DL (ref 11.5–16)
HGB BLD-MCNC: 5.6 G/DL (ref 11.5–16)
HGB BLD-MCNC: 6.5 G/DL (ref 11.5–16)
HISTORY CHECKED?,CKHIST: NORMAL
IMM GRANULOCYTES # BLD AUTO: 0.3 K/UL (ref 0–0.04)
IMM GRANULOCYTES NFR BLD AUTO: 3 % (ref 0–0.5)
LYMPHOCYTES # BLD: 1 K/UL (ref 0.8–3.5)
LYMPHOCYTES NFR BLD: 9 % (ref 12–49)
MCH RBC QN AUTO: 30.1 PG (ref 26–34)
MCHC RBC AUTO-ENTMCNC: 31.8 G/DL (ref 30–36.5)
MCV RBC AUTO: 94.6 FL (ref 80–99)
MONOCYTES # BLD: 0.8 K/UL (ref 0–1)
MONOCYTES NFR BLD: 8 % (ref 5–13)
NEUTS SEG # BLD: 8.5 K/UL (ref 1.8–8)
NEUTS SEG NFR BLD: 80 % (ref 32–75)
NRBC # BLD: 0 K/UL (ref 0–0.01)
NRBC BLD-RTO: 0 PER 100 WBC
PLATELET # BLD AUTO: 163 K/UL (ref 150–400)
PMV BLD AUTO: 9.8 FL (ref 8.9–12.9)
POTASSIUM SERPL-SCNC: 4.3 MMOL/L (ref 3.5–5.1)
PROCALCITONIN SERPL-MCNC: 5.28 NG/ML
RBC # BLD AUTO: 1.86 M/UL (ref 3.8–5.2)
RBC MORPH BLD: ABNORMAL
SODIUM SERPL-SCNC: 143 MMOL/L (ref 136–145)
VANCOMYCIN SERPL-MCNC: 14.9 UG/ML
WBC # BLD AUTO: 10.6 K/UL (ref 3.6–11)

## 2023-01-05 PROCEDURE — 85018 HEMOGLOBIN: CPT

## 2023-01-05 PROCEDURE — 74011250636 HC RX REV CODE- 250/636: Performed by: PHYSICIAN ASSISTANT

## 2023-01-05 PROCEDURE — 86900 BLOOD TYPING SEROLOGIC ABO: CPT

## 2023-01-05 PROCEDURE — 36430 TRANSFUSION BLD/BLD COMPNT: CPT

## 2023-01-05 PROCEDURE — 77030012390 HC DRN CHST BTL GTNG -B

## 2023-01-05 PROCEDURE — 86923 COMPATIBILITY TEST ELECTRIC: CPT

## 2023-01-05 PROCEDURE — 36415 COLL VENOUS BLD VENIPUNCTURE: CPT

## 2023-01-05 PROCEDURE — C1729 CATH, DRAINAGE: HCPCS

## 2023-01-05 PROCEDURE — 2709999900 HC NON-CHARGEABLE SUPPLY

## 2023-01-05 PROCEDURE — 74011250637 HC RX REV CODE- 250/637: Performed by: PHYSICIAN ASSISTANT

## 2023-01-05 PROCEDURE — C1769 GUIDE WIRE: HCPCS

## 2023-01-05 PROCEDURE — 30233N1 TRANSFUSION OF NONAUTOLOGOUS RED BLOOD CELLS INTO PERIPHERAL VEIN, PERCUTANEOUS APPROACH: ICD-10-PCS | Performed by: INTERNAL MEDICINE

## 2023-01-05 PROCEDURE — 32557 INSERT CATH PLEURA W/ IMAGE: CPT

## 2023-01-05 PROCEDURE — 80048 BASIC METABOLIC PNL TOTAL CA: CPT

## 2023-01-05 PROCEDURE — 74011000250 HC RX REV CODE- 250: Performed by: STUDENT IN AN ORGANIZED HEALTH CARE EDUCATION/TRAINING PROGRAM

## 2023-01-05 PROCEDURE — P9016 RBC LEUKOCYTES REDUCED: HCPCS

## 2023-01-05 PROCEDURE — 71250 CT THORAX DX C-: CPT

## 2023-01-05 PROCEDURE — 74011000258 HC RX REV CODE- 258: Performed by: STUDENT IN AN ORGANIZED HEALTH CARE EDUCATION/TRAINING PROGRAM

## 2023-01-05 PROCEDURE — 71045 X-RAY EXAM CHEST 1 VIEW: CPT

## 2023-01-05 PROCEDURE — 74011250637 HC RX REV CODE- 250/637: Performed by: STUDENT IN AN ORGANIZED HEALTH CARE EDUCATION/TRAINING PROGRAM

## 2023-01-05 PROCEDURE — 65270000046 HC RM TELEMETRY

## 2023-01-05 PROCEDURE — 77030011230 HC DIL VESL COON COOK -B

## 2023-01-05 PROCEDURE — 74011250636 HC RX REV CODE- 250/636: Performed by: STUDENT IN AN ORGANIZED HEALTH CARE EDUCATION/TRAINING PROGRAM

## 2023-01-05 PROCEDURE — 0W9B30Z DRAINAGE OF LEFT PLEURAL CAVITY WITH DRAINAGE DEVICE, PERCUTANEOUS APPROACH: ICD-10-PCS | Performed by: STUDENT IN AN ORGANIZED HEALTH CARE EDUCATION/TRAINING PROGRAM

## 2023-01-05 PROCEDURE — 99222 1ST HOSP IP/OBS MODERATE 55: CPT | Performed by: NURSE PRACTITIONER

## 2023-01-05 PROCEDURE — 85025 COMPLETE CBC W/AUTO DIFF WBC: CPT

## 2023-01-05 PROCEDURE — 84145 PROCALCITONIN (PCT): CPT

## 2023-01-05 PROCEDURE — 74011000250 HC RX REV CODE- 250: Performed by: NURSE PRACTITIONER

## 2023-01-05 PROCEDURE — 80202 ASSAY OF VANCOMYCIN: CPT

## 2023-01-05 PROCEDURE — C1892 INTRO/SHEATH,FIXED,PEEL-AWAY: HCPCS

## 2023-01-05 PROCEDURE — 77010033678 HC OXYGEN DAILY

## 2023-01-05 PROCEDURE — 83880 ASSAY OF NATRIURETIC PEPTIDE: CPT

## 2023-01-05 RX ORDER — OXYCODONE AND ACETAMINOPHEN 5; 325 MG/1; MG/1
1 TABLET ORAL
Status: DISCONTINUED | OUTPATIENT
Start: 2023-01-05 | End: 2023-01-06 | Stop reason: HOSPADM

## 2023-01-05 RX ORDER — SODIUM CHLORIDE 9 MG/ML
250 INJECTION, SOLUTION INTRAVENOUS AS NEEDED
Status: DISCONTINUED | OUTPATIENT
Start: 2023-01-05 | End: 2023-01-14 | Stop reason: SDUPTHER

## 2023-01-05 RX ORDER — OXYCODONE HYDROCHLORIDE 5 MG/1
5 TABLET ORAL
Status: DISCONTINUED | OUTPATIENT
Start: 2023-01-05 | End: 2023-01-09

## 2023-01-05 RX ORDER — LIDOCAINE 40 MG/G
CREAM TOPICAL AS NEEDED
Status: DISCONTINUED | OUTPATIENT
Start: 2023-01-05 | End: 2023-01-24 | Stop reason: HOSPADM

## 2023-01-05 RX ORDER — LIDOCAINE HYDROCHLORIDE 20 MG/ML
18 INJECTION, SOLUTION INFILTRATION; PERINEURAL ONCE
Status: COMPLETED | OUTPATIENT
Start: 2023-01-05 | End: 2023-01-05

## 2023-01-05 RX ORDER — HEPARIN SODIUM 200 [USP'U]/100ML
400 INJECTION, SOLUTION INTRAVENOUS ONCE
Status: COMPLETED | OUTPATIENT
Start: 2023-01-05 | End: 2023-01-05

## 2023-01-05 RX ORDER — LIDOCAINE 40 MG/G
CREAM TOPICAL
Status: COMPLETED | OUTPATIENT
Start: 2023-01-05 | End: 2023-01-05

## 2023-01-05 RX ADMIN — SODIUM CHLORIDE, POTASSIUM CHLORIDE, SODIUM LACTATE AND CALCIUM CHLORIDE 100 ML/HR: 600; 310; 30; 20 INJECTION, SOLUTION INTRAVENOUS at 00:28

## 2023-01-05 RX ADMIN — CARVEDILOL 6.25 MG: 6.25 TABLET, FILM COATED ORAL at 17:00

## 2023-01-05 RX ADMIN — ROSUVASTATIN CALCIUM 10 MG: 10 TABLET, COATED ORAL at 00:33

## 2023-01-05 RX ADMIN — ROSUVASTATIN CALCIUM 10 MG: 10 TABLET, COATED ORAL at 22:32

## 2023-01-05 RX ADMIN — GABAPENTIN 100 MG: 100 CAPSULE ORAL at 11:49

## 2023-01-05 RX ADMIN — GABAPENTIN 100 MG: 100 CAPSULE ORAL at 17:36

## 2023-01-05 RX ADMIN — ACETAMINOPHEN 650 MG: 325 TABLET ORAL at 17:36

## 2023-01-05 RX ADMIN — CEFEPIME 1 G: 1 INJECTION, POWDER, FOR SOLUTION INTRAMUSCULAR; INTRAVENOUS at 00:27

## 2023-01-05 RX ADMIN — GABAPENTIN 100 MG: 100 CAPSULE ORAL at 00:27

## 2023-01-05 RX ADMIN — CEFEPIME 1 G: 1 INJECTION, POWDER, FOR SOLUTION INTRAMUSCULAR; INTRAVENOUS at 14:13

## 2023-01-05 RX ADMIN — SODIUM CHLORIDE, PRESERVATIVE FREE 10 ML: 5 INJECTION INTRAVENOUS at 00:28

## 2023-01-05 RX ADMIN — SODIUM CHLORIDE, PRESERVATIVE FREE 10 ML: 5 INJECTION INTRAVENOUS at 22:32

## 2023-01-05 RX ADMIN — SODIUM CHLORIDE, POTASSIUM CHLORIDE, SODIUM LACTATE AND CALCIUM CHLORIDE 100 ML/HR: 600; 310; 30; 20 INJECTION, SOLUTION INTRAVENOUS at 17:47

## 2023-01-05 RX ADMIN — PAROXETINE 20 MG: 20 TABLET, FILM COATED ORAL at 11:48

## 2023-01-05 RX ADMIN — HEPARIN SODIUM 200 UNITS: 200 INJECTION, SOLUTION INTRAVENOUS at 16:00

## 2023-01-05 RX ADMIN — OXYCODONE 5 MG: 5 TABLET ORAL at 22:32

## 2023-01-05 RX ADMIN — GABAPENTIN 100 MG: 100 CAPSULE ORAL at 22:32

## 2023-01-05 RX ADMIN — LIDOCAINE: 40 CREAM TOPICAL at 10:00

## 2023-01-05 RX ADMIN — LIDOCAINE HYDROCHLORIDE 360 MG: 20 INJECTION, SOLUTION INFILTRATION; PERINEURAL at 16:00

## 2023-01-05 RX ADMIN — CEFEPIME 1 G: 1 INJECTION, POWDER, FOR SOLUTION INTRAMUSCULAR; INTRAVENOUS at 22:32

## 2023-01-05 RX ADMIN — CARVEDILOL 6.25 MG: 6.25 TABLET, FILM COATED ORAL at 11:48

## 2023-01-05 NOTE — PROGRESS NOTES
INTERVAL:  27-year-old woman admitted yesterday for MSSA pneumonia with parapneumonic effusion who underwent thoracentesis today with 820 cc of blood-tinged pleural fluid drained. Did well overnight but morning labs resulted with acute hemoglobin drop to 5.6 from 9.1 yesterday without apparent etiology for blood loss. This value was verified from 2 separate labs. Went to discussed with patient who appears very anxious and overwhelmed and is essentially shutting down. She reports she is sick of being stuck with needles, but is not agreeable to allow us to access her infusion port or to order a PICC line to minimize needlesticks and ensure adequate venous access for labs and medication administration. She also refuses any further medications or blood products until she speaks directly with her oncologist (she has specified that she will not accept surrogate/partner from oncology practice and will only speak to her oncologist). She is agreeable to repeat chest x-ray to evaluate if effusion has enlarged which would likely represent intrathoracic bleeding. Patient is lucid and has capacity make decisions and was able to verbalize understanding of consequences of not pursuing further therapeutics at this time including debility and death.

## 2023-01-05 NOTE — PROGRESS NOTES
Hospitalist Progress Note    NAME: Ernie Gil   :  1960   MRN:  [id number]            Subjective:     Chief Complaint / Reason for Physician Visit  Discussed with RN events overnight. Pt wishes to speak to her oncologist about her port access. She also felt increasingly SOB overnight and Hb returned at 5.6. pt declined transfusion until port access approved of by oncology. Now agrees to transfusion and chest tube. Review of Systems:  Symptom Y/N Comments  Symptom Y/N Comments   Fever/Chills n   Chest Pain n    Poor Appetite n   Edema n    Cough n   Abdominal Pain n    Sputum n   Joint Pain n    SOB/MCCALL n   Pruritis/Rash n    Nausea/vomit n   Tolerating PT/OT na    Diarrhea n   Tolerating Diet y    Constipation n   Other       Could NOT obtain due to:      Objective:     VITALS:   Last 24hrs VS reviewed since prior progress note. Most recent are:  Patient Vitals for the past 24 hrs:   Temp Pulse Resp BP SpO2   23 0800 -- 92 -- -- --   23 0730 99 °F (37.2 °C) 93 (!) 32 (!) 122/49 --   23 0026 98.6 °F (37 °C) 73 24 108/82 100 %   23 2200 -- 80 24 (!) 96/59 100 %   23 2130 -- 76 24 104/63 100 %   23 -- 88 27 94/67 100 %   23 98.6 °F (37 °C) 84 24 (!) 88/60 100 %   23 1856 -- 95 29 (!) 87/62 99 %   23 1852 -- 95 25 (!) 88/58 --   23 1835 -- 95 (!) 50 93/63 99 %   23 1822 98.8 °F (37.1 °C) (!) 108 (!) 40 113/72 96 %   23 1645 -- (!) 111 (!) 35 (!) 149/83 --   23 1625 (!) 102.5 °F (39.2 °C) (!) 138 29 128/84 96 %   23 1340 -- 95 22 (!) 151/53 95 %       No intake or output data in the 24 hours ending 23 1336       PHYSICAL EXAM:  General: WD, WN. Alert, cooperative, no acute distress    EENT:  EOMI. Anicteric sclerae. MMM  Resp:  CTA bilaterally, no wheezing or rales. No accessory muscle use  CV:  Regular  rhythm,  No edema  GI:  Soft, Non distended, Non tender.   +Bowel sounds  Neurologic:  Alert and oriented X 3, normal speech,   Psych:   Good insight. Not anxious nor agitated  Skin:  No rashes. No jaundice    Procedures: see electronic medical records for all procedures/Xrays and details which were not copied into this note but were reviewed prior to creation of Plan. LABS:  I reviewed today's most current labs and imaging studies. Pertinent labs include:  Recent Labs     01/05/23  0456 01/03/23  1557   WBC 10.6 13.3*   HGB 5.6* 9.1*   HCT 17.6* 27.5*    308       Recent Labs     01/05/23  0423 01/04/23  0413 01/03/23  1557    143 142   K 4.3 3.4* 3.4*   * 111* 108   CO2 22 24 26   * 100 113*   BUN 39* 32* 38*   CREA 3.41* 2.67* 3.00*   CA 7.9* 8.6 9.1   ALB  --   --  2.3*   TBILI  --   --  0.4   ALT  --   --  80*         Signed: Jose Luis Lundberg MD        Reviewed most current lab test results and cultures  YES  Reviewed most current radiology test results   YES  Review and summation of old records today    NO  Reviewed patient's current orders and MAR    YES  PMH/SH reviewed - no change compared to H&P      Assessment / Plan:  Multifocal pneumonia-prior culture positive for pansensitive MSSA  Plan  Continue cefepime and vancomycin  -S/p IR thoracentesis 1/4/23: bloody tap  -1/5/23 CT chest with left sided hemothorax  -Hb drop from 9.1 to 5.6 and hemothorax noted 1/5/23, overall stable, IR planning for pigtail placement possibly today  -Pharmacy consulted for vancomycin dosing  Suspect this represents persistent MSSA pneumonia now with parapneumonic effusion  Pulmonology consulted, greatly appreciate their expertise  -Previously with concerns for underlying ILD versus Keytruda pneumonitis  Trend BNP and procal  -Hb q 8, if drops again consider stat CTA to see if embolization is possible with IR.  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  Possibly multifactorial, may be a bit of prerenal as well  Trend renal function  Avoid nephrotoxic medications and renally dose medications as necessary  May need to involve nephro if Cr continues to uptrend or pt needs contrast    Invasive ductal breast cancer-patient reports she is no longer taking chemotherapy  Unclear if patient has discussed chemotherapy cessation with her oncologist  There was some concern that Florin Nassar was leading to pneumonitis  Oncology consulted, expertise appreciated  Patient reports she has not been taking dexamethasone    25.0 - 29.9 Overweight / There is no height or weight on file to calculate BMI. Code status: Full  Prophylaxis: SCD's  Recommended Disposition:  Pending work up  ALEXANDRA: >48 hours  Barriers: Clinical improvement, specialty clearance     ________________________________________________________________________  Care Plan discussed with:    Comments   Patient x    Family  x Updated sister Macario Yuen [phone]   RN x    Care Manager x    Consultant                       x Multidiciplinary team rounds were held today with , nursing, pharmacist and clinical coordinator. Patient's plan of care was discussed; medications were reviewed and discharge planning was addressed.      ________________________________________________________________________  Total NON critical care TIME:  40  Minutes    Total CRITICAL CARE TIME Spent:   Minutes non procedure based      Comments   >50% of visit spent in counseling and coordination of care     ________________________________________________________________________  Apolonia Hughes MD

## 2023-01-05 NOTE — PROGRESS NOTES
[facility] [place] at [address] [place] [address] [address] [facility]  W: [phone] F: [phone]  Reason for Visit:   Elizabeth Wilson is a 57 y.o. female with left breast cancer-ER 0%, WY 0%, HER2/radha negative. Treatment History:   10/1/22: Carboplatin taxol and keytruda followed by ddAC and Keytruda     History of Present Illness:   Mrs. Michael Lozano is a patient of Dr. Buddy Gibson at [facility].  She is followed for treatment of left breast cancer. She was recently admitted to [facility] for sepsis r/t COVID-pneumonia, CHRISTA, NSTEMI, hemoptysis and anemia. Pertinent treatment history as follows: She had a screening mammogram on 7/20/2022 which showed a 1 cm irregular mass in the left breast at 3 o'clock position. Biopsy on 8/2/2022 showed invasive ductal carcinoma, grade 2-3, ER 0%, WY 0%, HER2/radha negative. Ki-67 85%. Declined genetic testing. Recent Keytruda : last dose 12/14/2022  Last Chemotherapy: 12/21/2022     She had a screening mammogram on 7/20/2022 which showed a 1 cm irregular mass in the left breast at 3 o'clock position. Biopsy on 8/2/2022 showed invasive ductal carcinoma, grade 2-3, ER 0%, WY 0%, HER2/radha negative. Ki-67 85%. Declined genetic testing. Patient has now returned to the ED with complaints of fever and shortness of breath. She was found to have a large left pleural effusion and CHRISTA. Patient seen at bedside in hallway bed in ED. Discussed plan for thoracentesis today. Will discuss further plan of care once she has been placed in her room given no privacy in hallway. Interval History:     1/5/2023: Patient seen at bedside in ED. She is acutely ill-appearing and conversationally short of breath. Reviewed chest x-ray results at bedside with patient. At that time CT was still pending. She did give staff permission to access her port and agreed to transfusion of blood.   We did discuss that likely fluid accumulation can be attributed to malignant pleural effusion and this would mean cancer has moved into her lung. She verbalized understanding and agreement.       Past Medical History:   Diagnosis Date    Cancer ([address]     BREAST CANCER    GERD (gastroesophageal reflux disease)     HTN (hypertension) 07/18/2011    Psychiatric disorder     ANIXETY AND DEPRESSION    Thyroid disease     HYPOTHYROID      Past Surgical History:   Procedure Laterality Date    HX COLONOSCOPY      HX MYOMECTOMY  05/02/1996    x 1    HX WISDOM TEETH EXTRACTION        Social History     Tobacco Use    Smoking status: Never     Passive exposure: Never    Smokeless tobacco: Never   Substance Use Topics    Alcohol use: Never      Family History   Problem Relation Age of Onset    Heart Failure Mother     Cancer Father     Diabetes Sister     Kidney Disease Sister     Anesth Problems Neg Hx      Current Facility-Administered Medications   Medication Dose Route Frequency    0.9% sodium chloride infusion 250 mL  250 mL IntraVENous PRN    lidocaine (XYLOCAINE) 4 % cream   Topical PRN    vancomycin (VANCOCIN) 750 mg in 0.9% sodium chloride 250 mL ([id number])  750 mg IntraVENous ONCE    [START ON 1/6/2023] Vancomycin random level: 1/6 with am labs   Other ONCE    carvediloL (COREG) tablet 6.25 mg  6.25 mg Oral BID WITH MEALS    gabapentin (NEURONTIN) capsule 100 mg  100 mg Oral TID    PARoxetine (PAXIL) tablet 20 mg  20 mg Oral DAILY    rosuvastatin (CRESTOR) tablet 10 mg  10 mg Oral QHS    sodium chloride (NS) flush 5-40 mL  5-40 mL IntraVENous Q8H    sodium chloride (NS) flush 5-40 mL  5-40 mL IntraVENous PRN    acetaminophen (TYLENOL) tablet 650 mg  650 mg Oral Q6H PRN    Or    acetaminophen (TYLENOL) suppository 650 mg  650 mg Rectal Q6H PRN    polyethylene glycol (MIRALAX) packet 17 g  17 g Oral DAILY PRN    ondansetron (ZOFRAN ODT) tablet 4 mg  4 mg Oral Q8H PRN    Or    ondansetron (ZOFRAN) injection 4 mg  4 mg IntraVENous Q6H PRN    lidocaine 4 % patch 1 Patch  1 Patch TransDERmal Q24H    lactated Ringers infusion  100 mL/hr IntraVENous CONTINUOUS    VANCOMYCIN INFORMATION NOTE   Other Rx Dosing/Monitoring    cefepime (MAXIPIME) 1 g in 0.9% sodium chloride (MBP/ADV) 50 mL MBP  1 g IntraVENous Q12H     Current Outpatient Medications   Medication Sig    carvediloL (COREG) 6.25 mg tablet Take 6.25 mg by mouth two (2) times daily (with meals). famotidine (PEPCID) 40 mg/5 mL (8 mg/mL) suspension Take 2.5 mL by mouth two (2) times a day.    gabapentin (NEURONTIN) 100 mg capsule Take 1 Capsule by mouth three (3) times daily. Max Daily Amount: 300 mg. PARoxetine (PAXIL) 20 mg tablet Take 20 mg by mouth daily. rosuvastatin (CRESTOR) 10 mg tablet Take 10 mg by mouth nightly. dexAMETHasone (DECADRON) 6 mg tablet Take 1 Tablet by mouth daily for 6 days. (Patient not taking: Reported on 1/4/2023)    guaiFENesin ER (MUCINEX) 600 mg ER tablet Take 1 Tablet by mouth every twelve (12) hours for 10 days. OTHER,NON-FORMULARY, IV chemotherapy    dexAMETHasone (DECADRON) 4 mg tablet Take 2 tabs (8mg) once daily on days 2 & 3 of chemotherapy (Patient not taking: Reported on 1/4/2023)    ondansetron (ZOFRAN ODT) 8 mg disintegrating tablet Take 1 Tablet by mouth every eight (8) hours as needed for Nausea or Vomiting. prochlorperazine (Compazine) 5 mg tablet Take 1 Tablet by mouth every six (6) hours as needed for Nausea or Vomiting.    lidocaine-prilocaine (EMLA) topical cream Apply  to affected area as needed for Pain. Apply a thick layer over port a cath 30-60 minutes prior to port access    multivitamin, tx-iron-ca-min (THERA-M w/ IRON) 9 mg iron-400 mcg tab tablet Take 1 Tablet by mouth daily.       Allergies   Allergen Reactions    Sulfa (Sulfonamide Antibiotics) Itching, Swelling and Unknown (comments)        Review of Systems: not obtained    Physical Exam:   Visit Vitals  BP (!) 122/49   Pulse 92   Temp 99 °F (37.2 °C)   Resp (!) 32   SpO2 100% Results:     Lab Results   Component Value Date/Time    WBC 10.6 01/05/2023 04:56 AM    HGB 5.6 (LL) 01/05/2023 04:56 AM    HCT 17.6 (LL) 01/05/2023 04:56 AM    PLATELET 222 85/71/6346 04:56 AM    MCV 94.6 01/05/2023 04:56 AM    ABS. NEUTROPHILS 8.5 (H) 01/05/2023 04:56 AM     Lab Results   Component Value Date/Time    Sodium 143 01/05/2023 04:23 AM    Potassium 4.3 01/05/2023 04:23 AM    Chloride 111 (H) 01/05/2023 04:23 AM    CO2 22 01/05/2023 04:23 AM    Glucose 104 (H) 01/05/2023 04:23 AM    BUN 39 (H) 01/05/2023 04:23 AM    Creatinine 3.41 (H) 01/05/2023 04:23 AM    GFR est AA >60 09/26/2022 03:26 PM    GFR est non-AA >60 09/26/2022 03:26 PM    Calcium 7.9 (L) 01/05/2023 04:23 AM    Glucose (POC) 103 12/27/2022 09:30 PM     Lab Results   Component Value Date/Time    Bilirubin, total 0.4 01/03/2023 03:57 PM    ALT (SGPT) 94 (H) 01/03/2023 03:57 PM    Alk. phosphatase 111 01/03/2023 03:57 PM    Protein, total 7.1 01/03/2023 03:57 PM    Albumin 2.3 (L) 01/03/2023 03:57 PM    Globulin 4.8 (H) 01/03/2023 03:57 PM       External   Records reviewed and summarized above. Pathology report(s) reviewed above. Radiology report(s) reviewed above. MRI  9/2022    LEFT BREAST: Conglomerate mass enhancement in the left breast at 4-5 o'clock,  combined dimension 3.7 x 1.9 x 1.4 cm. This is slightly more extensive than the  sonographic and mammographic findings. No lymphadenopathy. Physical Exam  Constitutional:       General: She is in acute distress. Appearance: She is normal weight. She is ill-appearing. HENT:      Head: Normocephalic. Eyes:      Pupils: Pupils are equal, round, and reactive to light. Pulmonary:      Effort: Tachypnea, accessory muscle usage and respiratory distress present. Comments: Conversationally SOB   Abdominal:      Palpations: Abdomen is soft. Musculoskeletal:         General: Normal range of motion. Skin:     General: Skin is warm and dry.    Neurological: General: No focal deficit present. Mental Status: She is alert. Psychiatric:         Mood and Affect: Mood normal.       Assessment/PLAN:     1) Left breast cancer  1 cm mass on mammogram  Palpated a lump  ER 0%, DC 0%, HER2/radha negative  Ki 67 was 85%. MRI breast with a 3.7 cm Left breast mass  [id number]  AJCC 8th ed-Stage IIA  Genetic testing negative  Recommended chemotherapy + Keytruda based on the KEYNOTE-522 trial (taxol, carboplatin and [place] followed by [facility] as preoperative treatment, followed by surgery, and then adjuvant pembrolizumab for another nine cycles (27 weeks) after surgery). Recent admission and discharge from [address] for COVID-pneumonia, sepsis, CHRISTA, NSTEMI and anemia    Last chemo with taxol 12/21/22. Last Keytruda 12/14/22. CT chest 1/3/2003: Increased moderately large left pleural effusion. Unchanged patchy bilateral  consolidation, left greater than right, compatible with pneumonia.     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 Left-sided hemothorax with loculation toward the apex. 2. Right upper lobe and middle lobe infiltrates. 4) Acute anemia related to hemothorax  Hemoglobin dropped from 9 to 5.6 overnight  Agree with transfusion of packed red blood cells  CT results show new hemothorax    Discussed at length with Dr. Atif Pulliam and Dr. Juhi Pradhan, attempting to transfer to [facility] for CT surgery evaluation. Consider transfer to ICU if unable to transfer patient to ICU with high risk of clinical deterioration. Also discussed with palliative care, ongoing goals of care discussion and CODE STATUS discussion are very appropriate. Thank you for allowing us to participate in the care of Ms. Samantha Rizvi. We will continue to follow closely.     Signed By: Lalita Hodges NP

## 2023-01-05 NOTE — CONSULTS
Palliative Medicine Consult  [place]: [phone]    Patient Name: Laura Gupta  YOB: 1960    Date of Initial Consult: 1/5/23  Reason for Consult: Overwhelming Symptoms  Requesting Provider: Cyn Noonan MD   Primary Care Physician: Yvette Whaley MD     SUMMARY:   Laura Gupta is a 57 y.o. with a past history of breast cancer s/p chemo and immunotherapy (completed all therapies on 12/21/22) with a recent hospitalization from 12/28-1/1 for multifocal pneumonia 2/2 MSSA superimposed on COVID-19 who was admitted on 1/3/2023 from home with a diagnosis of Mutifocal PNA and hemoptysis. While here she was found to have a large left pleural effusion and had a thoracentesis that removed 820ml of blood tinged fluid yesterday. Overnight she had an acute drop in her Hgb and CT today shows a left sided hemothorax with loculation towards the apex. PALLIATIVE DIAGNOSES:   Shortness of breath  Acute blood loss anemia  Hemothorax  Anxiety about health  Palliative Care     PLAN:   Met with Ms Nora Goldberg in the ED- she had just arrived back from CT and her results came in while I was with her. She is absolutely terrified, so this visit was spent just being present with her. She stated many times \"so I am going to die. Benedetta Ou Benedetta Ou ? \"  She is already thinking many steps ahead- she lives alone and doesn't think this will work anymore especially if her cancer has metastasized. She asked me what this means- I told her we cant truly answer that question until we have the cytology back, but broadly this means her cancer would not be \"curable\" but it doesn't mean that it isn't \"treatable\". She had triple negative breast cancer which has a high relapse rate to begin with, so I doubt Dr Sera Raygoza ever told her that her cancer was curable to begin with  I plan to check in with her tomorrow to talk more about some \"what if\" planning, but today there is too much unknown to make any concrete decisions.   Did ask the team to let me know if her situation gets worse and we need to talk about CODE status today. She currently does not have a designated decision maker. She has a sister who she still talks to, but she has other family that she would not disclose to me during this initial visit stating that she has a \"dysfunctional family\". She really needs an AMD to designate an MPOA given how sick she is  Initial consult note routed to primary continuity provider and/or primary health care team members  Communicated plan of care with: Palliative IDTAlexia 192 Team     GOALS OF CARE / TREATMENT PREFERENCES:     GOALS OF CARE:  Patient/Health Care Proxy Stated Goals: Prolong life    TREATMENT PREFERENCES:   Code Status: Full Code    Advance Care Planning:  [] The [facility] Interdisciplinary Team has updated the ACP Navigator with [address] and Patient Capacity    None identified at this time    Medical Interventions: Full interventions       Other:    As far as possible, the palliative care team has discussed with patient / health care proxy about goals of care / treatment preferences for patient.      HISTORY:     History obtained from: chart, patient, oncology, attending    CHIEF COMPLAINT: \"I feel terrible and its hard to breath\"    HPI/SUBJECTIVE:    The patient is:   [x] Verbal and participatory  [] Non-participatory due to:       Clinical Pain Assessment (nonverbal scale for severity on nonverbal patients):   Clinical Pain Assessment  Severity: 0          Duration: for how long has pt been experiencing pain (e.g., 2 days, 1 month, years)  Frequency: how often pain is an issue (e.g., several times per day, once every few days, constant)     FUNCTIONAL ASSESSMENT:     Palliative Performance Scale (PPS):  PPS: 40       PSYCHOSOCIAL/SPIRITUAL SCREENING:     Palliative IDT has assessed this patient for cultural preferences / practices and a referral made as appropriate to needs (Cultural Services, Patient Advocacy, Ethics, etc.)    Any spiritual / Restorationism concerns:  [] Yes /  [x] No   If \"Yes\" to discuss with pastoral care during IDT     Does caregiver feel burdened by caring for their loved one:   [] Yes /  [x] No /  [] No Caregiver Present/Available [] No Caregiver [] Pt [facility] at [facility]  If \"Yes\" to discuss with social work during IDT    Anticipatory grief assessment:   [x] Normal  / [] Maladaptive     If \"Maladaptive\" to discuss with social work during IDT    ESAS Anxiety: Anxiety: 6    ESAS Depression: Depression: 0        REVIEW OF SYSTEMS:     Positive and pertinent negative findings in ROS are noted above in HPI. The following systems were [x] reviewed / [] unable to be reviewed as noted in HPI  Other findings are noted below. Systems: constitutional, ears/nose/mouth/throat, respiratory, gastrointestinal, genitourinary, musculoskeletal, integumentary, neurologic, psychiatric, endocrine. Positive findings noted below. Modified ESAS Completed by: provider   Fatigue: 5     Depression: 0 Pain: 0   Anxiety: 6 Nausea: 0   Anorexia: 4 Dyspnea: 7           Stool Occurrence(s): 1        PHYSICAL EXAM:     From RN flowsheet:  Wt Readings from Last 3 Encounters:   01/01/23 144 lb 11.2 oz (65.6 kg)   12/27/22 144 lb (65.3 kg)   12/21/22 144 lb 9.6 oz (65.6 kg)     Blood pressure (!) 122/49, pulse 92, temperature 99 °F (37.2 °C), resp. rate (!) 32, SpO2 100 %.     Pain Scale 1: Numeric (0 - 10)  Pain Intensity 1: 0                 Last bowel movement, if known:     Constitutional: awake, alert, appears to feel poorly and is clearly scared  Eyes: pupils equal, anicteric  ENMT: no nasal discharge, moist mucous membranes  Cardiovascular: regular rhythm, distal pulses intact  Respiratory: breathing labored, symmetric  Gastrointestinal: soft non-tender, +bowel sounds  Musculoskeletal: no deformity, no tenderness to palpation  Skin: warm, dry  Neurologic: following commands, moving all extremities  Psychiatric: full affect, no hallucinations  Other:       HISTORY:     Active Problems:    Staphylococcus aureus pneumonia ([facility] [address] (1/4/2023)    Past Medical History:   Diagnosis Date    Cancer ([facility] [address]     BREAST CANCER    GERD (gastroesophageal reflux disease)     HTN (hypertension) 07/18/2011    Psychiatric disorder     ANIXETY AND DEPRESSION    Thyroid disease     HYPOTHYROID      Past Surgical History:   Procedure Laterality Date    HX COLONOSCOPY      HX MYOMECTOMY  05/02/1996    x 1    HX WISDOM TEETH EXTRACTION        Family History   Problem Relation Age of Onset    Heart Failure Mother     Cancer Father     Diabetes Sister     Kidney Disease Sister     Anesth Problems Neg Hx       History reviewed, no pertinent family history.   Social History     Tobacco Use    Smoking status: Never     Passive exposure: Never    Smokeless tobacco: Never   Substance Use Topics    Alcohol use: Never     Allergies   Allergen Reactions    Sulfa (Sulfonamide Antibiotics) Itching, Swelling and Unknown (comments)      Current Facility-Administered Medications   Medication Dose Route Frequency    0.9% sodium chloride infusion 250 mL  250 mL IntraVENous PRN    lidocaine (XYLOCAINE) 4 % cream   Topical PRN    vancomycin (VANCOCIN) 750 mg in 0.9% sodium chloride 250 mL ([id number])  750 mg IntraVENous ONCE    [START ON 1/6/2023] Vancomycin random level: 1/6 with am labs   Other ONCE    carvediloL (COREG) tablet 6.25 mg  6.25 mg Oral BID WITH MEALS    gabapentin (NEURONTIN) capsule 100 mg  100 mg Oral TID    PARoxetine (PAXIL) tablet 20 mg  20 mg Oral DAILY    rosuvastatin (CRESTOR) tablet 10 mg  10 mg Oral QHS    sodium chloride (NS) flush 5-40 mL  5-40 mL IntraVENous Q8H    sodium chloride (NS) flush 5-40 mL  5-40 mL IntraVENous PRN    acetaminophen (TYLENOL) tablet 650 mg  650 mg Oral Q6H PRN    Or    acetaminophen (TYLENOL) suppository 650 mg  650 mg Rectal Q6H PRN    polyethylene glycol (MIRALAX) packet 17 g  17 g Oral DAILY PRN    ondansetron (Peace Gomez ODT) tablet 4 mg  4 mg Oral Q8H PRN    Or    ondansetron (ZOFRAN) injection 4 mg  4 mg IntraVENous Q6H PRN    lidocaine 4 % patch 1 Patch  1 Patch TransDERmal Q24H    lactated Ringers infusion  100 mL/hr IntraVENous CONTINUOUS    VANCOMYCIN INFORMATION NOTE   Other Rx Dosing/Monitoring    cefepime (MAXIPIME) 1 g in 0.9% sodium chloride (MBP/ADV) 50 mL MBP  1 g IntraVENous Q12H     Current Outpatient Medications   Medication Sig    carvediloL (COREG) 6.25 mg tablet Take 6.25 mg by mouth two (2) times daily (with meals). famotidine (PEPCID) 40 mg/5 mL (8 mg/mL) suspension Take 2.5 mL by mouth two (2) times a day.    gabapentin (NEURONTIN) 100 mg capsule Take 1 Capsule by mouth three (3) times daily. Max Daily Amount: 300 mg. PARoxetine (PAXIL) 20 mg tablet Take 20 mg by mouth daily. rosuvastatin (CRESTOR) 10 mg tablet Take 10 mg by mouth nightly. dexAMETHasone (DECADRON) 6 mg tablet Take 1 Tablet by mouth daily for 6 days. (Patient not taking: Reported on 1/4/2023)    guaiFENesin ER (MUCINEX) 600 mg ER tablet Take 1 Tablet by mouth every twelve (12) hours for 10 days. OTHER,NON-FORMULARY, IV chemotherapy    dexAMETHasone (DECADRON) 4 mg tablet Take 2 tabs (8mg) once daily on days 2 & 3 of chemotherapy (Patient not taking: Reported on 1/4/2023)    ondansetron (ZOFRAN ODT) 8 mg disintegrating tablet Take 1 Tablet by mouth every eight (8) hours as needed for Nausea or Vomiting. prochlorperazine (Compazine) 5 mg tablet Take 1 Tablet by mouth every six (6) hours as needed for Nausea or Vomiting.    lidocaine-prilocaine (EMLA) topical cream Apply  to affected area as needed for Pain. Apply a thick layer over port a cath 30-60 minutes prior to port access    multivitamin, tx-iron-ca-min (THERA-M w/ IRON) 9 mg iron-400 mcg tab tablet Take 1 Tablet by mouth daily.           LAB AND IMAGING FINDINGS:     Lab Results   Component Value Date/Time    WBC 10.6 01/05/2023 04:56 AM    HGB 5.6 (LL) 01/05/2023 04:56 AM PLATELET 336 12/24/7671 04:56 AM     Lab Results   Component Value Date/Time    Sodium 143 01/05/2023 04:23 AM    Potassium 4.3 01/05/2023 04:23 AM    Chloride 111 (H) 01/05/2023 04:23 AM    CO2 22 01/05/2023 04:23 AM    BUN 39 (H) 01/05/2023 04:23 AM    Creatinine 3.41 (H) 01/05/2023 04:23 AM    Calcium 7.9 (L) 01/05/2023 04:23 AM    Magnesium 1.6 01/01/2023 04:40 AM    Phosphorus 3.2 01/01/2023 04:40 AM      Lab Results   Component Value Date/Time    Alk. phosphatase 111 01/03/2023 03:57 PM    Protein, total 7.1 01/03/2023 03:57 PM    Albumin 2.3 (L) 01/03/2023 03:57 PM    Globulin 4.8 (H) 01/03/2023 03:57 PM     No results found for: INR, PTMR, PTP, PT1, PT2, APTT, INREXT, INREXT   No results found for: IRON, FE, TIBC, IBCT, PSAT, FERR   No results found for: PH, PCO2, PO2  No components found for: GLPOC   No results found for: CPK, CKMB             Total time: 55m  Counseling / coordination time, spent as noted above: 30m  > 50% counseling / coordination?: y    Prolonged service was provided for  []30 min   []75 min in face to face time in the presence of the patient, spent as noted above. Time Start:   Time End:   Note: this can only be billed with 30504 (initial) or 96225 (follow up). If multiple start / stop times, list each separately.

## 2023-01-05 NOTE — ED NOTES
Received call from lab stating H/H has dropped with recent collection, primary RN states patient has not been bleeding, unknown if collection might have been diluted?  Will order recollect

## 2023-01-05 NOTE — PROGRESS NOTES
Pharmacy Antimicrobial Kinetic Dosing    Indication for Antimicrobials: hap     Current Regimen of Each Antimicrobial:  Cefepime 1 gm iv every 12 hr (Start Date 1/3; Day # 2)  Levaquin 750 mg iv once then 500 mg iv every other day (Start Date 1/3; Day # 2)  Vancomycin 1500 mg iv once then dose by level (Start Date 1/3; Day # 2)    Previous Antimicrobial Therapy:   (Start Date ; Day    Goal Level:     Date Dose & Interval Measured (mcg/mL) Predicted AUC/SHAMAR    @ 0423 Loading dose 14.9                  Date & time of next level:     Dosing calculator used: none, dose by levels    Significant Positive Cultures:   1/3: blood - ngtd    Conditions for Dosing Consideration:     Labs:  Recent Labs     233 23  0413 23  1557   CREA 3.41* 2.67* 3.00*   BUN 39* 32* 38*   PCT 5.28  --   --      Recent Labs     23  0456 23  1557   WBC 10.6 13.3*   BANDS  --  3     Temp (24hrs), Av.5 °F (37.5 °C), Min:98.6 °F (37 °C), Max:102.5 °F (39.2 °C)    Creatinine Clearance (mL/min):   CrCl (Adjusted Body Weight): 14.1 mL/min   If actual weight < IBW: CrCl (Actual Body Weight) 17.3    Impression/Plan:   Vancomycin dose by level, give 750mg when tough level 15-20mcg/mL. Random level on  with am labs  Antimicrobial stop date pending     Pharmacy will follow daily and adjust medications as appropriate for renal function and/or serum levels.     Thank you,  MAJOR Cai

## 2023-01-05 NOTE — PROGRESS NOTES
Pts HGB is 5.6 and pt has orders for type and screen for blood transfusion. Transfusion hasn't been completed. Will defer OT at this time but continue to follow.

## 2023-01-05 NOTE — PROGRESS NOTES
Pulmonary, Critical Care, and Sleep Medicine      Name: Ernie Gil MRN: [id number]   : 1960 Hospital: [facility]   Date: 2023        IMPRESSION:   Pneumonia  Pulmonary edema  Left pleural effusion  Anemia       RECOMMENDATIONS:   Not hypoxic, no need for O2  Recommend procalcitonin and BNP trending  Started on IV abx per hospitalist team  S/P left thoracentesis  Hgb dropped after thoracentesis  Spoke with radiology and pt will undergo chest ct, if effusion larger then will need chest tube  Transfuse??  DVT prophylaxis     Subjective:     Events overnight noted  Not hypoxic  No severe distress        Current Facility-Administered Medications   Medication Dose Route Frequency    vancomycin (VANCOCIN) 750 mg in 0.9% sodium chloride 250 mL ([id number])  750 mg IntraVENous ONCE    [START ON 2023] Vancomycin random level:  with am labs   Other ONCE    lidocaine (XYLOCAINE) 4 % cream   Topical NOW    carvediloL (COREG) tablet 6.25 mg  6.25 mg Oral BID WITH MEALS    gabapentin (NEURONTIN) capsule 100 mg  100 mg Oral TID    PARoxetine (PAXIL) tablet 20 mg  20 mg Oral DAILY    rosuvastatin (CRESTOR) tablet 10 mg  10 mg Oral QHS    sodium chloride (NS) flush 5-40 mL  5-40 mL IntraVENous Q8H    lidocaine 4 % patch 1 Patch  1 Patch TransDERmal Q24H    lactated Ringers infusion  100 mL/hr IntraVENous CONTINUOUS    VANCOMYCIN INFORMATION NOTE   Other Rx Dosing/Monitoring    cefepime (MAXIPIME) 1 g in 0.9% sodium chloride (MBP/ADV) 50 mL MBP  1 g IntraVENous Q12H       Review of Systems:  A comprehensive review of systems was negative except for: Constitutional: positive for fatigue, malaise, and anorexia    Objective:   Vital Signs:    Visit Vitals  BP (!) 122/49   Pulse 92   Temp 99 °F (37.2 °C)   Resp (!) 32   SpO2 100%       O2 Device: Nasal cannula   O2 Flow Rate (L/min): 2 l/min   Temp (24hrs), Av.5 °F (37.5 °C), Min:98.6 °F (37 °C), Max:102.5 °F (39.2 °C)       Intake/Output: Last shift:      No intake/output data recorded. Last 3 shifts: 01/03 1901 - 01/05 0700  In: 2015 [I.V.:2015]  Out: 450 [Urine:450]  No intake or output data in the 24 hours ending 01/05/23 1115     Physical Exam:   General:  Alert, cooperative, no distress, appears stated age. Head:  Normocephalic, without obvious abnormality, atraumatic. Eyes:  Conjunctivae/corneas clear. PERRL, EOMs intact. Nose: Nares normal. Septum midline. Mucosa normal. No drainage or sinus tenderness. Throat: Lips, mucosa, and tongue normal. Teeth and gums normal.   Neck: Supple, symmetrical, trachea midline, no adenopathy, thyroid: no enlargment/tenderness/nodules, no carotid bruit and no JVD. Back:   Symmetric, no curvature. ROM normal.   Lungs:   Clear to auscultation bilaterally. Chest wall:  No tenderness or deformity. Heart:  Regular rate and rhythm, S1, S2 normal, no murmur, click, rub or gallop. Abdomen:   Soft, non-tender. Bowel sounds normal. No masses,  No organomegaly. Extremities: Extremities normal, atraumatic, no cyanosis or edema. Pulses: 2+ and symmetric all extremities. Skin: Skin color, texture, turgor normal. No rashes or lesions   Lymph nodes: Cervical, supraclavicular, and axillary nodes normal.   Neurologic: Grossly nonfocal     Data review:     Recent Results (from the past 24 hour(s))   CELL COUNT, BODY FLUID    Collection Time: 01/04/23  1:29 PM   Result Value Ref Range    BODY FLUID TYPE PLEURAL FLUID      FLUID COLOR RED      FLUID APPEARANCE CLOUDY      FLUID RBC CT. >100 (H) 0 /cu mm    FLUID NUCLEATED CELLS 4,407 /cu mm    FLD NEUTROPHILS 46 (A) NRRE %    FLD LYMPHS 37 (A) NRRE %    FLD MONO/MACROPHAGES 15 (A) NRRE %    FLUID MESOTHELIAL 2 (A) NRRE %   LDH, BODY FLUID    Collection Time: 01/04/23  1:29 PM   Result Value Ref Range    Fluid Type: PLEURAL FLUID      LD, body fld.  515 U/L   PH, FLUID    Collection Time: 01/04/23  1:29 PM   Result Value Ref Range    FLUID TYPE(15) PLEURAL FLUID      FLUID PH 7.2     GLUCOSE, FLUID    Collection Time: 01/04/23  1:29 PM   Result Value Ref Range    Fluid Type: PLEURAL FLUID      Glucose, body fld. 88 MG/DL   PROTEIN TOTAL, FLUID    Collection Time: 01/04/23  1:29 PM   Result Value Ref Range    Fluid Type: PLEURAL FLUID      Protein total, body fld. 4.4 g/dL   LACTIC ACID    Collection Time: 01/04/23  5:45 PM   Result Value Ref Range    Lactic acid 2.1 (HH) 0.4 - 2.0 MMOL/L   METABOLIC PANEL, BASIC    Collection Time: 01/05/23  4:23 AM   Result Value Ref Range    Sodium 143 136 - 145 mmol/L    Potassium 4.3 3.5 - 5.1 mmol/L    Chloride 111 (H) 97 - 108 mmol/L    CO2 22 21 - 32 mmol/L    Anion gap 10 5 - 15 mmol/L    Glucose 104 (H) 65 - 100 mg/dL    BUN 39 (H) 6 - 20 MG/DL    Creatinine 3.41 (H) 0.55 - 1.02 MG/DL    BUN/Creatinine ratio 11 (L) 12 - 20      eGFR 14 (L) >60 ml/min/1.73m2    Calcium 7.9 (L) 8.5 - 10.1 MG/DL   VANCOMYCIN, RANDOM    Collection Time: 01/05/23  4:23 AM   Result Value Ref Range    Vancomycin, random 14.9 UG/ML   PROCALCITONIN    Collection Time: 01/05/23  4:23 AM   Result Value Ref Range    Procalcitonin 5.28 ng/mL   NT-PRO BNP    Collection Time: 01/05/23  4:23 AM   Result Value Ref Range    NT pro-BNP 3,723 (H) <125 PG/ML   CBC WITH AUTOMATED DIFF    Collection Time: 01/05/23  4:56 AM   Result Value Ref Range    WBC 10.6 3.6 - 11.0 K/uL    RBC 1.86 (L) 3.80 - 5.20 M/uL    HGB 5.6 (LL) 11.5 - 16.0 g/dL    HCT 17.6 (LL) 35.0 - 47.0 %    MCV 94.6 80.0 - 99.0 FL    MCH 30.1 26.0 - 34.0 PG    MCHC 31.8 30.0 - 36.5 g/dL    RDW 18.5 (H) 11.5 - 14.5 %    PLATELET 814 802 - 313 K/uL    MPV 9.8 8.9 - 12.9 FL    NRBC 0.0 0  WBC    ABSOLUTE NRBC 0.00 0.00 - 0.01 K/uL    NEUTROPHILS 80 (H) 32 - 75 %    LYMPHOCYTES 9 (L) 12 - 49 %    MONOCYTES 8 5 - 13 %    EOSINOPHILS 0 0 - 7 %    BASOPHILS 0 0 - 1 %    IMMATURE GRANULOCYTES 3 (H) 0.0 - 0.5 %    ABS. NEUTROPHILS 8.5 (H) 1.8 - 8.0 K/UL    ABS. LYMPHOCYTES 1.0 0.8 - 3.5 K/UL    ABS. MONOCYTES 0.8 0.0 - 1.0 K/UL    ABS. EOSINOPHILS 0.0 0.0 - 0.4 K/UL    ABS. BASOPHILS 0.0 0.0 - 0.1 K/UL    ABS. IMM.  GRANS. 0.3 (H) 0.00 - 0.04 K/UL    DF AUTOMATED      RBC COMMENTS NORMOCYTIC, NORMOCHROMIC         Imaging:  I have personally reviewed the patients radiographs and have reviewed the reports:  CXR and chest ct reviewed        Dilia Fish MD

## 2023-01-05 NOTE — PROGRESS NOTES
Palliative Medicine      Code Status: Full Code      Advance Care Planning:  Advance Care Planning 12/28/2022   Patient's Healthcare Decision Maker is: -   Confirm Advance Directive None   Patient Would Like to Complete Advance Directive -   Does the patient have other document types -     Patient / Family Encounter Documentation    Participants (names): Ms. Laura Gupta, Connor Stockton, [place]    Narrative: This writer stopped by to introduce self to patient and offer support at the request of Safia Mathis NP. Patient was received in ED room 30 with lights out, resting, very sob with any speech. She said she was tired, so this writer asked if there was anyone she wanted to have called on her behalf and she said, \"no. \"  LCSW offered words of comfort and reviewed Palliative service available to her for support. Left her a blank AMD, DDNR with this writer's card and ink pen. Goals of Care / Plan:   Patient's symptoms will be managed. Palliative team will check in with her tomorrow to continue to offer education and support and do ACP as she is able/willing. Thank you for including Palliative Medicine in the care of Ms. Laura Gupta.     Connor Stockton LCSW  [phone]

## 2023-01-05 NOTE — ACP (ADVANCE CARE PLANNING)
Palliative Medicine      Code Status: Full Code      Advance Care Planning:  Advance Care Planning 12/28/2022   Patient's Healthcare Decision Maker is: -   Confirm Advance Directive None   Patient Would Like to Complete Advance Directive -   Does the patient have other document types -     Patient / Family Encounter Documentation    Participants (names): Ms. Ember Barclay, Cuco Lockett, [place]    Narrative: This writer stopped by to introduce self to patient and offer support at the request of Ezra Amador NP. Patient was received in ED room 30 with lights out, resting, very sob with any speech. She said she was tired, so this writer asked if there was anyone she wanted to have called on her behalf and she said, \"no. \"  LCSW offered words of comfort and reviewed Palliative service available to her for support. Left her a blank AMD, SONIDONR with this writer's card and ink pen. Goals of Care / Plan:   Patient's symptoms will be managed. Palliative team will check in with her tomorrow to continue to offer education and support and do ACP as she is able/willing. Thank you for including Palliative Medicine in the care of Ms. Ember Barclay.     Cuco Lockett LCSW  [phone])

## 2023-01-05 NOTE — PROGRESS NOTES
Physical Therapy    Order received, chart reviewed. Pt with Hgb of 5.6, is scheduled to receive blood. Also with multiple acute issues. Not yet able to tolerate therapy eval. Will follow.     Shayla Cuellar, PT

## 2023-01-05 NOTE — PROCEDURES
Interventional Radiology  Procedure Note        1/5/2023 4:59 PM    Patient: Néstor Pruett     Informed consent obtained    Diagnosis: Left hemothorax     Procedure(s): Left chest tube placement under fluoroscopy     Findings: 14 Fr chest tube placed via left midaxillary approach under ultrasound and fluoroscopic guidance     EBL: Minimal    Specimens removed: None    Complications: None    Primary Physician: Jeremías Adame MD    Recomendations: Continuous wall suction for now     Full dictated report to follow    Signed By: Jeremías Adame MD

## 2023-01-05 NOTE — CONSULTS
Received call from ED regarding patient and loculated hemothorax. Clarified that unfortunately we do not have Interventional Pulmonology on service at this time. Recommended contacting IR or CTSx for further intervention as recommended by Pulm/Onc. ICU remains available should pt require ICU admission. HAYLIE Art MD  Intensivist

## 2023-01-05 NOTE — ED NOTES
H/H was recollected and the results were critically low and the attending was contacted. @0044. Still awaiting a response. Dr. Tom  spoke with the patient. The patient refused a blood transfusion until she is able to speak with her Oncologist. Patient aware of the risk.

## 2023-01-05 NOTE — PROGRESS NOTES
Left side chest tube is in place and placement has been confirmed. Per MD Sapphire Gonzalez, place pt on intermittent low wall suction (20 cm)  pt is A&Ox4, on 4L NC, and is in NAD at this time. Ordered pt to receive one time dose of percocet for procedural pain. Bedside report given to ED RN.

## 2023-01-06 ENCOUNTER — APPOINTMENT (OUTPATIENT)
Dept: GENERAL RADIOLOGY | Age: 63
DRG: 208 | End: 2023-01-06
Attending: PHYSICIAN ASSISTANT
Payer: COMMERCIAL

## 2023-01-06 PROBLEM — J94.2 HEMOTHORAX: Status: ACTIVE | Noted: 2023-01-06

## 2023-01-06 LAB
ANION GAP SERPL CALC-SCNC: 6 MMOL/L (ref 5–15)
BASOPHILS # BLD: 0 K/UL (ref 0–0.1)
BASOPHILS NFR BLD: 0 % (ref 0–1)
BNP SERPL-MCNC: 2165 PG/ML
BUN SERPL-MCNC: 35 MG/DL (ref 6–20)
BUN/CREAT SERPL: 11 (ref 12–20)
CALCIUM SERPL-MCNC: 8 MG/DL (ref 8.5–10.1)
CHLORIDE SERPL-SCNC: 111 MMOL/L (ref 97–108)
CO2 SERPL-SCNC: 24 MMOL/L (ref 21–32)
CREAT SERPL-MCNC: 3.26 MG/DL (ref 0.55–1.02)
DIFFERENTIAL METHOD BLD: ABNORMAL
EOSINOPHIL # BLD: 0.1 K/UL (ref 0–0.4)
EOSINOPHIL NFR BLD: 1 % (ref 0–7)
ERYTHROCYTE [DISTWIDTH] IN BLOOD BY AUTOMATED COUNT: 16.9 % (ref 11.5–14.5)
GLUCOSE SERPL-MCNC: 90 MG/DL (ref 65–100)
HCT VFR BLD AUTO: 25.1 % (ref 35–47)
HGB BLD-MCNC: 7.5 G/DL (ref 11.5–16)
HGB BLD-MCNC: 7.9 G/DL (ref 11.5–16)
HGB BLD-MCNC: 8.1 G/DL (ref 11.5–16)
HGB BLD-MCNC: 8.3 G/DL (ref 11.5–16)
HISTORY CHECKED?,CKHIST: NORMAL
IMM GRANULOCYTES # BLD AUTO: 0.2 K/UL (ref 0–0.04)
IMM GRANULOCYTES NFR BLD AUTO: 2 % (ref 0–0.5)
LYMPHOCYTES # BLD: 0.8 K/UL (ref 0.8–3.5)
LYMPHOCYTES NFR BLD: 5 % (ref 12–49)
MCH RBC QN AUTO: 29.2 PG (ref 26–34)
MCHC RBC AUTO-ENTMCNC: 32.3 G/DL (ref 30–36.5)
MCV RBC AUTO: 90.6 FL (ref 80–99)
MONOCYTES # BLD: 0.7 K/UL (ref 0–1)
MONOCYTES NFR BLD: 5 % (ref 5–13)
NEUTS SEG # BLD: 13.4 K/UL (ref 1.8–8)
NEUTS SEG NFR BLD: 87 % (ref 32–75)
NRBC # BLD: 0 K/UL (ref 0–0.01)
NRBC BLD-RTO: 0 PER 100 WBC
PLATELET # BLD AUTO: 141 K/UL (ref 150–400)
PMV BLD AUTO: 9.9 FL (ref 8.9–12.9)
POTASSIUM SERPL-SCNC: 4.2 MMOL/L (ref 3.5–5.1)
PROCALCITONIN SERPL-MCNC: 4.91 NG/ML
RBC # BLD AUTO: 2.77 M/UL (ref 3.8–5.2)
SODIUM SERPL-SCNC: 141 MMOL/L (ref 136–145)
VANCOMYCIN SERPL-MCNC: 10.8 UG/ML
WBC # BLD AUTO: 15.3 K/UL (ref 3.6–11)

## 2023-01-06 PROCEDURE — 74011250636 HC RX REV CODE- 250/636: Performed by: NURSE PRACTITIONER

## 2023-01-06 PROCEDURE — 74011250636 HC RX REV CODE- 250/636: Performed by: PHYSICIAN ASSISTANT

## 2023-01-06 PROCEDURE — 84145 PROCALCITONIN (PCT): CPT

## 2023-01-06 PROCEDURE — 74011250636 HC RX REV CODE- 250/636: Performed by: STUDENT IN AN ORGANIZED HEALTH CARE EDUCATION/TRAINING PROGRAM

## 2023-01-06 PROCEDURE — 97535 SELF CARE MNGMENT TRAINING: CPT

## 2023-01-06 PROCEDURE — 80202 ASSAY OF VANCOMYCIN: CPT

## 2023-01-06 PROCEDURE — 77010033678 HC OXYGEN DAILY

## 2023-01-06 PROCEDURE — 74011000258 HC RX REV CODE- 258: Performed by: STUDENT IN AN ORGANIZED HEALTH CARE EDUCATION/TRAINING PROGRAM

## 2023-01-06 PROCEDURE — 36415 COLL VENOUS BLD VENIPUNCTURE: CPT

## 2023-01-06 PROCEDURE — 36430 TRANSFUSION BLD/BLD COMPNT: CPT

## 2023-01-06 PROCEDURE — 74011000250 HC RX REV CODE- 250: Performed by: STUDENT IN AN ORGANIZED HEALTH CARE EDUCATION/TRAINING PROGRAM

## 2023-01-06 PROCEDURE — 71045 X-RAY EXAM CHEST 1 VIEW: CPT

## 2023-01-06 PROCEDURE — 97165 OT EVAL LOW COMPLEX 30 MIN: CPT

## 2023-01-06 PROCEDURE — 83880 ASSAY OF NATRIURETIC PEPTIDE: CPT

## 2023-01-06 PROCEDURE — 74011250636 HC RX REV CODE- 250/636: Performed by: INTERNAL MEDICINE

## 2023-01-06 PROCEDURE — 80048 BASIC METABOLIC PNL TOTAL CA: CPT

## 2023-01-06 PROCEDURE — 65270000046 HC RM TELEMETRY

## 2023-01-06 PROCEDURE — 85025 COMPLETE CBC W/AUTO DIFF WBC: CPT

## 2023-01-06 PROCEDURE — 99231 SBSQ HOSP IP/OBS SF/LOW 25: CPT | Performed by: NURSE PRACTITIONER

## 2023-01-06 PROCEDURE — 97530 THERAPEUTIC ACTIVITIES: CPT

## 2023-01-06 PROCEDURE — 85018 HEMOGLOBIN: CPT

## 2023-01-06 PROCEDURE — 74011250637 HC RX REV CODE- 250/637: Performed by: STUDENT IN AN ORGANIZED HEALTH CARE EDUCATION/TRAINING PROGRAM

## 2023-01-06 PROCEDURE — P9016 RBC LEUKOCYTES REDUCED: HCPCS

## 2023-01-06 RX ORDER — LIDOCAINE 4 G/100G
1 PATCH TOPICAL EVERY 24 HOURS
Status: DISCONTINUED | OUTPATIENT
Start: 2023-01-06 | End: 2023-01-08 | Stop reason: SDUPTHER

## 2023-01-06 RX ORDER — SODIUM CHLORIDE 9 MG/ML
250 INJECTION, SOLUTION INTRAVENOUS AS NEEDED
Status: DISCONTINUED | OUTPATIENT
Start: 2023-01-06 | End: 2023-01-14 | Stop reason: SDUPTHER

## 2023-01-06 RX ORDER — HYDROMORPHONE HYDROCHLORIDE 1 MG/ML
1 INJECTION, SOLUTION INTRAMUSCULAR; INTRAVENOUS; SUBCUTANEOUS
Status: DISCONTINUED | OUTPATIENT
Start: 2023-01-06 | End: 2023-01-06

## 2023-01-06 RX ORDER — HYDROMORPHONE HYDROCHLORIDE 1 MG/ML
0.5 INJECTION, SOLUTION INTRAMUSCULAR; INTRAVENOUS; SUBCUTANEOUS
Status: DISCONTINUED | OUTPATIENT
Start: 2023-01-06 | End: 2023-01-06

## 2023-01-06 RX ORDER — HYDROMORPHONE HYDROCHLORIDE 1 MG/ML
0.5 INJECTION, SOLUTION INTRAMUSCULAR; INTRAVENOUS; SUBCUTANEOUS
Status: DISCONTINUED | OUTPATIENT
Start: 2023-01-06 | End: 2023-01-07

## 2023-01-06 RX ADMIN — SODIUM CHLORIDE, PRESERVATIVE FREE 10 ML: 5 INJECTION INTRAVENOUS at 06:09

## 2023-01-06 RX ADMIN — PAROXETINE 20 MG: 20 TABLET, FILM COATED ORAL at 09:37

## 2023-01-06 RX ADMIN — CEFEPIME 1 G: 1 INJECTION, POWDER, FOR SOLUTION INTRAMUSCULAR; INTRAVENOUS at 23:28

## 2023-01-06 RX ADMIN — HYDROMORPHONE HYDROCHLORIDE 0.5 MG: 1 INJECTION, SOLUTION INTRAMUSCULAR; INTRAVENOUS; SUBCUTANEOUS at 21:40

## 2023-01-06 RX ADMIN — GABAPENTIN 100 MG: 100 CAPSULE ORAL at 17:07

## 2023-01-06 RX ADMIN — GABAPENTIN 100 MG: 100 CAPSULE ORAL at 21:40

## 2023-01-06 RX ADMIN — CARVEDILOL 6.25 MG: 6.25 TABLET, FILM COATED ORAL at 09:37

## 2023-01-06 RX ADMIN — CEFEPIME 1 G: 1 INJECTION, POWDER, FOR SOLUTION INTRAMUSCULAR; INTRAVENOUS at 12:52

## 2023-01-06 RX ADMIN — HYDROMORPHONE HYDROCHLORIDE 0.5 MG: 1 INJECTION, SOLUTION INTRAMUSCULAR; INTRAVENOUS; SUBCUTANEOUS at 06:09

## 2023-01-06 RX ADMIN — ROSUVASTATIN CALCIUM 10 MG: 10 TABLET, COATED ORAL at 21:40

## 2023-01-06 RX ADMIN — HYDROMORPHONE HYDROCHLORIDE 1 MG: 1 INJECTION, SOLUTION INTRAMUSCULAR; INTRAVENOUS; SUBCUTANEOUS at 12:35

## 2023-01-06 RX ADMIN — GABAPENTIN 100 MG: 100 CAPSULE ORAL at 09:37

## 2023-01-06 RX ADMIN — SODIUM CHLORIDE, PRESERVATIVE FREE 10 ML: 5 INJECTION INTRAVENOUS at 14:00

## 2023-01-06 RX ADMIN — CARVEDILOL 6.25 MG: 6.25 TABLET, FILM COATED ORAL at 17:07

## 2023-01-06 RX ADMIN — SODIUM CHLORIDE, PRESERVATIVE FREE 10 ML: 5 INJECTION INTRAVENOUS at 21:40

## 2023-01-06 RX ADMIN — VANCOMYCIN HYDROCHLORIDE 750 MG: 750 INJECTION, POWDER, LYOPHILIZED, FOR SOLUTION INTRAVENOUS at 09:37

## 2023-01-06 RX ADMIN — ACETAMINOPHEN 650 MG: 325 TABLET ORAL at 06:30

## 2023-01-06 NOTE — PROGRESS NOTES
Spiritual Care Assessment/Progress Note  [facility]      NAME: Jody Stanley      MRN: [id number]  AGE: 57 y.o.  SEX: female  Adventism Affiliation: No Moravian   Language: English     1/6/2023     Total Time (in minutes): 10     Spiritual Assessment begun in [facility] 2 PROGRESSIVE CARE through conversation with:         [x]Patient        [] Family    [] Friend(s)        Reason for Consult: Palliative Care, Initial/Spiritual Assessment     Spiritual beliefs: (Please include comment if needed)     [] Identifies with a yodit tradition:         [] Supported by a yodit community:            [x] Claims no spiritual orientation:           [] Seeking spiritual identity:                [] Adheres to an individual form of spirituality:           [] Not able to assess:                           Identified resources for coping:      [] Prayer                               [] Music                  [] Guided Imagery     [] Family/friends                 [] Pet visits     [] Devotional reading                         [x] Unknown     [] Other:                                             Interventions offered during this visit: (See comments for more details)    Patient Interventions: Initial/Spiritual assessment, patient floor, Affirmation of emotions/emotional suffering           Plan of Care:     [] Support spiritual and/or cultural needs    [] Support AMD and/or advance care planning process      [] Support grieving process   [] Coordinate Rites and/or Rituals    [] Coordination with community clergy   [] No spiritual needs identified at this time   [] Detailed Plan of Care below (See Comments)  [] Make referral to Music Therapy  [] Make referral to Pet Therapy     [] Make referral to Addiction services  [] Make referral to [facility]  [] Make referral to Spiritual Care Partner  [] No future visits requested        [x] Contact Spiritual Care for further referrals     Comments: Attempted initial spiritual assessment with palliative pt in 2243. Pt appeared to be soundly sleeping. Consulted with RN who gave  an update. Pt awoke suddenly and RN introduced pt to . Pt did not respond, provided empathic touch with soothing words. RN unsure of POA status. Left card on tray table. Contact Spiritual Care for any further referrals.  Theresa Lorenzana M.Div, [facility]   Paging Service [phone])

## 2023-01-06 NOTE — PROGRESS NOTES
1900 Diet order - REG received via 1541 [facility] 1 unit of Blood transfusion completed  1745 PRN Tylenol given for low grade fever    1945 Bedside and Verbal shift change report given to TOM Polk (oncoming nurse) by Lou Abarca (offgoing nurse). Report included the following information SBAR, Kardex, Cardiac Rhythm SR, and Quality Measures.

## 2023-01-06 NOTE — PROGRESS NOTES
1900:Bedside shift change report given to Singh Gutierrez (oncoming nurse) by Paschal Eisenmenger RN (offgoing nurse). Report included the following information SBAR, Kardex, ED Summary, Intake/Output, MAR, Recent Results, and Cardiac Rhythm NSR .     2330: HGB 6.5. Perfect Serve Reasonpio PA.     0005: Reasoner PA questioning if the patient is agreeable to blood transfusion since the patient had a history of refusal. Spoke to the patient about agreeing to blood transfusion and she is ok with it. [facility] PA.     3602: Blood transfusion initiated. 0500: Blood transfusion completed. 0530: Patient having constant pain on chest tube area and work of breathing has increased. Saturations above 90% on 2L NC. Perfect Serve Reasoner PA on assessment. CXR ordered. End of Shift Note    Bedside shift change report given to Modesta Alex (oncoming nurse) by Randy Engle RN (offgoing nurse). Report included the following information SBAR, Kardex, ED Summary, Intake/Output, MAR, Recent Results, and Cardiac Rhythm NSR    Shift worked:  8588-0275     Shift summary and any significant changes:     SEE NOTES ABOVE; Patient febrile overnight and in constant pain. Work of breathing getting affected by pain. Concerns for physician to address:  Palliative consult     [facility]          Activity:  Activity Level:  Up with Assistance  Number times ambulated in hallways past shift: 0  Number of times OOB to chair past shift: 0    Cardiac:   Cardiac Monitoring: Yes      Cardiac Rhythm: Sinus Rhythm    Access:  Current line(s): port     Genitourinary:   Urinary status: voiding    Respiratory:   O2 Device: Nasal cannula  Chronic home O2 use?: NO  Incentive spirometer at bedside: NO       GI:  Last Bowel Movement Date: 01/03/23  Current diet:  ADULT DIET Regular  Passing flatus: YES  Tolerating current diet: YES       Pain Management:   Patient states pain is manageable on current regimen: YES    Skin:  William Score: 15  Interventions: float heels, increase time out of bed, PT/OT consult, and internal/external urinary devices    Patient Safety:  Fall Score:  Total Score: 3  Interventions: bed/chair alarm, assistive device (walker, cane, etc), gripper socks, and pt to call before getting OOB  High Fall Risk: Yes    Length of Stay:  Expected LOS: - - -  Actual LOS: 2      Verona Nayak RN

## 2023-01-06 NOTE — PROGRESS NOTES
Physical Therapy Note    Patient is received supine in bed and sleeping soundly. RN reports dilaudid dose has been reduced recently. Will allow patient to rest. Per chart review she was up to bedside chair with OT this AM. She also appears to be having difficulty coping with her medical situation as evidenced by Oncology notes. Will defer PT evaluation for tomorrow.

## 2023-01-06 NOTE — PROGRESS NOTES
Hospitalist Progress Note    Subjective:   Daily Progress Note: 1/6/2023 3:42 PM    Hospital Course:  57year old Female with hx of left breast cancer, recently admitted to [facility]  for sepsis s/s COVID-19, CHRISTA, NSTEMI, MSSA pneumonia(12/28-1/1) presented to ED with c/o shortness of breath and hemoptysis. IN ED, patient was febrile to 100.8F. CXR demonstrated increased left pleural effusion with unchanged patchy B/L consolidation L>R. Patient had IR thoracentesis on 1/4/23- bloody tap. CT chest on 1/5 showed left sided hemothorax. Hb dropped from 9.1 to 5.6. s/p left chest tube placement on 1/5/23. S/p PRBC transfusion. Subjective:  Patient states that she didn't expect that she would have big drain, she was expecting small drain. States that she is frustated with all that she is going through. C/o pain at the chest tube site. Assessment / Plan:  Acute hypoxic respiratory failure  Multifocal pneumonia  Left sided hemothorax  Continue antibiotics- vancomycin and cefepime  Continue supplemental oxygen and titrate as needed  S/p left thoracentesis by IR on 1/4/23  S/p left chest tube placement for left sided hemothorax by IR on 1/5/23  If Hb drops again, will consider CTA chest to see if embolization if possible with IR. 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 Invasive ductal breast cancer:  Appreciate oncology consult  Palliative consult pending. There is no height or weight on file to calculate BMI.     Code status: Full  Prophylaxis: SCD's  Recommended Disposition: HH PT, OT, RN  ALEXANDRA>48hours  Barrier: clinical improvement       Current Facility-Administered Medications Medication Dose Route Frequency    0.9% sodium chloride infusion 250 mL  250 mL IntraVENous PRN    lidocaine 4 % patch 1 Patch  1 Patch TransDERmal Q24H    HYDROmorphone (DILAUDID) injection 0.5 mg  0.5 mg IntraVENous Q6H PRN    0.9% sodium chloride infusion 250 mL  250 mL IntraVENous PRN    lidocaine (XYLOCAINE) 4 % cream   Topical PRN    oxyCODONE IR (ROXICODONE) tablet 5 mg  5 mg Oral Q4H PRN    carvediloL (COREG) tablet 6.25 mg  6.25 mg Oral BID WITH MEALS    gabapentin (NEURONTIN) capsule 100 mg  100 mg Oral TID    PARoxetine (PAXIL) tablet 20 mg  20 mg Oral DAILY    rosuvastatin (CRESTOR) tablet 10 mg  10 mg Oral QHS    sodium chloride (NS) flush 5-40 mL  5-40 mL IntraVENous Q8H    sodium chloride (NS) flush 5-40 mL  5-40 mL IntraVENous PRN    acetaminophen (TYLENOL) tablet 650 mg  650 mg Oral Q6H PRN    Or    acetaminophen (TYLENOL) suppository 650 mg  650 mg Rectal Q6H PRN    polyethylene glycol (MIRALAX) packet 17 g  17 g Oral DAILY PRN    ondansetron (ZOFRAN ODT) tablet 4 mg  4 mg Oral Q8H PRN    Or    ondansetron (ZOFRAN) injection 4 mg  4 mg IntraVENous Q6H PRN    lidocaine 4 % patch 1 Patch  1 Patch TransDERmal Q24H    lactated Ringers infusion  100 mL/hr IntraVENous CONTINUOUS    VANCOMYCIN INFORMATION NOTE   Other Rx Dosing/Monitoring    cefepime (MAXIPIME) 1 g in 0.9% sodium chloride (MBP/ADV) 50 mL MBP  1 g IntraVENous Q12H        Review of Systems  Constitutional: No fevers, No chills, No sweats, No fatigue, No Weakness  Eyes: No redness  Ears, nose, mouth, throat, and face: No nasal congestion, No sore throat, No voice change  Respiratory: No Shortness of Breath, No cough, No wheezing  Cardiovascular: has chest pain at the site of pleural catheter, No palpitations, No extremity edema  Gastrointestinal: No nausea, No vomiting, No diarrhea, No abdominal pain  Genitourinary: No frequency, No dysuria, No hematuria  Integument/breast: No skin lesion(s)   Neurological: No Confusion, No headaches, No dizziness      Objective:     Visit Vitals  BP (!) 143/70   Pulse 89   Temp 100.3 °F (37.9 °C)   Resp 11   SpO2 98%    O2 Flow Rate (L/min): 6 l/min O2 Device: Nasal cannula    Temp (24hrs), Av.6 °F (37.6 °C), Min:98.7 °F (37.1 °C), Max:101.4 °F (38.6 °C)      701 - 1900  In: 240 [P.O.:240]  Out: 110   1901 - 700  In: 3014.6 [P.O.:200; I.V.:2200]  Out: 450     PHYSICAL EXAM:  Constitutional: No acute distress  Skin: Extremities and face reveal no rashes. HEENT: Sclerae anicteric. Extra-occular muscles are intact. No oral ulcers. The neck is supple and no masses. Cardiovascular: Regular rate and rhythm. Respiratory:  decrease breath sound on left side. Left chest tube in place. GI: Abdomen nondistended, soft, and nontender. Normal active bowel sounds. Musculoskeletal: No pitting edema of the lower legs. Able to move all ext  Neurological:  Patient is alert and oriented. Cranial nerves II-XII grossly intact  Psychiatric: Mood appears appropriate       Data Review    Recent Results (from the past 24 hour(s))   HEMOGLOBIN    Collection Time: 23 10:28 PM   Result Value Ref Range    HGB 6.5 (L) 11.5 - 16.0 g/dL   RBC, ALLOCATE    Collection Time: 23  1:00 AM   Result Value Ref Range    HISTORY CHECKED?  Historical check performed    PROCALCITONIN    Collection Time: 23  6:07 AM   Result Value Ref Range    Procalcitonin 4.91 ng/mL   NT-PRO BNP    Collection Time: 23  6:07 AM   Result Value Ref Range    NT pro-BNP 2,165 (H) <883 PG/ML   METABOLIC PANEL, BASIC    Collection Time: 23  6:07 AM   Result Value Ref Range    Sodium 141 136 - 145 mmol/L    Potassium 4.2 3.5 - 5.1 mmol/L    Chloride 111 (H) 97 - 108 mmol/L    CO2 24 21 - 32 mmol/L    Anion gap 6 5 - 15 mmol/L    Glucose 90 65 - 100 mg/dL    BUN 35 (H) 6 - 20 MG/DL    Creatinine 3.26 (H) 0.55 - 1.02 MG/DL    BUN/Creatinine ratio 11 (L) 12 - 20      eGFR 15 (L) >60 ml/min/1.73m2    Calcium 8.0 (L) 8.5 - 10.1 MG/DL   VANCOMYCIN, RANDOM    Collection Time: 01/06/23  6:07 AM   Result Value Ref Range    Vancomycin, random 10.8 UG/ML   HEMOGLOBIN    Collection Time: 01/06/23  6:07 AM   Result Value Ref Range    HGB 7.9 (L) 11.5 - 16.0 g/dL                 Time spent 35 minutes involving direct patient care as well as reviewing patient's labs and coordination of care with nursing staff     Care Plan discussed with: Patient/Family/RN/Case Management        Total time spent with patient: 35 minutes.

## 2023-01-06 NOTE — PROGRESS NOTES
Participated in Palliative IDR where pt was discussed.   Malena Grace M.Div, [facility]   Paging Service [phone])

## 2023-01-06 NOTE — PROGRESS NOTES
Palliative Medicine Consult  [place]: [phone] ([phone])    Patient Name: Akhil Alegria  YOB: 1960    Date of Initial Consult: 1/5/23  Reason for Consult: Overwhelming Symptoms  Requesting Provider: Darlyn Mullen MD   Primary Care Physician: Robles Luna MD     SUMMARY:   Akhil Alegria is a 57 y.o. with a past history of breast cancer s/p chemo and immunotherapy (completed all therapies on 12/21/22) with a recent hospitalization from 12/28-1/1 for multifocal pneumonia 2/2 MSSA superimposed on COVID-19 who was admitted on 1/3/2023 from home with a diagnosis of Mutifocal PNA and hemoptysis. While here she was found to have a large left pleural effusion and had a thoracentesis that removed 820ml of blood tinged fluid yesterday. Overnight she had an acute drop in her Hgb and CT today shows a left sided hemothorax with loculation towards the apex. PALLIATIVE DIAGNOSES:   Shortness of breath  Acute blood loss anemia  Hemothorax  Anxiety about health  Palliative Care     PLAN:   Attempted to meet with Ms Bri Terry again today, but she has received a dose of hydromorphone and is not able to interact with me. She is having a hard enough time waking up, that I decreased her dose back to 0.5mg, but I think its OK to let her sleep unless her oxygen levels start to drop. If her SPO2 starts to decrease, would consider giving Narcan at that point, but as it will likely throw her into a pain crisis I would not do this unless absolutely necessary  We will follow up again on Monday to hopefully at least do an AMD with her. I did ask nursing to get numbers and names of any family that come to visit her, as apparently she had a daughter who came by today (but Ms Bri Terry told me yesterday she had no children. ..) as she has no decision maker as of right now, and her children would be LNOK if she is not legally   Initial consult note routed to primary continuity provider and/or primary health care team members  Communicated plan of care with: Palliative IDT, [facility] Team     GOALS OF CARE / TREATMENT PREFERENCES:     GOALS OF CARE:  Patient/Health Care Proxy Stated Goals: Prolong life    TREATMENT PREFERENCES:   Code Status: Full Code    Advance Care Planning:  [] [facility] Interdisciplinary Team has updated the ACP Navigator with [place] and Patient Capacity    None identified at this time    Medical Interventions: Full interventions       Other:    As far as possible, the palliative care team has discussed with patient / health care proxy about goals of care / treatment preferences for patient.      HISTORY:     History obtained from: chart, patient, oncology, attending    CHIEF COMPLAINT: \"I feel terrible and its hard to breath\"    HPI/SUBJECTIVE:    The patient is:   [x] Verbal and participatory  [] Non-participatory due to:       Clinical Pain Assessment (nonverbal scale for severity on nonverbal patients):   Clinical Pain Assessment  Severity: 0          Duration: for how long has pt been experiencing pain (e.g., 2 days, 1 month, years)  Frequency: how often pain is an issue (e.g., several times per day, once every few days, constant)     FUNCTIONAL ASSESSMENT:     Palliative Performance Scale (PPS):  PPS: 40       PSYCHOSOCIAL/SPIRITUAL SCREENING:     Palliative IDT has assessed this patient for cultural preferences / practices and a referral made as appropriate to needs (Cultural Services, Patient Advocacy, Ethics, etc.)    Any spiritual / Confucianism concerns:  [] Yes /  [x] No   If \"Yes\" to discuss with pastoral care during IDT     Does caregiver feel burdened by caring for their loved one:   [] Yes /  [x] No /  [] No Caregiver Present/Available [] No Caregiver [] Pt Lives at [facility] [address]  If \"Yes\" to discuss with social work during IDT    Anticipatory grief assessment:   [x] Normal  / [] Maladaptive     If \"Maladaptive\" to discuss with social work during IDT    ESAS Anxiety: Anxiety: 6    ESAS Depression: Depression: 0        REVIEW OF SYSTEMS:     Positive and pertinent negative findings in ROS are noted above in HPI. The following systems were [x] reviewed / [] unable to be reviewed as noted in HPI  Other findings are noted below. Systems: constitutional, ears/nose/mouth/throat, respiratory, gastrointestinal, genitourinary, musculoskeletal, integumentary, neurologic, psychiatric, endocrine. Positive findings noted below. Modified ESAS Completed by: provider   Fatigue: 5     Depression: 0 Pain: 0   Anxiety: 6 Nausea: 0   Anorexia: 4 Dyspnea: 7           Stool Occurrence(s): 1        PHYSICAL EXAM:     From RN flowsheet:  Wt Readings from Last 3 Encounters:   01/01/23 144 lb 11.2 oz (65.6 kg)   12/27/22 144 lb (65.3 kg)   12/21/22 144 lb 9.6 oz (65.6 kg)     Blood pressure (!) 148/59, pulse 77, temperature 99.8 °F (37.7 °C), resp. rate 30, SpO2 91 %.     Pain Scale 1: Numeric (0 - 10)  Pain Intensity 1: 5  Pain Onset 1: post op  Pain Location 1: Back  Pain Orientation 1: Left  Pain Description 1: Aching, Sharp  Pain Intervention(s) 1: Repositioned (refusing pain meds)  Last bowel movement, if known:     Constitutional: very lethargic, requiring a lot of stimulation to open her eyes, not interactive for me  ENMT: no nasal discharge, moist mucous membranes  Cardiovascular: regular rhythm, distal pulses intact  Respiratory: breathing labored, symmetric  Gastrointestinal: soft non-tender, +bowel sounds  Musculoskeletal: no deformity, no tenderness to palpation  Skin: warm, dry  Neurologic: not following commands  Other:       HISTORY:     Active Problems:    Staphylococcus aureus pneumonia (HCC) (1/4/2023)      Shortness of breath (1/5/2023)      Acute blood loss anemia (1/5/2023)      Hemothorax on left (1/5/2023)      Anxiety about health (1/5/2023)      Palliative care by specialist (1/5/2023)    Past Medical History:   Diagnosis Date    Cancer ([facility] [address]     BREAST CANCER    GERD (gastroesophageal reflux disease)     HTN (hypertension) 07/18/2011    Psychiatric disorder     ANIXETY AND DEPRESSION    Thyroid disease     HYPOTHYROID      Past Surgical History:   Procedure Laterality Date    HX COLONOSCOPY      HX MYOMECTOMY  05/02/1996    x 1    HX WISDOM TEETH EXTRACTION        Family History   Problem Relation Age of Onset    Heart Failure Mother     Cancer Father     Diabetes Sister     Kidney Disease Sister     Anesth Problems Neg Hx       History reviewed, no pertinent family history.   Social History     Tobacco Use    Smoking status: Never     Passive exposure: Never    Smokeless tobacco: Never   Substance Use Topics    Alcohol use: Never     Allergies   Allergen Reactions    Sulfa (Sulfonamide Antibiotics) Itching, Swelling and Unknown (comments)      Current Facility-Administered Medications   Medication Dose Route Frequency    0.9% sodium chloride infusion 250 mL  250 mL IntraVENous PRN    lidocaine 4 % patch 1 Patch  1 Patch TransDERmal Q24H    HYDROmorphone (DILAUDID) injection 0.5 mg  0.5 mg IntraVENous Q6H PRN    0.9% sodium chloride infusion 250 mL  250 mL IntraVENous PRN    lidocaine (XYLOCAINE) 4 % cream   Topical PRN    oxyCODONE IR (ROXICODONE) tablet 5 mg  5 mg Oral Q4H PRN    carvediloL (COREG) tablet 6.25 mg  6.25 mg Oral BID WITH MEALS    gabapentin (NEURONTIN) capsule 100 mg  100 mg Oral TID    PARoxetine (PAXIL) tablet 20 mg  20 mg Oral DAILY    rosuvastatin (CRESTOR) tablet 10 mg  10 mg Oral QHS    sodium chloride (NS) flush 5-40 mL  5-40 mL IntraVENous Q8H    sodium chloride (NS) flush 5-40 mL  5-40 mL IntraVENous PRN    acetaminophen (TYLENOL) tablet 650 mg  650 mg Oral Q6H PRN    Or    acetaminophen (TYLENOL) suppository 650 mg  650 mg Rectal Q6H PRN    polyethylene glycol (MIRALAX) packet 17 g  17 g Oral DAILY PRN    ondansetron (ZOFRAN ODT) tablet 4 mg  4 mg Oral Q8H PRN    Or    ondansetron (ZOFRAN) injection 4 mg  4 mg IntraVENous Q6H PRN    lidocaine 4 % patch 1 Patch  1 Patch TransDERmal Q24H    lactated Ringers infusion  100 mL/hr IntraVENous CONTINUOUS    VANCOMYCIN INFORMATION NOTE   Other Rx Dosing/Monitoring    cefepime (MAXIPIME) 1 g in 0.9% sodium chloride (MBP/ADV) 50 mL MBP  1 g IntraVENous Q12H          LAB AND IMAGING FINDINGS:     Lab Results   Component Value Date/Time    WBC 10.6 01/05/2023 04:56 AM    HGB 7.9 (L) 01/06/2023 06:07 AM    PLATELET 728 89/15/2088 04:56 AM     Lab Results   Component Value Date/Time    Sodium 141 01/06/2023 06:07 AM    Potassium 4.2 01/06/2023 06:07 AM    Chloride 111 (H) 01/06/2023 06:07 AM    CO2 24 01/06/2023 06:07 AM    BUN 35 (H) 01/06/2023 06:07 AM    Creatinine 3.26 (H) 01/06/2023 06:07 AM    Calcium 8.0 (L) 01/06/2023 06:07 AM    Magnesium 1.6 01/01/2023 04:40 AM    Phosphorus 3.2 01/01/2023 04:40 AM      Lab Results   Component Value Date/Time    Alk. phosphatase 111 01/03/2023 03:57 PM    Protein, total 7.1 01/03/2023 03:57 PM    Albumin 2.3 (L) 01/03/2023 03:57 PM    Globulin 4.8 (H) 01/03/2023 03:57 PM     No results found for: INR, PTMR, PTP, PT1, PT2, APTT, INREXT, INREXT   No results found for: IRON, FE, TIBC, IBCT, PSAT, FERR   No results found for: PH, PCO2, PO2  No components found for: GLPOC   No results found for: CPK, CKMB             Total time:   Counseling / coordination time, spent as noted above:   > 50% counseling / coordination?:     Prolonged service was provided for  []30 min   []75 min in face to face time in the presence of the patient, spent as noted above. Time Start:   Time End:   Note: this can only be billed with 23270 (initial) or 69131 (follow up). If multiple start / stop times, list each separately.

## 2023-01-06 NOTE — PROGRESS NOTES
Can[facility] [place] at [address] [facility], [address], [address] [facility] [address]  W: [phone] F: [phone]  Reason for Visit:   Conor Cordero is a 57 y.o. female with left breast cancer-ER 0%, MI 0%, HER2/radha negative. Treatment History:   10/1/22: Carboplatin taxol and keytruda followed by ddAC and Keytruda     History of Present Illness:   Mrs. Carmina Barahona is a patient of Dr. Fabi Nuñez at [facility].  She is followed for treatment of left breast cancer. She was recently admitted to [facility] for sepsis r/t COVID-pneumonia, CHRISTA, NSTEMI, hemoptysis and anemia. Pertinent treatment history as follows: She had a screening mammogram on 7/20/2022 which showed a 1 cm irregular mass in the left breast at 3 o'clock position. Biopsy on 8/2/2022 showed invasive ductal carcinoma, grade 2-3, ER 0%, MI 0%, HER2/radha negative. Ki-67 85%. Declined genetic testing. Recent Keytruda : last dose 12/14/2022  Last Chemotherapy: 12/21/2022     She had a screening mammogram on 7/20/2022 which showed a 1 cm irregular mass in the left breast at 3 o'clock position. Biopsy on 8/2/2022 showed invasive ductal carcinoma, grade 2-3, ER 0%, MI 0%, HER2/radha negative. Ki-67 85%. Declined genetic testing. Patient has now returned to the ED with complaints of fever and shortness of breath. She was found to have a large left pleural effusion and CHRISTA. Patient seen at bedside in hallway bed in ED. Discussed plan for thoracentesis today. Will discuss further plan of care once she has been placed in her room given no privacy in hallway. Interval History:     1/5/2023: Patient seen at bedside in ED. She is acutely ill-appearing and conversationally short of breath. Reviewed chest x-ray results at bedside with patient. At that time CT was still pending. She did give staff permission to access her port and agreed to transfusion of blood.   We did discuss that likely fluid accumulation can be attributed to malignant pleural effusion and this would mean cancer has moved into her lung. She verbalized understanding and agreement. 1/6/2023: Patient seen at bedside after working with PT. She was up in the chair complaining of pain to chest tube site. She verbalized frustrations at now being dependent upon others for ADLs. Reports she has always been fiercely independent. Upon discussion possibility of malignancy with presumed malignant pleural effusion patient been shut down and did not wish to speak further.       Past Medical History:   Diagnosis Date    Cancer ([address]     BREAST CANCER    GERD (gastroesophageal reflux disease)     HTN (hypertension) 07/18/2011    Psychiatric disorder     ANIXETY AND DEPRESSION    Thyroid disease     HYPOTHYROID      Past Surgical History:   Procedure Laterality Date    HX COLONOSCOPY      HX MYOMECTOMY  05/02/1996    x 1    HX WISDOM TEETH EXTRACTION        Social History     Tobacco Use    Smoking status: Never     Passive exposure: Never    Smokeless tobacco: Never   Substance Use Topics    Alcohol use: Never      Family History   Problem Relation Age of Onset    Heart Failure Mother     Cancer Father     Diabetes Sister     Kidney Disease Sister     Anesth Problems Neg Hx      Current Facility-Administered Medications   Medication Dose Route Frequency    0.9% sodium chloride infusion 250 mL  250 mL IntraVENous PRN    HYDROmorphone (DILAUDID) injection 1 mg  1 mg IntraVENous Q6H PRN    lidocaine 4 % patch 1 Patch  1 Patch TransDERmal Q24H    0.9% sodium chloride infusion 250 mL  250 mL IntraVENous PRN    lidocaine (XYLOCAINE) 4 % cream   Topical PRN    oxyCODONE IR (ROXICODONE) tablet 5 mg  5 mg Oral Q4H PRN    carvediloL (COREG) tablet 6.25 mg  6.25 mg Oral BID WITH MEALS    gabapentin (NEURONTIN) capsule 100 mg  100 mg Oral TID    PARoxetine (PAXIL) tablet 20 mg  20 mg Oral DAILY    rosuvastatin (CRESTOR) tablet 10 mg  10 mg Oral QHS    sodium chloride (NS) flush 5-40 mL  5-40 mL IntraVENous Q8H    sodium chloride (NS) flush 5-40 mL  5-40 mL IntraVENous PRN    acetaminophen (TYLENOL) tablet 650 mg  650 mg Oral Q6H PRN    Or    acetaminophen (TYLENOL) suppository 650 mg  650 mg Rectal Q6H PRN    polyethylene glycol (MIRALAX) packet 17 g  17 g Oral DAILY PRN    ondansetron (ZOFRAN ODT) tablet 4 mg  4 mg Oral Q8H PRN    Or    ondansetron (ZOFRAN) injection 4 mg  4 mg IntraVENous Q6H PRN    lidocaine 4 % patch 1 Patch  1 Patch TransDERmal Q24H    lactated Ringers infusion  100 mL/hr IntraVENous CONTINUOUS    VANCOMYCIN INFORMATION NOTE   Other Rx Dosing/Monitoring    cefepime (MAXIPIME) 1 g in 0.9% sodium chloride (MBP/ADV) 50 mL MBP  1 g IntraVENous Q12H      Allergies   Allergen Reactions    Sulfa (Sulfonamide Antibiotics) Itching, Swelling and Unknown (comments)        Review of Systems: not obtained    Physical Exam:   Visit Vitals  BP (!) 148/59   Pulse 77   Temp 99.8 °F (37.7 °C)   Resp 30   SpO2 91%         Results:     Lab Results   Component Value Date/Time    WBC 10.6 01/05/2023 04:56 AM    HGB 7.9 (L) 01/06/2023 06:07 AM    HCT 17.6 (LL) 01/05/2023 04:56 AM    PLATELET 361 27/99/5228 04:56 AM    MCV 94.6 01/05/2023 04:56 AM    ABS. NEUTROPHILS 8.5 (H) 01/05/2023 04:56 AM     Lab Results   Component Value Date/Time    Sodium 141 01/06/2023 06:07 AM    Potassium 4.2 01/06/2023 06:07 AM    Chloride 111 (H) 01/06/2023 06:07 AM    CO2 24 01/06/2023 06:07 AM    Glucose 90 01/06/2023 06:07 AM    BUN 35 (H) 01/06/2023 06:07 AM    Creatinine 3.26 (H) 01/06/2023 06:07 AM    GFR est AA >60 09/26/2022 03:26 PM    GFR est non-AA >60 09/26/2022 03:26 PM    Calcium 8.0 (L) 01/06/2023 06:07 AM    Glucose (POC) 103 12/27/2022 09:30 PM     Lab Results   Component Value Date/Time    Bilirubin, total 0.4 01/03/2023 03:57 PM    ALT (SGPT) 94 (H) 01/03/2023 03:57 PM    Alk.  phosphatase 111 01/03/2023 03:57 PM    Protein, total 7.1 01/03/2023 03:57 PM    Albumin 2.3 (L) 01/03/2023 03:57 PM    Globulin 4.8 (H) 01/03/2023 03:57 PM       External   Records reviewed and summarized above. Pathology report(s) reviewed above. Radiology report(s) reviewed above. MRI  9/2022    LEFT BREAST: Conglomerate mass enhancement in the left breast at 4-5 o'clock,  combined dimension 3.7 x 1.9 x 1.4 cm. This is slightly more extensive than the  sonographic and mammographic findings. No lymphadenopathy. Physical Exam  Constitutional:       Appearance: She is normal weight. HENT:      Head: Normocephalic. Eyes:      Pupils: Pupils are equal, round, and reactive to light. Pulmonary:      Comments: Conversationally SOB   Abdominal:      Palpations: Abdomen is soft. Musculoskeletal:         General: Normal range of motion. Skin:     General: Skin is warm and dry. Neurological:      General: No focal deficit present. Mental Status: She is alert. Psychiatric:         Mood and Affect: Mood normal.       Assessment/PLAN:     1) Left breast cancer  1 cm mass on mammogram  Palpated a lump  ER 0%, ME 0%, HER2/radha negative  Ki 67 was 85%. MRI breast with a 3.7 cm Left breast mass  [id number]  AJCC 8th ed-Stage IIA  Genetic testing negative  Recommended chemotherapy + Keytruda based on the KEYNOTE-522 trial (taxol, carboplatin and [place] followed by [facility] as preoperative treatment, followed by surgery, and then adjuvant pembrolizumab for another nine cycles (27 weeks) after surgery). Recent admission and discharge from [facility] for COVID-pneumonia, sepsis, CHRISTA, NSTEMI and anemia    Last chemo with taxol 12/21/22. Last Keytruda 12/14/22. CT chest 1/3/2003: Increased moderately large left pleural effusion. Unchanged patchy bilateral  consolidation, left greater than right, compatible with pneumonia.     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 Left-sided hemothorax with loculation toward the apex. 2. Right upper lobe and middle lobe infiltrates.     S/p chest tube placement after pneumothorax development  Appreciate pulmonology input, will leave chest tube through this weekend    4) Acute anemia related to hemothorax  Hemoglobin dropped from 9 to 5.6 overnight on 1/4  CT results showed new hemothorax  S/p PRBC transfusion  Continue to trend CBC, now stable    Will see as needed over the weekend, please call with questions or concerns    Signed By: Babak Burks NP

## 2023-01-06 NOTE — PROGRESS NOTES
Pulmonary, Critical Care, and Sleep Medicine      Name: Maria Guadalupe Canales MRN: [id number]   : 1960 Hospital: [address]   Date: 2023        IMPRESSION:   Pneumonia  Pulmonary edema  Left pleural effusion  Anemia       RECOMMENDATIONS:   Not hypoxic, no need for O2  Recommend procalcitonin and BNP trending  Started on IV abx per hospitalist team  S/P left thoracentesis  Hgb dropped after thoracentesis, now better  Chest tube in place  Will keep chest tube in place through the weekend   Repeat cxr Monday  Will follow monday     Subjective:     No acute events overnight  Chest tube placed by IR, CXR improved  No severe distress        Current Facility-Administered Medications   Medication Dose Route Frequency    vancomycin (VANCOCIN) 750 mg in 0.9% sodium chloride 250 mL (Tths0Uni)  750 mg IntraVENous ONCE    carvediloL (COREG) tablet 6.25 mg  6.25 mg Oral BID WITH MEALS    gabapentin (NEURONTIN) capsule 100 mg  100 mg Oral TID    PARoxetine (PAXIL) tablet 20 mg  20 mg Oral DAILY    rosuvastatin (CRESTOR) tablet 10 mg  10 mg Oral QHS    sodium chloride (NS) flush 5-40 mL  5-40 mL IntraVENous Q8H    lidocaine 4 % patch 1 Patch  1 Patch TransDERmal Q24H    lactated Ringers infusion  100 mL/hr IntraVENous CONTINUOUS    VANCOMYCIN INFORMATION NOTE   Other Rx Dosing/Monitoring    cefepime (MAXIPIME) 1 g in 0.9% sodium chloride (MBP/ADV) 50 mL MBP  1 g IntraVENous Q12H       Review of Systems:  A comprehensive review of systems was negative except for: Constitutional: positive for fatigue, malaise, and anorexia    Objective:   Vital Signs:    Visit Vitals  /68   Pulse 81   Temp (!) 101.4 °F (38.6 °C)   Resp 21   SpO2 94%       O2 Device: Nasal cannula   O2 Flow Rate (L/min): 2 l/min   Temp (24hrs), Av.6 °F (37.6 °C), Min:98.7 °F (37.1 °C), Max:101.4 °F (38.6 °C)       Intake/Output:   Last shift:      No intake/output data recorded.   Last 3 shifts:  1901 -  0700  In: 3014.6 [P.O.:200; I.V.:2200]  Out: 450     Intake/Output Summary (Last 24 hours) at 1/6/2023 1091  Last data filed at 1/6/2023 0518  Gross per 24 hour   Intake 2814.58 ml   Output 450 ml   Net 2364.58 ml        Physical Exam:   General:  Alert, cooperative, no distress, appears stated age. Head:  Normocephalic, without obvious abnormality, atraumatic. Eyes:  Conjunctivae/corneas clear. PERRL, EOMs intact. Nose: Nares normal. Septum midline. Mucosa normal. No drainage or sinus tenderness. Throat: Lips, mucosa, and tongue normal. Teeth and gums normal.   Neck: Supple, symmetrical, trachea midline, no adenopathy, thyroid: no enlargment/tenderness/nodules, no carotid bruit and no JVD. Back:   Symmetric, no curvature. ROM normal.   Lungs:   Clear to auscultation bilaterally. Chest wall:  No tenderness or deformity. Heart:  Regular rate and rhythm, S1, S2 normal, no murmur, click, rub or gallop. Abdomen:   Soft, non-tender. Bowel sounds normal. No masses,  No organomegaly. Extremities: Extremities normal, atraumatic, no cyanosis or edema. Pulses: 2+ and symmetric all extremities.    Skin: Skin color, texture, turgor normal. No rashes or lesions   Lymph nodes: Cervical, supraclavicular, and axillary nodes normal.   Neurologic: Grossly nonfocal     Data review:     Recent Results (from the past 24 hour(s))   TYPE & SCREEN    Collection Time: 01/05/23  1:18 PM   Result Value Ref Range    Crossmatch Expiration 01/08/2023,2359     ABO/Rh(D) O POSITIVE     Antibody screen NEG     Unit number [id number]     Blood component type [facility]     Unit division 00     Status of unit ISSUED     Crossmatch result Compatible     Unit number [id number]     Blood component type [facility]     Unit division 00     Status of unit ISSUED     Crossmatch result Compatible    HEMOGLOBIN    Collection Time: 01/05/23  2:09 PM   Result Value Ref Range    HGB 5.1 (LL) 11.5 - 16.0 g/dL   HEMOGLOBIN    Collection Time: 01/05/23 10:28 PM Result Value Ref Range    HGB 6.5 (L) 11.5 - 16.0 g/dL   RBC, ALLOCATE    Collection Time: 01/06/23  1:00 AM   Result Value Ref Range    HISTORY CHECKED?  Historical check performed    PROCALCITONIN    Collection Time: 01/06/23  6:07 AM   Result Value Ref Range    Procalcitonin 4.91 ng/mL   NT-PRO BNP    Collection Time: 01/06/23  6:07 AM   Result Value Ref Range    NT pro-BNP 2,165 (H) <251 PG/ML   METABOLIC PANEL, BASIC    Collection Time: 01/06/23  6:07 AM   Result Value Ref Range    Sodium 141 136 - 145 mmol/L    Potassium 4.2 3.5 - 5.1 mmol/L    Chloride 111 (H) 97 - 108 mmol/L    CO2 24 21 - 32 mmol/L    Anion gap 6 5 - 15 mmol/L    Glucose 90 65 - 100 mg/dL    BUN 35 (H) 6 - 20 MG/DL    Creatinine 3.26 (H) 0.55 - 1.02 MG/DL    BUN/Creatinine ratio 11 (L) 12 - 20      eGFR 15 (L) >60 ml/min/1.73m2    Calcium 8.0 (L) 8.5 - 10.1 MG/DL   VANCOMYCIN, RANDOM    Collection Time: 01/06/23  6:07 AM   Result Value Ref Range    Vancomycin, random 10.8 UG/ML   HEMOGLOBIN    Collection Time: 01/06/23  6:07 AM   Result Value Ref Range    HGB 7.9 (L) 11.5 - 16.0 g/dL       Imaging:  I have personally reviewed the patients radiographs and have reviewed the reports:  CXR: improved        Mikki Garrison MD

## 2023-01-06 NOTE — PROGRESS NOTES
0915-Per , pt may be removed from droplet plus contact as her rapid covid test on 1/3/23 was negative. Will d/c droplet precautions.

## 2023-01-06 NOTE — PROGRESS NOTES
Problem: Self Care Deficits Care Plan (Adult)  Goal: *Acute Goals and Plan of Care (Insert Text)  Description: FUNCTIONAL STATUS PRIOR TO ADMISSION: Patient was independent and active without use of DME.     HOME SUPPORT: The patient lived alone with no local support. Occupational Therapy Goals  Initiated 1/6/2023  1. Patient will perform grooming at the sink with supervision/set-up within 7 day(s). 2.  Patient will perform bathing at the sink with minimal assistance/contact guard assist within 7 day(s). 3.  Patient will perform lower body dressing with moderate assistance  within 7 day(s). 4.  Patient will perform toilet transfers with supervision/set-up within 7 day(s). 5.  Patient will perform all aspects of toileting with supervision/set-up within 7 day(s). 6.  Patient will participate in upper extremity therapeutic exercise/activities with supervision/set-up for 5 minutes within 7 day(s). 7.  Patient will utilize energy conservation techniques during functional activities with verbal cues within 7 day(s). Outcome: Not Met   OCCUPATIONAL THERAPY EVALUATION  Patient: Colt Ramirez (84 y.o. female)  Date: 1/6/2023  Primary Diagnosis: Staphylococcus aureus pneumonia ([facility] [J15.211]       Precautions:        ASSESSMENT  Based on the objective data described below, the patient presents with decreased endurance, strength, functional mobility, ADLs, pt was irritable about therapy asking her questions of her prior level. Pt was living at home, alone and stated she was independent with ADLs and ILS and did not have support. She was SBA for rolling and supine to sit with modified bed, and extra time. She was max to mod to scoot to EOb. Pt stood with min of 1 and transferred to chair with min of 1. She left sitting up in chair and call bell with in reach, and was pleased to be up in the chair at end of session.      Current Level of Function Impacting Discharge (ADLs/self-care): max for ADLs, decreased endurance, strength, GW. Functional Outcome Measure: The patient scored 50/100 on the Barthel outcome measure which is indicative of max for ADLs . Other factors to consider for discharge: pt reports that she lives alone with no support      Patient will benefit from skilled therapy intervention to address the above noted impairments. PLAN :  Recommendations and Planned Interventions: self care training, functional mobility training, therapeutic exercise, balance training, therapeutic activities, endurance activities, patient education, home safety training, and family training/education    Frequency/Duration: Patient will be followed by occupational therapy 4 times a week to address goals. Recommendation for discharge: (in order for the patient to meet his/her long term goals)  Therapy 3 hours per day 5-7 days per week    This discharge recommendation:  Has been made in collaboration with the attending provider and/or case management    IF patient discharges home will need the following DME: tbd       SUBJECTIVE:   Patient stated I dont know if it will be better sitting up or not.     OBJECTIVE DATA SUMMARY:   HISTORY:   Past Medical History:   Diagnosis Date    Cancer ([facility] [address]     BREAST CANCER    GERD (gastroesophageal reflux disease)     HTN (hypertension) 07/18/2011    Psychiatric disorder     ANIXETY AND DEPRESSION    Thyroid disease     HYPOTHYROID     Past Surgical History:   Procedure Laterality Date    HX COLONOSCOPY      HX MYOMECTOMY  05/02/1996    x 1    HX WISDOM TEETH EXTRACTION         Expanded or extensive additional review of patient history:     Home Situation  Home Environment: Private residence  # Steps to Enter: 5  Rails to Enter: Yes  Hand Rails : Bilateral (wide)  One/Two Story Residence: Two story  # of Interior Steps: 12  Interior Rails: Left  Lift Chair Available: No  Living Alone: Yes  Patient Expects to be Discharged to[de-identified] Other: ([address])  Tub or Shower Type: Tub    Hand dominance: Right    EXAMINATION OF PERFORMANCE DEFICITS:  Cognitive/Behavioral Status:  Neurologic State: Irritable; Alert  Orientation Level: Oriented X4  Cognition: Follows commands  Perception: Appears intact  Perseveration: No perseveration noted  Safety/Judgement: Awareness of environment    Skin: in fair health     Edema: none    Hearing:intact       Vision/Perceptual:            intact                         Range of Motion:    AROM: Generally decreased, functional  PROM: Generally decreased, functional                      Strength:    Strength: Generally decreased, functional                Coordination:  Coordination: Within functional limits  Fine Motor Skills-Upper: Left Intact; Right Intact    Gross Motor Skills-Upper: Left Intact; Right Intact    Tone & Sensation:    Tone: Normal  Sensation: Intact                      Balance:  Sitting: Intact  Standing: Impaired; Without support  Standing - Static: Fair;Constant support  Standing - Dynamic : Fair;Constant support    Functional Mobility and Transfers for ADLs:  Bed Mobility:  Rolling: Stand-by assistance;Bed Modified; Additional time  Supine to Sit: Bed Modified; Additional time;Stand-by assistance  Scooting: Maximum assistance;Assist x1;Additional time    Transfers:  Sit to Stand: Minimum assistance;Assist x1  Stand to Sit: Minimum assistance;Assist x1  Bed to Chair: Minimum assistance;Assist x1  Toilet Transfer : Minimum assistance;Assist x1    ADL Assessment:  Feeding: Setup    Oral Facial Hygiene/Grooming: Setup    Bathing: Maximum assistance         Upper Body Dressing: Maximum assistance;Minimum assistance    Lower Body Dressing: Maximum assistance    Toileting: Maximum assistance;Minimum assistance                Cognitive Retraining  Safety/Judgement: Awareness of environment           Functional Measure:    Barthel Index:  Bathin  Bladder: 10  Bowels: 10  Groomin  Dressin  Feedin  Mobility: 0  Stairs: 0  Toilet Use: 5  Transfer (Bed to Chair and Back): 10  Total: 50/100      The Barthel ADL Index: Guidelines  1. The index should be used as a record of what a patient does, not as a record of what a patient could do. 2. The main aim is to establish degree of independence from any help, physical or verbal, however minor and for whatever reason. 3. The need for supervision renders the patient not independent. 4. A patient's performance should be established using the best available evidence. Asking the patient, friends/relatives and nurses are the usual sources, but direct observation and common sense are also important. However direct testing is not needed. 5. Usually the patient's performance over the preceding 24-48 hours is important, but occasionally longer periods will be relevant. 6. Middle categories imply that the patient supplies over 50 per cent of the effort. 7. Use of aids to be independent is allowed. Score Interpretation (from [address] [address])    Independent   60-79 Minimally independent   40-59 Partially dependent   20-39 Very dependent   <20 Totally dependent     -Danuta Bejarano., Barthel, D.W. (1965). Functional evaluation: the Barthel Index. [address] [facility] ([address] (1997). The Barthel activities of daily living index: self-reporting versus actual performance in the old (> or = 75 years). Journal [address] 45(7), 14 [facility], ..M.F, Ras Pang., Camelia Sosa. (1999). Measuring the change in disability after inpatient rehabilitation; comparison of the responsiveness of the Barthel Index and Functional [place] Measure. Journal of Neurology, Neurosurgery, and Psychiatry, 66(4), 311-920. Romel Don NELISABETH.JOSE, IRMA Ariza, & Arielle Coronado M.A. (2004) Assessment of post-stroke quality of life in cost-effectiveness studies: The usefulness of the Barthel Index and the EuroQoL-5D.  Quality of Life Research, 13, 080-69         Occupational Therapy Evaluation Charge Determination   History Examination Decision-Making   LOW Complexity : Brief history review  LOW Complexity : 1-3 performance deficits relating to physical, cognitive , or psychosocial skils that result in activity limitations and / or participation restrictions  LOW Complexity : No comorbidities that affect functional and no verbal or physical assistance needed to complete eval tasks       Based on the above components, the patient evaluation is determined to be of the following complexity level: LOW   Pain Rating:  Pt stated chest tube pain was an 8    Activity Tolerance:   Fair    After treatment patient left in no apparent distress:    Sitting in chair, Call bell within reach, and Bed / chair alarm activated    COMMUNICATION/EDUCATION:   The patients plan of care was discussed with: Physical therapist and Registered nurse. Patient understands intent and goals of therapy, but is neutral about his/her participation. This patients plan of care is appropriate for delegation to [facility].     Thank you for this referral.  Sathish Garg OT  Time Calculation: 52 mins

## 2023-01-06 NOTE — PROGRESS NOTES
Palliative Medicine    LCSW attempted to see patient and she was having therapy, so planned to return later. Sheldon Donaldson NP reported patient was too lethargic to participate in ACP discussion today due to pain medication. LCSW will follow up next week as appropriate. Thank you for including Palliative Medicine in the care of Ms. Daniel Noguera.     Dave Wick LCSW  [phone] ([phone])

## 2023-01-07 LAB
ABO + RH BLD: NORMAL
ANION GAP SERPL CALC-SCNC: 6 MMOL/L (ref 5–15)
BLD PROD TYP BPU: NORMAL
BLD PROD TYP BPU: NORMAL
BLOOD GROUP ANTIBODIES SERPL: NORMAL
BNP SERPL-MCNC: 9407 PG/ML
BPU ID: NORMAL
BPU ID: NORMAL
BUN SERPL-MCNC: 37 MG/DL (ref 6–20)
BUN/CREAT SERPL: 10 (ref 12–20)
CALCIUM SERPL-MCNC: 8 MG/DL (ref 8.5–10.1)
CHLORIDE SERPL-SCNC: 110 MMOL/L (ref 97–108)
CO2 SERPL-SCNC: 22 MMOL/L (ref 21–32)
CREAT SERPL-MCNC: 3.88 MG/DL (ref 0.55–1.02)
CROSSMATCH RESULT,%XM: NORMAL
CROSSMATCH RESULT,%XM: NORMAL
ERYTHROCYTE [DISTWIDTH] IN BLOOD BY AUTOMATED COUNT: 17.2 % (ref 11.5–14.5)
GLUCOSE SERPL-MCNC: 116 MG/DL (ref 65–100)
HCT VFR BLD AUTO: 24.8 % (ref 35–47)
HGB BLD-MCNC: 7.7 G/DL (ref 11.5–16)
HGB BLD-MCNC: 7.9 G/DL (ref 11.5–16)
HGB BLD-MCNC: 7.9 G/DL (ref 11.5–16)
MCH RBC QN AUTO: 29.3 PG (ref 26–34)
MCHC RBC AUTO-ENTMCNC: 31.9 G/DL (ref 30–36.5)
MCV RBC AUTO: 91.9 FL (ref 80–99)
NRBC # BLD: 0 K/UL (ref 0–0.01)
NRBC BLD-RTO: 0 PER 100 WBC
PLATELET # BLD AUTO: 118 K/UL (ref 150–400)
PMV BLD AUTO: 9.9 FL (ref 8.9–12.9)
POTASSIUM SERPL-SCNC: 4.3 MMOL/L (ref 3.5–5.1)
PROCALCITONIN SERPL-MCNC: 6.55 NG/ML
RBC # BLD AUTO: 2.7 M/UL (ref 3.8–5.2)
SODIUM SERPL-SCNC: 138 MMOL/L (ref 136–145)
SPECIMEN EXP DATE BLD: NORMAL
STATUS OF UNIT,%ST: NORMAL
STATUS OF UNIT,%ST: NORMAL
UNIT DIVISION, %UDIV: 0
UNIT DIVISION, %UDIV: 0
VANCOMYCIN SERPL-MCNC: 16.5 UG/ML
WBC # BLD AUTO: 11.2 K/UL (ref 3.6–11)

## 2023-01-07 PROCEDURE — 83880 ASSAY OF NATRIURETIC PEPTIDE: CPT

## 2023-01-07 PROCEDURE — 85018 HEMOGLOBIN: CPT

## 2023-01-07 PROCEDURE — 74011250636 HC RX REV CODE- 250/636: Performed by: PHYSICIAN ASSISTANT

## 2023-01-07 PROCEDURE — 80202 ASSAY OF VANCOMYCIN: CPT

## 2023-01-07 PROCEDURE — 74011000250 HC RX REV CODE- 250: Performed by: STUDENT IN AN ORGANIZED HEALTH CARE EDUCATION/TRAINING PROGRAM

## 2023-01-07 PROCEDURE — 74011250636 HC RX REV CODE- 250/636: Performed by: INTERNAL MEDICINE

## 2023-01-07 PROCEDURE — 77010033678 HC OXYGEN DAILY

## 2023-01-07 PROCEDURE — 51798 US URINE CAPACITY MEASURE: CPT

## 2023-01-07 PROCEDURE — 74011000258 HC RX REV CODE- 258: Performed by: STUDENT IN AN ORGANIZED HEALTH CARE EDUCATION/TRAINING PROGRAM

## 2023-01-07 PROCEDURE — 74011000250 HC RX REV CODE- 250: Performed by: INTERNAL MEDICINE

## 2023-01-07 PROCEDURE — 80048 BASIC METABOLIC PNL TOTAL CA: CPT

## 2023-01-07 PROCEDURE — 74011250637 HC RX REV CODE- 250/637: Performed by: STUDENT IN AN ORGANIZED HEALTH CARE EDUCATION/TRAINING PROGRAM

## 2023-01-07 PROCEDURE — 74011250636 HC RX REV CODE- 250/636: Performed by: STUDENT IN AN ORGANIZED HEALTH CARE EDUCATION/TRAINING PROGRAM

## 2023-01-07 PROCEDURE — 65270000046 HC RM TELEMETRY

## 2023-01-07 PROCEDURE — 84145 PROCALCITONIN (PCT): CPT

## 2023-01-07 PROCEDURE — 85027 COMPLETE CBC AUTOMATED: CPT

## 2023-01-07 PROCEDURE — 36415 COLL VENOUS BLD VENIPUNCTURE: CPT

## 2023-01-07 RX ORDER — HYDROMORPHONE HYDROCHLORIDE 1 MG/ML
0.5 INJECTION, SOLUTION INTRAMUSCULAR; INTRAVENOUS; SUBCUTANEOUS
Status: DISCONTINUED | OUTPATIENT
Start: 2023-01-07 | End: 2023-01-24 | Stop reason: HOSPADM

## 2023-01-07 RX ORDER — METOPROLOL TARTRATE 5 MG/5ML
2.5 INJECTION INTRAVENOUS
Status: DISCONTINUED | OUTPATIENT
Start: 2023-01-07 | End: 2023-01-24 | Stop reason: HOSPADM

## 2023-01-07 RX ADMIN — GABAPENTIN 100 MG: 100 CAPSULE ORAL at 15:27

## 2023-01-07 RX ADMIN — HYDROMORPHONE HYDROCHLORIDE 0.5 MG: 1 INJECTION, SOLUTION INTRAMUSCULAR; INTRAVENOUS; SUBCUTANEOUS at 09:57

## 2023-01-07 RX ADMIN — ROSUVASTATIN CALCIUM 10 MG: 10 TABLET, COATED ORAL at 21:29

## 2023-01-07 RX ADMIN — HYDROMORPHONE HYDROCHLORIDE 0.5 MG: 1 INJECTION, SOLUTION INTRAMUSCULAR; INTRAVENOUS; SUBCUTANEOUS at 22:33

## 2023-01-07 RX ADMIN — SODIUM CHLORIDE, PRESERVATIVE FREE 10 ML: 5 INJECTION INTRAVENOUS at 21:30

## 2023-01-07 RX ADMIN — CARVEDILOL 6.25 MG: 6.25 TABLET, FILM COATED ORAL at 17:10

## 2023-01-07 RX ADMIN — METOPROLOL TARTRATE 2.5 MG: 5 INJECTION INTRAVENOUS at 22:34

## 2023-01-07 RX ADMIN — METOPROLOL TARTRATE 2.5 MG: 5 INJECTION INTRAVENOUS at 16:00

## 2023-01-07 RX ADMIN — SODIUM CHLORIDE, PRESERVATIVE FREE 10 ML: 5 INJECTION INTRAVENOUS at 05:22

## 2023-01-07 RX ADMIN — VANCOMYCIN HYDROCHLORIDE 750 MG: 750 INJECTION, POWDER, LYOPHILIZED, FOR SOLUTION INTRAVENOUS at 11:21

## 2023-01-07 RX ADMIN — CEFEPIME 1 G: 1 INJECTION, POWDER, FOR SOLUTION INTRAMUSCULAR; INTRAVENOUS at 12:23

## 2023-01-07 RX ADMIN — PAROXETINE 20 MG: 20 TABLET, FILM COATED ORAL at 09:59

## 2023-01-07 RX ADMIN — ACETAMINOPHEN 650 MG: 325 TABLET ORAL at 15:29

## 2023-01-07 RX ADMIN — GABAPENTIN 100 MG: 100 CAPSULE ORAL at 09:59

## 2023-01-07 RX ADMIN — ACETAMINOPHEN 650 MG: 325 TABLET ORAL at 02:36

## 2023-01-07 RX ADMIN — HYDROMORPHONE HYDROCHLORIDE 0.5 MG: 1 INJECTION, SOLUTION INTRAMUSCULAR; INTRAVENOUS; SUBCUTANEOUS at 17:10

## 2023-01-07 RX ADMIN — CARVEDILOL 6.25 MG: 6.25 TABLET, FILM COATED ORAL at 09:59

## 2023-01-07 RX ADMIN — GABAPENTIN 100 MG: 100 CAPSULE ORAL at 21:29

## 2023-01-07 RX ADMIN — SODIUM CHLORIDE, PRESERVATIVE FREE 10 ML: 5 INJECTION INTRAVENOUS at 14:00

## 2023-01-07 RX ADMIN — CEFEPIME 1 G: 1 INJECTION, POWDER, FOR SOLUTION INTRAMUSCULAR; INTRAVENOUS at 22:06

## 2023-01-07 RX ADMIN — HYDROMORPHONE HYDROCHLORIDE 0.5 MG: 1 INJECTION, SOLUTION INTRAMUSCULAR; INTRAVENOUS; SUBCUTANEOUS at 05:18

## 2023-01-07 NOTE — PROGRESS NOTES
Physical Therapy    Attempted again to see pt for eval. Cleared by RN but upon entering room, pt appearing very uncomfortable and labored in breathing. Sats stable. Pt stating she wanted to reposition in bed. Used hercules sheet to pull pt up in bed and RN arrived to assist as needed otherwise. Pt requested to sit straight up in bed and was assisted with HHA of RN and PT. Pt noted to feel very hot to touch. She voiced not being comfortable but unable to tolerate any other activity to reposition. Pt positioned up in bed and RN with pt to continue assess. RN taking temp and noted to be above 102. Session aborted. Will follow.     Vasu Cam PT  11 min contact time

## 2023-01-07 NOTE — PROGRESS NOTES
0700: Bedside shift change report given to [id number] (oncoming nurse) by Estrella Suarez RN (offgoing nurse). Report included the following information SBAR, Kardex, Intake/Output, and MAR.      0957: PRN dilaudid given     1400: Hgb (lavender tube) sent    1430: Hgb 7.9    1453: Pt's temp 102.8, PRN tylenol given and room cooled down. Pt resting comfortably      1710: PRN dilaudid given    1900: End of Shift Note    Bedside shift change report given to Da  (oncoming nurse) by Pema Blue RN (offgoing nurse). Report included the following information SBAR, Kardex, Intake/Output, and MAR    Shift worked:  6778-2028     Shift summary and any significant changes:     Pt had fever today, tylenol given. Hgb 7.9, resting comfortably     Concerns for physician to address:  N/a     Zone phone for oncoming shift:          Activity:  Activity Level: Bed Rest  Number times ambulated in hallways past shift: 0  Number of times OOB to chair past shift: 2    Cardiac:   Cardiac Monitoring: Yes      Cardiac Rhythm: Sinus Rhythm    Access:  Current line(s): PIV and port     Genitourinary:   Urinary status: voiding    Respiratory:   O2 Device: Nasal cannula  Chronic home O2 use?: NO  Incentive spirometer at bedside: N/A       GI:  Last Bowel Movement Date: 01/03/23  Current diet:  ADULT DIET Regular  Passing flatus: YES  Tolerating current diet: YES       Pain Management:   Patient states pain is manageable on current regimen: YES    Skin:  William Score: 15  Interventions: increase time out of bed    Patient Safety:  Fall Score:  Total Score: 3  Interventions: assistive device (walker, cane, etc)  High Fall Risk: Yes    Length of Stay:  Expected LOS: 3d 14h  Actual LOS: 3      Pema Blue RN

## 2023-01-07 NOTE — PROGRESS NOTES
Pharmacy Antimicrobial Kinetic Dosing    Indication for Antimicrobials: hap     Current Regimen of Each Antimicrobial:  Cefepime 1 gm iv every 12 hr (Start Date 1/3; Day # 3)  Levaquin 750 mg iv once then 500 mg iv every other day (Start Date 1/3; Day # 3)  Vancomycin 1500 mg iv once then dose by level (Start Date 1/3; Day # 3)    Previous Antimicrobial Therapy:   (Start Date ; Day    Goal Level:     Date Dose & Interval Measured (mcg/mL) Predicted AUC/SHAMAR    @ 0423 Loading dose 14.9     @ 0607 750mg x 1 10.8     750 mg x1 16.5      Date & time of next level:     Dosing calculator used: none, dose by levels    Significant Positive Cultures:   1/3: blood - ngtd    Conditions for Dosing Consideration:     Labs:  Recent Labs     23  0304 23  0607 23  0423   CREA 3.88* 3.26* 3.41*   BUN 37* 35* 39*   PCT 6.55 4.91 5.28     Recent Labs     23  0304 23  1527 23  0456   WBC 11.2* 15.3* 10.6     Temp (24hrs), Av.6 °F (37.6 °C), Min:99 °F (37.2 °C), Max:100.3 °F (37.9 °C)    Creatinine Clearance (mL/min):   CrCl (Adjusted Body Weight): 12.4 mL/min   If actual weight < IBW: CrCl (Actual Body Weight) 15.2    Impression/Plan:   Vancomycin random level was 16.5 this AM. Will schedule 750 mg x1 for later this morning  Vancomycin random level  @ 1200  Antimicrobial stop date pending     Pharmacy will follow daily and adjust medications as appropriate for renal function and/or serum levels.     Thank you,  Zachary Kennedy, PHARMD

## 2023-01-07 NOTE — PROGRESS NOTES
1900: Bedside shift change report given to Singh Gutierrez (oncoming nurse) by Daisy Hilliard RN (offgoing nurse). Report included the following information SBAR, Kardex, ED Summary, Intake/Output, MAR, Recent Results, and Cardiac Rhythm NSR . End of Shift Note    Bedside shift change report given to [id number] (oncoming nurse) by Randy Engle RN (offgoing nurse). Report included the following information SBAR, Kardex, ED Summary, Intake/Output, MAR, Recent Results, and Cardiac Rhythm NSR    Shift worked:  [id number]     Shift summary and any significant changes:     BNP and creatinine increasing? ? Bladder scan patient only 135ml. Patient with output of 50ml. Patient continues to complain of pain, dilaudid given. Concerns for physician to address:  Palliative and MD needs to talk to juliano regarding patients care, treatments and plans. Zone phone for oncoming shift:          Activity:  Activity Level: Bed Rest  Number times ambulated in hallways past shift: 0  Number of times OOB to chair past shift: 0    Cardiac:   Cardiac Monitoring: Yes      Cardiac Rhythm: Sinus Rhythm    Access:  Current line(s): PIV and port     Genitourinary:   Urinary status: voiding    Respiratory:   O2 Device: Nasal cannula  Chronic home O2 use?: YES  Incentive spirometer at bedside: NO       GI:  Last Bowel Movement Date: 01/03/23  Current diet:  ADULT DIET Regular  Passing flatus: YES  Tolerating current diet: YES       Pain Management:   Patient states pain is manageable on current regimen: YES    Skin:  William Score: 15  Interventions: float heels, increase time out of bed, PT/OT consult, and internal/external urinary devices    Patient Safety:  Fall Score:  Total Score: 3  Interventions: bed/chair alarm, assistive device (walker, cane, etc), gripper socks, and pt to call before getting OOB  High Fall Risk: Yes    Length of Stay:  Expected LOS: 3d 14h  Actual LOS: [address] Mike Walsh RN

## 2023-01-07 NOTE — PROGRESS NOTES
Hospitalist Progress Note    Subjective:   Daily Progress Note: 1/7/2023 3:42 PM    Hospital Course:  57year old Female with hx of left breast cancer, recently admitted to [facility] for sepsis s/s COVID-19, CHRISTA, NSTEMI, MSSA pneumonia(12/28-1/1) presented to ED with c/o shortness of breath and hemoptysis. IN ED, patient was febrile to 100.8F. CXR demonstrated increased left pleural effusion with unchanged patchy B/L consolidation L>R. Patient had IR thoracentesis on 1/4/23- bloody tap. CT chest on 1/5 showed left sided hemothorax. Hb dropped from 9.1 to 5.6. s/p left chest tube placement on 1/5/23. S/p PRBC transfusion. Subjective:  Patient reports pain at the chest tube drain site. I asked patient if she would like me to call her sister to give updates. Patient stated that her sister knows everything and requested not to call her. Assessment / Plan:  Acute hypoxic respiratory failure  Multifocal pneumonia  Left sided hemothorax  Continue antibiotics- vancomycin and cefepime  Continue supplemental oxygen and titrate as needed  S/p left thoracentesis by IR on 1/4/23  S/p left chest tube placement for left sided hemothorax by IR on 1/5/23  If Hb drops again, will consider CTA chest to see if embolization if possible with IR. If we consider stat CTA chest, will consult nephrology    Acute blood loss anemia:  S/s left hemothorax  S/p PRBC transfusion on 1/5/23. 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     Code status: Full  Prophylaxis: SCD's  Recommended Disposition: HH PT, OT, RN  ALEXANDRA>48hours  Barrier: clinical improvement Current Facility-Administered Medications   Medication Dose Route Frequency    HYDROmorphone (DILAUDID) injection 0.5 mg  0.5 mg IntraVENous Q4H PRN    vancomycin (VANCOCIN) 750 mg in 0.9% sodium chloride 250 mL ([id number])  750 mg IntraVENous ONCE    [START ON 1/8/2023] Vancomycin level 1/8 @ 1200   Other ONCE    0.9% sodium chloride infusion 250 mL  250 mL IntraVENous PRN    lidocaine 4 % patch 1 Patch  1 Patch TransDERmal Q24H    0.9% sodium chloride infusion 250 mL  250 mL IntraVENous PRN    lidocaine (XYLOCAINE) 4 % cream   Topical PRN    oxyCODONE IR (ROXICODONE) tablet 5 mg  5 mg Oral Q4H PRN    carvediloL (COREG) tablet 6.25 mg  6.25 mg Oral BID WITH MEALS    gabapentin (NEURONTIN) capsule 100 mg  100 mg Oral TID    PARoxetine (PAXIL) tablet 20 mg  20 mg Oral DAILY    rosuvastatin (CRESTOR) tablet 10 mg  10 mg Oral QHS    sodium chloride (NS) flush 5-40 mL  5-40 mL IntraVENous Q8H    sodium chloride (NS) flush 5-40 mL  5-40 mL IntraVENous PRN    acetaminophen (TYLENOL) tablet 650 mg  650 mg Oral Q6H PRN    Or    acetaminophen (TYLENOL) suppository 650 mg  650 mg Rectal Q6H PRN    polyethylene glycol (MIRALAX) packet 17 g  17 g Oral DAILY PRN    ondansetron (ZOFRAN ODT) tablet 4 mg  4 mg Oral Q8H PRN    Or    ondansetron (ZOFRAN) injection 4 mg  4 mg IntraVENous Q6H PRN    lidocaine 4 % patch 1 Patch  1 Patch TransDERmal Q24H    lactated Ringers infusion  100 mL/hr IntraVENous CONTINUOUS    VANCOMYCIN INFORMATION NOTE   Other Rx Dosing/Monitoring    cefepime (MAXIPIME) 1 g in 0.9% sodium chloride (MBP/ADV) 50 mL MBP  1 g IntraVENous Q12H        Review of Systems  Constitutional: No fevers, No chills, No sweats, No fatigue, No Weakness  Eyes: No redness  Ears, nose, mouth, throat, and face: No nasal congestion, No sore throat, No voice change  Respiratory: No Shortness of Breath, No cough, No wheezing  Cardiovascular: has chest pain at the site of pleural catheter, No palpitations, No extremity edema  Gastrointestinal: No nausea, No vomiting, No diarrhea, No abdominal pain  Genitourinary: No frequency, No dysuria, No hematuria  Integument/breast: No skin lesion(s)   Neurological: No Confusion, No headaches, No dizziness      Objective:     Visit Vitals  BP (!) 110/94 (BP 1 Location: Right upper arm, BP Patient Position: At rest)   Pulse 97   Temp 99 °F (37.2 °C)   Resp 20   SpO2 97%    O2 Flow Rate (L/min): 4 l/min O2 Device: Nasal cannula    Temp (24hrs), Av.7 °F (37.6 °C), Min:99 °F (37.2 °C), Max:100.3 °F (37.9 °C)      No intake/output data recorded.  1901 -  0700  In: 5153.8 [P.O.:240; I.V.:4600]  Out: 860 [Urine:50]    PHYSICAL EXAM:  Constitutional: No acute distress  Skin: Extremities and face reveal no rashes. HEENT: Sclerae anicteric. Extra-occular muscles are intact. No oral ulcers. The neck is supple and no masses. Cardiovascular: Regular rate and rhythm. Respiratory:  decrease breath sound on left side. Left chest tube in place. GI: Abdomen nondistended, soft, and nontender. Normal active bowel sounds. Musculoskeletal: No pitting edema of the lower legs. Able to move all ext  Neurological:  Patient is alert and oriented.  Cranial nerves II-XII grossly intact  Psychiatric: Mood appears appropriate       Data Review    Recent Results (from the past 24 hour(s))   HEMOGLOBIN    Collection Time: 23  3:27 PM   Result Value Ref Range    HGB 8.3 (L) 11.5 - 16.0 g/dL   CBC WITH AUTOMATED DIFF    Collection Time: 23  3:27 PM   Result Value Ref Range    WBC 15.3 (H) 3.6 - 11.0 K/uL    RBC 2.77 (L) 3.80 - 5.20 M/uL    HGB 8.1 (L) 11.5 - 16.0 g/dL    HCT 25.1 (L) 35.0 - 47.0 %    MCV 90.6 80.0 - 99.0 FL    MCH 29.2 26.0 - 34.0 PG    MCHC 32.3 30.0 - 36.5 g/dL    RDW 16.9 (H) 11.5 - 14.5 %    PLATELET 751 (L) 012 - 400 K/uL    MPV 9.9 8.9 - 12.9 FL    NRBC 0.0 0  WBC    ABSOLUTE NRBC 0.00 0.00 - 0.01 K/uL    NEUTROPHILS 87 (H) 32 - 75 %    LYMPHOCYTES 5 (L) 12 - 49 %    MONOCYTES 5 5 - 13 %    EOSINOPHILS 1 0 - 7 %    BASOPHILS 0 0 - 1 %    IMMATURE GRANULOCYTES 2 (H) 0.0 - 0.5 %    ABS. NEUTROPHILS 13.4 (H) 1.8 - 8.0 K/UL    ABS. LYMPHOCYTES 0.8 0.8 - 3.5 K/UL    ABS. MONOCYTES 0.7 0.0 - 1.0 K/UL    ABS. EOSINOPHILS 0.1 0.0 - 0.4 K/UL    ABS. BASOPHILS 0.0 0.0 - 0.1 K/UL    ABS. IMM.  GRANS. 0.2 (H) 0.00 - 0.04 K/UL    DF AUTOMATED     HEMOGLOBIN    Collection Time: 01/06/23 11:36 PM   Result Value Ref Range    HGB 7.5 (L) 11.5 - 16.0 g/dL   PROCALCITONIN    Collection Time: 01/07/23  3:04 AM   Result Value Ref Range    Procalcitonin 6.55 ng/mL   NT-PRO BNP    Collection Time: 01/07/23  3:04 AM   Result Value Ref Range    NT pro-BNP 9,407 (H) <019 PG/ML   METABOLIC PANEL, BASIC    Collection Time: 01/07/23  3:04 AM   Result Value Ref Range    Sodium 138 136 - 145 mmol/L    Potassium 4.3 3.5 - 5.1 mmol/L    Chloride 110 (H) 97 - 108 mmol/L    CO2 22 21 - 32 mmol/L    Anion gap 6 5 - 15 mmol/L    Glucose 116 (H) 65 - 100 mg/dL    BUN 37 (H) 6 - 20 MG/DL    Creatinine 3.88 (H) 0.55 - 1.02 MG/DL    BUN/Creatinine ratio 10 (L) 12 - 20      eGFR 12 (L) >60 ml/min/1.73m2    Calcium 8.0 (L) 8.5 - 10.1 MG/DL   VANCOMYCIN, RANDOM    Collection Time: 01/07/23  3:04 AM   Result Value Ref Range    Vancomycin, random 16.5 UG/ML   CBC W/O DIFF    Collection Time: 01/07/23  3:04 AM   Result Value Ref Range    WBC 11.2 (H) 3.6 - 11.0 K/uL    RBC 2.70 (L) 3.80 - 5.20 M/uL    HGB 7.9 (L) 11.5 - 16.0 g/dL    HCT 24.8 (L) 35.0 - 47.0 %    MCV 91.9 80.0 - 99.0 FL    MCH 29.3 26.0 - 34.0 PG    MCHC 31.9 30.0 - 36.5 g/dL    RDW 17.2 (H) 11.5 - 14.5 %    PLATELET 521 (L) 314 - 400 K/uL    MPV 9.9 8.9 - 12.9 FL    NRBC 0.0 0  WBC    ABSOLUTE NRBC 0.00 0.00 - 0.01 K/uL                 Time spent 35 minutes involving direct patient care as well as reviewing patient's labs and coordination of care with nursing staff     Care Plan discussed with: Patient/Family/RN/Case Management        Total time spent with patient: 35 minutes.

## 2023-01-08 ENCOUNTER — APPOINTMENT (OUTPATIENT)
Dept: CT IMAGING | Age: 63
DRG: 208 | End: 2023-01-08
Attending: INTERNAL MEDICINE
Payer: COMMERCIAL

## 2023-01-08 ENCOUNTER — APPOINTMENT (OUTPATIENT)
Dept: GENERAL RADIOLOGY | Age: 63
DRG: 208 | End: 2023-01-08
Attending: INTERNAL MEDICINE
Payer: COMMERCIAL

## 2023-01-08 ENCOUNTER — APPOINTMENT (OUTPATIENT)
Dept: ULTRASOUND IMAGING | Age: 63
DRG: 208 | End: 2023-01-08
Attending: INTERNAL MEDICINE
Payer: COMMERCIAL

## 2023-01-08 LAB
ALBUMIN SERPL-MCNC: 1.9 G/DL (ref 3.5–5)
ALBUMIN/GLOB SERPL: 0.7 (ref 1.1–2.2)
ALP SERPL-CCNC: 695 U/L (ref 45–117)
ALT SERPL-CCNC: 944 U/L (ref 12–78)
ANION GAP SERPL CALC-SCNC: 7 MMOL/L (ref 5–15)
ANION GAP SERPL CALC-SCNC: 7 MMOL/L (ref 5–15)
ARTERIAL PATENCY WRIST A: YES
ARTERIAL PATENCY WRIST A: YES
AST SERPL-CCNC: 1980 U/L (ref 15–37)
BASE DEFICIT BLDA-SCNC: 3 MMOL/L
BASE DEFICIT BLDA-SCNC: 7.1 MMOL/L
BDY SITE: ABNORMAL
BDY SITE: ABNORMAL
BILIRUB SERPL-MCNC: 1.7 MG/DL (ref 0.2–1)
BUN SERPL-MCNC: 47 MG/DL (ref 6–20)
BUN SERPL-MCNC: 52 MG/DL (ref 6–20)
BUN/CREAT SERPL: 8 (ref 12–20)
BUN/CREAT SERPL: 8 (ref 12–20)
CALCIUM SERPL-MCNC: 7.1 MG/DL (ref 8.5–10.1)
CALCIUM SERPL-MCNC: 7.6 MG/DL (ref 8.5–10.1)
CHLORIDE SERPL-SCNC: 106 MMOL/L (ref 97–108)
CHLORIDE SERPL-SCNC: 109 MMOL/L (ref 97–108)
CO2 SERPL-SCNC: 22 MMOL/L (ref 21–32)
CO2 SERPL-SCNC: 23 MMOL/L (ref 21–32)
CREAT SERPL-MCNC: 5.59 MG/DL (ref 0.55–1.02)
CREAT SERPL-MCNC: 6.82 MG/DL (ref 0.55–1.02)
FIO2 ON VENT: 50 %
GAS FLOW.O2 O2 DELIVERY SYS: 50 L/MIN
GLOBULIN SER CALC-MCNC: 2.8 G/DL (ref 2–4)
GLUCOSE BLD STRIP.AUTO-MCNC: 104 MG/DL (ref 65–117)
GLUCOSE BLD STRIP.AUTO-MCNC: 160 MG/DL (ref 65–117)
GLUCOSE BLD STRIP.AUTO-MCNC: 86 MG/DL (ref 65–117)
GLUCOSE BLD STRIP.AUTO-MCNC: 87 MG/DL (ref 65–117)
GLUCOSE SERPL-MCNC: 172 MG/DL (ref 65–100)
GLUCOSE SERPL-MCNC: 86 MG/DL (ref 65–100)
HCO3 BLDA-SCNC: 18 MMOL/L (ref 22–26)
HCO3 BLDA-SCNC: 21 MMOL/L (ref 22–26)
HGB BLD-MCNC: 7.6 G/DL (ref 11.5–16)
HGB BLD-MCNC: 7.8 G/DL (ref 11.5–16)
HGB BLD-MCNC: 7.8 G/DL (ref 11.5–16)
MAGNESIUM SERPL-MCNC: 1.5 MG/DL (ref 1.6–2.4)
PCO2 BLDA: 35 MMHG (ref 35–45)
PCO2 BLDA: 36 MMHG (ref 35–45)
PH BLDA: 7.33 (ref 7.35–7.45)
PH BLDA: 7.39 (ref 7.35–7.45)
PHOSPHATE SERPL-MCNC: 5.9 MG/DL (ref 2.6–4.7)
PO2 BLDA: 52 MMHG (ref 80–100)
PO2 BLDA: 66 MMHG (ref 80–100)
POTASSIUM SERPL-SCNC: 5.1 MMOL/L (ref 3.5–5.1)
POTASSIUM SERPL-SCNC: 5.3 MMOL/L (ref 3.5–5.1)
PROT SERPL-MCNC: 4.7 G/DL (ref 6.4–8.2)
SAO2 % BLD: 84 % (ref 92–97)
SAO2 % BLD: 93 % (ref 92–97)
SAO2% DEVICE SAO2% SENSOR NAME: ABNORMAL
SAO2% DEVICE SAO2% SENSOR NAME: ABNORMAL
SERVICE CMNT-IMP: ABNORMAL
SERVICE CMNT-IMP: NORMAL
SODIUM SERPL-SCNC: 136 MMOL/L (ref 136–145)
SODIUM SERPL-SCNC: 138 MMOL/L (ref 136–145)
SPECIMEN SITE: ABNORMAL
SPECIMEN SITE: ABNORMAL
VANCOMYCIN SERPL-MCNC: 19.7 UG/ML

## 2023-01-08 PROCEDURE — 74011250636 HC RX REV CODE- 250/636: Performed by: NURSE PRACTITIONER

## 2023-01-08 PROCEDURE — 74011250637 HC RX REV CODE- 250/637: Performed by: STUDENT IN AN ORGANIZED HEALTH CARE EDUCATION/TRAINING PROGRAM

## 2023-01-08 PROCEDURE — 74011000250 HC RX REV CODE- 250: Performed by: INTERNAL MEDICINE

## 2023-01-08 PROCEDURE — 74011250636 HC RX REV CODE- 250/636: Performed by: INTERNAL MEDICINE

## 2023-01-08 PROCEDURE — 84100 ASSAY OF PHOSPHORUS: CPT

## 2023-01-08 PROCEDURE — 74011250636 HC RX REV CODE- 250/636: Performed by: PHYSICIAN ASSISTANT

## 2023-01-08 PROCEDURE — 82803 BLOOD GASES ANY COMBINATION: CPT

## 2023-01-08 PROCEDURE — 36600 WITHDRAWAL OF ARTERIAL BLOOD: CPT

## 2023-01-08 PROCEDURE — 74011000258 HC RX REV CODE- 258: Performed by: STUDENT IN AN ORGANIZED HEALTH CARE EDUCATION/TRAINING PROGRAM

## 2023-01-08 PROCEDURE — 74011250637 HC RX REV CODE- 250/637: Performed by: INTERNAL MEDICINE

## 2023-01-08 PROCEDURE — 71045 X-RAY EXAM CHEST 1 VIEW: CPT

## 2023-01-08 PROCEDURE — 74011000250 HC RX REV CODE- 250: Performed by: STUDENT IN AN ORGANIZED HEALTH CARE EDUCATION/TRAINING PROGRAM

## 2023-01-08 PROCEDURE — 85018 HEMOGLOBIN: CPT

## 2023-01-08 PROCEDURE — 74011000258 HC RX REV CODE- 258: Performed by: INTERNAL MEDICINE

## 2023-01-08 PROCEDURE — 65610000006 HC RM INTENSIVE CARE

## 2023-01-08 PROCEDURE — 82962 GLUCOSE BLOOD TEST: CPT

## 2023-01-08 PROCEDURE — 83735 ASSAY OF MAGNESIUM: CPT

## 2023-01-08 PROCEDURE — 77010033678 HC OXYGEN DAILY

## 2023-01-08 PROCEDURE — P9045 ALBUMIN (HUMAN), 5%, 250 ML: HCPCS | Performed by: INTERNAL MEDICINE

## 2023-01-08 PROCEDURE — 71250 CT THORAX DX C-: CPT

## 2023-01-08 PROCEDURE — 77010033711 HC HIGH FLOW OXYGEN

## 2023-01-08 PROCEDURE — 80202 ASSAY OF VANCOMYCIN: CPT

## 2023-01-08 PROCEDURE — 36415 COLL VENOUS BLD VENIPUNCTURE: CPT

## 2023-01-08 PROCEDURE — 74011250636 HC RX REV CODE- 250/636: Performed by: STUDENT IN AN ORGANIZED HEALTH CARE EDUCATION/TRAINING PROGRAM

## 2023-01-08 PROCEDURE — 80053 COMPREHEN METABOLIC PANEL: CPT

## 2023-01-08 PROCEDURE — 82533 TOTAL CORTISOL: CPT

## 2023-01-08 PROCEDURE — 93970 EXTREMITY STUDY: CPT

## 2023-01-08 RX ORDER — NOREPINEPHRINE BITARTRATE/D5W 8 MG/250ML
.5-16 PLASTIC BAG, INJECTION (ML) INTRAVENOUS
Status: DISCONTINUED | OUTPATIENT
Start: 2023-01-08 | End: 2023-01-08

## 2023-01-08 RX ORDER — HEPARIN SODIUM 5000 [USP'U]/ML
5000 INJECTION, SOLUTION INTRAVENOUS; SUBCUTANEOUS EVERY 12 HOURS
Status: DISCONTINUED | OUTPATIENT
Start: 2023-01-08 | End: 2023-01-24 | Stop reason: HOSPADM

## 2023-01-08 RX ORDER — MIDODRINE HYDROCHLORIDE 5 MG/1
5 TABLET ORAL
Status: DISCONTINUED | OUTPATIENT
Start: 2023-01-08 | End: 2023-01-08

## 2023-01-08 RX ORDER — MAGNESIUM SULFATE HEPTAHYDRATE 40 MG/ML
2 INJECTION, SOLUTION INTRAVENOUS ONCE
Status: COMPLETED | OUTPATIENT
Start: 2023-01-08 | End: 2023-01-09

## 2023-01-08 RX ORDER — MIDODRINE HYDROCHLORIDE 5 MG/1
10 TABLET ORAL
Status: DISCONTINUED | OUTPATIENT
Start: 2023-01-08 | End: 2023-01-09

## 2023-01-08 RX ORDER — ALBUMIN HUMAN 50 G/1000ML
12.5 SOLUTION INTRAVENOUS ONCE
Status: COMPLETED | OUTPATIENT
Start: 2023-01-08 | End: 2023-01-08

## 2023-01-08 RX ORDER — SODIUM BICARBONATE 1 MEQ/ML
100 SYRINGE (ML) INTRAVENOUS ONCE
Status: COMPLETED | OUTPATIENT
Start: 2023-01-08 | End: 2023-01-08

## 2023-01-08 RX ORDER — ALPRAZOLAM 0.5 MG/1
1 TABLET ORAL 3 TIMES DAILY
Status: DISCONTINUED | OUTPATIENT
Start: 2023-01-08 | End: 2023-01-08

## 2023-01-08 RX ORDER — MIDAZOLAM HYDROCHLORIDE 1 MG/ML
0.5 INJECTION, SOLUTION INTRAMUSCULAR; INTRAVENOUS ONCE
Status: COMPLETED | OUTPATIENT
Start: 2023-01-08 | End: 2023-01-08

## 2023-01-08 RX ORDER — MIDODRINE HYDROCHLORIDE 5 MG/1
10 TABLET ORAL
Status: COMPLETED | OUTPATIENT
Start: 2023-01-08 | End: 2023-01-08

## 2023-01-08 RX ADMIN — MIDODRINE HYDROCHLORIDE 5 MG: 5 TABLET ORAL at 08:06

## 2023-01-08 RX ADMIN — CARVEDILOL 6.25 MG: 6.25 TABLET, FILM COATED ORAL at 16:21

## 2023-01-08 RX ADMIN — ALPRAZOLAM 1 MG: 0.5 TABLET ORAL at 16:22

## 2023-01-08 RX ADMIN — MAGNESIUM SULFATE HEPTAHYDRATE 2 G: 40 INJECTION, SOLUTION INTRAVENOUS at 23:29

## 2023-01-08 RX ADMIN — SODIUM BICARBONATE: 84 INJECTION, SOLUTION INTRAVENOUS at 16:43

## 2023-01-08 RX ADMIN — METHYLPREDNISOLONE SODIUM SUCCINATE 40 MG: 40 INJECTION, POWDER, FOR SOLUTION INTRAMUSCULAR; INTRAVENOUS at 21:49

## 2023-01-08 RX ADMIN — ACETAMINOPHEN 650 MG: 325 TABLET ORAL at 04:54

## 2023-01-08 RX ADMIN — SODIUM CHLORIDE, PRESERVATIVE FREE 10 ML: 5 INJECTION INTRAVENOUS at 16:21

## 2023-01-08 RX ADMIN — SODIUM CHLORIDE 500 ML: 900 INJECTION, SOLUTION INTRAVENOUS at 08:00

## 2023-01-08 RX ADMIN — CEFEPIME 1 G: 1 INJECTION, POWDER, FOR SOLUTION INTRAMUSCULAR; INTRAVENOUS at 13:07

## 2023-01-08 RX ADMIN — CEFEPIME 1 G: 1 INJECTION, POWDER, FOR SOLUTION INTRAMUSCULAR; INTRAVENOUS at 23:30

## 2023-01-08 RX ADMIN — SODIUM BICARBONATE 100 MEQ: 84 INJECTION INTRAVENOUS at 16:43

## 2023-01-08 RX ADMIN — NOREPINEPHRINE BITARTRATE 4 MCG/MIN: 1 SOLUTION INTRAVENOUS at 11:02

## 2023-01-08 RX ADMIN — MIDODRINE HYDROCHLORIDE 10 MG: 5 TABLET ORAL at 16:21

## 2023-01-08 RX ADMIN — ALBUMIN (HUMAN) 12.5 G: 12.5 INJECTION, SOLUTION INTRAVENOUS at 11:20

## 2023-01-08 RX ADMIN — MIDODRINE HYDROCHLORIDE 10 MG: 5 TABLET ORAL at 11:01

## 2023-01-08 RX ADMIN — SODIUM CHLORIDE, PRESERVATIVE FREE 10 ML: 5 INJECTION INTRAVENOUS at 21:50

## 2023-01-08 RX ADMIN — METHYLPREDNISOLONE SODIUM SUCCINATE 40 MG: 40 INJECTION, POWDER, FOR SOLUTION INTRAMUSCULAR; INTRAVENOUS at 15:01

## 2023-01-08 RX ADMIN — MIDAZOLAM 0.5 MG: 1 INJECTION INTRAMUSCULAR; INTRAVENOUS at 15:01

## 2023-01-08 RX ADMIN — HYDROMORPHONE HYDROCHLORIDE 0.5 MG: 1 INJECTION, SOLUTION INTRAMUSCULAR; INTRAVENOUS; SUBCUTANEOUS at 05:09

## 2023-01-08 RX ADMIN — ALBUMIN (HUMAN) 12.5 G: 12.5 INJECTION, SOLUTION INTRAVENOUS at 09:23

## 2023-01-08 RX ADMIN — SODIUM ZIRCONIUM CYCLOSILICATE 15 G: 10 POWDER, FOR SUSPENSION ORAL at 17:12

## 2023-01-08 RX ADMIN — SODIUM CHLORIDE, PRESERVATIVE FREE 10 ML: 5 INJECTION INTRAVENOUS at 05:11

## 2023-01-08 RX ADMIN — GABAPENTIN 100 MG: 100 CAPSULE ORAL at 16:21

## 2023-01-08 NOTE — PROGRESS NOTES
Hospitalist Progress Note    Subjective:   Daily Progress Note: 1/8/2023 3:42 PM    Hospital Course:  57year old Female with hx of left breast cancer, recently admitted to [facility] for sepsis s/s COVID-19, CHRISTA, NSTEMI, MSSA pneumonia(12/28-1/1) presented to ED with c/o shortness of breath and hemoptysis. IN ED, patient was febrile to 100.8F. CXR demonstrated increased left pleural effusion with unchanged patchy B/L consolidation L>R. Patient had IR thoracentesis on 1/4/23- bloody tap. CT chest on 1/5 showed left sided hemothorax. Hb dropped from 9.1 to 5.6. s/p left chest tube placement on 1/5/23. S/p PRBC transfusion. Subjective:  Rapid response called this morning for persistent hypotension. Received 500 cc bolus iv fluids and 12.5 gm of albumin. Additional 12.5gm ordered and levophed ordered. Called patient's sister and updated her about patient's status. Creatinine worsening today. Assessment / Plan:  Acute hypoxic respiratory failure  Multifocal pneumonia  Left sided hemothorax  Continue antibiotics- vancomycin and cefepime  Continue supplemental oxygen and titrate as needed  S/p left thoracentesis by IR on 1/4/23  S/p left chest tube placement for left sided hemothorax by IR on 1/5/23  If Hb drops again, will consider CTA chest to see if embolization if possible with IR. If we consider stat CTA chest, will consult nephrology    Sepsis vs SIRS due to malignancy:  Continue vancomycin and cefepime  Continue iv hydration  Start levophed and titrate as needed. Acute blood loss anemia:  S/s left hemothorax  S/p PRBC transfusion on 1/5/23.  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     Code status: Full  Prophylaxis: SCD's  Recommended Disposition: HH PT, OT, RN  ALEXANDRA>48hours  Barrier: clinical improvement       Current Facility-Administered Medications   Medication Dose Route Frequency    midodrine (PROAMATINE) tablet 5 mg  5 mg Oral TID WITH MEALS    sodium zirconium cyclosilicate (LOKELMA) powder packet 10 g  10 g Oral NOW    NOREPINephrine (LEVOPHED) 8 mg in 5% dextrose 250mL (32 mcg/mL) infusion  0.5-16 mcg/min IntraVENous TITRATE    albumin human 5% (BUMINATE) solution 12.5 g  12.5 g IntraVENous ONCE    HYDROmorphone (DILAUDID) injection 0.5 mg  0.5 mg IntraVENous Q4H PRN    Vancomycin level 1/8 @ 1200   Other ONCE    metoprolol (LOPRESSOR) injection 2.5 mg  2.5 mg IntraVENous Q6H PRN    0.9% sodium chloride infusion 250 mL  250 mL IntraVENous PRN    0.9% sodium chloride infusion 250 mL  250 mL IntraVENous PRN    lidocaine (XYLOCAINE) 4 % cream   Topical PRN    oxyCODONE IR (ROXICODONE) tablet 5 mg  5 mg Oral Q4H PRN    carvediloL (COREG) tablet 6.25 mg  6.25 mg Oral BID WITH MEALS    gabapentin (NEURONTIN) capsule 100 mg  100 mg Oral TID    PARoxetine (PAXIL) tablet 20 mg  20 mg Oral DAILY    rosuvastatin (CRESTOR) tablet 10 mg  10 mg Oral QHS    sodium chloride (NS) flush 5-40 mL  5-40 mL IntraVENous Q8H    sodium chloride (NS) flush 5-40 mL  5-40 mL IntraVENous PRN    acetaminophen (TYLENOL) tablet 650 mg  650 mg Oral Q6H PRN    Or    acetaminophen (TYLENOL) suppository 650 mg  650 mg Rectal Q6H PRN    polyethylene glycol (MIRALAX) packet 17 g  17 g Oral DAILY PRN    ondansetron (ZOFRAN ODT) tablet 4 mg  4 mg Oral Q8H PRN    Or    ondansetron (ZOFRAN) injection 4 mg  4 mg IntraVENous Q6H PRN    lidocaine 4 % patch 1 Patch  1 Patch TransDERmal Q24H    lactated Ringers infusion  100 mL/hr IntraVENous CONTINUOUS    VANCOMYCIN INFORMATION NOTE   Other Rx Dosing/Monitoring    cefepime (MAXIPIME) 1 g in 0.9% sodium chloride (MBP/ADV) 50 mL MBP  1 g IntraVENous Q12H        Review of Systems  Constitutional: No fevers, No chills, No sweats, No fatigue, No Weakness  Eyes: No redness  Ears, nose, mouth, throat, and face: No nasal congestion, No sore throat, No voice change  Respiratory: No Shortness of Breath, No cough, No wheezing  Cardiovascular: has chest pain at the site of pleural catheter, No palpitations, No extremity edema  Gastrointestinal: No nausea, No vomiting, No diarrhea, No abdominal pain  Genitourinary: No frequency, No dysuria, No hematuria  Integument/breast: No skin lesion(s)   Neurological: No Confusion, No headaches, No dizziness      Objective:     Visit Vitals  BP (!) 101/48   Pulse 89   Temp 98.1 °F (36.7 °C)   Resp 21   SpO2 97%    O2 Flow Rate (L/min): 5 l/min O2 Device: Nasal cannula    Temp (24hrs), Av.2 °F (37.9 °C), Min:98.1 °F (36.7 °C), Max:102.7 °F (39.3 °C)      No intake/output data recorded.  1901 -  0700  In: 2400 [I.V.:2400]  Out: 330 [Urine:50]    PHYSICAL EXAM:  Constitutional: No acute distress  Skin: Extremities and face reveal no rashes. HEENT: Sclerae anicteric. Extra-occular muscles are intact. No oral ulcers. The neck is supple and no masses. Cardiovascular: Regular rate and rhythm. Respiratory:  decrease breath sound on left side. Left chest tube in place. GI: Abdomen nondistended, soft, and nontender. Normal active bowel sounds. Musculoskeletal: No pitting edema of the lower legs. Able to move all ext  Neurological:  Patient is alert and oriented.  Cranial nerves II-XII grossly intact  Psychiatric: Mood appears appropriate       Data Review    Recent Results (from the past 24 hour(s))   HEMOGLOBIN    Collection Time: 23  2:18 PM   Result Value Ref Range    HGB 7.9 (L) 11.5 - 16.0 g/dL   HEMOGLOBIN    Collection Time: 23  9:55 PM   Result Value Ref Range    HGB 7.7 (L) 11.5 - 16.0 g/dL   HEMOGLOBIN    Collection Time: 23  4:32 AM   Result Value Ref Range    HGB 7.8 (L) 11.5 - 53.8 g/dL   METABOLIC PANEL, BASIC    Collection Time: 01/08/23  4:32 AM   Result Value Ref Range    Sodium 138 136 - 145 mmol/L    Potassium 5.3 (H) 3.5 - 5.1 mmol/L    Chloride 109 (H) 97 - 108 mmol/L    CO2 22 21 - 32 mmol/L    Anion gap 7 5 - 15 mmol/L    Glucose 86 65 - 100 mg/dL    BUN 47 (H) 6 - 20 MG/DL    Creatinine 5.59 (H) 0.55 - 1.02 MG/DL    BUN/Creatinine ratio 8 (L) 12 - 20      eGFR 8 (L) >60 ml/min/1.73m2    Calcium 7.6 (L) 8.5 - 10.1 MG/DL   GLUCOSE, POC    Collection Time: 01/08/23  7:33 AM   Result Value Ref Range    Glucose (POC) 104 65 - 117 mg/dL    Performed by Claudio Hollis PCT                  Total critical care time spent 50 minutes involving direct patient care as well as reviewing patient's labs and coordination of care with nursing staff     Care Plan discussed with: Patient/Family/RN/Case Management        Total time spent with patient: 50 minutes.

## 2023-01-08 NOTE — PROGRESS NOTES
0700-730: Bedside shift change report given to [id number] (oncoming nurse) by Mare Peng RN (offgoing nurse). Report included the following information SBAR, Kardex, Intake/Output, and MAR.      0740: Pt's Bps lower this morning, 83/53 MAP 60. Rechecked and got 92/48 MAP 59. RN messaged MD    0745: 500 cc NS bolus ordered & 5 mg midodrine 3x/daily    0805: Midodrine given and NS bolus started. Pressures cycling q15 minutes. MD at bedside with pt.     0830: Albumin ordered to further help increase pressure. Albumin to be delivered by pharmacy. 7347: Albumin started    9345-0483: Pt's pressures down to 69/48. RN messaged MD. RRT called. Additional NS bolus done and levo gtt started at 4 mcg/min. Another albumin given to help elevate Bps. Pt's sister at bedside. 1125: levophed gtt down to 3 mcg/min    1230: Levophed gtt down to 2 mcg/min    1256: levophed gtt down to 1 mcg/min    1305: levophed gtt stopped    1330: Pt sounding more crackly and increased work of breathing, RRT team called to bedside again. Intensivist agreed to tx pt to ICU for closer monitoring. Pt increasingly anxious and upset. 1500: TRANSFER - OUT REPORT:    Verbal report given to [facility] RN(name) on Tonya Essex  being transferred to ICU (unit) for urgent transfer       Report consisted of patients Situation, Background, Assessment and   Recommendations(SBAR). Information from the following report(s) SBAR, Kardex, Intake/Output, and MAR was reviewed with the receiving nurse.     Lines:   Venous Access Device Port-a-cath Upper chest (subclavicular area, right (Active)       Venous Access Device Portacath 01/05/23 Upper chest (subclavicular area, right (Active)   Central Line Being Utilized Yes 01/08/23 1101   Criteria for Appropriate Use Limited/no vessel suitable for conventional peripheral access 01/08/23 1101   Site Assessment Clean, dry, & intact 01/08/23 1101   Date of Last Dressing Change 01/05/23 01/08/23 1101   Dressing Status Clean, dry, & intact 01/08/23 1101   Dressing Type Disk with Chlorhexadine gluconate (CHG); Transparent 01/08/23 1101   Action Taken Open ports on tubing capped 01/08/23 1101   Date Accessed (Medial Site) 01/05/23 01/05/23 1303   Access Time (Medial Site) 1304 01/05/23 1303   Access Needle Size (Site #1) 20 G 01/05/23 1303   Access Needle Length (Medial Site) 1 inch 01/05/23 1303   Positive Blood Return (Medial Site) Yes 01/08/23 1101   Date Needle Changed (Medial Site) 01/05/23 01/05/23 1303   Positive Blood Return (Lateral Site) Yes 01/08/23 1101   Alcohol Cap Used Yes 01/07/23 0400        Opportunity for questions and clarification was provided. Patient transported with:   Monitor  O2 @ 25L (NRB) liters  Registered Nurse    Report to be done at bedside d/t urgency of transfer.

## 2023-01-08 NOTE — H&P
[facility] INITIAL ASSESSMENT. Name: John Vail   : 1960   MRN: [id number]   Date: 2023        Chief Complaint   Patient presents with    Chest Pain    Shortness of Breath     Pt arrives via EMS with report of CP and SOB and lower extremity swelling, recently dx with COVID + on  and admitted. Reports sxs worsening after hospital d/c. Patient reports hx HTN.          HPI:     Patient being transferred to ICU as the PCU nurses do not feel comfortable keeping the patient on the floor as she was hypotensive earlier and has been refusing po meds    57year old Female with hx of left breast cancer, recently admitted to [facility] for sepsis s/s COVID-19, CHRISTA, NSTEMI, MSSA pneumonia(-) presented to ED with c/o shortness of breath and hemoptysis. IN ED, patient was febrile to 100.8F. CXR demonstrated increased left pleural effusion with unchanged patchy B/L consolidation L>R. Patient had IR thoracentesis on 23- bloody tap. CT chest on  showed left sided hemothorax. Hb dropped from 9.1 to 5.6. s/p left chest tube placement on 23. S/p PRBC transfusion. Active Problems Being Managed:     Anxiety  Hypotension  Left pleural effusion  Hyperkalemia  Acute renal failure    Assessment/Plan:     1) hypotension : due to low volume status?   Responded to ivf  Now off levophed  Continue to observe for now  Obtain cortisol level  Increase midodrine to 10 mg TID  Start hydrocortisone /solumedrol    2) sepsis ? : elevated  procal  Could be due to breast ca as well  Continue empiric abx    3) respiratory distress : suspect is anxiety driven   Cxr after thoracentesis revealed only mild basilar atelectasis  Obtain ABG  Obtain cxr    4) renal failure : acute on chronic suspect is due to poor oral intake  Continue ivf    5) GI : npo for now    6) neuro : start scheduled xanax     7) heme : s/p left hemothorax  Follow h/h  Transfuse as needed  B/l leg doppler to r/o dvt      DVT prophylaxis: heparin  SUP: none  Code status:   full  Next of kin: none    I personally spent 120 minutes of critical care time. This is time spent at this critically ill patient's bedside actively involved in patient care as well as the coordination of care and discussions with the patient's family. This does not include any procedural time which has been billed separately. Addendum : reviewed ct chest . Worsening b/l infiltrates. ? Chemo induced pneumonitis versus atypical infection  Will continue treatment with steroids   Will not tolerate bronchoscopy at this time    Review of systems:     ROS   +ve for anxiety    Objective:     Vital Signs:  Visit Vitals  /62   Pulse 88   Temp 98.1 °F (36.7 °C)   Resp (!) 36   SpO2 93%    O2 Flow Rate (L/min): 5 l/min O2 Device: Nasal cannula Temp (24hrs), Av.4 °F (38 °C), Min:98.1 °F (36.7 °C), Max:102.7 °F (39.3 °C)           Intake/Output:     Intake/Output Summary (Last 24 hours) at 2023 1354  Last data filed at 2023 1453  Gross per 24 hour   Intake --   Output 0 ml   Net 0 ml       Physical Exam:  Anxious, tachypneic  HEENT: pallor+ve  Heart: s1,s2  Lungs: clear  Abd: soft  Ext: no edema  Cns: no focal deficit    Past Medical History:        has a past medical history of Cancer ([facility] [address] GERD (gastroesophageal reflux disease), HTN (hypertension) (2011), Psychiatric disorder, and Thyroid disease. Past Surgical History:      has a past surgical history that includes hx myomectomy (1996); hx wisdom teeth extraction; hx colonoscopy; and ir thoracentesis/insert chest tube (2023). Home Medications:     Prior to Admission medications    Medication Sig Start Date End Date Taking? Authorizing Provider   carvediloL (COREG) 6.25 mg tablet Take 6.25 mg by mouth two (2) times daily (with meals). 11/3/22  Yes Provider, Historical   famotidine (PEPCID) 40 mg/5 mL (8 mg/mL) suspension Take 2.5 mL by mouth two (2) times a day.  22  Yes Andrzej Germain NP   gabapentin (NEURONTIN) 100 mg capsule Take 1 Capsule by mouth three (3) times daily. Max Daily Amount: 300 mg. 11/30/22  Yes Anastacio Forte NP   PARoxetine (PAXIL) 20 mg tablet Take 20 mg by mouth daily. Yes Provider, Historical   rosuvastatin (CRESTOR) 10 mg tablet Take 10 mg by mouth nightly. 8/15/22  Yes Provider, Historical   dexAMETHasone (DECADRON) 6 mg tablet Take 1 Tablet by mouth daily for 6 days. Patient not taking: Reported on 1/4/2023 1/1/23 1/7/23  Ba Samson MD   guaiFENesin ER [facility]) 600 mg ER tablet Take 1 Tablet by mouth every twelve (12) hours for 10 days. 1/1/23 1/11/23  Ba Samson MD   OTHER,NON-FORMULARY, IV chemotherapy    Provider, Historical   dexAMETHasone (DECADRON) 4 mg tablet Take 2 tabs (8mg) once daily on days 2 & 3 of chemotherapy  Patient not taking: Reported on 1/4/2023 9/28/22   Andrzej Germain NP   ondansetron (ZOFRAN ODT) 8 mg disintegrating tablet Take 1 Tablet by mouth every eight (8) hours as needed for Nausea or Vomiting. 9/28/22   Andrzej Germain NP   prochlorperazine (Compazine) 5 mg tablet Take 1 Tablet by mouth every six (6) hours as needed for Nausea or Vomiting. 9/28/22   Andrzej Germain NP   lidocaine-prilocaine (EMLA) topical cream Apply  to affected area as needed for Pain. Apply a thick layer over port a cath 30-60 minutes prior to port access 9/28/22   Andrzej Germain NP   multivitamin, tx-iron-ca-min (THERA-M w/ IRON) 9 mg iron-400 mcg tab tablet Take 1 Tablet by mouth daily. Provider, Historical         Allergies/Social/Family History:      Allergies   Allergen Reactions    Sulfa (Sulfonamide Antibiotics) Itching, Swelling and Unknown (comments)      Social History     Tobacco Use    Smoking status: Never     Passive exposure: Never    Smokeless tobacco: Never   Substance Use Topics    Alcohol use: Never      Family History   Problem Relation Age of Onset    Heart Failure Mother     Cancer Father     Diabetes Sister Kidney Disease Sister     Anesth Problems Neg Hx          LABS AND  DATA:   Reviewed      Peak airway pressure:      Minute ventilation:        CRITICAL CARE CONSULTANT NOTE  I had a face to face encounter with the patient, reviewed and interpreted patient data including clinical events, labs, images, vital signs, I/O's, and examined patient. I have discussed the case and the plan and management of the patient's care with the consulting services, the bedside nurses and the respiratory therapist.      NOTE OF PERSONAL INVOLVEMENT IN CARE   This patient has a high probability of imminent, clinically significant deterioration, which requires the highest level of preparedness to intervene urgently. I participated in the decision-making and personally managed or directed the management of the following life and organ supporting interventions that required my frequent assessment to treat or prevent imminent deterioration.         Massiel Fall MD   Pulmonary/[facility]  [phone]  1/8/2023

## 2023-01-08 NOTE — PROGRESS NOTES
Chart reviewed. Pt s/p rapid response for hypotension with BP 69/48 . Will continue to hold PT evaluation.  Thank you    Jered Gray, PT, DPT

## 2023-01-09 ENCOUNTER — APPOINTMENT (OUTPATIENT)
Dept: GENERAL RADIOLOGY | Age: 63
DRG: 208 | End: 2023-01-09
Payer: COMMERCIAL

## 2023-01-09 ENCOUNTER — APPOINTMENT (OUTPATIENT)
Dept: INTERVENTIONAL RADIOLOGY/VASCULAR | Age: 63
DRG: 208 | End: 2023-01-09
Attending: INTERNAL MEDICINE
Payer: COMMERCIAL

## 2023-01-09 ENCOUNTER — APPOINTMENT (OUTPATIENT)
Dept: GENERAL RADIOLOGY | Age: 63
DRG: 208 | End: 2023-01-09
Attending: INTERNAL MEDICINE
Payer: COMMERCIAL

## 2023-01-09 DIAGNOSIS — M54.50 CHRONIC BILATERAL LOW BACK PAIN WITHOUT SCIATICA: ICD-10-CM

## 2023-01-09 DIAGNOSIS — G89.29 CHRONIC BILATERAL LOW BACK PAIN WITHOUT SCIATICA: ICD-10-CM

## 2023-01-09 LAB
ANION GAP SERPL CALC-SCNC: 11 MMOL/L (ref 5–15)
ANION GAP SERPL CALC-SCNC: 9 MMOL/L (ref 5–15)
ANION GAP SERPL CALC-SCNC: 9 MMOL/L (ref 5–15)
BACTERIA SPEC CULT: NORMAL
BASE DEFICIT BLDA-SCNC: 2.6 MMOL/L
BDY SITE: ABNORMAL
BUN SERPL-MCNC: 49 MG/DL (ref 6–20)
BUN SERPL-MCNC: 61 MG/DL (ref 6–20)
BUN SERPL-MCNC: 65 MG/DL (ref 6–20)
BUN/CREAT SERPL: 8 (ref 12–20)
BUN/CREAT SERPL: 8 (ref 12–20)
BUN/CREAT SERPL: 9 (ref 12–20)
CALCIUM SERPL-MCNC: 7.5 MG/DL (ref 8.5–10.1)
CALCIUM SERPL-MCNC: 7.5 MG/DL (ref 8.5–10.1)
CALCIUM SERPL-MCNC: 7.6 MG/DL (ref 8.5–10.1)
CHLORIDE SERPL-SCNC: 101 MMOL/L (ref 97–108)
CHLORIDE SERPL-SCNC: 103 MMOL/L (ref 97–108)
CHLORIDE SERPL-SCNC: 105 MMOL/L (ref 97–108)
CO2 SERPL-SCNC: 23 MMOL/L (ref 21–32)
CO2 SERPL-SCNC: 24 MMOL/L (ref 21–32)
CO2 SERPL-SCNC: 28 MMOL/L (ref 21–32)
CORTIS SERPL-MCNC: 61.2 UG/DL
CREAT SERPL-MCNC: 5.92 MG/DL (ref 0.55–1.02)
CREAT SERPL-MCNC: 7.35 MG/DL (ref 0.55–1.02)
CREAT SERPL-MCNC: 7.61 MG/DL (ref 0.55–1.02)
CREAT UR-MCNC: 144 MG/DL
GLUCOSE BLD STRIP.AUTO-MCNC: 165 MG/DL (ref 65–117)
GLUCOSE SERPL-MCNC: 153 MG/DL (ref 65–100)
GLUCOSE SERPL-MCNC: 168 MG/DL (ref 65–100)
GLUCOSE SERPL-MCNC: 201 MG/DL (ref 65–100)
HBV SURFACE AB SER QL: NONREACTIVE
HBV SURFACE AB SER-ACNC: <3.1 MIU/ML
HBV SURFACE AG SER QL: <0.1 INDEX
HBV SURFACE AG SER QL: NEGATIVE
HCO3 BLDA-SCNC: 21 MMOL/L (ref 22–26)
HGB BLD-MCNC: 8 G/DL (ref 11.5–16)
HGB BLD-MCNC: 8 G/DL (ref 11.5–16)
HGB BLD-MCNC: 8.5 G/DL (ref 11.5–16)
INR PPP: 2.7 (ref 0.9–1.1)
MAGNESIUM SERPL-MCNC: 2.2 MG/DL (ref 1.6–2.4)
MAGNESIUM SERPL-MCNC: 2.4 MG/DL (ref 1.6–2.4)
MAGNESIUM SERPL-MCNC: 2.5 MG/DL (ref 1.6–2.4)
PCO2 BLDA: 35 MMHG (ref 35–45)
PH BLDA: 7.4 (ref 7.35–7.45)
PHOSPHATE SERPL-MCNC: 6.7 MG/DL (ref 2.6–4.7)
PO2 BLDA: 103 MMHG (ref 80–100)
POTASSIUM SERPL-SCNC: 4.3 MMOL/L (ref 3.5–5.1)
POTASSIUM SERPL-SCNC: 4.5 MMOL/L (ref 3.5–5.1)
POTASSIUM SERPL-SCNC: 4.5 MMOL/L (ref 3.5–5.1)
PROTHROMBIN TIME: 26.9 SEC (ref 9–11.1)
SAO2 % BLD: 98 % (ref 92–97)
SAO2% DEVICE SAO2% SENSOR NAME: ABNORMAL
SERVICE CMNT-IMP: ABNORMAL
SERVICE CMNT-IMP: NORMAL
SODIUM SERPL-SCNC: 137 MMOL/L (ref 136–145)
SODIUM SERPL-SCNC: 138 MMOL/L (ref 136–145)
SODIUM SERPL-SCNC: 138 MMOL/L (ref 136–145)
SODIUM UR-SCNC: 50 MMOL/L
SPECIMEN SITE: ABNORMAL
TROPONIN-HIGH SENSITIVITY: 348 NG/L (ref 0–51)
TROPONIN-HIGH SENSITIVITY: 446 NG/L (ref 0–51)

## 2023-01-09 PROCEDURE — 85018 HEMOGLOBIN: CPT

## 2023-01-09 PROCEDURE — 87340 HEPATITIS B SURFACE AG IA: CPT

## 2023-01-09 PROCEDURE — 2709999900 HC NON-CHARGEABLE SUPPLY

## 2023-01-09 PROCEDURE — 71045 X-RAY EXAM CHEST 1 VIEW: CPT

## 2023-01-09 PROCEDURE — 74011000250 HC RX REV CODE- 250: Performed by: INTERNAL MEDICINE

## 2023-01-09 PROCEDURE — 74011000250 HC RX REV CODE- 250: Performed by: NURSE PRACTITIONER

## 2023-01-09 PROCEDURE — 82962 GLUCOSE BLOOD TEST: CPT

## 2023-01-09 PROCEDURE — 84484 ASSAY OF TROPONIN QUANT: CPT

## 2023-01-09 PROCEDURE — C1892 INTRO/SHEATH,FIXED,PEEL-AWAY: HCPCS

## 2023-01-09 PROCEDURE — 84300 ASSAY OF URINE SODIUM: CPT

## 2023-01-09 PROCEDURE — 74011250636 HC RX REV CODE- 250/636: Performed by: INTERNAL MEDICINE

## 2023-01-09 PROCEDURE — 77010033711 HC HIGH FLOW OXYGEN

## 2023-01-09 PROCEDURE — 74011000258 HC RX REV CODE- 258: Performed by: NURSE PRACTITIONER

## 2023-01-09 PROCEDURE — 5A1D70Z PERFORMANCE OF URINARY FILTRATION, INTERMITTENT, LESS THAN 6 HOURS PER DAY: ICD-10-PCS | Performed by: INTERNAL MEDICINE

## 2023-01-09 PROCEDURE — 36415 COLL VENOUS BLD VENIPUNCTURE: CPT

## 2023-01-09 PROCEDURE — 5A1D90Z PERFORMANCE OF URINARY FILTRATION, CONTINUOUS, GREATER THAN 18 HOURS PER DAY: ICD-10-PCS | Performed by: INTERNAL MEDICINE

## 2023-01-09 PROCEDURE — 83735 ASSAY OF MAGNESIUM: CPT

## 2023-01-09 PROCEDURE — 90935 HEMODIALYSIS ONE EVALUATION: CPT

## 2023-01-09 PROCEDURE — 36600 WITHDRAWAL OF ARTERIAL BLOOD: CPT

## 2023-01-09 PROCEDURE — 77030002996 HC SUT SLK J&J -A

## 2023-01-09 PROCEDURE — 74011000250 HC RX REV CODE- 250: Performed by: STUDENT IN AN ORGANIZED HEALTH CARE EDUCATION/TRAINING PROGRAM

## 2023-01-09 PROCEDURE — 99233 SBSQ HOSP IP/OBS HIGH 50: CPT | Performed by: NURSE PRACTITIONER

## 2023-01-09 PROCEDURE — 84100 ASSAY OF PHOSPHORUS: CPT

## 2023-01-09 PROCEDURE — 65610000006 HC RM INTENSIVE CARE

## 2023-01-09 PROCEDURE — 82803 BLOOD GASES ANY COMBINATION: CPT

## 2023-01-09 PROCEDURE — 86706 HEP B SURFACE ANTIBODY: CPT

## 2023-01-09 PROCEDURE — 74011250637 HC RX REV CODE- 250/637: Performed by: INTERNAL MEDICINE

## 2023-01-09 PROCEDURE — 76937 US GUIDE VASCULAR ACCESS: CPT

## 2023-01-09 PROCEDURE — 5A1945Z RESPIRATORY VENTILATION, 24-96 CONSECUTIVE HOURS: ICD-10-PCS | Performed by: INTERNAL MEDICINE

## 2023-01-09 PROCEDURE — 31500 INSERT EMERGENCY AIRWAY: CPT

## 2023-01-09 PROCEDURE — 06H033Z INSERTION OF INFUSION DEVICE INTO INFERIOR VENA CAVA, PERCUTANEOUS APPROACH: ICD-10-PCS | Performed by: STUDENT IN AN ORGANIZED HEALTH CARE EDUCATION/TRAINING PROGRAM

## 2023-01-09 PROCEDURE — 74011000258 HC RX REV CODE- 258: Performed by: INTERNAL MEDICINE

## 2023-01-09 PROCEDURE — 0BH17EZ INSERTION OF ENDOTRACHEAL AIRWAY INTO TRACHEA, VIA NATURAL OR ARTIFICIAL OPENING: ICD-10-PCS | Performed by: INTERNAL MEDICINE

## 2023-01-09 PROCEDURE — 80048 BASIC METABOLIC PNL TOTAL CA: CPT

## 2023-01-09 PROCEDURE — 74011250636 HC RX REV CODE- 250/636: Performed by: STUDENT IN AN ORGANIZED HEALTH CARE EDUCATION/TRAINING PROGRAM

## 2023-01-09 PROCEDURE — 74011250636 HC RX REV CODE- 250/636: Performed by: NURSE PRACTITIONER

## 2023-01-09 PROCEDURE — 94002 VENT MGMT INPAT INIT DAY: CPT

## 2023-01-09 PROCEDURE — 85610 PROTHROMBIN TIME: CPT

## 2023-01-09 PROCEDURE — 82570 ASSAY OF URINE CREATININE: CPT

## 2023-01-09 PROCEDURE — 90945 DIALYSIS ONE EVALUATION: CPT

## 2023-01-09 RX ORDER — MIDAZOLAM HYDROCHLORIDE 1 MG/ML
INJECTION, SOLUTION INTRAMUSCULAR; INTRAVENOUS
Status: DISCONTINUED
Start: 2023-01-09 | End: 2023-01-09

## 2023-01-09 RX ORDER — ETOMIDATE 2 MG/ML
20 INJECTION INTRAVENOUS ONCE
Status: COMPLETED | OUTPATIENT
Start: 2023-01-09 | End: 2023-01-09

## 2023-01-09 RX ORDER — FUROSEMIDE 10 MG/ML
80 INJECTION INTRAMUSCULAR; INTRAVENOUS ONCE
Status: COMPLETED | OUTPATIENT
Start: 2023-01-09 | End: 2023-01-09

## 2023-01-09 RX ORDER — PAROXETINE HYDROCHLORIDE 20 MG/1
20 TABLET, FILM COATED ORAL DAILY
Status: DISCONTINUED | OUTPATIENT
Start: 2023-01-10 | End: 2023-01-24 | Stop reason: HOSPADM

## 2023-01-09 RX ORDER — HEPARIN SODIUM 200 [USP'U]/100ML
400 INJECTION, SOLUTION INTRAVENOUS ONCE
Status: COMPLETED | OUTPATIENT
Start: 2023-01-09 | End: 2023-01-09

## 2023-01-09 RX ORDER — LEVOFLOXACIN 5 MG/ML
750 INJECTION, SOLUTION INTRAVENOUS ONCE
Status: COMPLETED | OUTPATIENT
Start: 2023-01-09 | End: 2023-01-09

## 2023-01-09 RX ORDER — CHLORHEXIDINE GLUCONATE 0.12 MG/ML
15 RINSE ORAL EVERY 12 HOURS
Status: DISCONTINUED | OUTPATIENT
Start: 2023-01-09 | End: 2023-01-12

## 2023-01-09 RX ORDER — LEVOFLOXACIN 5 MG/ML
500 INJECTION, SOLUTION INTRAVENOUS EVERY 24 HOURS
Status: DISCONTINUED | OUTPATIENT
Start: 2023-01-10 | End: 2023-01-12

## 2023-01-09 RX ORDER — LEVOFLOXACIN 5 MG/ML
500 INJECTION, SOLUTION INTRAVENOUS
Status: DISCONTINUED | OUTPATIENT
Start: 2023-01-11 | End: 2023-01-09

## 2023-01-09 RX ORDER — MIDAZOLAM HYDROCHLORIDE 1 MG/ML
4 INJECTION, SOLUTION INTRAMUSCULAR; INTRAVENOUS ONCE
Status: COMPLETED | OUTPATIENT
Start: 2023-01-09 | End: 2023-01-09

## 2023-01-09 RX ORDER — HEPARIN 100 UNIT/ML
300 SYRINGE INTRAVENOUS ONCE
Status: COMPLETED | OUTPATIENT
Start: 2023-01-09 | End: 2023-01-09

## 2023-01-09 RX ORDER — LIDOCAINE HYDROCHLORIDE 20 MG/ML
18 INJECTION, SOLUTION INFILTRATION; PERINEURAL ONCE
Status: COMPLETED | OUTPATIENT
Start: 2023-01-09 | End: 2023-01-09

## 2023-01-09 RX ORDER — DEXMEDETOMIDINE HYDROCHLORIDE 4 UG/ML
.1-1.5 INJECTION, SOLUTION INTRAVENOUS
Status: DISCONTINUED | OUTPATIENT
Start: 2023-01-09 | End: 2023-01-15

## 2023-01-09 RX ORDER — NOREPINEPHRINE BITARTRATE/D5W 8 MG/250ML
.5-3 PLASTIC BAG, INJECTION (ML) INTRAVENOUS
Status: DISCONTINUED | OUTPATIENT
Start: 2023-01-09 | End: 2023-01-11

## 2023-01-09 RX ORDER — ROCURONIUM BROMIDE 10 MG/ML
1.2 INJECTION, SOLUTION INTRAVENOUS
Status: COMPLETED | OUTPATIENT
Start: 2023-01-09 | End: 2023-01-09

## 2023-01-09 RX ORDER — BALSAM PERU/CASTOR OIL
OINTMENT (GRAM) TOPICAL 2 TIMES DAILY
Status: DISCONTINUED | OUTPATIENT
Start: 2023-01-09 | End: 2023-01-24 | Stop reason: HOSPADM

## 2023-01-09 RX ADMIN — LEVOFLOXACIN 750 MG: 5 INJECTION, SOLUTION INTRAVENOUS at 10:58

## 2023-01-09 RX ADMIN — Medication 1 AMPULE: at 21:27

## 2023-01-09 RX ADMIN — HEPARIN SODIUM 15 ML: 200 INJECTION, SOLUTION INTRAVENOUS at 13:00

## 2023-01-09 RX ADMIN — CALCIUM CHLORIDE, MAGNESIUM CHLORIDE, DEXTROSE MONOHYDRATE, LACTIC ACID, SODIUM CHLORIDE, SODIUM BICARBONATE AND POTASSIUM CHLORIDE: 3.68; 3.05; 22; 5.4; 6.46; 3.09; .314 INJECTION INTRAVENOUS at 21:15

## 2023-01-09 RX ADMIN — SODIUM CHLORIDE, PRESERVATIVE FREE 20 MG: 5 INJECTION INTRAVENOUS at 23:56

## 2023-01-09 RX ADMIN — METHYLPREDNISOLONE SODIUM SUCCINATE 40 MG: 40 INJECTION, POWDER, FOR SOLUTION INTRAMUSCULAR; INTRAVENOUS at 21:24

## 2023-01-09 RX ADMIN — CASTOR OIL AND BALSAM, PERU: 788; 87 OINTMENT TOPICAL at 17:33

## 2023-01-09 RX ADMIN — METHYLPREDNISOLONE SODIUM SUCCINATE 40 MG: 40 INJECTION, POWDER, FOR SOLUTION INTRAMUSCULAR; INTRAVENOUS at 10:57

## 2023-01-09 RX ADMIN — SODIUM CHLORIDE, PRESERVATIVE FREE 10 ML: 5 INJECTION INTRAVENOUS at 20:05

## 2023-01-09 RX ADMIN — CHLORHEXIDINE GLUCONATE 15 ML: 1.2 RINSE ORAL at 21:27

## 2023-01-09 RX ADMIN — CASTOR OIL AND BALSAM, PERU: 788; 87 OINTMENT TOPICAL at 20:04

## 2023-01-09 RX ADMIN — LIDOCAINE HYDROCHLORIDE 4 ML: 20 INJECTION, SOLUTION INFILTRATION; PERINEURAL at 13:00

## 2023-01-09 RX ADMIN — MIDAZOLAM 3 MG: 1 INJECTION INTRAMUSCULAR; INTRAVENOUS at 09:15

## 2023-01-09 RX ADMIN — SODIUM CHLORIDE, PRESERVATIVE FREE 300 UNITS: 5 INJECTION INTRAVENOUS at 13:00

## 2023-01-09 RX ADMIN — CEFEPIME 2 G: 2 INJECTION, POWDER, FOR SOLUTION INTRAVENOUS at 21:24

## 2023-01-09 RX ADMIN — Medication 100 MCG/HR: at 09:21

## 2023-01-09 RX ADMIN — NOREPINEPHRINE BITARTRATE 10 MCG/MIN: 1 SOLUTION INTRAVENOUS at 09:10

## 2023-01-09 RX ADMIN — DEXMEDETOMIDINE 0.4 MCG/KG/HR: 100 INJECTION, SOLUTION INTRAVENOUS at 23:30

## 2023-01-09 RX ADMIN — HEPARIN SODIUM 5000 UNITS: 5000 INJECTION INTRAVENOUS; SUBCUTANEOUS at 17:38

## 2023-01-09 RX ADMIN — SODIUM CHLORIDE, PRESERVATIVE FREE 10 ML: 5 INJECTION INTRAVENOUS at 05:06

## 2023-01-09 RX ADMIN — Medication 89.2 MG: at 09:17

## 2023-01-09 RX ADMIN — CHLORHEXIDINE GLUCONATE 15 ML: 1.2 RINSE ORAL at 11:58

## 2023-01-09 RX ADMIN — SODIUM CHLORIDE, PRESERVATIVE FREE 10 ML: 5 INJECTION INTRAVENOUS at 17:33

## 2023-01-09 RX ADMIN — FUROSEMIDE 80 MG: 10 INJECTION, SOLUTION INTRAMUSCULAR; INTRAVENOUS at 10:38

## 2023-01-09 RX ADMIN — ETOMIDATE 20 MG: 2 INJECTION, SOLUTION INTRAVENOUS at 09:10

## 2023-01-09 RX ADMIN — NOREPINEPHRINE BITARTRATE 8 MCG/MIN: 1 SOLUTION INTRAVENOUS at 10:15

## 2023-01-09 RX ADMIN — Medication 1 AMPULE: at 11:57

## 2023-01-09 RX ADMIN — HEPARIN SODIUM 5000 UNITS: 5000 INJECTION INTRAVENOUS; SUBCUTANEOUS at 03:44

## 2023-01-09 NOTE — PROGRESS NOTES
Physical Therapy  Chart reviewed and spoke with nursing. Patient emergently intubated this am and is now awaiting dialysis. Patient is critically ill and not appropriate for therapy services at this time. Will sign off. Please re-consult if patient improves and is appropriate for active mobility.   Thank you,  Rico Barroso, PT

## 2023-01-09 NOTE — PROCEDURES
Hemodialysis / [phone]    Vitals Pre Post Assessment Pre Post   BP BP: 119/67 (01/09/23 1430) 92/68 LOC Intubated and sedated Intubated and sedated   HR Pulse (Heart Rate): 70 (01/09/23 1430) 68 Lungs Diminished diminished   Resp Resp Rate: 22 (01/09/23 1430) 22 Cardiac NSR NSR   Temp Temp: 97.6 °F (36.4 °C) (01/09/23 1415) 97.8 Skin Warm and dry warm and dry   Weight  N/a N/a Edema none None   Tele status tele tele Pain Pain Intensity 1: 3 (01/09/23 0330) 0     Orders   Duration: Start: 2561 End: 1769 Total: 2hrs   Dialyzer: Dialyzer/Set Up Inspection: Revaclear (01/09/23 1415)   K Bath: Dialysate K (mEq/L): 2 (01/09/23 1415)   Ca Bath: Dialysate CA (mEq/L): 2.5 (01/09/23 1415)   Na:     Bicarb: Dialysate HCO3 (mEq/L): 40 (01/09/23 1415)   Target Fluid Removal: Goal/Amount of Fluid to Remove (mL): 1500 mL (01/09/23 1415)     Access   Type & Location:  R Simpson : Pre- Assessment: Dressing CDI. No s/s of infection. Both lumens aspirate & flush well. Running well at . Post assessment: Dressing CDI. No changes.     Comments:                                        Labs   HBsAg (Antigen) / date:  Pending                                             HBsAb (Antibody) / date: Pending   Source: EPIC   Obtained/Reviewed  Critical Results Called HGB   Date Value Ref Range Status   01/09/2023 8.0 (L) 11.5 - 16.0 g/dL Final     Potassium   Date Value Ref Range Status   01/09/2023 4.5 3.5 - 5.1 mmol/L Final     Calcium   Date Value Ref Range Status   01/09/2023 7.6 (L) 8.5 - 10.1 MG/DL Final     BUN   Date Value Ref Range Status   01/09/2023 65 (H) 6 - 20 MG/DL Final     Creatinine   Date Value Ref Range Status   01/09/2023 7.61 (H) 0.55 - 1.02 MG/DL Final        Meds Given   Name Dose Route   None                 Adequacy / Fluid    Total Liters Process: 23.6L   Net Fluid Removed: 1000ml      Comments   Time Out Done:   (Time) 1432   Admitting Diagnosis: SOB   Consent obtained/signed: Informed Consent Verified: Yes (01/09/23 1415)   Machine / RO # Machine Number: [id number] (01/09/23 1886)   Primary Nurse Rpt Pre: Scooter Gamez   Primary Nurse Rpt Post: Scooter Gamez   Pt Education: Acces care   Care Plan: To continue HD as ordered    Pts outpatient clinic: 1st time tx     Tx Summary   Comments:      SBAR received from Primary RN. Pt at bedside intubated and sedated. Consent signed & on file   [phone]: Each catheter limb disinfected per p&p, caps removed, hubs disinfected per p&p. Each lumen aspirated for blood return and flushed with Normal Saline per policy. Labs drawn per request/ order. VSS. Dialysis Tx initiated. 1500: Pts bp in 80's UF cut back, levo turned up, Dr. Johnston Postin made aware  : Levo increased  063 86 46 67: UF increased  1615: UF decreased, 100ml of NS given, and levo increased due to bp dropping into 70'sys    1635: Tx ended. VSS. Each dialysis catheter limb disinfected per p&p, all possible blood returned per p&p, and each dialysis hub disinfected per p&p. Each lumen flushed, post dialysis catheter Heparin dwell instilled per order, and caps applied. Bed locked and in the lowest position, call bell and belongings in reach. SBAR given to Primary, RN. Patient is stable at time of their/ my departure. All Dialysis related medications have been reviewed.

## 2023-01-09 NOTE — PROGRESS NOTES
Palliative Medicine Consult  [place]: [phone] ([phone])    Patient Name: Ernie Gil  YOB: 1960    Date of Initial Consult: 1/5/23  Reason for Consult: Overwhelming Symptoms  Requesting Provider: Alejandro Hernandez MD   Primary Care Physician: Jeni Mendoza MD     SUMMARY:   Ernie Gil is a 57 y.o. with a past history of breast cancer s/p chemo and immunotherapy (completed all therapies on 12/21/22) with a recent hospitalization from 12/28-1/1 for multifocal pneumonia 2/2 MSSA superimposed on COVID-19 who was admitted on 1/3/2023 from home with a diagnosis of Mutifocal PNA and hemoptysis. While here she was found to have a large left pleural effusion and had a thoracentesis that removed 820ml of blood tinged fluid yesterday. Overnight she had an acute drop in her Hgb and CT today shows a left sided hemothorax with loculation towards the apex. PALLIATIVE DIAGNOSES:   Shortness of breath  Acute blood loss anemia  Hemothorax  Anxiety about health  Palliative Care     PLAN:   Ms Colt Carranza had just been emergently intubated when I came back on service today- I reached out to her sister Reji Vincent to update her and get a sense of the family tree since Ms Colt Carranza was not forthcoming with this information and did not complete and AMD when we were working with her last week. Reji Vincent was shocked/saddened/contemplative when I updated her. She questioned during our conversation- how much more should we do. I shared with her that all of this is stabilize and give time- so no decision have to be made today. She confirmed that Ms Colt Carranza does not have any children, and there are three siblings, Lew Sam herself, Mayra Christian, and Janiya Mendez- not to be confused with Ines Cruz who is his son)  She understands that the three of them share decision making in the absence of an AMD.  She is going to call and speak with them today and will get back to me either today or tomorrow.   I encourage her to start thinking about what her sister would want in this situation, especially given that she was already grieving her loss of independence. Stewart Redman shared that the \"shutting down\" in the face of trials was her sisters personality and it has made it hard to advocate for her because she is not open to what her wishes have been. For now, goals remain the same for aggressive care, but will stay in contact with Stewart Redman throughout this to work through goals as she progresses. Initial consult note routed to primary continuity provider and/or primary health care team members  Communicated plan of care with: Palliative IDT, Alexia [address] Team     GOALS OF CARE / TREATMENT PREFERENCES:     GOALS OF CARE:  Patient/Health Care Proxy Stated Goals: Prolong life    TREATMENT PREFERENCES:   Code Status: Full Code    Advance Care Planning:  [x] [facility] Interdisciplinary Team has updated the ACP Navigator with [address] and Patient Capacity      Primary Decision Maker (Active): Galen - Sister - [phone]    Primary Decision Maker: Carrol Barakat - Sister - [phone]    Primary Decision Maker: Geo Economy (Big) - Brother - [phone]    Medical Interventions: Full interventions       Other:    As far as possible, the palliative care team has discussed with patient / health care proxy about goals of care / treatment preferences for patient.      HISTORY:     History obtained from: chart, attending, sister    CHIEF COMPLAINT: sedated  HPI/SUBJECTIVE:    The patient is:   [] Verbal and participatory  [x] Non-participatory due to: sedation    Clinical Pain Assessment (nonverbal scale for severity on nonverbal patients):   Clinical Pain Assessment  Severity: 0          Duration: for how long has pt been experiencing pain (e.g., 2 days, 1 month, years)  Frequency: how often pain is an issue (e.g., several times per day, once every few days, constant)     FUNCTIONAL ASSESSMENT:     Palliative Performance Scale (PPS):  PPS: 40       PSYCHOSOCIAL/SPIRITUAL SCREENING:     Palliative IDT has assessed this patient for cultural preferences / practices and a referral made as appropriate to needs (Cultural Services, Patient Advocacy, Ethics, etc.)    Any spiritual / Lutheran concerns:  [] Yes /  [x] No   If \"Yes\" to discuss with pastoral care during IDT     Does caregiver feel burdened by caring for their loved one:   [] Yes /  [x] No /  [] No Caregiver Present/Available [] No Caregiver [] Pt Lives at [place] [phone]  If \"Yes\" to discuss with social work during IDT    Anticipatory grief assessment:   [x] Normal  / [] Maladaptive     If \"Maladaptive\" to discuss with social work during IDT    ESAS Anxiety: Anxiety: 6    ESAS Depression: Depression: 0        REVIEW OF SYSTEMS:     Positive and pertinent negative findings in ROS are noted above in HPI. The following systems were [x] reviewed / [] unable to be reviewed as noted in HPI  Other findings are noted below. Systems: constitutional, ears/nose/mouth/throat, respiratory, gastrointestinal, genitourinary, musculoskeletal, integumentary, neurologic, psychiatric, endocrine. Positive findings noted below. Modified ESAS Completed by: provider   Fatigue: 5     Depression: 0 Pain: 0   Anxiety: 6 Nausea: 0   Anorexia: 4 Dyspnea: 7           Stool Occurrence(s): 1        PHYSICAL EXAM:     From RN flowsheet:  Wt Readings from Last 3 Encounters:   01/09/23 163 lb 12.8 oz (74.3 kg)   01/01/23 144 lb 11.2 oz (65.6 kg)   12/27/22 144 lb (65.3 kg)     Blood pressure 132/78, pulse 75, temperature (!) 94.7 °F (34.8 °C), resp. rate 22, height 5' (1.524 m), weight 163 lb 12.8 oz (74.3 kg), SpO2 97 %.     Pain Scale 1: Behavioral Pain Scale (BPS)  Pain Intensity 1: 3  Pain Onset 1: post op  Pain Location 1: Back  Pain Orientation 1: Left  Pain Description 1: Aching, Sore  Pain Intervention(s) 1: Repositioned  Last bowel movement, if known:     Constitutional: sedated  Respiratory:vent support  Gastrointestinal: soft non-tender, +bowel sounds  Musculoskeletal: no deformity, no tenderness to palpation  Skin: warm, dry  Neurologic: not following commands  Other:       HISTORY:     Active Problems:    [facility] (1/4/2023)      Shortness of breath (1/5/2023)      Acute blood loss anemia (1/5/2023)      Hemothorax on left (1/5/2023)      Anxiety about health (1/5/2023)      Palliative care by specialist (1/5/2023)      Hemothorax (1/6/2023)    Past Medical History:   Diagnosis Date    Cancer ([facility]     BREAST CANCER    GERD (gastroesophageal reflux disease)     HTN (hypertension) 07/18/2011    Psychiatric disorder     ANIXETY AND DEPRESSION    Thyroid disease     HYPOTHYROID      Past Surgical History:   Procedure Laterality Date    HX COLONOSCOPY      HX MYOMECTOMY  05/02/1996    x 1    HX WISDOM TEETH EXTRACTION      IR THORACENTESIS/INSERT CHEST TUBE  1/5/2023      Family History   Problem Relation Age of Onset    Heart Failure Mother     Cancer Father     Diabetes Sister     Kidney Disease Sister     Anesth Problems Neg Hx       History reviewed, no pertinent family history.   Social History     Tobacco Use    Smoking status: Never     Passive exposure: Never    Smokeless tobacco: Never   Substance Use Topics    Alcohol use: Never     Allergies   Allergen Reactions    Sulfa (Sulfonamide Antibiotics) Itching, Swelling and Unknown (comments)      Current Facility-Administered Medications   Medication Dose Route Frequency    furosemide (LASIX) injection 80 mg  80 mg IntraVENous ONCE    NOREPINephrine (LEVOPHED) 8 mg in 5% dextrose 250mL (32 mcg/mL) infusion  0.5-16 mcg/min IntraVENous TITRATE    lactated ringers bolus infusion 500 mL  500 mL IntraVENous ONCE    fentaNYL (PF) 1,500 mcg/30 mL (50 mcg/mL) infusion  0-200 mcg/hr IntraVENous TITRATE    methylPREDNISolone (PF) (SOLU-MEDROL) injection 40 mg  40 mg IntraVENous Q12H    midodrine (PROAMATINE) tablet 10 mg  10 mg Oral TID WITH MEALS    heparin (porcine) injection 5,000 Units  5,000 Units SubCUTAneous Q12H    sodium bicarbonate (8.4%) 150 mEq in dextrose 5% 1,000 mL infusion   IntraVENous CONTINUOUS    HYDROmorphone (DILAUDID) injection 0.5 mg  0.5 mg IntraVENous Q4H PRN    metoprolol (LOPRESSOR) injection 2.5 mg  2.5 mg IntraVENous Q6H PRN    0.9% sodium chloride infusion 250 mL  250 mL IntraVENous PRN    0.9% sodium chloride infusion 250 mL  250 mL IntraVENous PRN    lidocaine (XYLOCAINE) 4 % cream   Topical PRN    oxyCODONE IR (ROXICODONE) tablet 5 mg  5 mg Oral Q4H PRN    [Held by provider] carvediloL (COREG) tablet 6.25 mg  6.25 mg Oral BID WITH MEALS    [Held by provider] gabapentin (NEURONTIN) capsule 100 mg  100 mg Oral TID    PARoxetine (PAXIL) tablet 20 mg  20 mg Oral DAILY    rosuvastatin (CRESTOR) tablet 10 mg  10 mg Oral QHS    sodium chloride (NS) flush 5-40 mL  5-40 mL IntraVENous Q8H    sodium chloride (NS) flush 5-40 mL  5-40 mL IntraVENous PRN    acetaminophen (TYLENOL) tablet 650 mg  650 mg Oral Q6H PRN    Or    acetaminophen (TYLENOL) suppository 650 mg  650 mg Rectal Q6H PRN    polyethylene glycol (MIRALAX) packet 17 g  17 g Oral DAILY PRN    ondansetron (ZOFRAN ODT) tablet 4 mg  4 mg Oral Q8H PRN    Or    ondansetron (ZOFRAN) injection 4 mg  4 mg IntraVENous Q6H PRN    lidocaine 4 % patch 1 Patch  1 Patch TransDERmal Q24H    VANCOMYCIN INFORMATION NOTE   Other Rx Dosing/Monitoring    cefepime (MAXIPIME) 1 g in 0.9% sodium chloride (MBP/ADV) 50 mL MBP  1 g IntraVENous Q12H          LAB AND IMAGING FINDINGS:     Lab Results   Component Value Date/Time    WBC 11.2 (H) 01/07/2023 03:04 AM    HGB 8.0 (L) 01/09/2023 06:19 AM    PLATELET 646 (L) 64/53/1187 03:04 AM     Lab Results   Component Value Date/Time    Sodium 137 01/09/2023 06:19 AM    Potassium 4.5 01/09/2023 06:19 AM    Chloride 105 01/09/2023 06:19 AM    CO2 23 01/09/2023 06:19 AM    BUN 61 (H) 01/09/2023 06:19 AM    Creatinine 7.35 (H) 01/09/2023 06:19 AM    Calcium 7.5 (L) 01/09/2023 06:19 AM    Magnesium 2.5 (H) 01/09/2023 06:19 AM    Phosphorus 6.7 (H) 01/09/2023 06:19 AM      Lab Results   Component Value Date/Time    Alk. phosphatase 695 (H) 01/08/2023 09:35 PM    Protein, total 4.7 (L) 01/08/2023 09:35 PM    Albumin 1.9 (L) 01/08/2023 09:35 PM    Globulin 2.8 01/08/2023 09:35 PM     No results found for: INR, PTMR, PTP, PT1, PT2, APTT, INREXT, INREXT   No results found for: IRON, FE, TIBC, IBCT, PSAT, FERR   Lab Results   Component Value Date/Time    pH 7.39 01/08/2023 09:00 PM    PCO2 36 01/08/2023 09:00 PM    PO2 66 (L) 01/08/2023 09:00 PM     No components found for: GLPOC   No results found for: CPK, CKMB             Total time: 50  Counseling / coordination time, spent as noted above: 30  > 50% counseling / coordination?: y    Prolonged service was provided for  []30 min   []75 min in face to face time in the presence of the patient, spent as noted above. Time Start:   Time End:   Note: this can only be billed with 35761 (initial) or 50168 (follow up). If multiple start / stop times, list each separately.

## 2023-01-09 NOTE — CONSULTS
Cardiology Consult Note    CC: Sepsis/resp failure/CHRISTA    PCP:Susie Gtz MD  Reason for consult:  NQWMI  Requesting MD:  Dr. Mihaela Olivera Date: 1/3/2023   Today's Date: 1/9/2023   Cardiologist:  Dr Heather Carrillo. Cardiac Assessment/Plan:   1) Atypical CP, for yrs: Neg [facility] 9/2021 (6:00, -CP, -ecg; Mild LVH). 2) Abnl ecg c/w LVH. *\"EF 45-50%; No valve dz:\" @ [facility] by RCA. (EF not that low on my interp; EF 50-55%). 3) HTN  4) Palpitations (few x/yr; few sec). 5) FHx early CAD/CHF  6) Breast CA, left; Chemo (Dr GÉNESIS Parks @ [facility])       Echo 12/27/22: Left Ventricle: Normal left ventricular systolic function with a visually estimated EF of 50 - 55%. Left ventricle size is normal. Normal wall thickness. Mild hypokinesis of the following segments: basal inferolateral and mid inferolateral. Unfortunately, imaging quality limis the accuracy    Chest tube 1/5/23. Intubated 1/9/23. Complicated clinical course as noted below; Currently intubated/sedated  Current cardiac meds:  Levo @ 15; crestor 10 (holding coreg 6.25 bid)  IMP: type II NQWMI;     Rec: cont supportive care; wean pressors as able; hold crestor. Future aspirin when bleeding resolved. ____________________________________________________________________  Cathie Prieto is a 57 y.o. female presents with Staphylococcus aureus pneumonia ([address] Joseph Shepherd.     Admitted from 12/28 to 1/1/23 @ [address] for PNA/hemoptysis/covid/sepsis/CHRISTA/NQWMI w/ trop 100s      As noted in H&P: \"56 y.o.  female with pertinent past medical history of breast cancer currently off chemotherapy, MDD/TAIWO, hypothyroidism, GERD, and recent hospitalization from 12/28-1/1 for multifocal pneumonia secondary to MSSA pneumonia superimposed on COVID-19 pneumonia with course complicated by hemoptysis, sepsis, CHRISTA, and type II MI completed course of cefepime and doxycycline transition to Augmentin on time of discharge-patient unsure if she has been taking that who presents with complaints of persistent decline since discharge from OSH. She complains of malaise, fever/chills, cough productive of blood-tinged sputum, shortness of breath/dyspnea on exertion. She reports she does not feel she is doing well at home and think she needs to be in the hospital for further management. She denies tobacco, alcohol, or illicit drug use. ROS otherwise negative. In the ED, patient febrile to 100.8 °F, hemodynamically stable (hypertensive 150s/60s), saturating mid 90s on room air. ECG demonstrates NSR with LVH criteria, CXR demonstrates increased left pleural effusion and left lower lobe airspace opacity and CT chest demonstrates increased moderately large left pleural effusion with unchanged patchy bilateral consolidation left greater than right compatible with pneumonia. Rapid and flu negative. Point-of-care lactic 0.7, proBNP 1091(Previously 866), high since her troponin 116 down trended to 107 (600s at prior hospitalization, sodium 142, potassium 3.4, BUN 38, creatinine 3.0 (baseline), WBC 13.3 (slightly increased from time of discharge), hemoglobin 9.1 (improved from prior), platelets 812. Given recent hospitalization and concern for hep patient given vancomycin, Levaquin, and cefepime by ED provider. \"    ICU MD 1/8/22: \"Patient being transferred to ICU as the PCU nurses do not feel comfortable keeping the patient on the floor as she was hypotensive earlier and has been refusing po meds     57year old Female with hx of left breast cancer, recently admitted to [facility] for sepsis s/s COVID-19, CHRISTA, NSTEMI, MSSA pneumonia(12/28-1/1) presented to ED with c/o shortness of breath and hemoptysis. IN ED, patient was febrile to 100.8F. CXR demonstrated increased left pleural effusion with unchanged patchy B/L consolidation L>R. Patient had IR thoracentesis on 1/4/23- bloody tap. CT chest on 1/5 showed left sided hemothorax. Hb dropped from 9.1 to 5.6. s/p left chest tube placement on 1/5/23.  S/p PRBC transfusion. \"    ______________________________________________________________________    The patient is intubated; can provide no history. On accelerating levophed (15 currently); on HD. No PND, orthopnea, palpitations, pre-syncope, syncope, peripheral edema, or decrease in exercise tolerance. No current complaints. ECG 1/3/22:   Sinus, leftward axis, no acute changes. LVH. Tele: sinus  CXR: \"1. Endotracheal tube terminates 1 cm above the marvin. Consider retraction 1 to  2 cm. 2. Appropriate positioning of left IJ central venous catheter. No pneumothorax. 3. Slight interval improvement in extensive bilateral airspace disease. 4. Left chest tube remains in place. Suspected small left pleural effusion. \"    Notable labs: K 4.5; BUN 61; Cr 7.35 (prev 3 range). Alb 1.9; LFTs 900s/2000; Prev procal 7; proBNP 9k; neg covid 1/3/23. INR 2.7.  ___________________________________________________  Notable prior cardiac history:  @ NP OV 10/27/22:  Ms Antoinette Nunez has a h/o:  1) Atypical CP, for yrs: Neg [facility] 9/2021 (6:00, -CP, -ecg; Mild LVH). 2) Abnl ecg c/w LVH. *\"EF 45-50%; No valve dz:\" @ [facility] by RCA. (EF not that low on my interp; EF 50-55%). 3) HTN  4) Palpitations (few x/yr; few sec). 5) FHx early CAD/CHF  6) Breast CA, left; Chemo pending (Dr Star Parrish @ [facility])  No ethanol, added salt, or nicotine. 1 caff johnny/d. 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 She would like an annual visit here. 8/2021:  She has atypical chest pain (for years; 0-1 X/mo; sharp sensation; few seconds; random onset/offset). She also has intermittent palpitations (fast HR sensation, few X/year; few seconds duration, not related to chest pain). No MCCALL with good exercise tolerance. No previous cardiac evaluation. PCP note from 4/28/06 reviewed; ECG from then interpreted today as noted below. 2/2022: no recent chest pain, dyspnea, or palpitations. She would like an annual visit here. 10/2022: Chest pain remains resolved.   No dyspnea nor palpitations. Reportedly mild LV dysfunction as noted above; in my review of the echo, the EF is not that low. NP F/U: Doing well. No CP, MCCALL, palps, edema or syncope. Home SBPs in the 130s. IMPRESSION AND PLAN  01. Other chest pain (R07.89): Atypical chest pain remains resolved; Neg [facility] 9/2021. We discussed the signs and symptoms of unstable angina, myocardial infarction and malignant arrhythmia. The patient knows to seek immediate medical attention should they occur. 02. Essential (primary) hypertension (I10):  Reasonably well controlled; continue Losartan 25mg QD and increase Coreg to 6.25mg BID. Continue to monitor BP.   03. Other cardiomyopathies (I42.8):  EF looks reassuring on echo per review; repeat planned for next month. 04. Abnormal electrocardiogram [ECG] [EKG] (R94.31):  Nonspecific finding. This condition is stable. 05. Malignant neoplasm of left breast in female, estrogen receptor negative, unspecified site of breast (C50.912): Managed by: Other Physician   06. Long term (current) use of aspirin (Z79.82)   07. Body mass index [BMI] 27.0-27.9, adult (Z68.27)     ORDERS:   Dietary management education, guidance, and counseling        10/27/22 MEDICATION LIST  Medication Sig Desc Non-VCS   aspirin 81 mg tablet,delayed release take 1 tablet by oral route  every day Y   carvedilol 6.25 mg tablet take 1 tablet by oral route 2 times every day with food N   Crestor take 1 Tablet by oral route  every week Y   losartan 25 mg tablet take 1 tablet by oral route  every day N   Multi Vitamin 9 mg iron/15 mL oral liquid  Y   paroxetine 20 mg tablet take 1 tablet by oral route  every day Y     ______________________________________  CARDIAC HISTORY  RISK FACTORS:  1 Hypertension       CARDIOVASCULAR PROCEDURES  Procedure Date Results   Echo 10/05/2022 \"EF 45-50%; No valve dz:\" @ [address] by RCA. (EF not that low on my interp).    EKG 10/13/2022 Sinus Rhythm, LVH; axis -15°; nonspecific T-wave changes. [facility] 10/01/2021 Normal EF, No Ischemia, 6:00, -CP, -ecg; Mild LVH. ACTIVE ALLERGIES:  Ingredient Reaction (Severity) Comment   SULFA (SULFONAMIDE ANTIBIOTICS)         PROBLEM LIST:  Problem Description Chronic   Hypertension Y   Abnormal EKG Y       PAST MEDICAL/SURGICAL HISTORY  (Detailed)    Disease/disorder Onset Date Surgical History Date Comments     myomectomy     Hypertension         Family History  (Detailed)  Relationship Family Member Name  Age at Death Condition Onset Age Cause of Death   Mother    Cardiovascular disease  N     SOCIAL HISTORY  (Detailed)  Tobacco use reviewed. Preferred language is [place]. Smoking status: Never smoker. ALCOHOL  There is no history of alcohol use.      CAFFEINE  The patient uses caffeine: coffee - 1 cup a day.    ______________________________________________________________________  Patient Active Problem List    Diagnosis Date Noted    Hemothorax 2023    Shortness of breath 2023    Acute blood loss anemia 2023    Hemothorax on left 2023    Anxiety about health 2023    Palliative care by specialist 2023    Staphylococcus aureus pneumonia ([facility] 2023    Bacterial pneumonia 2022    Troponin level elevated 2022    Elevated LFTs 2022    Pneumonia 2022    CHRISTA (acute kidney injury) ([facility] 2022    COVID-19 2022    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 [facility]) 2022    Menopausal symptoms 2011        Past Medical History:   Diagnosis Date    Cancer ([facility]     BREAST CANCER    GERD (gastroesophageal reflux disease)     HTN (hypertension) 2011    Psychiatric disorder     ANIXETY AND DEPRESSION    Thyroid disease     HYPOTHYROID      Past Surgical History:   Procedure Laterality Date    HX COLONOSCOPY      HX MYOMECTOMY  05/02/1996    x 1    HX WISDOM TEETH EXTRACTION      IR THORACENTESIS/INSERT CHEST TUBE  2023 Allergies   Allergen Reactions    Sulfa (Sulfonamide Antibiotics) Itching, Swelling and Unknown (comments)      Family History   Problem Relation Age of Onset    Heart Failure Mother     Cancer Father     Diabetes Sister     Kidney Disease Sister     Anesth Problems Neg Hx       Social History     Socioeconomic History    Marital status: SINGLE     Spouse name: Not on file    Number of children: Not on file    Years of education: Not on file    Highest education level: Not on file   Occupational History    Not on file   Tobacco Use    Smoking status: Never     Passive exposure: Never    Smokeless tobacco: Never   Vaping Use    Vaping Use: Never used   Substance and Sexual Activity    Alcohol use: Never    Drug use: Not Currently     Types: Marijuana     Comment: IN HIGH SCHOOL ONLY    Sexual activity: Not Currently     Partners: Male   Other Topics Concern    Not on file   Social History Narrative    Not on file     Social Determinants of Health     Financial Resource Strain: Unknown    Difficulty of Paying Living Expenses: Patient refused   Food Insecurity: Unknown    Worried About Running Out of Food in the Last Year: Patient refused    Ran Out of Food in the Last Year: Patient refused   Transportation Needs: No Transportation Needs    Lack of Transportation (Medical): No    Lack of Transportation (Non-Medical):  No   Physical Activity: Not on file   Stress: Not on file   Social Connections: Not on file   Intimate Partner Violence: Not on file   Housing Stability: Unknown    Unable to Pay for Housing in the Last Year: Patient refused    Number of Places Lived in the Last Year: 2    Unstable Housing in the Last Year: Patient refused     Current Facility-Administered Medications   Medication Dose Route Frequency    NOREPINephrine (LEVOPHED) 8 mg in 5% dextrose 250mL (32 mcg/mL) infusion  0.5-16 mcg/min IntraVENous TITRATE    lactated ringers bolus infusion 500 mL  500 mL IntraVENous ONCE    fentaNYL (PF) 1,500 mcg/30 mL (50 mcg/mL) infusion  0-200 mcg/hr IntraVENous TITRATE    [START ON 1/11/2023] levoFLOXacin (LEVAQUIN) 500 mg in D5W IVPB  500 mg IntraVENous Q48H    alcohol 62% (NOZIN) nasal  1 Ampule  1 Ampule Topical Q12H    chlorhexidine (ORAL CARE KIT) 0.12 % mouthwash 15 mL  15 mL Oral Q12H    balsam peru-castor oiL (VENELEX) ointment   Topical BID    cefepime (MAXIPIME) 1 g in 0.9% sodium chloride (MBP/ADV) 50 mL MBP  1 g IntraVENous Q24H    lidocaine (XYLOCAINE) 20 mg/mL (2 %) injection 360 mg  18 mL SubCUTAneous ONCE    heparin (porcine) pf 300 Units  300 Units InterCATHeter ONCE    heparinized saline 2 units/mL infusion 800 Units  400 mL Irrigation ONCE    methylPREDNISolone (PF) (SOLU-MEDROL) injection 40 mg  40 mg IntraVENous Q12H    heparin (porcine) injection 5,000 Units  5,000 Units SubCUTAneous Q12H    sodium bicarbonate (8.4%) 150 mEq in dextrose 5% 1,000 mL infusion   IntraVENous CONTINUOUS    HYDROmorphone (DILAUDID) injection 0.5 mg  0.5 mg IntraVENous Q4H PRN    metoprolol (LOPRESSOR) injection 2.5 mg  2.5 mg IntraVENous Q6H PRN    0.9% sodium chloride infusion 250 mL  250 mL IntraVENous PRN    0.9% sodium chloride infusion 250 mL  250 mL IntraVENous PRN    lidocaine (XYLOCAINE) 4 % cream   Topical PRN    oxyCODONE IR (ROXICODONE) tablet 5 mg  5 mg Oral Q4H PRN    [Held by provider] carvediloL (COREG) tablet 6.25 mg  6.25 mg Oral BID WITH MEALS    [Held by provider] gabapentin (NEURONTIN) capsule 100 mg  100 mg Oral TID    PARoxetine (PAXIL) tablet 20 mg  20 mg Oral DAILY    rosuvastatin (CRESTOR) tablet 10 mg  10 mg Oral QHS    sodium chloride (NS) flush 5-40 mL  5-40 mL IntraVENous Q8H    sodium chloride (NS) flush 5-40 mL  5-40 mL IntraVENous PRN    acetaminophen (TYLENOL) tablet 650 mg  650 mg Oral Q6H PRN    Or    acetaminophen (TYLENOL) suppository 650 mg  650 mg Rectal Q6H PRN    polyethylene glycol (MIRALAX) packet 17 g  17 g Oral DAILY PRN    ondansetron (ZOFRAN ODT) tablet 4 mg  4 mg Oral Q8H PRN    Or    ondansetron (ZOFRAN) injection 4 mg  4 mg IntraVENous Q6H PRN    lidocaine 4 % patch 1 Patch  1 Patch TransDERmal Q24H        No reported FHx of early CAD or SCD    Prior to Admission Medications:  Prior to Admission medications    Medication Sig Start Date End Date Taking? Authorizing Provider   carvediloL (COREG) 6.25 mg tablet Take 6.25 mg by mouth two (2) times daily (with meals). 11/3/22  Yes Provider, Historical   famotidine (PEPCID) 40 mg/5 mL (8 mg/mL) suspension Take 2.5 mL by mouth two (2) times a day. 11/30/22  Yes Arline Forte NP   gabapentin (NEURONTIN) 100 mg capsule Take 1 Capsule by mouth three (3) times daily. Max Daily Amount: 300 mg. 11/30/22  Yes Arline Forte NP   PARoxetine (PAXIL) 20 mg tablet Take 20 mg by mouth daily. Yes Provider, Historical   rosuvastatin (CRESTOR) 10 mg tablet Take 10 mg by mouth nightly. 8/15/22  Yes Provider, Historical   guaiFENesin ER (MUCINEX) 600 mg ER tablet Take 1 Tablet by mouth every twelve (12) hours for 10 days. 1/1/23 1/11/23  Ankita King MD   OTHER,NON-FORMULARY, IV chemotherapy    Provider, Historical   dexAMETHasone (DECADRON) 4 mg tablet Take 2 tabs (8mg) once daily on days 2 & 3 of chemotherapy  Patient not taking: Reported on 1/4/2023 9/28/22   Neri Perez NP   ondansetron (ZOFRAN ODT) 8 mg disintegrating tablet Take 1 Tablet by mouth every eight (8) hours as needed for Nausea or Vomiting. 9/28/22   Neri Perez NP   prochlorperazine (Compazine) 5 mg tablet Take 1 Tablet by mouth every six (6) hours as needed for Nausea or Vomiting. 9/28/22   Neri Perez NP   lidocaine-prilocaine (EMLA) topical cream Apply  to affected area as needed for Pain. Apply a thick layer over port a cath 30-60 minutes prior to port access 9/28/22   Neri Perez NP   multivitamin, tx-iron-ca-min (THERA-M w/ IRON) 9 mg iron-400 mcg tab tablet Take 1 Tablet by mouth daily.     Provider, Historical        Review of Symptoms: As noted in H&P:  \"Total of 12 systems reviewed with pertinent positives and negatives noted in HPI \". 24 hr VS reviewed, overall VSSAF  Temp (24hrs), Av.8 °F (36 °C), Min:94.7 °F (34.8 °C), Max:97.6 °F (36.4 °C)    Patient Vitals for the past 8 hrs:   Pulse   23 1200 70   23 1130 69   23 1125 68   23 1015 75   23 1000 76   23 0945 75   23 0930 82   23 0921 78   23 0918 77   23 0915 79   23 0913 78   23 0900 87   23 0830 90   23 0800 86   23 0700 83   23 0630 81   23 0600 80   23 0530 85   23 0500 84     Patient Vitals for the past 8 hrs:   Resp   23 1200 22   23 1130 22   23 1125 22   23 1015 22   23 1000 20   23 0945 22   23 0930 22   23 0921 22   23 0918 22   23 0915 22   23 0913 22   23 0900 (!) 38   23 0830 (!) 46   23 0800 29   23 0700 (!) 33   23 0630 (!) 38   23 0600 (!) 34   23 0530 (!) 41   23 0500 (!) 42     Patient Vitals for the past 8 hrs:   BP   23 1200 136/74   23 1130 133/81   23 1015 102/67   23 1000 (!) 170/96   23 0945 132/78   23 0930 (!) 156/81   23 0918 (!) 80/56   23 0915 (!) 86/61   23 0913 94/67   23 0900 (!) 154/72   23 0830 139/68   23 0800 (!) 106/90   23 0700 134/86   23 0630 (!) 146/93   23 0600 115/87   23 0530 (!) 141/93   23 0500 (!) 152/97       Intake/Output Summary (Last 24 hours) at 2023 1259  Last data filed at 2023 1032  Gross per 24 hour   Intake 1340.93 ml   Output 446 ml   Net 894.93 ml         Physical Exam (complete single organ system exam)    Cons: The patient is no distress. Appears stated age. Intubated/sedated  HEENT: ETT/central lines in place. No xanthelasma. Neck: Flat JVP without appreciable HJR.   Resp: Vent respiratory effort with rhonchi & crackles bilaterally. CV: Regular rate and rhythm. PMI not palpated. Normal S1,S2  No gallop or rubs appreciated. Soft DHAVAL murmur appreciated. Intact carotid upstroke bilaterally without appreciated bruits. Abdominal aorta not palpated; no abdominal bruit noted. Intact pedal pulses. Mild anasarca. GI: No abd mass noted, soft; no organomegaly noted. Bowel sounds present. Muscular:  No significant kyphosis. Ext: No cyanosis, clubbing, or stigmata of peripheral embolization. Derm: No ulcers or stasis dermatitis of lower extremities. Neuro: Sedated.     Labs:   Recent Results (from the past 24 hour(s))   GLUCOSE, POC    Collection Time: 01/08/23  3:43 PM   Result Value Ref Range    Glucose (POC) 86 65 - 117 mg/dL    Performed by Leo Cooks    BLOOD GAS, ARTERIAL    Collection Time: 01/08/23  3:45 PM   Result Value Ref Range    pH 7.33 (L) 7.35 - 7.45      PCO2 35 35 - 45 mmHg    PO2 52 (L) 80 - 100 mmHg    O2 SAT 84 (L) 92 - 97 %    BICARBONATE 18 (L) 22 - 26 mmol/L    BASE DEFICIT 7.1 mmol/L    O2 METHOD NASAL CANNULA      Sample source ARTERIAL      SITE RIGHT RADIAL      MIREYA'S TEST YES     HEMOGLOBIN    Collection Time: 01/08/23  4:56 PM   Result Value Ref Range    HGB 7.8 (L) 11.5 - 16.0 g/dL   CORTISOL    Collection Time: 01/08/23  4:56 PM   Result Value Ref Range    Cortisol, random 61.2 ug/dL   VANCOMYCIN, RANDOM    Collection Time: 01/08/23  4:56 PM   Result Value Ref Range    Vancomycin, random 19.7 UG/ML   GLUCOSE, POC    Collection Time: 01/08/23  8:40 PM   Result Value Ref Range    Glucose (POC) 160 (H) 65 - 117 mg/dL    Performed by Taylor Morrison    BLOOD GAS, ARTERIAL    Collection Time: 01/08/23  9:00 PM   Result Value Ref Range    pH 7.39 7.35 - 7.45      PCO2 36 35 - 45 mmHg    PO2 66 (L) 80 - 100 mmHg    O2 SAT 93 92 - 97 %    BICARBONATE 21 (L) 22 - 26 mmol/L    BASE DEFICIT 3.0 mmol/L    O2 METHOD High Flow Nasal Cannula      O2 FLOW RATE 50.00 L/min    FIO2 50 % Sample source ARTERIAL      SITE LEFT RADIAL      MIREYA'S TEST YES     HEMOGLOBIN    Collection Time: 01/08/23  9:35 PM   Result Value Ref Range    HGB 7.6 (L) 11.5 - 71.2 g/dL   METABOLIC PANEL, COMPREHENSIVE    Collection Time: 01/08/23  9:35 PM   Result Value Ref Range    Sodium 136 136 - 145 mmol/L    Potassium 5.1 3.5 - 5.1 mmol/L    Chloride 106 97 - 108 mmol/L    CO2 23 21 - 32 mmol/L    Anion gap 7 5 - 15 mmol/L    Glucose 172 (H) 65 - 100 mg/dL    BUN 52 (H) 6 - 20 MG/DL    Creatinine 6.82 (H) 0.55 - 1.02 MG/DL    BUN/Creatinine ratio 8 (L) 12 - 20      eGFR 6 (L) >60 ml/min/1.73m2    Calcium 7.1 (L) 8.5 - 10.1 MG/DL    Bilirubin, total 1.7 (H) 0.2 - 1.0 MG/DL    ALT (SGPT) 944 (H) 12 - 78 U/L    AST (SGOT) 1,980 (H) 15 - 37 U/L    Alk.  phosphatase 695 (H) 45 - 117 U/L    Protein, total 4.7 (L) 6.4 - 8.2 g/dL    Albumin 1.9 (L) 3.5 - 5.0 g/dL    Globulin 2.8 2.0 - 4.0 g/dL    A-G Ratio 0.7 (L) 1.1 - 2.2     MAGNESIUM    Collection Time: 01/08/23  9:35 PM   Result Value Ref Range    Magnesium 1.5 (L) 1.6 - 2.4 mg/dL   PHOSPHORUS    Collection Time: 01/08/23  9:35 PM   Result Value Ref Range    Phosphorus 5.9 (H) 2.6 - 4.7 MG/DL   HEMOGLOBIN    Collection Time: 01/09/23  6:19 AM   Result Value Ref Range    HGB 8.0 (L) 11.5 - 13.7 g/dL   METABOLIC PANEL, BASIC    Collection Time: 01/09/23  6:19 AM   Result Value Ref Range    Sodium 137 136 - 145 mmol/L    Potassium 4.5 3.5 - 5.1 mmol/L    Chloride 105 97 - 108 mmol/L    CO2 23 21 - 32 mmol/L    Anion gap 9 5 - 15 mmol/L    Glucose 201 (H) 65 - 100 mg/dL    BUN 61 (H) 6 - 20 MG/DL    Creatinine 7.35 (H) 0.55 - 1.02 MG/DL    BUN/Creatinine ratio 8 (L) 12 - 20      eGFR 6 (L) >60 ml/min/1.73m2    Calcium 7.5 (L) 8.5 - 10.1 MG/DL   MAGNESIUM    Collection Time: 01/09/23  6:19 AM   Result Value Ref Range    Magnesium 2.5 (H) 1.6 - 2.4 mg/dL   PHOSPHORUS    Collection Time: 01/09/23  6:19 AM   Result Value Ref Range    Phosphorus 6.7 (H) 2.6 - 4.7 MG/DL TROPONIN-HIGH SENSITIVITY    Collection Time: 01/09/23  6:19 AM   Result Value Ref Range    Troponin-High Sensitivity 446 (HH) 0 - 51 ng/L   HEMOGLOBIN    Collection Time: 01/09/23 11:34 AM   Result Value Ref Range    HGB 8.0 (L) 11.5 - 16.0 g/dL   GLUCOSE, POC    Collection Time: 01/09/23 11:43 AM   Result Value Ref Range    Glucose (POC) 165 (H) 65 - 117 mg/dL    Performed by Laly Soliz RN      No results for input(s): CPK, CKMB, CKNDX, TROIQ in the last 72 hours. Recent Labs     01/09/23  1134 01/09/23  0619 01/08/23  2135 01/08/23  1656 01/08/23  0432 01/07/23  1418 01/07/23  0304 01/06/23  2336 01/06/23  1527   NA  --  137 136  --  138  --  138   < >  --    K  --  4.5 5.1  --  5.3*  --  4.3   < >  --    CL  --  105 106  --  109*  --  110*   < >  --    CO2  --  23 23  --  22  --  22   < >  --    BUN  --  61* 52*  --  47*  --  37*   < >  --    CREA  --  7.35* 6.82*  --  5.59*  --  3.88*   < >  --    GLU  --  201* 172*  --  86  --  116*   < >  --    PHOS  --  6.7* 5.9*  --   --   --   --   --   --    CA  --  7.5* 7.1*  --  7.6*  --  8.0*   < >  --    ALB  --   --  1.9*  --   --   --   --   --   --    WBC  --   --   --   --   --   --  11.2*  --  15.3*   HGB 8.0* 8.0* 7.6*   < > 7.8*   < > 7.9*   < > 8.1*  8.3*   HCT  --   --   --   --   --   --  24.8*  --  25.1*   PLT  --   --   --   --   --   --  118*  --  141*    < > = values in this interval not displayed.        Yaneth Anderson MD

## 2023-01-09 NOTE — PROCEDURES
[facility]      Procedure Note - Intubation:   Performed by Abdiel Torres MD .   Staff Anesthesiologist/Intensivist    Diagnosis: acute respiratory failure  Insertion Date: 1/9/2023 Time:approximately 0845   Obtained Consent? Other; patient expressed goals of care to Dr. Arlene Harris 1/8; pt in acute respiratory distress today, thus emergent now  Procedure Location:  ICU. Immediately prior to the procedure, the patient was reevaluated and found suitable for the planned procedure and any planned medications. Immediately prior to the procedure a time out was called to verify the correct patient, procedure, equipment, staff, and marking as appropriate. Medications given were etomidate, midazolam, and rocuronium (Zemuron). A number 8.0 cuffed   ETT was placed to 25 cm at the teeth. Placement was evaluated by noting bilateral, symmetric breath sounds, good end-tidal CO2 detector color change , no breath sounds over stomach, and chest x-ray visualization. Attempts required: 1. Complications: none. RSI was used. .  The procedure was tolerated well. HAYLIE Burnette MD  Intensivist             [facility]      Procedure Note - Central Venous Access:   Performed by Abdiel Torres MD  Diagnosis: hypotension  Insertion Date: 01/09/23  Time:12:44 PM  Obtained Consent? no; emergent   Procedure Location:  ICU. Immediately prior to the procedure, the patient was reevaluated and found suitable for the planned procedure and any planned medications. Immediately prior to the procedure a time out was called to verify the correct patient, procedure, equipment, staff, and marking as appropriate. Central line Bundle:  Full sterile barrier precautions used. 7-Step Sterility Protocol followed.   (cap, mask sterile gown, sterile gloves, large sterile sheet, hand hygiene, 2% chlorhexidine for cutaneous antisepsis)    Patient positioned in Trendelenburg?yes   The site was prepped with ChloraPrep. Catheter inserted into a new site. Using Seldinger technique a Arrow Triple Lumen CVC was placed in the Left, Internal Jugular Vein via direct cannulation with 1 number of attempts for Monitoring, Blood Drawing, and IV Access. Ultrasound Guidance was utilized. There was good dark, non-pulsatile blood return in all ports. Femoral Site? no. If Yes, reason femoral site was chosen: na  Catheter secured. Biopatch in place? yes. Sterile Bio-occlusive dressing placed. The following complications were encountered: None. A follow-up chest x-ray was ordered post procedure. The procedure was tolerated well.     Avani Gottlieb MD  Critical Care Medicine  [facility]

## 2023-01-09 NOTE — PROGRESS NOTES
[facility] at [address]  W: [phone] F: [phone]  Reason for Visit:   [place] Camila is a 57 y.o. female with left breast cancer-ER 0%, WA 0%, HER2/radha negative. Treatment History:   10/1/22: Carboplatin taxol and keytruda followed by ddAC and Keytruda     History of Present Illness:   Mrs. Kole Woo is a patient of Dr. Annamaria Alcocer at [facility].  She is followed for treatment of left breast cancer. She was recently admitted to [facility] for sepsis r/t COVID-pneumonia, CHRISTA, NSTEMI, hemoptysis and anemia. Pertinent treatment history as follows: She had a screening mammogram on 7/20/2022 which showed a 1 cm irregular mass in the left breast at 3 o'clock position. Biopsy on 8/2/2022 showed invasive ductal carcinoma, grade 2-3, ER 0%, WA 0%, HER2/radha negative. Ki-67 85%. Declined genetic testing. Recent Keytruda : last dose 12/14/2022  Last Chemotherapy: 12/21/2022     She had a screening mammogram on 7/20/2022 which showed a 1 cm irregular mass in the left breast at 3 o'clock position. Biopsy on 8/2/2022 showed invasive ductal carcinoma, grade 2-3, ER 0%, WA 0%, HER2/radha negative. Ki-67 85%. Declined genetic testing. Patient has now returned to the ED with complaints of fever and shortness of breath. She was found to have a large left pleural effusion and CHRISTA. Patient seen at bedside in hallway bed in ED. Discussed plan for thoracentesis today. Will discuss further plan of care once she has been placed in her room given no privacy in hallway. Interval History:     1/5/2023: Patient seen at bedside in ED. She is acutely ill-appearing and conversationally short of breath. Reviewed chest x-ray results at bedside with patient. At that time CT was still pending. She did give staff permission to access her port and agreed to transfusion of blood.   We did discuss that likely fluid accumulation can be attributed to malignant pleural effusion and this would mean cancer has moved into her lung. She verbalized understanding and agreement. 1/6/2023: Patient seen at bedside after working with PT. She was up in the chair complaining of pain to chest tube site. She verbalized frustrations at now being dependent upon others for ADLs. Reports she has always been fiercely independent. Upon discussion possibility of malignancy with presumed malignant pleural effusion patient been shut down and did not wish to speak further. 1/9/2023: Patient seen at bedside, she was recently intubated emergently for worsening respiratory failure. Was moved from stepdown to ICU yesterday with ongoing hypotension.       Past Medical History:   Diagnosis Date    Cancer ([facility] [address]     BREAST CANCER    GERD (gastroesophageal reflux disease)     HTN (hypertension) 07/18/2011    Psychiatric disorder     ANIXETY AND DEPRESSION    Thyroid disease     HYPOTHYROID      Past Surgical History:   Procedure Laterality Date    HX COLONOSCOPY      HX MYOMECTOMY  05/02/1996    x 1    HX WISDOM TEETH EXTRACTION      IR THORACENTESIS/INSERT CHEST TUBE  1/5/2023      Social History     Tobacco Use    Smoking status: Never     Passive exposure: Never    Smokeless tobacco: Never   Substance Use Topics    Alcohol use: Never      Family History   Problem Relation Age of Onset    Heart Failure Mother     Cancer Father     Diabetes Sister     Kidney Disease Sister     Anesth Problems Neg Hx      Current Facility-Administered Medications   Medication Dose Route Frequency    NOREPINephrine (LEVOPHED) 8 mg in 5% dextrose 250mL (32 mcg/mL) infusion  0.5-16 mcg/min IntraVENous TITRATE    lactated ringers bolus infusion 500 mL  500 mL IntraVENous ONCE    fentaNYL (PF) 1,500 mcg/30 mL (50 mcg/mL) infusion  0-200 mcg/hr IntraVENous TITRATE    levoFLOXacin (LEVAQUIN) 750 mg in D5W IVPB  750 mg IntraVENous ONCE    [START ON 1/11/2023] levoFLOXacin (LEVAQUIN) 500 mg in D5W IVPB  500 mg IntraVENous Q48H    alcohol 62% (NOZIN) nasal  1 Ampule  1 Ampule Topical Q12H    chlorhexidine (ORAL CARE KIT) 0.12 % mouthwash 15 mL  15 mL Oral Q12H    balsam peru-castor oiL (VENELEX) ointment   Topical BID    cefepime (MAXIPIME) 1 g in 0.9% sodium chloride (MBP/ADV) 50 mL MBP  1 g IntraVENous Q24H    methylPREDNISolone (PF) (SOLU-MEDROL) injection 40 mg  40 mg IntraVENous Q12H    heparin (porcine) injection 5,000 Units  5,000 Units SubCUTAneous Q12H    sodium bicarbonate (8.4%) 150 mEq in dextrose 5% 1,000 mL infusion   IntraVENous CONTINUOUS    HYDROmorphone (DILAUDID) injection 0.5 mg  0.5 mg IntraVENous Q4H PRN    metoprolol (LOPRESSOR) injection 2.5 mg  2.5 mg IntraVENous Q6H PRN    0.9% sodium chloride infusion 250 mL  250 mL IntraVENous PRN    0.9% sodium chloride infusion 250 mL  250 mL IntraVENous PRN    lidocaine (XYLOCAINE) 4 % cream   Topical PRN    oxyCODONE IR (ROXICODONE) tablet 5 mg  5 mg Oral Q4H PRN    [Held by provider] carvediloL (COREG) tablet 6.25 mg  6.25 mg Oral BID WITH MEALS    [Held by provider] gabapentin (NEURONTIN) capsule 100 mg  100 mg Oral TID    PARoxetine (PAXIL) tablet 20 mg  20 mg Oral DAILY    rosuvastatin (CRESTOR) tablet 10 mg  10 mg Oral QHS    sodium chloride (NS) flush 5-40 mL  5-40 mL IntraVENous Q8H    sodium chloride (NS) flush 5-40 mL  5-40 mL IntraVENous PRN    acetaminophen (TYLENOL) tablet 650 mg  650 mg Oral Q6H PRN    Or    acetaminophen (TYLENOL) suppository 650 mg  650 mg Rectal Q6H PRN    polyethylene glycol (MIRALAX) packet 17 g  17 g Oral DAILY PRN    ondansetron (ZOFRAN ODT) tablet 4 mg  4 mg Oral Q8H PRN    Or    ondansetron (ZOFRAN) injection 4 mg  4 mg IntraVENous Q6H PRN    lidocaine 4 % patch 1 Patch  1 Patch TransDERmal Q24H      Allergies   Allergen Reactions    Sulfa (Sulfonamide Antibiotics) Itching, Swelling and Unknown (comments)        Review of Systems: not obtained    Physical Exam:   Visit Vitals  /67   Pulse 75   Temp (!) 94.7 °F (34.8 °C)   Resp 22   Ht 5' (1.524 m)   Wt 163 lb 12.8 oz (74.3 kg)   SpO2 100%   BMI 31.99 kg/m²         Results:     Lab Results   Component Value Date/Time    WBC 11.2 (H) 01/07/2023 03:04 AM    HGB 8.0 (L) 01/09/2023 06:19 AM    HCT 24.8 (L) 01/07/2023 03:04 AM    PLATELET 449 (L) 98/20/3230 03:04 AM    MCV 91.9 01/07/2023 03:04 AM    ABS. NEUTROPHILS 13.4 (H) 01/06/2023 03:27 PM     Lab Results   Component Value Date/Time    Sodium 137 01/09/2023 06:19 AM    Potassium 4.5 01/09/2023 06:19 AM    Chloride 105 01/09/2023 06:19 AM    CO2 23 01/09/2023 06:19 AM    Glucose 201 (H) 01/09/2023 06:19 AM    BUN 61 (H) 01/09/2023 06:19 AM    Creatinine 7.35 (H) 01/09/2023 06:19 AM    GFR est AA >60 09/26/2022 03:26 PM    GFR est non-AA >60 09/26/2022 03:26 PM    Calcium 7.5 (L) 01/09/2023 06:19 AM    Glucose (POC) 160 (H) 01/08/2023 08:40 PM     Lab Results   Component Value Date/Time    Bilirubin, total 1.7 (H) 01/08/2023 09:35 PM    ALT (SGPT) 944 (H) 01/08/2023 09:35 PM    Alk. phosphatase 695 (H) 01/08/2023 09:35 PM    Protein, total 4.7 (L) 01/08/2023 09:35 PM    Albumin 1.9 (L) 01/08/2023 09:35 PM    Globulin 2.8 01/08/2023 09:35 PM       External   Records reviewed and summarized above. Pathology report(s) reviewed above. Radiology report(s) reviewed above. MRI  9/2022    LEFT BREAST: Conglomerate mass enhancement in the left breast at 4-5 o'clock,  combined dimension 3.7 x 1.9 x 1.4 cm. This is slightly more extensive than the  sonographic and mammographic findings. No lymphadenopathy. Physical Exam  Constitutional:       Appearance: She is normal weight. She is ill-appearing. Interventions: She is sedated and intubated. HENT:      Head: Normocephalic. Eyes:      Pupils: Pupils are equal, round, and reactive to light. Pulmonary:      Effort: She is intubated. Abdominal:      Palpations: Abdomen is soft.    Musculoskeletal:         General: Normal range of motion. Skin:     General: Skin is warm and dry. Neurological:      General: No focal deficit present. Mental Status: She is unresponsive. Comments: Intubated        Assessment/PLAN:     1) Left breast cancer  1 cm mass on mammogram  Palpated a lump  ER 0%, OK 0%, HER2/radha negative  Ki 67 was 85%. MRI breast with a 3.7 cm Left breast mass  [id number]  AJCC 8th ed-Stage IIA  Genetic testing negative  Recommended chemotherapy + Keytruda based on the KEYNOTE-522 trial (taxol, carboplatin and [place] followed by [facility] as preoperative treatment, followed by surgery, and then adjuvant pembrolizumab for another nine cycles (27 weeks) after surgery). Recent admission and discharge from [facility] for COVID-pneumonia, sepsis, CHRISTA, NSTEMI and anemia    Last chemo with taxol 12/21/22. Last Keytruda 12/14/22. CT chest 1/3/2003: Increased moderately large left pleural effusion. Unchanged patchy bilateral  consolidation, left greater than right, compatible with pneumonia. Unlikely pneumonitis given CT results are consistent with COVID and viral pneumonia on admission     Repeat CT results noted from 1/8  1. Left chest tube. Interval substantially diminished size of loculated left  pleural effusion with small-moderate residual.  2. Interval demonstration of small-moderate layering right pleural effusion. 3. Substantial interval worsening of bilateral pulmonary groundglass  opacifications with crazy paving. Bibasilar dependent consolidations versus atelectasis are also shown.     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  Continue to trend CMP, creatinine worsening over the weekend, 7.5 today  Held off on steroids initially with concern for underlying infection and pneumonia  Started on full dose steroids 1/8  Nephrology consulted with plans to place Blayne start HD today    3) Worsening respiratory failure resulting in intubation  New findings of hemothorax on CT  Multifocal pneumonia, prior positive for pansensitive MSSA  Large left pleural effusion seen on CT  Appreciate pulmonology input  S/p thoracentesis with 820 mL removed of red tinge fluid  Await cytology results, although very suspicious for malignant pleural effusion     Repeat CT results as follows  IMPRESSION  1. Left-sided hemothorax with loculation toward the apex. 2. Right upper lobe and middle lobe infiltrates. S/p chest tube placement after pneumothorax development  Appreciate pulmonology input    Moved to the ICU on 1/8 for episodes of hypotension  Intubated on 1/9 for worsening respiratory failure    4) Acute anemia related to hemothorax  Hemoglobin dropped from 9 to 5.6 overnight on 1/4  CT results showed new hemothorax  S/p PRBC transfusion  Continue to trend CBC, now stable    Discussed with bedside RN in  ICU  Discussed with palliative care NP    No role for oncology at this time. Suspect cause of pleural effusion is metastatic malignancy although cytology remains pending. Patient is acutely ill and is now intubated in ICU. Agree with ongoing goals of care discussion with family per palliative care. We will continue to follow peripherally, please call if needed.     Signed By: Alfa Hogue NP

## 2023-01-09 NOTE — PROGRESS NOTES
0800  assessed. Patient able to respond good morning. Very tachypneic at 36. On high flow at 60 lpm at 80%.   O2 sats being maintained so far at 94%,

## 2023-01-09 NOTE — PROGRESS NOTES
Alexandru Diann Quinteros  XNM:[id number]   :1960       Patient PLCS--[id number]  Saúl  Left hemothroax  Ckd      PLAN  Place jose alberto cath  Start hemodialysis today    Consent for hd and hd cath obtained from sister -christin via phone  Sister understand procedure and agrees to proceed with hd cath placement and dialysis. I have d.w her if her bp is low she may need CRRT.       Staphylococcus aureus pneumonia [facility]) MD Chris  [facility]  [facility]  [address], [place], [address] [facility]  Phone - [phone]         Fax - [phone] [facility] Office  [address] [place] [address]  Phone - [phone]        Fax - [phone]

## 2023-01-09 NOTE — PROGRESS NOTES
Obtained phone consent from Gisella Ackerman (sister) at this time with Abad Montaño as witness. Per Catracho Guadarrama) the pt may receive a Blayne dialysis catheter placement.

## 2023-01-09 NOTE — PROGRESS NOTES
1900 - 2000  Bedside and Verbal shift change report given to Cara Ramos (oncoming nurse) by Kristin Scott (offgoing nurse). Report included the following information SBAR, Kardex, Procedure Summary, Intake/Output, MAR, Recent Results, and Cardiac Rhythm SR .   2000 - 0800  End of Shift Note    Bedside shift change report given to Manuel Reese (oncoming nurse) by Dayna Chin RN (offgoing nurse). Report included the following information SBAR, Procedure Summary, Intake/Output, MAR, Recent Results, and Cardiac Rhythm SR    Shift worked:  Night     Shift summary and any significant changes:     Hypothermia     Concerns for physician to address:  See Lab results / Nutrition     Zone phone for oncoming shift:   [phone]       Activity:  Activity Level: Bed Rest  Number times ambulated in hallways past shift: 0  Number of times OOB to chair past shift: 0    Cardiac:   Cardiac Monitoring: Yes      Cardiac Rhythm: Sinus Rhythm    Access:  Current line(s): port and midline     Genitourinary:   Urinary status: isaac    Respiratory:   O2 Device: Heated, Hi flow nasal cannula  Chronic home O2 use?: NO  Incentive spirometer at bedside: NO       GI:  Last Bowel Movement Date: 01/08/23  Current diet:  ADULT DIET Regular  Passing flatus: YES  Tolerating current diet: YES       Pain Management:   Patient states pain is manageable on current regimen: N/A    Skin:  William Score: 13  Interventions: float heels and internal/external urinary devices    Patient Safety:  Fall Score:  Total Score: 4  Interventions: bed/chair alarm and gripper socks  High Fall Risk: Yes    Length of Stay:  Expected LOS: 3d 14h  Actual LOS: [address]., RN

## 2023-01-09 NOTE — PROGRESS NOTES
[facility]    ICU TEAM Progress Note    Name: Chayito Lozoya   : 1960   MRN: [id number]   Date: 2023           ICU Assessment & Plan of Abigail Aleman Is a 57 y.o. female with left breast cancer, recently admitted to [facility] for sepsis s/s COVID-19, CHRISTA, NSTEMI, MSSA pneumonia(-) presented to ED with c/o shortness of breath and hemoptysis. Transferred to ICU  for hypotension. NEURO  #. Anxiety  - cont paxil  - hold BZDs    CARDIAC  #. Hypotension, unclear etio, septic vs hypovolemic  #. NSTEMI  #. Elevated BNP  - levo prn MAP<65  - trop elevated    -- Cards consult -- likely no intervention as pt with sig bleeding   -- cont to trend, EKG now   -- hold ASA/AC due to bleeding, hold BB due to instability, hold statin due to liver insult    RESPIRATORY  #. Acute hypoxic respiratory failure, on HFNC  #. Pulmonary edema vs PNA vs less likely Keytruda pneumonitis  - inc'g requirements overnight  - possible intubation today  - diuresis/HD, abx, steroids (1mg/kg)    RENAL  #. CHRISTA on CKD 4, prerenal vs ATN  - Renal consulted  - likely headed to RRT  - volume removal as able    GASTROINTESTINAL  #. Elevated LFTs, likely ischemic hepatopathy  - monitor liver labs daily    HEMATOLOGIC / ONCOLOGIC  #. ABLA   #. Pleural effusion s/p thora c/b hemothorax now with surgical CTx   #. Invasive ductal breast cancer  - CTx with continued output; Hb stable at this time  - cont H/H q8  - standard transfusion thresholds  - Onc following peripherally while pt acutely ill  - pleural fluid cyto pending, likely malignant    ID  #.  Multifocal MSSA PNA  - based on cx data, cefP should well cover  - acutely worsened today, now intubated  - will add double-coverage for risk of PsA in likely immunocompromised pt  - consider bronch    ENDOCRINE  - accuchecks q6h, insulin prn  - monitor closely for refeeding with TF    MUSCULOSKELETAL  - wound care following    GOC: ongoing discussions with [facility] (sisters). Greatly appreciate Palliative assistance. DVT Prophylaxis: hep ppx  U - Ulcer Prophylaxis: PPI  G - Glycemic Control: Insulin    Subjective:   Progress Note: 1/9/2023      Reason for ICU Admission: hypotension     HPI:58 year old Female with hx of left breast cancer, recently admitted to [facility] for sepsis s/s COVID-19, CHRISTA, NSTEMI, MSSA pneumonia(12/28-1/1) presented to ED with c/o shortness of breath and hemoptysis. IN ED, patient was febrile to 100.8F. CXR demonstrated increased left pleural effusion with unchanged patchy B/L consolidation L>R. Patient had IR thoracentesis on 1/4/23- bloody tap. CT chest on 1/5 showed left sided hemothorax. Hb dropped from 9.1 to 5.6. s/p left chest tube placement on 1/5/23. S/p PRBC transfusion. Overnight Events:   1/9 - at shift change, pt notably tachypneic to 40 in sig respiratory distress on HFNC. Decision made to intubate after clarifying goals of care established with Dr. Akanksha Omer, who had admitted patient to ICU yesterday and spoken with patient specifically about them. Patient unable to speak for herself at this time. POD:  * No surgery found *    S/P:       Home Medications:     Prior to Admission medications    Medication Sig Start Date End Date Taking? Authorizing Provider   carvediloL (COREG) 6.25 mg tablet Take 6.25 mg by mouth two (2) times daily (with meals). 11/3/22  Yes Provider, Historical   famotidine (PEPCID) 40 mg/5 mL (8 mg/mL) suspension Take 2.5 mL by mouth two (2) times a day. 11/30/22  Yes Gregory Forte NP   gabapentin (NEURONTIN) 100 mg capsule Take 1 Capsule by mouth three (3) times daily. Max Daily Amount: 300 mg. 11/30/22  Yes Gregory Forte NP   PARoxetine (PAXIL) 20 mg tablet Take 20 mg by mouth daily. Yes Provider, Historical   rosuvastatin (CRESTOR) 10 mg tablet Take 10 mg by mouth nightly.  8/15/22  Yes Provider, Historical   guaiFENesin ER (MUCINEX) 600 mg ER tablet Take 1 Tablet by mouth every twelve (12) hours for 10 days. 1/1/23 1/11/23  Augustine Gandara MD   OTHER,NON-FORMULARY, IV chemotherapy    Provider, Historical   dexAMETHasone (DECADRON) 4 mg tablet Take 2 tabs (8mg) once daily on days 2 & 3 of chemotherapy  Patient not taking: Reported on 1/4/2023 9/28/22   Maulik Quiroga NP   ondansetron (ZOFRAN ODT) 8 mg disintegrating tablet Take 1 Tablet by mouth every eight (8) hours as needed for Nausea or Vomiting. 9/28/22   Maulik Quiroga NP   prochlorperazine (Compazine) 5 mg tablet Take 1 Tablet by mouth every six (6) hours as needed for Nausea or Vomiting. 9/28/22   Maulik Quiroga NP   lidocaine-prilocaine (EMLA) topical cream Apply  to affected area as needed for Pain. Apply a thick layer over port a cath 30-60 minutes prior to port access 9/28/22   Maulik Quiroga NP   multivitamin, tx-iron-ca-min (THERA-M w/ IRON) 9 mg iron-400 mcg tab tablet Take 1 Tablet by mouth daily.     Provider, Historical       Current Meds:     Current Facility-Administered Medications   Medication Dose Route Frequency    methylPREDNISolone (PF) (SOLU-MEDROL) injection 40 mg  40 mg IntraVENous Q12H    midodrine (PROAMATINE) tablet 10 mg  10 mg Oral TID WITH MEALS    heparin (porcine) injection 5,000 Units  5,000 Units SubCUTAneous Q12H    sodium bicarbonate (8.4%) 150 mEq in dextrose 5% 1,000 mL infusion   IntraVENous CONTINUOUS    HYDROmorphone (DILAUDID) injection 0.5 mg  0.5 mg IntraVENous Q4H PRN    metoprolol (LOPRESSOR) injection 2.5 mg  2.5 mg IntraVENous Q6H PRN    0.9% sodium chloride infusion 250 mL  250 mL IntraVENous PRN    0.9% sodium chloride infusion 250 mL  250 mL IntraVENous PRN    lidocaine (XYLOCAINE) 4 % cream   Topical PRN    oxyCODONE IR (ROXICODONE) tablet 5 mg  5 mg Oral Q4H PRN    [Held by provider] carvediloL (COREG) tablet 6.25 mg  6.25 mg Oral BID WITH MEALS    [Held by provider] gabapentin (NEURONTIN) capsule 100 mg  100 mg Oral TID    PARoxetine (PAXIL) tablet 20 mg  20 mg Oral DAILY    rosuvastatin (CRESTOR) tablet 10 mg  10 mg Oral QHS    sodium chloride (NS) flush 5-40 mL  5-40 mL IntraVENous Q8H    sodium chloride (NS) flush 5-40 mL  5-40 mL IntraVENous PRN    acetaminophen (TYLENOL) tablet 650 mg  650 mg Oral Q6H PRN    Or    acetaminophen (TYLENOL) suppository 650 mg  650 mg Rectal Q6H PRN    polyethylene glycol (MIRALAX) packet 17 g  17 g Oral DAILY PRN    ondansetron (ZOFRAN ODT) tablet 4 mg  4 mg Oral Q8H PRN    Or    ondansetron (ZOFRAN) injection 4 mg  4 mg IntraVENous Q6H PRN    lidocaine 4 % patch 1 Patch  1 Patch TransDERmal Q24H    VANCOMYCIN INFORMATION NOTE   Other Rx Dosing/Monitoring    cefepime (MAXIPIME) 1 g in 0.9% sodium chloride (MBP/ADV) 50 mL MBP  1 g IntraVENous Q12H       Objective:   Vital Signs:  Visit Vitals  /86   Pulse 83   Temp (!) 94.7 °F (34.8 °C)   Resp (!) 33   Ht 5' (1.524 m)   Wt 74.3 kg (163 lb 12.8 oz)   SpO2 97%   BMI 31.99 kg/m²    O2 Flow Rate (L/min): (S) 60 l/min (Increased flow secondary Sp02 dropping.) O2 Device: Heated, Hi flow nasal cannula Temp (24hrs), Av.9 °F (36.1 °C), Min:94.7 °F (34.8 °C), Max:98.1 °F (36.7 °C)           Intake/Output:     Intake/Output Summary (Last 24 hours) at 2023 0811  Last data filed at 2023 0600  Gross per 24 hour   Intake 1340.93 ml   Output 516 ml   Net 824.93 ml       Physical Exam:  Ill-appearing woman, lying in bed, on HFNC, appearing acutely toxic in respiratory distress  Resps tachypneic and labored, symmetric chest rise, L CTx in place  Tachy rate, no heave/rub  Peripheral pulses thready  Abd soft, no guarding  Skin warm, diaphoretic  Eyes open but not following commands    LABS AND  DATA:   Personally reviewed    MEDS:   Reviewed    DISPOSITION  Stay in ICU    CRITICAL CARE CONSULTANT NOTE  I had a face to face encounter with the patient, reviewed and interpreted patient data including clinical events, labs, images, vital signs, I/O's, and examined patient.   I have discussed the case and the plan and management of the patient's care with the consulting services, the bedside nurses and the respiratory therapist.      NOTE OF PERSONAL INVOLVEMENT IN CARE   This patient has a high probability of imminent, clinically significant deterioration, which requires the highest level of preparedness to intervene urgently. I participated in the decision-making and personally managed or directed the management of the following life and organ supporting interventions that required my frequent assessment to treat or prevent imminent deterioration. I personally spent 50 minutes of critical care time. This is time spent at this critically ill patient's bedside actively involved in patient care as well as the coordination of care. This does not include any procedural time which has been billed separately.     Clyde Broussard MD  Intensivist  1/9/2023

## 2023-01-09 NOTE — CONSULTS
[facility]:[id number]   :1960     Pt seen  S/p hd today-   1 kg fluid removed  On vent    We will start her on CRRT    D/w DR. CARREON and nurse      Staphylococcus aureus pneumonia [facility]) MD Chris  [facility]  [facility]  [address], [place] [address]  [place], [address] [facility]  Phone - [phone]         Fax - [phone] [facility] Office  [address] [address]  Phone - [phone]        Fax - [phone]

## 2023-01-09 NOTE — PROGRESS NOTES
Received cardiology consult for pt. She is already established with Dr. Johnna Alas with [facility]. Will defer consult to their team.  Discussed with RN. Theron Soria NP  1/9/2023  9:42 AM    [facility]  [address] [place] [address]  [phone]  www. [facility]

## 2023-01-10 ENCOUNTER — HOSPITAL ENCOUNTER (OUTPATIENT)
Dept: INFUSION THERAPY | Age: 63
End: 2023-01-10

## 2023-01-10 ENCOUNTER — APPOINTMENT (OUTPATIENT)
Dept: NON INVASIVE DIAGNOSTICS | Age: 63
DRG: 208 | End: 2023-01-10
Attending: INTERNAL MEDICINE
Payer: COMMERCIAL

## 2023-01-10 ENCOUNTER — APPOINTMENT (OUTPATIENT)
Dept: GENERAL RADIOLOGY | Age: 63
DRG: 208 | End: 2023-01-10
Attending: INTERNAL MEDICINE
Payer: COMMERCIAL

## 2023-01-10 ENCOUNTER — APPOINTMENT (OUTPATIENT)
Dept: ULTRASOUND IMAGING | Age: 63
DRG: 208 | End: 2023-01-10
Attending: INTERNAL MEDICINE
Payer: COMMERCIAL

## 2023-01-10 LAB
ALBUMIN SERPL-MCNC: 1.6 G/DL (ref 3.5–5)
ALBUMIN/GLOB SERPL: 0.5 (ref 1.1–2.2)
ALP SERPL-CCNC: 993 U/L (ref 45–117)
ALT SERPL-CCNC: 1182 U/L (ref 12–78)
ANION GAP SERPL CALC-SCNC: 7 MMOL/L (ref 5–15)
ANION GAP SERPL CALC-SCNC: 8 MMOL/L (ref 5–15)
APTT PPP: 54.1 SEC (ref 22.1–31)
AST SERPL-CCNC: >2000 U/L (ref 15–37)
BASOPHILS # BLD: 0 K/UL (ref 0–0.1)
BASOPHILS NFR BLD: 0 % (ref 0–1)
BILIRUB DIRECT SERPL-MCNC: 2.1 MG/DL (ref 0–0.2)
BILIRUB SERPL-MCNC: 2.8 MG/DL (ref 0.2–1)
BUN SERPL-MCNC: 32 MG/DL (ref 6–20)
BUN SERPL-MCNC: 41 MG/DL (ref 6–20)
BUN/CREAT SERPL: 9 (ref 12–20)
BUN/CREAT SERPL: 9 (ref 12–20)
CALCIUM SERPL-MCNC: 7.4 MG/DL (ref 8.5–10.1)
CALCIUM SERPL-MCNC: 7.4 MG/DL (ref 8.5–10.1)
CHLORIDE SERPL-SCNC: 104 MMOL/L (ref 97–108)
CHLORIDE SERPL-SCNC: 105 MMOL/L (ref 97–108)
CO2 SERPL-SCNC: 27 MMOL/L (ref 21–32)
CO2 SERPL-SCNC: 28 MMOL/L (ref 21–32)
CREAT SERPL-MCNC: 3.47 MG/DL (ref 0.55–1.02)
CREAT SERPL-MCNC: 4.78 MG/DL (ref 0.55–1.02)
DIFFERENTIAL METHOD BLD: ABNORMAL
ECHO LA DIAMETER INDEX: 1.35 CM/M2
ECHO LA DIAMETER: 2.3 CM
ECHO LV EDV A4C: 60 ML
ECHO LV EDV INDEX A4C: 35 ML/M2
ECHO LV EJECTION FRACTION A4C: 40 %
ECHO LV ESV A4C: 36 ML
ECHO LV ESV INDEX A4C: 21 ML/M2
ECHO LV FRACTIONAL SHORTENING: 26 % (ref 28–44)
ECHO LV INTERNAL DIMENSION DIASTOLE INDEX: 1.99 CM/M2
ECHO LV INTERNAL DIMENSION DIASTOLIC: 3.4 CM (ref 3.9–5.3)
ECHO LV INTERNAL DIMENSION SYSTOLIC INDEX: 1.46 CM/M2
ECHO LV INTERNAL DIMENSION SYSTOLIC: 2.5 CM
ECHO LV IVSD: 1 CM (ref 0.6–0.9)
ECHO LV MASS 2D: 91.8 G (ref 67–162)
ECHO LV MASS INDEX 2D: 53.7 G/M2 (ref 43–95)
ECHO LV POSTERIOR WALL DIASTOLIC: 0.9 CM (ref 0.6–0.9)
ECHO LV RELATIVE WALL THICKNESS RATIO: 0.53
ECHO LVOT AREA: 2.3 CM2
ECHO LVOT DIAM: 1.7 CM
ECHO MV MAX VELOCITY: 1.1 M/S
ECHO MV MEAN GRADIENT: 2 MMHG
ECHO MV MEAN VELOCITY: 0.7 M/S
ECHO MV PEAK GRADIENT: 4 MMHG
ECHO MV VTI: 25.1 CM
EOSINOPHIL # BLD: 0 K/UL (ref 0–0.4)
EOSINOPHIL NFR BLD: 0 % (ref 0–7)
ERYTHROCYTE [DISTWIDTH] IN BLOOD BY AUTOMATED COUNT: 16.6 % (ref 11.5–14.5)
ERYTHROCYTE [DISTWIDTH] IN BLOOD BY AUTOMATED COUNT: 16.8 % (ref 11.5–14.5)
FIBRINOGEN PPP-MCNC: 222 MG/DL (ref 200–475)
GLOBULIN SER CALC-MCNC: 3.4 G/DL (ref 2–4)
GLUCOSE SERPL-MCNC: 116 MG/DL (ref 65–100)
GLUCOSE SERPL-MCNC: 140 MG/DL (ref 65–100)
HCT VFR BLD AUTO: 22.9 % (ref 35–47)
HCT VFR BLD AUTO: 22.9 % (ref 35–47)
HGB BLD-MCNC: 7.8 G/DL (ref 11.5–16)
HGB BLD-MCNC: 7.9 G/DL (ref 11.5–16)
IMM GRANULOCYTES # BLD AUTO: 0 K/UL (ref 0–0.04)
IMM GRANULOCYTES NFR BLD AUTO: 0 % (ref 0–0.5)
INR PPP: 4.9 (ref 0.9–1.1)
LYMPHOCYTES # BLD: 1.1 K/UL (ref 0.8–3.5)
LYMPHOCYTES NFR BLD: 8 % (ref 12–49)
MAGNESIUM SERPL-MCNC: 2.3 MG/DL (ref 1.6–2.4)
MAGNESIUM SERPL-MCNC: 2.5 MG/DL (ref 1.6–2.4)
MCH RBC QN AUTO: 29.6 PG (ref 26–34)
MCH RBC QN AUTO: 29.7 PG (ref 26–34)
MCHC RBC AUTO-ENTMCNC: 34.1 G/DL (ref 30–36.5)
MCHC RBC AUTO-ENTMCNC: 34.5 G/DL (ref 30–36.5)
MCV RBC AUTO: 85.8 FL (ref 80–99)
MCV RBC AUTO: 87.1 FL (ref 80–99)
MONOCYTES # BLD: 0.4 K/UL (ref 0–1)
MONOCYTES NFR BLD: 3 % (ref 5–13)
NEUTS BAND NFR BLD MANUAL: 2 %
NEUTS SEG # BLD: 12.4 K/UL (ref 1.8–8)
NEUTS SEG NFR BLD: 87 % (ref 32–75)
NRBC # BLD: 0.03 K/UL (ref 0–0.01)
NRBC # BLD: 0.09 K/UL (ref 0–0.01)
NRBC BLD-RTO: 0.2 PER 100 WBC
NRBC BLD-RTO: 0.6 PER 100 WBC
PHOSPHATE SERPL-MCNC: 2.8 MG/DL (ref 2.6–4.7)
PHOSPHATE SERPL-MCNC: 3.7 MG/DL (ref 2.6–4.7)
PLATELET # BLD AUTO: 104 K/UL (ref 150–400)
PLATELET # BLD AUTO: 92 K/UL (ref 150–400)
PMV BLD AUTO: 11.6 FL (ref 8.9–12.9)
PMV BLD AUTO: 12.1 FL (ref 8.9–12.9)
POTASSIUM SERPL-SCNC: 4.5 MMOL/L (ref 3.5–5.1)
POTASSIUM SERPL-SCNC: 4.8 MMOL/L (ref 3.5–5.1)
PROT SERPL-MCNC: 5 G/DL (ref 6.4–8.2)
PROTHROMBIN TIME: 46.4 SEC (ref 9–11.1)
RBC # BLD AUTO: 2.63 M/UL (ref 3.8–5.2)
RBC # BLD AUTO: 2.67 M/UL (ref 3.8–5.2)
RBC MORPH BLD: ABNORMAL
SODIUM SERPL-SCNC: 139 MMOL/L (ref 136–145)
SODIUM SERPL-SCNC: 140 MMOL/L (ref 136–145)
THERAPEUTIC RANGE,PTTT: ABNORMAL SECS (ref 58–77)
TROPONIN-HIGH SENSITIVITY: 300 NG/L (ref 0–51)
WBC # BLD AUTO: 13.9 K/UL (ref 3.6–11)
WBC # BLD AUTO: 16.5 K/UL (ref 3.6–11)
WBC MORPH BLD: ABNORMAL

## 2023-01-10 PROCEDURE — 36415 COLL VENOUS BLD VENIPUNCTURE: CPT

## 2023-01-10 PROCEDURE — 74011000250 HC RX REV CODE- 250: Performed by: STUDENT IN AN ORGANIZED HEALTH CARE EDUCATION/TRAINING PROGRAM

## 2023-01-10 PROCEDURE — 74011250637 HC RX REV CODE- 250/637: Performed by: INTERNAL MEDICINE

## 2023-01-10 PROCEDURE — 84484 ASSAY OF TROPONIN QUANT: CPT

## 2023-01-10 PROCEDURE — 87070 CULTURE OTHR SPECIMN AEROBIC: CPT

## 2023-01-10 PROCEDURE — 80048 BASIC METABOLIC PNL TOTAL CA: CPT

## 2023-01-10 PROCEDURE — 85027 COMPLETE CBC AUTOMATED: CPT

## 2023-01-10 PROCEDURE — 74011000258 HC RX REV CODE- 258: Performed by: INTERNAL MEDICINE

## 2023-01-10 PROCEDURE — 94003 VENT MGMT INPAT SUBQ DAY: CPT

## 2023-01-10 PROCEDURE — 85610 PROTHROMBIN TIME: CPT

## 2023-01-10 PROCEDURE — 85384 FIBRINOGEN ACTIVITY: CPT

## 2023-01-10 PROCEDURE — 99233 SBSQ HOSP IP/OBS HIGH 50: CPT | Performed by: NURSE PRACTITIONER

## 2023-01-10 PROCEDURE — 74011000258 HC RX REV CODE- 258: Performed by: NURSE PRACTITIONER

## 2023-01-10 PROCEDURE — 90945 DIALYSIS ONE EVALUATION: CPT

## 2023-01-10 PROCEDURE — 83735 ASSAY OF MAGNESIUM: CPT

## 2023-01-10 PROCEDURE — 93308 TTE F-UP OR LMTD: CPT

## 2023-01-10 PROCEDURE — 74011250636 HC RX REV CODE- 250/636: Performed by: NURSE PRACTITIONER

## 2023-01-10 PROCEDURE — 84100 ASSAY OF PHOSPHORUS: CPT

## 2023-01-10 PROCEDURE — 74011000250 HC RX REV CODE- 250: Performed by: NURSE PRACTITIONER

## 2023-01-10 PROCEDURE — 74011250636 HC RX REV CODE- 250/636: Performed by: PHYSICIAN ASSISTANT

## 2023-01-10 PROCEDURE — 65610000006 HC RM INTENSIVE CARE

## 2023-01-10 PROCEDURE — 71045 X-RAY EXAM CHEST 1 VIEW: CPT

## 2023-01-10 PROCEDURE — 74011250636 HC RX REV CODE- 250/636: Performed by: INTERNAL MEDICINE

## 2023-01-10 PROCEDURE — 76705 ECHO EXAM OF ABDOMEN: CPT

## 2023-01-10 PROCEDURE — 85025 COMPLETE CBC W/AUTO DIFF WBC: CPT

## 2023-01-10 PROCEDURE — 74011000250 HC RX REV CODE- 250: Performed by: INTERNAL MEDICINE

## 2023-01-10 PROCEDURE — 99233 SBSQ HOSP IP/OBS HIGH 50: CPT | Performed by: STUDENT IN AN ORGANIZED HEALTH CARE EDUCATION/TRAINING PROGRAM

## 2023-01-10 PROCEDURE — 74011250637 HC RX REV CODE- 250/637: Performed by: NURSE PRACTITIONER

## 2023-01-10 PROCEDURE — 80076 HEPATIC FUNCTION PANEL: CPT

## 2023-01-10 PROCEDURE — 85730 THROMBOPLASTIN TIME PARTIAL: CPT

## 2023-01-10 RX ORDER — FUROSEMIDE 10 MG/ML
80 INJECTION INTRAMUSCULAR; INTRAVENOUS ONCE
Status: COMPLETED | OUTPATIENT
Start: 2023-01-10 | End: 2023-01-10

## 2023-01-10 RX ADMIN — SODIUM CHLORIDE, PRESERVATIVE FREE 20 MG: 5 INJECTION INTRAVENOUS at 20:19

## 2023-01-10 RX ADMIN — CALCIUM CHLORIDE, MAGNESIUM CHLORIDE, DEXTROSE MONOHYDRATE, LACTIC ACID, SODIUM CHLORIDE, SODIUM BICARBONATE AND POTASSIUM CHLORIDE: 3.68; 3.05; 22; 5.4; 6.46; 3.09; .314 INJECTION INTRAVENOUS at 05:30

## 2023-01-10 RX ADMIN — CASTOR OIL AND BALSAM, PERU: 788; 87 OINTMENT TOPICAL at 20:20

## 2023-01-10 RX ADMIN — Medication 1 AMPULE: at 08:01

## 2023-01-10 RX ADMIN — HYDROMORPHONE HYDROCHLORIDE 0.5 MG: 1 INJECTION, SOLUTION INTRAMUSCULAR; INTRAVENOUS; SUBCUTANEOUS at 02:42

## 2023-01-10 RX ADMIN — HEPARIN SODIUM 5000 UNITS: 5000 INJECTION INTRAVENOUS; SUBCUTANEOUS at 15:57

## 2023-01-10 RX ADMIN — CEFEPIME 2 G: 2 INJECTION, POWDER, FOR SOLUTION INTRAVENOUS at 08:02

## 2023-01-10 RX ADMIN — EPOETIN ALFA-EPBX 10000 UNITS: 10000 INJECTION, SOLUTION INTRAVENOUS; SUBCUTANEOUS at 23:36

## 2023-01-10 RX ADMIN — METHYLPREDNISOLONE SODIUM SUCCINATE 40 MG: 40 INJECTION, POWDER, FOR SOLUTION INTRAMUSCULAR; INTRAVENOUS at 20:19

## 2023-01-10 RX ADMIN — CALCIUM CHLORIDE, MAGNESIUM CHLORIDE, DEXTROSE MONOHYDRATE, LACTIC ACID, SODIUM CHLORIDE, SODIUM BICARBONATE AND POTASSIUM CHLORIDE: 3.68; 3.05; 22; 5.4; 6.46; 3.09; .314 INJECTION INTRAVENOUS at 16:16

## 2023-01-10 RX ADMIN — CALCIUM CHLORIDE, MAGNESIUM CHLORIDE, DEXTROSE MONOHYDRATE, LACTIC ACID, SODIUM CHLORIDE, SODIUM BICARBONATE AND POTASSIUM CHLORIDE: 3.68; 3.05; 22; 5.4; 6.46; 3.09; .314 INJECTION INTRAVENOUS at 21:47

## 2023-01-10 RX ADMIN — LEVOFLOXACIN 500 MG: 5 INJECTION, SOLUTION INTRAVENOUS at 12:41

## 2023-01-10 RX ADMIN — CALCIUM CHLORIDE, MAGNESIUM CHLORIDE, DEXTROSE MONOHYDRATE, LACTIC ACID, SODIUM CHLORIDE, SODIUM BICARBONATE AND POTASSIUM CHLORIDE: 3.68; 3.05; 22; 5.4; 6.46; 3.09; .314 INJECTION INTRAVENOUS at 10:46

## 2023-01-10 RX ADMIN — CEFEPIME 2 G: 2 INJECTION, POWDER, FOR SOLUTION INTRAVENOUS at 20:17

## 2023-01-10 RX ADMIN — FUROSEMIDE 80 MG: 10 INJECTION, SOLUTION INTRAMUSCULAR; INTRAVENOUS at 09:56

## 2023-01-10 RX ADMIN — METHYLPREDNISOLONE SODIUM SUCCINATE 40 MG: 40 INJECTION, POWDER, FOR SOLUTION INTRAMUSCULAR; INTRAVENOUS at 08:02

## 2023-01-10 RX ADMIN — CHLORHEXIDINE GLUCONATE 15 ML: 1.2 RINSE ORAL at 20:20

## 2023-01-10 RX ADMIN — CALCIUM CHLORIDE, MAGNESIUM CHLORIDE, DEXTROSE MONOHYDRATE, LACTIC ACID, SODIUM CHLORIDE, SODIUM BICARBONATE AND POTASSIUM CHLORIDE: 3.68; 3.05; 22; 5.4; 6.46; 3.09; .314 INJECTION INTRAVENOUS at 08:00

## 2023-01-10 RX ADMIN — CHLORHEXIDINE GLUCONATE 15 ML: 1.2 RINSE ORAL at 08:06

## 2023-01-10 RX ADMIN — CALCIUM CHLORIDE, MAGNESIUM CHLORIDE, DEXTROSE MONOHYDRATE, LACTIC ACID, SODIUM CHLORIDE, SODIUM BICARBONATE AND POTASSIUM CHLORIDE: 3.68; 3.05; 22; 5.4; 6.46; 3.09; .314 INJECTION INTRAVENOUS at 02:39

## 2023-01-10 RX ADMIN — Medication 1 AMPULE: at 20:17

## 2023-01-10 RX ADMIN — SODIUM CHLORIDE, PRESERVATIVE FREE 40 ML: 5 INJECTION INTRAVENOUS at 13:41

## 2023-01-10 RX ADMIN — CASTOR OIL AND BALSAM, PERU: 788; 87 OINTMENT TOPICAL at 08:02

## 2023-01-10 RX ADMIN — DEXMEDETOMIDINE 0.4 MCG/KG/HR: 100 INJECTION, SOLUTION INTRAVENOUS at 11:58

## 2023-01-10 RX ADMIN — NOREPINEPHRINE BITARTRATE 4 MCG/MIN: 1 SOLUTION INTRAVENOUS at 03:03

## 2023-01-10 RX ADMIN — PAROXETINE HYDROCHLORIDE 20 MG: 20 TABLET, FILM COATED ORAL at 08:02

## 2023-01-10 RX ADMIN — CALCIUM CHLORIDE, MAGNESIUM CHLORIDE, DEXTROSE MONOHYDRATE, LACTIC ACID, SODIUM CHLORIDE, SODIUM BICARBONATE AND POTASSIUM CHLORIDE: 3.68; 3.05; 22; 5.4; 6.46; 3.09; .314 INJECTION INTRAVENOUS at 13:10

## 2023-01-10 RX ADMIN — HEPARIN SODIUM 5000 UNITS: 5000 INJECTION INTRAVENOUS; SUBCUTANEOUS at 02:02

## 2023-01-10 RX ADMIN — SODIUM CHLORIDE, PRESERVATIVE FREE 10 ML: 5 INJECTION INTRAVENOUS at 20:27

## 2023-01-10 RX ADMIN — CALCIUM CHLORIDE, MAGNESIUM CHLORIDE, DEXTROSE MONOHYDRATE, LACTIC ACID, SODIUM CHLORIDE, SODIUM BICARBONATE AND POTASSIUM CHLORIDE: 3.68; 3.05; 22; 5.4; 6.46; 3.09; .314 INJECTION INTRAVENOUS at 00:01

## 2023-01-10 RX ADMIN — SODIUM CHLORIDE, PRESERVATIVE FREE 20 MG: 5 INJECTION INTRAVENOUS at 08:02

## 2023-01-10 RX ADMIN — CALCIUM CHLORIDE, MAGNESIUM CHLORIDE, DEXTROSE MONOHYDRATE, LACTIC ACID, SODIUM CHLORIDE, SODIUM BICARBONATE AND POTASSIUM CHLORIDE: 3.68; 3.05; 22; 5.4; 6.46; 3.09; .314 INJECTION INTRAVENOUS at 19:03

## 2023-01-10 RX ADMIN — SODIUM CHLORIDE, PRESERVATIVE FREE 10 ML: 5 INJECTION INTRAVENOUS at 05:51

## 2023-01-10 NOTE — PROGRESS NOTES
Nephrology Progress Note  [facility] / [facility], [address], [address]  Phone - [phone]  Fax - [phone]                 Patient: Bree Pineda                   YOB: 1960        Date- 1/10/2023                      Admit Date: 1/3/2023  CC: Follow up for christa          IMPRESSION & PLAN:   1. CHRISTA due to acute tubular necrosis, serology workup  overall negative. 2.  Respiratory distress. 3.  Left pneumothorax requiring chest tube placement. 4.  Pleural effusion. 5.  Anemia requiring blood transfusion. 6.  History of breast cancer. 7.  Recent history of COVID-19 infection. 8.  Multifocal pneumonia. 9.  Status post left thoracocentesis. 10.  Non-ST elevation myocardial infarction. PLAN-  Continue CRRT- factor  ml/hr  Give lasix 80 mg iv now  Check bmp bid  Epogen for anemia  Avoid hypotension  D.w ICU nurse     Subjective: Interval History:   - 1-10-23--on vent - ON CRRT- bp stable-she is anuric    Objective:   Vitals:    01/10/23 0700 01/10/23 0715 01/10/23 0716 01/10/23 0800   BP: 104/62 109/63  112/63   Pulse: 77 78 77 76   Resp: 22 22 22 22   Temp:    99.2 °F (37.3 °C)   SpO2: 97% 97% 98% 95%   Weight:       Height:          01/09 0701 - 01/10 0700  In: 1231.6 [I.V.:1231.6]  Out: 1960 [Urine:10]    Last 3 Recorded Weights in this Encounter    01/09/23 0400 01/10/23 0300   Weight: 74.3 kg (163 lb 12.8 oz) 74 kg (163 lb 2.3 oz)        Physical exam:    GEN: intubated on vent  NECK-no mass, ET TUBE  RESP: Clear b/l, no wheezing--chest tube +  CVS: RRR,S1,S2    ABDO: soft , non tender,  NEURO:Can't access due to patient's current condition    : [place] +      Chart reviewed. Pertinent Notes reviewed.      Data Review :  Recent Labs     01/10/23  0235 01/09/23 2000 01/09/23  1134 01/09/23  0619 01/08/23  2135    138 138 137 136   K 4.5 4.3 4.5 4.5 5.1    101 103 105 106   CO2 27 28 24 23 23   BUN 41* 49* 65* 61* 52*   CREA 4.78* 5.92* 7.61* 7.35* 6.82*   * 153* 168* 201* 172*   CA 7.4* 7.5* 7.6* 7.5* 7.1*   MG 2.3 2.2 2.4 2.5* 1.5*   PHOS 3.7  --   --  6.7* 5.9*     Recent Labs     01/10/23  0235 01/09/23 2000 01/09/23  1134   WBC 16.5*  --   --    HGB 7.9* 8.5* 8.0*   HCT 22.9*  --   --    *  --   --      No results for input(s): FE, TIBC, PSAT, FERR in the last 72 hours.    No results found for: HBA1C, BDW0XUMV   No results found for: MCACR, MCA1, MCA2, MCA3, MCAU, MCAU2, MCALPOCT  US Results (most recent):  Medication list  reviewed  Current Facility-Administered Medications   Medication Dose Route Frequency    furosemide (LASIX) injection 80 mg  80 mg IntraVENous ONCE    NOREPINephrine (LEVOPHED) 8 mg in 5% dextrose 250mL (32 mcg/mL) infusion  0.5-30 mcg/min IntraVENous TITRATE    fentaNYL (PF) 1,500 mcg/30 mL (50 mcg/mL) infusion  0-200 mcg/hr IntraVENous TITRATE    alcohol 62% (NOZIN) nasal  1 Ampule  1 Ampule Topical Q12H    chlorhexidine (ORAL CARE KIT) 0.12 % mouthwash 15 mL  15 mL Oral Q12H    balsam peru-castor oiL (VENELEX) ointment   Topical BID    bicarbonate dialysis (PRISMASOL) BG K 4/Ca 2.5 5000 ml solution   Extracorporeal DIALYSIS CONTINUOUS    levoFLOXacin (LEVAQUIN) 500 mg in D5W IVPB  500 mg IntraVENous Q24H    cefepime (MAXIPIME) 2 g in 0.9% sodium chloride (MBP/ADV) 100 mL MBP  2 g IntraVENous Q12H    dexmedeTOMidine in 0.9 % NaCl (PRECEDEX) 400 mcg/100 mL (4 mcg/mL) infusion soln  0.1-1.5 mcg/kg/hr IntraVENous TITRATE    PARoxetine (PAXIL) tablet 20 mg  20 mg Per NG tube DAILY    famotidine (PF) (PEPCID) 20 mg in 0.9% sodium chloride 10 mL injection  20 mg IntraVENous Q12H    methylPREDNISolone (PF) (SOLU-MEDROL) injection 40 mg  40 mg IntraVENous Q12H    [Held by provider] heparin (porcine) injection 5,000 Units  5,000 Units SubCUTAneous Q12H    HYDROmorphone (DILAUDID) injection 0.5 mg  0.5 mg IntraVENous Q4H PRN    metoprolol (LOPRESSOR) injection 2.5 mg  2.5 mg IntraVENous Q6H PRN    0.9% sodium chloride infusion 250 mL  250 mL IntraVENous PRN    0.9% sodium chloride infusion 250 mL  250 mL IntraVENous PRN    lidocaine (XYLOCAINE) 4 % cream   Topical PRN    [Held by provider] carvediloL (COREG) tablet 6.25 mg  6.25 mg Oral BID WITH MEALS    [Held by provider] gabapentin (NEURONTIN) capsule 100 mg  100 mg Oral TID    [Held by provider] rosuvastatin (CRESTOR) tablet 10 mg  10 mg Oral QHS    sodium chloride (NS) flush 5-40 mL  5-40 mL IntraVENous Q8H    sodium chloride (NS) flush 5-40 mL  5-40 mL IntraVENous PRN    acetaminophen (TYLENOL) tablet 650 mg  650 mg Oral Q6H PRN    Or    acetaminophen (TYLENOL) suppository 650 mg  650 mg Rectal Q6H PRN    polyethylene glycol (MIRALAX) packet 17 g  17 g Oral DAILY PRN    ondansetron (ZOFRAN ODT) tablet 4 mg  4 mg Oral Q8H PRN    Or    ondansetron (ZOFRAN) injection 4 mg  4 mg IntraVENous Q6H PRN        Yas Villar MD  1/10/2023

## 2023-01-10 NOTE — PROGRESS NOTES
CM Note:    1/10/23 1155am Call to [address] [facility] Ariana (sister Ceci Koroma) at Mobile phone. Received VM. Mailbox full, unable to leave a VM. Call at Home phone, received VM. Message left requesting return call. CM awaiting return call to complete initial assessment.         [address] [facility] RN,MSN  Care Manager  [phone]

## 2023-01-10 NOTE — PROGRESS NOTES
Cardiology Progress Note  1/10/2023    Admit Date: 1/3/2023  Admit Diagnosis: Staphylococcus aureus pneumonia ([facility] [address] [J15.211]  CC:  None currently  Cardiologist:  Dr Marcy Bowman. Cardiac Assessment/Plan:    1) Atypical CP, for yrs: Neg [facility] 9/2021 (6:00, -CP, -ecg; Mild LVH). 2) Abnl ecg c/w LVH. *\"EF 45-50%; No valve dz:\" @ [facility] by [facility]. (EF not that low on my interp; EF 50-55%). 3) HTN  4) Palpitations (few x/yr; few sec). 5) FHx early CAD/CHF  6) Breast CA, left; Chemo (Dr R Jerline Peabody @ [address] [place] [address])    Admitted from 12/28 to 1/1/23 @ [address] [place] [address] for PNA/hemoptysis/covid/sepsis/CHRISTA/NQWMI w/ trop 100s    Admitted 1/3/23 w/ dyspnea/sepsis; subsequent renal failure/hypotension/hemothorax w/ chest tube; Resp failure 1/9 w/ intubation. Type II NQWMI. Neg blood Cx. Hepatic failure. Chest tube 1/5/23. Intubated 1/9/23. Off pressors 1/10/23.      1/10: Remains intubated/sedated. BPs ; current 118; Hr 70s; sinus; 96% vent (30% FIo2; PEEP 5); Essentially anuric. WBC 16.5; Hg 7.9 from 8.5; plt 104; INR 4.9; K 45; Cr 4.8; alb 1.6; LFTs 1k, >2K.; trops 300s. CXR: \"The pulmonary vasculature is within normal limits. Diffuse airspace opacity in the right lung. Partial opacification of the left upper lobe. No change. \"    Cardiac meds; Levophed off; lasix 80 IV x1. (Holding coreg 6.25 bid & crestor 10). No new cardiac recs; cont supportive care; Future aspirin when bleeding resolved. _________________________________________________________  Echo 12/27/22: Left Ventricle: Normal left ventricular systolic function with a visually estimated EF of 50 - 55%. Left ventricle size is normal. Normal wall thickness. Mild hypokinesis of the following segments: basal inferolateral and mid inferolateral. Unfortunately, imaging quality limis the accuracy     Chest tube 1/5/23. Intubated 1/9/23.      Complicated clinical course as noted below; Currently intubated/sedated  Current cardiac meds: Levo @ 15; crestor 10 (holding coreg 6.25 bid)  IMP: type II NQWMI;      Rec: cont supportive care; wean pressors as able; hold crestor. Future aspirin when bleeding resolved. ____________________________________________________________________  Astrid Ferrell is a 57 y.o. female presents with Staphylococcus aureus pneumonia ([facility] [address] [place]ley Noe. Admitted from 12/28 to 1/1/23 @ [address] for PNA/hemoptysis/covid/sepsis/CHRISTA/NQWMI w/ trop 100s        As noted in H&P: \"56 y.o.  female with pertinent past medical history of breast cancer currently off chemotherapy, MDD/TAIWO, hypothyroidism, GERD, and recent hospitalization from 12/28-1/1 for multifocal pneumonia secondary to MSSA pneumonia superimposed on COVID-19 pneumonia with course complicated by hemoptysis, sepsis, CHRISTA, and type II MI completed course of cefepime and doxycycline transition to Augmentin on time of discharge-patient unsure if she has been taking that who presents with complaints of persistent decline since discharge from OSH. She complains of malaise, fever/chills, cough productive of blood-tinged sputum, shortness of breath/dyspnea on exertion. She reports she does not feel she is doing well at home and think she needs to be in the hospital for further management. She denies tobacco, alcohol, or illicit drug use. ROS otherwise negative. In the ED, patient febrile to 100.8 °F, hemodynamically stable (hypertensive 150s/60s), saturating mid 90s on room air. ECG demonstrates NSR with LVH criteria, CXR demonstrates increased left pleural effusion and left lower lobe airspace opacity and CT chest demonstrates increased moderately large left pleural effusion with unchanged patchy bilateral consolidation left greater than right compatible with pneumonia. Rapid and flu negative.   Point-of-care lactic 0.7, proBNP 1091(Previously 866), high since her troponin 116 down trended to 107 (600s at prior hospitalization, sodium 142, potassium 3.4, BUN 38, creatinine 3.0 (baseline), WBC 13.3 (slightly increased from time of discharge), hemoglobin 9.1 (improved from prior), platelets 618. Given recent hospitalization and concern for hep patient given vancomycin, Levaquin, and cefepime by ED provider. \"     ICU MD 1/8/22: \"Patient being transferred to ICU as the PCU nurses do not feel comfortable keeping the patient on the floor as she was hypotensive earlier and has been refusing po meds     57year old Female with hx of left breast cancer, recently admitted to [facility] for sepsis s/s COVID-19, CHRISTA, NSTEMI, MSSA pneumonia(12/28-1/1) presented to ED with c/o shortness of breath and hemoptysis. IN ED, patient was febrile to 100.8F. CXR demonstrated increased left pleural effusion with unchanged patchy B/L consolidation L>R. Patient had IR thoracentesis on 1/4/23- bloody tap. CT chest on 1/5 showed left sided hemothorax. Hb dropped from 9.1 to 5.6. s/p left chest tube placement on 1/5/23. S/p PRBC transfusion. \"     ______________________________________________________________________     The patient is intubated; can provide no history. On accelerating levophed (15 currently); on HD. No PND, orthopnea, palpitations, pre-syncope, syncope, peripheral edema, or decrease in exercise tolerance. No current complaints. ECG 1/3/22:   Sinus, leftward axis, no acute changes. LVH. Tele: sinus  CXR: \"1. Endotracheal tube terminates 1 cm above the marvin. Consider retraction 1 to  2 cm. 2. Appropriate positioning of left IJ central venous catheter. No pneumothorax. 3. Slight interval improvement in extensive bilateral airspace disease. 4. Left chest tube remains in place. Suspected small left pleural effusion. \"     Notable labs: K 4.5; BUN 61; Cr 7.35 (prev 3 range). Alb 1.9; LFTs 900s/2000; Prev procal 7; proBNP 9k; neg covid 1/3/23.  INR 2.7.  ___________________________________________________  Notable prior cardiac history:  @ NP OV 10/27/22:  Ms Kole Woo has a h/o:  1) Atypical CP, for yrs: Neg [facility] 9/2021 (6:00, -CP, -ecg; Mild LVH). 2) Abnl ecg c/w LVH. *\"EF 45-50%; No valve dz:\" @ [facility] by RCA. (EF not that low on my interp; EF 50-55%). 3) HTN  4) Palpitations (few x/yr; few sec). 5) FHx early CAD/CHF  6) Breast CA, left; Chemo pending (Dr Dudley Lehman @ [facility])  No ethanol, added salt, or nicotine. 1 caff johnny/d. 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 She would like an annual visit here. 8/2021:  She has atypical chest pain (for years; 0-1 X/mo; sharp sensation; few seconds; random onset/offset). She also has intermittent palpitations (fast HR sensation, few X/year; few seconds duration, not related to chest pain). No MCCALL with good exercise tolerance. No previous cardiac evaluation. PCP note from 4/28/06 reviewed; ECG from then interpreted today as noted below. 2/2022: no recent chest pain, dyspnea, or palpitations. She would like an annual visit here. 10/2022: Chest pain remains resolved. No dyspnea nor palpitations. Reportedly mild LV dysfunction as noted above; in my review of the echo, the EF is not that low. NP F/U: Doing well. No CP, MCCALL, palps, edema or syncope. Home SBPs in the 130s. IMPRESSION AND PLAN  01. Other chest pain (R07.89): Atypical chest pain remains resolved; Neg [facility] 9/2021. We discussed the signs and symptoms of unstable angina, myocardial infarction and malignant arrhythmia. The patient knows to seek immediate medical attention should they occur. 02. Essential (primary) hypertension (I10):  Reasonably well controlled; continue Losartan 25mg QD and increase Coreg to 6.25mg BID. Continue to monitor BP.   03. Other cardiomyopathies (I42.8):  EF looks reassuring on echo per review; repeat planned for next month. 04. Abnormal electrocardiogram [ECG] [EKG] (R94.31):  Nonspecific finding. This condition is stable. 05.  Malignant neoplasm of left breast in female, estrogen receptor negative, unspecified site of breast (C00.027): Managed by: Other Physician   06. Long term (current) use of aspirin (Z79.82)   07. Body mass index [BMI] 27.0-27.9, adult (Z68.27)      ORDERS:    Dietary management education, guidance, and counseling           10/27/22 MEDICATION LIST  Medication Sig Desc Non-VCS   aspirin 81 mg tablet,delayed release take 1 tablet by oral route  every day Y   carvedilol 6.25 mg tablet take 1 tablet by oral route 2 times every day with food N   Crestor take 1 Tablet by oral route  every week Y   losartan 25 mg tablet take 1 tablet by oral route  every day N   Multi Vitamin 9 mg iron/15 mL oral liquid   Y   paroxetine 20 mg tablet take 1 tablet by oral route  every day Y      ______________________________________  CARDIAC HISTORY  RISK FACTORS:  1 Hypertension         CARDIOVASCULAR PROCEDURES  Procedure Date Results   Echo 10/05/2022 \"EF 45-50%; No valve dz:\" @ [address] by RCA. (EF not that low on my interp). EKG 10/13/2022 Sinus Rhythm, LVH; axis -15°; nonspecific T-wave changes. [facility] 10/01/2021 Normal EF, No Ischemia, 6:00, -CP, -ecg; Mild LVH. For other plans, see orders.   High complexity decision making was performed  X Yes   High-risk of decompensation with multiple organ involvement X Yes   Hospital problem list     Active Hospital Problems    Diagnosis Date Noted    Hemothorax 01/06/2023    Shortness of breath 01/05/2023    Acute blood loss anemia 01/05/2023    Hemothorax on left 01/05/2023    Anxiety about health 01/05/2023    Palliative care by specialist 01/05/2023    Staphylococcus aureus pneumonia ([facility] [address] 01/04/2023                                                         Subjective:  Patient reports  [x]   nothing; unable to communicate    [x]   intubated     Chest pain  none  consistent with:  Non-cardiac CP         Atypical CP     None now  On going  Anginal CP     Dyspnea  none  at rest  with exertion         improved  unchanged  worse              PND  none  overnight       Orthopnea  none  improved unchanged  worse   Presyncope  none  improved  unchanged  worse   Ambulated in hallway without symptoms   Yes   Ambulated in room without symptoms  Yes     ROS Hematuria:  Yes X No Dysuria:  Yes X No                (2+  other systems) Cough: Yes X No Sputum: Yes X No                 BRBPR:  Yes X No Melena: Yes X No   No change in family and social history from H&P/Consult note.   Objective:    Physical Exam:  24 hr VS reviewed, overall VSSAF  Temp (24hrs), Av.1 °F (36.7 °C), Min:97.5 °F (36.4 °C), Max:99.2 °F (37.3 °C)    Patient Vitals for the past 8 hrs:   Pulse   01/10/23 0900 77   01/10/23 0800 76   01/10/23 0716 77   01/10/23 0715 78   01/10/23 0700 77   01/10/23 0645 74   01/10/23 0630 74   01/10/23 0615 75   01/10/23 0600 74   01/10/23 0545 72   01/10/23 0530 72   01/10/23 0515 72   01/10/23 0511 74   01/10/23 0500 73   01/10/23 0445 70   01/10/23 0430 73   01/10/23 0415 73   01/10/23 0400 72   01/10/23 0345 72   01/10/23 0330 71   01/10/23 0315 72   01/10/23 0300 70   01/10/23 0245 72   01/10/23 0230 72   01/10/23 0215 74   01/10/23 0200 75   01/10/23 0145 75   01/10/23 0130 76   01/10/23 0115 79     Patient Vitals for the past 8 hrs:   Resp   01/10/23 0900 22   01/10/23 0800 22   01/10/23 0716 22   01/10/23 0715 22   01/10/23 0700 22   01/10/23 0645 22   01/10/23 0630 22   01/10/23 0615 22   01/10/23 0600 22   01/10/23 0545 22   01/10/23 0530 22   01/10/23 0515 22   01/10/23 0511 22   01/10/23 0500 22   01/10/23 0445 22   01/10/23 0430 22   01/10/23 0415 22   01/10/23 0400 22   01/10/23 0345 22   01/10/23 0330 22   01/10/23 0315 22   01/10/23 0300 22   01/10/23 0245 22   01/10/23 0230 22   01/10/23 0215 22   01/10/23 0200 22   01/10/23 0145 22   01/10/23 0130 22   01/10/23 0115 22     Patient Vitals for the past 8 hrs:   BP   01/10/23 0900 115/65   01/10/23 0800 112/63   01/10/23 0715 109/63   01/10/23 0700 104/62   01/10/23 0645 111/63   01/10/23 0630 117/64   01/10/23 0615 120/63   01/10/23 0600 117/64   01/10/23 0545 131/66   01/10/23 0530 129/66   01/10/23 0515 124/66   01/10/23 0500 117/65   01/10/23 0445 128/76   01/10/23 0430 119/69   01/10/23 0415 120/67   01/10/23 0400 127/68   01/10/23 0345 130/65   01/10/23 0330 123/66   01/10/23 0315 122/65   01/10/23 0300 120/66   01/10/23 0245 115/75   01/10/23 0230 110/69   01/10/23 0215 113/66   01/10/23 0200 104/64   01/10/23 0145 104/62   01/10/23 0130 100/63   01/10/23 0115 102/63         Intake/Output Summary (Last 24 hours) at 1/10/2023 0914  Last data filed at 1/10/2023 0802  Gross per 24 hour   Intake 1361.22 ml   Output 2021 ml   Net -659.78 ml     General:  WD,WN  Elderly  Cachetic x NAD     Agitated  Lethargic  Arousable  Obtunded    x Sedated  On Bipap x Intubated                  ENT/Palate: WNL  Dry MM x anicteric x ETT in place              Respiratory: X CTA ant  Nl resp effort  Increased effort  No significant change     rhonchi  rales  improved  worse              Cardiovasc: X RRR  IRRR X Nl S1, S2 x No rub     No murmur X No new murmur  Murmur c/w: x No gallop    x No edema  BLE edema:+  RLE edema:+  LLE edema:+     Edema less  Edema more  Edema same  Edema worse    X Nl JVP  Elevated JVP  JVP same  JVP worse    X Carotid wnl x abd aorta not palpated X no peripheral emboli noted                GI: X abd soft x nondistended X BS present X No organo- megaly noted              Skin: X Warm, dry  Cold extremites                  Neuro:  A/O  Grossly non- focal  Obtunded x Sedated     Lethargic  Arousable x intubated       cath site intact w/o hematoma or new bruit; distal pulse unchanged  Yes   Data Review:     Telemetry independently reviewed x sinus  chronic afib  parox afib  NSVT     ECG independently reviewed  NSR  afib  no significant changes  NSST-Tw chgs     x no new ECG provided for review   Lab results reviewed as noted below. Current medications reviewed as noted below.   Recent Labs     01/09/23  1137 01/08/23  2100   PH 7.40 7.39 PCO2 35 36   PO2 103* 66*     No results for input(s): CPK, CKMB, CKNDX, TROIQ in the last 72 hours. Recent Labs     01/10/23  0235 01/09/23  2000 01/09/23  1134 01/09/23  0619 01/08/23  2135    138 138 137 136   K 4.5 4.3 4.5 4.5 5.1    101 103 105 106   CO2 27 28 24 23 23   BUN 41* 49* 65* 61* 52*   CREA 4.78* 5.92* 7.61* 7.35* 6.82*   * 153* 168* 201* 172*   PHOS 3.7  --   --  6.7* 5.9*   CA 7.4* 7.5* 7.6* 7.5* 7.1*   ALB 1.6*  --   --   --  1.9*   WBC 16.5*  --   --   --   --    HGB 7.9* 8.5* 8.0* 8.0* 7.6*   HCT 22.9*  --   --   --   --    *  --   --   --   --      Recent Labs     01/10/23  0235 01/08/23  2135   * 695*   TBILI 2.8* 1.7*   TP 5.0* 4.7*   ALB 1.6* 1.9*   GLOB 3.4 2.8     Recent Labs     01/10/23  0235 01/09/23  1134   INR 4.9* 2.7*   PTP 46.4* 26.9*   APTT 54.1*  --       No results for input(s): FE, TIBC, PSAT, FERR in the last 72 hours.    Lab Results   Component Value Date/Time    Glucose (POC) 165 (H) 01/09/2023 11:43 AM    Glucose (POC) 160 (H) 01/08/2023 08:40 PM    Glucose (POC) 86 01/08/2023 03:43 PM    Glucose (POC) 87 01/08/2023 12:19 PM    Glucose (POC) 104 01/08/2023 07:33 AM       Current Facility-Administered Medications   Medication Dose Route Frequency    furosemide (LASIX) injection 80 mg  80 mg IntraVENous ONCE    NOREPINephrine (LEVOPHED) 8 mg in 5% dextrose 250mL (32 mcg/mL) infusion  0.5-30 mcg/min IntraVENous TITRATE    fentaNYL (PF) 1,500 mcg/30 mL (50 mcg/mL) infusion  0-200 mcg/hr IntraVENous TITRATE    alcohol 62% (NOZIN) nasal  1 Ampule  1 Ampule Topical Q12H    chlorhexidine (ORAL CARE KIT) 0.12 % mouthwash 15 mL  15 mL Oral Q12H    balsam peru-castor oiL (VENELEX) ointment   Topical BID    bicarbonate dialysis (PRISMASOL) BG K 4/Ca 2.5 5000 ml solution   Extracorporeal DIALYSIS CONTINUOUS    levoFLOXacin (LEVAQUIN) 500 mg in D5W IVPB  500 mg IntraVENous Q24H    cefepime (MAXIPIME) 2 g in 0.9% sodium chloride (MBP/ADV) 100 mL MBP  2 g IntraVENous Q12H    dexmedeTOMidine in 0.9 % NaCl (PRECEDEX) 400 mcg/100 mL (4 mcg/mL) infusion soln  0.1-1.5 mcg/kg/hr IntraVENous TITRATE    PARoxetine (PAXIL) tablet 20 mg  20 mg Per NG tube DAILY    famotidine (PF) (PEPCID) 20 mg in 0.9% sodium chloride 10 mL injection  20 mg IntraVENous Q12H    methylPREDNISolone (PF) (SOLU-MEDROL) injection 40 mg  40 mg IntraVENous Q12H    [Held by provider] heparin (porcine) injection 5,000 Units  5,000 Units SubCUTAneous Q12H    HYDROmorphone (DILAUDID) injection 0.5 mg  0.5 mg IntraVENous Q4H PRN    metoprolol (LOPRESSOR) injection 2.5 mg  2.5 mg IntraVENous Q6H PRN    0.9% sodium chloride infusion 250 mL  250 mL IntraVENous PRN    0.9% sodium chloride infusion 250 mL  250 mL IntraVENous PRN    lidocaine (XYLOCAINE) 4 % cream   Topical PRN    [Held by provider] carvediloL (COREG) tablet 6.25 mg  6.25 mg Oral BID WITH MEALS    [Held by provider] gabapentin (NEURONTIN) capsule 100 mg  100 mg Oral TID    [Held by provider] rosuvastatin (CRESTOR) tablet 10 mg  10 mg Oral QHS    sodium chloride (NS) flush 5-40 mL  5-40 mL IntraVENous Q8H    sodium chloride (NS) flush 5-40 mL  5-40 mL IntraVENous PRN    acetaminophen (TYLENOL) tablet 650 mg  650 mg Oral Q6H PRN    Or    acetaminophen (TYLENOL) suppository 650 mg  650 mg Rectal Q6H PRN    polyethylene glycol (MIRALAX) packet 17 g  17 g Oral DAILY PRN    ondansetron (ZOFRAN ODT) tablet 4 mg  4 mg Oral Q8H PRN    Or    ondansetron (ZOFRAN) injection 4 mg  4 mg IntraVENous Q6H PRN         Hayden Santizo MD

## 2023-01-10 NOTE — PROGRESS NOTES
1900: Report received from [facility], [address] [place] [address] Huser: Bed pad changed, pt coughing and grabbing for tube while turning. Suctioned thick, yellowish white secretions from ETT. Pt has small, yellow runny stool. Pericare completed. Turned to left side. 2115: CVVHD started. Factor goal 50-100ml/hr per Shelbie Branham 1154 staff    Precedex gtt started and fentanyl gtt stopped. Levophed gtt weaned down and off by shift change.      0700: Report given Crow Aguilar RN

## 2023-01-10 NOTE — PROGRESS NOTES
Palliative Medicine Consult  [place]: [phone] ([phone])    Patient Name: Louie Valle  YOB: 1960    Date of Initial Consult: 1/5/23  Reason for Consult: Overwhelming Symptoms  Requesting Provider: Earmon Gilford, MD   Primary Care Physician: Ami Mendoza MD     SUMMARY:   Louie Valle is a 57 y.o. with a past history of breast cancer s/p chemo and immunotherapy (completed all therapies on 12/21/22) with a recent hospitalization from 12/28-1/1 for multifocal pneumonia 2/2 MSSA superimposed on COVID-19 who was admitted on 1/3/2023 from home with a diagnosis of Mutifocal PNA and hemoptysis. While here she was found to have a large left pleural effusion and had a thoracentesis that removed 820ml of blood tinged fluid yesterday. Overnight she had an acute drop in her Hgb and CT today shows a left sided hemothorax with loculation towards the apex. PALLIATIVE DIAGNOSES:   Shortness of breath  Acute blood loss anemia  Hemothorax  Anxiety about health  Palliative Care     PLAN:   Met with Sheri Mcneil at bedside- plan was to do a bronch today to better formulate a treatment plan now that she is more stable on the vent. Unfortunately Sheri Mcneil is stuck in regards to decision making. She wants to stop and make her sister comfort care, but her two siblings want to keep going so we are at a cross roads. I tried to explain to her that the bronch is not going to cause any more suffering, it will just give us the option of more specific information so in the meantime while decisions are being made, we can treat her more appropriately.     I did encourage her to ask her siblings- If Ms Shaw Baldwin is able to survive this will aggressive interventions etc, would she be OK with surviving even if it means compromising her independence (which she was already facing prior to being in the ICU)  For now- no more decisions have been made, and Sheri Mcneil is not willing to consent to any more procedures until the family has come to a consensus on treatment goals   Initial consult note routed to primary continuity provider and/or primary health care team members  Communicated plan of care with: Palliative IDT, Alexia [facility] Team     GOALS OF CARE / TREATMENT PREFERENCES:     GOALS OF CARE:  Patient/Health Care Proxy Stated Goals: Prolong life    TREATMENT PREFERENCES:   Code Status: Full Code    Advance Care Planning:  [x] [facility] Interdisciplinary Team has updated the ACP Navigator with [address] and Patient Capacity      Primary Decision Maker (Active): Galen - Sister - [phone]    Primary Decision Maker: Meena Nephew - Sister - [phone]    Primary Decision Maker: Peyton Vania (Big) - Brother - [phone]    Medical Interventions: Full interventions       Other:    As far as possible, the palliative care team has discussed with patient / health care proxy about goals of care / treatment preferences for patient.      HISTORY:     History obtained from: chart, attending, sister    CHIEF COMPLAINT: sedated  HPI/SUBJECTIVE:    The patient is:   [] Verbal and participatory  [x] Non-participatory due to: intubated and very lethargic    Clinical Pain Assessment (nonverbal scale for severity on nonverbal patients):   Clinical Pain Assessment  Severity: 0          Duration: for how long has pt been experiencing pain (e.g., 2 days, 1 month, years)  Frequency: how often pain is an issue (e.g., several times per day, once every few days, constant)     FUNCTIONAL ASSESSMENT:     Palliative Performance Scale (PPS):  PPS: 40       PSYCHOSOCIAL/SPIRITUAL SCREENING:     Palliative IDT has assessed this patient for cultural preferences / practices and a referral made as appropriate to needs (Cultural Services, Patient Advocacy, Ethics, etc.)    Any spiritual / Zoroastrian concerns:  [] Yes /  [x] No   If \"Yes\" to discuss with pastoral care during IDT     Does caregiver feel burdened by caring for their loved one:   [] Yes /  [x] No /  [] No Caregiver Present/Available [] No Caregiver [] Pt Lives at [place] [id number]  If \"Yes\" to discuss with social work during IDT    Anticipatory grief assessment:   [x] Normal  / [] Maladaptive     If \"Maladaptive\" to discuss with social work during IDT    ESAS Anxiety: Anxiety: 0    ESAS Depression: Depression: 0        REVIEW OF SYSTEMS:     Positive and pertinent negative findings in ROS are noted above in HPI. The following systems were [x] reviewed / [] unable to be reviewed as noted in HPI  Other findings are noted below. Systems: constitutional, ears/nose/mouth/throat, respiratory, gastrointestinal, genitourinary, musculoskeletal, integumentary, neurologic, psychiatric, endocrine. Positive findings noted below. Modified ESAS Completed by: provider   Fatigue: 10 Drowsiness: 0   Depression: 0 Pain: 0   Anxiety: 0 Nausea: 0   Anorexia: 4 Dyspnea: 0           Stool Occurrence(s): 1        PHYSICAL EXAM:     From RN flowsheet:  Wt Readings from Last 3 Encounters:   01/10/23 163 lb 2.3 oz (74 kg)   01/01/23 144 lb 11.2 oz (65.6 kg)   12/27/22 144 lb (65.3 kg)     Blood pressure 116/62, pulse 76, temperature 99.2 °F (37.3 °C), resp. rate 22, height 5' (1.524 m), weight 163 lb 2.3 oz (74 kg), SpO2 97 %.     Pain Scale 1: Behavioral Pain Scale (BPS)  Pain Intensity 1: 3  Pain Onset 1: post op  Pain Location 1: Back  Pain Orientation 1: Left  Pain Description 1: Aching, Sore  Pain Intervention(s) 1: Music  Last bowel movement, if known:     Constitutional: sedated  Respiratory:vent support  Gastrointestinal: soft non-tender, +bowel sounds  Musculoskeletal: no deformity, no tenderness to palpation  Skin: warm, dry  Neurologic: not following commands  Other:       HISTORY:     Active Problems:    Staphylococcus aureus pneumonia (HCC) (1/4/2023)      Shortness of breath (1/5/2023)      Acute blood loss anemia (1/5/2023)      Hemothorax on left (1/5/2023)      Anxiety about health (1/5/2023)      Palliative care by specialist (1/5/2023)      Hemothorax (1/6/2023)    Past Medical History:   Diagnosis Date    Cancer ([facility] [address]     BREAST CANCER    GERD (gastroesophageal reflux disease)     HTN (hypertension) 07/18/2011    Psychiatric disorder     ANIXETY AND DEPRESSION    Thyroid disease     HYPOTHYROID      Past Surgical History:   Procedure Laterality Date    HX COLONOSCOPY      HX MYOMECTOMY  05/02/1996    x 1    HX WISDOM TEETH EXTRACTION      IR INSERT NON TUNL CVC OVER 5 YRS  1/9/2023    IR THORACENTESIS/INSERT CHEST TUBE  1/5/2023      Family History   Problem Relation Age of Onset    Heart Failure Mother     Cancer Father     Diabetes Sister     Kidney Disease Sister     Anesth Problems Neg Hx       History reviewed, no pertinent family history.   Social History     Tobacco Use    Smoking status: Never     Passive exposure: Never    Smokeless tobacco: Never   Substance Use Topics    Alcohol use: Never     Allergies   Allergen Reactions    Sulfa (Sulfonamide Antibiotics) Itching, Swelling and Unknown (comments)      Current Facility-Administered Medications   Medication Dose Route Frequency    NOREPINephrine (LEVOPHED) 8 mg in 5% dextrose 250mL (32 mcg/mL) infusion  0.5-30 mcg/min IntraVENous TITRATE    fentaNYL (PF) 1,500 mcg/30 mL (50 mcg/mL) infusion  0-200 mcg/hr IntraVENous TITRATE    alcohol 62% (NOZIN) nasal  1 Ampule  1 Ampule Topical Q12H    chlorhexidine (ORAL CARE KIT) 0.12 % mouthwash 15 mL  15 mL Oral Q12H    balsam peru-castor oiL (VENELEX) ointment   Topical BID    bicarbonate dialysis (PRISMASOL) BG K 4/Ca 2.5 5000 ml solution   Extracorporeal DIALYSIS CONTINUOUS    levoFLOXacin (LEVAQUIN) 500 mg in D5W IVPB  500 mg IntraVENous Q24H    cefepime (MAXIPIME) 2 g in 0.9% sodium chloride (MBP/ADV) 100 mL MBP  2 g IntraVENous Q12H    dexmedeTOMidine in 0.9 % NaCl (PRECEDEX) 400 mcg/100 mL (4 mcg/mL) infusion soln  0.1-1.5 mcg/kg/hr IntraVENous TITRATE    PARoxetine (PAXIL) tablet 20 mg  20 mg Per NG tube DAILY    famotidine (PF) (PEPCID) 20 mg in 0.9% sodium chloride 10 mL injection  20 mg IntraVENous Q12H    methylPREDNISolone (PF) (SOLU-MEDROL) injection 40 mg  40 mg IntraVENous Q12H    heparin (porcine) injection 5,000 Units  5,000 Units SubCUTAneous Q12H    HYDROmorphone (DILAUDID) injection 0.5 mg  0.5 mg IntraVENous Q4H PRN    metoprolol (LOPRESSOR) injection 2.5 mg  2.5 mg IntraVENous Q6H PRN    0.9% sodium chloride infusion 250 mL  250 mL IntraVENous PRN    0.9% sodium chloride infusion 250 mL  250 mL IntraVENous PRN    lidocaine (XYLOCAINE) 4 % cream   Topical PRN    [Held by provider] carvediloL (COREG) tablet 6.25 mg  6.25 mg Oral BID WITH MEALS    [Held by provider] gabapentin (NEURONTIN) capsule 100 mg  100 mg Oral TID    [Held by provider] rosuvastatin (CRESTOR) tablet 10 mg  10 mg Oral QHS    sodium chloride (NS) flush 5-40 mL  5-40 mL IntraVENous Q8H    sodium chloride (NS) flush 5-40 mL  5-40 mL IntraVENous PRN    acetaminophen (TYLENOL) tablet 650 mg  650 mg Oral Q6H PRN    Or    acetaminophen (TYLENOL) suppository 650 mg  650 mg Rectal Q6H PRN    polyethylene glycol (MIRALAX) packet 17 g  17 g Oral DAILY PRN    ondansetron (ZOFRAN ODT) tablet 4 mg  4 mg Oral Q8H PRN    Or    ondansetron (ZOFRAN) injection 4 mg  4 mg IntraVENous Q6H PRN          LAB AND IMAGING FINDINGS:     Lab Results   Component Value Date/Time    WBC 16.5 (H) 01/10/2023 02:35 AM    HGB 7.9 (L) 01/10/2023 02:35 AM    PLATELET 259 (L) 87/99/6717 02:35 AM     Lab Results   Component Value Date/Time    Sodium 139 01/10/2023 02:35 AM    Potassium 4.5 01/10/2023 02:35 AM    Chloride 104 01/10/2023 02:35 AM    CO2 27 01/10/2023 02:35 AM    BUN 41 (H) 01/10/2023 02:35 AM    Creatinine 4.78 (H) 01/10/2023 02:35 AM    Calcium 7.4 (L) 01/10/2023 02:35 AM    Magnesium 2.3 01/10/2023 02:35 AM    Phosphorus 3.7 01/10/2023 02:35 AM      Lab Results   Component Value Date/Time    Alk.  phosphatase 993 (H) 01/10/2023 02:35 AM    Protein, total 5.0 (L) 01/10/2023 02:35 AM    Albumin 1.6 (L) 01/10/2023 02:35 AM    Globulin 3.4 01/10/2023 02:35 AM     Lab Results   Component Value Date/Time    INR 4.9 (HH) 01/10/2023 02:35 AM    Prothrombin time 46.4 (H) 01/10/2023 02:35 AM    aPTT 54.1 (H) 01/10/2023 02:35 AM      No results found for: IRON, FE, TIBC, IBCT, PSAT, FERR   Lab Results   Component Value Date/Time    pH 7.40 01/09/2023 11:37 AM    PCO2 35 01/09/2023 11:37 AM    PO2 103 (H) 01/09/2023 11:37 AM     No components found for: GLPOC   No results found for: CPK, CKMB             Total time: 50  Counseling / coordination time, spent as noted above: 45  > 50% counseling / coordination?: y    Prolonged service was provided for  []30 min   []75 min in face to face time in the presence of the patient, spent as noted above. Time Start:   Time End:   Note: this can only be billed with 73937 (initial) or 47864 (follow up). If multiple start / stop times, list each separately.

## 2023-01-10 NOTE — PROGRESS NOTES
[facility] at [address] [facility] [address] [place] [address] [address] [facility]  W: [phone] F: [phone]  Reason for Visit:   Haley Carbajal is a 57 y.o. female with left breast cancer-ER 0%, DC 0%, HER2/radha negative. Treatment History:   10/1/22: Carboplatin taxol and keytruda followed by ddAC and Keytruda     History of Present Illness:   Mrs. Jeb Norton is a patient of Dr. Pascual Carter at [facility].  She is followed for treatment of left breast cancer. She was recently admitted to [facility] for sepsis r/t COVID-pneumonia, CHRISTA, NSTEMI, hemoptysis and anemia. Pertinent treatment history as follows: She had a screening mammogram on 7/20/2022 which showed a 1 cm irregular mass in the left breast at 3 o'clock position. Biopsy on 8/2/2022 showed invasive ductal carcinoma, grade 2-3, ER 0%, DC 0%, HER2/radha negative. Ki-67 85%. Declined genetic testing. Recent Keytruda : last dose 12/14/2022  Last Chemotherapy: 12/21/2022     She had a screening mammogram on 7/20/2022 which showed a 1 cm irregular mass in the left breast at 3 o'clock position. Biopsy on 8/2/2022 showed invasive ductal carcinoma, grade 2-3, ER 0%, DC 0%, HER2/radha negative. Ki-67 85%. Declined genetic testing. Patient has now returned to the ED with complaints of fever and shortness of breath. She was found to have a large left pleural effusion and CHRISTA. Patient seen at bedside in hallway bed in ED. Discussed plan for thoracentesis today. Will discuss further plan of care once she has been placed in her room given no privacy in hallway. Interval History:     1/5/2023: Patient seen at bedside in ED. She is acutely ill-appearing and conversationally short of breath. Reviewed chest x-ray results at bedside with patient. At that time CT was still pending. She did give staff permission to access her port and agreed to transfusion of blood.   We did discuss that likely fluid accumulation can be attributed to malignant pleural effusion and this would mean cancer has moved into her lung. She verbalized understanding and agreement. 1/6/2023: Patient seen at bedside after working with PT. She was up in the chair complaining of pain to chest tube site. She verbalized frustrations at now being dependent upon others for ADLs. Reports she has always been fiercely independent. Upon discussion possibility of malignancy with presumed malignant pleural effusion patient been shut down and did not wish to speak further. 1/9/2023: Patient seen at bedside, she was recently intubated emergently for worsening respiratory failure. Was moved from stepdown to ICU yesterday with ongoing hypotension. 1/10/2023: Patient seen at bedside, she remains intubated.       Past Medical History:   Diagnosis Date    Cancer ([facility] [address]     BREAST CANCER    GERD (gastroesophageal reflux disease)     HTN (hypertension) 07/18/2011    Psychiatric disorder     ANIXETY AND DEPRESSION    Thyroid disease     HYPOTHYROID      Past Surgical History:   Procedure Laterality Date    HX COLONOSCOPY      HX MYOMECTOMY  05/02/1996    x 1    HX WISDOM TEETH EXTRACTION      IR INSERT NON TUNL CVC OVER 5 YRS  1/9/2023    IR THORACENTESIS/INSERT CHEST TUBE  1/5/2023      Social History     Tobacco Use    Smoking status: Never     Passive exposure: Never    Smokeless tobacco: Never   Substance Use Topics    Alcohol use: Never      Family History   Problem Relation Age of Onset    Heart Failure Mother     Cancer Father     Diabetes Sister     Kidney Disease Sister     Anesth Problems Neg Hx      Current Facility-Administered Medications   Medication Dose Route Frequency    NOREPINephrine (LEVOPHED) 8 mg in 5% dextrose 250mL (32 mcg/mL) infusion  0.5-30 mcg/min IntraVENous TITRATE    fentaNYL (PF) 1,500 mcg/30 mL (50 mcg/mL) infusion  0-200 mcg/hr IntraVENous TITRATE    alcohol 62% (NOZIN) nasal  1 Ampule  1 Ampule Topical Q12H    chlorhexidine (ORAL CARE KIT) 0.12 % mouthwash 15 mL  15 mL Oral Q12H    balsam peru-castor oiL (VENELEX) ointment   Topical BID    bicarbonate dialysis (PRISMASOL) BG K 4/Ca 2.5 5000 ml solution   Extracorporeal DIALYSIS CONTINUOUS    levoFLOXacin (LEVAQUIN) 500 mg in D5W IVPB  500 mg IntraVENous Q24H    cefepime (MAXIPIME) 2 g in 0.9% sodium chloride (MBP/ADV) 100 mL MBP  2 g IntraVENous Q12H    dexmedeTOMidine in 0.9 % NaCl (PRECEDEX) 400 mcg/100 mL (4 mcg/mL) infusion soln  0.1-1.5 mcg/kg/hr IntraVENous TITRATE    PARoxetine (PAXIL) tablet 20 mg  20 mg Per NG tube DAILY    famotidine (PF) (PEPCID) 20 mg in 0.9% sodium chloride 10 mL injection  20 mg IntraVENous Q12H    methylPREDNISolone (PF) (SOLU-MEDROL) injection 40 mg  40 mg IntraVENous Q12H    heparin (porcine) injection 5,000 Units  5,000 Units SubCUTAneous Q12H    HYDROmorphone (DILAUDID) injection 0.5 mg  0.5 mg IntraVENous Q4H PRN    metoprolol (LOPRESSOR) injection 2.5 mg  2.5 mg IntraVENous Q6H PRN    0.9% sodium chloride infusion 250 mL  250 mL IntraVENous PRN    0.9% sodium chloride infusion 250 mL  250 mL IntraVENous PRN    lidocaine (XYLOCAINE) 4 % cream   Topical PRN    [Held by provider] carvediloL (COREG) tablet 6.25 mg  6.25 mg Oral BID WITH MEALS    [Held by provider] gabapentin (NEURONTIN) capsule 100 mg  100 mg Oral TID    [Held by provider] rosuvastatin (CRESTOR) tablet 10 mg  10 mg Oral QHS    sodium chloride (NS) flush 5-40 mL  5-40 mL IntraVENous Q8H    sodium chloride (NS) flush 5-40 mL  5-40 mL IntraVENous PRN    acetaminophen (TYLENOL) tablet 650 mg  650 mg Oral Q6H PRN    Or    acetaminophen (TYLENOL) suppository 650 mg  650 mg Rectal Q6H PRN    polyethylene glycol (MIRALAX) packet 17 g  17 g Oral DAILY PRN    ondansetron (ZOFRAN ODT) tablet 4 mg  4 mg Oral Q8H PRN    Or    ondansetron (ZOFRAN) injection 4 mg  4 mg IntraVENous Q6H PRN      Allergies   Allergen Reactions    Sulfa (Sulfonamide Antibiotics) Itching, Swelling and Unknown (comments)        Review of Systems: not obtained    Physical Exam:   Visit Vitals  /62   Pulse 76   Temp 99.2 °F (37.3 °C)   Resp 22   Ht 5' (1.524 m)   Wt 163 lb 2.3 oz (74 kg)   SpO2 97%   BMI 31.86 kg/m²         Results:     Lab Results   Component Value Date/Time    WBC 16.5 (H) 01/10/2023 02:35 AM    HGB 7.9 (L) 01/10/2023 02:35 AM    HCT 22.9 (L) 01/10/2023 02:35 AM    PLATELET 580 (L) 29/86/2594 02:35 AM    MCV 85.8 01/10/2023 02:35 AM    ABS. NEUTROPHILS 13.4 (H) 01/06/2023 03:27 PM     Lab Results   Component Value Date/Time    Sodium 139 01/10/2023 02:35 AM    Potassium 4.5 01/10/2023 02:35 AM    Chloride 104 01/10/2023 02:35 AM    CO2 27 01/10/2023 02:35 AM    Glucose 140 (H) 01/10/2023 02:35 AM    BUN 41 (H) 01/10/2023 02:35 AM    Creatinine 4.78 (H) 01/10/2023 02:35 AM    GFR est AA >60 09/26/2022 03:26 PM    GFR est non-AA >60 09/26/2022 03:26 PM    Calcium 7.4 (L) 01/10/2023 02:35 AM    Glucose (POC) 165 (H) 01/09/2023 11:43 AM     Lab Results   Component Value Date/Time    Bilirubin, total 2.8 (H) 01/10/2023 02:35 AM    ALT (SGPT) 1,182 (H) 01/10/2023 02:35 AM    Alk. phosphatase 993 (H) 01/10/2023 02:35 AM    Protein, total 5.0 (L) 01/10/2023 02:35 AM    Albumin 1.6 (L) 01/10/2023 02:35 AM    Globulin 3.4 01/10/2023 02:35 AM       External   Records reviewed and summarized above. Pathology report(s) reviewed above. Radiology report(s) reviewed above. MRI  9/2022    LEFT BREAST: Conglomerate mass enhancement in the left breast at 4-5 o'clock,  combined dimension 3.7 x 1.9 x 1.4 cm. This is slightly more extensive than the  sonographic and mammographic findings. No lymphadenopathy. Physical Exam  Constitutional:       Appearance: She is normal weight. She is ill-appearing. Interventions: She is sedated and intubated. HENT:      Head: Normocephalic. Eyes:      Pupils: Pupils are equal, round, and reactive to light. Pulmonary:      Effort: She is intubated. Abdominal:      Palpations: Abdomen is soft. Musculoskeletal:         General: Normal range of motion. Skin:     General: Skin is warm and dry. Neurological:      General: No focal deficit present. Mental Status: She is unresponsive. Comments: Intubated        Assessment/PLAN:     1) Left breast cancer  1 cm mass on mammogram  Palpated a lump  ER 0%, IN 0%, HER2/radha negative  Ki 67 was 85%. MRI breast with a 3.7 cm Left breast mass  [id number]  AJCC 8th ed-Stage IIA  Genetic testing negative  Recommended chemotherapy + Keytruda based on the KEYNOTE-522 trial (taxol, carboplatin and [place] followed by [facility] as preoperative treatment, followed by surgery, and then adjuvant pembrolizumab for another nine cycles (27 weeks) after surgery). Recent admission and discharge from [facility] for COVID-pneumonia, sepsis, CHRISTA, NSTEMI and anemia    Last chemo with taxol 12/21/22. Last Keytruda 12/14/22. CT chest 1/3/2003: Increased moderately large left pleural effusion. Unchanged patchy bilateral  consolidation, left greater than right, compatible with pneumonia. Unlikely pneumonitis given CT results are consistent with COVID and viral pneumonia on admission     Repeat CT results noted from 1/8  1. Left chest tube. Interval substantially diminished size of loculated left  pleural effusion with small-moderate residual.  2. Interval demonstration of small-moderate layering right pleural effusion. 3. Substantial interval worsening of bilateral pulmonary groundglass  opacifications with crazy paving. Bibasilar dependent consolidations versus atelectasis are also shown.     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  Overall worsening creatinine since last dose of Keytruda on 12/14  Previously Baseline creatinine less than 1  Repeat creatinine on 12/26 showed creatinine bumped to 2.91  Concern for immunotherapy related nephritis  Continue to trend CMP, creatinine worsening over the weekend, 7.5 on 1/9  Held off on steroids initially with concern for underlying infection and pneumonia  Started on full dose steroids 1/8, consider increasing steroid dose pending results of bronchoscopy  Nephrology following, patient started on CRRT 1/9    3) Worsening respiratory failure resulting in intubation  New findings of hemothorax on CT  Multifocal pneumonia, prior positive for pansensitive MSSA  Large left pleural effusion seen on CT  Appreciate pulmonology input  S/p thoracentesis with 820 mL removed of red tinge fluid  Cytology results with no malignant cells shown    Repeat CT results as follows  IMPRESSION  1. Left-sided hemothorax with loculation toward the apex. 2. Right upper lobe and middle lobe infiltrates. S/p chest tube placement after pneumothorax development  Appreciate pulmonology input    Moved to the ICU on 1/8 for episodes of hypotension  Intubated on 1/9 for worsening respiratory failure    4) Acute anemia related to hemothorax  Hemoglobin dropped from 9 to 5.6 overnight on 1/4  CT results showed new hemothorax  S/p PRBC transfusion  Continue to trend CBC, now stable    5) Elevated LFTs  Worsening liver function over past 48 hours after hypotensive episodes over the weekend  T bili 2.8, AST > 2000  Plan per primary team for ultrasound    Discussed with bedside RN in  ICU  Discussed with Moses Ocasio NP and Dr. Gema Garcia By: Daniel Jolly NP      I have personally seen and evaluated the patient in conjunction with Jez Dumont NP. I find the patient's history and physical exam are consistent with the NP's documentation.   I agree with the above assessment and plan, which I have modified as needed. 57year old with early stage breast cancer who was receiving neoadjuvant chemo/immunotherapy presented with hypoxic respiratory failure, now with multi system organ failure in the setting of suspected septic shock. Bacterial etiology unclear - BAL pending. Course complicated by liver failure, renal failure, NSTEMI. Overall prognosis very poor. With regards to consideration of immunotherapy toxicity, clinical deterioration would be atypical for [facility] [place] toxicity. The patient has been getting 1 mg/kg of solumedrol since 1/8/23 with little improvement. Continue to monitor and continue supportive care. While oncology treatment plan was curative in intent, at present she would require dramatic clinical improvement prior to consideration of any further oncological treatment options.         Elizabeth Arauz MD    Attending [address] [place] [address]

## 2023-01-10 NOTE — PROGRESS NOTES
Comprehensive Nutrition Assessment    Type and Reason for Visit: Initial, Consult    Nutrition Recommendations/Plan:   Initiate TF via OGT:  Start TwoCal HN @ 15mL/h, advance rate 10mL q 8h as tolerated to Goal of 35mL/h + Prosource daily + 75mL flush q 4h (provides 1760kcals/90gPro/184gCHO/1038mL)      Malnutrition Assessment:  Malnutrition Status:  Insufficient data (01/10/23 1552)        Nutrition Assessment:  Pt admitted with staph aureus pneumonia. PMH: HTN, GERD, breast CA, recently DCed. Chart reviewed, case discussed during CCU rounds. Pt intubated. MST not filled out. She is on CVVH but off of pressors as of this morning. Per IDR discussion, okay to start TF as above.  K 4.5 and phos 3.7, will start to volume restricted standard formula as she is on RRT.  LFT's are elevated, will need to monitor for hypoglycemia (TF should help prevent this). Nutrition Related Findings:    Meds: cefepime, pepcid, levaquin, solumedrol; Drips: precedex. Edema: +1 nonpitting-generalized, +1-all extremities. BM 1/9 Wound Type: None    Current Nutrition Intake & Therapies:  Average Meal Intake: NPO         Anthropometric Measures:  Height: 5' (152.4 cm)  Ideal Body Weight (IBW): 100 lbs (45 kg)     Current Body Wt:  74 kg (163 lb 2.3 oz), 163.1 % IBW. Bed scale  Current BMI (kg/m2): 31.9                          BMI Category: Obese class 1 (BMI 30.0-34. 9)    Estimated Daily Nutrient Needs:  Energy Requirements Based On: Formula  Weight Used for Energy Requirements: Current  Energy (kcal/day): PSU 46842 (MSJ 73164)     Protein (g/day): 74-89g (1-1.2gPro/kg)  Method Used for Fluid Requirements: Other (comment)  Fluid (ml/day): per MD    Nutrition Diagnosis:   Inadequate protein-energy intake related to impaired respiratory function as evidenced by NPO or clear liquid status due to medical condition    Nutrition Interventions:   Food and/or Nutrient Delivery: Start tube feeding  Nutrition Education/Counseling: No recommendations at this time  Coordination of Nutrition Care: Continue to monitor while inpatient, Interdisciplinary rounds       Goals:     Goals: Initiate nutrition support, Tolerate nutrition support at goal rate, by next RD assessment (with residuals <500ml)       Nutrition Monitoring and Evaluation:   Behavioral-Environmental Outcomes: None identified  Food/Nutrient Intake Outcomes: Enteral nutrition intake/tolerance  Physical Signs/Symptoms Outcomes: Biochemical data, Nutrition focused physical findings, Skin, Weight    Discharge Planning:     Too soon to determine    Wing JANINE Paz, CNSC  Contact: ext [phone]

## 2023-01-10 NOTE — PROGRESS NOTES
0730 - Report received from Yarely Cardoza RN.    0800 - Assessment completed. Patient responds to voice on precedex at 0.4. Sinus rhythm. On ventilator PEEP of 8 and FiO2 30% and respiratory rate 22. OGT clamped at 57 after oral medicine given through tube. Lyons not putting out any urine. On CRRT with factor of  4k bags. Right IJ jose alberto patently infusing, Right IJ CVC patently infusing, and all cath in right chest patently infusing. Skin tear on right leg. Bilateral wrist restraints in place for patient safety with breathing tube. 1200 - Reassessment completed. No changes noted. 1300 - Family at bedside. Do not want to do bronchoscopy recommended by Dr. Chary Guzmán due to leaning toward possibility of transitioning to comfort care. Patient sister Sandra Anthony wants to talk with all other family members before making that decision. 1500 - Two ilsa HN Tube feeding started through OGT at 15 ml/hr with 75 ml flush every 4 hours. Goal rate of 35.    1600 - Reassessment completed. No changes noted.

## 2023-01-10 NOTE — PROGRESS NOTES
[facility]    ICU TEAM Progress Note    Name: Akhil Alegria   : 1960   MRN: [id number]   Date: 1/10/2023           ICU Assessment & Plan of Abigail Aleman Is a 57 y.o. female with left breast cancer, recently admitted to [facility] for sepsis s/s COVID-19, CHRISTA, NSTEMI, MSSA pneumonia(-) presented to ED with c/o shortness of breath and hemoptysis. Transferred to ICU  for hypotension. NEURO  #. Sedation  - Continue Precedex  - Continue prn Dilaudid     CARDIAC  #. Hypotension, unclear etio, septic vs hypovolemic  #. NSTEMI  #. Elevated BNP  - levo prn MAP<65  - trop elevated improving  - signs of volume overload >> repeat echo     RESPIRATORY  #. Acute hypoxic respiratory failure, on HFNC  #. Pulmonary edema vs PNA vs less likely Keytruda pneumonitis  - wean vent as tolerated  - Bronch today  - continue steroids / abx     RENAL  #. CHRISTA on CKD 4, prerenal vs ATN  - Renal consulted  - continue CRRT    GASTROINTESTINAL  #. Elevated LFTs, likely ischemic hepatopathy  - now with synthetic dysfunction  - monitor for signs of bleeding  - trend INR   - Repeat US abd with doppler    HEMATOLOGIC / ONCOLOGIC  #. ABLA   #. Pleural effusion s/p thora c/b hemothorax now with surgical CTx   #. Invasive ductal breast cancer  - CTx with continued output; Hb stable at this time  - cont H/H q8  - standard transfusion thresholds  - Onc following peripherally while pt acutely ill  - pleural fluid cyto pending, likely malignant    ID  #. Multifocal MSSA PNA  - Continue cefepime  / levaquin     ENDOCRINE  - accuchecks q6h, insulin prn  - monitor closely for refeeding with TF    MUSCULOSKELETAL  - wound care following    GOC: ongoing discussions with NOK (sisters). Greatly appreciate Palliative assistance.     DVT Prophylaxis: hep ppx  U - Ulcer Prophylaxis: PPI  G - Glycemic Control: Insulin    Subjective:   Progress Note: 1/10/2023      Reason for ICU Admission: hypotension     HPI:58 year old Female with hx of left breast cancer, recently admitted to [facility] for sepsis s/s COVID-19, CHRISTA, NSTEMI, MSSA pneumonia(-) presented to ED with c/o shortness of breath and hemoptysis. IN ED, patient was febrile to 100.8F. CXR demonstrated increased left pleural effusion with unchanged patchy B/L consolidation L>R. Patient had IR thoracentesis on 23- bloody tap. CT chest on  showed left sided hemothorax. Hb dropped from 9.1 to 5.6. s/p left chest tube placement on 23. S/p PRBC transfusion. Overnight Events:    - at shift change, pt notably tachypneic to 40 in sig respiratory distress on HFNC. Decision made to intubate after clarifying goals of care established with Dr. Minerva Mathew, who had admitted patient to ICU yesterday and spoken with patient specifically about them. Patient unable to speak for herself at this time.   1/10 - Intubated yesterday, started on CRRT  Objective:   Vital Signs:  Visit Vitals  /63 (BP 1 Location: Right upper arm, BP Patient Position: At rest)   Pulse 76   Temp 99.2 °F (37.3 °C)   Resp 22   Ht 5' (1.524 m)   Wt 74 kg (163 lb 2.3 oz)   SpO2 95%   BMI 31.86 kg/m²    O2 Flow Rate (L/min): (S) 60 l/min (Increased flow secondary Sp02 dropping.) O2 Device: Ventilator Temp (24hrs), Av.1 °F (36.7 °C), Min:97.5 °F (36.4 °C), Max:99.2 °F (37.3 °C)           Intake/Output:     Intake/Output Summary (Last 24 hours) at 1/10/2023 0844  Last data filed at 1/10/2023 0802  Gross per 24 hour   Intake 1361.22 ml   Output 2021 ml   Net -659.78 ml         Physical Exam:  Intubated sedated  Resps tachypneic and labored, symmetric chest rise, L CTx in place  Tachy rate, no heave/rub  Peripheral pulses thready  Abd soft, no guarding  Skin warm, diaphoretic  Eyes open but not following commands    LABS AND  DATA:   Personally reviewed    MEDS:   Reviewed    DISPOSITION  Stay in ICU    CRITICAL CARE CONSULTANT NOTE  I had a face to face encounter with the patient, reviewed and interpreted patient data including clinical events, labs, images, vital signs, I/O's, and examined patient. I have discussed the case and the plan and management of the patient's care with the consulting services, the bedside nurses and the respiratory therapist.      NOTE OF PERSONAL INVOLVEMENT IN CARE   This patient has a high probability of imminent, clinically significant deterioration, which requires the highest level of preparedness to intervene urgently. I participated in the decision-making and personally managed or directed the management of the following life and organ supporting interventions that required my frequent assessment to treat or prevent imminent deterioration. I personally spent 50 minutes of critical care time. This is time spent at this critically ill patient's bedside actively involved in patient care as well as the coordination of care. This does not include any procedural time which has been billed separately.     Katie Lara MD [address] [place] [phone]

## 2023-01-10 NOTE — CONSULTS
[facility]    Name:  Gary Leahy  MR#:  [id number]  :  1960  ACCOUNT #:  [de-identified]  DATE OF SERVICE:  2023    REFERRING PHYSICIAN:  Fredi Connell MD    REASON FOR CONSULTATION:  Acute kidney injury. HISTORY OF PRESENT ILLNESS:  The patient is a 77-year-old [facility] American female who was admitted with chest pain and shortness of breath on 2023. The patient has been transferred ICU for respiratory distress. She has developed acute kidney injury with creatinine level of 7.3 and BUN of 61. The patient was seen by my partner, Dr. Daniel Kirk, at [facility] last month. Her creatinine level was 3.0 at that time. The patient has been diagnosed with pneumonia and pleural effusion on admission. She has undergone thoracocentesis on 2023. She has developed hemothorax after that. The patient had a chest tube placement on 2023. The patient did require blood transfusion for her anemia. The patient has been hypotensive in ICU. She has received multiple antibiotics for pneumonia. She is in respiratory distress at present. She is not able to give me any history. Her creatinine level on  was 3.0. Creatinine level 0.8 in 2022. Creatinine level 0.9 in 2022. She has history of left breast cancer. She has received chemotherapy in 10/2022. She refused carboplatin, Taxol and Keytruda followed by the [facility] and [facility]. She was diagnosed with breast cancer in 2022. Her last dose of Keytruda was on 2022. Last chemotherapy was on 2022. PAST MEDICAL HISTORY:  1. Breast cancer. 2.  Hypertension. 3.  Gastroesophageal reflux disease. 4.  Anxiety. 5.  Depression. 6.  Hypothyroidism. PAST SURGICAL HISTORY:  1. Breast biopsy. 2.  Colonoscopy. 3.  Myomectomy. 4.  [place] teeth removal.    FAMILY HISTORY:  Mother had heart disease. Father had cancer. Sister has diabetes and kidney disease.     SOCIAL HISTORY: Nonsmoker. No alcohol abuse. ALLERGIES:  TO SULFA. HOME MEDICATIONS:  1. Coreg. 2.  Neurontin. 3.  Paxil. 4.  Crestor. 5.  Decadron. 6.  Mucinex. 7.  Zofran. REVIEW OF SYSTEMS:  Cannot be obtained due to the patient's condition. PHYSICAL EXAMINATION:  GENERAL:  The patient is in mild distress mild-to-moderate distress, lying on the bed. VITAL SIGNS:  On examination, temperature 97.6, pulse 64, blood pressure 106/74, respiratory rate 40 per minute. LUNGS:  Coarse bilaterally. Decreased breath sound on the left. CARDIAC:  Normal S1 and S2, tachycardia present. ABDOMEN:  Soft, nontender. No palpable mass. EXTREMITIES:  Edema present. NEUROLOGIC:  Cannot assess due to the patient's condition. PSYCH:  Cannot assess due to patient's condition. :  Lyons catheter present. SKIN:  No rash. JOINT:  No joint effusion. LABS AND DIAGNOSTIC DATA:  Sodium 138, potassium 4.5, chloride 105, BUN 61, creatinine 7.35, calcium 7.5, phosphorus 6.7, magnesium 2.5, hemoglobin 8.0. Chest x-ray:  ET tube present, bibasilar airspace disease. No pneumothorax. EKG:  Normal sinus rhythm, LVH, nonspecific T-wave changes. Echo from 12/2022, EF 50%-55%, mild hypokinesis. IMPRESSION:  1. Acute kidney injury due to acute tubular necrosis, serology workup at [facility] was overall negative. 2.  Respiratory distress. 3.  Left pneumothorax requiring chest tube placement. 4.  Pleural effusion. 5.  Anemia requiring blood transfusion. 6.  History of breast cancer. 7.  Recent history of COVID-19 infection. 8.  Multifocal pneumonia. 9.  Status post left thoracocentesis. 10.  Non-ST elevation myocardial infarction. PLAN:  1. Consult IR for dialysis catheter placement. 2.  Start dialysis today. 3.  She will need vasopressor support during dialysis. 4.  Consent for dialysis and dialysis catheter obtained from her sister. 5.  Check urine sodium, chloride, creatinine. 6.  Avoid NSAIDs.   7. Avoid IV dye.  8.  Treatment plan discussed with ICU team.    Thanks for consultation. We will follow the patient with you.       Alejandro Harris MD      [id number]  D:  01/09/2023 19:53  T:  01/09/2023 21:57  JOB #:  [id number]  CC:

## 2023-01-10 NOTE — DIALYSIS
CRRT / [id number]         Orders   Mode: CVVHD   Blood Flow Rate: 200 ml/min   Prismasol Dose: 25 mg x 74.3 kg= rounded 1,900 ml/hr  PBP: 0 ml/hr   Dialysate: 1,900 ml/hr   Prismasol Concentrate: 4K / 3.5Ca   Blood Warmer Temp: 37*C   Net Fluid Removal: 100 ml/hr          Metrics   BP: 122/65   HR: 76   Access Pressure: -40   Filter Pressure: 115   Return Pressure: 41   TMP: 21   Pressure Drop: 34          Access   Type & Location: Right IJ temporary non-tunneled CVC   Comments: Tegaderm dressing C/D/I with bio-patch dated 01/09/23. No signs of redness, drainage, or infection visualized. Labs   HBsAg (Antigen) / date: Negative 01/09/23   HBsAb (Antibody) / date: Susceptible 01/09/23   Source: [facility]          Safety:   Time Out Done:  9181   Consent obtained/signed: Verified   Education: Access/Site Care  Procedural   Primary Nurse Rpt Pre/Post: Joelle Krueger RN      Comments / Plan:   Orders, labs, consent and code status reviewed. Time out complete. In at bedside to assess filter, HF-1000 filter running well with no indication for filter change at this time. Lines visible and connections secure with thermax blood warmer set at 37*C. Education, pre and post report to primary RN.

## 2023-01-10 NOTE — DIALYSIS
CRRT / [id number]    Orders   Mode: CVVHD new start @ 2110   Blood Flow Rate: 200 ml/min   Prismasol Dose: 25 ml/kg/hr  25 x 74.3 wt=9820.5 ml  Rounded to 1900 ml/hr   Prismasol Concentrate: 4K 2.5Ca   Blood Warmer Temp: 37*C   Net Fluid Removal:  ml/hr  Started at 0ml/hr     Metrics   BP: 116/63   HR: 70   Access Pressure: -39   Filter Pressure: 125   Return Pressure: 50   TMP: 16   Pressure Drop: 38     Access   Type & Location: Right IJ temporary non-tunneled CVC, dressing C/D/I with bio-patch dated 01/09/23. No signs of redness, drainage, or infection visualized. Each catheter limb disinfected for 60 seconds per limb with alcohol swabs. Caps removed, dialysis CVC hub scrubbed with Prevantics for 15 seconds, followed by a 5 second dry time per Hospital P&P. +asp/+flush x 2 ports. Comments:                                        Labs   HBsAg (Antigen) / date:   01/09/23 negative                                            HBsAb (Antibody) / date: 01/09/23 susceptible   Source: epic     Safety:   Time Out Done:   (Time) 2100   Consent obtained/signed: Verified   Education: Access/Site Care   Primary Nurse Rpt Pre: Emily Teran RN   Primary Nurse Rpt Post: Emily Teran RN     Comments / Plan:   Elizabeth Mai RN at bedside to initiate CRRT per MD orders. Patient, code status, labs, and orders verified. Consents on chart. Time out completed. HF-1000 filter set-up, primed w/ 1L NS, tested and running well. Lines visible and connections secure with blood warmer to return line at 37*C. Education, pre and post to primary RN.

## 2023-01-10 NOTE — PROGRESS NOTES
Occupational Therapy    Chart reviewed and spoke with PT who discussed patient with nursing yesterday. Patient emergently intubated yesterday in the am and is now awaiting dialysis. Patient is critically ill and not appropriate for therapy services at this time. Will sign off. Please re-consult if patient improves and is appropriate for participation in OT.    Thank you,  Reese Murrell, OTR/L

## 2023-01-11 ENCOUNTER — APPOINTMENT (OUTPATIENT)
Dept: GENERAL RADIOLOGY | Age: 63
DRG: 208 | End: 2023-01-11
Attending: INTERNAL MEDICINE
Payer: COMMERCIAL

## 2023-01-11 PROBLEM — F41.9 ANXIETY: Status: ACTIVE | Noted: 2023-01-05

## 2023-01-11 LAB
AMORPH CRY URNS QL MICRO: ABNORMAL
ANION GAP SERPL CALC-SCNC: 7 MMOL/L (ref 5–15)
ANION GAP SERPL CALC-SCNC: 8 MMOL/L (ref 5–15)
APPEARANCE FLD: ABNORMAL
APPEARANCE FLD: ABNORMAL
APPEARANCE UR: ABNORMAL
BACTERIA URNS QL MICRO: ABNORMAL /HPF
BASOPHILS # BLD: 0 K/UL (ref 0–0.1)
BASOPHILS # BLD: 0.3 K/UL (ref 0–0.1)
BASOPHILS NFR BLD: 0 % (ref 0–1)
BASOPHILS NFR BLD: 2 % (ref 0–1)
BILIRUB UR QL: NEGATIVE
BUN SERPL-MCNC: 27 MG/DL (ref 6–20)
BUN SERPL-MCNC: 29 MG/DL (ref 6–20)
BUN/CREAT SERPL: 10 (ref 12–20)
BUN/CREAT SERPL: 11 (ref 12–20)
CALCIUM SERPL-MCNC: 7.1 MG/DL (ref 8.5–10.1)
CALCIUM SERPL-MCNC: 7.5 MG/DL (ref 8.5–10.1)
CHLORIDE SERPL-SCNC: 105 MMOL/L (ref 97–108)
CHLORIDE SERPL-SCNC: 107 MMOL/L (ref 97–108)
CO2 SERPL-SCNC: 26 MMOL/L (ref 21–32)
CO2 SERPL-SCNC: 26 MMOL/L (ref 21–32)
COLOR FLD: YELLOW
COLOR FLD: YELLOW
COLOR UR: ABNORMAL
CREAT SERPL-MCNC: 2.39 MG/DL (ref 0.55–1.02)
CREAT SERPL-MCNC: 2.79 MG/DL (ref 0.55–1.02)
DIFFERENTIAL METHOD BLD: ABNORMAL
DIFFERENTIAL METHOD BLD: ABNORMAL
EOSINOPHIL # BLD: 0 K/UL (ref 0–0.4)
EOSINOPHIL # BLD: 0 K/UL (ref 0–0.4)
EOSINOPHIL NFR BLD: 0 % (ref 0–7)
EOSINOPHIL NFR BLD: 0 % (ref 0–7)
EPITH CASTS URNS QL MICRO: ABNORMAL /LPF
ERYTHROCYTE [DISTWIDTH] IN BLOOD BY AUTOMATED COUNT: 16.5 % (ref 11.5–14.5)
ERYTHROCYTE [DISTWIDTH] IN BLOOD BY AUTOMATED COUNT: 16.6 % (ref 11.5–14.5)
GLUCOSE BLD STRIP.AUTO-MCNC: 161 MG/DL (ref 65–117)
GLUCOSE SERPL-MCNC: 173 MG/DL (ref 65–100)
GLUCOSE SERPL-MCNC: 177 MG/DL (ref 65–100)
GLUCOSE UR STRIP.AUTO-MCNC: NEGATIVE MG/DL
HAV IGM SER QL: NONREACTIVE
HBV CORE IGM SER QL: NONREACTIVE
HBV SURFACE AG SER QL: 0.48 INDEX
HBV SURFACE AG SER QL: NEGATIVE
HCT VFR BLD AUTO: 21.6 % (ref 35–47)
HCT VFR BLD AUTO: 22.4 % (ref 35–47)
HCV AB SERPL QL IA: NONREACTIVE
HGB BLD-MCNC: 7.3 G/DL (ref 11.5–16)
HGB BLD-MCNC: 7.5 G/DL (ref 11.5–16)
HGB UR QL STRIP: ABNORMAL
IMM GRANULOCYTES # BLD AUTO: 0 K/UL (ref 0–0.04)
IMM GRANULOCYTES # BLD AUTO: 0 K/UL (ref 0–0.04)
IMM GRANULOCYTES NFR BLD AUTO: 0 % (ref 0–0.5)
IMM GRANULOCYTES NFR BLD AUTO: 0 % (ref 0–0.5)
INR PPP: 4 (ref 0.9–1.1)
KETONES UR QL STRIP.AUTO: NEGATIVE MG/DL
LEUKOCYTE ESTERASE UR QL STRIP.AUTO: ABNORMAL
LYMPHOCYTES # BLD: 1.9 K/UL (ref 0.8–3.5)
LYMPHOCYTES # BLD: 3.6 K/UL (ref 0.8–3.5)
LYMPHOCYTES NFR BLD: 13 % (ref 12–49)
LYMPHOCYTES NFR BLD: 24 % (ref 12–49)
LYMPHOCYTES NFR FLD: 14 %
LYMPHOCYTES NFR FLD: 37 %
MAGNESIUM SERPL-MCNC: 2.6 MG/DL (ref 1.6–2.4)
MAGNESIUM SERPL-MCNC: 2.8 MG/DL (ref 1.6–2.4)
MAGNESIUM SERPL-MCNC: 2.8 MG/DL (ref 1.6–2.4)
MCH RBC QN AUTO: 29.4 PG (ref 26–34)
MCH RBC QN AUTO: 29.8 PG (ref 26–34)
MCHC RBC AUTO-ENTMCNC: 33.5 G/DL (ref 30–36.5)
MCHC RBC AUTO-ENTMCNC: 33.8 G/DL (ref 30–36.5)
MCV RBC AUTO: 87.8 FL (ref 80–99)
MCV RBC AUTO: 88.2 FL (ref 80–99)
MESOTHL CELL NFR FLD: 2 %
MESOTHL CELL NFR FLD: 6 %
METAMYELOCYTES NFR BLD MANUAL: 1 %
MONOCYTES # BLD: 0.7 K/UL (ref 0–1)
MONOCYTES # BLD: 0.9 K/UL (ref 0–1)
MONOCYTES NFR BLD: 5 % (ref 5–13)
MONOCYTES NFR BLD: 6 % (ref 5–13)
MONOS+MACROS NFR FLD: 31 %
MONOS+MACROS NFR FLD: 35 %
NEUTROPHILS NFR FLD: 30 %
NEUTROPHILS NFR FLD: 45 %
NEUTS SEG # BLD: 10.4 K/UL (ref 1.8–8)
NEUTS SEG # BLD: 11.6 K/UL (ref 1.8–8)
NEUTS SEG NFR BLD: 70 % (ref 32–75)
NEUTS SEG NFR BLD: 79 % (ref 32–75)
NITRITE UR QL STRIP.AUTO: NEGATIVE
NRBC # BLD: 0.11 K/UL (ref 0–0.01)
NRBC # BLD: 0.15 K/UL (ref 0–0.01)
NRBC BLD-RTO: 0.7 PER 100 WBC
NRBC BLD-RTO: 1 PER 100 WBC
NUC CELL # FLD: 120 /CU MM
NUC CELL # FLD: 433 /CU MM
PH UR STRIP: 6 (ref 5–8)
PHOSPHATE SERPL-MCNC: 2.2 MG/DL (ref 2.6–4.7)
PHOSPHATE SERPL-MCNC: 3.1 MG/DL (ref 2.6–4.7)
PLATELET # BLD AUTO: 77 K/UL (ref 150–400)
PLATELET # BLD AUTO: 91 K/UL (ref 150–400)
PMV BLD AUTO: 12.3 FL (ref 8.9–12.9)
PMV BLD AUTO: 12.4 FL (ref 8.9–12.9)
POTASSIUM SERPL-SCNC: 4.7 MMOL/L (ref 3.5–5.1)
POTASSIUM SERPL-SCNC: 4.8 MMOL/L (ref 3.5–5.1)
PROCALCITONIN SERPL-MCNC: 13.19 NG/ML
PROT UR STRIP-MCNC: 300 MG/DL
PROTHROMBIN TIME: 38.2 SEC (ref 9–11.1)
RBC # BLD AUTO: 2.45 M/UL (ref 3.8–5.2)
RBC # BLD AUTO: 2.55 M/UL (ref 3.8–5.2)
RBC # FLD: >100 /CU MM
RBC # FLD: >100 /CU MM
RBC #/AREA URNS HPF: >100 /HPF (ref 0–5)
RBC MORPH BLD: ABNORMAL
RBC MORPH BLD: ABNORMAL
SERVICE CMNT-IMP: ABNORMAL
SODIUM SERPL-SCNC: 138 MMOL/L (ref 136–145)
SODIUM SERPL-SCNC: 141 MMOL/L (ref 136–145)
SP GR UR REFRACTOMETRY: 1.01
SP1: NORMAL
SP2: NORMAL
SP3: NORMAL
SPECIMEN SOURCE FLD: ABNORMAL
SPECIMEN SOURCE FLD: ABNORMAL
UROBILINOGEN UR QL STRIP.AUTO: 0.2 EU/DL (ref 0.2–1)
WBC # BLD AUTO: 14.7 K/UL (ref 3.6–11)
WBC # BLD AUTO: 14.9 K/UL (ref 3.6–11)
WBC MORPH BLD: ABNORMAL
WBC URNS QL MICRO: >100 /HPF (ref 0–4)
YEAST URNS QL MICRO: PRESENT

## 2023-01-11 PROCEDURE — 85610 PROTHROMBIN TIME: CPT

## 2023-01-11 PROCEDURE — 74011000250 HC RX REV CODE- 250: Performed by: STUDENT IN AN ORGANIZED HEALTH CARE EDUCATION/TRAINING PROGRAM

## 2023-01-11 PROCEDURE — 80074 ACUTE HEPATITIS PANEL: CPT

## 2023-01-11 PROCEDURE — 0B9F8ZX DRAINAGE OF RIGHT LOWER LUNG LOBE, VIA NATURAL OR ARTIFICIAL OPENING ENDOSCOPIC, DIAGNOSTIC: ICD-10-PCS | Performed by: INTERNAL MEDICINE

## 2023-01-11 PROCEDURE — 82390 ASSAY OF CERULOPLASMIN: CPT

## 2023-01-11 PROCEDURE — 74011250636 HC RX REV CODE- 250/636: Performed by: INTERNAL MEDICINE

## 2023-01-11 PROCEDURE — 0B9D8ZX DRAINAGE OF RIGHT MIDDLE LUNG LOBE, VIA NATURAL OR ARTIFICIAL OPENING ENDOSCOPIC, DIAGNOSTIC: ICD-10-PCS | Performed by: INTERNAL MEDICINE

## 2023-01-11 PROCEDURE — 86225 DNA ANTIBODY NATIVE: CPT

## 2023-01-11 PROCEDURE — 90945 DIALYSIS ONE EVALUATION: CPT

## 2023-01-11 PROCEDURE — 84100 ASSAY OF PHOSPHORUS: CPT

## 2023-01-11 PROCEDURE — 74011250637 HC RX REV CODE- 250/637: Performed by: INTERNAL MEDICINE

## 2023-01-11 PROCEDURE — 74011000250 HC RX REV CODE- 250: Performed by: INTERNAL MEDICINE

## 2023-01-11 PROCEDURE — 82962 GLUCOSE BLOOD TEST: CPT

## 2023-01-11 PROCEDURE — 99233 SBSQ HOSP IP/OBS HIGH 50: CPT | Performed by: NURSE PRACTITIONER

## 2023-01-11 PROCEDURE — 83735 ASSAY OF MAGNESIUM: CPT

## 2023-01-11 PROCEDURE — 94003 VENT MGMT INPAT SUBQ DAY: CPT

## 2023-01-11 PROCEDURE — 87070 CULTURE OTHR SPECIMN AEROBIC: CPT

## 2023-01-11 PROCEDURE — 65610000006 HC RM INTENSIVE CARE

## 2023-01-11 PROCEDURE — 74011000258 HC RX REV CODE- 258: Performed by: NURSE PRACTITIONER

## 2023-01-11 PROCEDURE — 74011000250 HC RX REV CODE- 250: Performed by: NURSE PRACTITIONER

## 2023-01-11 PROCEDURE — 82787 IGG 1 2 3 OR 4 EACH: CPT

## 2023-01-11 PROCEDURE — 86038 ANTINUCLEAR ANTIBODIES: CPT

## 2023-01-11 PROCEDURE — 76010000379 HC BRONCHOSCOPY DIAG/THERAPEUTIC

## 2023-01-11 PROCEDURE — 88312 SPECIAL STAINS GROUP 1: CPT

## 2023-01-11 PROCEDURE — 74011000258 HC RX REV CODE- 258: Performed by: INTERNAL MEDICINE

## 2023-01-11 PROCEDURE — 87116 MYCOBACTERIA CULTURE: CPT

## 2023-01-11 PROCEDURE — 80048 BASIC METABOLIC PNL TOTAL CA: CPT

## 2023-01-11 PROCEDURE — 74011250636 HC RX REV CODE- 250/636: Performed by: NURSE PRACTITIONER

## 2023-01-11 PROCEDURE — 82103 ALPHA-1-ANTITRYPSIN TOTAL: CPT

## 2023-01-11 PROCEDURE — 88112 CYTOPATH CELL ENHANCE TECH: CPT

## 2023-01-11 PROCEDURE — 85025 COMPLETE CBC W/AUTO DIFF WBC: CPT

## 2023-01-11 PROCEDURE — 89050 BODY FLUID CELL COUNT: CPT

## 2023-01-11 PROCEDURE — 88305 TISSUE EXAM BY PATHOLOGIST: CPT

## 2023-01-11 PROCEDURE — 74011250636 HC RX REV CODE- 250/636: Performed by: PHYSICIAN ASSISTANT

## 2023-01-11 PROCEDURE — 0B9C8ZX DRAINAGE OF RIGHT UPPER LUNG LOBE, VIA NATURAL OR ARTIFICIAL OPENING ENDOSCOPIC, DIAGNOSTIC: ICD-10-PCS | Performed by: INTERNAL MEDICINE

## 2023-01-11 PROCEDURE — 71045 X-RAY EXAM CHEST 1 VIEW: CPT

## 2023-01-11 PROCEDURE — 86381 MITOCHONDRIAL ANTIBODY EACH: CPT

## 2023-01-11 PROCEDURE — 81001 URINALYSIS AUTO W/SCOPE: CPT

## 2023-01-11 PROCEDURE — 86015 ACTIN ANTIBODY EACH: CPT

## 2023-01-11 PROCEDURE — 74011250637 HC RX REV CODE- 250/637: Performed by: NURSE PRACTITIONER

## 2023-01-11 PROCEDURE — 36415 COLL VENOUS BLD VENIPUNCTURE: CPT

## 2023-01-11 PROCEDURE — 84145 PROCALCITONIN (PCT): CPT

## 2023-01-11 RX ORDER — LIDOCAINE HCL/PF 100 MG/5ML
SYRINGE (ML) INTRAVENOUS
Status: DISCONTINUED
Start: 2023-01-11 | End: 2023-01-11 | Stop reason: WASHOUT

## 2023-01-11 RX ORDER — HYDROMORPHONE HYDROCHLORIDE 1 MG/ML
0.5 INJECTION, SOLUTION INTRAMUSCULAR; INTRAVENOUS; SUBCUTANEOUS ONCE
Status: COMPLETED | OUTPATIENT
Start: 2023-01-11 | End: 2023-01-11

## 2023-01-11 RX ORDER — LIDOCAINE HYDROCHLORIDE 20 MG/ML
6 INJECTION, SOLUTION INFILTRATION; PERINEURAL ONCE
Status: COMPLETED | OUTPATIENT
Start: 2023-01-11 | End: 2023-01-11

## 2023-01-11 RX ADMIN — CHLORHEXIDINE GLUCONATE 15 ML: 1.2 RINSE ORAL at 08:36

## 2023-01-11 RX ADMIN — Medication 1 AMPULE: at 08:36

## 2023-01-11 RX ADMIN — DEXMEDETOMIDINE 0.5 MCG/KG/HR: 100 INJECTION, SOLUTION INTRAVENOUS at 00:38

## 2023-01-11 RX ADMIN — HYDROMORPHONE HYDROCHLORIDE 0.5 MG: 1 INJECTION, SOLUTION INTRAMUSCULAR; INTRAVENOUS; SUBCUTANEOUS at 08:25

## 2023-01-11 RX ADMIN — HYDROMORPHONE HYDROCHLORIDE 0.5 MG: 1 INJECTION, SOLUTION INTRAMUSCULAR; INTRAVENOUS; SUBCUTANEOUS at 13:24

## 2023-01-11 RX ADMIN — METHYLPREDNISOLONE SODIUM SUCCINATE 40 MG: 40 INJECTION, POWDER, FOR SOLUTION INTRAMUSCULAR; INTRAVENOUS at 08:54

## 2023-01-11 RX ADMIN — LIDOCAINE HYDROCHLORIDE 120 MG: 20 INJECTION, SOLUTION INFILTRATION; PERINEURAL at 13:28

## 2023-01-11 RX ADMIN — DEXMEDETOMIDINE 1.2 MCG/KG/HR: 100 INJECTION, SOLUTION INTRAVENOUS at 18:31

## 2023-01-11 RX ADMIN — PIPERACILLIN AND TAZOBACTAM 3.38 G: 3; .375 INJECTION, POWDER, FOR SOLUTION INTRAVENOUS at 17:55

## 2023-01-11 RX ADMIN — PAROXETINE HYDROCHLORIDE 20 MG: 20 TABLET, FILM COATED ORAL at 08:55

## 2023-01-11 RX ADMIN — HYDROMORPHONE HYDROCHLORIDE 0.5 MG: 1 INJECTION, SOLUTION INTRAMUSCULAR; INTRAVENOUS; SUBCUTANEOUS at 08:52

## 2023-01-11 RX ADMIN — CALCIUM CHLORIDE, MAGNESIUM CHLORIDE, DEXTROSE MONOHYDRATE, LACTIC ACID, SODIUM CHLORIDE, SODIUM BICARBONATE AND POTASSIUM CHLORIDE: 3.68; 3.05; 22; 5.4; 6.46; 3.09; .314 INJECTION INTRAVENOUS at 03:13

## 2023-01-11 RX ADMIN — DEXMEDETOMIDINE 1.3 MCG/KG/HR: 100 INJECTION, SOLUTION INTRAVENOUS at 10:06

## 2023-01-11 RX ADMIN — METHYLPREDNISOLONE SODIUM SUCCINATE 40 MG: 40 INJECTION, POWDER, FOR SOLUTION INTRAMUSCULAR; INTRAVENOUS at 20:13

## 2023-01-11 RX ADMIN — SODIUM CHLORIDE, PRESERVATIVE FREE 10 ML: 5 INJECTION INTRAVENOUS at 05:07

## 2023-01-11 RX ADMIN — CALCIUM CHLORIDE, MAGNESIUM CHLORIDE, DEXTROSE MONOHYDRATE, LACTIC ACID, SODIUM CHLORIDE, SODIUM BICARBONATE AND POTASSIUM CHLORIDE: 3.68; 3.05; 22; 5.4; 6.46; 3.09; .314 INJECTION INTRAVENOUS at 00:38

## 2023-01-11 RX ADMIN — CHLORHEXIDINE GLUCONATE 15 ML: 1.2 RINSE ORAL at 20:06

## 2023-01-11 RX ADMIN — SODIUM CHLORIDE, PRESERVATIVE FREE 10 ML: 5 INJECTION INTRAVENOUS at 13:24

## 2023-01-11 RX ADMIN — CALCIUM CHLORIDE, MAGNESIUM CHLORIDE, DEXTROSE MONOHYDRATE, LACTIC ACID, SODIUM CHLORIDE, SODIUM BICARBONATE AND POTASSIUM CHLORIDE: 3.68; 3.05; 22; 5.4; 6.46; 3.09; .314 INJECTION INTRAVENOUS at 05:52

## 2023-01-11 RX ADMIN — DIBASIC SODIUM PHOSPHATE, MONOBASIC POTASSIUM PHOSPHATE AND MONOBASIC SODIUM PHOSPHATE 2 TABLET: 852; 155; 130 TABLET ORAL at 06:16

## 2023-01-11 RX ADMIN — DIBASIC SODIUM PHOSPHATE, MONOBASIC POTASSIUM PHOSPHATE AND MONOBASIC SODIUM PHOSPHATE 2 TABLET: 852; 155; 130 TABLET ORAL at 20:07

## 2023-01-11 RX ADMIN — CALCIUM CHLORIDE, MAGNESIUM CHLORIDE, DEXTROSE MONOHYDRATE, LACTIC ACID, SODIUM CHLORIDE, SODIUM BICARBONATE AND POTASSIUM CHLORIDE: 3.68; 3.05; 22; 5.4; 6.46; 3.09; .314 INJECTION INTRAVENOUS at 20:10

## 2023-01-11 RX ADMIN — SODIUM CHLORIDE, PRESERVATIVE FREE 10 ML: 5 INJECTION INTRAVENOUS at 21:23

## 2023-01-11 RX ADMIN — HEPARIN SODIUM 5000 UNITS: 5000 INJECTION INTRAVENOUS; SUBCUTANEOUS at 03:12

## 2023-01-11 RX ADMIN — Medication 1 AMPULE: at 20:06

## 2023-01-11 RX ADMIN — SODIUM CHLORIDE, PRESERVATIVE FREE 20 MG: 5 INJECTION INTRAVENOUS at 08:54

## 2023-01-11 RX ADMIN — CALCIUM CHLORIDE, MAGNESIUM CHLORIDE, DEXTROSE MONOHYDRATE, LACTIC ACID, SODIUM CHLORIDE, SODIUM BICARBONATE AND POTASSIUM CHLORIDE: 3.68; 3.05; 22; 5.4; 6.46; 3.09; .314 INJECTION INTRAVENOUS at 08:35

## 2023-01-11 RX ADMIN — PIPERACILLIN AND TAZOBACTAM 3.38 G: 3; .375 INJECTION, POWDER, FOR SOLUTION INTRAVENOUS at 08:55

## 2023-01-11 RX ADMIN — DEXMEDETOMIDINE 1.4 MCG/KG/HR: 100 INJECTION, SOLUTION INTRAVENOUS at 14:24

## 2023-01-11 RX ADMIN — CASTOR OIL AND BALSAM, PERU: 788; 87 OINTMENT TOPICAL at 08:36

## 2023-01-11 RX ADMIN — CALCIUM CHLORIDE, MAGNESIUM CHLORIDE, DEXTROSE MONOHYDRATE, LACTIC ACID, SODIUM CHLORIDE, SODIUM BICARBONATE AND POTASSIUM CHLORIDE: 3.68; 3.05; 22; 5.4; 6.46; 3.09; .314 INJECTION INTRAVENOUS at 22:28

## 2023-01-11 RX ADMIN — CALCIUM CHLORIDE, MAGNESIUM CHLORIDE, DEXTROSE MONOHYDRATE, LACTIC ACID, SODIUM CHLORIDE, SODIUM BICARBONATE AND POTASSIUM CHLORIDE: 3.68; 3.05; 22; 5.4; 6.46; 3.09; .314 INJECTION INTRAVENOUS at 17:57

## 2023-01-11 RX ADMIN — CASTOR OIL AND BALSAM, PERU: 788; 87 OINTMENT TOPICAL at 20:12

## 2023-01-11 RX ADMIN — CALCIUM CHLORIDE, MAGNESIUM CHLORIDE, DEXTROSE MONOHYDRATE, LACTIC ACID, SODIUM CHLORIDE, SODIUM BICARBONATE AND POTASSIUM CHLORIDE: 3.68; 3.05; 22; 5.4; 6.46; 3.09; .314 INJECTION INTRAVENOUS at 11:17

## 2023-01-11 RX ADMIN — LEVOFLOXACIN 500 MG: 5 INJECTION, SOLUTION INTRAVENOUS at 13:33

## 2023-01-11 RX ADMIN — SODIUM CHLORIDE, PRESERVATIVE FREE 20 MG: 5 INJECTION INTRAVENOUS at 20:11

## 2023-01-11 NOTE — PROGRESS NOTES
Cardiology Progress Note  1/11/2023    Admit Date: 1/3/2023  Admit Diagnosis: Staphylococcus aureus pneumonia ([facility] [address] [J15.211]  CC:  None currently  Cardiologist:  Dr Rod Villarreal. Cardiac Assessment/Plan:    1) Atypical CP, for yrs: Neg [facility] 9/2021 (6:00, -CP, -ecg; Mild LVH). 2) Abnl ecg c/w LVH. *\"EF 45-50%; No valve dz:\" @ [address] by RCA. (EF not that low on my interp; EF 50-55%). 3) HTN  4) Palpitations (few x/yr; few sec). 5) FHx early CAD/CHF  6) Breast CA, left; Chemo (Dr GÉNESIS Longo @ [facility])    Admitted from 12/28 to 1/1/23 @ [facility] for PNA/hemoptysis/covid/sepsis/CHRISTA/NQWMI w/ trop 100s    Admitted 1/3/23 w/ dyspnea/sepsis; subsequent renal failure/hypotension/hemothorax w/ chest tube; Resp failure 1/9 w/ intubation. Type II NQWMI. Neg blood Cx. Hepatic failure. Chest tube 1/5/23. Intubated 1/9/23. Off pressors 1/10/23. Echo 1/10/23: Left Ventricle: Mildly reduced left ventricular systolic function with a visually estimated EF of 45 - 50%. Left ventricle size is normal. Increased wall thickness. 1/10: Remains intubated/sedated. BPs ; current 118; Hr 70s; sinus; 96% vent (30% FIo2; PEEP 5); Essentially anuric. WBC 16.5; Hg 7.9 from 8.5; plt 104; INR 4.9; K 45; Cr 4.8; alb 1.6; LFTs 1k, >2K.; trops 300s. CXR: \"The pulmonary vasculature is within normal limits. Diffuse airspace opacity in the right lung. Partial opacification of the left upper lobe. No change. \"    Cardiac meds; Levophed off; lasix 80 IV x1. (Holding coreg 6.25 bid & crestor 10). No new cardiac recs; cont supportive care; Future aspirin when bleeding resolved. 1/11: Remains intubated; More alert/agitated. CT remains in (min outpt per nursing)  BPs 100-120s; HR 70-80s; sinus; 96% vent (30%; PEEP 5); anuric. WBC 15; Hg 7.5; pt 91; BUN 29; Cr 2.8; procal 13. Cardiac meds; None (Holding coreg 6.25 bid & crestor 10). Rec:  Add back asa when able; Restart coreg @ 3.125 bid if BPs stable today.      _________________________________________________________  Echo 12/27/22: Left Ventricle: Normal left ventricular systolic function with a visually estimated EF of 50 - 55%. Left ventricle size is normal. Normal wall thickness. Mild hypokinesis of the following segments: basal inferolateral and mid inferolateral. Unfortunately, imaging quality limis the accuracy     Chest tube 1/5/23. Intubated 1/9/23. Complicated clinical course as noted below; Currently intubated/sedated  Current cardiac meds:  Levo @ 15; crestor 10 (holding coreg 6.25 bid)  IMP: type II NQWMI;      Rec: cont supportive care; wean pressors as able; hold crestor. Future aspirin when bleeding resolved. ____________________________________________________________________  Daisy Valentin is a 57 y.o. female presents with Staphylococcus aureus pneumonia ([address] Ronda Smalls. Admitted from 12/28 to 1/1/23 @ [facility] [facility] for PNA/hemoptysis/covid/sepsis/CHRISTA/NQWMI w/ trop 100s        As noted in H&P: \"56 y.o.  female with pertinent past medical history of breast cancer currently off chemotherapy, MDD/TAIWO, hypothyroidism, GERD, and recent hospitalization from 12/28-1/1 for multifocal pneumonia secondary to MSSA pneumonia superimposed on COVID-19 pneumonia with course complicated by hemoptysis, sepsis, CHRISTA, and type II MI completed course of cefepime and doxycycline transition to Augmentin on time of discharge-patient unsure if she has been taking that who presents with complaints of persistent decline since discharge from OSH. She complains of malaise, fever/chills, cough productive of blood-tinged sputum, shortness of breath/dyspnea on exertion. She reports she does not feel she is doing well at home and think she needs to be in the hospital for further management. She denies tobacco, alcohol, or illicit drug use. ROS otherwise negative.      In the ED, patient febrile to 100.8 °F, hemodynamically stable (hypertensive 150s/60s), saturating mid 90s on room air. ECG demonstrates NSR with LVH criteria, CXR demonstrates increased left pleural effusion and left lower lobe airspace opacity and CT chest demonstrates increased moderately large left pleural effusion with unchanged patchy bilateral consolidation left greater than right compatible with pneumonia. Rapid and flu negative. Point-of-care lactic 0.7, proBNP 1091(Previously 866), high since her troponin 116 down trended to 107 (600s at prior hospitalization, sodium 142, potassium 3.4, BUN 38, creatinine 3.0 (baseline), WBC 13.3 (slightly increased from time of discharge), hemoglobin 9.1 (improved from prior), platelets 884. Given recent hospitalization and concern for hep patient given vancomycin, Levaquin, and cefepime by ED provider. \"     ICU MD 1/8/22: \"Patient being transferred to ICU as the PCU nurses do not feel comfortable keeping the patient on the floor as she was hypotensive earlier and has been refusing po meds     57year old Female with hx of left breast cancer, recently admitted to [facility] for sepsis s/s COVID-19, CHRISTA, NSTEMI, MSSA pneumonia(12/28-1/1) presented to ED with c/o shortness of breath and hemoptysis. IN ED, patient was febrile to 100.8F. CXR demonstrated increased left pleural effusion with unchanged patchy B/L consolidation L>R. Patient had IR thoracentesis on 1/4/23- bloody tap. CT chest on 1/5 showed left sided hemothorax. Hb dropped from 9.1 to 5.6. s/p left chest tube placement on 1/5/23. S/p PRBC transfusion. \"     ______________________________________________________________________     The patient is intubated; can provide no history. On accelerating levophed (15 currently); on HD. No PND, orthopnea, palpitations, pre-syncope, syncope, peripheral edema, or decrease in exercise tolerance. No current complaints. ECG 1/3/22:   Sinus, leftward axis, no acute changes. LVH. Tele: sinus  CXR: \"1.  Endotracheal tube terminates 1 cm above the marvin. Consider retraction 1 to  2 cm. 2. Appropriate positioning of left IJ central venous catheter. No pneumothorax. 3. Slight interval improvement in extensive bilateral airspace disease. 4. Left chest tube remains in place. Suspected small left pleural effusion. \"     Notable labs: K 4.5; BUN 61; Cr 7.35 (prev 3 range). Alb 1.9; LFTs 900s/2000; Prev procal 7; proBNP 9k; neg covid 1/3/23. INR 2.7.  ___________________________________________________  Notable prior cardiac history:  @ NP OV 10/27/22:  Ms Fidel Mercado has a h/o:  1) Atypical CP, for yrs: Neg [facility] 9/2021 (6:00, -CP, -ecg; Mild LVH). 2) Abnl ecg c/w LVH. *\"EF 45-50%; No valve dz:\" @ [facility] by RCA. (EF not that low on my interp; EF 50-55%). 3) HTN  4) Palpitations (few x/yr; few sec). 5) FHx early CAD/CHF  6) Breast CA, left; Chemo pending (Dr Dania Cruz @ [facility])  No ethanol, added salt, or nicotine. 1 caff johnny/d. 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 She would like an annual visit here. 8/2021:  She has atypical chest pain (for years; 0-1 X/mo; sharp sensation; few seconds; random onset/offset). She also has intermittent palpitations (fast HR sensation, few X/year; few seconds duration, not related to chest pain). No MCCALL with good exercise tolerance. No previous cardiac evaluation. PCP note from 4/28/06 reviewed; ECG from then interpreted today as noted below. 2/2022: no recent chest pain, dyspnea, or palpitations. She would like an annual visit here. 10/2022: Chest pain remains resolved. No dyspnea nor palpitations. Reportedly mild LV dysfunction as noted above; in my review of the echo, the EF is not that low. NP F/U: Doing well. No CP, MCCALL, palps, edema or syncope. Home SBPs in the 130s. IMPRESSION AND PLAN  01. Other chest pain (R07.89): Atypical chest pain remains resolved; Neg [facility] 9/2021. We discussed the signs and symptoms of unstable angina, myocardial infarction and malignant arrhythmia.   The patient knows to seek immediate medical attention should they occur. 02. Essential (primary) hypertension (I10):  Reasonably well controlled; continue Losartan 25mg QD and increase Coreg to 6.25mg BID. Continue to monitor BP.   03. Other cardiomyopathies (I42.8):  EF looks reassuring on echo per review; repeat planned for next month. 04. Abnormal electrocardiogram [ECG] [EKG] (R94.31):  Nonspecific finding. This condition is stable. 05. Malignant neoplasm of left breast in female, estrogen receptor negative, unspecified site of breast (C50.912): Managed by: Other Physician   06. Long term (current) use of aspirin (Z79.82)   07. Body mass index [BMI] 27.0-27.9, adult (Z68.27)      ORDERS:    Dietary management education, guidance, and counseling           10/27/22 MEDICATION LIST  Medication Sig Desc Non-VCS   aspirin 81 mg tablet,delayed release take 1 tablet by oral route  every day Y   carvedilol 6.25 mg tablet take 1 tablet by oral route 2 times every day with food N   Crestor take 1 Tablet by oral route  every week Y   losartan 25 mg tablet take 1 tablet by oral route  every day N   Multi Vitamin 9 mg iron/15 mL oral liquid   Y   paroxetine 20 mg tablet take 1 tablet by oral route  every day Y      ______________________________________  CARDIAC HISTORY  RISK FACTORS:  1 Hypertension         CARDIOVASCULAR PROCEDURES  Procedure Date Results   Echo 10/05/2022 \"EF 45-50%; No valve dz:\" @ [facility] by RCA. (EF not that low on my interp). EKG 10/13/2022 Sinus Rhythm, LVH; axis -15°; nonspecific T-wave changes. [facility] 10/01/2021 Normal EF, No Ischemia, 6:00, -CP, -ecg; Mild LVH. For other plans, see orders.   High complexity decision making was performed  X Yes   High-risk of decompensation with multiple organ involvement X Yes   Hospital problem list     Active Hospital Problems    Diagnosis Date Noted    Hemothorax 01/06/2023    Shortness of breath 01/05/2023    Acute blood loss anemia 01/05/2023    Hemothorax on left 01/05/2023 Anxiety about health 2023    Palliative care by specialist 2023    St[facility] ([facility] 2023                                                         Subjective:  Patient reports  [x]   nothing; unable to communicate    [x]   intubated     Chest pain  none  consistent with:  Non-cardiac CP         Atypical CP     None now  On going  Anginal CP     Dyspnea  none  at rest  with exertion         improved  unchanged  worse              PND  none  overnight       Orthopnea  none  improved  unchanged  worse   Presyncope  none  improved  unchanged  worse   Ambulated in hallway without symptoms   Yes   Ambulated in room without symptoms  Yes     ROS Hematuria:  Yes X No Dysuria:  Yes X No                (2+  other systems) Cough: Yes X No Sputum: Yes X No                 BRBPR:  Yes X No Melena: Yes X No   No change in family and social history from H&P/Consult note.   Objective:    Physical Exam:  24 hr VS reviewed, overall VSSAF  Temp (24hrs), Av.9 °F (37.2 °C), Min:98.6 °F (37 °C), Max:99.2 °F (37.3 °C)    Patient Vitals for the past 8 hrs:   Pulse   23 0700 76   23 0600 73   23 0500 73   23 0400 72   23 0318 73   23 0300 75   23 0225 81   23 0200 82   23 0100 77   23 0019 83   23 0000 83       Patient Vitals for the past 8 hrs:   Resp   23 0700 22   23 0600 22   23 0500 22   23 0400 22   23 0318 22   23 0300 22   23 0200 22   23 0100 22   23 0019 22   23 0000 22       Patient Vitals for the past 8 hrs:   BP   23 0700 114/66   23 0600 119/66   23 0500 127/67   23 0400 124/66   23 0300 126/65   23 0225 120/69   23 0200 123/70   23 0100 130/72   23 0000 105/64           Intake/Output Summary (Last 24 hours) at 2023 0747  Last data filed at 2023 0700  Gross per 24 hour   Intake 1002.82 ml Output 3236 ml   Net -2233.18 ml       General:  WD,WN  Elderly  Cachetic x NAD     Agitated  Lethargic  Arousable  Obtunded    x Sedated  On Bipap x Intubated                  ENT/Palate: WNL  Dry MM x anicteric x ETT in place              Respiratory: X CTA ant  Nl resp effort  Increased effort  No significant change     rhonchi  rales  improved  worse              Cardiovasc: X RRR  IRRR X Nl S1, S2 x No rub     No murmur X No new murmur  Murmur c/w: x No gallop    x No edema  BLE edema:+  RLE edema:+  LLE edema:+     Edema less  Edema more  Edema same  Edema worse    X Nl JVP  Elevated JVP  JVP same  JVP worse    X Carotid wnl x abd aorta not palpated X no peripheral emboli noted                GI: X abd soft x nondistended X BS present X No organo- megaly noted              Skin: X Warm, dry  Cold extremites                  Neuro:  A/O  Grossly non- focal  Obtunded x Sedated     Lethargic  Arousable x intubated       cath site intact w/o hematoma or new bruit; distal pulse unchanged  Yes   Data Review:     Telemetry independently reviewed x sinus  chronic afib  parox afib  NSVT     ECG independently reviewed  NSR  afib  no significant changes  NSST-Tw chgs     x no new ECG provided for review   Lab results reviewed as noted below. Current medications reviewed as noted below. Recent Labs     01/09/23  1137 01/08/23  2100   PH 7.40 7.39   PCO2 35 36   PO2 103* 66*       No results for input(s): CPK, CKMB, CKNDX, TROIQ in the last 72 hours.   Recent Labs     01/11/23  0301 01/10/23  1600 01/10/23  0235 01/09/23  0619 01/08/23  2135    140 139   < > 136   K 4.7 4.8 4.5   < > 5.1    105 104   < > 106   CO2 26 28 27   < > 23   BUN 29* 32* 41*   < > 52*   CREA 2.79* 3.47* 4.78*   < > 6.82*   * 116* 140*   < > 172*   PHOS 2.2* 2.8 3.7   < > 5.9*   CA 7.5* 7.4* 7.4*   < > 7.1*   ALB  --   --  1.6*  --  1.9*   WBC 14.9* 13.9* 16.5*  --   --    HGB 7.5* 7.8* 7.9*   < > 7.6*   HCT 22.4* 22.9* 22.9* --   --    PLT 91* 92* 104*  --   --     < > = values in this interval not displayed. Recent Labs     01/10/23  0235 01/08/23  2135   * 695*   TBILI 2.8* 1.7*   TP 5.0* 4.7*   ALB 1.6* 1.9*   GLOB 3.4 2.8       Recent Labs     01/10/23  0235 01/09/23  1134   INR 4.9* 2.7*   PTP 46.4* 26.9*   APTT 54.1*  --         No results for input(s): FE, TIBC, PSAT, FERR in the last 72 hours.    Lab Results   Component Value Date/Time    Glucose (POC) 165 (H) 01/09/2023 11:43 AM    Glucose (POC) 160 (H) 01/08/2023 08:40 PM    Glucose (POC) 86 01/08/2023 03:43 PM    Glucose (POC) 87 01/08/2023 12:19 PM    Glucose (POC) 104 01/08/2023 07:33 AM       Current Facility-Administered Medications   Medication Dose Route Frequency    phosphorus (K PHOS NEUTRAL) 250 mg tablet 2 Tablet  2 Tablet Per NG tube BID    epoetin tyler-epbx (RETACRIT) injection 10,000 Units  10,000 Units SubCUTAneous Q TUE, THU & SAT    alcohol 62% (NOZIN) nasal  1 Ampule  1 Ampule Topical Q12H    chlorhexidine (ORAL CARE KIT) 0.12 % mouthwash 15 mL  15 mL Oral Q12H    balsam peru-castor oiL (VENELEX) ointment   Topical BID    bicarbonate dialysis (PRISMASOL) BG K 4/Ca 2.5 5000 ml solution   Extracorporeal DIALYSIS CONTINUOUS    levoFLOXacin (LEVAQUIN) 500 mg in D5W IVPB  500 mg IntraVENous Q24H    cefepime (MAXIPIME) 2 g in 0.9% sodium chloride (MBP/ADV) 100 mL MBP  2 g IntraVENous Q12H    dexmedeTOMidine in 0.9 % NaCl (PRECEDEX) 400 mcg/100 mL (4 mcg/mL) infusion soln  0.1-1.5 mcg/kg/hr IntraVENous TITRATE    PARoxetine (PAXIL) tablet 20 mg  20 mg Per NG tube DAILY    famotidine (PF) (PEPCID) 20 mg in 0.9% sodium chloride 10 mL injection  20 mg IntraVENous Q12H    methylPREDNISolone (PF) (SOLU-MEDROL) injection 40 mg  40 mg IntraVENous Q12H    heparin (porcine) injection 5,000 Units  5,000 Units SubCUTAneous Q12H    HYDROmorphone (DILAUDID) injection 0.5 mg  0.5 mg IntraVENous Q4H PRN    metoprolol (LOPRESSOR) injection 2.5 mg  2.5 mg IntraVENous Q6H PRN    0.9% sodium chloride infusion 250 mL  250 mL IntraVENous PRN    0.9% sodium chloride infusion 250 mL  250 mL IntraVENous PRN    lidocaine (XYLOCAINE) 4 % cream   Topical PRN    [Held by provider] carvediloL (COREG) tablet 6.25 mg  6.25 mg Oral BID WITH MEALS    [Held by provider] gabapentin (NEURONTIN) capsule 100 mg  100 mg Oral TID    [Held by provider] rosuvastatin (CRESTOR) tablet 10 mg  10 mg Oral QHS    sodium chloride (NS) flush 5-40 mL  5-40 mL IntraVENous Q8H    sodium chloride (NS) flush 5-40 mL  5-40 mL IntraVENous PRN    acetaminophen (TYLENOL) tablet 650 mg  650 mg Oral Q6H PRN    Or    acetaminophen (TYLENOL) suppository 650 mg  650 mg Rectal Q6H PRN    polyethylene glycol (MIRALAX) packet 17 g  17 g Oral DAILY PRN    ondansetron (ZOFRAN ODT) tablet 4 mg  4 mg Oral Q8H PRN    Or    ondansetron (ZOFRAN) injection 4 mg  4 mg IntraVENous Q6H PRN         Seth Wilson MD

## 2023-01-11 NOTE — DIALYSIS
CRRT / [phone]         Orders   Mode: CVVHD restarted @ 1425   Blood Flow Rate: 200 ml/min   Prismasol Dose: 25 mg x 74.3 kg= rounded 1,900 ml/hr  PBP: 0 ml/hr   Dialysate: 1,900 ml/hr   Prismasol Concentrate: 4K / 3.5Ca   Blood Warmer Temp: 37*C   Net Fluid Removal: 150 ml/hr per Dr. Winnie Watson note. Metrics:   BP: 121/74   HR: 77   Access Pressure: -44   Filter Pressure: 130   Return Pressure: 44   TMP: 18   Pressure Drop: 39          Access   Type & Location: Right IJ temporary non-tunneled CVC   Comments: Dressing changed per hospital policy, dated 85/96/64. No s/sx infection. Labs   HBsAg (Antigen) / date: Negative 01/09/23   HBsAb (Antibody) / date: Susceptible 01/09/23   Source: [facility]          Safety:   Time Out Done:  9710   Consent obtained/signed: Verified   Education: Access/Site Care  Procedural   Primary Nurse Rpt Pre/Post: Beverley Cruz RN      Comments / Plan:   Orders, labs, consent and code status reviewed. Time out complete. CVVHD restarted due to developing clot in deaeration chamber. All possible  blood returned by CRRT RN. HF-1000 primed and tested. Lines visible and connections secure with thermax blood warmer set at 37*C. Education, pre and post report to primary RN.

## 2023-01-11 NOTE — PROCEDURES
[facility]  Bronchoscopy Procedure Note    Procedure:  Diagnostic bronchoscopy. Indication:  Abnormal chest imaging    Consent/Treatment:  Informed consent was obtained from the  family after risks, benefits and alternatives were explained. Timeout verified the correct patient and correct procedure. Anesthesia:   Patient on ventilator and receiving  precedex  Lidocaine to tracheobronchial tree through ETT    The following parameters were monitored: oxygen saturation, heart rate, blood pressure, respiratory rate, EKG, adequacy of pulmonary ventilation and response to care. Total physician intraservice time was 35    Procedure Details:   -- The bronchoscope was introduced through an endotracheal tube. -- The trachea and marvin were completely inspected and were found to be normal.  -- The right-sided endobronchial anatomy was completely inspected and were found to be normal.  -- The left-sided endobronchial anatomy was completely inspected and were found to be normal.   There were thick green secretions noted in the ETT but not within the airways themselves. There was some thin bilious fluid noted in the RLL / RML which was easily suctioned clean. BAL was preformed in the RML, RUL, and LLL. A total of 180cc of clear sterile saline was instilled with a return of 80 cc of green/ yellow appearing fluid with scant mucus plugs  Specimens:    The bronchoscope was wedged in the RML/RUL/ROGER and bronchoalveolar lavage was performed; material was sent for  microbiology, cytology, and AFB smear and culture    Complications: none    Estimated Blood Loss: Minimal    Marissa hKan  [phone]

## 2023-01-11 NOTE — PROGRESS NOTES
[facility]    ICU TEAM Progress Note    Name: Laura Gupta   : 1960   MRN: [id number]   Date: 2023           ICU Assessment & Plan of Care     Subjective:   Progress Note: 2023      Reason for ICU Admission: hypotension     HPI:58 year old Female with hx of left breast cancer, recently admitted to [facility] for sepsis s/s COVID-19, CHRISTA, NSTEMI, MSSA pneumonia(-) presented to ED with c/o shortness of breath and hemoptysis. IN ED, patient was febrile to 100.8F. CXR demonstrated increased left pleural effusion with unchanged patchy B/L consolidation L>R. Patient had IR thoracentesis on 23- bloody tap. CT chest on  showed left sided hemothorax. Hb dropped from 9.1 to 5.6. s/p left chest tube placement on 23. S/p PRBC transfusion.  - at shift change, pt notably tachypneic to 40 in sig respiratory distress on HFNC. Decision made to intubate after clarifying goals of care established with Dr. Carlos Alberto Noe, who had admitted patient to ICU yesterday and spoken with patient specifically about them. Patient unable to speak for herself at this time. 1/10 - Intubated yesterday, started on CRRT   - Patient's family refused Bronch yesterday, sister stated she was unsure she wanted any further procedures and she needed to discuss it with her family. Advised against delay but no consent given. Extremly agitated this a.m with tachycardia / diaphoresis / attempting to pull her own ETT       Assessment and Plan     NEURO  #. Sedation  - Continue Precedex  - Continue prn Dilaudid  - SAT daily     CARDIAC  #. Hypotension, unclear etio, septic vs hypovolemic  #. NSTEMI  #. Elevated BNP  - levo prn MAP<65  - trop elevated improving  - signs of volume overload    Repeat echo with stable EF 45%  - asprin / coreg when able     RESPIRATORY  #. Acute hypoxic respiratory failure, intubated   #.  Pulmonary edema vs PNA vs  Keytruda pneumonitis  - wean vent as tolerated  - continue steroids / abx   - PSV if mental status allows     RENAL  #. CHRISTA on CKD 4, prerenal vs ATN  - Renal consulted  - continue CRRT    GASTROINTESTINAL  #. Elevated LFTs, etiology unclear   No prolonged periods of hypotension   US abd with doppler unrevealing   - now with synthetic dysfunction  - monitor for signs of bleeding  - trend INR   - Consult GI    HEMATOLOGIC / ONCOLOGIC  #. ABLA   #. Pleural effusion s/p thora c/b hemothorax now with surgical CTx   #. Invasive ductal breast cancer  - chest tube output improving  - Hb stable at this time  - pleural fluid  - 2 samples sent    pending   ID  #. Multifocal MSSA PNA   Sputum now with GNR and elevated procal   - cefepime broadened to zosyn     ENDOCRINE  - accuchecks q6h, insulin prn  - monitor closely for refeeding with TF    MUSCULOSKELETAL  - wound care following    GOC: ongoing discussions with Frank Youngblood (sister). Greatly appreciate Palliative assistance.     DVT Prophylaxis: hep ppx  U - Ulcer Prophylaxis: PPI  G - Glycemic Control: Insulin      Objective:   Vital Signs:  Visit Vitals  /66   Pulse 75   Temp 98.6 °F (37 °C)   Resp 28   Ht 5' (1.524 m)   Wt 69.8 kg (153 lb 14.1 oz)   SpO2 96%   BMI 30.05 kg/m²    O2 Flow Rate (L/min): (S) 60 l/min (Increased flow secondary Sp02 dropping.) O2 Device: Ventilator Temp (24hrs), Av.9 °F (37.2 °C), Min:98.6 °F (37 °C), Max:99.2 °F (37.3 °C)           Intake/Output:     Intake/Output Summary (Last 24 hours) at 2023 0754  Last data filed at 2023 0700  Gross per 24 hour   Intake 1002.82 ml   Output 3236 ml   Net -2233.18 ml         Physical Exam:  Intubated sedated  Resps tachypneic and labored, symmetric chest rise, L CTx in place  Tachy rate, no heave/rub  Peripheral pulses thready  Abd soft, no guarding  Skin warm, diaphoretic  Eyes open but not following commands    LABS AND  DATA:   Personally reviewed    MEDS:   Reviewed    DISPOSITION  Stay in ICU    [address] [place] [address] NOTE  I had a face to face encounter with the patient, reviewed and interpreted patient data including clinical events, labs, images, vital signs, I/O's, and examined patient. I have discussed the case and the plan and management of the patient's care with the consulting services, the bedside nurses and the respiratory therapist.      NOTE OF PERSONAL INVOLVEMENT IN CARE   This patient has a high probability of imminent, clinically significant deterioration, which requires the highest level of preparedness to intervene urgently. I participated in the decision-making and personally managed or directed the management of the following life and organ supporting interventions that required my frequent assessment to treat or prevent imminent deterioration. I personally spent 45 minutes of critical care time. This is time spent at this critically ill patient's bedside actively involved in patient care as well as the coordination of care. This does not include any procedural time which has been billed separately.     April Hewitt  [facility]  [phone]

## 2023-01-11 NOTE — PROGRESS NOTES
Nephrology Progress Note  [facility] / [facility], [address]  [place], [address]  Phone - [phone]  Fax - [phone]                 Patient: Tonya Essex                   YOB: 1960        Date- 1/11/2023                      Admit Date: 1/3/2023  CC: Follow up for CHRISTA        IMPRESSION & PLAN:   1. CHRISTA due to acute tubular necrosis, serology workup  overall negative. 2.  Hypophosphatemia  3. Left pneumothorax requiring chest tube placement. 4.  Pleural effusion. 5.  Anemia requiring blood transfusion. 6.  History of breast cancer. 7.  Recent history of COVID-19 infection. 8.  Multifocal pneumonia.- RESP FAILURE on vent  9. Status post left thoracocentesis. 10.  Non-ST elevation myocardial infarction. PLAN-  CONTINUE CRRT- factor 150 ml/hr  Replace phos via NG tube  Check labs bid  Epogen for anemia  Avoid hypotension  D.w ICU nurse     Subjective: Interval History:   -1-11-23 - phos low, k stable-- on CRRT- she is anuric   1-10-23--on vent - ON CRRT- bp stable-she is anuric    Objective:   Vitals:    01/11/23 0800 01/11/23 0807 01/11/23 0815 01/11/23 0849   BP: (!) 185/97 (!) 177/113 (!) 167/86    Pulse: (!) 114 (!) 123 (!) 119 (!) 106   Resp: (!) 31 (!) 33 (!) 34 21   Temp:       SpO2: 94% 91% 92% 97%   Weight:       Height:          01/10 0701 - 01/11 0700  In: 1002.8 [I.V.:502.8]  Out: 3236 [Urine:5]    Last 3 Recorded Weights in this Encounter    01/10/23 0300 01/10/23 1322 01/11/23 0600   Weight: 74 kg (163 lb 2.3 oz) 73.9 kg (163 lb) 69.8 kg (153 lb 14.1 oz)        Physical exam:    GEN: intubated on vent  NECK-no mass, ET TUBE  RESP: decreased BS b/l, no wheezing--chest tube +  CVS: s1,s2, tachy    NEURO:Can't access due to patient's current condition    : [place] +      Chart reviewed. Pertinent Notes reviewed.      Data Review :  Recent Labs     01/11/23  0301 01/10/23  1600 01/10/23  0235    140 139   K 4.7 4.8 4.5    105 104   CO2 26 28 27   BUN 29* 32* 41*   CREA 2.79* 3.47* 4.78*   * 116* 140*   CA 7.5* 7.4* 7.4*   MG 2.8* 2.5* 2.3   PHOS 2.2* 2.8 3.7       Recent Labs     01/11/23  0301 01/10/23  1600 01/10/23  0235   WBC 14.9* 13.9* 16.5*   HGB 7.5* 7.8* 7.9*   HCT 22.4* 22.9* 22.9*   PLT 91* 92* 104*       No results for input(s): FE, TIBC, PSAT, FERR in the last 72 hours.    No results found for: HBA1C, GEF4ZHJH, GGE0EQNK   No results found for: MCACR, MCA1, MCA2, MCA3, MCAU, MCAU2, MCALPOCT  US Results (most recent):  Medication list  reviewed  Current Facility-Administered Medications   Medication Dose Route Frequency    phosphorus (K PHOS NEUTRAL) 250 mg tablet 2 Tablet  2 Tablet Per NG tube BID    piperacillin-tazobactam (ZOSYN) 3.375 g in 0.9% sodium chloride (MBP/ADV) 100 mL MBP  3.375 g IntraVENous Q8H    epoetin tyler-epbx (RETACRIT) injection 10,000 Units  10,000 Units SubCUTAneous Q TUE, THU & SAT    alcohol 62% (NOZIN) nasal  1 Ampule  1 Ampule Topical Q12H    chlorhexidine (ORAL CARE KIT) 0.12 % mouthwash 15 mL  15 mL Oral Q12H    balsam peru-castor oiL (VENELEX) ointment   Topical BID    bicarbonate dialysis (PRISMASOL) BG K 4/Ca 2.5 5000 ml solution   Extracorporeal DIALYSIS CONTINUOUS    levoFLOXacin (LEVAQUIN) 500 mg in D5W IVPB  500 mg IntraVENous Q24H    dexmedeTOMidine in 0.9 % NaCl (PRECEDEX) 400 mcg/100 mL (4 mcg/mL) infusion soln  0.1-1.5 mcg/kg/hr IntraVENous TITRATE    PARoxetine (PAXIL) tablet 20 mg  20 mg Per NG tube DAILY    famotidine (PF) (PEPCID) 20 mg in 0.9% sodium chloride 10 mL injection  20 mg IntraVENous Q12H    methylPREDNISolone (PF) (SOLU-MEDROL) injection 40 mg  40 mg IntraVENous Q12H    heparin (porcine) injection 5,000 Units  5,000 Units SubCUTAneous Q12H    HYDROmorphone (DILAUDID) injection 0.5 mg  0.5 mg IntraVENous Q4H PRN    metoprolol (LOPRESSOR) injection 2.5 mg  2.5 mg IntraVENous Q6H PRN    0.9% sodium chloride infusion 250 mL  250 mL IntraVENous PRN    0.9% sodium chloride infusion 250 mL  250 mL IntraVENous PRN    lidocaine (XYLOCAINE) 4 % cream   Topical PRN    [Held by provider] carvediloL (COREG) tablet 6.25 mg  6.25 mg Oral BID WITH MEALS    [Held by provider] gabapentin (NEURONTIN) capsule 100 mg  100 mg Oral TID    [Held by provider] rosuvastatin (CRESTOR) tablet 10 mg  10 mg Oral QHS    sodium chloride (NS) flush 5-40 mL  5-40 mL IntraVENous Q8H    sodium chloride (NS) flush 5-40 mL  5-40 mL IntraVENous PRN    acetaminophen (TYLENOL) tablet 650 mg  650 mg Oral Q6H PRN    Or    acetaminophen (TYLENOL) suppository 650 mg  650 mg Rectal Q6H PRN    polyethylene glycol (MIRALAX) packet 17 g  17 g Oral DAILY PRN    ondansetron (ZOFRAN ODT) tablet 4 mg  4 mg Oral Q8H PRN    Or    ondansetron (ZOFRAN) injection 4 mg  4 mg IntraVENous Q6H PRN        Ino Olson MD  1/11/2023

## 2023-01-11 NOTE — CONSULTS
Gastroenterology Attending Physician Matthew Garcia) attestation statement and comments. This patient was seen and examined by me in a face-to-face visit today. I reviewed the medical record including lab work, imaging and other provider notes. I confirmed the history as described above. I evaluated the patient. I reviewed the medical record including lab work, imaging and other provider notes. I confirmed the history as described above. The patient was incapable of giving a meaningful history. I discussed this case in detail with STANLEY Dupree. I formulated an  assessment of this patient and developed a treatment plan. I agree with the above consultation note. I agree with the history, exam and assessment and plan as outlined in the note. I would like to add the followinyo F w/ metastatic Breast CA w/ CHRISTA, NSTEMI, MSSS PNA, admitted here w/ fever, L PLEF and b/l ASDZ, undergoing thoracentesis  w/ noted bloody fluid and evidence of left hemothorax 23. Noted drop then in Hgb 9.1 to 5.6 and later pressor requirement noted 23  She has received multiple transfusions. Noted to have rise in LFTs and more jaundiced appearance. Labs w/ plt 91, INR 4.0, T. Bili rising from nl to 1.7 to 2.8 now w/ progressive rise transaminases AST/ALT >2000/1182, and alk phos 993. Abd US w/o hepatic abnl. She remains sedated, intubated, w/ benign abd, Left chest tube draining bloody fluid, and on CVVH. I suspect liver enzyme abnl is related to transient hypotensive injury which ICU team has worked to improve. Less likely is this from drug induced injury, given acuity of finding. Plan: 1) Follow LFT trend; 2) Check pending liver serologies; 3) Avoid hepatotoxic agents  I d/w ICU Attg.   Will follow with you for now,  Joseph Caballero MD            GI CONSULTATION NOTE  Andrew Tolbert NP  [phone] NP in-hospital cell phone M-F until 4:30  After 5pm or on weekends, please call  for physician on call  NAME: Daniel Noguera :  1960   MRN:  [id number]   Attending:  Dr. Gabriel Logan Primary GI:  Dr. Caitlin Lala   Date/Time:  2023 12:41 PM  Assessment:   Acute liver failure   Platelets 91  INR 4.0  T. Bili 2.8  ALT 1,182, AST >2,000,   2022:  CT abd/pel without contrast with no acute GI findings  01/10/2023:  Abdominal US with no sonographic abnormality of the liver. Acute respiratory failure- intubated   Hypotension  Treatment per primary team  Plan:   Acute elevation in transaminates is likely secondary to hypotensive injury and should start to improve   Follow LFT's  Liver serology ordered to r/o other possible causes   Avoid hepatotoxic medications   Continue ICU level care   Follow for improvement  Plan discussed with Dr. Bolton Lands:     HISTORY OF PRESENT ILLNESS:     Akhil Alegria is an 57 y.o.  female who we are asked to see for complaint of elevated LFT's   Patient has a hx of left breast cancer, recently admitted to [facility] for sepsis s/s COVID-19, CHRISTA, NSTEMI, MSSA pneumonia(-) presented to ED with c/o shortness of breath and hemoptysis. In ED, patient was febrile to 100.8F. CXR demonstrated increased left pleural effusion with unchanged patchy B/L consolidation L>R. Patient had IR thoracentesis on 23- bloody tap. CT chest on  showed left sided hemothorax. Hb dropped from 9.1 to 5.6. s/p left chest tube placement on 23. S/p PRBC transfusion. Patient was found to be hypotensive on  and started on pressors and tx to the ICU. GI was consulted for acute rise in LFT's.     Past Medical History:   Diagnosis Date    Cancer [facility])     BREAST CANCER    GERD (gastroesophageal reflux disease)     HTN (hypertension) 2011    Psychiatric disorder     ANIXETY AND DEPRESSION    Thyroid disease     HYPOTHYROID      Past Surgical History:   Procedure Laterality Date    HX COLONOSCOPY      HX MYOMECTOMY  05/02/1996    x 1    HX WISDOM TEETH EXTRACTION      IR INSERT NON TAMMYL CVC OVER 5 YRS  1/9/2023    IR THORACENTESIS/INSERT CHEST TUBE  1/5/2023     Social History     Tobacco Use    Smoking status: Never     Passive exposure: Never    Smokeless tobacco: Never   Substance Use Topics    Alcohol use: Never      Family History   Problem Relation Age of Onset    Heart Failure Mother     Cancer Father     Diabetes Sister     Kidney Disease Sister     Anesth Problems Neg Hx       Allergies   Allergen Reactions    Sulfa (Sulfonamide Antibiotics) Itching, Swelling and Unknown (comments)      Prior to Admission medications    Medication Sig Start Date End Date Taking? Authorizing Provider   carvediloL (COREG) 6.25 mg tablet Take 6.25 mg by mouth two (2) times daily (with meals). 11/3/22  Yes Provider, Historical   famotidine (PEPCID) 40 mg/5 mL (8 mg/mL) suspension Take 2.5 mL by mouth two (2) times a day. 11/30/22  Yes Denis Forte NP   gabapentin (NEURONTIN) 100 mg capsule Take 1 Capsule by mouth three (3) times daily. Max Daily Amount: 300 mg. 11/30/22  Yes Denis Forte NP   PARoxetine (PAXIL) 20 mg tablet Take 20 mg by mouth daily. Yes Provider, Historical   rosuvastatin (CRESTOR) 10 mg tablet Take 10 mg by mouth nightly. 8/15/22  Yes Provider, Historical   guaiFENesin ER (MUCINEX) 600 mg ER tablet Take 1 Tablet by mouth every twelve (12) hours for 10 days.  1/1/23 1/11/23  Lai Morton MD   OTHER,NON-FORMULARY, IV chemotherapy    Provider, Historical   dexAMETHasone (DECADRON) 4 mg tablet Take 2 tabs (8mg) once daily on days 2 & 3 of chemotherapy  Patient not taking: Reported on 1/4/2023 9/28/22   Robbie Cabrera NP   ondansetron (ZOFRAN ODT) 8 mg disintegrating tablet Take 1 Tablet by mouth every eight (8) hours as needed for Nausea or Vomiting. 9/28/22   Robbie Cabrera NP   prochlorperazine (Compazine) 5 mg tablet Take 1 Tablet by mouth every six (6) hours as needed for Nausea or Vomiting. 9/28/22   Robbie Cabrera NP   lidocaine-prilocaine (EMLA) topical cream Apply  to affected area as needed for Pain. Apply a thick layer over port a cath 30-60 minutes prior to port access 9/28/22   Ladonna Solis NP   multivitamin, tx-iron-ca-min (THERA-M w/ IRON) 9 mg iron-400 mcg tab tablet Take 1 Tablet by mouth daily. Provider, Historical       Patient Active Problem List   Diagnosis Code    Menopausal symptoms N95.1    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 ([facility] C50.011, C50.012    Pneumonia J18.9    CHRISTA (acute kidney injury) ([facility] N17.9    COVID-19 U07.1    Troponin level elevated R77.8    Elevated LFTs R79.89    Bacterial pneumonia J15.9    Staphylococcus aureus pneumonia ([facility] J15.211    Shortness of breath R06.02    Acute blood loss anemia D62    Hemothorax on left J94.2    Anxiety about health F41.8    Palliative care by specialist Z51.5    Hemothorax J94.2       REVIEW OF SYSTEMS:    Constitutional: negative fever, negative chills, negative weight loss  Eyes:   negative visual changes  ENT:   negative sore throat, tongue or lip swelling   Respiratory:  negative cough, negative dyspnea  Cards:  negative for chest pain, palpitations, lower extremity edema  GI:   See HPI  :  negative for frequency, dysuria  Integument:  negative for rash and pruritus  Heme:  negative for easy bruising and gum/nose bleeding  Musculoskel: negative for myalgias,  back pain and muscle weakness  Neuro: negative for headaches, dizziness, vertigo  Psych: negative for feelings of anxiety, depression     Objective:   VITALS:    Visit Vitals  /65 (BP 1 Location: Right upper arm, BP Patient Position: At rest)   Pulse 80   Temp 99 °F (37.2 °C)   Resp 25   Ht 5' (1.524 m)   Wt 69.8 kg (153 lb 14.1 oz)   SpO2 98%   BMI 30.05 kg/m²       PHYSICAL EXAM:   General:          Acutely ill AA female. Head:               Normocephalic, without obvious abnormality, atraumatic. Eyes:               Conjunctivae clear and pale, anicteric sclerae.   Pupils are equal  Nose: Nares normal. No drainage or sinus tenderness. Throat:             Lips, mucosa, and tongue normal.  No Thrush  Neck:               Supple, symmetrical,  no adenopathy, thyroid: non tender  Back:               Symmetric,  No CVA tenderness. Lungs:             CTA bilaterally. No wheezing/rhonchi/rales. +Intubated  Chest wall:      No tenderness or deformity. No Accessory muscle use. Heart:              Regular rate and rhythm,  no murmur, rub or gallop. Abdomen:        Soft, non-tender. Not distended. Bowel sounds normal. No masses  Extremities:     Atraumatic, No cyanosis. No edema.  No clubbing  Skin:                Texture, turgor normal. No rashes/lesions/jaundice  Psych:             Unable to assess- intubated and sedated  Neurologic:      Intubated and sedated     LAB DATA REVIEWED:    Recent Results (from the past 24 hour(s))   ECHO ADULT FOLLOW-UP OR LIMITED    Collection Time: 01/10/23  1:30 PM   Result Value Ref Range    IVSd 1.0 (A) 0.6 - 0.9 cm    LVIDd 3.4 (A) 3.9 - 5.3 cm    LVIDs 2.5 cm    LVOT Diameter 1.7 cm    LVPWd 0.9 0.6 - 0.9 cm    LV Ejection Fraction A4C 40 %    LV EDV A4C 60 mL    LV ESV A4C 36 mL    LA Diameter 2.3 cm    MV Peak Gradient 4 mmHg    MV Mean Gradient 2 mmHg    MV Max Velocity 1.1 m/s    MV Mean Velocity 0.7 m/s    MV VTI 25.1 cm    Fractional Shortening 2D 26 28 - 44 %    LV ESV Index A4C 21 mL/m2    LV EDV Index A4C 35 mL/m2    LVIDd Index 1.99 cm/m2    LVIDs Index 1.46 cm/m2    LV RWT Ratio 0.53     LV Mass 2D 91.8 67 - 162 g    LV Mass 2D Index 53.7 43 - 95 g/m2    LVOT Area 2.3 cm2    LA Size Index 1.35 cm/m2   FIBRINOGEN    Collection Time: 01/10/23  4:00 PM   Result Value Ref Range    Fibrinogen 222 200 - 424 mg/dL   METABOLIC PANEL, BASIC    Collection Time: 01/10/23  4:00 PM   Result Value Ref Range    Sodium 140 136 - 145 mmol/L    Potassium 4.8 3.5 - 5.1 mmol/L    Chloride 105 97 - 108 mmol/L    CO2 28 21 - 32 mmol/L    Anion gap 7 5 - 15 mmol/L    Glucose 116 (H) 65 - 100 mg/dL    BUN 32 (H) 6 - 20 MG/DL    Creatinine 3.47 (H) 0.55 - 1.02 MG/DL    BUN/Creatinine ratio 9 (L) 12 - 20      eGFR 14 (L) >60 ml/min/1.73m2    Calcium 7.4 (L) 8.5 - 10.1 MG/DL   PHOSPHORUS    Collection Time: 01/10/23  4:00 PM   Result Value Ref Range    Phosphorus 2.8 2.6 - 4.7 MG/DL   MAGNESIUM    Collection Time: 01/10/23  4:00 PM   Result Value Ref Range    Magnesium 2.5 (H) 1.6 - 2.4 mg/dL   CBC WITH AUTOMATED DIFF    Collection Time: 01/10/23  4:00 PM   Result Value Ref Range    WBC 13.9 (H) 3.6 - 11.0 K/uL    RBC 2.63 (L) 3.80 - 5.20 M/uL    HGB 7.8 (L) 11.5 - 16.0 g/dL    HCT 22.9 (L) 35.0 - 47.0 %    MCV 87.1 80.0 - 99.0 FL    MCH 29.7 26.0 - 34.0 PG    MCHC 34.1 30.0 - 36.5 g/dL    RDW 16.6 (H) 11.5 - 14.5 %    PLATELET 92 (L) 098 - 400 K/uL    MPV 12.1 8.9 - 12.9 FL    NRBC 0.6 (H) 0  WBC    ABSOLUTE NRBC 0.09 (H) 0.00 - 0.01 K/uL    NEUTROPHILS 87 (H) 32 - 75 %    BAND NEUTROPHILS 2 %    LYMPHOCYTES 8 (L) 12 - 49 %    MONOCYTES 3 (L) 5 - 13 %    EOSINOPHILS 0 0 - 7 %    BASOPHILS 0 0 - 1 %    IMMATURE GRANULOCYTES 0 0.0 - 0.5 %    ABS. NEUTROPHILS 12.4 (H) 1.8 - 8.0 K/UL    ABS. LYMPHOCYTES 1.1 0.8 - 3.5 K/UL    ABS. MONOCYTES 0.4 0.0 - 1.0 K/UL    ABS. EOSINOPHILS 0.0 0.0 - 0.4 K/UL    ABS. BASOPHILS 0.0 0.0 - 0.1 K/UL    ABS. IMM.  GRANS. 0.0 0.00 - 0.04 K/UL    DF MANUAL      RBC COMMENTS ANISOCYTOSIS  1+        WBC COMMENTS REACTIVE LYMPHS     METABOLIC PANEL, BASIC    Collection Time: 01/11/23  3:01 AM   Result Value Ref Range    Sodium 138 136 - 145 mmol/L    Potassium 4.7 3.5 - 5.1 mmol/L    Chloride 105 97 - 108 mmol/L    CO2 26 21 - 32 mmol/L    Anion gap 7 5 - 15 mmol/L    Glucose 177 (H) 65 - 100 mg/dL    BUN 29 (H) 6 - 20 MG/DL    Creatinine 2.79 (H) 0.55 - 1.02 MG/DL    BUN/Creatinine ratio 10 (L) 12 - 20      eGFR 18 (L) >60 ml/min/1.73m2    Calcium 7.5 (L) 8.5 - 10.1 MG/DL   PHOSPHORUS    Collection Time: 01/11/23  3:01 AM   Result Value Ref Range    Phosphorus 2.2 (L) 2.6 - 4.7 MG/DL   MAGNESIUM    Collection Time: 01/11/23  3:01 AM   Result Value Ref Range    Magnesium 2.8 (H) 1.6 - 2.4 mg/dL   CBC WITH AUTOMATED DIFF    Collection Time: 01/11/23  3:01 AM   Result Value Ref Range    WBC 14.9 (H) 3.6 - 11.0 K/uL    RBC 2.55 (L) 3.80 - 5.20 M/uL    HGB 7.5 (L) 11.5 - 16.0 g/dL    HCT 22.4 (L) 35.0 - 47.0 %    MCV 87.8 80.0 - 99.0 FL    MCH 29.4 26.0 - 34.0 PG    MCHC 33.5 30.0 - 36.5 g/dL    RDW 16.5 (H) 11.5 - 14.5 %    PLATELET 91 (L) 774 - 400 K/uL    MPV 12.3 8.9 - 12.9 FL    NRBC 0.7 (H) 0  WBC    ABSOLUTE NRBC 0.11 (H) 0.00 - 0.01 K/uL    NEUTROPHILS 70 32 - 75 %    LYMPHOCYTES 24 12 - 49 %    MONOCYTES 6 5 - 13 %    EOSINOPHILS 0 0 - 7 %    BASOPHILS 0 0 - 1 %    IMMATURE GRANULOCYTES 0 0.0 - 0.5 %    ABS. NEUTROPHILS 10.4 (H) 1.8 - 8.0 K/UL    ABS. LYMPHOCYTES 3.6 (H) 0.8 - 3.5 K/UL    ABS. MONOCYTES 0.9 0.0 - 1.0 K/UL    ABS. EOSINOPHILS 0.0 0.0 - 0.4 K/UL    ABS. BASOPHILS 0.0 0.0 - 0.1 K/UL    ABS. IMM.  GRANS. 0.0 0.00 - 0.04 K/UL    DF SMEAR SCANNED      RBC COMMENTS NORMOCYTIC, NORMOCHROMIC     PROCALCITONIN    Collection Time: 01/11/23  3:01 AM   Result Value Ref Range    Procalcitonin 13.19 ng/mL   PROTHROMBIN TIME + INR    Collection Time: 01/11/23 10:34 AM   Result Value Ref Range    INR 4.0 (H) 0.9 - 1.1      Prothrombin time 38.2 (H) 9.0 - 11.1 sec   MAGNESIUM    Collection Time: 01/11/23 10:34 AM   Result Value Ref Range    Magnesium 2.8 (H) 1.6 - 2.4 mg/dL       IMAGING RESULTS:  I have personally reviewed the imaging reports      Total time spent with patient: 25 minutes ________________________________________________________________________  Care Plan discussed with:  Patient x   Family     RN x              Consultant:       CT  1/11/2023:  ________________________________________________________________________    ___________________________________________________  Consulting Provider: Angela Caldwell NP      1/11/2023  12:41 PM

## 2023-01-11 NOTE — PROGRESS NOTES
0700 - Bedside shift change report given to [id number] Aleman (oncoming nurse) by Praveen Richey (offgoing nurse). Report included the following information SBAR, Kardex, Intake/Output, MAR, Recent Results, and Cardiac Rhythm NSR .     0730 - Pt highly agitated and trying to pull out her ETT. Pt nodding appropriately at this time and appears to be indicating that she does not want the tube, however with the limited ability to communicate and high level of agitation the pt's understanding of the situation is unclear. 46 - Dr. Yeimy Adams with cardiology rounding this morning, see note. 0800 - Pt assessment aligns with previous nurse report, however pt is highly agitated and trying to self-extubate. 0825 - PRN dilaudid given for nonverbal signs of pain. 3201 - Nephrology MD rounding this morning, states to continue CRRT with a factor of 100-150.    0945 - Interdisciplinary team rounds were held 1/11/2023 with the following team members:Care Management, Nursing, Nutrition, Pharmacy, and Physician and the patient. Plan of care discussed. See clinical pathway and/or care plan for interventions and desired outcomes. Goals of the Day: Attempt SBT, control agitation and nonverbal signs of pain, determine plan of care with family. 1045 - Palliative care provider rounding. 5 - Dr. Elvis Herrera and Chalino Dixon RN obtained phone consent from Preet Lowe for bronchoscopy. 1200 - Reassessment completed, since titration of Dex gtt and administration of Dilaudid, patient is resting much more comfortably. 1324 - PRN Dilaudid given for non verbal signs of pain. 1400 - Bronchoscopy completed bedside by MD and RT.    1430 - Samples drawn and sent to the lab.    1600 - Reassessment completed, no changes to note at this time. 1800 - Pt's nephew at bedside. Family educated about pt's kidney failure, liver failure, poor respiratory status prior to intubation, and her cancer.  The family stated today that they value the nephew's opinion on the patient's care and medical decisions, but the nephew appeared not to have most of the information that I was providing. Highly recommend family meeting so that all decision makers have the same information. 1900 - Bedside shift change report given to Charity SANTOS (oncoming nurse) by Christina Marroquin RN (offgoing nurse). Report included the following information SBAR, Kardex, Intake/Output, MAR, Recent Results, and Cardiac Rhythm to sinus tachy when agitated .      Dontae Crowley, RN

## 2023-01-11 NOTE — PROGRESS NOTES
Problem: Falls - Risk of  Goal: *Absence of Falls  Description: Document Earma Gonzalo Fall Risk and appropriate interventions in the flowsheet. Outcome: Progressing Towards Goal  Note: Fall Risk Interventions:  Mobility Interventions: Bed/chair exit alarm, Communicate number of staff needed for ambulation/transfer    Mentation Interventions: Adequate sleep, hydration, pain control, Door open when patient unattended, Evaluate medications/consider consulting pharmacy, Reorient patient, Room close to nurse's station    Medication Interventions: Bed/chair exit alarm, Evaluate medications/consider consulting pharmacy    Elimination Interventions: Bed/chair exit alarm              Problem: Patient Education: Go to Patient Education Activity  Goal: Patient/Family Education  Outcome: Progressing Towards Goal     Problem: Pressure Injury - Risk of  Goal: *Prevention of pressure injury  Description: Document William Scale and appropriate interventions in the flowsheet. Outcome: Progressing Towards Goal  Note: Pressure Injury Interventions:  Sensory Interventions: Assess changes in LOC, Float heels, Keep linens dry and wrinkle-free, Minimize linen layers, Monitor skin under medical devices, Turn and reposition approx. every two hours (pillows and wedges if needed)    Moisture Interventions: Absorbent underpads, Check for incontinence Q2 hours and as needed, Internal/External urinary devices, Minimize layers, Moisture barrier    Activity Interventions: Pressure redistribution bed/mattress(bed type)    Mobility Interventions: Float heels, Pressure redistribution bed/mattress (bed type), Turn and reposition approx.  every two hours(pillow and wedges)    Nutrition Interventions: Document food/fluid/supplement intake, Discuss nutritional consult with provider    Friction and Shear Interventions: Apply protective barrier, creams and emollients, Lift team/patient mobility team, Minimize layers                Problem: Patient Education: Go to Patient Education Activity  Goal: Patient/Family Education  Outcome: Progressing Towards Goal     Problem: Patient Education: Go to Patient Education Activity  Goal: Patient/Family Education  Outcome: Progressing Towards Goal     Problem: Ventilator Management  Goal: *Adequate oxygenation and ventilation  Outcome: Progressing Towards Goal  Goal: *Patient maintains clear airway/free of aspiration  Outcome: Progressing Towards Goal  Goal: *Absence of infection signs and symptoms  Outcome: Progressing Towards Goal  Goal: *Normal spontaneous ventilation  Outcome: Progressing Towards Goal     Problem: Non-Violent Restraints  Goal: Removal from restraints as soon as assessed to be safe  Outcome: Progressing Towards Goal  Goal: No harm/injury to patient while restraints in use  Outcome: Progressing Towards Goal  Goal: Patient's dignity will be maintained  Outcome: Progressing Towards Goal  Goal: Patient Interventions  Outcome: Progressing Towards Goal

## 2023-01-11 NOTE — DIALYSIS
CRRT / [phone]    Orders   Mode: CVVHD rounding   Blood Flow Rate: 200 ml/min   Dialysate Flow Rate: 1900 ml/hr   Prismasol Concentrate: 4K / 2.5Ca   Blood Warmer Temp: 37*C   Net Fluid Removal: 100 ml/hr     Metrics   BP: 120/69   HR: 81   Access Pressure: -38   Filter Pressure: 116   Return Pressure: 40   TMP: 19   Pressure Drop: 35     Access   Type & Location: RIJ non-tunneled CVC C/D/I with transparent dressing and biopatch in place dated 01/09/23. Labs   HBsAg (Antigen) / date: Negative  01/09/23                                              HBsAb (Antibody) / date: Susceptible  01/09/23   Source: Epic     Safety:   Time Out Done:   (Time) 0227   Consent obtained/signed: Verified   Education: Access/Site Care   Primary Nurse Rpt Pre: JEAN-PAUL Toney RN   Primary Nurse Rpt Post: JEAN-PAUL Toney RN     Comments / Plan:   Patient, orders, line and consent verified. Labs, notes and code status reviewed. [id number] filter running well with no indications for change at this time. Lines visible/secure with blood warmer attached to the return line and set to 37C*. Education and Pre/Post with primary RN.

## 2023-01-11 NOTE — PROGRESS NOTES
RT assisted in bedside bronch. Pt placed on 100% fio2 during procedure.  ETT retracted to 24 at the lip per MD.

## 2023-01-11 NOTE — PROGRESS NOTES
CM attempted to call Caterina Corcoran sister at [phone] and [phone]- CM unable to leave  at either number to reach family to obtain and CM initial assessment.       aMlena Hodges, MSW x [phone]

## 2023-01-11 NOTE — PROGRESS NOTES
Palliative Medicine Consult  [place]: [phone]    Patient Name: Bianca Alcazar  YOB: 1960    Date of Initial Consult: 1/5/23  Reason for Consult: Overwhelming Symptoms  Requesting Provider: Brody Qiu MD   Primary Care Physician: Ebony Heard MD     SUMMARY:   Bianca Alcazar is a 57 y.o. with a past history of breast cancer s/p chemo and immunotherapy (completed all therapies on 12/21/22) with a recent hospitalization from 12/28-1/1 for multifocal pneumonia 2/2 MSSA superimposed on COVID-19 who was admitted on 1/3/2023 from home with a diagnosis of Mutifocal PNA and hemoptysis. While here she was found to have a large left pleural effusion and had a thoracentesis that removed 820ml of blood tinged fluid yesterday. Overnight she had an acute drop in her Hgb and CT today shows a left sided hemothorax with loculation towards the apex. PALLIATIVE DIAGNOSES:   Shortness of breath  Acute blood loss anemia  Hemothorax  Anxiety about health  Palliative Care     PLAN:   Spoke with Nimco Martina via phone- she remains frustrated as she has been unable to convey to her siblings (and her nephew Kate Arnold, who is apparently an alpha in this situation although not actually a decision maker) why she thinks its time to make her comfort care. I offered a family meeting with the siblings, and Nimco Mack thinks this will be VERY helpful, but is worried she will not be able to get them all together in a timely manner. She has my number to call me if she is able to, but in the meantime I told her we need some direction  As the family cannot come together on what the overall goal is- Ms Alena Polanco will remain full aggressive care with the intent to prolong life.   Silva Delvalle know that this means she will need to be in agreement to consent for procedures as they come up, and if she is unwilling to (as she clearly doesn't agree with this level of care) then her siblings will need to make themselves available to us. Difficult situation as non of the family (including Crystal Newell) know what Ms Tete Shook wishes would be because she was very resistant to talking about any of this since her initial diagnosis, and would shut down when faced with difficulties  I will remain available in the event that Crystal Newell calls me back, but otherwise we are full steam ahead. If Ms Miguel Casas is able to be successfully extubated- [address] as we need her to do an AMD at least naming MPOA so that when she decompensates we are not stuck in this situation again. Initial consult note routed to primary continuity provider and/or primary health care team members  Communicated plan of care with: Palliative IDT, [address] Team     GOALS OF CARE / TREATMENT PREFERENCES:     GOALS OF CARE:  Patient/Health Care Proxy Stated Goals: Prolong life    TREATMENT PREFERENCES:   Code Status: Full Code    Advance Care Planning:  [x] [facility] Interdisciplinary Team has updated the ACP Navigator with [address] and Patient Capacity      Primary Decision Maker (Active): Galen - Sister - [phone]    Primary Decision Maker: Herbie Grider - Sister - [phone]    Primary Decision Maker: Torri Blow (Big) - Brother - [phone]    Medical Interventions: Full interventions       Other:    As far as possible, the palliative care team has discussed with patient / health care proxy about goals of care / treatment preferences for patient.      HISTORY:     History obtained from: chart, attending, sister    CHIEF COMPLAINT: sedated  HPI/SUBJECTIVE:    The patient is:   [] Verbal and participatory  [x] Non-participatory due to: intubated and very lethargic    Clinical Pain Assessment (nonverbal scale for severity on nonverbal patients):   Clinical Pain Assessment  Severity: 0          Duration: for how long has pt been experiencing pain (e.g., 2 days, 1 month, years)  Frequency: how often pain is an issue (e.g., several times per day, once every few days, constant)     FUNCTIONAL ASSESSMENT:     Palliative Performance Scale (PPS):  PPS: 40       PSYCHOSOCIAL/SPIRITUAL SCREENING:     Palliative IDT has assessed this patient for cultural preferences / practices and a referral made as appropriate to needs (Cultural Services, Patient Advocacy, Ethics, etc.)    Any spiritual / Muslim concerns:  [] Yes /  [x] No   If \"Yes\" to discuss with pastoral care during IDT     Does caregiver feel burdened by caring for their loved one:   [] Yes /  [x] No /  [] No Caregiver Present/Available [] No Caregiver [] Pt Lives at [facility]  If \"Yes\" to discuss with social work during IDT    Anticipatory grief assessment:   [x] Normal  / [] Maladaptive     If \"Maladaptive\" to discuss with social work during IDT    ESAS Anxiety: Anxiety: 0    ESAS Depression: Depression: 0        REVIEW OF SYSTEMS:     Positive and pertinent negative findings in ROS are noted above in HPI. The following systems were [x] reviewed / [] unable to be reviewed as noted in HPI  Other findings are noted below. Systems: constitutional, ears/nose/mouth/throat, respiratory, gastrointestinal, genitourinary, musculoskeletal, integumentary, neurologic, psychiatric, endocrine. Positive findings noted below. Modified ESAS Completed by: provider   Fatigue: 10 Drowsiness: 0   Depression: 0 Pain: 0   Anxiety: 0 Nausea: 0   Anorexia: 4 Dyspnea: 0           Stool Occurrence(s): 1        PHYSICAL EXAM:     From RN flowsheet:  Wt Readings from Last 3 Encounters:   01/11/23 153 lb 14.1 oz (69.8 kg)   01/01/23 144 lb 11.2 oz (65.6 kg)   12/27/22 144 lb (65.3 kg)     Blood pressure 105/65, pulse 80, temperature 99 °F (37.2 °C), resp. rate 25, height 5' (1.524 m), weight 153 lb 14.1 oz (69.8 kg), SpO2 98 %.     Pain Scale 1: Behavioral Pain Scale (BPS)  Pain Intensity 1: 3  Pain Onset 1: post op  Pain Location 1: Back  Pain Orientation 1: Left  Pain Description 1: Aching, Sore  Pain Intervention(s) 1: Medication (see MAR)  Last bowel movement, if known:     Constitutional: sedated  Respiratory:vent support  Gastrointestinal: soft non-tender, +bowel sounds  Musculoskeletal: no deformity, no tenderness to palpation  Skin: warm, dry  Neurologic: not following commands  Other:       HISTORY:     Active Problems:    Staphylococcus aureus pneumonia ([facility] [address] (1/4/2023)      Shortness of breath (1/5/2023)      Acute blood loss anemia (1/5/2023)      Hemothorax on left (1/5/2023)      Anxiety about health (1/5/2023)      Palliative care by specialist (1/5/2023)      Hemothorax (1/6/2023)    Past Medical History:   Diagnosis Date    [facility]     BREAST CANCER    GERD (gastroesophageal reflux disease)     HTN (hypertension) 07/18/2011    Psychiatric disorder     ANIXETY AND DEPRESSION    Thyroid disease     HYPOTHYROID      Past Surgical History:   Procedure Laterality Date    HX COLONOSCOPY      HX MYOMECTOMY  05/02/1996    x 1    HX WISDOM TEETH EXTRACTION      IR INSERT NON TUNL CVC OVER 5 YRS  1/9/2023    IR THORACENTESIS/INSERT CHEST TUBE  1/5/2023      Family History   Problem Relation Age of Onset    Heart Failure Mother     Cancer Father     Diabetes Sister     Kidney Disease Sister     Anesth Problems Neg Hx       History reviewed, no pertinent family history.   Social History     Tobacco Use    Smoking status: Never     Passive exposure: Never    Smokeless tobacco: Never   Substance Use Topics    Alcohol use: Never     Allergies   Allergen Reactions    Sulfa (Sulfonamide Antibiotics) Itching, Swelling and Unknown (comments)      Current Facility-Administered Medications   Medication Dose Route Frequency    phosphorus (K PHOS NEUTRAL) 250 mg tablet 2 Tablet  2 Tablet Per NG tube BID    piperacillin-tazobactam (ZOSYN) 3.375 g in 0.9% sodium chloride (MBP/ADV) 100 mL MBP  3.375 g IntraVENous Q8H    lidocaine (XYLOCAINE) 20 mg/mL (2 %) injection 120 mg  6 mL Via Airway ONCE    epoetin tyler-epbx (RETACRIT) injection 10,000 Units  10,000 Units SubCUTAneous Q TUE, THU & SAT    alcohol 62% (NOZIN) nasal  1 Ampule  1 Ampule Topical Q12H    chlorhexidine (ORAL CARE KIT) 0.12 % mouthwash 15 mL  15 mL Oral Q12H    balsam peru-castor oiL (VENELEX) ointment   Topical BID    bicarbonate dialysis (PRISMASOL) BG K 4/Ca 2.5 5000 ml solution   Extracorporeal DIALYSIS CONTINUOUS    levoFLOXacin (LEVAQUIN) 500 mg in D5W IVPB  500 mg IntraVENous Q24H    dexmedeTOMidine in 0.9 % NaCl (PRECEDEX) 400 mcg/100 mL (4 mcg/mL) infusion soln  0.1-1.5 mcg/kg/hr IntraVENous TITRATE    PARoxetine (PAXIL) tablet 20 mg  20 mg Per NG tube DAILY    famotidine (PF) (PEPCID) 20 mg in 0.9% sodium chloride 10 mL injection  20 mg IntraVENous Q12H    methylPREDNISolone (PF) (SOLU-MEDROL) injection 40 mg  40 mg IntraVENous Q12H    heparin (porcine) injection 5,000 Units  5,000 Units SubCUTAneous Q12H    HYDROmorphone (DILAUDID) injection 0.5 mg  0.5 mg IntraVENous Q4H PRN    metoprolol (LOPRESSOR) injection 2.5 mg  2.5 mg IntraVENous Q6H PRN    0.9% sodium chloride infusion 250 mL  250 mL IntraVENous PRN    0.9% sodium chloride infusion 250 mL  250 mL IntraVENous PRN    lidocaine (XYLOCAINE) 4 % cream   Topical PRN    [Held by provider] carvediloL (COREG) tablet 6.25 mg  6.25 mg Oral BID WITH MEALS    [Held by provider] gabapentin (NEURONTIN) capsule 100 mg  100 mg Oral TID    [Held by provider] rosuvastatin (CRESTOR) tablet 10 mg  10 mg Oral QHS    sodium chloride (NS) flush 5-40 mL  5-40 mL IntraVENous Q8H    sodium chloride (NS) flush 5-40 mL  5-40 mL IntraVENous PRN    acetaminophen (TYLENOL) tablet 650 mg  650 mg Oral Q6H PRN    Or    acetaminophen (TYLENOL) suppository 650 mg  650 mg Rectal Q6H PRN    polyethylene glycol (MIRALAX) packet 17 g  17 g Oral DAILY PRN    ondansetron (ZOFRAN ODT) tablet 4 mg  4 mg Oral Q8H PRN    Or    ondansetron (ZOFRAN) injection 4 mg  4 mg IntraVENous Q6H PRN          LAB AND IMAGING FINDINGS: Lab Results   Component Value Date/Time    WBC 14.9 (H) 01/11/2023 03:01 AM    HGB 7.5 (L) 01/11/2023 03:01 AM    PLATELET 91 (L) 13/37/0189 03:01 AM     Lab Results   Component Value Date/Time    Sodium 138 01/11/2023 03:01 AM    Potassium 4.7 01/11/2023 03:01 AM    Chloride 105 01/11/2023 03:01 AM    CO2 26 01/11/2023 03:01 AM    BUN 29 (H) 01/11/2023 03:01 AM    Creatinine 2.79 (H) 01/11/2023 03:01 AM    Calcium 7.5 (L) 01/11/2023 03:01 AM    Magnesium 2.8 (H) 01/11/2023 10:34 AM    Phosphorus 2.2 (L) 01/11/2023 03:01 AM      Lab Results   Component Value Date/Time    Alk. phosphatase 993 (H) 01/10/2023 02:35 AM    Protein, total 5.0 (L) 01/10/2023 02:35 AM    Albumin 1.6 (L) 01/10/2023 02:35 AM    Globulin 3.4 01/10/2023 02:35 AM     Lab Results   Component Value Date/Time    INR 4.0 (H) 01/11/2023 10:34 AM    Prothrombin time 38.2 (H) 01/11/2023 10:34 AM    aPTT 54.1 (H) 01/10/2023 02:35 AM      No results found for: IRON, FE, TIBC, IBCT, PSAT, FERR   Lab Results   Component Value Date/Time    pH 7.40 01/09/2023 11:37 AM    PCO2 35 01/09/2023 11:37 AM    PO2 103 (H) 01/09/2023 11:37 AM     No components found for: GLPOC   No results found for: CPK, CKMB             Total time: 50  Counseling / coordination time, spent as noted above: 30  > 50% counseling / coordination?: y    Prolonged service was provided for  []30 min   []75 min in face to face time in the presence of the patient, spent as noted above. Time Start:   Time End:   Note: this can only be billed with 81977 (initial) or 76300 (follow up). If multiple start / stop times, list each separately.

## 2023-01-12 ENCOUNTER — APPOINTMENT (OUTPATIENT)
Dept: GENERAL RADIOLOGY | Age: 63
DRG: 208 | End: 2023-01-12
Attending: INTERNAL MEDICINE
Payer: COMMERCIAL

## 2023-01-12 LAB
ALBUMIN SERPL-MCNC: 1.8 G/DL (ref 3.5–5)
ALBUMIN/GLOB SERPL: 0.5 (ref 1.1–2.2)
ALP SERPL-CCNC: 1268 U/L (ref 45–117)
ALT SERPL-CCNC: 1184 U/L (ref 12–78)
ANION GAP SERPL CALC-SCNC: 10 MMOL/L (ref 5–15)
AST SERPL-CCNC: 1236 U/L (ref 15–37)
BACTERIA SPEC CULT: ABNORMAL
BASOPHILS # BLD: 0 K/UL (ref 0–0.1)
BASOPHILS NFR BLD: 0 % (ref 0–1)
BILIRUB DIRECT SERPL-MCNC: 6 MG/DL (ref 0–0.2)
BILIRUB SERPL-MCNC: 8 MG/DL (ref 0.2–1)
BUN SERPL-MCNC: 30 MG/DL (ref 6–20)
BUN/CREAT SERPL: 14 (ref 12–20)
CALCIUM SERPL-MCNC: 7.4 MG/DL (ref 8.5–10.1)
CHLORIDE SERPL-SCNC: 104 MMOL/L (ref 97–108)
CO2 SERPL-SCNC: 25 MMOL/L (ref 21–32)
CREAT SERPL-MCNC: 2.15 MG/DL (ref 0.55–1.02)
DIFFERENTIAL METHOD BLD: ABNORMAL
EOSINOPHIL # BLD: 0 K/UL (ref 0–0.4)
EOSINOPHIL NFR BLD: 0 % (ref 0–7)
ERYTHROCYTE [DISTWIDTH] IN BLOOD BY AUTOMATED COUNT: 16.5 % (ref 11.5–14.5)
GLOBULIN SER CALC-MCNC: 3.5 G/DL (ref 2–4)
GLUCOSE BLD STRIP.AUTO-MCNC: 167 MG/DL (ref 65–117)
GLUCOSE BLD STRIP.AUTO-MCNC: 194 MG/DL (ref 65–117)
GLUCOSE BLD STRIP.AUTO-MCNC: 258 MG/DL (ref 65–117)
GLUCOSE SERPL-MCNC: 228 MG/DL (ref 65–100)
GRAM STN SPEC: ABNORMAL
HCT VFR BLD AUTO: 22.3 % (ref 35–47)
HGB BLD-MCNC: 7.7 G/DL (ref 11.5–16)
IMM GRANULOCYTES # BLD AUTO: 0.1 K/UL (ref 0–0.04)
IMM GRANULOCYTES NFR BLD AUTO: 1 % (ref 0–0.5)
INR PPP: 2.5 (ref 0.9–1.1)
IRON SATN MFR SERPL: ABNORMAL % (ref 20–50)
IRON SERPL-MCNC: 192 UG/DL (ref 35–150)
LYMPHOCYTES # BLD: 3.1 K/UL (ref 0.8–3.5)
LYMPHOCYTES NFR BLD: 21 % (ref 12–49)
MAGNESIUM SERPL-MCNC: 2.8 MG/DL (ref 1.6–2.4)
MCH RBC QN AUTO: 30.1 PG (ref 26–34)
MCHC RBC AUTO-ENTMCNC: 34.5 G/DL (ref 30–36.5)
MCV RBC AUTO: 87.1 FL (ref 80–99)
MONOCYTES # BLD: 0.9 K/UL (ref 0–1)
MONOCYTES NFR BLD: 6 % (ref 5–13)
NEUTS SEG # BLD: 10.6 K/UL (ref 1.8–8)
NEUTS SEG NFR BLD: 72 % (ref 32–75)
NRBC # BLD: 0.18 K/UL (ref 0–0.01)
NRBC BLD-RTO: 1.2 PER 100 WBC
PHOSPHATE SERPL-MCNC: 1.9 MG/DL (ref 2.6–4.7)
PLATELET # BLD AUTO: 83 K/UL (ref 150–400)
PMV BLD AUTO: 12.8 FL (ref 8.9–12.9)
POTASSIUM SERPL-SCNC: 4.4 MMOL/L (ref 3.5–5.1)
PROT SERPL-MCNC: 5.3 G/DL (ref 6.4–8.2)
PROTHROMBIN TIME: 24.3 SEC (ref 9–11.1)
RBC # BLD AUTO: 2.56 M/UL (ref 3.8–5.2)
RBC MORPH BLD: ABNORMAL
RBC MORPH BLD: ABNORMAL
SERVICE CMNT-IMP: ABNORMAL
SODIUM SERPL-SCNC: 139 MMOL/L (ref 136–145)
TIBC SERPL-MCNC: 178 UG/DL (ref 250–450)
WBC # BLD AUTO: 14.7 K/UL (ref 3.6–11)

## 2023-01-12 PROCEDURE — 74011250636 HC RX REV CODE- 250/636: Performed by: INTERNAL MEDICINE

## 2023-01-12 PROCEDURE — 65610000006 HC RM INTENSIVE CARE

## 2023-01-12 PROCEDURE — 74011250636 HC RX REV CODE- 250/636: Performed by: PHYSICIAN ASSISTANT

## 2023-01-12 PROCEDURE — 74011000250 HC RX REV CODE- 250: Performed by: INTERNAL MEDICINE

## 2023-01-12 PROCEDURE — 99231 SBSQ HOSP IP/OBS SF/LOW 25: CPT | Performed by: NURSE PRACTITIONER

## 2023-01-12 PROCEDURE — 74011000250 HC RX REV CODE- 250: Performed by: STUDENT IN AN ORGANIZED HEALTH CARE EDUCATION/TRAINING PROGRAM

## 2023-01-12 PROCEDURE — 36415 COLL VENOUS BLD VENIPUNCTURE: CPT

## 2023-01-12 PROCEDURE — 80076 HEPATIC FUNCTION PANEL: CPT

## 2023-01-12 PROCEDURE — 82962 GLUCOSE BLOOD TEST: CPT

## 2023-01-12 PROCEDURE — 84100 ASSAY OF PHOSPHORUS: CPT

## 2023-01-12 PROCEDURE — 83540 ASSAY OF IRON: CPT

## 2023-01-12 PROCEDURE — 74011250637 HC RX REV CODE- 250/637: Performed by: INTERNAL MEDICINE

## 2023-01-12 PROCEDURE — 83735 ASSAY OF MAGNESIUM: CPT

## 2023-01-12 PROCEDURE — 94003 VENT MGMT INPAT SUBQ DAY: CPT

## 2023-01-12 PROCEDURE — 85610 PROTHROMBIN TIME: CPT

## 2023-01-12 PROCEDURE — 74011250636 HC RX REV CODE- 250/636: Performed by: ANESTHESIOLOGY

## 2023-01-12 PROCEDURE — 74011250636 HC RX REV CODE- 250/636: Performed by: NURSE PRACTITIONER

## 2023-01-12 PROCEDURE — 74011000258 HC RX REV CODE- 258: Performed by: NURSE PRACTITIONER

## 2023-01-12 PROCEDURE — 90945 DIALYSIS ONE EVALUATION: CPT

## 2023-01-12 PROCEDURE — 71045 X-RAY EXAM CHEST 1 VIEW: CPT

## 2023-01-12 PROCEDURE — 80048 BASIC METABOLIC PNL TOTAL CA: CPT

## 2023-01-12 PROCEDURE — 74011636637 HC RX REV CODE- 636/637: Performed by: NURSE PRACTITIONER

## 2023-01-12 PROCEDURE — 74011000258 HC RX REV CODE- 258: Performed by: INTERNAL MEDICINE

## 2023-01-12 PROCEDURE — 74011250637 HC RX REV CODE- 250/637: Performed by: NURSE PRACTITIONER

## 2023-01-12 PROCEDURE — 74011000250 HC RX REV CODE- 250: Performed by: NURSE PRACTITIONER

## 2023-01-12 PROCEDURE — 85025 COMPLETE CBC W/AUTO DIFF WBC: CPT

## 2023-01-12 RX ORDER — LEVOFLOXACIN 5 MG/ML
750 INJECTION, SOLUTION INTRAVENOUS
Status: DISCONTINUED | OUTPATIENT
Start: 2023-01-14 | End: 2023-01-13

## 2023-01-12 RX ORDER — IBUPROFEN 200 MG
4 TABLET ORAL AS NEEDED
Status: DISCONTINUED | OUTPATIENT
Start: 2023-01-12 | End: 2023-01-24 | Stop reason: HOSPADM

## 2023-01-12 RX ORDER — INSULIN LISPRO 100 [IU]/ML
INJECTION, SOLUTION INTRAVENOUS; SUBCUTANEOUS EVERY 6 HOURS
Status: DISCONTINUED | OUTPATIENT
Start: 2023-01-12 | End: 2023-01-24 | Stop reason: HOSPADM

## 2023-01-12 RX ORDER — DEXTROSE MONOHYDRATE 100 MG/ML
0-250 INJECTION, SOLUTION INTRAVENOUS AS NEEDED
Status: DISCONTINUED | OUTPATIENT
Start: 2023-01-12 | End: 2023-01-24 | Stop reason: HOSPADM

## 2023-01-12 RX ADMIN — LEVOFLOXACIN 500 MG: 5 INJECTION, SOLUTION INTRAVENOUS at 12:13

## 2023-01-12 RX ADMIN — SODIUM CHLORIDE, PRESERVATIVE FREE 20 MG: 5 INJECTION INTRAVENOUS at 09:06

## 2023-01-12 RX ADMIN — CASTOR OIL AND BALSAM, PERU: 788; 87 OINTMENT TOPICAL at 20:27

## 2023-01-12 RX ADMIN — Medication 1 AMPULE: at 20:24

## 2023-01-12 RX ADMIN — PIPERACILLIN AND TAZOBACTAM 3.38 G: 3; .375 INJECTION, POWDER, FOR SOLUTION INTRAVENOUS at 20:27

## 2023-01-12 RX ADMIN — PAROXETINE HYDROCHLORIDE 20 MG: 20 TABLET, FILM COATED ORAL at 09:06

## 2023-01-12 RX ADMIN — METHYLPREDNISOLONE SODIUM SUCCINATE 20 MG: 40 INJECTION, POWDER, FOR SOLUTION INTRAMUSCULAR; INTRAVENOUS at 20:34

## 2023-01-12 RX ADMIN — METHYLPREDNISOLONE SODIUM SUCCINATE 40 MG: 40 INJECTION, POWDER, FOR SOLUTION INTRAMUSCULAR; INTRAVENOUS at 09:06

## 2023-01-12 RX ADMIN — DEXMEDETOMIDINE 1.1 MCG/KG/HR: 100 INJECTION, SOLUTION INTRAVENOUS at 05:40

## 2023-01-12 RX ADMIN — PIPERACILLIN AND TAZOBACTAM 3.38 G: 3; .375 INJECTION, POWDER, FOR SOLUTION INTRAVENOUS at 08:51

## 2023-01-12 RX ADMIN — SODIUM CHLORIDE, PRESERVATIVE FREE 20 MG: 5 INJECTION INTRAVENOUS at 20:34

## 2023-01-12 RX ADMIN — DEXMEDETOMIDINE 1 MCG/KG/HR: 100 INJECTION, SOLUTION INTRAVENOUS at 01:00

## 2023-01-12 RX ADMIN — CALCIUM CHLORIDE, MAGNESIUM CHLORIDE, DEXTROSE MONOHYDRATE, LACTIC ACID, SODIUM CHLORIDE, SODIUM BICARBONATE AND POTASSIUM CHLORIDE: 3.68; 3.05; 22; 5.4; 6.46; 3.09; .314 INJECTION INTRAVENOUS at 06:26

## 2023-01-12 RX ADMIN — Medication 5 UNITS: at 12:37

## 2023-01-12 RX ADMIN — DEXMEDETOMIDINE 0.4 MCG/KG/HR: 100 INJECTION, SOLUTION INTRAVENOUS at 17:54

## 2023-01-12 RX ADMIN — SODIUM CHLORIDE, PRESERVATIVE FREE 10 ML: 5 INJECTION INTRAVENOUS at 22:13

## 2023-01-12 RX ADMIN — CALCIUM CHLORIDE, MAGNESIUM CHLORIDE, DEXTROSE MONOHYDRATE, LACTIC ACID, SODIUM CHLORIDE, SODIUM BICARBONATE AND POTASSIUM CHLORIDE: 3.68; 3.05; 22; 5.4; 6.46; 3.09; .314 INJECTION INTRAVENOUS at 01:08

## 2023-01-12 RX ADMIN — SODIUM CHLORIDE, PRESERVATIVE FREE 10 ML: 5 INJECTION INTRAVENOUS at 05:09

## 2023-01-12 RX ADMIN — EPOETIN ALFA-EPBX 10000 UNITS: 10000 INJECTION, SOLUTION INTRAVENOUS; SUBCUTANEOUS at 20:33

## 2023-01-12 RX ADMIN — SODIUM CHLORIDE, PRESERVATIVE FREE 10 ML: 5 INJECTION INTRAVENOUS at 13:52

## 2023-01-12 RX ADMIN — CASTOR OIL AND BALSAM, PERU: 788; 87 OINTMENT TOPICAL at 09:08

## 2023-01-12 RX ADMIN — DIBASIC SODIUM PHOSPHATE, MONOBASIC POTASSIUM PHOSPHATE AND MONOBASIC SODIUM PHOSPHATE 2 TABLET: 852; 155; 130 TABLET ORAL at 09:06

## 2023-01-12 RX ADMIN — Medication 2 UNITS: at 06:24

## 2023-01-12 RX ADMIN — CALCIUM CHLORIDE, MAGNESIUM CHLORIDE, DEXTROSE MONOHYDRATE, LACTIC ACID, SODIUM CHLORIDE, SODIUM BICARBONATE AND POTASSIUM CHLORIDE: 3.68; 3.05; 22; 5.4; 6.46; 3.09; .314 INJECTION INTRAVENOUS at 11:58

## 2023-01-12 RX ADMIN — CHLORHEXIDINE GLUCONATE 15 ML: 1.2 RINSE ORAL at 09:09

## 2023-01-12 RX ADMIN — CALCIUM CHLORIDE, MAGNESIUM CHLORIDE, DEXTROSE MONOHYDRATE, LACTIC ACID, SODIUM CHLORIDE, SODIUM BICARBONATE AND POTASSIUM CHLORIDE: 3.68; 3.05; 22; 5.4; 6.46; 3.09; .314 INJECTION INTRAVENOUS at 09:10

## 2023-01-12 RX ADMIN — CALCIUM CHLORIDE, MAGNESIUM CHLORIDE, DEXTROSE MONOHYDRATE, LACTIC ACID, SODIUM CHLORIDE, SODIUM BICARBONATE AND POTASSIUM CHLORIDE: 3.68; 3.05; 22; 5.4; 6.46; 3.09; .314 INJECTION INTRAVENOUS at 03:51

## 2023-01-12 RX ADMIN — HYDROMORPHONE HYDROCHLORIDE 0.5 MG: 1 INJECTION, SOLUTION INTRAMUSCULAR; INTRAVENOUS; SUBCUTANEOUS at 04:24

## 2023-01-12 RX ADMIN — PIPERACILLIN AND TAZOBACTAM 3.38 G: 3; .375 INJECTION, POWDER, FOR SOLUTION INTRAVENOUS at 01:02

## 2023-01-12 RX ADMIN — Medication 1 AMPULE: at 09:08

## 2023-01-12 NOTE — PROGRESS NOTES
GI PROGRESS NOTE  Maria Teresa Abreu NP  [phone] NP in-hospital cell phone M-F until 4:30  After 5pm or on weekends, please call  for physician on call  Luzmaria Bergeron :1960 IS   ATTG: Dr. Tish Middleton PCP: Shari Nageotte, MD  GI: Dr. Rayshawn Messer  Date/Time:  2023 9:44 AM   Reason for following: Acute liver failure   Assessment:   Acute liver failure   Platelets 83  INR 8  T. Bili 2.8  ALT 1,184 , AST 1,236, ALP 1,268  2022:  CT abd/pel without contrast with no acute GI findings  01/10/2023:  Abdominal US with no sonographic abnormality of the liver. Acute respiratory failure- intubated   Hypotension  Treatment per primary team  Plan:   Acute elevation in transaminates is likely secondary to hypotensive injury and should start to improve   Follow LFT's  Liver serology ordered to r/o other possible causes   Avoid hepatotoxic medications   Continue ICU level care   Nothing further to add from a GI standpoint. GI signing-off. Please call with any questions. Thank you for this consult. Plan of care discussed with Dr. Rayshawn Messer  Subjective:   Discussed with RN events overnight. Patient intubated and sedated in ICU     Review of Systems:  Symptom Y/N Comments  Symptom Y/N Comments   Fever/Chills    Chest Pain     Cough    Headaches     Sputum    Joint Pain     SOB/MCCALL    Pruritis/Rash     Tolerating Diet    Other       Could NOT obtain due to: Intubated and sedated     Objective:   VITALS:   Last 24hrs VS reviewed since prior progress note. Most recent are:  Visit Vitals  /63   Pulse 75   Temp 99.4 °F (37.4 °C)   Resp 20   Ht 5' (1.524 m)   Wt 69.3 kg (152 lb 12.5 oz)   SpO2 100%   BMI 29.84 kg/m²       Intake/Output Summary (Last 24 hours) at 2023 0944  Last data filed at 2023 0900  Gross per 24 hour   Intake 1503.23 ml   Output 3833 ml   Net -2329.77 ml     PHYSICAL EXAM:  General: Acutely ill AA female   HEENT: NC, Atraumatic.  Anicteric sclerae. Lungs:  Diminished, intubated   Heart:  Regular  rhythm,  No murmur, No Rubs, No Gallops  Abdomen: Soft, Non distended, Non tender. +Bowel sounds, no HSM  Extremities: No c/c/e  Neurologic:  Intubated and sedated   Psych: Intubated and sedated     Lab and Radiology Data Reviewed: (see below)    Medications Reviewed: (see below)  PMH/SH reviewed - no change compared to H&P  ________________________________________________________________________  Total time spent with patient: 15 minutes ________________________________________________________________________  Care Plan discussed with:  Patient x   Family     RN x              Consultant:       Kiana Lorenzana NP     Procedures: see electronic medical records for all procedures/Xrays and details which were not copied into this note but were reviewed prior to creation of Plan. LABS:  Recent Labs     01/12/23  0410 01/11/23  1445   WBC 14.7* 14.7*   HGB 7.7* 7.3*   HCT 22.3* 21.6*   PLT 83* 77*     Recent Labs     01/12/23  0410 01/11/23  1445 01/11/23  1034 01/11/23  0301    141  --  138   K 4.4 4.8  --  4.7    107  --  105   CO2 25 26  --  26   BUN 30* 27*  --  29*   CREA 2.15* 2.39*  --  2.79*   * 173*  --  177*   CA 7.4* 7.1*  --  7.5*   MG 2.8* 2.6* 2.8* 2.8*   PHOS 1.9* 3.1  --  2.2*     Recent Labs     01/12/23  0410 01/10/23  0235   AP 1,268* 993*   TP 5.3* 5.0*   ALB 1.8* 1.6*   GLOB 3.5 3.4     Recent Labs     01/12/23  0410 01/11/23  1034 01/10/23  0235   INR 2.5* 4.0* 4.9*   PTP 24.3* 38.2* 46.4*   APTT  --   --  54.1*      Recent Labs     01/12/23 0410   TIBC 178*   PSAT INDETERMINATE - SENT TO REFERENCE LAB FOR ADDITIONAL TESTING. No results found for: FOL, RBCF  Recent Labs     01/09/23  1137   PH 7.40   PCO2 35   PO2 103*     No results for input(s): CPK, CKMB in the last 72 hours.     No lab exists for component: TROPONINI  Lab Results   Component Value Date/Time    Color DARK YELLOW 01/11/2023 10:34 AM Appearance TURBID (A) 01/11/2023 10:34 AM    Specific gravity 1.012 01/11/2023 10:34 AM    pH (UA) 6.0 01/11/2023 10:34 AM    Protein 300 (A) 01/11/2023 10:34 AM    Glucose Negative 01/11/2023 10:34 AM    Ketone Negative 01/11/2023 10:34 AM    Bilirubin Negative 01/11/2023 10:34 AM    Urobilinogen 0.2 01/11/2023 10:34 AM    Nitrites Negative 01/11/2023 10:34 AM    Leukocyte Esterase LARGE (A) 01/11/2023 10:34 AM    Epithelial cells MANY (A) 01/11/2023 10:34 AM    Bacteria 1+ (A) 01/11/2023 10:34 AM    WBC >100 (H) 01/11/2023 10:34 AM    RBC >100 (H) 01/11/2023 10:34 AM       MEDICATIONS:  Current Facility-Administered Medications   Medication Dose Route Frequency    phosphorus (K PHOS NEUTRAL) 250 mg tablet 2 Tablet  2 Tablet Per NG tube BID    glucose chewable tablet 16 g  4 Tablet Oral PRN    glucagon (GLUCAGEN) injection 1 mg  1 mg IntraMUSCular PRN    dextrose 10% infusion 0-250 mL  0-250 mL IntraVENous PRN    insulin lispro (HUMALOG) injection   SubCUTAneous Q6H    piperacillin-tazobactam (ZOSYN) 3.375 g in 0.9% sodium chloride (MBP/ADV) 100 mL MBP  3.375 g IntraVENous Q8H    epoetin tyler-epbx (RETACRIT) injection 10,000 Units  10,000 Units SubCUTAneous Q TUE, THU & SAT    alcohol 62% (NOZIN) nasal  1 Ampule  1 Ampule Topical Q12H    chlorhexidine (ORAL CARE KIT) 0.12 % mouthwash 15 mL  15 mL Oral Q12H    balsam peru-castor oiL (VENELEX) ointment   Topical BID    bicarbonate dialysis (PRISMASOL) BG K 4/Ca 2.5 5000 ml solution   Extracorporeal DIALYSIS CONTINUOUS    levoFLOXacin (LEVAQUIN) 500 mg in D5W IVPB  500 mg IntraVENous Q24H    dexmedeTOMidine in 0.9 % NaCl (PRECEDEX) 400 mcg/100 mL (4 mcg/mL) infusion soln  0.1-1.5 mcg/kg/hr IntraVENous TITRATE    PARoxetine (PAXIL) tablet 20 mg  20 mg Per NG tube DAILY    famotidine (PF) (PEPCID) 20 mg in 0.9% sodium chloride 10 mL injection  20 mg IntraVENous Q12H    methylPREDNISolone (PF) (SOLU-MEDROL) injection 40 mg  40 mg IntraVENous Q12H    heparin (porcine) injection 5,000 Units  5,000 Units SubCUTAneous Q12H    HYDROmorphone (DILAUDID) injection 0.5 mg  0.5 mg IntraVENous Q4H PRN    metoprolol (LOPRESSOR) injection 2.5 mg  2.5 mg IntraVENous Q6H PRN    0.9% sodium chloride infusion 250 mL  250 mL IntraVENous PRN    0.9% sodium chloride infusion 250 mL  250 mL IntraVENous PRN    lidocaine (XYLOCAINE) 4 % cream   Topical PRN    [Held by provider] carvediloL (COREG) tablet 6.25 mg  6.25 mg Oral BID WITH MEALS    [Held by provider] gabapentin (NEURONTIN) capsule 100 mg  100 mg Oral TID    [Held by provider] rosuvastatin (CRESTOR) tablet 10 mg  10 mg Oral QHS    sodium chloride (NS) flush 5-40 mL  5-40 mL IntraVENous Q8H    sodium chloride (NS) flush 5-40 mL  5-40 mL IntraVENous PRN    acetaminophen (TYLENOL) tablet 650 mg  650 mg Oral Q6H PRN    Or    acetaminophen (TYLENOL) suppository 650 mg  650 mg Rectal Q6H PRN    polyethylene glycol (MIRALAX) packet 17 g  17 g Oral DAILY PRN    ondansetron (ZOFRAN ODT) tablet 4 mg  4 mg Oral Q8H PRN    Or    ondansetron (ZOFRAN) injection 4 mg  4 mg IntraVENous Q6H PRN

## 2023-01-12 NOTE — PROGRESS NOTES
Reason for Readmission:    Chest pain, SOB          RUR Score/Risk Level:     22%    PCP: First and Last name:  Inderjit Kessler   Name of Practice:    Are you a current patient: Yes/No: yes   Approximate date of last visit: family unsure but knows it has been within the last year   Can you participate in a virtual visit with your PCP:     Is a Care Conference indicated: no- palliative care following      Did you attend your follow up appointment (s): If not, why not:no readmit before getting to appointment         Resources/supports as identified by patient/family:   Sister, brother, nephew       Top Challenges facing patient (as identified by patient/family and CM): Finances/Medication cost?     Has [employer] - no issues  Transportation      usually drives self but has supportive family  Support system or lack thereof? Sisters, brother and nephew     Living arrangements? Lives with nephew      Self-care/ADLs/Cognition? Independent with ADL's prior to admit       Current Advanced Directive/Advance Care Plan:  not on file - full code           Plan for utilizing home health:   TBD              Transition of Care Plan:    Based on readmission, the patient's previous Plan of Care   has been evaluated and/or modified. 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 Noted palliative care following for support and goals of care- Family will transport to home upon discharge.   CATIE Bueno    Readmission Assessment  Number of days since last admission?: 1-7 days  Previous disposition: Home with Family  Who is being interviewed?: Caregiver  What was the patient's/caregiver's perception as to why they think they needed to return back to the hospital?: Did not agree to original recommended D/C plan (per sister patient \"too weak\" discharged too early)  Did you visit your Primary Care Physician after you left the hospital, before you returned this time?: No (readmit within 2 days)  Why weren't you able to visit your PCP?: Other (Comment) (readmit within 3 days)  In our efforts to provide the best possible care to you and others like you, can you think of anything that we could have done to help you after you left the hospital the first time, so that you might not have needed to return so soon?: Arrange for more help when leaving the hospital, Teaching during hospitalization regarding your illness (involve family with discharge instructions if possible)

## 2023-01-12 NOTE — PROGRESS NOTES
0700 Bedside and Verbal shift change report given to [facility] and [address] [facility] (oncoming nurse) by Xuan Zambrano (offgoing nurse). Report included the following information SBAR, Kardex, ED Summary, Procedure Summary, Intake/Output, MAR, Recent Results, and Cardiac Rhythm NSR .      0745 shift assessment completed    Arlin Larson MD with nephrology at bedside - received verbal orders per MD to stop CRRT this afternoon d/t patient being off pressors greater than 24 hours and if BP stay stable    0945 Interdisciplinary team rounds were held 1/12/2023 with the following team members:Care Management, Nursing, Nutrition, Occupational Therapy, Pharmacy, Physical Therapy, and Physician and the patient. Plan of care discussed. See clinical pathway and/or care plan for interventions and desired outcomes. Goals of the Day: Transition off CRRT, wean precedex, and SBT. Received verbal orders to renew restraint order    Davie Tucker NP with hematology and Linden Gottlieb MD at bedside with patient and family     [phone] Patient placed on SBT - TF can stay on for now per MD. No plan to extubate today, will stay on SBT for now, and plan is to reassess readiness to extubate tomorrow    1200 Reassessment completed     [address], MD nephrology paged to notify MD of patient's low BP and fluid removal factor of zero for the last 4 hours    1301 Received verbal orders per Consuelo Bradley MD with nephrology to stop CRRT    1315 CRRT stopped and blood washed back to patient     1338 Received verbal orders per MD Nabil to stop precedex gtt     1342 Precedex gtt stopped     1435 Patient's bed alarm and vent alarm going off- RN entered room and patient had self extubated with restraints still in place. RT and MD Roslyn called to bedside, patient airway cleared with Jennifer, 3 L NC placed on patient and current oxygen saturation is sustaining at 98% with no complaints of chest pain or SOB, and patient is verbalizing her needs.  [facility] updated over the phone    1500 Received verbal orders per Luis A Linda MD to hold heparin sq and to remove isaac     1600 Reassessment completed    1700 Patient anxious and tachypneic  - received verbal orders per Luis A Linda MD to restart patient's precedex gtt     [facility] Patient refused blood sugar to be taken and refused PO medications.  Per pharmacy will retime scheduled zosyn dose

## 2023-01-12 NOTE — PROGRESS NOTES
Palliative Medicine Consult  Remberto: [phone]    Patient Name: Layla Vann  YOB: 1960    Date of Initial Consult: 1/5/23  Reason for Consult: Overwhelming Symptoms  Requesting Provider: Dontrell Calabrese MD   Primary Care Physician: Kristal Castro MD     SUMMARY:   Layla Vann is a 57 y.o. with a past history of breast cancer s/p chemo and immunotherapy (completed all therapies on 12/21/22) with a recent hospitalization from 12/28-1/1 for multifocal pneumonia 2/2 MSSA superimposed on COVID-19 who was admitted on 1/3/2023 from home with a diagnosis of Mutifocal PNA and hemoptysis. While here she was found to have a large left pleural effusion and had a thoracentesis that removed 820ml of blood tinged fluid yesterday. Overnight she had an acute drop in her Hgb and CT today shows a left sided hemothorax with loculation towards the apex. PALLIATIVE DIAGNOSES:   Shortness of breath  Anxiety about health  Palliative Care     PLAN:   Spoke with Kathe Morrow at bedside- she had asked me to come by to let me know that she is going to work with the family to try and get everyone together even if it is by phone, so that we can make some decisions tomorrow.   Will remain available in case the family makes themselves available tomorrow  Initial consult note routed to primary continuity provider and/or primary health care team members  Communicated plan of care with: Palliative Alexia PILLAI 192 Team     GOALS OF CARE / TREATMENT PREFERENCES:     GOALS OF CARE:  Patient/Health Care Proxy Stated Goals: Prolong life    TREATMENT PREFERENCES:   Code Status: Full Code    Advance Care Planning:  [x] The [facility] Interdisciplinary Team has updated the ACP Navigator with [address] and Patient Capacity      Primary Decision Maker (Active): Allen Britts Sister - [phone]    Primary Decision Maker: Rosalie Green - Sister - [phone]    Primary Decision Maker: Whiteheadgerardo Salmon (Big) - Brother - [phone]    Medical Interventions: Full interventions       Other:    As far as possible, the palliative care team has discussed with patient / health care proxy about goals of care / treatment preferences for patient. HISTORY:     History obtained from: chart, attending, sister    CHIEF COMPLAINT: sedated  HPI/SUBJECTIVE:    The patient is:   [] Verbal and participatory  [x] Non-participatory due to: intubated and very lethargic    Clinical Pain Assessment (nonverbal scale for severity on nonverbal patients):   Clinical Pain Assessment  Severity: 0          Duration: for how long has pt been experiencing pain (e.g., 2 days, 1 month, years)  Frequency: how often pain is an issue (e.g., several times per day, once every few days, constant)     FUNCTIONAL ASSESSMENT:     Palliative Performance Scale (PPS):  PPS: 40       PSYCHOSOCIAL/SPIRITUAL SCREENING:     Palliative IDT has assessed this patient for cultural preferences / practices and a referral made as appropriate to needs (Cultural Services, Patient Advocacy, Ethics, etc.)    Any spiritual / Alevism concerns:  [] Yes /  [x] No   If \"Yes\" to discuss with pastoral care during IDT     Does caregiver feel burdened by caring for their loved one:   [] Yes /  [x] No /  [] No Caregiver Present/Available [] No Caregiver [] Pt Lives at [place] [id number]  If \"Yes\" to discuss with social work during IDT    Anticipatory grief assessment:   [x] Normal  / [] Maladaptive     If \"Maladaptive\" to discuss with social work during IDT    ESAS Anxiety: Anxiety: 0    ESAS Depression: Depression: 0        REVIEW OF SYSTEMS:     Positive and pertinent negative findings in ROS are noted above in HPI. The following systems were [x] reviewed / [] unable to be reviewed as noted in HPI  Other findings are noted below.   Systems: constitutional, ears/nose/mouth/throat, respiratory, gastrointestinal, genitourinary, musculoskeletal, integumentary, neurologic, psychiatric, endocrine. Positive findings noted below. Modified ESAS Completed by: provider   Fatigue: 10 Drowsiness: 0   Depression: 0 Pain: 0   Anxiety: 0 Nausea: 0   Anorexia: 4 Dyspnea: 0           Stool Occurrence(s): 1        PHYSICAL EXAM:     From RN flowsheet:  Wt Readings from Last 3 Encounters:   01/12/23 152 lb 12.5 oz (69.3 kg)   01/01/23 144 lb 11.2 oz (65.6 kg)   12/27/22 144 lb (65.3 kg)     Blood pressure (!) 91/56, pulse 88, temperature 99.2 °F (37.3 °C), resp. rate 23, height 5' (1.524 m), weight 152 lb 12.5 oz (69.3 kg), SpO2 100 %.     Pain Scale 1: Behavioral Pain Scale (BPS)  Pain Intensity 1: 3  Pain Onset 1: post op  Pain Location 1: Back  Pain Orientation 1: Left  Pain Description 1: Aching, Sore  Pain Intervention(s) 1: Medication (see MAR)  Last bowel movement, if known:     Constitutional: sedated  Respiratory:vent support  Gastrointestinal: soft non-tender, +bowel sounds  Musculoskeletal: no deformity, no tenderness to palpation  Skin: warm, dry  Neurologic: not following commands  Other:       HISTORY:     Active Problems:    Staphylococcus aureus pneumonia (HCC) (1/4/2023)      Shortness of breath (1/5/2023)      Acute blood loss anemia (1/5/2023)      Hemothorax on left (1/5/2023)      Anxiety (1/5/2023)      Palliative care by specialist (1/5/2023)      Hemothorax (1/6/2023)    Past Medical History:   Diagnosis Date    Cancer ([facility] [address]     BREAST CANCER    GERD (gastroesophageal reflux disease)     HTN (hypertension) 07/18/2011    Psychiatric disorder     ANIXETY AND DEPRESSION    Thyroid disease     HYPOTHYROID      Past Surgical History:   Procedure Laterality Date    HX COLONOSCOPY      HX MYOMECTOMY  05/02/1996    x 1    HX WISDOM TEETH EXTRACTION      IR INSERT NON TUNL CVC OVER 5 YRS  1/9/2023    IR THORACENTESIS/INSERT CHEST TUBE  1/5/2023      Family History   Problem Relation Age of Onset    Heart Failure Mother     Cancer Father     Diabetes Sister     Kidney Disease Sister Anesth Problems Neg Hx       History reviewed, no pertinent family history.   Social History     Tobacco Use    Smoking status: Never     Passive exposure: Never    Smokeless tobacco: Never   Substance Use Topics    Alcohol use: Never     Allergies   Allergen Reactions    Sulfa (Sulfonamide Antibiotics) Itching, Swelling and Unknown (comments)      Current Facility-Administered Medications   Medication Dose Route Frequency    phosphorus (K PHOS NEUTRAL) 250 mg tablet 2 Tablet  2 Tablet Per NG tube BID    glucose chewable tablet 16 g  4 Tablet Oral PRN    glucagon (GLUCAGEN) injection 1 mg  1 mg IntraMUSCular PRN    dextrose 10% infusion 0-250 mL  0-250 mL IntraVENous PRN    insulin lispro (HUMALOG) injection   SubCUTAneous Q6H    methylPREDNISolone (PF) (SOLU-MEDROL) injection 20 mg  20 mg IntraVENous Q12H    piperacillin-tazobactam (ZOSYN) 3.375 g in 0.9% sodium chloride (MBP/ADV) 100 mL MBP  3.375 g IntraVENous Q8H    epoetin tyler-epbx (RETACRIT) injection 10,000 Units  10,000 Units SubCUTAneous Q TUE, THU & SAT    alcohol 62% (NOZIN) nasal  1 Ampule  1 Ampule Topical Q12H    chlorhexidine (ORAL CARE KIT) 0.12 % mouthwash 15 mL  15 mL Oral Q12H    balsam peru-castor oiL (VENELEX) ointment   Topical BID    bicarbonate dialysis (PRISMASOL) BG K 4/Ca 2.5 5000 ml solution   Extracorporeal DIALYSIS CONTINUOUS    levoFLOXacin (LEVAQUIN) 500 mg in D5W IVPB  500 mg IntraVENous Q24H    dexmedeTOMidine in 0.9 % NaCl (PRECEDEX) 400 mcg/100 mL (4 mcg/mL) infusion soln  0.1-1.5 mcg/kg/hr IntraVENous TITRATE    PARoxetine (PAXIL) tablet 20 mg  20 mg Per NG tube DAILY    famotidine (PF) (PEPCID) 20 mg in 0.9% sodium chloride 10 mL injection  20 mg IntraVENous Q12H    heparin (porcine) injection 5,000 Units  5,000 Units SubCUTAneous Q12H    HYDROmorphone (DILAUDID) injection 0.5 mg  0.5 mg IntraVENous Q4H PRN    metoprolol (LOPRESSOR) injection 2.5 mg  2.5 mg IntraVENous Q6H PRN    0.9% sodium chloride infusion 250 mL 250 mL IntraVENous PRN    0.9% sodium chloride infusion 250 mL  250 mL IntraVENous PRN    lidocaine (XYLOCAINE) 4 % cream   Topical PRN    [Held by provider] gabapentin (NEURONTIN) capsule 100 mg  100 mg Oral TID    sodium chloride (NS) flush 5-40 mL  5-40 mL IntraVENous Q8H    sodium chloride (NS) flush 5-40 mL  5-40 mL IntraVENous PRN    acetaminophen (TYLENOL) tablet 650 mg  650 mg Oral Q6H PRN    Or    acetaminophen (TYLENOL) suppository 650 mg  650 mg Rectal Q6H PRN    polyethylene glycol (MIRALAX) packet 17 g  17 g Oral DAILY PRN    ondansetron (ZOFRAN ODT) tablet 4 mg  4 mg Oral Q8H PRN    Or    ondansetron (ZOFRAN) injection 4 mg  4 mg IntraVENous Q6H PRN          LAB AND IMAGING FINDINGS:     Lab Results   Component Value Date/Time    WBC 14.7 (H) 01/12/2023 04:10 AM    HGB 7.7 (L) 01/12/2023 04:10 AM    PLATELET 83 (L) 14/20/7362 04:10 AM     Lab Results   Component Value Date/Time    Sodium 139 01/12/2023 04:10 AM    Potassium 4.4 01/12/2023 04:10 AM    Chloride 104 01/12/2023 04:10 AM    CO2 25 01/12/2023 04:10 AM    BUN 30 (H) 01/12/2023 04:10 AM    Creatinine 2.15 (H) 01/12/2023 04:10 AM    Calcium 7.4 (L) 01/12/2023 04:10 AM    Magnesium 2.8 (H) 01/12/2023 04:10 AM    Phosphorus 1.9 (L) 01/12/2023 04:10 AM      Lab Results   Component Value Date/Time    Alk. phosphatase 1,268 (H) 01/12/2023 04:10 AM    Protein, total 5.3 (L) 01/12/2023 04:10 AM    Albumin 1.8 (L) 01/12/2023 04:10 AM    Globulin 3.5 01/12/2023 04:10 AM     Lab Results   Component Value Date/Time    INR 2.5 (H) 01/12/2023 04:10 AM    Prothrombin time 24.3 (H) 01/12/2023 04:10 AM    aPTT 54.1 (H) 01/10/2023 02:35 AM      Lab Results   Component Value Date/Time    Iron 192 (H) 01/12/2023 04:10 AM    TIBC 178 (L) 01/12/2023 04:10 AM    Iron % saturation  01/12/2023 04:10 AM     INDETERMINATE - SENT TO REFERENCE LAB FOR ADDITIONAL TESTING.       Lab Results   Component Value Date/Time    pH 7.40 01/09/2023 11:37 AM    PCO2 35 01/09/2023 11:37 AM    PO2 103 (H) 01/09/2023 11:37 AM     No components found for: GLPOC   No results found for: CPK, CKMB             Total time:   Counseling / coordination time, spent as noted above:   > 50% counseling / coordination?:     Prolonged service was provided for  []30 min   []75 min in face to face time in the presence of the patient, spent as noted above. Time Start:   Time End:   Note: this can only be billed with 41559 (initial) or 60877 (follow up). If multiple start / stop times, list each separately.

## 2023-01-12 NOTE — PROGRESS NOTES
Nephrology Progress Note  [facility] / [facility], Arabella Maldonado, [address]  Phone - [phone]  Fax - [phone]                 Patient: Néstor Pruett                   YOB: 1960        Date- 1/12/2023                      Admit Date: 1/3/2023  CC: Follow up for hemalatha     IMPRESSION & PLAN:   1.  hemalatha due to acute tubular necrosis, serology workup  overall negative. 2.  Hypophosphatemia  3. Left pneumothorax requiring chest tube placement. 4.  Pleural effusion. 5.  Anemia requiring blood transfusion. 6.  History of breast cancer. 7.  Recent history of COVID-19 infection. 8.  Multifocal pneumonia.- RESP FAILURE on vent  9. Status post left thoracocentesis. 10.  Non-ST elevation myocardial infarction. PLAN-  Stop CRRT today afternoon-   We will eval her for hd tomorrow am  Replace phos via NG tube  Check labs bid  Epogen for anemia  Avoid hypotension  D.w ICU nurse     Subjective: Interval History:   -1-11-23 - phos low, k stable-- on CRRT- she is anuric--she is off the pressors   1-10-23--on vent - ON CRRT- bp stable-she is anuric    Objective:   Vitals:    01/12/23 0700 01/12/23 0713 01/12/23 0745 01/12/23 0800   BP: (!) 108/55  (!) 104/57 (!) 110/59   Pulse: 74 74 75 76   Resp: 22 22 22 22   Temp:   99.4 °F (37.4 °C)    SpO2: 100% 100% 100% 100%   Weight:       Height:          01/11 0701 - 01/12 0700  In: 1526.5 [I.V.:751.5]  Out: 3698 [Urine:15]    Last 3 Recorded Weights in this Encounter    01/10/23 1322 01/11/23 0600 01/12/23 0620   Weight: 73.9 kg (163 lb) 69.8 kg (153 lb 14.1 oz) 69.3 kg (152 lb 12.5 oz)        Physical exam:    GEN: intubated on vent  NECK-ET tube  RESP: decreased BS b/l, no wheezing--chest tube +left  CVS: s1,s2   NEURO:Can't access due to patient's current condition       Chart reviewed. Pertinent Notes reviewed.      Data Review :  Recent Labs     01/12/23  7200 01/11/23  1445 01/11/23  1034 01/11/23  0301    141  --  138   K 4.4 4.8  --  4.7    107  --  105   CO2 25 26  --  26   BUN 30* 27*  --  29*   CREA 2.15* 2.39*  --  2.79*   * 173*  --  177*   CA 7.4* 7.1*  --  7.5*   MG 2.8* 2.6* 2.8* 2.8*   PHOS 1.9* 3.1  --  2.2*       Recent Labs     01/12/23  0410 01/11/23  1445 01/11/23  0301   WBC 14.7* 14.7* 14.9*   HGB 7.7* 7.3* 7.5*   HCT 22.3* 21.6* 22.4*   PLT 83* 77* 91*       Recent Labs     01/12/23  0410   TIBC 178*   PSAT INDETERMINATE - SENT TO REFERENCE LAB FOR ADDITIONAL TESTING.           No results found for: HBA1C, AUA8XWWL, NQQ4JEBG   No results found for: MCACR, MCA1, MCA2, MCA3, MCAU, MCAU2, MCALPOCT  US Results (most recent):  Medication list  reviewed    Current Facility-Administered Medications   Medication Dose Route Frequency    phosphorus (K PHOS NEUTRAL) 250 mg tablet 2 Tablet  2 Tablet Per NG tube BID    glucose chewable tablet 16 g  4 Tablet Oral PRN    glucagon (GLUCAGEN) injection 1 mg  1 mg IntraMUSCular PRN    dextrose 10% infusion 0-250 mL  0-250 mL IntraVENous PRN    insulin lispro (HUMALOG) injection   SubCUTAneous Q6H    piperacillin-tazobactam (ZOSYN) 3.375 g in 0.9% sodium chloride (MBP/ADV) 100 mL MBP  3.375 g IntraVENous Q8H    epoetin tyler-epbx (RETACRIT) injection 10,000 Units  10,000 Units SubCUTAneous Q TUE, THU & SAT    alcohol 62% (NOZIN) nasal  1 Ampule  1 Ampule Topical Q12H    chlorhexidine (ORAL CARE KIT) 0.12 % mouthwash 15 mL  15 mL Oral Q12H    balsam peru-castor oiL (VENELEX) ointment   Topical BID    bicarbonate dialysis (PRISMASOL) BG K 4/Ca 2.5 5000 ml solution   Extracorporeal DIALYSIS CONTINUOUS    levoFLOXacin (LEVAQUIN) 500 mg in D5W IVPB  500 mg IntraVENous Q24H    dexmedeTOMidine in 0.9 % NaCl (PRECEDEX) 400 mcg/100 mL (4 mcg/mL) infusion soln  0.1-1.5 mcg/kg/hr IntraVENous TITRATE    PARoxetine (PAXIL) tablet 20 mg  20 mg Per NG tube DAILY    famotidine (PF) (PEPCID) 20 mg in 0.9% sodium chloride 10 mL injection  20 mg IntraVENous Q12H    methylPREDNISolone (PF) (SOLU-MEDROL) injection 40 mg  40 mg IntraVENous Q12H    heparin (porcine) injection 5,000 Units  5,000 Units SubCUTAneous Q12H    HYDROmorphone (DILAUDID) injection 0.5 mg  0.5 mg IntraVENous Q4H PRN    metoprolol (LOPRESSOR) injection 2.5 mg  2.5 mg IntraVENous Q6H PRN    0.9% sodium chloride infusion 250 mL  250 mL IntraVENous PRN    0.9% sodium chloride infusion 250 mL  250 mL IntraVENous PRN    lidocaine (XYLOCAINE) 4 % cream   Topical PRN    [Held by provider] carvediloL (COREG) tablet 6.25 mg  6.25 mg Oral BID WITH MEALS    [Held by provider] gabapentin (NEURONTIN) capsule 100 mg  100 mg Oral TID    [Held by provider] rosuvastatin (CRESTOR) tablet 10 mg  10 mg Oral QHS    sodium chloride (NS) flush 5-40 mL  5-40 mL IntraVENous Q8H    sodium chloride (NS) flush 5-40 mL  5-40 mL IntraVENous PRN    acetaminophen (TYLENOL) tablet 650 mg  650 mg Oral Q6H PRN    Or    acetaminophen (TYLENOL) suppository 650 mg  650 mg Rectal Q6H PRN    polyethylene glycol (MIRALAX) packet 17 g  17 g Oral DAILY PRN    ondansetron (ZOFRAN ODT) tablet 4 mg  4 mg Oral Q8H PRN    Or    ondansetron (ZOFRAN) injection 4 mg  4 mg IntraVENous Q6H PRN        Owen Benavides MD  1/12/2023

## 2023-01-12 NOTE — PROGRESS NOTES
Critical care follow up visit in CCU. Patient intubated and unresponsive at this time. Sister was present visiting. Supportive listening presence and affirmation offered as sister shared her thoughts and feelings about patient's current situation. Her requested prayer offered at the end of the visit. She thanked  for the visit and prayer.       Visited by: Diana Clark  To david ferrer: Shyann Montes  ([id number])

## 2023-01-12 NOTE — PROGRESS NOTES
Problem: Non-Violent Restraints  Goal: Removal from restraints as soon as assessed to be safe  1/12/2023 1344 by Ricardo Mullen RN  Outcome: Resolved/Met  1/12/2023 1111 by Ricardo Mullen RN  Outcome: Progressing Towards Goal  Goal: No harm/injury to patient while restraints in use  1/12/2023 1344 by Ricardo Mullen RN  Outcome: Resolved/Met  1/12/2023 1111 by Ricardo Mullen RN  Outcome: Progressing Towards Goal  Goal: Patient's dignity will be maintained  1/12/2023 1344 by Ricardo Mullen RN  Outcome: Resolved/Met  1/12/2023 1111 by Ricardo Mullen RN  Outcome: Progressing Towards Goal  Goal: Patient Interventions  1/12/2023 1344 by Ricardo Mullen RN  Outcome: Resolved/Met  1/12/2023 1111 by Ricardo Mullen RN  Outcome: Progressing Towards Goal

## 2023-01-12 NOTE — PROGRESS NOTES
ICU DAILY PROGRESS NOTE      Summary  57year old Female with hx of left breast cancer, recently admitted to [facility] for sepsis s/s COVID-19, CHRISTA, NSTEMI, MSSA pneumonia(12/28-1/1) presented to ED with c/o shortness of breath and hemoptysis. In ED, patient was febrile to 100.8F. CXR demonstrated increased left pleural effusion with unchanged patchy B/L consolidation L>R. Patient had IR thoracentesis on 1/4/23- bloody tap. CT chest on 1/5 showed left sided hemothorax. Hb dropped from 9.1 to 5.6. s/p left chest tube placement on 1/5/23. S/p PRBC transfusion    On 1/9 pt transpored to ICU and was intubated and treated for pneumonia. On 1/12 she was being weaned from vent. Before formal SBT completed pt self extubated. Previous 24 Hours    No events overnight    [facility] Day 8    1/9 - at shift change, pt notably tachypneic to 40 in sig respiratory distress on HFNC. Decision made to intubate after clarifying goals of care established with Dr. Guadalupe Balderrama, who had admitted patient to ICU yesterday and spoken with patient specifically about them. Patient unable to speak for herself at this time. 1/10 - Intubated yesterday, started on CRRT  01/11 - Patient's family refused Bronch yesterday, sister stated she was unsure she wanted any further procedures and she needed to discuss it with her family. Advised against delay but no consent given.    Extremly agitated this a.m with tachycardia / diaphoresis / attempting to pull her own ETT    Critical Care Problems    Patient Active Problem List   Diagnosis Code    Menopausal symptoms N95.1    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 ([facility] [address] C50.011, C50.012    Pneumonia J18.9    CHRISTA (acute kidney injury) ([facility] [address] N17.9    COVID-19 U07.1    Troponin level elevated R77.8    Elevated LFTs R79.89    Bacterial pneumonia J15.9    Staphylococcus aureus pneumonia (HCC) J15.211    Shortness of breath R06.02    Acute blood loss anemia D62    Hemothorax on left J94.2    Anxiety F41.9    Palliative care by specialist Z51.5    Hemothorax J94.2         Plan    Neuro  Depression  - prior to to intubation pt ws informed that her pleural effusion may represent malignancy and she became depressed and distant as described by Oncology. Today post extubation she was dispondent saying \" take me out\". - currently is calm but may need anti-depressant. CVS  #. Hypotension, unclear etio, septic vs hypovolemic  #. NSTEMI  #. Elevated BNP  - MAP > 65; now off pressors  - reviewed cardiology note    Pulm  #. Acute hypoxic respiratory failure, intubated   #. Pulmonary edema vs PNA vs  Keytruda pneumonitis  - weaned vent today and pt self- extubated before SBT completed; I was called to bedside when vent was alarming and noted ET tube pulled out so deflated balloon and took out the ETT; pt clinically did will and only required NC O2. Prior to self-extubation she was doing well on PS trial.  - continue steroids, but will decrease dose to 20 mg q12h and/abx     GI  #. Elevated LFTs, etiology unclear              No prolonged periods of hypotension              US abd with doppler unrevealing   - now with synthetic dysfunction  - monitor for signs of bleeding  - trend INR; now INR 2.5  - appreciate GI input    Renal  #. CHRISTA on CKD 4, prerenal vs ATN  - Renal consulted  -  on CRRT; plan is to convert to HD    Endo  - accuchecks q6h, insulin prn  - monitor closely for refeeding with TF    ID  #. Multifocal MSSA PNA   Sputum now with GNR and elevated procal   - cont.  zosyn     Heme  - on sq heparin    Vitals  Visit Vitals  /67 (BP 1 Location: Right upper arm, BP Patient Position: At rest)   Pulse 88   Temp 98.8 °F (37.1 °C)   Resp (!) 33   Ht 5' (1.524 m)   Wt 69.3 kg (152 lb 12.5 oz)   SpO2 99%   BMI 29.84 kg/m²    O2 Flow Rate (L/min): 2 l/min O2 Device: Nasal cannula Temp (24hrs), Av.2 °F (37.3 °C), Min:98.1 °F (36.7 °C), Max:100.1 °F (37.8 °C)           Intake/Output:     Intake/Output Summary (Last 24 hours) at 1/12/2023 1641  Last data filed at 1/12/2023 1600  Gross per 24 hour   Intake 1893.41 ml   Output 3543 ml   Net -1649.59 ml         Physical exam:    Physical Exam  Eyes:      Pupils: Pupils are equal, round, and reactive to light. Cardiovascular:      Rate and Rhythm: Regular rhythm. Tachycardia present. Heart sounds: Normal heart sounds. Pulmonary:      Breath sounds: Normal breath sounds. No stridor. No wheezing. Abdominal:      Palpations: Abdomen is soft. Skin:     General: Skin is warm and dry. Neurological:      General: No focal deficit present. I have examined the patient on this day 1/12/2023 and the above documented exam is accurate including the components that have been copied forward    LABS AND  DATA: Personally reviewed  Recent Labs     01/12/23  0410 01/11/23  1445   WBC 14.7* 14.7*   HGB 7.7* 7.3*   HCT 22.3* 21.6*   PLT 83* 77*     Recent Labs     01/12/23  0410 01/11/23  1445    141   K 4.4 4.8    107   CO2 25 26   BUN 30* 27*   CREA 2.15* 2.39*   * 173*   CA 7.4* 7.1*   MG 2.8* 2.6*   PHOS 1.9* 3.1     Recent Labs     01/12/23  0410 01/10/23  0235   AP 1,268* 993*   TP 5.3* 5.0*   ALB 1.8* 1.6*   GLOB 3.5 3.4     Recent Labs     01/12/23  0410 01/11/23  1034 01/10/23  0235   INR 2.5* 4.0* 4.9*   PTP 24.3* 38.2* 46.4*   APTT  --   --  54.1*      No results for input(s): PHI, PCO2I, PO2I, FIO2I in the last 72 hours. No results for input(s): CPK, CKMB, TROIQ, BNPP in the last 72 hours.     Hemodynamics:   PAP:   CO:     Wedge:   CI:     CVP:    SVR:       PVR:       Ventilator Settings:  Mode Rate Tidal Volume Pressure FiO2 PEEP   (S) Pressure support   320 ml  10 cm H2O 30 % 5 cm H20     Peak airway pressure: 16 cm H2O    Minute ventilation: 7.07 l/min        MEDS: Reviewed    Chest X-Ray:  CXR Results  (Last 48 hours)                 01/12/23 0503  XR CHEST PORT Final result Impression:      Improved interstitial opacities and airspace opacities bilaterally with some   residual noted in the lung bases. Small left pleural effusion. .           Narrative:  EXAM:  XR CHEST PORT       INDICATION: Intubated       COMPARISON: 1/11/2023       TECHNIQUE: Semierect portable chest AP view       FINDINGS: Tubes and lines are stable. The cardiac silhouette is within normal   limits. Significant improvement and pulmonary edema with some residual.        Bibasilar airspace opacities improved but remain. Small left pleural effusions   unchanged. The visualized bones and upper abdomen are age-appropriate. 01/11/23 0521  XR CHEST PORT Final result    Impression:      Improved aeration the right upper lobe otherwise no significant change           Narrative:  EXAM:  XR CHEST PORT       INDICATION: Intubated       COMPARISON: 1/10/2023       TECHNIQUE: Semierect portable chest AP view       FINDINGS: Tubes and lines are stable. Heart size enlarged. The pulmonary   vasculature is within normal limits. Small left pleural effusion persists status post left-sided chest tube   placement. End preliminary aeration the right upper lobe. Multidisciplinary Rounds Completed:  Yes      DISPOSITION  Stay in ICU    CRITICAL CARE CONSULTANT NOTE  I had a in-person encounter with Tonya Essex, reviewed and interpreted patient data including events, labs, images, vital signs, I/O's, and examined patient. I have discussed the case and the plan and management of the patient's care with the consulting services, the bedside nurses and the respiratory therapist.      NOTE OF PERSONAL INVOLVEMENT IN CARE   This patient is at high risk for sudden and clinically significant deterioration, which requires the highest level of preparedness to intervene urgently.  I participated in the decision-making and personally managed or directed the management of the following life and organ supporting interventions that required my frequent assessment to treat or prevent imminent deterioration. I personally spent 50 minutes of critical care time. This is time spent at patient's bedside actively involved in patient care as well as the coordination of care and discussions with the patient's family. This does not include any procedural time which has been billed separately.       ------------------------------------------------------------------------------    Arlin Busch MD, PhD  [address]  [phone]

## 2023-01-12 NOTE — PROGRESS NOTES
[facility] [place] at [address], [place], [address] [facility]  W: [phone] F: [phone]    Social visit at bedside with sister. Sister Cecy Camargo at bedside. Discussed patient's decline since hospitalization. Cecy Camargo had questions regarding cytology results. She is trying to arrange a family meeting although this is complicated by her nephew who is a  and can only meet on the weekends. She is aware that neither palliative or oncology is available to meet on the weekends with her. Discussed attempting to coordinate a conference call for meeting via [address] [place] [address] Avenue so the nephew can be present. Oncology is available for family meeting if needed.

## 2023-01-12 NOTE — PROGRESS NOTES
Problem: Non-Violent Restraints  Goal: Removal from restraints as soon as assessed to be safe  1/12/2023 1344 by Ami Morales RN  Outcome: Resolved/Met  1/12/2023 1111 by Ami Morales RN  Outcome: Progressing Towards Goal  Goal: No harm/injury to patient while restraints in use  1/12/2023 1344 by Ami Morales RN  Outcome: Resolved/Met  1/12/2023 1111 by Ami Morales RN  Outcome: Progressing Towards Goal  Goal: Patient's dignity will be maintained  1/12/2023 1344 by Ami Morales RN  Outcome: Resolved/Met  1/12/2023 1111 by Ami Morales RN  Outcome: Progressing Towards Goal  Goal: Patient Interventions  1/12/2023 1344 by Ami Morales RN  Outcome: Resolved/Met  1/12/2023 1111 by Ami Morales RN  Outcome: Progressing Towards Goal

## 2023-01-12 NOTE — PROGRESS NOTES
Cardiology Progress Note  1/12/2023    Admit Date: 1/3/2023  Admit Diagnosis: Staphylococcus aureus pneumonia ([facility] [address] [J15.211]  CC:  None currently  Cardiologist:  Dr Jessy Gonsalves. Cardiac Assessment/Plan:    1) Atypical CP, for yrs: Neg [facility] 9/2021 (6:00, -CP, -ecg; Mild LVH). 2) Abnl ecg c/w LVH. *\"EF 45-50%; No valve dz:\" @ [facility] by RCA. (EF not that low on my interp; EF 50-55%). 3) HTN  4) Palpitations (few x/yr; few sec). 5) FHx early CAD/CHF  6) Breast CA, left; Chemo (Dr GÉNESIS Chacon @ [address] [place] [address])    Admitted from 12/28 to 1/1/23 @ 8 [facility] for PNA/hemoptysis/covid/sepsis/CHRISTA/NQWMI w/ trop 100s    Admitted 1/3/23 w/ dyspnea/sepsis; subsequent renal failure/hypotension/hemothorax w/ chest tube; Resp failure 1/9 w/ intubation. Type II NQWMI. Neg blood Cx. Hepatic failure. Chest tube 1/5/23. Intubated 1/9/23. Off pressors 1/10/23. Echo 1/10/23: Left Ventricle: Mildly reduced left ventricular systolic function with a visually estimated EF of 45 - 50%. Left ventricle size is normal. Increased wall thickness. CXR: 1/10/23: \"The pulmonary vasculature is within normal limits. Diffuse airspace opacity in the right lung. Partial opacification of the left upper lobe. No change. \"    1/11: Remains intubated; More alert/agitated. CT remains in (min outpt per nursing)  BPs 100-120s; HR 70-80s; sinus; 96% vent (30%; PEEP 5); anuric. WBC 15; Hg 7.5; pt 91; BUN 29; Cr 2.8; procal 13. Cardiac meds; None (Holding coreg 6.25 bid & crestor 10). Rec: Add back asa when able; Restart coreg @ 3.125 bid if BPs stable today. 1/12: Remains intubated/sedated; CT remains in.  BPs 73-110s; Hr 70-80s; Sinus; 100% vent (30%; PEEP 5). Anuric. WBC 15; Hg 7.7; plt 83. Cr 2.15; alb 1.8; LFTs 1ks; INR 2.5    Cardiac meds; None (off coreg 6.25 bid & crestor 10). Rec:  Add back asa when able; Restart coreg @ 3.125 bid when BPs consistently stable.    _________________________________________________________  Echo 12/27/22: Left Ventricle: Normal left ventricular systolic function with a visually estimated EF of 50 - 55%. Left ventricle size is normal. Normal wall thickness. Mild hypokinesis of the following segments: basal inferolateral and mid inferolateral. Unfortunately, imaging quality limis the accuracy     Chest tube 1/5/23. Intubated 1/9/23. Complicated clinical course as noted below; Currently intubated/sedated  Current cardiac meds:  Levo @ 15; crestor 10 (holding coreg 6.25 bid)  IMP: type II NQWMI;      Rec: cont supportive care; wean pressors as able; hold crestor. Future aspirin when bleeding resolved. ____________________________________________________________________  Chayito Lozoya is a 57 y.o. female presents with Staphylococcus aureus pneumonia ([address] Earla Blade. Admitted from 12/28 to 1/1/23 @ [facility] for PNA/hemoptysis/covid/sepsis/CHRISTA/NQWMI w/ trop 100s        As noted in H&P: \"56 y.o.  female with pertinent past medical history of breast cancer currently off chemotherapy, MDD/TAIWO, hypothyroidism, GERD, and recent hospitalization from 12/28-1/1 for multifocal pneumonia secondary to MSSA pneumonia superimposed on COVID-19 pneumonia with course complicated by hemoptysis, sepsis, CHRISTA, and type II MI completed course of cefepime and doxycycline transition to Augmentin on time of discharge-patient unsure if she has been taking that who presents with complaints of persistent decline since discharge from OSH. She complains of malaise, fever/chills, cough productive of blood-tinged sputum, shortness of breath/dyspnea on exertion. She reports she does not feel she is doing well at home and think she needs to be in the hospital for further management. She denies tobacco, alcohol, or illicit drug use. ROS otherwise negative.      In the ED, patient febrile to 100.8 °F, hemodynamically stable (hypertensive 150s/60s), saturating mid 90s on room air. ECG demonstrates NSR with LVH criteria, CXR demonstrates increased left pleural effusion and left lower lobe airspace opacity and CT chest demonstrates increased moderately large left pleural effusion with unchanged patchy bilateral consolidation left greater than right compatible with pneumonia. Rapid and flu negative. Point-of-care lactic 0.7, proBNP 1091(Previously 866), high since her troponin 116 down trended to 107 (600s at prior hospitalization, sodium 142, potassium 3.4, BUN 38, creatinine 3.0 (baseline), WBC 13.3 (slightly increased from time of discharge), hemoglobin 9.1 (improved from prior), platelets 159. Given recent hospitalization and concern for hep patient given vancomycin, Levaquin, and cefepime by ED provider. \"     ICU MD 1/8/22: \"Patient being transferred to ICU as the PCU nurses do not feel comfortable keeping the patient on the floor as she was hypotensive earlier and has been refusing po meds     57year old Female with hx of left breast cancer, recently admitted to [facility] for sepsis s/s COVID-19, CHRISTA, NSTEMI, MSSA pneumonia(12/28-1/1) presented to ED with c/o shortness of breath and hemoptysis. IN ED, patient was febrile to 100.8F. CXR demonstrated increased left pleural effusion with unchanged patchy B/L consolidation L>R. Patient had IR thoracentesis on 1/4/23- bloody tap. CT chest on 1/5 showed left sided hemothorax. Hb dropped from 9.1 to 5.6. s/p left chest tube placement on 1/5/23. S/p PRBC transfusion. \"     ______________________________________________________________________     The patient is intubated; can provide no history. On accelerating levophed (15 currently); on HD. No PND, orthopnea, palpitations, pre-syncope, syncope, peripheral edema, or decrease in exercise tolerance. No current complaints. ECG 1/3/22:   Sinus, leftward axis, no acute changes. LVH. Tele: sinus  CXR: \"1. Endotracheal tube terminates 1 cm above the marvin.  Consider retraction 1 to  2 cm. 2. Appropriate positioning of left IJ central venous catheter. No pneumothorax. 3. Slight interval improvement in extensive bilateral airspace disease. 4. Left chest tube remains in place. Suspected small left pleural effusion. \"     Notable labs: K 4.5; BUN 61; Cr 7.35 (prev 3 range). Alb 1.9; LFTs 900s/2000; Prev procal 7; proBNP 9k; neg covid 1/3/23. INR 2.7.  ___________________________________________________  Notable prior cardiac history:  @ NP OV 10/27/22:  Ms Claude Neve has a h/o:  1) Atypical CP, for yrs: Neg [facility]H 9/2021 (6:00, -CP, -ecg; Mild LVH). 2) Abnl ecg c/w LVH. *\"EF 45-50%; No valve dz:\" @ [facility] by RCA. (EF not that low on my interp; EF 50-55%). 3) HTN  4) Palpitations (few x/yr; few sec). 5) FHx early CAD/CHF  6) Breast CA, left; Chemo pending (Dr Lilian Carlos @ [facility])  No ethanol, added salt, or nicotine. 1 caff johnny/d. 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 She would like an annual visit here. 8/2021:  She has atypical chest pain (for years; 0-1 X/mo; sharp sensation; few seconds; random onset/offset). She also has intermittent palpitations (fast HR sensation, few X/year; few seconds duration, not related to chest pain). No MCCALL with good exercise tolerance. No previous cardiac evaluation. PCP note from 4/28/06 reviewed; ECG from then interpreted today as noted below. 2/2022: no recent chest pain, dyspnea, or palpitations. She would like an annual visit here. 10/2022: Chest pain remains resolved. No dyspnea nor palpitations. Reportedly mild LV dysfunction as noted above; in my review of the echo, the EF is not that low. NP F/U: Doing well. No CP, MCCALL, palps, edema or syncope. Home SBPs in the 130s. IMPRESSION AND PLAN  01. Other chest pain (R07.89): Atypical chest pain remains resolved; Neg [facility] 9/2021. We discussed the signs and symptoms of unstable angina, myocardial infarction and malignant arrhythmia.   The patient knows to seek immediate medical attention should they occur. 02. Essential (primary) hypertension (I10):  Reasonably well controlled; continue Losartan 25mg QD and increase Coreg to 6.25mg BID. Continue to monitor BP.   03. Other cardiomyopathies (I42.8):  EF looks reassuring on echo per review; repeat planned for next month. 04. Abnormal electrocardiogram [ECG] [EKG] (R94.31):  Nonspecific finding. This condition is stable. 05. Malignant neoplasm of left breast in female, estrogen receptor negative, unspecified site of breast (C50.912): Managed by: Other Physician   06. Long term (current) use of aspirin (Z79.82)   07. Body mass index [BMI] 27.0-27.9, adult (Z68.27)      ORDERS:    Dietary management education, guidance, and counseling           10/27/22 MEDICATION LIST  Medication Sig Desc Non-VCS   aspirin 81 mg tablet,delayed release take 1 tablet by oral route  every day Y   carvedilol 6.25 mg tablet take 1 tablet by oral route 2 times every day with food N   Crestor take 1 Tablet by oral route  every week Y   losartan 25 mg tablet take 1 tablet by oral route  every day N   Multi Vitamin 9 mg iron/15 mL oral liquid   Y   paroxetine 20 mg tablet take 1 tablet by oral route  every day Y      ______________________________________  CARDIAC HISTORY  RISK FACTORS:  1 Hypertension         CARDIOVASCULAR PROCEDURES  Procedure Date Results   Echo 10/05/2022 \"EF 45-50%; No valve dz:\" @ [facility] by RCA. (EF not that low on my interp). EKG 10/13/2022 Sinus Rhythm, LVH; axis -15°; nonspecific T-wave changes. [facility] 10/01/2021 Normal EF, No Ischemia, 6:00, -CP, -ecg; Mild LVH. For other plans, see orders.   High complexity decision making was performed  X Yes   High-risk of decompensation with multiple organ involvement X Yes   Hospital problem list     Active Hospital Problems    Diagnosis Date Noted    Hemothorax 01/06/2023    Shortness of breath 01/05/2023    Acute blood loss anemia 01/05/2023    Hemothorax on left 01/05/2023    Anxiety 2023    Palliative care by specialist 2023    Staphylococcus aureus pneumonia ([facility] [address] 2023                                                         Subjective:  Patient reports  [x]   nothing; unable to communicate    [x]   intubated     Chest pain  none  consistent with:  Non-cardiac CP         Atypical CP     None now  On going  Anginal CP     Dyspnea  none  at rest  with exertion         improved  unchanged  worse              PND  none  overnight       Orthopnea  none  improved  unchanged  worse   Presyncope  none  improved  unchanged  worse   Ambulated in hallway without symptoms   Yes   Ambulated in room without symptoms  Yes     ROS Hematuria:  Yes X No Dysuria:  Yes X No                (2+  other systems) Cough: Yes X No Sputum: Yes X No                 BRBPR:  Yes X No Melena: Yes X No   No change in family and social history from H&P/Consult note.   Objective:    Physical Exam:  24 hr VS reviewed, overall VSSAF  Temp (24hrs), Av.1 °F (37.3 °C), Min:98.1 °F (36.7 °C), Max:100.1 °F (37.8 °C)    Patient Vitals for the past 8 hrs:   Pulse   23 1145 86   23 1137 80   23 1100 80   23 1030 75   23 1015 79   23 1000 77   23 0945 80   23 0930 77   23 0915 70   23 0900 75   23 0855 77   23 0830 82   23 0800 76   23 0745 75   23 0713 74   23 0700 74   23 0600 75   23 0500 81   23 0434 85       Patient Vitals for the past 8 hrs:   Resp   23 1145 23   23 1137 22   23 1100 10   23 1030 16   23 1015 19   23 1000 19   23 0945 (!) 0   23 0930 18   23 0915 16   23 0900 20   23 0855 15   23 0830 22   23 0800 22   23 0745 22   23 0713 22   23 0700 22   23 0600 22   23 0500 22   23 0434 22       Patient Vitals for the past 8 hrs:   BP   23 1145 (!) 99/56   23 1100 93/61 01/12/23 1030 (!) 95/58   01/12/23 1015 (!) 91/57   01/12/23 1000 (!) 85/53   01/12/23 0945 (!) 90/54   01/12/23 0930 (!) 90/57   01/12/23 0915 (!) 106/59   01/12/23 0900 111/63   01/12/23 0855 103/63   01/12/23 0830 (!) 73/55   01/12/23 0800 (!) 110/59   01/12/23 0745 (!) 104/57   01/12/23 0700 (!) 108/55   01/12/23 0600 112/62           Intake/Output Summary (Last 24 hours) at 1/12/2023 1201  Last data filed at 1/12/2023 1100  Gross per 24 hour   Intake 1782.65 ml   Output 3605 ml   Net -1822.35 ml       General:  WD,WN  Elderly  Cachetic x NAD     Agitated  Lethargic  Arousable  Obtunded    x Sedated  On Bipap x Intubated                  ENT/Palate: WNL  Dry MM x anicteric x ETT in place              Respiratory: X CTA ant  Nl resp effort  Increased effort  No significant change     rhonchi  rales  improved  worse              Cardiovasc: X RRR  IRRR X Nl S1, S2 x No rub     No murmur X No new murmur  Murmur c/w: x No gallop    x No edema  BLE edema:+  RLE edema:+  LLE edema:+     Edema less  Edema more  Edema same  Edema worse    X Nl JVP  Elevated JVP  JVP same  JVP worse    X Carotid wnl x abd aorta not palpated X no peripheral emboli noted                GI: X abd soft x nondistended X BS present X No organo- megaly noted              Skin: X Warm, dry  Cold extremites                  Neuro:  A/O  Grossly non- focal  Obtunded x Sedated     Lethargic  Arousable x intubated       cath site intact w/o hematoma or new bruit; distal pulse unchanged  Yes   Data Review:     Telemetry independently reviewed x sinus  chronic afib  parox afib  NSVT     ECG independently reviewed  NSR  afib  no significant changes  NSST-Tw chgs     x no new ECG provided for review   Lab results reviewed as noted below. Current medications reviewed as noted below. No results for input(s): PH, PCO2, PO2 in the last 72 hours. No results for input(s): CPK, CKMB, CKNDX, TROIQ in the last 72 hours.   Recent Labs     01/12/23  1864 01/11/23  1445 01/11/23  0301 01/10/23  1600 01/10/23  0235    141 138   < > 139   K 4.4 4.8 4.7   < > 4.5    107 105   < > 104   CO2 25 26 26   < > 27   BUN 30* 27* 29*   < > 41*   CREA 2.15* 2.39* 2.79*   < > 4.78*   * 173* 177*   < > 140*   PHOS 1.9* 3.1 2.2*   < > 3.7   CA 7.4* 7.1* 7.5*   < > 7.4*   ALB 1.8*  --   --   --  1.6*   WBC 14.7* 14.7* 14.9*   < > 16.5*   HGB 7.7* 7.3* 7.5*   < > 7.9*   HCT 22.3* 21.6* 22.4*   < > 22.9*   PLT 83* 77* 91*   < > 104*    < > = values in this interval not displayed. Recent Labs     01/12/23  0410 01/10/23  0235   AP 1,268* 993*   TBILI 8.0* 2.8*   TP 5.3* 5.0*   ALB 1.8* 1.6*   GLOB 3.5 3.4       Recent Labs     01/12/23  0410 01/11/23  1034 01/10/23  0235   INR 2.5* 4.0* 4.9*   PTP 24.3* 38.2* 46.4*   APTT  --   --  54.1*        Recent Labs     01/12/23  0410   TIBC 178*   PSAT INDETERMINATE - SENT TO REFERENCE LAB FOR ADDITIONAL TESTING.         Lab Results   Component Value Date/Time    Glucose (POC) 194 (H) 01/12/2023 06:19 AM    Glucose (POC) 161 (H) 01/11/2023 05:18 PM    Glucose (POC) 165 (H) 01/09/2023 11:43 AM    Glucose (POC) 160 (H) 01/08/2023 08:40 PM    Glucose (POC) 86 01/08/2023 03:43 PM       Current Facility-Administered Medications   Medication Dose Route Frequency    phosphorus (K PHOS NEUTRAL) 250 mg tablet 2 Tablet  2 Tablet Per NG tube BID    glucose chewable tablet 16 g  4 Tablet Oral PRN    glucagon (GLUCAGEN) injection 1 mg  1 mg IntraMUSCular PRN    dextrose 10% infusion 0-250 mL  0-250 mL IntraVENous PRN    insulin lispro (HUMALOG) injection   SubCUTAneous Q6H    methylPREDNISolone (PF) (SOLU-MEDROL) injection 20 mg  20 mg IntraVENous Q12H    piperacillin-tazobactam (ZOSYN) 3.375 g in 0.9% sodium chloride (MBP/ADV) 100 mL MBP  3.375 g IntraVENous Q8H    epoetin tyler-epbx (RETACRIT) injection 10,000 Units  10,000 Units SubCUTAneous Q TUE, THU & SAT    alcohol 62% (NOZIN) nasal  1 Ampule  1 Ampule Topical Q12H chlorhexidine (ORAL CARE KIT) 0.12 % mouthwash 15 mL  15 mL Oral Q12H    balsam peru-castor oiL (VENELEX) ointment   Topical BID    bicarbonate dialysis (PRISMASOL) BG K 4/Ca 2.5 5000 ml solution   Extracorporeal DIALYSIS CONTINUOUS    levoFLOXacin (LEVAQUIN) 500 mg in D5W IVPB  500 mg IntraVENous Q24H    dexmedeTOMidine in 0.9 % NaCl (PRECEDEX) 400 mcg/100 mL (4 mcg/mL) infusion soln  0.1-1.5 mcg/kg/hr IntraVENous TITRATE    PARoxetine (PAXIL) tablet 20 mg  20 mg Per NG tube DAILY    famotidine (PF) (PEPCID) 20 mg in 0.9% sodium chloride 10 mL injection  20 mg IntraVENous Q12H    heparin (porcine) injection 5,000 Units  5,000 Units SubCUTAneous Q12H    HYDROmorphone (DILAUDID) injection 0.5 mg  0.5 mg IntraVENous Q4H PRN    metoprolol (LOPRESSOR) injection 2.5 mg  2.5 mg IntraVENous Q6H PRN    0.9% sodium chloride infusion 250 mL  250 mL IntraVENous PRN    0.9% sodium chloride infusion 250 mL  250 mL IntraVENous PRN    lidocaine (XYLOCAINE) 4 % cream   Topical PRN    [Held by provider] carvediloL (COREG) tablet 6.25 mg  6.25 mg Oral BID WITH MEALS    [Held by provider] gabapentin (NEURONTIN) capsule 100 mg  100 mg Oral TID    [Held by provider] rosuvastatin (CRESTOR) tablet 10 mg  10 mg Oral QHS    sodium chloride (NS) flush 5-40 mL  5-40 mL IntraVENous Q8H    sodium chloride (NS) flush 5-40 mL  5-40 mL IntraVENous PRN    acetaminophen (TYLENOL) tablet 650 mg  650 mg Oral Q6H PRN    Or    acetaminophen (TYLENOL) suppository 650 mg  650 mg Rectal Q6H PRN    polyethylene glycol (MIRALAX) packet 17 g  17 g Oral DAILY PRN    ondansetron (ZOFRAN ODT) tablet 4 mg  4 mg Oral Q8H PRN    Or    ondansetron (ZOFRAN) injection 4 mg  4 mg IntraVENous Q6H PRN         Khalida Randolph MD

## 2023-01-13 ENCOUNTER — APPOINTMENT (OUTPATIENT)
Dept: GENERAL RADIOLOGY | Age: 63
DRG: 208 | End: 2023-01-13
Attending: INTERNAL MEDICINE
Payer: COMMERCIAL

## 2023-01-13 PROBLEM — Z71.89 GOALS OF CARE, COUNSELING/DISCUSSION: Status: ACTIVE | Noted: 2023-01-13

## 2023-01-13 PROBLEM — Z71.89 ADVANCED DIRECTIVES, COUNSELING/DISCUSSION: Status: ACTIVE | Noted: 2023-01-13

## 2023-01-13 LAB
A1AT SERPL-MCNC: 274 MG/DL (ref 101–187)
ALBUMIN SERPL-MCNC: 1.7 G/DL (ref 3.5–5)
ALBUMIN/GLOB SERPL: 0.5 (ref 1.1–2.2)
ALP SERPL-CCNC: 1067 U/L (ref 45–117)
ALT SERPL-CCNC: 858 U/L (ref 12–78)
ANA SER QL: POSITIVE
ANION GAP SERPL CALC-SCNC: 8 MMOL/L (ref 5–15)
APTT PPP: 39.9 SEC (ref 22.1–31)
AST SERPL-CCNC: 606 U/L (ref 15–37)
BACTERIA SPEC CULT: ABNORMAL
BACTERIA SPEC CULT: ABNORMAL
BASOPHILS # BLD: 0 K/UL (ref 0–0.1)
BASOPHILS NFR BLD: 0 % (ref 0–1)
BILIRUB DIRECT SERPL-MCNC: 6.6 MG/DL (ref 0–0.2)
BILIRUB SERPL-MCNC: 8.1 MG/DL (ref 0.2–1)
BUN SERPL-MCNC: 51 MG/DL (ref 6–20)
BUN/CREAT SERPL: 14 (ref 12–20)
CALCIUM SERPL-MCNC: 7.5 MG/DL (ref 8.5–10.1)
CENTROMERE B AB SER-ACNC: <0.2 AI (ref 0–0.9)
CERULOPLASMIN SERPL-MCNC: 20.2 MG/DL (ref 19–39)
CHLORIDE SERPL-SCNC: 106 MMOL/L (ref 97–108)
CHROMATIN AB SERPL-ACNC: <0.2 AI (ref 0–0.9)
CO2 SERPL-SCNC: 26 MMOL/L (ref 21–32)
CREAT SERPL-MCNC: 3.6 MG/DL (ref 0.55–1.02)
DIFFERENTIAL METHOD BLD: ABNORMAL
DSDNA AB SER-ACNC: <1 IU/ML (ref 0–9)
ENA JO1 AB SER-ACNC: <0.2 AI (ref 0–0.9)
ENA RNP AB SER-ACNC: 0.6 AI (ref 0–0.9)
ENA SCL70 AB SER-ACNC: 1 AI (ref 0–0.9)
ENA SM AB SER-ACNC: <0.2 AI (ref 0–0.9)
ENA SS-A AB SER-ACNC: <0.2 AI (ref 0–0.9)
ENA SS-B AB SER-ACNC: <0.2 AI (ref 0–0.9)
EOSINOPHIL # BLD: 0 K/UL (ref 0–0.4)
EOSINOPHIL NFR BLD: 0 % (ref 0–7)
ERYTHROCYTE [DISTWIDTH] IN BLOOD BY AUTOMATED COUNT: 17 % (ref 11.5–14.5)
GLOBULIN SER CALC-MCNC: 3.3 G/DL (ref 2–4)
GLUCOSE BLD STRIP.AUTO-MCNC: 106 MG/DL (ref 65–117)
GLUCOSE BLD STRIP.AUTO-MCNC: 112 MG/DL (ref 65–117)
GLUCOSE BLD STRIP.AUTO-MCNC: 113 MG/DL (ref 65–117)
GLUCOSE BLD STRIP.AUTO-MCNC: 139 MG/DL (ref 65–117)
GLUCOSE SERPL-MCNC: 146 MG/DL (ref 65–100)
GRAM STN SPEC: ABNORMAL
HCT VFR BLD AUTO: 20.6 % (ref 35–47)
HGB BLD-MCNC: 6.9 G/DL (ref 11.5–16)
HISTORY CHECKED?,CKHIST: NORMAL
IGG4 SER-MCNC: 22 MG/DL (ref 2–96)
IMM GRANULOCYTES # BLD AUTO: 0.2 K/UL (ref 0–0.04)
IMM GRANULOCYTES NFR BLD AUTO: 1 % (ref 0–0.5)
INR PPP: 1.9 (ref 0.9–1.1)
LYMPHOCYTES # BLD: 2.3 K/UL (ref 0.8–3.5)
LYMPHOCYTES NFR BLD: 13 % (ref 12–49)
MAGNESIUM SERPL-MCNC: 2.8 MG/DL (ref 1.6–2.4)
MCH RBC QN AUTO: 30 PG (ref 26–34)
MCHC RBC AUTO-ENTMCNC: 33.5 G/DL (ref 30–36.5)
MCV RBC AUTO: 89.6 FL (ref 80–99)
MITOCHONDRIA M2 IGG SER-ACNC: 23.7 UNITS (ref 0–20)
MONOCYTES # BLD: 1.1 K/UL (ref 0–1)
MONOCYTES NFR BLD: 6 % (ref 5–13)
NEUTS SEG # BLD: 14 K/UL (ref 1.8–8)
NEUTS SEG NFR BLD: 80 % (ref 32–75)
NRBC # BLD: 0.08 K/UL (ref 0–0.01)
NRBC BLD-RTO: 0.5 PER 100 WBC
PHOSPHATE SERPL-MCNC: 3.9 MG/DL (ref 2.6–4.7)
PLATELET # BLD AUTO: 70 K/UL (ref 150–400)
PMV BLD AUTO: 12.2 FL (ref 8.9–12.9)
POTASSIUM SERPL-SCNC: 4.7 MMOL/L (ref 3.5–5.1)
PROCALCITONIN SERPL-MCNC: 6.35 NG/ML
PROT SERPL-MCNC: 5 G/DL (ref 6.4–8.2)
PROTHROMBIN TIME: 18.8 SEC (ref 9–11.1)
RBC # BLD AUTO: 2.3 M/UL (ref 3.8–5.2)
SEE BELOW, 164869: ABNORMAL
SERVICE CMNT-IMP: ABNORMAL
SERVICE CMNT-IMP: ABNORMAL
SERVICE CMNT-IMP: NORMAL
SMA IGG SER-ACNC: 12 UNITS (ref 0–19)
SODIUM SERPL-SCNC: 140 MMOL/L (ref 136–145)
THERAPEUTIC RANGE,PTTT: ABNORMAL SECS (ref 58–77)
WBC # BLD AUTO: 17.5 K/UL (ref 3.6–11)

## 2023-01-13 PROCEDURE — 82962 GLUCOSE BLOOD TEST: CPT

## 2023-01-13 PROCEDURE — 86923 COMPATIBILITY TEST ELECTRIC: CPT

## 2023-01-13 PROCEDURE — 74011250636 HC RX REV CODE- 250/636: Performed by: INTERNAL MEDICINE

## 2023-01-13 PROCEDURE — 74011636637 HC RX REV CODE- 636/637: Performed by: NURSE PRACTITIONER

## 2023-01-13 PROCEDURE — 90935 HEMODIALYSIS ONE EVALUATION: CPT

## 2023-01-13 PROCEDURE — 86900 BLOOD TYPING SEROLOGIC ABO: CPT

## 2023-01-13 PROCEDURE — 74011000258 HC RX REV CODE- 258: Performed by: ANESTHESIOLOGY

## 2023-01-13 PROCEDURE — 84100 ASSAY OF PHOSPHORUS: CPT

## 2023-01-13 PROCEDURE — 74011000250 HC RX REV CODE- 250: Performed by: STUDENT IN AN ORGANIZED HEALTH CARE EDUCATION/TRAINING PROGRAM

## 2023-01-13 PROCEDURE — 83735 ASSAY OF MAGNESIUM: CPT

## 2023-01-13 PROCEDURE — 80048 BASIC METABOLIC PNL TOTAL CA: CPT

## 2023-01-13 PROCEDURE — 92610 EVALUATE SWALLOWING FUNCTION: CPT

## 2023-01-13 PROCEDURE — 77010033678 HC OXYGEN DAILY

## 2023-01-13 PROCEDURE — 74011000250 HC RX REV CODE- 250: Performed by: NURSE PRACTITIONER

## 2023-01-13 PROCEDURE — 74011250636 HC RX REV CODE- 250/636: Performed by: NURSE PRACTITIONER

## 2023-01-13 PROCEDURE — 80076 HEPATIC FUNCTION PANEL: CPT

## 2023-01-13 PROCEDURE — 85610 PROTHROMBIN TIME: CPT

## 2023-01-13 PROCEDURE — 85730 THROMBOPLASTIN TIME PARTIAL: CPT

## 2023-01-13 PROCEDURE — P9016 RBC LEUKOCYTES REDUCED: HCPCS

## 2023-01-13 PROCEDURE — 65270000046 HC RM TELEMETRY

## 2023-01-13 PROCEDURE — 74011250637 HC RX REV CODE- 250/637: Performed by: INTERNAL MEDICINE

## 2023-01-13 PROCEDURE — 36415 COLL VENOUS BLD VENIPUNCTURE: CPT

## 2023-01-13 PROCEDURE — 85025 COMPLETE CBC W/AUTO DIFF WBC: CPT

## 2023-01-13 PROCEDURE — 84145 PROCALCITONIN (PCT): CPT

## 2023-01-13 PROCEDURE — 74011250636 HC RX REV CODE- 250/636: Performed by: PHYSICIAN ASSISTANT

## 2023-01-13 PROCEDURE — 99232 SBSQ HOSP IP/OBS MODERATE 35: CPT | Performed by: NURSE PRACTITIONER

## 2023-01-13 PROCEDURE — 71045 X-RAY EXAM CHEST 1 VIEW: CPT

## 2023-01-13 PROCEDURE — 74011000258 HC RX REV CODE- 258: Performed by: INTERNAL MEDICINE

## 2023-01-13 PROCEDURE — 74011250636 HC RX REV CODE- 250/636: Performed by: ANESTHESIOLOGY

## 2023-01-13 RX ORDER — LEVOFLOXACIN 5 MG/ML
500 INJECTION, SOLUTION INTRAVENOUS
Status: DISCONTINUED | OUTPATIENT
Start: 2023-01-14 | End: 2023-01-14

## 2023-01-13 RX ORDER — SODIUM CHLORIDE 9 MG/ML
250 INJECTION, SOLUTION INTRAVENOUS AS NEEDED
Status: DISCONTINUED | OUTPATIENT
Start: 2023-01-13 | End: 2023-01-24 | Stop reason: HOSPADM

## 2023-01-13 RX ORDER — LIDOCAINE 4 G/100G
1 PATCH TOPICAL EVERY 24 HOURS
Status: DISCONTINUED | OUTPATIENT
Start: 2023-01-13 | End: 2023-01-24 | Stop reason: HOSPADM

## 2023-01-13 RX ADMIN — Medication 1 AMPULE: at 10:42

## 2023-01-13 RX ADMIN — SODIUM CHLORIDE, PRESERVATIVE FREE 20 MG: 5 INJECTION INTRAVENOUS at 10:43

## 2023-01-13 RX ADMIN — SODIUM CHLORIDE, PRESERVATIVE FREE 10 ML: 5 INJECTION INTRAVENOUS at 16:56

## 2023-01-13 RX ADMIN — SODIUM CHLORIDE, PRESERVATIVE FREE 10 ML: 5 INJECTION INTRAVENOUS at 05:13

## 2023-01-13 RX ADMIN — SODIUM CHLORIDE, PRESERVATIVE FREE 10 ML: 5 INJECTION INTRAVENOUS at 22:37

## 2023-01-13 RX ADMIN — SODIUM CHLORIDE, PRESERVATIVE FREE 20 MG: 5 INJECTION INTRAVENOUS at 22:08

## 2023-01-13 RX ADMIN — HYDROMORPHONE HYDROCHLORIDE 0.5 MG: 1 INJECTION, SOLUTION INTRAMUSCULAR; INTRAVENOUS; SUBCUTANEOUS at 23:29

## 2023-01-13 RX ADMIN — HYDROMORPHONE HYDROCHLORIDE 0.5 MG: 1 INJECTION, SOLUTION INTRAMUSCULAR; INTRAVENOUS; SUBCUTANEOUS at 08:25

## 2023-01-13 RX ADMIN — HEPARIN SODIUM 5000 UNITS: 5000 INJECTION INTRAVENOUS; SUBCUTANEOUS at 14:15

## 2023-01-13 RX ADMIN — Medication 2 UNITS: at 00:00

## 2023-01-13 RX ADMIN — HYDROMORPHONE HYDROCHLORIDE 0.5 MG: 1 INJECTION, SOLUTION INTRAMUSCULAR; INTRAVENOUS; SUBCUTANEOUS at 14:57

## 2023-01-13 RX ADMIN — CASTOR OIL AND BALSAM, PERU: 788; 87 OINTMENT TOPICAL at 22:07

## 2023-01-13 RX ADMIN — PIPERACILLIN AND TAZOBACTAM 3.38 G: 3; .375 INJECTION, POWDER, FOR SOLUTION INTRAVENOUS at 03:33

## 2023-01-13 RX ADMIN — PIPERACILLIN AND TAZOBACTAM 3.38 G: 3; .375 INJECTION, POWDER, FOR SOLUTION INTRAVENOUS at 14:15

## 2023-01-13 RX ADMIN — CASTOR OIL AND BALSAM, PERU: 788; 87 OINTMENT TOPICAL at 10:43

## 2023-01-13 RX ADMIN — METHYLPREDNISOLONE SODIUM SUCCINATE 20 MG: 40 INJECTION, POWDER, FOR SOLUTION INTRAMUSCULAR; INTRAVENOUS at 22:08

## 2023-01-13 RX ADMIN — Medication 1 AMPULE: at 22:07

## 2023-01-13 RX ADMIN — HYDROMORPHONE HYDROCHLORIDE 0.5 MG: 1 INJECTION, SOLUTION INTRAMUSCULAR; INTRAVENOUS; SUBCUTANEOUS at 19:01

## 2023-01-13 RX ADMIN — METHYLPREDNISOLONE SODIUM SUCCINATE 20 MG: 40 INJECTION, POWDER, FOR SOLUTION INTRAMUSCULAR; INTRAVENOUS at 10:43

## 2023-01-13 RX ADMIN — HYDROMORPHONE HYDROCHLORIDE 0.5 MG: 1 INJECTION, SOLUTION INTRAMUSCULAR; INTRAVENOUS; SUBCUTANEOUS at 00:32

## 2023-01-13 RX ADMIN — HEPARIN SODIUM 5000 UNITS: 5000 INJECTION INTRAVENOUS; SUBCUTANEOUS at 03:33

## 2023-01-13 NOTE — PROGRESS NOTES
Comprehensive Nutrition Assessment    Type and Reason for Visit: Reassess    Nutrition Recommendations/Plan:   Advance diet as medically able per SLP      Malnutrition Assessment:  Malnutrition Status:  Insufficient data (01/10/23 1552)        Nutrition Assessment:  Chart reviewed, case discussed during CCU rounds. Pt self extubated yesterday, needs re-estimated. She is getting disconnected from HD at time of attempted visit. Pt is NPO, awaiting SLP consult for diet advancement. K 4.7 and phos 3.9 this morning. BM noted yesterday. Will see how she does with swallow eval and diet advancement. Nutrition Related Findings:    Meds: pepcid, levaquin, solumedrol, humalog, zosyn; Drips: precedex. Edema: +1-generalized, all extremities. BM 1/12 Wound Type: None    Current Nutrition Intake & Therapies:  Average Meal Intake: NPO     No diet orders on file    Anthropometric Measures:  Height: 5' (152.4 cm)  Ideal Body Weight (IBW): 100 lbs (45 kg)     Current Body Wt:  69.3 kg (152 lb 12.5 oz), 163.1 % IBW. Bed scale  Current BMI (kg/m2): 29.8                          BMI Category: Obese class 1 (BMI 30.0-34. 9)    Estimated Daily Nutrient Needs:  Energy Requirements Based On: Formula  Weight Used for Energy Requirements: Current  Energy (kcal/day): MSJ 1550 (1165 x 1.3)     Protein (g/day): 74-89g (1-1.2gPro/kg)  Method Used for Fluid Requirements: Other (comment)  Fluid (ml/day): per MD    Nutrition Diagnosis:   Inadequate protein-energy intake related to impaired respiratory function as evidenced by NPO or clear liquid status due to medical condition  Previous dx resumed.      Nutrition Interventions:   Food and/or Nutrient Delivery: Start oral diet  Nutrition Education/Counseling: No recommendations at this time  Coordination of Nutrition Care: Continue to monitor while inpatient, Interdisciplinary rounds       Goals:  Previous Goal Met:  (N/A)  Goals: Initiate PO diet, by next RD assessment       Nutrition Monitoring and Evaluation:   Behavioral-Environmental Outcomes: None identified  Food/Nutrient Intake Outcomes: Diet advancement/tolerance  Physical Signs/Symptoms Outcomes: Biochemical data, Nutrition focused physical findings, Skin, Weight    Discharge Planning:     Too soon to determine    Augusto Valenzuela RD, CNSC  Contact: ext [phone]

## 2023-01-13 NOTE — PROGRESS NOTES
Problem: Dysphagia (Adult)  Goal: *Acute Goals and Plan of Care (Insert Text)  Description: 1/13/2023  Speech path goals  1. Patient will participate with reeval of swallowing. Outcome: Not Met   SPEECH LANGUAGE PATHOLOGY BEDSIDE SWALLOW EVALUATION  Patient: Laura Gupta (10 y.o. female)  Date: 1/13/2023  Primary Diagnosis: Staphylococcus aureus pneumonia ([facility] [address] [J15.211]       Precautions:        ASSESSMENT :  Based on the objective data described below, the patient presents with functional oral motor skills. Speech was intelligible. She was able to express her basic needs. She accepted a few sips of water and cranberry juice. She tolerated very small sips but coughed after one sip. She refused applesauce and solids. Affect was very flat. Limited participation. She is in with large pleural effusion and has a chest tube in place. On HD. She was intubated from 1/9-1/12/23. Voicing was adequate and cough was as well. Patient will benefit from skilled intervention to address the above impairments. Patients rehabilitation potential is considered to be Fair     PLAN :  Recommendations and Planned Interventions:  Recommend NPO but ice chips with nsg   Frequency/Duration: Patient will be followed by speech-language pathology 3 times a week to address goals. Discharge Recommendations: To Be Determined     SUBJECTIVE:   Patient stated I told you I don't like water. .    OBJECTIVE:     Past Medical History:   Diagnosis Date    Cancer ([facility] [address]     BREAST CANCER    GERD (gastroesophageal reflux disease)     HTN (hypertension) 07/18/2011    Psychiatric disorder     ANIXETY AND DEPRESSION    Thyroid disease     HYPOTHYROID     Past Surgical History:   Procedure Laterality Date    HX COLONOSCOPY      HX MYOMECTOMY  05/02/1996    x 1    HX WISDOM TEETH EXTRACTION      IR INSERT NON TUNL CVC OVER 5 YRS  1/9/2023    IR THORACENTESIS/INSERT CHEST TUBE  1/5/2023     Prior Level of Function/Home Situation:   [address] [place] [address] Environment: Private residence  # Steps to Enter: 5  Rails to Enter: Yes  Hand Rails : Bilateral (wide)  One/Two Story Residence: Two story  # of Interior Steps: 12  Interior Rails: Left  Lift Chair Available: No  Living Alone: Yes  Support Systems: Other Family Member(s)  Patient Expects to be Discharged to[de-identified] Home with home health  Current DME Used/Available at Home: Other (comment)  Tub or Shower Type: Tub  Diet prior to admission:    Current Diet:  NPO   Cognitive and Communication Status:  Neurologic State: Alert  Orientation Level: Oriented to person  Cognition: Decreased command following  Perception: Appears intact  Perseveration: No perseveration noted  Safety/Judgement: Awareness of environment  Oral Assessment:  Oral Assessment  Labial: No impairment  Dentition: Full;Natural  Lingual: Other (comment)  Velum: Unable to visualize  Mandible: No impairment  P.O. Trials:  Patient Position: upright in bed  Vocal quality prior to P.O.: Low volume  Consistency Presented: Thin liquid; Ice chips  How Presented: Self-fed/presented;Straw;SLP-fed/presented;Spoon     Bolus Acceptance: No impairment  Bolus Formation/Control: Impaired  Type of Impairment: Mastication  Propulsion: Delayed (# of seconds)  Oral Residue: None  Initiation of Swallow: Delayed (# of seconds)  Laryngeal Elevation: Decreased  Aspiration Signs/Symptoms: Strong cough                Oral Phase Severity: Mild  Pharyngeal Phase Severity : Mild    NOMS:   The NOMS functional outcome measure was used to quantify this patient's level of swallowing impairment. Based on the NOMS, the patient was determined to be at level 1 for swallow function       NOMS Swallowing Levels:  Level 1 (CN): NPO  Level 2 (CM): NPO but takes consistency in therapy  Level 3 (CL): Takes less than 50% of nutrition p.o. and continues with nonoral feedings; and/or safe with mod cues; and/or max diet restriction  Level 4 (CK):  Safe swallow but needs mod cues; and/or mod diet restriction; and/or still requires some nonoral feeding/supplements  Level 5 (CJ): Safe swallow with min diet restriction; and/or needs min cues  Level 6 (CI): Independent with p.o.; rare cues; usually self cues; may need to avoid some foods or needs extra time  Level 7 ([address] [place] [address]): Independent for all p.o.  RADHA. (2003). National Outcomes Measurement System (NOMS): Adult Speech-Language Pathology User's Guide. Pain:  Pain Scale 1: Numeric (0 - 10)  Pain Intensity 1: 7  Pain Location 1: Generalized;Back    After treatment:   Patient left in no apparent distress in bed    COMMUNICATION/EDUCATION:   Patient was educated regarding her deficit(s) of dysphagia but she is confused and not able to participate. The patient's plan of care including recommendations, planned interventions, and recommended diet changes were discussed with: Registered nurse. Patient is unable to participate in goal setting and plan of care.     Thank you for this referral.  Soy Tiwari, SLP  Time Calculation: 10 mins

## 2023-01-13 NOTE — PROGRESS NOTES
ICU DAILY PROGRESS NOTE      Summary  57year old Female with hx of left breast cancer, recently admitted to [facility] for sepsis s/s COVID-19, CHRISTA, NSTEMI, MSSA pneumonia(12/28-1/1) presented to ED with c/o shortness of breath and hemoptysis. In ED, patient was febrile to 100.8F. CXR demonstrated increased left pleural effusion with unchanged patchy B/L consolidation L>R. Patient had IR thoracentesis on 1/4/23- bloody tap. CT chest on 1/5 showed left sided hemothorax. Hb dropped from 9.1 to 5.6. s/p left chest tube placement on 1/5/23. S/p PRBC transfusion    On 1/9 pt transpored to ICU and was intubated and treated for pneumonia. On 1/12 she was being weaned from vent. Before formal SBT completed pt self extubated. On 1/13 tolerated HD. Her Cehst tube was removed. She has remained hemodynamically stable and is ok to transfer to telemetry. Previous 24 Hours    No events overnight    [facility] Day 9    1/9 - at shift change, pt notably tachypneic to 40 in sig respiratory distress on HFNC. Decision made to intubate after clarifying goals of care established with Dr. Deonte Donaldson, who had admitted patient to ICU yesterday and spoken with patient specifically about them. Patient unable to speak for herself at this time. 1/10 - Intubated yesterday, started on CRRT  01/11 - Patient's family refused Bronch yesterday, sister stated she was unsure she wanted any further procedures and she needed to discuss it with her family. Advised against delay but no consent given.    Extremly agitated this a.m with tachycardia / diaphoresis / attempting to pull her own ETT  01/12: self extubated; decreased steroids to 20 mg q12h  01/13: off CVVHD and converted to HD    Critical Care Problems    Patient Active Problem List   Diagnosis Code    Menopausal symptoms N95.1    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 ([facility] [address] C50.011, C50.012    Pneumonia J18.9    CHRISTA (acute kidney injury) ([facility] [address] N17.9    COVID-19 U07.1    Troponin level elevated R77.8    Elevated LFTs R79.89    Bacterial pneumonia J15.9    Staphylococcus aureus pneumonia (HCC) J15.211    Shortness of breath R06.02    Acute blood loss anemia D62    Hemothorax on left J94.2    Anxiety F41.9    Palliative care by specialist Z51.5    Hemothorax J94.2         Plan    Neuro  Depression    - currently is calm but may need anti-depressant. CVS  #. Hypotension, unclear etio, septic vs hypovolemic  #. NSTEMI  #. Elevated BNP  - MAP > 65; now off pressors  - reviewed cardiology note    Pulm  #. Acute hypoxic respiratory failure, intubated - resolved  #. Pulmonary edema vs PNA vs  Keytruda pneumonitis  - self extubated on   - continue steroids; can taper dose down tomorrow    GI  #. Elevated LFTs, etiology unclear              No prolonged periods of hypotension              US abd with doppler unrevealing   - transaminases decreasing    Renal  #. CHRISTA on CKD 4, prerenal vs ATN  - Renal consulted  -  CRRT stopped and had first HD today, tolerated well    Endo  - accuchecks q6h, insulin prn  - monitor closely for refeeding with TF    ID  #. Multifocal MSSA PNA   Sputum now with GNR and elevated procal   - cont. zosyn     Heme  - received 1 unit PRBC on HD today for Hgb of 6.9  - on sq heparin    Vitals  Visit Vitals  /85   Pulse (!) 109   Temp 97.5 °F (36.4 °C)   Resp (!) 33   Ht 5' (1.524 m)   Wt 69.3 kg (152 lb 12.5 oz)   SpO2 100%   BMI 29.84 kg/m²    O2 Flow Rate (L/min): 2 l/min O2 Device: None (Room air) Temp (24hrs), Av.9 °F (36.6 °C), Min:96.9 °F (36.1 °C), Max:98.8 °F (37.1 °C)           Intake/Output:     Intake/Output Summary (Last 24 hours) at 2023 1421  Last data filed at 2023 1330  Gross per 24 hour   Intake 578.63 ml   Output 2578 ml   Net -1999.37 ml           Physical exam:    Physical Exam  HENT:      Head: Normocephalic. Eyes:      Pupils: Pupils are equal, round, and reactive to light. Cardiovascular:      Rate and Rhythm: Normal rate and regular rhythm. Heart sounds: Normal heart sounds. Pulmonary:      Effort: Pulmonary effort is normal.   Abdominal:      Palpations: Abdomen is soft. Skin:     General: Skin is warm and dry. Neurological:      General: No focal deficit present. I have examined the patient on this day 1/13/2023 and the above documented exam is accurate including the components that have been copied forward    LABS AND  DATA: Personally reviewed  Recent Labs     01/13/23 0512 01/12/23  0410   WBC 17.5* 14.7*   HGB 6.9* 7.7*   HCT 20.6* 22.3*   PLT 70* 83*       Recent Labs     01/13/23 0512 01/12/23  0410    139   K 4.7 4.4    104   CO2 26 25   BUN 51* 30*   CREA 3.60* 2.15*   * 228*   CA 7.5* 7.4*   MG 2.8* 2.8*   PHOS 3.9 1.9*       Recent Labs     01/13/23 0512 01/12/23  0410   AP 1,067* 1,268*   TP 5.0* 5.3*   ALB 1.7* 1.8*   GLOB 3.3 3.5       Recent Labs     01/13/23 0512 01/12/23  0410   INR 1.9* 2.5*   PTP 18.8* 24.3*   APTT 39.9*  --         No results for input(s): PHI, PCO2I, PO2I, FIO2I in the last 72 hours. No results for input(s): CPK, CKMB, TROIQ, BNPP in the last 72 hours. Hemodynamics:   PAP:   CO:     Wedge:   CI:     CVP:    SVR:       PVR:       Ventilator Settings:  Mode Rate Tidal Volume Pressure FiO2 PEEP   (S) Pressure support   320 ml  10 cm H2O 30 % 5 cm H20     Peak airway pressure: 16 cm H2O    Minute ventilation: 7.07 l/min        MEDS: Reviewed    Chest X-Ray:  CXR Results  (Last 48 hours)                 01/13/23 0504  XR CHEST PORT Final result    Impression:      Extubated. Improved aeration right lung base. Residual airspace opacity and   possible small left effusion in the left lung base           Narrative:  EXAM:  XR CHEST PORT       INDICATION: Intubated       COMPARISON: 1/12/2023       TECHNIQUE: Upright portable chest AP view       FINDINGS: Extubated. Nasogastric tube.  The tubes and lines are stable. The   cardiac silhouette is within normal limits. The pulmonary vasculature is within   normal limits. Volume loss left lung base. Mild patchy airspace opacity in the right lung base   improved. Left-sided chest tube is stable. Small left pleural effusion   suspected. The visualized bones and upper abdomen are age-appropriate. 01/12/23 0503  XR CHEST PORT Final result    Impression:      Improved interstitial opacities and airspace opacities bilaterally with some   residual noted in the lung bases. Small left pleural effusion. .           Narrative:  EXAM:  XR CHEST PORT       INDICATION: Intubated       COMPARISON: 1/11/2023       TECHNIQUE: Semierect portable chest AP view       FINDINGS: Tubes and lines are stable. The cardiac silhouette is within normal   limits. Significant improvement and pulmonary edema with some residual.        Bibasilar airspace opacities improved but remain. Small left pleural effusions   unchanged. The visualized bones and upper abdomen are age-appropriate. Multidisciplinary Rounds Completed:  Yes      DISPOSITION  Stay in ICU    CRITICAL CARE CONSULTANT NOTE  I had a in-person encounter with Rick Hartmann, reviewed and interpreted patient data including events, labs, images, vital signs, I/O's, and examined patient. I have discussed the case and the plan and management of the patient's care with the consulting services, the bedside nurses and the respiratory therapist.      NOTE OF PERSONAL INVOLVEMENT IN CARE   This patient is at high risk for sudden and clinically significant deterioration, which requires the highest level of preparedness to intervene urgently. I participated in the decision-making and personally managed or directed the management of the following life and organ supporting interventions that required my frequent assessment to treat or prevent imminent deterioration. I personally spent  minutes of critical care time. This is time spent at patient's bedside actively involved in patient care as well as the coordination of care and discussions with the patient's family. This does not include any procedural time which has been billed separately.       ------------------------------------------------------------------------------    Maximino Wells MD, PhD  [address]  [phone]

## 2023-01-13 NOTE — INTERDISCIPLINARY ROUNDS
Interdisciplinary team rounds were held 1/13/2023 with the following team members:Care Management, Nursing, Nutrition, Pharmacy, Physician, and Clinical Coordinator. Plan of care discussed. See clinical pathway and/or care plan for interventions and desired outcomes. Goals of the Day: HD with blood transfusion.

## 2023-01-13 NOTE — PROGRESS NOTES
Shift Summary:  Pt received first HD treatment today and tolerated well. Pt received 1 unit  PRBC's during HD. Pt withdrawn most of the day. She states that she wants her sister Bethany Enrique to be her medical power of , but still refuses to sign the actual document. Sister at bedside for much of the day and left after HD complete. Pt given dilaudid x 3 today for back pain/chest pain. Also, received order for lidocaine patch to help with back pain. Pt had 1 loose BM this morning. Speech therapy assessment complete and ordered ice chips only for now. Central line removed. Chest tube removed by Dr. William Walsh. Pt sat on edge of bed and able to stand x 3 with 2 person max assist for a few seconds each time. Pt unable to transfer to chair and assisted back into bed.   PT/OT consults in place

## 2023-01-13 NOTE — PROGRESS NOTES
Nephrology Progress Note  [facility] / [facility], [place], [address]  Phone - [phone]  Fax - [phone]                 Patient: Tatiana Marc                   YOB: 1960        Date- 1/13/2023                      Admit Date: 1/3/2023  CC: Follow up for CHRISTA   IMPRESSION & PLAN:   1. CHRISTA due to acute tubular necrosis, serology workup  overall negative. 2.  Hypophosphatemia  3. Left pneumothorax requiring chest tube placement. 4.  Pleural effusion. 5.  Anemia requiring blood transfusion. 6.  History of breast cancer. 7.  Recent history of COVID-19 infection. 8.  Multifocal pneumonia.- RESP FAILURE on vent  9. Status post left thoracocentesis. 10.  Non-ST elevation myocardial infarction. PLAN-  SEEN ON HD  Epogen for anemia  Avoid hypotension  DDemetriow ICU nurse     Subjective: Interval History:   -1-13-23-seen on hd-- bp stable- OFF VENT  1-11-23 - phos low, k stable-- on CRRT- she is anuric--she is off the pressors   1-10-23--on vent - ON CRRT- bp stable-she is anuric    Objective:   Vitals:    01/13/23 0945 01/13/23 1000 01/13/23 1015 01/13/23 1030   BP: 127/65 (!) 151/93 135/76 121/83   Pulse: 64 66 67 82   Resp: 20 24 22 23   Temp: 98 °F (36.7 °C)      SpO2: 100%  100% 100%   Weight:       Height:          01/12 0701 - 01/13 0700  In: 1234.2 [I.V.:724.2]  Out: 883 [Urine:2]    Last 3 Recorded Weights in this Encounter    01/10/23 1322 01/11/23 0600 01/12/23 0620   Weight: 73.9 kg (163 lb) 69.8 kg (153 lb 14.1 oz) 69.3 kg (152 lb 12.5 oz)        Physical exam:  GEN:  NAD  NECK:  Supple, no thyromegaly  RESP: decreased BS  b/l, no  wheezing,   CVS: RRR,S1,S2   NEURO: non focal  EXT: Edema +nt           Chart reviewed. Pertinent Notes reviewed.      Data Review :  Recent Labs     01/13/23  0512 01/12/23  0410 01/11/23  1445    139 141   K 4.7 4.4 4.8    104 107   CO2 26 25 26   BUN 51* 30* 27*   CREA 3.60* 2.15* 2.39*   * 228* 173*   CA 7.5* 7.4* 7.1*   MG 2.8* 2.8* 2.6*   PHOS 3.9 1.9* 3.1       Recent Labs     01/13/23  0512 01/12/23  0410 01/11/23  1445   WBC 17.5* 14.7* 14.7*   HGB 6.9* 7.7* 7.3*   HCT 20.6* 22.3* 21.6*   PLT 70* 83* 77*       Recent Labs     01/12/23  0410   TIBC 178*   PSAT INDETERMINATE - SENT TO REFERENCE LAB FOR ADDITIONAL TESTING.         No results found for: HBA1C, YVN0TNSP, DKA1FAQG   No results found for: MCACR, MCA1, MCA2, MCA3, MCAU, MCAU2, MCALPOCT  US Results (most recent):  Medication list  reviewed    Current Facility-Administered Medications   Medication Dose Route Frequency    0.9% sodium chloride infusion 250 mL  250 mL IntraVENous PRN    lidocaine 4 % patch 1 Patch  1 Patch TransDERmal Q24H    glucose chewable tablet 16 g  4 Tablet Oral PRN    glucagon (GLUCAGEN) injection 1 mg  1 mg IntraMUSCular PRN    dextrose 10% infusion 0-250 mL  0-250 mL IntraVENous PRN    insulin lispro (HUMALOG) injection   SubCUTAneous Q6H    methylPREDNISolone (PF) (SOLU-MEDROL) injection 20 mg  20 mg IntraVENous Q12H    [START ON 1/14/2023] levoFLOXacin (LEVAQUIN) 750 mg in D5W IVPB  750 mg IntraVENous Q48H    piperacillin-tazobactam (ZOSYN) 3.375 g in 0.9% sodium chloride (MBP/ADV) 100 mL MBP  3.375 g IntraVENous Q8H    epoetin tyler-epbx (RETACRIT) injection 10,000 Units  10,000 Units SubCUTAneous Q TUE, THU & SAT    alcohol 62% (NOZIN) nasal  1 Ampule  1 Ampule Topical Q12H    balsam peru-castor oiL (VENELEX) ointment   Topical BID    dexmedeTOMidine in 0.9 % NaCl (PRECEDEX) 400 mcg/100 mL (4 mcg/mL) infusion soln  0.1-1.5 mcg/kg/hr IntraVENous TITRATE    PARoxetine (PAXIL) tablet 20 mg  20 mg Per NG tube DAILY    famotidine (PF) (PEPCID) 20 mg in 0.9% sodium chloride 10 mL injection  20 mg IntraVENous Q12H    heparin (porcine) injection 5,000 Units  5,000 Units SubCUTAneous Q12H    HYDROmorphone (DILAUDID) injection 0.5 mg  0.5 mg IntraVENous Q4H PRN    metoprolol (LOPRESSOR) injection 2.5 mg  2.5 mg IntraVENous Q6H PRN    0.9% sodium chloride infusion 250 mL  250 mL IntraVENous PRN    0.9% sodium chloride infusion 250 mL  250 mL IntraVENous PRN    lidocaine (XYLOCAINE) 4 % cream   Topical PRN    [Held by provider] gabapentin (NEURONTIN) capsule 100 mg  100 mg Oral TID    sodium chloride (NS) flush 5-40 mL  5-40 mL IntraVENous Q8H    sodium chloride (NS) flush 5-40 mL  5-40 mL IntraVENous PRN    acetaminophen (TYLENOL) tablet 650 mg  650 mg Oral Q6H PRN    Or    acetaminophen (TYLENOL) suppository 650 mg  650 mg Rectal Q6H PRN    polyethylene glycol (MIRALAX) packet 17 g  17 g Oral DAILY PRN    ondansetron (ZOFRAN ODT) tablet 4 mg  4 mg Oral Q8H PRN    Or    ondansetron (ZOFRAN) injection 4 mg  4 mg IntraVENous Q6H PRN        Bobby Lauren MD  1/13/2023

## 2023-01-13 NOTE — PROGRESS NOTES
1900 - Verbal/Bedside shift change report given to TOM Nash (oncoming nurse) by Jacinda العلي RN (offgoing nurse). Report included the following information SBAR, Kardex, Intake/Output, MAR, Recent Results, Cardiac Rhythm normal sinus rhythm, and dual skin assessment at the bedside. 2000 - Assessment complete. Receiving oxygen via Nasal cannula 2L . Lines:  PIV: x1, L triple lumen CVC, R chest port ( not accessed), R fabienne  patent Lyons,     Infusions: Precedex 0.4mcg ,     Skin/Wounds: R leg skin tear, L pigtail    2045 Lyons removed. Patient tolerated well. 0000 - Reassessment complete. VSS.     0400 - Reassessment complete. VSS. Labs sent. Bath and linen change complete. Applied new cream/lotion/powder. Tolerated well.    0700 - Verbal/Bedside shift change report given to Jacinda العلي RN/TOM Rded (oncoming nurse) by Rafael Hayward (offgoing nurse). Report included the following information SBAR, Kardex, Intake/Output, MAR, Recent Results, and Cardiac Rhythm normal sinus rhythm.

## 2023-01-13 NOTE — PROGRESS NOTES
Cardiology Progress Note  1/13/2023    Admit Date: 1/3/2023  Admit Diagnosis: Staphylococcus aureus pneumonia ([facility] [J15.211]  CC:  None currently  Cardiologist:  Dr Dom Aguilera. Cardiac Assessment/Plan:    1) Atypical CP, for yrs: Neg SEH 9/2021 (6:00, -CP, -ecg; Mild LVH). 2) Abnl ecg c/w LVH. *\"EF 45-50%; No valve dz:\" @ [address] by RCA. (EF not that low on my interp; EF 50-55%). 3) HTN  4) Palpitations (few x/yr; few sec). 5) FHx early CAD/CHF  6) Breast CA, left; Chemo (Dr GÉNESIS Salazar Pall @ [facility])    Admitted from 12/28 to 1/1/23 @ [facility] [facility] for PNA/hemoptysis/covid/sepsis/CHRISTA/NQWMI w/ trop 100s    Admitted 1/3/23 w/ dyspnea/sepsis; subsequent renal failure/hypotension/hemothorax w/ chest tube; Resp failure 1/9 w/ intubation. Type II NQWMI. Neg blood Cx. Hepatic failure. Chest tube 1/5/23. Intubated 1/9/23. Self-extubated 1/12/23. Off pressors 1/10/23. Echo 1/10/23: Left Ventricle: Mildly reduced left ventricular systolic function with a visually estimated EF of 45 - 50%. Left ventricle size is normal. Increased wall thickness. CXR: 1/10/23: \"The pulmonary vasculature is within normal limits. Diffuse airspace opacity in the right lung. Partial opacification of the left upper lobe. No change. \"  CXR 1/13: \"The pulmonary vasculature is within normal limits. Volume loss left lung base. Mild patchy airspace opacity in the right lung base  improved. Left-sided chest tube is stable. Small left pleural effusion suspected. \"    1/12: Remains intubated/sedated; CT remains in.  BPs 73-110s; Hr 70-80s; Sinus; 100% vent (30%; PEEP 5). Anuric. WBC 15; Hg 7.7; plt 83. Cr 2.15; alb 1.8; LFTs 1ks; INR 2.5    1/13: Self extubated; CT remains in. No CP except at CT site; no resting dyspnea  BPs 75-130s, current 130s; HR 50s-80s; sinus; anuric  WBC 17; Hg 6.9; plt 70. K 4.7; Cr 3.6; lfts 600s/800s; procal 6 from 13. INR 1.9    Cardiac meds; None (off coreg 6.25 bid & crestor 10).     Rec: Add back asa when able (not now w/ recurrent anemia); Restart coreg @ 3.125 bid when BPs consistently stable.    _________________________________________________________  Echo 12/27/22: Left Ventricle: Normal left ventricular systolic function with a visually estimated EF of 50 - 55%. Left ventricle size is normal. Normal wall thickness. Mild hypokinesis of the following segments: basal inferolateral and mid inferolateral. Unfortunately, imaging quality limis the accuracy     Chest tube 1/5/23. Intubated 1/9/23. Complicated clinical course as noted below; Currently intubated/sedated  Current cardiac meds:  Levo @ 15; crestor 10 (holding coreg 6.25 bid)  IMP: type II NQWMI;      Rec: cont supportive care; wean pressors as able; hold crestor. Future aspirin when bleeding resolved. ____________________________________________________________________  Saulo James is a 57 y.o. female presents with Staphylococcus aureus pneumonia ([address] Sophia Golden. Admitted from 12/28 to 1/1/23 @ [address] [place] [address] for PNA/hemoptysis/covid/sepsis/CHRISTA/NQWMI w/ trop 100s        As noted in H&P: \"56 y.o.  female with pertinent past medical history of breast cancer currently off chemotherapy, MDD/TAIWO, hypothyroidism, GERD, and recent hospitalization from 12/28-1/1 for multifocal pneumonia secondary to MSSA pneumonia superimposed on COVID-19 pneumonia with course complicated by hemoptysis, sepsis, CHRISTA, and type II MI completed course of cefepime and doxycycline transition to Augmentin on time of discharge-patient unsure if she has been taking that who presents with complaints of persistent decline since discharge from OSH. She complains of malaise, fever/chills, cough productive of blood-tinged sputum, shortness of breath/dyspnea on exertion. She reports she does not feel she is doing well at home and think she needs to be in the hospital for further management. She denies tobacco, alcohol, or illicit drug use.   ROS otherwise negative. In the ED, patient febrile to 100.8 °F, hemodynamically stable (hypertensive 150s/60s), saturating mid 90s on room air. ECG demonstrates NSR with LVH criteria, CXR demonstrates increased left pleural effusion and left lower lobe airspace opacity and CT chest demonstrates increased moderately large left pleural effusion with unchanged patchy bilateral consolidation left greater than right compatible with pneumonia. Rapid and flu negative. Point-of-care lactic 0.7, proBNP 1091(Previously 866), high since her troponin 116 down trended to 107 (600s at prior hospitalization, sodium 142, potassium 3.4, BUN 38, creatinine 3.0 (baseline), WBC 13.3 (slightly increased from time of discharge), hemoglobin 9.1 (improved from prior), platelets 596. Given recent hospitalization and concern for hep patient given vancomycin, Levaquin, and cefepime by ED provider. \"     ICU MD 1/8/22: \"Patient being transferred to ICU as the PCU nurses do not feel comfortable keeping the patient on the floor as she was hypotensive earlier and has been refusing po meds     57year old Female with hx of left breast cancer, recently admitted to [facility] for sepsis s/s COVID-19, CHRISTA, NSTEMI, MSSA pneumonia(12/28-1/1) presented to ED with c/o shortness of breath and hemoptysis. IN ED, patient was febrile to 100.8F. CXR demonstrated increased left pleural effusion with unchanged patchy B/L consolidation L>R. Patient had IR thoracentesis on 1/4/23- bloody tap. CT chest on 1/5 showed left sided hemothorax. Hb dropped from 9.1 to 5.6. s/p left chest tube placement on 1/5/23. S/p PRBC transfusion. \"     ______________________________________________________________________     The patient is intubated; can provide no history. On accelerating levophed (15 currently); on HD. No PND, orthopnea, palpitations, pre-syncope, syncope, peripheral edema, or decrease in exercise tolerance. No current complaints.      ECG 1/3/22:   Sinus, leftward axis, no acute changes. LVH. Tele: sinus  CXR: \"1. Endotracheal tube terminates 1 cm above the marvin. Consider retraction 1 to  2 cm. 2. Appropriate positioning of left IJ central venous catheter. No pneumothorax. 3. Slight interval improvement in extensive bilateral airspace disease. 4. Left chest tube remains in place. Suspected small left pleural effusion. \"     Notable labs: K 4.5; BUN 61; Cr 7.35 (prev 3 range). Alb 1.9; LFTs 900s/2000; Prev procal 7; proBNP 9k; neg covid 1/3/23. INR 2.7.  ___________________________________________________  Notable prior cardiac history:  @ NP OV 10/27/22:  Ms Carmina Barahona has a h/o:  1) Atypical CP, for yrs: Neg SEH 9/2021 (6:00, -CP, -ecg; Mild LVH). 2) Abnl ecg c/w LVH. *\"EF 45-50%; No valve dz:\" @ [address] by RCA. (EF not that low on my interp; EF 50-55%). 3) HTN  4) Palpitations (few x/yr; few sec). 5) FHx early CAD/CHF  6) Breast CA, left; Chemo pending (Dr Louise Abraham @ [facility])  No ethanol, added salt, or nicotine. 1 caff johnny/d. 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 She would like an annual visit here. 8/2021:  She has atypical chest pain (for years; 0-1 X/mo; sharp sensation; few seconds; random onset/offset). She also has intermittent palpitations (fast HR sensation, few X/year; few seconds duration, not related to chest pain). No MCCALL with good exercise tolerance. No previous cardiac evaluation. PCP note from 4/28/06 reviewed; ECG from then interpreted today as noted below. 2/2022: no recent chest pain, dyspnea, or palpitations. She would like an annual visit here. 10/2022: Chest pain remains resolved. No dyspnea nor palpitations. Reportedly mild LV dysfunction as noted above; in my review of the echo, the EF is not that low. NP F/U: Doing well. No CP, MCCALL, palps, edema or syncope. Home SBPs in the 130s. IMPRESSION AND PLAN  01. Other chest pain (R07.89): Atypical chest pain remains resolved; [facility]      We discussed the signs and symptoms of unstable angina, myocardial infarction and malignant arrhythmia. The patient knows to seek immediate medical attention should they occur. 02. Essential (primary) hypertension (I10):  Reasonably well controlled; continue Losartan 25mg QD and increase Coreg to 6.25mg BID. Continue to monitor BP.   03. Other cardiomyopathies (I42.8):  EF looks reassuring on echo per review; repeat planned for next month. 04. Abnormal electrocardiogram [ECG] [EKG] (R94.31):  Nonspecific finding. This condition is stable. 05. Malignant neoplasm of left breast in female, estrogen receptor negative, unspecified site of breast (C50.912): Managed by: Other Physician   06. Long term (current) use of aspirin (Z79.82)   07. Body mass index [BMI] 27.0-27.9, adult (Z68.27)      ORDERS:    Dietary management education, guidance, and counseling           10/27/22 MEDICATION LIST  Medication Sig Desc Non-VCS   aspirin 81 mg tablet,delayed release take 1 tablet by oral route  every day Y   carvedilol 6.25 mg tablet take 1 tablet by oral route 2 times every day with food N   Crestor take 1 Tablet by oral route  every week Y   losartan 25 mg tablet take 1 tablet by oral route  every day N   Multi Vitamin 9 mg iron/15 mL oral liquid   Y   paroxetine 20 mg tablet take 1 tablet by oral route  every day Y      ______________________________________  CARDIAC HISTORY  RISK FACTORS:  1 Hypertension         CARDIOVASCULAR PROCEDURES  Procedure Date Results   Echo 10/05/2022 \"EF 45-50%; No valve dz:\" @ [address] by RCA. (EF not that low on my interp). EKG 10/13/2022 Sinus Rhythm, LVH; axis -15°; nonspecific T-wave changes. [facility] 10/01/2021 Normal EF, No Ischemia, 6:00, -CP, -ecg; Mild LVH. For other plans, see orders.   High complexity decision making was performed  X Yes   High-risk of decompensation with multiple organ involvement X Yes   Hospital problem list     Active Hospital Problems    Diagnosis Date Noted    Hemothorax 01/06/2023 Shortness of breath 2023    Acute blood loss anemia 2023    Hemothorax on left 2023    Anxiety 2023    Palliative care by specialist 2023    Staphylococcus aureus pneumonia ([facility] 2023                                                         Subjective:  Patient reports  []   nothing; unable to communicate    []   intubated     Chest pain  none  consistent with:  Non-cardiac CP         Atypical CP     None now x On going at CT site  Anginal CP     Dyspnea x none  at rest  with exertion         improved  unchanged  worse              PND x none  overnight       Orthopnea x none  improved  unchanged  worse   Presyncope x none  improved  unchanged  worse   Ambulated in hallway without symptoms   Yes   Ambulated in room without symptoms  Yes     ROS Hematuria:  Yes X No Dysuria:  Yes X No                (2+  other systems) Cough: Yes X No Sputum: Yes X No                 BRBPR:  Yes X No Melena: Yes X No   No change in family and social history from H&P/Consult note.   Objective:    Physical Exam:  24 hr VS reviewed, overall VSSAF  Temp (24hrs), Av.4 °F (36.9 °C), Min:97.5 °F (36.4 °C), Max:99.2 °F (37.3 °C)    Patient Vitals for the past 8 hrs:   Pulse   23 0700 67   23 0625 70   23 0600 65   23 0500 63   23 0400 (!) 55   23 0300 (!) 57   23 0200 73   23 0100 68   23 0000 74       Patient Vitals for the past 8 hrs:   Resp   23 0700 20   23 0625 20   23 0600 19   23 0500 20   23 0400 22   23 0300 18   23 0200 21   23 0100 20   23 0000 23       Patient Vitals for the past 8 hrs:   BP   23 0700 131/66   23 0625 133/65   23 0500 126/66   23 0400 133/67   23 0300 138/69   23 0200 139/71   23 0100 137/68   23 0000 138/73           Intake/Output Summary (Last 24 hours) at 2023 0751  Last data filed at 2023 0630  Gross per 24 hour   Intake 1159.18 ml   Output 881 ml   Net 278.18 ml       General:  WD,WN  Elderly  Cachetic x NAD     Agitated  Lethargic  Arousable  Obtunded     Sedated  On Bipap  Intubated x NC                ENT/Palate:  WNL x Dry MM x anicteric  ETT in place              Respiratory: X CTA ant  Nl resp effort  Increased effort  No significant change     rhonchi  rales  improved  worse              Cardiovasc: X RRR  IRRR X Nl S1, S2 x No rub     No murmur X No new murmur  Murmur c/w: x No gallop    x No edema  BLE edema:+  RLE edema:+  LLE edema:+     Edema less  Edema more  Edema same  Edema worse    X Nl JVP  Elevated JVP  JVP same  JVP worse    X Carotid wnl x abd aorta not palpated X no peripheral emboli noted                GI: X abd soft x nondistended X BS present X No organo- megaly noted              Skin: X Warm, dry  Cold extremites                  Neuro: x A/O x Grossly non- focal  Obtunded  Sedated     Lethargic  Arousable  intubated x Flat/depressed affect     cath site intact w/o hematoma or new bruit; distal pulse unchanged  Yes   Data Review:     Telemetry independently reviewed x sinus  chronic afib  parox afib  NSVT     ECG independently reviewed  NSR  afib  no significant changes  NSST-Tw chgs     x no new ECG provided for review   Lab results reviewed as noted below. Current medications reviewed as noted below. No results for input(s): PH, PCO2, PO2 in the last 72 hours. No results for input(s): CPK, CKMB, CKNDX, TROIQ in the last 72 hours.   Recent Labs     01/13/23  0512 01/12/23  0410 01/11/23  1445    139 141   K 4.7 4.4 4.8    104 107   CO2 26 25 26   BUN 51* 30* 27*   CREA 3.60* 2.15* 2.39*   * 228* 173*   PHOS 3.9 1.9* 3.1   CA 7.5* 7.4* 7.1*   ALB 1.7* 1.8*  --    WBC 17.5* 14.7* 14.7*   HGB 6.9* 7.7* 7.3*   HCT 20.6* 22.3* 21.6*   PLT 70* 83* 77*       Recent Labs     01/13/23  0512 01/12/23  0410   AP 1,067* 1,268*   TBILI 8.1* 8.0*   TP 5.0* 5.3*   ALB 1.7* 1.8* GLOB 3.3 3.5       Recent Labs     01/13/23  0512 01/12/23  0410 01/11/23  1034   INR 1.9* 2.5* 4.0*   PTP 18.8* 24.3* 38.2*   APTT 39.9*  --   --         Recent Labs     01/12/23 0410   TIBC 178*   PSAT INDETERMINATE - SENT TO REFERENCE LAB FOR ADDITIONAL TESTING.         Lab Results   Component Value Date/Time    Glucose (POC) 139 (H) 01/13/2023 05:54 AM    Glucose (POC) 167 (H) 01/12/2023 11:37 PM    Glucose (POC) 258 (H) 01/12/2023 12:12 PM    Glucose (POC) 194 (H) 01/12/2023 06:19 AM    Glucose (POC) 161 (H) 01/11/2023 05:18 PM       Current Facility-Administered Medications   Medication Dose Route Frequency    0.9% sodium chloride infusion 250 mL  250 mL IntraVENous PRN    phosphorus (K PHOS NEUTRAL) 250 mg tablet 2 Tablet  2 Tablet Per NG tube BID    glucose chewable tablet 16 g  4 Tablet Oral PRN    glucagon (GLUCAGEN) injection 1 mg  1 mg IntraMUSCular PRN    dextrose 10% infusion 0-250 mL  0-250 mL IntraVENous PRN    insulin lispro (HUMALOG) injection   SubCUTAneous Q6H    methylPREDNISolone (PF) (SOLU-MEDROL) injection 20 mg  20 mg IntraVENous Q12H    [START ON 1/14/2023] levoFLOXacin (LEVAQUIN) 750 mg in D5W IVPB  750 mg IntraVENous Q48H    piperacillin-tazobactam (ZOSYN) 3.375 g in 0.9% sodium chloride (MBP/ADV) 100 mL MBP  3.375 g IntraVENous Q8H    epoetin tyler-epbx (RETACRIT) injection 10,000 Units  10,000 Units SubCUTAneous Q TUE, THU & SAT    alcohol 62% (NOZIN) nasal  1 Ampule  1 Ampule Topical Q12H    balsam peru-castor oiL (VENELEX) ointment   Topical BID    dexmedeTOMidine in 0.9 % NaCl (PRECEDEX) 400 mcg/100 mL (4 mcg/mL) infusion soln  0.1-1.5 mcg/kg/hr IntraVENous TITRATE    PARoxetine (PAXIL) tablet 20 mg  20 mg Per NG tube DAILY    famotidine (PF) (PEPCID) 20 mg in 0.9% sodium chloride 10 mL injection  20 mg IntraVENous Q12H    heparin (porcine) injection 5,000 Units  5,000 Units SubCUTAneous Q12H    HYDROmorphone (DILAUDID) injection 0.5 mg  0.5 mg IntraVENous Q4H PRN metoprolol (LOPRESSOR) injection 2.5 mg  2.5 mg IntraVENous Q6H PRN    0.9% sodium chloride infusion 250 mL  250 mL IntraVENous PRN    0.9% sodium chloride infusion 250 mL  250 mL IntraVENous PRN    lidocaine (XYLOCAINE) 4 % cream   Topical PRN    [Held by provider] gabapentin (NEURONTIN) capsule 100 mg  100 mg Oral TID    sodium chloride (NS) flush 5-40 mL  5-40 mL IntraVENous Q8H    sodium chloride (NS) flush 5-40 mL  5-40 mL IntraVENous PRN    acetaminophen (TYLENOL) tablet 650 mg  650 mg Oral Q6H PRN    Or    acetaminophen (TYLENOL) suppository 650 mg  650 mg Rectal Q6H PRN    polyethylene glycol (MIRALAX) packet 17 g  17 g Oral DAILY PRN    ondansetron (ZOFRAN ODT) tablet 4 mg  4 mg Oral Q8H PRN    Or    ondansetron (ZOFRAN) injection 4 mg  4 mg IntraVENous Q6H PRN         Ria Mcdonald MD

## 2023-01-13 NOTE — PROCEDURES
[facility] / [phone]    Vitals Pre Post Assessment Pre Post   /65 135/85 LOC A&Ox4, cooperative, follows commands, generalized weakness  A&Ox4, cooperative, follows commands, generalized weakness   HR 64 109 Lungs Diminished bilaterally, L lateral chest tube Diminished bilaterally, L lateral chest tube   Resp 24 29 Cardiac Regular, S1, S2, denies chest pain Regular, S1, S2, denies chest pain   Temp Temp: 98 °F (36.7 °C) (01/13/23 0945) 97.5   Skin R IJ Blayne, L lateral lung chest tube R IJ Blayne, L lateral lung chest tube   Weight    Edema Generalized 1+  Generalized 1+   Tele status NSR, ST NSR Pain Pain Intensity 1: 7 (01/13/23 0826) 7     Orders   Duration: Start: 0954 End: 1319 Total: 3.25 hours   Dialyzer: Dialyzer/Set Up Inspection: Revaclear (01/13/23 0945)   K Bath: Dialysate K (mEq/L): 2 (01/13/23 0945)   Ca Bath: Dialysate CA (mEq/L): 2.5 (01/13/23 0945)   Na: Dialysate NA (mEq/L): 140 (01/13/23 0945)   Bicarb: Dialysate HCO3 (mEq/L): 40 (01/13/23 0945)   Target Fluid Removal: Goal/Amount of Fluid to Remove (mL): 2500 mL (01/13/23 0945)     Access   Type & Location: R IJ Blayne: Pre- Assessment: Dressing clean, dry, loose. No s/s of infection. Both lumens aspirate & flush well. Running well at . Post assessment: New sterile dressing applied today, 1/13/23. No changes.       Comments:                                        Labs   HBsAg (Antigen) / date: 1/11/23 Negative                                           HBsAb (Antibody) / date: 1/9/23 Susceptible    Source:    Obtained/Reviewed  Critical Results Called HGB   Date Value Ref Range Status   01/13/2023 6.9 (L) 11.5 - 16.0 g/dL Final     Potassium   Date Value Ref Range Status   01/13/2023 4.7 3.5 - 5.1 mmol/L Final     Calcium   Date Value Ref Range Status   01/13/2023 7.5 (L) 8.5 - 10.1 MG/DL Final     BUN   Date Value Ref Range Status   01/13/2023 51 (H) 6 - 20 MG/DL Final     Comment:     INVESTIGATED PER DELTA CHECK PROTOCOL     Creatinine   Date Value Ref Range Status   01/13/2023 3.60 (H) 0.55 - 1.02 MG/DL Final     Comment:     INVESTIGATED PER DELTA CHECK PROTOCOL        Meds Given   Name Dose Route   Packed Red Blood Cells  1 Unit  IV               Adequacy / Fluid    Total Liters Process: 75.9 L   Net Fluid Removed: 2500 mL      Comments   Time Out Done:   (Time) @ 5194   Admitting Diagnosis: 9282   Consent obtained/signed: Informed Consent Verified: Yes (01/13/23 0945)   Machine / RO # Machine Number: [id number] (01/13/23 0945)   Primary Nurse Rpt Pre: Judy Carrillo RN   Primary Nurse Rpt Post: Judy Carrillo RN   Pt Education: Ordered Dialysis Treatment    Care Plan: Catheter Infection Prevention   Pts outpatient clinic: N/A     Tx Summary   Comments:                SBAR received from Primary RN. Pt A&Ox4. Consent signed & on file     8832: Each catheter limb disinfected per p&p, caps removed, hubs disinfected per p&p. Each lumen aspirated for blood return and flushed with Normal Saline per policy. VSS. Dialysis Tx initiated. 1030: Lines visible, patent and intact. VSS. Ordered blood transfusion administered. Pt family visiting at bedside. 1130: Pt tolerating blood transfusion well, vital signs stable, lines patent and intact, pt re-positioned for comfort. 1132: Pt experienced short run of SVT HR 170s then returned to HR , BP stable, no complaints of chest pain or shortness of breath, verbally informed primary RN. 1156: Blood transfusion completed. Pt tolerated well. Lines patent and intact. 1250: Pt resting quietly, vital signs stable, lines patent and intact. 1319: Tx ended. VSS. Each dialysis catheter limb disinfected per p&p, all possible blood returned per p&p, and each dialysis hub disinfected per p&p. Each lumen flushed, post dialysis catheter normal saline dwell instilled per order, and caps applied. Bed locked and in the lowest position, call bell and belongings in reach.  SBAR given to Primary, RN. Patient is stable at time of my departure. All Dialysis related medications have been reviewed. Labs/Medications

## 2023-01-13 NOTE — PROGRESS NOTES
Palliative Medicine Consult  [place]: [phone]    Patient Name: Astrid Ferrell  YOB: 1960    Date of Initial Consult: 1/5/23  Reason for Consult: Overwhelming Symptoms  Requesting Provider: Sigifredo Steinberg MD   Primary Care Physician: Shari Nageotte, MD     SUMMARY:   Astrid Ferrell is a 57 y.o. with a past history of breast cancer s/p chemo and immunotherapy (completed all therapies on 12/21/22) with a recent hospitalization from 12/28-1/1 for multifocal pneumonia 2/2 MSSA superimposed on COVID-19 who was admitted on 1/3/2023 from home with a diagnosis of Mutifocal PNA and hemoptysis. While here she was found to have a large left pleural effusion and had a thoracentesis that removed 820ml of blood tinged fluid yesterday. Overnight she had an acute drop in her Hgb and CT today shows a left sided hemothorax with loculation towards the apex. PALLIATIVE DIAGNOSES:   Goals of care  Palliative Care  Advanced Directives     PLAN:   Met with Ms Eura Hammans at bedside- she self extubated last night, and is awake and alert but clearly has lost time while on the vent  She doesn't remember me so I re-introduced myself  I asked her if she wanted me to fill her in on what has happened over the last few days and she turned her head away and said \"I dont want to talk about it\"  I let her know that was fine, but we DID need to clarify some things- specifically who she would want to be her voice if she ends up in this situation again. She tells me she wants it to be Rush Rout her sister, but was not willing to put that to paper. I let her know that without this being documented, it would fall to Katerine Pittman and June Constantine to make decisions together, and this was not working over the last few days.   I also let her know that her nephew June Fitch had strong opinions as well, and she looked over at me sharply with this revelation (I assumed because she was not happy with this news), but still would not agree to do an AMD with me. Markie Vaughan is due to arrive shortly, and so I left the document with her nurse to see if she could encourage her to sign it  Unsure how much more help I can be as Ms Rosana Sam is so resistant to talking about anything regarding her wishes. I will see if my colleague can get any further with her next week in talking about things, otherwise we may just have to defer to the medical team to address issues as they come up  Initial consult note routed to primary continuity provider and/or primary health care team members  Communicated plan of care with: Palliative IDT, [facility] [address] Team     GOALS OF CARE / TREATMENT PREFERENCES:     GOALS OF CARE:  Patient/Health Care Proxy Stated Goals: Prolong life    TREATMENT PREFERENCES:   Code Status: Full Code    Advance Care Planning:  [x] [facility] Interdisciplinary Team has updated the ACP Navigator with [address] and Patient Capacity      Primary Decision Maker (Active): [place]  Sister - [phone]    Primary Decision Maker: Sindhu Bhaktashiela  Sister - [phone]    Primary Decision Maker: Ventura Salmon (Big) - Brother - [phone]    Medical Interventions: Full interventions       Other:    As far as possible, the palliative care team has discussed with patient / health care proxy about goals of care / treatment preferences for patient.      HISTORY:     History obtained from: chart, attending, sister    CHIEF COMPLAINT: sedated  HPI/SUBJECTIVE:    The patient is:   [] Verbal and participatory  [x] Non-participatory due to: intubated and very lethargic    Clinical Pain Assessment (nonverbal scale for severity on nonverbal patients):   Clinical Pain Assessment  Severity: 0          Duration: for how long has pt been experiencing pain (e.g., 2 days, 1 month, years)  Frequency: how often pain is an issue (e.g., several times per day, once every few days, constant)     FUNCTIONAL ASSESSMENT:     Palliative Performance Scale (PPS):  PPS: 40       PSYCHOSOCIAL/SPIRITUAL SCREENING:     Palliative IDT has assessed this patient for cultural preferences / practices and a referral made as appropriate to needs (Cultural Services, Patient Advocacy, Ethics, etc.)    Any spiritual / Baptism concerns:  [] Yes /  [x] No   If \"Yes\" to discuss with pastoral care during IDT     Does caregiver feel burdened by caring for their loved one:   [] Yes /  [x] No /  [] No Caregiver Present/Available [] No Caregiver [] Pt Lives at [place] [address]  If \"Yes\" to discuss with social work during IDT    Anticipatory grief assessment:   [x] Normal  / [] Maladaptive     If \"Maladaptive\" to discuss with social work during IDT    ESAS Anxiety: Anxiety: 0    ESAS Depression: Depression: 0        REVIEW OF SYSTEMS:     Positive and pertinent negative findings in ROS are noted above in HPI. The following systems were [x] reviewed / [] unable to be reviewed as noted in HPI  Other findings are noted below. Systems: constitutional, ears/nose/mouth/throat, respiratory, gastrointestinal, genitourinary, musculoskeletal, integumentary, neurologic, psychiatric, endocrine. Positive findings noted below. Modified ESAS Completed by: provider   Fatigue: 10 Drowsiness: 0   Depression: 0 Pain: 0   Anxiety: 0 Nausea: 0   Anorexia: 4 Dyspnea: 0           Stool Occurrence(s): 1        PHYSICAL EXAM:     From RN flowsheet:  Wt Readings from Last 3 Encounters:   01/12/23 152 lb 12.5 oz (69.3 kg)   01/01/23 144 lb 11.2 oz (65.6 kg)   12/27/22 144 lb (65.3 kg)     Blood pressure 121/83, pulse 82, temperature 98 °F (36.7 °C), resp. rate 23, height 5' (1.524 m), weight 152 lb 12.5 oz (69.3 kg), SpO2 100 %.     Pain Scale 1: Numeric (0 - 10)  Pain Intensity 1: 7  Pain Onset 1: post op  Pain Location 1: Generalized, Back  Pain Orientation 1: Other (comment) (gen)  Pain Description 1: Aching, Sore  Pain Intervention(s) 1: Medication (see MAR)  Last bowel movement, if known:     Constitutional: awake, alert  Respiratory:breathing not labored  Gastrointestinal: soft non-tender, +bowel sounds  Musculoskeletal: no deformity, no tenderness to palpation  Skin: warm, dry  Neurologic: following commands, interactive, but seems angry and withdrawn  Other:       HISTORY:     Active Problems:    [facility] (1/4/2023)      Shortness of breath (1/5/2023)      Acute blood loss anemia (1/5/2023)      Hemothorax on left (1/5/2023)      Anxiety (1/5/2023)      Palliative care by specialist (1/5/2023)      Hemothorax (1/6/2023)    Past Medical History:   Diagnosis Date    Cancer ([facility]     BREAST CANCER    GERD (gastroesophageal reflux disease)     HTN (hypertension) 07/18/2011    Psychiatric disorder     ANIXETY AND DEPRESSION    Thyroid disease     HYPOTHYROID      Past Surgical History:   Procedure Laterality Date    HX COLONOSCOPY      HX MYOMECTOMY  05/02/1996    x 1    HX WISDOM TEETH EXTRACTION      IR INSERT NON TUNL CVC OVER 5 YRS  1/9/2023    IR THORACENTESIS/INSERT CHEST TUBE  1/5/2023      Family History   Problem Relation Age of Onset    Heart Failure Mother     Cancer Father     Diabetes Sister     Kidney Disease Sister     Anesth Problems Neg Hx       History reviewed, no pertinent family history.   Social History     Tobacco Use    Smoking status: Never     Passive exposure: Never    Smokeless tobacco: Never   Substance Use Topics    Alcohol use: Never     Allergies   Allergen Reactions    Sulfa (Sulfonamide Antibiotics) Itching, Swelling and Unknown (comments)      Current Facility-Administered Medications   Medication Dose Route Frequency    0.9% sodium chloride infusion 250 mL  250 mL IntraVENous PRN    lidocaine 4 % patch 1 Patch  1 Patch TransDERmal Q24H    glucose chewable tablet 16 g  4 Tablet Oral PRN    glucagon (GLUCAGEN) injection 1 mg  1 mg IntraMUSCular PRN    dextrose 10% infusion 0-250 mL  0-250 mL IntraVENous PRN    insulin lispro (HUMALOG) injection   SubCUTAneous Q6H methylPREDNISolone (PF) (SOLU-MEDROL) injection 20 mg  20 mg IntraVENous Q12H    [START ON 1/14/2023] levoFLOXacin (LEVAQUIN) 750 mg in D5W IVPB  750 mg IntraVENous Q48H    piperacillin-tazobactam (ZOSYN) 3.375 g in 0.9% sodium chloride (MBP/ADV) 100 mL MBP  3.375 g IntraVENous Q8H    epoetin tyler-epbx (RETACRIT) injection 10,000 Units  10,000 Units SubCUTAneous Q TUE, THU & SAT    alcohol 62% (NOZIN) nasal  1 Ampule  1 Ampule Topical Q12H    balsam peru-castor oiL (VENELEX) ointment   Topical BID    dexmedeTOMidine in 0.9 % NaCl (PRECEDEX) 400 mcg/100 mL (4 mcg/mL) infusion soln  0.1-1.5 mcg/kg/hr IntraVENous TITRATE    PARoxetine (PAXIL) tablet 20 mg  20 mg Per NG tube DAILY    famotidine (PF) (PEPCID) 20 mg in 0.9% sodium chloride 10 mL injection  20 mg IntraVENous Q12H    heparin (porcine) injection 5,000 Units  5,000 Units SubCUTAneous Q12H    HYDROmorphone (DILAUDID) injection 0.5 mg  0.5 mg IntraVENous Q4H PRN    metoprolol (LOPRESSOR) injection 2.5 mg  2.5 mg IntraVENous Q6H PRN    0.9% sodium chloride infusion 250 mL  250 mL IntraVENous PRN    0.9% sodium chloride infusion 250 mL  250 mL IntraVENous PRN    lidocaine (XYLOCAINE) 4 % cream   Topical PRN    [Held by provider] gabapentin (NEURONTIN) capsule 100 mg  100 mg Oral TID    sodium chloride (NS) flush 5-40 mL  5-40 mL IntraVENous Q8H    sodium chloride (NS) flush 5-40 mL  5-40 mL IntraVENous PRN    acetaminophen (TYLENOL) tablet 650 mg  650 mg Oral Q6H PRN    Or    acetaminophen (TYLENOL) suppository 650 mg  650 mg Rectal Q6H PRN    polyethylene glycol (MIRALAX) packet 17 g  17 g Oral DAILY PRN    ondansetron (ZOFRAN ODT) tablet 4 mg  4 mg Oral Q8H PRN    Or    ondansetron (ZOFRAN) injection 4 mg  4 mg IntraVENous Q6H PRN          LAB AND IMAGING FINDINGS:     Lab Results   Component Value Date/Time    WBC 17.5 (H) 01/13/2023 05:12 AM    HGB 6.9 (L) 01/13/2023 05:12 AM    PLATELET 70 (L) 13/50/5888 05:12 AM     Lab Results   Component Value Date/Time    Sodium 140 01/13/2023 05:12 AM    Potassium 4.7 01/13/2023 05:12 AM    Chloride 106 01/13/2023 05:12 AM    CO2 26 01/13/2023 05:12 AM    BUN 51 (H) 01/13/2023 05:12 AM    Creatinine 3.60 (H) 01/13/2023 05:12 AM    Calcium 7.5 (L) 01/13/2023 05:12 AM    Magnesium 2.8 (H) 01/13/2023 05:12 AM    Phosphorus 3.9 01/13/2023 05:12 AM      Lab Results   Component Value Date/Time    Alk. phosphatase 1,067 (H) 01/13/2023 05:12 AM    Protein, total 5.0 (L) 01/13/2023 05:12 AM    Albumin 1.7 (L) 01/13/2023 05:12 AM    Globulin 3.3 01/13/2023 05:12 AM     Lab Results   Component Value Date/Time    INR 1.9 (H) 01/13/2023 05:12 AM    Prothrombin time 18.8 (H) 01/13/2023 05:12 AM    aPTT 39.9 (H) 01/13/2023 05:12 AM      Lab Results   Component Value Date/Time    Iron 192 (H) 01/12/2023 04:10 AM    TIBC 178 (L) 01/12/2023 04:10 AM    Iron % saturation  01/12/2023 04:10 AM     INDETERMINATE - SENT TO REFERENCE LAB FOR ADDITIONAL TESTING. Lab Results   Component Value Date/Time    pH 7.40 01/09/2023 11:37 AM    PCO2 35 01/09/2023 11:37 AM    PO2 103 (H) 01/09/2023 11:37 AM     No components found for: GLPOC   No results found for: CPK, CKMB             Total time: 35  Counseling / coordination time, spent as noted above: 30  > 50% counseling / coordination?: y    Prolonged service was provided for  []30 min   []75 min in face to face time in the presence of the patient, spent as noted above. Time Start:   Time End:   Note: this can only be billed with 45928 (initial) or 12111 (follow up). If multiple start / stop times, list each separately.

## 2023-01-14 ENCOUNTER — APPOINTMENT (OUTPATIENT)
Dept: GENERAL RADIOLOGY | Age: 63
DRG: 208 | End: 2023-01-14
Attending: INTERNAL MEDICINE
Payer: COMMERCIAL

## 2023-01-14 LAB
ABO + RH BLD: NORMAL
ANION GAP SERPL CALC-SCNC: 10 MMOL/L (ref 5–15)
ANION GAP SERPL CALC-SCNC: 11 MMOL/L (ref 5–15)
BASOPHILS # BLD: 0 K/UL (ref 0–0.1)
BASOPHILS # BLD: 0 K/UL (ref 0–0.1)
BASOPHILS NFR BLD: 0 % (ref 0–1)
BASOPHILS NFR BLD: 0 % (ref 0–1)
BLD PROD TYP BPU: NORMAL
BLOOD GROUP ANTIBODIES SERPL: NORMAL
BPU ID: NORMAL
BUN SERPL-MCNC: 40 MG/DL (ref 6–20)
BUN SERPL-MCNC: 48 MG/DL (ref 6–20)
BUN/CREAT SERPL: 10 (ref 12–20)
BUN/CREAT SERPL: 11 (ref 12–20)
CALCIUM SERPL-MCNC: 7.9 MG/DL (ref 8.5–10.1)
CALCIUM SERPL-MCNC: 8.2 MG/DL (ref 8.5–10.1)
CHLORIDE SERPL-SCNC: 100 MMOL/L (ref 97–108)
CHLORIDE SERPL-SCNC: 101 MMOL/L (ref 97–108)
CO2 SERPL-SCNC: 29 MMOL/L (ref 21–32)
CO2 SERPL-SCNC: 29 MMOL/L (ref 21–32)
CREAT SERPL-MCNC: 3.71 MG/DL (ref 0.55–1.02)
CREAT SERPL-MCNC: 4.75 MG/DL (ref 0.55–1.02)
CROSSMATCH RESULT,%XM: NORMAL
DIFFERENTIAL METHOD BLD: ABNORMAL
DIFFERENTIAL METHOD BLD: ABNORMAL
EOSINOPHIL # BLD: 0 K/UL (ref 0–0.4)
EOSINOPHIL # BLD: 0.1 K/UL (ref 0–0.4)
EOSINOPHIL NFR BLD: 0 % (ref 0–7)
EOSINOPHIL NFR BLD: 0 % (ref 0–7)
ERYTHROCYTE [DISTWIDTH] IN BLOOD BY AUTOMATED COUNT: 16.7 % (ref 11.5–14.5)
ERYTHROCYTE [DISTWIDTH] IN BLOOD BY AUTOMATED COUNT: 16.8 % (ref 11.5–14.5)
GLUCOSE BLD STRIP.AUTO-MCNC: 131 MG/DL (ref 65–117)
GLUCOSE BLD STRIP.AUTO-MCNC: 138 MG/DL (ref 65–117)
GLUCOSE BLD STRIP.AUTO-MCNC: 169 MG/DL (ref 65–117)
GLUCOSE SERPL-MCNC: 143 MG/DL (ref 65–100)
GLUCOSE SERPL-MCNC: 144 MG/DL (ref 65–100)
HCT VFR BLD AUTO: 26.5 % (ref 35–47)
HCT VFR BLD AUTO: 28.6 % (ref 35–47)
HGB BLD-MCNC: 9 G/DL (ref 11.5–16)
HGB BLD-MCNC: 9.4 G/DL (ref 11.5–16)
IMM GRANULOCYTES # BLD AUTO: 0.3 K/UL (ref 0–0.04)
IMM GRANULOCYTES # BLD AUTO: 0.4 K/UL (ref 0–0.04)
IMM GRANULOCYTES NFR BLD AUTO: 1 % (ref 0–0.5)
IMM GRANULOCYTES NFR BLD AUTO: 2 % (ref 0–0.5)
LYMPHOCYTES # BLD: 1.9 K/UL (ref 0.8–3.5)
LYMPHOCYTES # BLD: 2.1 K/UL (ref 0.8–3.5)
LYMPHOCYTES NFR BLD: 10 % (ref 12–49)
LYMPHOCYTES NFR BLD: 10 % (ref 12–49)
MAGNESIUM SERPL-MCNC: 2.5 MG/DL (ref 1.6–2.4)
MAGNESIUM SERPL-MCNC: 2.8 MG/DL (ref 1.6–2.4)
MCH RBC QN AUTO: 29.2 PG (ref 26–34)
MCH RBC QN AUTO: 29.5 PG (ref 26–34)
MCHC RBC AUTO-ENTMCNC: 32.9 G/DL (ref 30–36.5)
MCHC RBC AUTO-ENTMCNC: 34 G/DL (ref 30–36.5)
MCV RBC AUTO: 86.9 FL (ref 80–99)
MCV RBC AUTO: 88.8 FL (ref 80–99)
MONOCYTES # BLD: 1.5 K/UL (ref 0–1)
MONOCYTES # BLD: 1.7 K/UL (ref 0–1)
MONOCYTES NFR BLD: 8 % (ref 5–13)
MONOCYTES NFR BLD: 9 % (ref 5–13)
NEUTS SEG # BLD: 14.6 K/UL (ref 1.8–8)
NEUTS SEG # BLD: 16.4 K/UL (ref 1.8–8)
NEUTS SEG NFR BLD: 79 % (ref 32–75)
NEUTS SEG NFR BLD: 81 % (ref 32–75)
NRBC # BLD: 0.28 K/UL (ref 0–0.01)
NRBC # BLD: 0.48 K/UL (ref 0–0.01)
NRBC BLD-RTO: 1.4 PER 100 WBC
NRBC BLD-RTO: 2.6 PER 100 WBC
PHOSPHATE SERPL-MCNC: 4.8 MG/DL (ref 2.6–4.7)
PHOSPHATE SERPL-MCNC: 4.9 MG/DL (ref 2.6–4.7)
PLATELET # BLD AUTO: 85 K/UL (ref 150–400)
PLATELET # BLD AUTO: 86 K/UL (ref 150–400)
PMV BLD AUTO: 12 FL (ref 8.9–12.9)
PMV BLD AUTO: 12.3 FL (ref 8.9–12.9)
POTASSIUM SERPL-SCNC: 4.1 MMOL/L (ref 3.5–5.1)
POTASSIUM SERPL-SCNC: 4.3 MMOL/L (ref 3.5–5.1)
RBC # BLD AUTO: 3.05 M/UL (ref 3.8–5.2)
RBC # BLD AUTO: 3.22 M/UL (ref 3.8–5.2)
RBC MORPH BLD: ABNORMAL
SERVICE CMNT-IMP: ABNORMAL
SODIUM SERPL-SCNC: 140 MMOL/L (ref 136–145)
SODIUM SERPL-SCNC: 140 MMOL/L (ref 136–145)
SPECIMEN EXP DATE BLD: NORMAL
STATUS OF UNIT,%ST: NORMAL
UNIT DIVISION, %UDIV: 0
WBC # BLD AUTO: 18.6 K/UL (ref 3.6–11)
WBC # BLD AUTO: 20.3 K/UL (ref 3.6–11)

## 2023-01-14 PROCEDURE — 84100 ASSAY OF PHOSPHORUS: CPT

## 2023-01-14 PROCEDURE — 97530 THERAPEUTIC ACTIVITIES: CPT | Performed by: OCCUPATIONAL THERAPIST

## 2023-01-14 PROCEDURE — 74011250636 HC RX REV CODE- 250/636: Performed by: ANESTHESIOLOGY

## 2023-01-14 PROCEDURE — 80048 BASIC METABOLIC PNL TOTAL CA: CPT

## 2023-01-14 PROCEDURE — 97530 THERAPEUTIC ACTIVITIES: CPT

## 2023-01-14 PROCEDURE — 92526 ORAL FUNCTION THERAPY: CPT

## 2023-01-14 PROCEDURE — 74011250636 HC RX REV CODE- 250/636: Performed by: INTERNAL MEDICINE

## 2023-01-14 PROCEDURE — 74011000250 HC RX REV CODE- 250: Performed by: NURSE PRACTITIONER

## 2023-01-14 PROCEDURE — 74011250637 HC RX REV CODE- 250/637: Performed by: NURSE PRACTITIONER

## 2023-01-14 PROCEDURE — 74011250637 HC RX REV CODE- 250/637: Performed by: INTERNAL MEDICINE

## 2023-01-14 PROCEDURE — 71045 X-RAY EXAM CHEST 1 VIEW: CPT

## 2023-01-14 PROCEDURE — 36415 COLL VENOUS BLD VENIPUNCTURE: CPT

## 2023-01-14 PROCEDURE — 74011636637 HC RX REV CODE- 636/637: Performed by: NURSE PRACTITIONER

## 2023-01-14 PROCEDURE — 74011000250 HC RX REV CODE- 250: Performed by: STUDENT IN AN ORGANIZED HEALTH CARE EDUCATION/TRAINING PROGRAM

## 2023-01-14 PROCEDURE — 65270000046 HC RM TELEMETRY

## 2023-01-14 PROCEDURE — 74011000258 HC RX REV CODE- 258: Performed by: ANESTHESIOLOGY

## 2023-01-14 PROCEDURE — 83735 ASSAY OF MAGNESIUM: CPT

## 2023-01-14 PROCEDURE — 85025 COMPLETE CBC W/AUTO DIFF WBC: CPT

## 2023-01-14 PROCEDURE — 74011250636 HC RX REV CODE- 250/636: Performed by: NURSE PRACTITIONER

## 2023-01-14 PROCEDURE — 74011250636 HC RX REV CODE- 250/636: Performed by: PHYSICIAN ASSISTANT

## 2023-01-14 PROCEDURE — 97168 OT RE-EVAL EST PLAN CARE: CPT | Performed by: OCCUPATIONAL THERAPIST

## 2023-01-14 PROCEDURE — 82962 GLUCOSE BLOOD TEST: CPT

## 2023-01-14 PROCEDURE — 97162 PT EVAL MOD COMPLEX 30 MIN: CPT

## 2023-01-14 PROCEDURE — 74011250637 HC RX REV CODE- 250/637: Performed by: STUDENT IN AN ORGANIZED HEALTH CARE EDUCATION/TRAINING PROGRAM

## 2023-01-14 RX ORDER — CARVEDILOL 6.25 MG/1
6.25 TABLET ORAL 2 TIMES DAILY WITH MEALS
Status: DISCONTINUED | OUTPATIENT
Start: 2023-01-14 | End: 2023-01-16

## 2023-01-14 RX ADMIN — EPOETIN ALFA-EPBX 10000 UNITS: 10000 INJECTION, SOLUTION INTRAVENOUS; SUBCUTANEOUS at 22:34

## 2023-01-14 RX ADMIN — PIPERACILLIN AND TAZOBACTAM 3.38 G: 3; .375 INJECTION, POWDER, FOR SOLUTION INTRAVENOUS at 13:54

## 2023-01-14 RX ADMIN — LEVOFLOXACIN 500 MG: 5 INJECTION, SOLUTION INTRAVENOUS at 12:42

## 2023-01-14 RX ADMIN — Medication 1 AMPULE: at 08:44

## 2023-01-14 RX ADMIN — SODIUM CHLORIDE, PRESERVATIVE FREE 10 ML: 5 INJECTION INTRAVENOUS at 13:58

## 2023-01-14 RX ADMIN — SODIUM CHLORIDE, PRESERVATIVE FREE 20 MG: 5 INJECTION INTRAVENOUS at 08:44

## 2023-01-14 RX ADMIN — HYDROMORPHONE HYDROCHLORIDE 0.5 MG: 1 INJECTION, SOLUTION INTRAMUSCULAR; INTRAVENOUS; SUBCUTANEOUS at 05:48

## 2023-01-14 RX ADMIN — METHYLPREDNISOLONE SODIUM SUCCINATE 20 MG: 40 INJECTION, POWDER, FOR SOLUTION INTRAMUSCULAR; INTRAVENOUS at 20:00

## 2023-01-14 RX ADMIN — PIPERACILLIN AND TAZOBACTAM 3.38 G: 3; .375 INJECTION, POWDER, FOR SOLUTION INTRAVENOUS at 01:33

## 2023-01-14 RX ADMIN — HEPARIN SODIUM 5000 UNITS: 5000 INJECTION INTRAVENOUS; SUBCUTANEOUS at 05:48

## 2023-01-14 RX ADMIN — CARVEDILOL 6.25 MG: 6.25 TABLET, FILM COATED ORAL at 13:54

## 2023-01-14 RX ADMIN — METHYLPREDNISOLONE SODIUM SUCCINATE 20 MG: 40 INJECTION, POWDER, FOR SOLUTION INTRAMUSCULAR; INTRAVENOUS at 08:44

## 2023-01-14 RX ADMIN — SODIUM CHLORIDE, PRESERVATIVE FREE 20 MG: 5 INJECTION INTRAVENOUS at 20:00

## 2023-01-14 RX ADMIN — CASTOR OIL AND BALSAM, PERU: 788; 87 OINTMENT TOPICAL at 22:34

## 2023-01-14 RX ADMIN — ACETAMINOPHEN 650 MG: 325 TABLET ORAL at 12:52

## 2023-01-14 RX ADMIN — Medication 2 UNITS: at 12:41

## 2023-01-14 RX ADMIN — HEPARIN SODIUM 5000 UNITS: 5000 INJECTION INTRAVENOUS; SUBCUTANEOUS at 18:07

## 2023-01-14 RX ADMIN — SODIUM CHLORIDE, PRESERVATIVE FREE 10 ML: 5 INJECTION INTRAVENOUS at 23:53

## 2023-01-14 RX ADMIN — Medication 1 AMPULE: at 20:00

## 2023-01-14 RX ADMIN — PAROXETINE HYDROCHLORIDE 20 MG: 20 TABLET, FILM COATED ORAL at 08:44

## 2023-01-14 RX ADMIN — SODIUM CHLORIDE, PRESERVATIVE FREE 10 ML: 5 INJECTION INTRAVENOUS at 05:43

## 2023-01-14 RX ADMIN — CASTOR OIL AND BALSAM, PERU: 788; 87 OINTMENT TOPICAL at 08:48

## 2023-01-14 NOTE — PROGRESS NOTES
1 Verbal SBAR report received from [address] REPORT:    Verbal report received from CAYETANO ROSEN [facility] (name) on [facility]  being received from CCU(unit) for routine progression of care      Report consisted of patients Situation, Background, Assessment and   Recommendations(SBAR). Information from the following report(s) SBAR, Kardex, ED Summary, Intake/Output, MAR, Recent Results, and Cardiac Rhythm NSR  was reviewed with the receiving nurse. Opportunity for questions and clarification was provided. Assessment completed upon patients arrival to unit and care assumed.      1900 Bedside shift change report given to Regine

## 2023-01-14 NOTE — PROGRESS NOTES
Problem: Mobility Impaired (Adult and Pediatric)  Goal: *Acute Goals and Plan of Care (Insert Text)  Description: FUNCTIONAL STATUS PRIOR TO ADMISSION: Patient was independent and active without use of DME.    HOME SUPPORT PRIOR TO ADMISSION: The patient lived alone with no local support. Physical Therapy Goals  Initiated 1/14/2023  1. Patient will move from supine to sit and sit to supine, scoot up and down, and roll side to side in bed with moderate assist of 1 person within 7 day(s). 2.  Patient will sit edge of bed with stand-by-assist for 5 minutes within 7 day(s). 3.  Patient will transfer from bed to chair and chair to bed with moderate assist using the least restrictive device within 7 day(s). 4.  Patient will perform sit to stand with moderate assistance/contact guard assist within 7 day(s). 5.  Patient will ambulate with moderate assist for 5 feet with the least restrictive device within 7 day(s). Outcome: Progressing Towards Goal     PHYSICAL THERAPY EVALUATION  Patient: Delmar Hutchins (28 y.o. female)  Date: 1/14/2023  Primary Diagnosis: Staphylococcus aureus pneumonia [facility]) [J15.211]       Precautions: Fall       ASSESSMENT  Patient seen on hospital day 11, with history of breast cancer and recent hospitalization admitted from home with PNA and hemoptysis. Required intubation during hospital stay and self-extubated 1/13. Required CVVH during hospitalization and now on new(?) HD. Based on the objective data described below, the patient presents with impaired cognitive status, general weakness, poor initiation/attention, impaired balance, and impaired activity tolerance. Vitals overall stable on room air with activity. She requires up to maximal assist of 2 for bed mobility in setting of poor initiation and poor participation during mobility. Unsteady at edge of bed leaning in various directions. She appears withdrawn with flat affect.  It is noted that she has limited to no support available and usually lives alone and is independent. Recommend discharge to SNF. Palliative care following. Acute PT will continue to follow and progress as able. Current Level of Function Impacting Discharge (mobility/balance): maximal assist x 2 supine <>sit    Other factors to consider for discharge: no support (supposedly has a sister but unclear if able to provide support), palliative care following, self-extubated, New (?) HD     Patient will benefit from skilled therapy intervention to address the above noted impairments. PLAN :  Recommendations and Planned Interventions: bed mobility training, transfer training, gait training, therapeutic exercises, neuromuscular re-education, patient and family training/education, and therapeutic activities      Frequency/Duration: Patient will be followed by physical therapy:  3 times a week to address goals. Recommendation for discharge: (in order for the patient to meet his/her long term goals)  Therapy up to 5 days/week in SNF setting    This discharge recommendation:  Has not yet been discussed the attending provider and/or case management    IF patient discharges home will need the following DME: to be determined (TBD)     SUBJECTIVE:   Patient stated I can't.     OBJECTIVE DATA SUMMARY:   HISTORY:    Past Medical History:   Diagnosis Date    Cancer ([facility]     BREAST CANCER    GERD (gastroesophageal reflux disease)     HTN (hypertension) 07/18/2011    Psychiatric disorder     ANIXETY AND DEPRESSION    Thyroid disease     HYPOTHYROID     Past Surgical History:   Procedure Laterality Date    HX COLONOSCOPY      HX MYOMECTOMY  05/02/1996    x 1    HX WISDOM TEETH EXTRACTION      IR INSERT NON TUNL CVC OVER 5 YRS  1/9/2023    IR THORACENTESIS/INSERT CHEST TUBE  1/5/2023     Personal factors and/or comorbidities impacting plan of care: no support, breast Ca, psych history    Home Situation  Home Environment: Private residence  # Steps to Enter: 5  Rails to Enter: Yes  [employer] Rails : Bilateral (wide)  One/Two Story Residence: Two story  # of Interior Steps: 12  Interior Rails: Left  Lift Chair Available: No  Living Alone: Yes  Support Systems: Other Family Member(s)  Patient Expects to be Discharged to[de-identified] Home with home health  Current DME Used/Available at Home: Other (comment)  Tub or Shower Type: Tub    EXAMINATION/PRESENTATION/DECISION MAKING:   Critical Behavior:  Neurologic State:  (having difficulty with thought organization. SHe can communciate wants and needs, but tended to perseverate. Wanted to just hold hand for a long time)  Orientation Level: Oriented to person  Cognition: Impaired decision making, Impulsive  Safety/Judgement: Lack of insight into deficits  Flat affect  Hearing: Auditory  Auditory Impairment: None  Skin:  grossly intact  Range Of Motion:  AROM: Generally decreased, functional           PROM: Generally decreased, functional           Strength:    Strength: Generally decreased, functional                    Tone & Sensation:   Tone: Normal              Sensation:  (unable to assess due to cognitive status)               Coordination:  Coordination: Generally decreased, functional  Vision:    Reports impaired vision - denies blurry or double and did not elaborate  Functional Mobility:  Bed Mobility:  Rolling: Moderate assistance  Supine to Sit: Maximum assistance;Assist x2 (from long sitting, impaired initiation/participation)  Sit to Supine:  Moderate assistance;Assist x2 (majority of assist with LEs)  Scooting: Maximum assistance (at EOB)     Balance:   Sitting: Impaired  Sitting - Static: Fair (occasional) (intermittently leaning side to side)  Sitting - Dynamic: Poor (constant support)    Therapeutic Exercises:   B UE AAROM sitting edge of bed, Long arc quads     Physical Therapy Evaluation Charge Determination   History Examination Presentation Decision-Making   MEDIUM  Complexity : 1-2 comorbidities / personal factors will impact the outcome/ POC  MEDIUM Complexity : 3 Standardized tests and measures addressing body structure, function, activity limitation and / or participation in recreation  MEDIUM Complexity : Evolving with changing characteristics  MEDIUM Complexity : FOTO score of 26-74      Based on the above components, the patient evaluation is determined to be of the following complexity level: MEDIUM    Pain Rating:  Complains of pain at toes    Activity Tolerance:   Fair    After treatment patient left in no apparent distress:   Supine in bed, Call bell within reach, Side rails x 3, and left as found - RN aware of unable to turn on bed alarm    COMMUNICATION/EDUCATION:   The patients plan of care was discussed with: Occupational therapist and Registered nurse. Patient is unable to participate in goal setting and plan of care.     Thank you for this referral.  Rowdy Reece, PT   Time Calculation: 22 mins

## 2023-01-14 NOTE — PROGRESS NOTES
Bedside shift change report given to [facility] (oncoming nurse) by Rere SANTOS (offgoing nurse). Report included the following information SBAR, MAR, Recent Results, Med Rec Status, and Cardiac Rhythm NSR .     0750 - Morning assessment performed. Pt oriented x4. Free from delirium. Delays speech response noted. Follows commands. Occasionally makes eye contact but does not maintain it. Occasional repetitive speech noted. Will occasionally respond in ways not relevant to current conversation. HRR. 2/2 pulses. No edema noted. Lungs diminished. Sattng well on RA. Denies pain at this time. Bowel sounds hypoactive. 1100 - SLP in at bedside to evaluate pt. Clear liquid diet ordered. 1215 - Pt ate a few bites of lunch. Nurse attempted to assist pt with meal. Pt not initiating  feeding herself. 1245 - PT/OT in at bedside to evaluate pt.    1340 - MD in at bedside. Requested to restart home antihypertensive r/t elevated BP's. Orders received. 1600 - Labs drawn. 1720 - Report called in to [address] [place] [address]. All questions answered. 1825 - Pt transferred to room 2210. Pt noted to only have 2 head coverings for personal belongings. No clothes. All items transferred with pt to new room. Family member escorted with pt.

## 2023-01-14 NOTE — PROGRESS NOTES
Nephrology Progress Note  [facility] / [facility], Phineas Ly  [place], [address]  Phone - [phone]  Fax - [phone]                 Patient: Delmar Hutchins                   YOB: 1960        Date- 1/14/2023                      Admit Date: 1/3/2023  CC: Follow up for CHRISTA   IMPRESSION & PLAN:   1. CHRISTA due to acute tubular necrosis, serology workup  overall negative. 2.  Hypophosphatemia  3. Left pneumothorax requiring chest tube placement. 4.  Pleural effusion. 5.  Anemia requiring blood transfusion. 6.  History of breast cancer. 7.  Recent history of COVID-19 infection. 8.  Multifocal pneumonia.- RESP FAILURE on vent  9. Status post left thoracocentesis. 10.  Non-ST elevation myocardial infarction. PLAN-  No plan for HD today  Epogen for anemia  Avoid hypotension  Daily labs and monitor of UOP for renal recovery and needs for HD  D/W ICU RN       Subjective: Interval History:   1/14/23: resting comfortable on RA, Anuric, had HD yesterday  -1-13-23-seen on hd-- bp stable- OFF VENT  1-11-23 - phos low, k stable-- on CRRT- she is anuric--she is off the pressors   1-10-23--on vent - ON CRRT- bp stable-she is anuric    Objective:   Vitals:    01/14/23 0000 01/14/23 0500 01/14/23 0600 01/14/23 0700   BP: (!) 151/78 (!) 169/89 (!) 159/102 (!) 152/78   Pulse: 80 82 77 68   Resp: 12 20 14 13   Temp:  98.4 °F (36.9 °C)     SpO2: 92% 95% 94% 94%   Weight:       Height:          01/13 0701 - 01/14 0700  In: 312   Out: 2514     Last 3 Recorded Weights in this Encounter    01/10/23 1322 01/11/23 0600 01/12/23 0620   Weight: 73.9 kg (163 lb) 69.8 kg (153 lb 14.1 oz) 69.3 kg (152 lb 12.5 oz)        Physical exam:  GEN:  NAD  NECK:  Supple, no thyromegaly  RESP: decreased BS  b/l, no  wheezing,   CVS: RRR,S1,S2   NEURO: non focal  EXT: Edema +nt           Chart reviewed. Pertinent Notes reviewed.      Data Review :  Recent Labs     01/14/23  0520 01/13/23 0512 01/12/23  0410    140 139   K 4.1 4.7 4.4    106 104   CO2 29 26 25   BUN 40* 51* 30*   CREA 3.71* 3.60* 2.15*   * 146* 228*   CA 7.9* 7.5* 7.4*   MG 2.5* 2.8* 2.8*   PHOS 4.8* 3.9 1.9*     Recent Labs     01/14/23 0520 01/13/23 0512 01/12/23  0410   WBC 20.3* 17.5* 14.7*   HGB 9.4* 6.9* 7.7*   HCT 28.6* 20.6* 22.3*   PLT 85* 70* 83*     Recent Labs     01/12/23 0410   TIBC 178*   PSAT INDETERMINATE - SENT TO REFERENCE LAB FOR ADDITIONAL TESTING.       No results found for: HBA1C, UAQ5NPIG, ZQH7CAYE   No results found for: MCACR, MCA1, MCA2, MCA3, MCAU, MCAU2, MCALPOCT  US Results (most recent):  Medication list  reviewed    Current Facility-Administered Medications   Medication Dose Route Frequency    0.9% sodium chloride infusion 250 mL  250 mL IntraVENous PRN    lidocaine 4 % patch 1 Patch  1 Patch TransDERmal Q24H    piperacillin-tazobactam (ZOSYN) 3.375 g in 0.9% sodium chloride (MBP/ADV) 100 mL MBP  3.375 g IntraVENous Q12H    levoFLOXacin (LEVAQUIN) 500 mg in D5W IVPB  500 mg IntraVENous Q48H    glucose chewable tablet 16 g  4 Tablet Oral PRN    glucagon (GLUCAGEN) injection 1 mg  1 mg IntraMUSCular PRN    dextrose 10% infusion 0-250 mL  0-250 mL IntraVENous PRN    insulin lispro (HUMALOG) injection   SubCUTAneous Q6H    methylPREDNISolone (PF) (SOLU-MEDROL) injection 20 mg  20 mg IntraVENous Q12H    epoetin tyler-epbx (RETACRIT) injection 10,000 Units  10,000 Units SubCUTAneous Q TUE, THU & SAT    alcohol 62% (NOZIN) nasal  1 Ampule  1 Ampule Topical Q12H    balsam peru-castor oiL (VENELEX) ointment   Topical BID    dexmedeTOMidine in 0.9 % NaCl (PRECEDEX) 400 mcg/100 mL (4 mcg/mL) infusion soln  0.1-1.5 mcg/kg/hr IntraVENous TITRATE    PARoxetine (PAXIL) tablet 20 mg  20 mg Per NG tube DAILY    famotidine (PF) (PEPCID) 20 mg in 0.9% sodium chloride 10 mL injection  20 mg IntraVENous Q12H    heparin (porcine) injection 5,000 Units  5,000 Units SubCUTAneous Q12H    HYDROmorphone (DILAUDID) injection 0.5 mg  0.5 mg IntraVENous Q4H PRN    metoprolol (LOPRESSOR) injection 2.5 mg  2.5 mg IntraVENous Q6H PRN    0.9% sodium chloride infusion 250 mL  250 mL IntraVENous PRN    0.9% sodium chloride infusion 250 mL  250 mL IntraVENous PRN    lidocaine (XYLOCAINE) 4 % cream   Topical PRN    [Held by provider] gabapentin (NEURONTIN) capsule 100 mg  100 mg Oral TID    sodium chloride (NS) flush 5-40 mL  5-40 mL IntraVENous Q8H    sodium chloride (NS) flush 5-40 mL  5-40 mL IntraVENous PRN    acetaminophen (TYLENOL) tablet 650 mg  650 mg Oral Q6H PRN    Or    acetaminophen (TYLENOL) suppository 650 mg  650 mg Rectal Q6H PRN    polyethylene glycol (MIRALAX) packet 17 g  17 g Oral DAILY PRN    ondansetron (ZOFRAN ODT) tablet 4 mg  4 mg Oral Q8H PRN    Or    ondansetron (ZOFRAN) injection 4 mg  4 mg IntraVENous Q6H PRN        Topher Shepherd MD  1/14/2023

## 2023-01-14 NOTE — PROGRESS NOTES
Problem: Self Care Deficits Care Plan (Adult)  Goal: *Acute Goals and Plan of Care (Insert Text)  Description: FUNCTIONAL STATUS PRIOR TO ADMISSION: Patient was independent and active without use of DME.     HOME SUPPORT: The patient lived alone with no local support. Goals revised 1/14/2023  1. Pt will perform grooming seated edge of bed with min A within 7 hernandez. 2. Pt will sit at edge of bed for 8 minutes without LOB during ADLs with supervision within 7 days. 3. Pt will complete UE ADLs with supervision within 7 days. 4. Pt will complete LE ADLs with mod A within 7 days  5. Pt will complete functional transfers with mod A within 7 days  6. Pt will participate in UE exercises for 10 minutes with supervision within 7 days. Occupational Therapy Goals  Initiated 1/6/2023  1. Patient will perform grooming at the sink with supervision/set-up within 7 day(s). 2.  Patient will perform bathing at the sink with minimal assistance/contact guard assist within 7 day(s). 3.  Patient will perform lower body dressing with moderate assistance  within 7 day(s). 4.  Patient will perform toilet transfers with supervision/set-up within 7 day(s). 5.  Patient will perform all aspects of toileting with supervision/set-up within 7 day(s). 6.  Patient will participate in upper extremity therapeutic exercise/activities with supervision/set-up for 5 minutes within 7 day(s). 7.  Patient will utilize energy conservation techniques during functional activities with verbal cues within 7 day(s). Outcome: Not Met   OCCUPATIONAL THERAPY RE-EVALUATION  Patient: Daisy Valentin (29 y.o. female)  Date: 1/14/2023  Diagnosis: Staphylococcus aureus pneumonia ([facility] [J15.211] <principal problem not specified>      Precautions:    Chart, occupational therapy assessment, plan of care, and goals were reviewed.     ASSESSMENT  Based on the objective data described below, pt presents with decreased I with self care tasks since initial eval due mutlpile medical complications: pt transferred to ICU 1/8 for hypotension, intubated 1/9, OT sign off on 1/10, pt self extubated on 1/12, on CVVH and transitioned to HD, new OT order 1/13. Pt now presenting with decreased ADL status due to decreased sitting balance, decreased cogntition, flat affect, varying participation level, decreased attention, and overall deconditioning. Pt able to tolerate sitting at EOB for 8 minutes with varying assist for static and dynamic balance. Pt initially needing CGA only for balance, progress to mod A as pt leaning in all directions left/right and anterior/posterior. Pt does not fall over, but states she cannot tell she is off balance. Pt was able to wash her face at side of bed with mod A for balance and increased cues to reach all areas. Pt VSS throughout session. Pt needing to return to supine following activity as she was fatigued, RN aware. Feel pt will need continued OT at SNF level as she lives alone and has no local support. Acute OT to continue to follow to address above. Current Level of Function Impacting Discharge (ADLs): max A for ADLs, up to mod A for sitting balance    Other factors to consider for discharge: has no local support         PLAN :  Recommendations and Planned Interventions: self care training, therapeutic exercise, balance training, therapeutic activities, endurance activities, patient education, and home safety training    Frequency/Duration: Patient will be followed by occupational therapy 3 times a week to address goals. Recommendation for discharge: (in order for the patient to meet his/her long term goals)  Therapy up to 5 days/week in SNF setting    This discharge recommendation:  Has not yet been discussed the attending provider and/or case management    Equipment recommendations for successful discharge (if) home: TBD       SUBJECTIVE:   Patient stated I don't know i'm falling over.     OBJECTIVE DATA SUMMARY:   Hospital course since last seen and reason for reevaluation: pt transferred to ICU 1/8 for hypotension, intubated 1/9, OT sign off on 1/10, pt self extubated on 1/12, on CVVH and transitioned to HD, new OT order 1/13. Cognitive/Behavioral Status:  Neurologic State: Alert  Orientation Level: Appropriate for age  Cognition: Follows commands  Perception: Cues to maintain midline in sitting     Safety/Judgement: Lack of insight into deficits    Skin: UE intact    Edema: UE intact    Hearing: Auditory  Auditory Impairment: None    Vision/Perceptual:                                     Range of Motion:    AROM: Generally decreased, functional  PROM: Generally decreased, functional                      Strength:    Strength: Generally decreased, functional                Coordination:  Coordination: Generally decreased, functional  Fine Motor Skills-Upper: Left Intact; Right Intact    Gross Motor Skills-Upper: Left Intact; Right Intact    Tone & Sensation:    Tone: Normal  Sensation:  (unable to assess due to cognitive status)                        Functional Mobility and Transfers for ADLs:  Bed Mobility:  Rolling: Moderate assistance  Supine to Sit: Maximum assistance;Assist x2 (from long sitting, impaired initiation/participation)  Sit to Supine: Moderate assistance;Assist x2 (majority of assist with LEs)  Scooting: Maximum assistance (at EOB)    Transfers:             Balance:  Sitting: Impaired  Sitting - Static: Fair (occasional) (intermittently leaning side to side)  Sitting - Dynamic: Poor (constant support)    ADL Assessment:  Feeding: Moderate assistance (due to varying participation)    Oral Facial Hygiene/Grooming: Moderate assistance    Bathing: Maximum assistance         Upper Body Dressing: Maximum assistance    Lower Body Dressing: Total assistance    Toileting:  Total assistance                ADL Intervention and task modifications:                                          Cognitive Retraining  Safety/Judgement: Lack of insight into deficits    Therapeutic Activity   Pt completed transfer training and balance activities as above in prep for seated EOB ADLs    Functional Measure:    Barthel Index:  Bathin  Bladder: 5  Bowels: 5  Groomin  Dressin  Feedin  Mobility: 0  Stairs: 0  Toilet Use: 0  Transfer (Bed to Chair and Back): 0  Total: 10/100      The Barthel ADL Index: Guidelines  1. The index should be used as a record of what a patient does, not as a record of what a patient could do. 2. The main aim is to establish degree of independence from any help, physical or verbal, however minor and for whatever reason. 3. The need for supervision renders the patient not independent. 4. A patient's performance should be established using the best available evidence. Asking the patient, friends/relatives and nurses are the usual sources, but direct observation and common sense are also important. However direct testing is not needed. 5. Usually the patient's performance over the preceding 24-48 hours is important, but occasionally longer periods will be relevant. 6. Middle categories imply that the patient supplies over 50 per cent of the effort. 7. Use of aids to be independent is allowed. Score Interpretation (from [address] [address])    Independent   60-79 Minimally independent   40-59 Partially dependent   20-39 Very dependent   <20 Totally dependent     -Danuta Bejarano., Barthel, D.W. (1965). Functional evaluation: the Barthel Index. [address] [facility] ([address] (1997). The Barthel activities of daily living index: self-reporting versus actual performance in the old (> or = 75 years). Journal of [address] [place] [address] 45(7), [facility], .DemetrioDemetrio, Altaf Doyle., [facility]. (). Measuring the change in disability after inpatient rehabilitation; comparison of the responsiveness of the Barthel Index and Functional Moriarty Measure.  Journal of Neurology, Neurosurgery, and Psychiatry, 66(4), 235-314. PAULINE Means, IRMA Ariza, & Shaka Giraldo M.A. (2004) Assessment of post-stroke quality of life in cost-effectiveness studies: The usefulness of the Barthel Index and the EuroQoL-5D. Quality of Life Research, 13, 427-43         Pain:  No c.o pain during sesson    Activity Tolerance:   Fair and requires rest breaks    After treatment patient left in no apparent distress:   Supine in bed, Heels elevated for pressure relief, Call bell within reach, Bed / chair alarm activated, Side rails x 3, and RN aware and OK    COMMUNICATION/COLLABORATION:   The patients plan of care was discussed with: Physical therapist and Registered nurse.      EVERARDO Stephenson/L  Time Calculation: 24 mins

## 2023-01-14 NOTE — PROGRESS NOTES
Hospitalist Progress Note    Subjective:   Daily Progress Note: 1/14/2023 3:42 PM    Hospital Course:  57year old Female with hx of left breast cancer, recently admitted to [facility] for sepsis s/s COVID-19, CHRISTA, NSTEMI, MSSA pneumonia(12/28-1/1) presented to ED with c/o shortness of breath and hemoptysis. IN ED, patient was febrile to 100.8F. CXR demonstrated increased left pleural effusion with unchanged patchy B/L consolidation L>R. Patient had IR thoracentesis on 1/4/23- bloody tap. CT chest on 1/5 showed left sided hemothorax. Hb dropped from 9.1 to 5.6. s/p left chest tube placement on 1/5/23. S/p PRBC transfusion. Txed to ICU 1/8 for hypotension requiring levophed, she was intubated on 1/9/23 for respiratory distress. Pt was started on CRRT on 1/10. Pt self extubated on 1/12/23. Subjective:  Pt seen in ICU. Doing well. Assessment / Plan:  Acute hypoxic respiratory failure  Multifocal pneumonia/keytruda pneumonitis  S/p left chest tube placement for left sided hemothorax by IR on 1/5/23  S/p intubation 1/9, self extubated 1/12  Continue antibiotics- zosyn. Dc levaquine  Continue supplemental oxygen and titrate as needed  -chest tube removed  1/13  Cont steroids  Pleural effusion neg for malignant cells. 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 Anuric currently. Will need permcath        Sepsis vs SIRS due to malignancy:  Hypovolemia, requiring levophed   Continue current abx  Follow procal      Acute blood loss anemia:  Anemia of chronic disease  S/s left hemothorax  S/p PRBC transfusion on 1/5/23.  Hb post transfusion stable  Will monitor cbc and transfuse if Hb<7  Receive 1unit prbc    NSTEMI:  Likely type II MI  Outpatient cardiology follow up        Invasive ductal breast cancer:  Appreciate oncology consult  Appreciate palliative care consult         Body mass index is 29.84 kg/m².     Code status: Full  Prophylaxis: SCD's  Recommended Disposition: HH PT, OT, RN  ALEXANDRA>48hours  Barrier: clinical improvement       Current Facility-Administered Medications   Medication Dose Route Frequency    carvediloL (COREG) tablet 6.25 mg  6.25 mg Oral BID WITH MEALS    0.9% sodium chloride infusion 250 mL  250 mL IntraVENous PRN    lidocaine 4 % patch 1 Patch  1 Patch TransDERmal Q24H    piperacillin-tazobactam (ZOSYN) 3.375 g in 0.9% sodium chloride (MBP/ADV) 100 mL MBP  3.375 g IntraVENous Q12H    glucose chewable tablet 16 g  4 Tablet Oral PRN    glucagon (GLUCAGEN) injection 1 mg  1 mg IntraMUSCular PRN    dextrose 10% infusion 0-250 mL  0-250 mL IntraVENous PRN    insulin lispro (HUMALOG) injection   SubCUTAneous Q6H    methylPREDNISolone (PF) (SOLU-MEDROL) injection 20 mg  20 mg IntraVENous Q12H    epoetin tyler-epbx (RETACRIT) injection 10,000 Units  10,000 Units SubCUTAneous Q TUE, THU & SAT    alcohol 62% (NOZIN) nasal  1 Ampule  1 Ampule Topical Q12H    balsam peru-castor oiL (VENELEX) ointment   Topical BID    dexmedeTOMidine in 0.9 % NaCl (PRECEDEX) 400 mcg/100 mL (4 mcg/mL) infusion soln  0.1-1.5 mcg/kg/hr IntraVENous TITRATE    PARoxetine (PAXIL) tablet 20 mg  20 mg Per NG tube DAILY    famotidine (PF) (PEPCID) 20 mg in 0.9% sodium chloride 10 mL injection  20 mg IntraVENous Q12H    heparin (porcine) injection 5,000 Units  5,000 Units SubCUTAneous Q12H    HYDROmorphone (DILAUDID) injection 0.5 mg  0.5 mg IntraVENous Q4H PRN    metoprolol (LOPRESSOR) injection 2.5 mg  2.5 mg IntraVENous Q6H PRN    lidocaine (XYLOCAINE) 4 % cream   Topical PRN    [Held by provider] gabapentin (NEURONTIN) capsule 100 mg  100 mg Oral TID    sodium chloride (NS) flush 5-40 mL  5-40 mL IntraVENous Q8H    sodium chloride (NS) flush 5-40 mL  5-40 mL IntraVENous PRN    acetaminophen (TYLENOL) tablet 650 mg  650 mg Oral Q6H PRN    Or    acetaminophen (TYLENOL) suppository 650 mg  650 mg Rectal Q6H PRN    polyethylene glycol (MIRALAX) packet 17 g  17 g Oral DAILY PRN    ondansetron (ZOFRAN ODT) tablet 4 mg  4 mg Oral Q8H PRN    Or    ondansetron (ZOFRAN) injection 4 mg  4 mg IntraVENous Q6H PRN        Review of Systems  Constitutional: No fevers, No chills, No sweats, No fatigue, No Weakness  Eyes: No redness  Ears, nose, mouth, throat, and face: No nasal congestion, No sore throat, No voice change  Respiratory: No Shortness of Breath, No cough, No wheezing  Cardiovascular: has chest pain at the site of pleural catheter, No palpitations, No extremity edema  Gastrointestinal: No nausea, No vomiting, No diarrhea, No abdominal pain  Genitourinary: No frequency, No dysuria, No hematuria  Integument/breast: No skin lesion(s)   Neurological: No Confusion, No headaches, No dizziness      Objective:     Visit Vitals  BP (!) 170/82   Pulse 80   Temp 98 °F (36.7 °C)   Resp 19   Ht 5' (1.524 m)   Wt 69.3 kg (152 lb 12.5 oz)   SpO2 97%   BMI 29.84 kg/m²    O2 Flow Rate (L/min): 2 l/min O2 Device: None (Room air)    Temp (24hrs), Av °F (36.7 °C), Min:97.7 °F (36.5 °C), Max:98.4 °F (36.9 °C)      No intake/output data recorded.  1901 -  0700  In: 578.6 [I.V.:266.6]  Out: 2564     PHYSICAL EXAM:  Constitutional: No acute distress  Skin: Extremities and face reveal no rashes. HEENT: Sclerae anicteric. Extra-occular muscles are intact. No oral ulcers. The neck is supple and no masses. Cardiovascular: Regular rate and rhythm. Respiratory:  decrease breath sound on left side. Left chest tube in place. GI: Abdomen nondistended, soft, and nontender. Normal active bowel sounds. Musculoskeletal: No pitting edema of the lower legs. Able to move all ext  Neurological:  Patient is alert and oriented.  Cranial nerves II-XII grossly intact  Psychiatric: Mood appears appropriate       Data Review    Recent Results (from the past 24 hour(s))   GLUCOSE, POC    Collection Time: 01/13/23  7:19 PM   Result Value Ref Range    Glucose (POC) 112 65 - 117 mg/dL    Performed by Johann Finney    GLUCOSE, POC    Collection Time: 01/13/23 11:25 PM   Result Value Ref Range    Glucose (POC) 113 65 - 117 mg/dL    Performed by Erwin Del Toro RN    METABOLIC PANEL, BASIC    Collection Time: 01/14/23  5:20 AM   Result Value Ref Range    Sodium 140 136 - 145 mmol/L    Potassium 4.1 3.5 - 5.1 mmol/L    Chloride 101 97 - 108 mmol/L    CO2 29 21 - 32 mmol/L    Anion gap 10 5 - 15 mmol/L    Glucose 143 (H) 65 - 100 mg/dL    BUN 40 (H) 6 - 20 MG/DL    Creatinine 3.71 (H) 0.55 - 1.02 MG/DL    BUN/Creatinine ratio 11 (L) 12 - 20      eGFR 13 (L) >60 ml/min/1.73m2    Calcium 7.9 (L) 8.5 - 10.1 MG/DL   PHOSPHORUS    Collection Time: 01/14/23  5:20 AM   Result Value Ref Range    Phosphorus 4.8 (H) 2.6 - 4.7 MG/DL   MAGNESIUM    Collection Time: 01/14/23  5:20 AM   Result Value Ref Range    Magnesium 2.5 (H) 1.6 - 2.4 mg/dL   CBC WITH AUTOMATED DIFF    Collection Time: 01/14/23  5:20 AM   Result Value Ref Range    WBC 20.3 (H) 3.6 - 11.0 K/uL    RBC 3.22 (L) 3.80 - 5.20 M/uL    HGB 9.4 (L) 11.5 - 16.0 g/dL    HCT 28.6 (L) 35.0 - 47.0 %    MCV 88.8 80.0 - 99.0 FL    MCH 29.2 26.0 - 34.0 PG    MCHC 32.9 30.0 - 36.5 g/dL    RDW 16.8 (H) 11.5 - 14.5 %    PLATELET 85 (L) 651 - 400 K/uL    MPV 12.3 8.9 - 12.9 FL    NRBC 1.4 (H) 0  WBC    ABSOLUTE NRBC 0.28 (H) 0.00 - 0.01 K/uL    NEUTROPHILS 81 (H) 32 - 75 %    LYMPHOCYTES 10 (L) 12 - 49 %    MONOCYTES 8 5 - 13 %    EOSINOPHILS 0 0 - 7 %    BASOPHILS 0 0 - 1 %    IMMATURE GRANULOCYTES 1 (H) 0.0 - 0.5 %    ABS. NEUTROPHILS 16.4 (H) 1.8 - 8.0 K/UL    ABS. LYMPHOCYTES 2.1 0.8 - 3.5 K/UL    ABS. MONOCYTES 1.5 (H) 0.0 - 1.0 K/UL    ABS. EOSINOPHILS 0.1 0.0 - 0.4 K/UL    ABS. BASOPHILS 0.0 0.0 - 0.1 K/UL    ABS. IMM.  GRANS. 0.3 (H) 0.00 - 0.04 K/UL    DF AUTOMATED     GLUCOSE, POC    Collection Time: 01/14/23  5:30 AM   Result Value Ref Range    Glucose (POC) 138 (H) 65 - 117 mg/dL    Performed by Polina Jackson RN    GLUCOSE, POC    Collection Time: 01/14/23 11:46 AM   Result Value Ref Range    Glucose (POC) 169 (H) 65 - 117 mg/dL    Performed by Isak (TRDAREN RN)                  Total critical care time spent 50 minutes involving direct patient care as well as reviewing patient's labs and coordination of care with nursing staff     Care Plan discussed with: Patient/Family/RN/Case Management        Total time spent with patient: 50 minutes.

## 2023-01-14 NOTE — PROGRESS NOTES
0330: Received verbal report from [facility]. All question were answered. 5092: bedside verbal report given to [facility]. All questions were answered.

## 2023-01-14 NOTE — PROGRESS NOTES
Problem: Dysphagia (Adult)  Goal: *Acute Goals and Plan of Care (Insert Text)  Description: 1/13/2023  Speech path goals  1. Patient will participate with reeval of swallowing. -met  2. Tolerate clear liquids wihtout s/s aspiration by 1-20-23  Outcome: Progressing Towards Goal   SPEECH LANGUAGE PATHOLOGY DYSPHAGIA TREATMENT  Patient: Bianca Alcazar (63 y.o. female)  Date: 1/14/2023  Diagnosis: Staphylococcus aureus pneumonia ([facility] [J15.211] <principal problem not specified>      Precautions: aspiration      ASSESSMENT:  Patient with functional swallow for thins and purees. Refusing most items. Ready to start liquid diet.,      PLAN:  Recommendations and Planned Interventions:  Start clear liquids. Upright and alert for all PO. Doubt she will agree to take PO meds d/t confusion/behavior  Patient continues to benefit from skilled intervention to address the above impairments. Continue treatment per established plan of care. Discharge Recommendations: To Be Determined     SUBJECTIVE:   Patient stated I need. ----- I need-------. OBJECTIVE:   Cognitive and Communication Status:  Neurologic State:  (having difficulty with thought organization. SHe can communciate wants and needs, but tended to perseverate. Wanted to just hold hand for a long time)  Orientation Level: Oriented to person  Cognition: Impaired decision making, Impulsive  Perception: Appears intact  Perseveration: No perseveration noted  Safety/Judgement: Lack of insight into deficits  Dysphagia Treatment:  Oral Assessment:  Oral Assessment  Dentition: Natural  Oral Hygiene: WFL  P.O. Trials:  Patient Position: upright inbed  Vocal quality prior to P.O.: No impairment  Consistency Presented: Thin liquid (1 full cup of thins wiht succesive swallows.  1 bite jello)  How Presented: SLP-fed/presented;Spoon;Straw;Successive swallows   ORAL PHASE:   Bolus Acceptance: Impaired (declined most items)  Bolus Formation/Control: No impairment     Propulsion: No impairment  Oral Residue: None  PHARYNGEAL PHASE:   Initiation of Swallow: No impairment  Laryngeal Elevation: Functional  Aspiration Signs/Symptoms: None         SPEECH:   INTERMITTENT word-retrieval/verbal organization issues. Fluent at times. RN reports inconsistencies as well. Exercises:  Laryngeal Exercises:                                                                                                                                   Pain:  Pain Scale 1: Numeric (0 - 10)  Pain Intensity 1: 8  Pain Location 1: Chest;Back    After treatment:   Patient left in no apparent distress in bed and Nursing notified    COMMUNICATION/EDUCATION:   Patient was educated regarding her deficit(s) of dysphagia  as this relates to her diagnosis of AMS. She demonstrated Fair understanding as evidenced by discussion. .    The patient's plan of care including recommendations, planned interventions, and recommended diet changes were discussed with: Registered nurse.      Hmamad Crawford, SLP  Time Calculation: 20 mins

## 2023-01-14 NOTE — PROGRESS NOTES
1900h - Bedside and Verbal shift change report given to Lm Granda, RN and Nathen Loja, RN (oncoming nurse) by Gerhard Smith RN (offgoing nurse). Report included the following information SBAR, Kardex, ED Summary, Intake/Output, MAR, Accordion, Recent Results, Med Rec Status, and Cardiac Rhythm NSR .    2000h - Assessment done and documented, see flow sheet. 6630J - Patient complaints of back pain, PRN meds given. See MAR.    0000h - Re-assessment done and documented, see flow sheet.

## 2023-01-14 NOTE — PROGRESS NOTES
Hospitalist Progress Note    Subjective:   Daily Progress Note: 1/13/2023 3:42 PM    Hospital Course:  57year old Female with hx of left breast cancer, recently admitted to [facility] for sepsis s/s COVID-19, CHRISTA, NSTEMI, MSSA pneumonia(12/28-1/1) presented to ED with c/o shortness of breath and hemoptysis. IN ED, patient was febrile to 100.8F. CXR demonstrated increased left pleural effusion with unchanged patchy B/L consolidation L>R. Patient had IR thoracentesis on 1/4/23- bloody tap. CT chest on 1/5 showed left sided hemothorax. Hb dropped from 9.1 to 5.6. s/p left chest tube placement on 1/5/23. S/p PRBC transfusion. Txed to ICU 1/8 for hypotension requiring levophed, she was intubated on 1/9/23 for respiratory distress. Pt was started on CRRT on 1/10. Pt self extubated on 1/12/23. Subjective:  Pt seen in ICU, tolerated HD today. Received 1 unit prbc. 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 Hb post transfusion stable  Will monitor cbc and transfuse if Hb<7  Receive 1unit prbc    NSTEMI:  Likely type II MI  Outpatient cardiology follow up        Invasive ductal breast cancer:  Appreciate oncology consult  Appreciate palliative care consult         Body mass index is 29.84 kg/m².     Code status: Full  Prophylaxis: SCD's  Recommended Disposition:  PT, OT, RN  ALEXANDRA>48hours  Barrier: clinical improvement       Current Facility-Administered Medications   Medication Dose Route Frequency    0.9% sodium chloride infusion 250 mL  250 mL IntraVENous PRN    lidocaine 4 % patch 1 Patch  1 Patch TransDERmal Q24H    piperacillin-tazobactam (ZOSYN) 3.375 g in 0.9% sodium chloride (MBP/ADV) 100 mL MBP  3.375 g IntraVENous Q12H    [START ON 1/14/2023] levoFLOXacin (LEVAQUIN) 500 mg in D5W IVPB  500 mg IntraVENous Q48H    glucose chewable tablet 16 g  4 Tablet Oral PRN    glucagon (GLUCAGEN) injection 1 mg  1 mg IntraMUSCular PRN    dextrose 10% infusion 0-250 mL  0-250 mL IntraVENous PRN    insulin lispro (HUMALOG) injection   SubCUTAneous Q6H    methylPREDNISolone (PF) (SOLU-MEDROL) injection 20 mg  20 mg IntraVENous Q12H    epoetin tyler-epbx (RETACRIT) injection 10,000 Units  10,000 Units SubCUTAneous Q TUE, THU & SAT    alcohol 62% (NOZIN) nasal  1 Ampule  1 Ampule Topical Q12H    balsam peru-castor oiL (VENELEX) ointment   Topical BID    dexmedeTOMidine in 0.9 % NaCl (PRECEDEX) 400 mcg/100 mL (4 mcg/mL) infusion soln  0.1-1.5 mcg/kg/hr IntraVENous TITRATE    PARoxetine (PAXIL) tablet 20 mg  20 mg Per NG tube DAILY    famotidine (PF) (PEPCID) 20 mg in 0.9% sodium chloride 10 mL injection  20 mg IntraVENous Q12H    heparin (porcine) injection 5,000 Units  5,000 Units SubCUTAneous Q12H    HYDROmorphone (DILAUDID) injection 0.5 mg  0.5 mg IntraVENous Q4H PRN    metoprolol (LOPRESSOR) injection 2.5 mg  2.5 mg IntraVENous Q6H PRN    0.9% sodium chloride infusion 250 mL  250 mL IntraVENous PRN    0.9% sodium chloride infusion 250 mL  250 mL IntraVENous PRN    lidocaine (XYLOCAINE) 4 % cream   Topical PRN    [Held by provider] gabapentin (NEURONTIN) capsule 100 mg  100 mg Oral TID    sodium chloride (NS) flush 5-40 mL  5-40 mL IntraVENous Q8H    sodium chloride (NS) flush 5-40 mL  5-40 mL IntraVENous PRN acetaminophen (TYLENOL) tablet 650 mg  650 mg Oral Q6H PRN    Or    acetaminophen (TYLENOL) suppository 650 mg  650 mg Rectal Q6H PRN    polyethylene glycol (MIRALAX) packet 17 g  17 g Oral DAILY PRN    ondansetron (ZOFRAN ODT) tablet 4 mg  4 mg Oral Q8H PRN    Or    ondansetron (ZOFRAN) injection 4 mg  4 mg IntraVENous Q6H PRN        Review of Systems  Constitutional: No fevers, No chills, No sweats, No fatigue, No Weakness  Eyes: No redness  Ears, nose, mouth, throat, and face: No nasal congestion, No sore throat, No voice change  Respiratory: No Shortness of Breath, No cough, No wheezing  Cardiovascular: has chest pain at the site of pleural catheter, No palpitations, No extremity edema  Gastrointestinal: No nausea, No vomiting, No diarrhea, No abdominal pain  Genitourinary: No frequency, No dysuria, No hematuria  Integument/breast: No skin lesion(s)   Neurological: No Confusion, No headaches, No dizziness      Objective:     Visit Vitals  BP (!) 141/77   Pulse 87   Temp 97.7 °F (36.5 °C)   Resp 20   Ht 5' (1.524 m)   Wt 69.3 kg (152 lb 12.5 oz)   SpO2 99%   BMI 29.84 kg/m²    O2 Flow Rate (L/min): 2 l/min O2 Device: None (Room air)    Temp (24hrs), Av.7 °F (36.5 °C), Min:96.9 °F (36.1 °C), Max:98.6 °F (37 °C)      No intake/output data recorded.  0701 -  1900  In: 1546.2 [I.V.:724.2]  Out: 8375 [Urine:2]    PHYSICAL EXAM:  Constitutional: No acute distress  Skin: Extremities and face reveal no rashes. HEENT: Sclerae anicteric. Extra-occular muscles are intact. No oral ulcers. The neck is supple and no masses. Cardiovascular: Regular rate and rhythm. Respiratory:  decrease breath sound on left side. Left chest tube in place. GI: Abdomen nondistended, soft, and nontender. Normal active bowel sounds. Musculoskeletal: No pitting edema of the lower legs. Able to move all ext  Neurological:  Patient is alert and oriented.  Cranial nerves II-XII grossly intact  Psychiatric: Mood appears appropriate       Data Review    Recent Results (from the past 24 hour(s))   GLUCOSE, POC    Collection Time: 01/12/23 11:37 PM   Result Value Ref Range    Glucose (POC) 167 (H) 65 - 117 mg/dL    Performed by Clarisse Spears RN BSN    METABOLIC PANEL, BASIC    Collection Time: 01/13/23  5:12 AM   Result Value Ref Range    Sodium 140 136 - 145 mmol/L    Potassium 4.7 3.5 - 5.1 mmol/L    Chloride 106 97 - 108 mmol/L    CO2 26 21 - 32 mmol/L    Anion gap 8 5 - 15 mmol/L    Glucose 146 (H) 65 - 100 mg/dL    BUN 51 (H) 6 - 20 MG/DL    Creatinine 3.60 (H) 0.55 - 1.02 MG/DL    BUN/Creatinine ratio 14 12 - 20      eGFR 14 (L) >60 ml/min/1.73m2    Calcium 7.5 (L) 8.5 - 10.1 MG/DL   PHOSPHORUS    Collection Time: 01/13/23  5:12 AM   Result Value Ref Range    Phosphorus 3.9 2.6 - 4.7 MG/DL   MAGNESIUM    Collection Time: 01/13/23  5:12 AM   Result Value Ref Range    Magnesium 2.8 (H) 1.6 - 2.4 mg/dL   CBC WITH AUTOMATED DIFF    Collection Time: 01/13/23  5:12 AM   Result Value Ref Range    WBC 17.5 (H) 3.6 - 11.0 K/uL    RBC 2.30 (L) 3.80 - 5.20 M/uL    HGB 6.9 (L) 11.5 - 16.0 g/dL    HCT 20.6 (L) 35.0 - 47.0 %    MCV 89.6 80.0 - 99.0 FL    MCH 30.0 26.0 - 34.0 PG    MCHC 33.5 30.0 - 36.5 g/dL    RDW 17.0 (H) 11.5 - 14.5 %    PLATELET 70 (L) 525 - 400 K/uL    MPV 12.2 8.9 - 12.9 FL    NRBC 0.5 (H) 0  WBC    ABSOLUTE NRBC 0.08 (H) 0.00 - 0.01 K/uL    NEUTROPHILS 80 (H) 32 - 75 %    LYMPHOCYTES 13 12 - 49 %    MONOCYTES 6 5 - 13 %    EOSINOPHILS 0 0 - 7 %    BASOPHILS 0 0 - 1 %    IMMATURE GRANULOCYTES 1 (H) 0.0 - 0.5 %    ABS. NEUTROPHILS 14.0 (H) 1.8 - 8.0 K/UL    ABS. LYMPHOCYTES 2.3 0.8 - 3.5 K/UL    ABS. MONOCYTES 1.1 (H) 0.0 - 1.0 K/UL    ABS. EOSINOPHILS 0.0 0.0 - 0.4 K/UL    ABS. BASOPHILS 0.0 0.0 - 0.1 K/UL    ABS. IMM.  GRANS. 0.2 (H) 0.00 - 0.04 K/UL    DF AUTOMATED     PROCALCITONIN    Collection Time: 01/13/23  5:12 AM   Result Value Ref Range    Procalcitonin 6.35 ng/mL   HEPATIC FUNCTION PANEL    Collection Time: 01/13/23  5:12 AM   Result Value Ref Range    Protein, total 5.0 (L) 6.4 - 8.2 g/dL    Albumin 1.7 (L) 3.5 - 5.0 g/dL    Globulin 3.3 2.0 - 4.0 g/dL    A-G Ratio 0.5 (L) 1.1 - 2.2      Bilirubin, total 8.1 (H) 0.2 - 1.0 MG/DL    Bilirubin, direct 6.6 (H) 0.0 - 0.2 MG/DL    Alk. phosphatase 1,067 (H) 45 - 117 U/L    AST (SGOT) 606 (H) 15 - 37 U/L    ALT (SGPT) 858 (H) 12 - 78 U/L   PROTHROMBIN TIME + INR    Collection Time: 01/13/23  5:12 AM   Result Value Ref Range    INR 1.9 (H) 0.9 - 1.1      Prothrombin time 18.8 (H) 9.0 - 11.1 sec   PTT    Collection Time: 01/13/23  5:12 AM   Result Value Ref Range    aPTT 39.9 (H) 22.1 - 31.0 sec    aPTT, therapeutic range     58.0 - 77.0 SECS   RBC, ALLOCATE    Collection Time: 01/13/23  5:45 AM   Result Value Ref Range    HISTORY CHECKED? Historical check performed    GLUCOSE, POC    Collection Time: 01/13/23  5:54 AM   Result Value Ref Range    Glucose (POC) 139 (H) 65 - 117 mg/dL    Performed by Linh Grider RN BSN    TYPE & SCREEN    Collection Time: 01/13/23  6:01 AM   Result Value Ref Range    Crossmatch Expiration 01/16/2023,2359     ABO/Rh(D) O POSITIVE     Antibody screen NEG     Unit number [id number]     Blood component type RC LR,2     Unit division 00     Status of unit ISSUED     Crossmatch result Compatible    GLUCOSE, POC    Collection Time: 01/13/23 12:38 PM   Result Value Ref Range    Glucose (POC) 106 65 - 117 mg/dL    Performed by Osito Rebolledo    GLUCOSE, POC    Collection Time: 01/13/23  7:19 PM   Result Value Ref Range    Glucose (POC) 112 65 - 117 mg/dL    Performed by Osito Rebolledo                  Total critical care time spent 50 minutes involving direct patient care as well as reviewing patient's labs and coordination of care with nursing staff     Care Plan discussed with: Patient/Family/RN/Case Management        Total time spent with patient: 50 minutes.

## 2023-01-15 ENCOUNTER — APPOINTMENT (OUTPATIENT)
Dept: GENERAL RADIOLOGY | Age: 63
DRG: 208 | End: 2023-01-15
Attending: INTERNAL MEDICINE
Payer: COMMERCIAL

## 2023-01-15 LAB
ALBUMIN SERPL-MCNC: 2.3 G/DL (ref 3.5–5)
ALBUMIN/GLOB SERPL: 0.5 (ref 1.1–2.2)
ALP SERPL-CCNC: 1062 U/L (ref 45–117)
ALT SERPL-CCNC: 625 U/L (ref 12–78)
AMMONIA PLAS-SCNC: 22 UMOL/L
ANION GAP SERPL CALC-SCNC: 11 MMOL/L (ref 5–15)
AST SERPL-CCNC: 333 U/L (ref 15–37)
BASOPHILS # BLD: 0 K/UL (ref 0–0.1)
BASOPHILS NFR BLD: 0 % (ref 0–1)
BILIRUB DIRECT SERPL-MCNC: 9.5 MG/DL (ref 0–0.2)
BILIRUB SERPL-MCNC: 11.5 MG/DL (ref 0.2–1)
BUN SERPL-MCNC: 63 MG/DL (ref 6–20)
BUN/CREAT SERPL: 11 (ref 12–20)
CALCIUM SERPL-MCNC: 8.2 MG/DL (ref 8.5–10.1)
CHLORIDE SERPL-SCNC: 101 MMOL/L (ref 97–108)
CO2 SERPL-SCNC: 26 MMOL/L (ref 21–32)
CREAT SERPL-MCNC: 5.75 MG/DL (ref 0.55–1.02)
DIFFERENTIAL METHOD BLD: ABNORMAL
EOSINOPHIL # BLD: 0 K/UL (ref 0–0.4)
EOSINOPHIL NFR BLD: 0 % (ref 0–7)
ERYTHROCYTE [DISTWIDTH] IN BLOOD BY AUTOMATED COUNT: 17.4 % (ref 11.5–14.5)
GLOBULIN SER CALC-MCNC: 4.2 G/DL (ref 2–4)
GLUCOSE BLD STRIP.AUTO-MCNC: 134 MG/DL (ref 65–117)
GLUCOSE BLD STRIP.AUTO-MCNC: 156 MG/DL (ref 65–117)
GLUCOSE BLD STRIP.AUTO-MCNC: 177 MG/DL (ref 65–117)
GLUCOSE BLD STRIP.AUTO-MCNC: 214 MG/DL (ref 65–117)
GLUCOSE SERPL-MCNC: 161 MG/DL (ref 65–100)
HCT VFR BLD AUTO: 28 % (ref 35–47)
HGB BLD-MCNC: 9.2 G/DL (ref 11.5–16)
IMM GRANULOCYTES # BLD AUTO: 0 K/UL (ref 0–0.04)
IMM GRANULOCYTES NFR BLD AUTO: 0 % (ref 0–0.5)
LYMPHOCYTES # BLD: 1.7 K/UL (ref 0.8–3.5)
LYMPHOCYTES NFR BLD: 10 % (ref 12–49)
MAGNESIUM SERPL-MCNC: 2.8 MG/DL (ref 1.6–2.4)
MCH RBC QN AUTO: 29.2 PG (ref 26–34)
MCHC RBC AUTO-ENTMCNC: 32.9 G/DL (ref 30–36.5)
MCV RBC AUTO: 88.9 FL (ref 80–99)
MONOCYTES # BLD: 1.4 K/UL (ref 0–1)
MONOCYTES NFR BLD: 8 % (ref 5–13)
NEUTS BAND NFR BLD MANUAL: 2 %
NEUTS SEG # BLD: 14.3 K/UL (ref 1.8–8)
NEUTS SEG NFR BLD: 80 % (ref 32–75)
NRBC # BLD: 0.51 K/UL (ref 0–0.01)
NRBC BLD-RTO: 2.9 PER 100 WBC
PHOSPHATE SERPL-MCNC: 5.6 MG/DL (ref 2.6–4.7)
PLATELET # BLD AUTO: 99 K/UL (ref 150–400)
POTASSIUM SERPL-SCNC: 4.6 MMOL/L (ref 3.5–5.1)
PROT SERPL-MCNC: 6.5 G/DL (ref 6.4–8.2)
RBC # BLD AUTO: 3.15 M/UL (ref 3.8–5.2)
RBC MORPH BLD: ABNORMAL
SERVICE CMNT-IMP: ABNORMAL
SODIUM SERPL-SCNC: 138 MMOL/L (ref 136–145)
WBC # BLD AUTO: 17.4 K/UL (ref 3.6–11)

## 2023-01-15 PROCEDURE — 36415 COLL VENOUS BLD VENIPUNCTURE: CPT

## 2023-01-15 PROCEDURE — 74011250636 HC RX REV CODE- 250/636: Performed by: ANESTHESIOLOGY

## 2023-01-15 PROCEDURE — 74011250636 HC RX REV CODE- 250/636: Performed by: NURSE PRACTITIONER

## 2023-01-15 PROCEDURE — 65270000046 HC RM TELEMETRY

## 2023-01-15 PROCEDURE — 80048 BASIC METABOLIC PNL TOTAL CA: CPT

## 2023-01-15 PROCEDURE — 71045 X-RAY EXAM CHEST 1 VIEW: CPT

## 2023-01-15 PROCEDURE — 83735 ASSAY OF MAGNESIUM: CPT

## 2023-01-15 PROCEDURE — 74011636637 HC RX REV CODE- 636/637: Performed by: NURSE PRACTITIONER

## 2023-01-15 PROCEDURE — 82140 ASSAY OF AMMONIA: CPT

## 2023-01-15 PROCEDURE — 74011250637 HC RX REV CODE- 250/637: Performed by: NURSE PRACTITIONER

## 2023-01-15 PROCEDURE — 74011000250 HC RX REV CODE- 250: Performed by: NURSE PRACTITIONER

## 2023-01-15 PROCEDURE — 74011000250 HC RX REV CODE- 250: Performed by: STUDENT IN AN ORGANIZED HEALTH CARE EDUCATION/TRAINING PROGRAM

## 2023-01-15 PROCEDURE — 74011250637 HC RX REV CODE- 250/637: Performed by: INTERNAL MEDICINE

## 2023-01-15 PROCEDURE — 74011000258 HC RX REV CODE- 258: Performed by: ANESTHESIOLOGY

## 2023-01-15 PROCEDURE — 74011250636 HC RX REV CODE- 250/636: Performed by: INTERNAL MEDICINE

## 2023-01-15 PROCEDURE — 80076 HEPATIC FUNCTION PANEL: CPT

## 2023-01-15 PROCEDURE — 85025 COMPLETE CBC W/AUTO DIFF WBC: CPT

## 2023-01-15 PROCEDURE — 84100 ASSAY OF PHOSPHORUS: CPT

## 2023-01-15 PROCEDURE — 82962 GLUCOSE BLOOD TEST: CPT

## 2023-01-15 RX ADMIN — HEPARIN SODIUM 5000 UNITS: 5000 INJECTION INTRAVENOUS; SUBCUTANEOUS at 18:20

## 2023-01-15 RX ADMIN — CARVEDILOL 6.25 MG: 6.25 TABLET, FILM COATED ORAL at 10:09

## 2023-01-15 RX ADMIN — HEPARIN SODIUM 5000 UNITS: 5000 INJECTION INTRAVENOUS; SUBCUTANEOUS at 06:03

## 2023-01-15 RX ADMIN — CARVEDILOL 6.25 MG: 6.25 TABLET, FILM COATED ORAL at 18:20

## 2023-01-15 RX ADMIN — METHYLPREDNISOLONE SODIUM SUCCINATE 20 MG: 40 INJECTION, POWDER, FOR SOLUTION INTRAMUSCULAR; INTRAVENOUS at 10:09

## 2023-01-15 RX ADMIN — PAROXETINE HYDROCHLORIDE 20 MG: 20 TABLET, FILM COATED ORAL at 10:09

## 2023-01-15 RX ADMIN — Medication 2 UNITS: at 18:20

## 2023-01-15 RX ADMIN — PIPERACILLIN AND TAZOBACTAM 3.38 G: 3; .375 INJECTION, POWDER, FOR SOLUTION INTRAVENOUS at 02:57

## 2023-01-15 RX ADMIN — SODIUM CHLORIDE, PRESERVATIVE FREE 20 MG: 5 INJECTION INTRAVENOUS at 10:09

## 2023-01-15 RX ADMIN — METHYLPREDNISOLONE SODIUM SUCCINATE 20 MG: 40 INJECTION, POWDER, FOR SOLUTION INTRAMUSCULAR; INTRAVENOUS at 21:40

## 2023-01-15 RX ADMIN — Medication 1 AMPULE: at 21:45

## 2023-01-15 RX ADMIN — SODIUM CHLORIDE, PRESERVATIVE FREE 10 ML: 5 INJECTION INTRAVENOUS at 05:59

## 2023-01-15 RX ADMIN — PIPERACILLIN AND TAZOBACTAM 3.38 G: 3; .375 INJECTION, POWDER, FOR SOLUTION INTRAVENOUS at 15:37

## 2023-01-15 RX ADMIN — SODIUM CHLORIDE, PRESERVATIVE FREE 20 MG: 5 INJECTION INTRAVENOUS at 21:39

## 2023-01-15 RX ADMIN — SODIUM CHLORIDE, PRESERVATIVE FREE 10 ML: 5 INJECTION INTRAVENOUS at 14:12

## 2023-01-15 RX ADMIN — CASTOR OIL AND BALSAM, PERU: 788; 87 OINTMENT TOPICAL at 21:38

## 2023-01-15 NOTE — PROGRESS NOTES
Problem: Falls - Risk of  Goal: *Absence of Falls  Description: Document Taye Sol Fall Risk and appropriate interventions in the flowsheet. Outcome: Progressing Towards Goal  Note: Fall Risk Interventions:  Mobility Interventions: Bed/chair exit alarm, Patient to call before getting OOB, Strengthening exercises (ROM-active/passive), Utilize gait belt for transfers/ambulation    Mentation Interventions: Adequate sleep, hydration, pain control, Door open when patient unattended, Bed/chair exit alarm, More frequent rounding, Increase mobility, Reorient patient, Toileting rounds, Update white board    Medication Interventions: Patient to call before getting OOB, Teach patient to arise slowly, Utilize gait belt for transfers/ambulation    Elimination Interventions: Call light in reach, Toileting schedule/hourly rounds, Toilet paper/wipes in reach, Stay With Me (per policy), Patient to call for help with toileting needs              Problem: Patient Education: Go to Patient Education Activity  Goal: Patient/Family Education  Outcome: Progressing Towards Goal     Problem: Pressure Injury - Risk of  Goal: *Prevention of pressure injury  Description: Document William Scale and appropriate interventions in the flowsheet.   Outcome: Progressing Towards Goal  Note: Pressure Injury Interventions:  Sensory Interventions: Assess changes in LOC    Moisture Interventions: Absorbent underpads    Activity Interventions: Assess need for specialty bed, PT/OT evaluation    Mobility Interventions: Assess need for specialty bed    Nutrition Interventions: Document food/fluid/supplement intake    Friction and Shear Interventions: Apply protective barrier, creams and emollients                Problem: Patient Education: Go to Patient Education Activity  Goal: Patient/Family Education  Outcome: Progressing Towards Goal     Problem: Patient Education: Go to Patient Education Activity  Goal: Patient/Family Education  Outcome: Progressing Towards Goal     Problem: Patient Education: Go to Patient Education Activity  Goal: Patient/Family Education  Outcome: Progressing Towards Goal     Problem: Patient Education: Go to Patient Education Activity  Goal: Patient/Family Education  Outcome: Progressing Towards Goal

## 2023-01-15 NOTE — PROGRESS NOTES
Cardiology Progress Note  1/14/2023    Admit Date: 1/3/2023  Admit Diagnosis: Staphylococcus aureus pneumonia ([facility] [address] [J15.211]  CC:  None currently  Cardiologist:  Dr Peterson Workman. Cardiac Assessment/Plan:    1) Atypical CP, for yrs: Neg SEH 9/2021 (6:00, -CP, -ecg; Mild LVH). 2) Abnl ecg c/w LVH. *\"EF 45-50%; No valve dz:\" @ [facility] by RCA. (EF not that low on my interp; EF 50-55%). 3) HTN  4) Palpitations (few x/yr; few sec). 5) FHx early CAD/CHF  6) Breast CA, left; Chemo (Dr GÉNESIS Pina Bodega @ [address] [place] [address])    Admitted from 12/28 to 1/1/23 @ 8 [facility] for PNA/hemoptysis/covid/sepsis/CHRISTA/NQWMI w/ trop 100s    Admitted 1/3/23 w/ dyspnea/sepsis; subsequent renal failure/hypotension/hemothorax w/ chest tube; Resp failure 1/9 w/ intubation. Type II NQWMI. Neg blood Cx. Hepatic failure. Chest tube 1/5/23. Intubated 1/9/23. Self-extubated 1/12/23. Off pressors 1/10/23. Echo 1/10/23: Left Ventricle: Mildly reduced left ventricular systolic function with a visually estimated EF of 45 - 50%. Left ventricle size is normal. Increased wall thickness. CXR: 1/10/23: \"The pulmonary vasculature is within normal limits. Diffuse airspace opacity in the right lung. Partial opacification of the left upper lobe. No change. \"  CXR 1/13: \"The pulmonary vasculature is within normal limits. Volume loss left lung base. Mild patchy airspace opacity in the right lung base  improved. Left-sided chest tube is stable. Small left pleural effusion suspected. \"    1/12: Remains intubated/sedated; CT remains in.  BPs 73-110s; Hr 70-80s; Sinus; 100% vent (30%; PEEP 5). Anuric. WBC 15; Hg 7.7; plt 83. Cr 2.15; alb 1.8; LFTs 1ks; INR 2.5    1/13: Self extubated; CT remains in. No CP except at CT site; no resting dyspnea  BPs 75-130s, current 130s; HR 50s-80s; sinus; anuric  WBC 17; Hg 6.9; plt 70. K 4.7; Cr 3.6; lfts 600s/800s; procal 6 from 13. INR 1.9    Cardiac meds; None (off coreg 6.25 bid & crestor 10).     Rec: Add back asa when able (not now w/ recurrent anemia); Restart coreg @ 3.125 bid when BPs consistently stable. 1/14:  No changes to recs. Still confused. _________________________________________________________  Echo 12/27/22: Left Ventricle: Normal left ventricular systolic function with a visually estimated EF of 50 - 55%. Left ventricle size is normal. Normal wall thickness. Mild hypokinesis of the following segments: basal inferolateral and mid inferolateral. Unfortunately, imaging quality limis the accuracy     Chest tube 1/5/23. Intubated 1/9/23. Complicated clinical course as noted below; Currently intubated/sedated  Current cardiac meds:  Levo @ 15; crestor 10 (holding coreg 6.25 bid)  IMP: type II NQWMI;      Rec: cont supportive care; wean pressors as able; hold crestor. Future aspirin when bleeding resolved. ____________________________________________________________________  Sebastian Nuñez is a 57 y.o. female presents with Staphylococcus aureus pneumonia ([address] Ofelia Shaw. Admitted from 12/28 to 1/1/23 @ [facility] for PNA/hemoptysis/covid/sepsis/CHRISTA/NQWMI w/ trop 100s        As noted in H&P: \"56 y.o.  female with pertinent past medical history of breast cancer currently off chemotherapy, MDD/TAIWO, hypothyroidism, GERD, and recent hospitalization from 12/28-1/1 for multifocal pneumonia secondary to MSSA pneumonia superimposed on COVID-19 pneumonia with course complicated by hemoptysis, sepsis, CHRISTA, and type II MI completed course of cefepime and doxycycline transition to Augmentin on time of discharge-patient unsure if she has been taking that who presents with complaints of persistent decline since discharge from OSH. She complains of malaise, fever/chills, cough productive of blood-tinged sputum, shortness of breath/dyspnea on exertion. She reports she does not feel she is doing well at home and think she needs to be in the hospital for further management.   She denies tobacco, alcohol, or illicit drug use. ROS otherwise negative. In the ED, patient febrile to 100.8 °F, hemodynamically stable (hypertensive 150s/60s), saturating mid 90s on room air. ECG demonstrates NSR with LVH criteria, CXR demonstrates increased left pleural effusion and left lower lobe airspace opacity and CT chest demonstrates increased moderately large left pleural effusion with unchanged patchy bilateral consolidation left greater than right compatible with pneumonia. Rapid and flu negative. Point-of-care lactic 0.7, proBNP 1091(Previously 866), high since her troponin 116 down trended to 107 (600s at prior hospitalization, sodium 142, potassium 3.4, BUN 38, creatinine 3.0 (baseline), WBC 13.3 (slightly increased from time of discharge), hemoglobin 9.1 (improved from prior), platelets 523. Given recent hospitalization and concern for hep patient given vancomycin, Levaquin, and cefepime by ED provider. \"     ICU MD 1/8/22: \"Patient being transferred to ICU as the PCU nurses do not feel comfortable keeping the patient on the floor as she was hypotensive earlier and has been refusing po meds     57year old Female with hx of left breast cancer, recently admitted to [facility] for sepsis s/s COVID-19, CHRISTA, NSTEMI, MSSA pneumonia(12/28-1/1) presented to ED with c/o shortness of breath and hemoptysis. IN ED, patient was febrile to 100.8F. CXR demonstrated increased left pleural effusion with unchanged patchy B/L consolidation L>R. Patient had IR thoracentesis on 1/4/23- bloody tap. CT chest on 1/5 showed left sided hemothorax. Hb dropped from 9.1 to 5.6. s/p left chest tube placement on 1/5/23. S/p PRBC transfusion. \"     ______________________________________________________________________     The patient is intubated; can provide no history. On accelerating levophed (15 currently); on HD. No PND, orthopnea, palpitations, pre-syncope, syncope, peripheral edema, or decrease in exercise tolerance.   No current complaints. ECG 1/3/22:   Sinus, leftward axis, no acute changes. LVH. Tele: sinus  CXR: \"1. Endotracheal tube terminates 1 cm above the marvin. Consider retraction 1 to  2 cm. 2. Appropriate positioning of left IJ central venous catheter. No pneumothorax. 3. Slight interval improvement in extensive bilateral airspace disease. 4. Left chest tube remains in place. Suspected small left pleural effusion. \"     Notable labs: K 4.5; BUN 61; Cr 7.35 (prev 3 range). Alb 1.9; LFTs 900s/2000; Prev procal 7; proBNP 9k; neg covid 1/3/23. INR 2.7.  ___________________________________________________  Notable prior cardiac history:  @ NP OV 10/27/22:  Ms Gabi Dos Santos has a h/o:  1) Atypical CP, for yrs: Neg SEH 9/2021 (6:00, -CP, -ecg; Mild LVH). 2) Abnl ecg c/w LVH. *\"EF 45-50%; No valve dz:\" @ [address] by RCA. (EF not that low on my interp; EF 50-55%). 3) HTN  4) Palpitations (few x/yr; few sec). 5) FHx early CAD/CHF  6) Breast CA, left; Chemo pending (Dr Ned Harrington @ [facility])  No ethanol, added salt, or nicotine. 1 caff johnny/d. 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 She would like an annual visit here. 8/2021:  She has atypical chest pain (for years; 0-1 X/mo; sharp sensation; few seconds; random onset/offset). She also has intermittent palpitations (fast HR sensation, few X/year; few seconds duration, not related to chest pain). No MCCALL with good exercise tolerance. No previous cardiac evaluation. PCP note from 4/28/06 reviewed; ECG from then interpreted today as noted below. 2/2022: no recent chest pain, dyspnea, or palpitations. She would like an annual visit here. 10/2022: Chest pain remains resolved. No dyspnea nor palpitations. Reportedly mild LV dysfunction as noted above; in my review of the echo, the EF is not that low. NP F/U: Doing well. No CP, MCCALL, palps, edema or syncope. Home SBPs in the 130s. IMPRESSION AND PLAN  01. Other chest pain (R07.89):   Atypical chest pain remains resolved; Neg SEH 9/2021. We discussed the signs and symptoms of unstable angina, myocardial infarction and malignant arrhythmia. The patient knows to seek immediate medical attention should they occur. 02. Essential (primary) hypertension (I10):  Reasonably well controlled; continue Losartan 25mg QD and increase Coreg to 6.25mg BID. Continue to monitor BP.   03. Other cardiomyopathies (I42.8):  EF looks reassuring on echo per review; repeat planned for next month. 04. Abnormal electrocardiogram [ECG] [EKG] (R94.31):  Nonspecific finding. This condition is stable. 05. Malignant neoplasm of left breast in female, estrogen receptor negative, unspecified site of breast (C50.912): Managed by: Other Physician   06. Long term (current) use of aspirin (Z79.82)   07. Body mass index [BMI] 27.0-27.9, adult (Z68.27)      ORDERS:    Dietary management education, guidance, and counseling           10/27/22 MEDICATION LIST  Medication Sig Desc Non-VCS   aspirin 81 mg tablet,delayed release take 1 tablet by oral route  every day Y   carvedilol 6.25 mg tablet take 1 tablet by oral route 2 times every day with food N   Crestor take 1 Tablet by oral route  every week Y   losartan 25 mg tablet take 1 tablet by oral route  every day N   Multi Vitamin 9 mg iron/15 mL oral liquid   Y   paroxetine 20 mg tablet take 1 tablet by oral route  every day Y      ______________________________________  CARDIAC HISTORY  RISK FACTORS:  1 Hypertension         CARDIOVASCULAR PROCEDURES  Procedure Date Results   Echo 10/05/2022 \"EF 45-50%; No valve dz:\" @ [facility] by RCA. (EF not that low on my interp). EKG 10/13/2022 Sinus Rhythm, LVH; axis -15°; nonspecific T-wave changes. [facility] 10/01/2021 Normal EF, No Ischemia, 6:00, -CP, -ecg; Mild LVH. For other plans, see orders.   High complexity decision making was performed  X Yes   High-risk of decompensation with multiple organ involvement X Yes   Hospital problem list     Active Hospital Problems Diagnosis Date Noted    Goals of care, counseling/discussion 2023    Advanced directives, counseling/discussion 2023    Hemothorax 2023    Shortness of breath 2023    Acute blood loss anemia 2023    Hemothorax on left 2023    Anxiety 2023    Palliative care by specialist 2023    Staphylococcus aureus pneumonia ([facility] [address] 2023                                                         Subjective:  Patient reports  []   nothing; unable to communicate    []   intubated     Chest pain  none  consistent with:  Non-cardiac CP         Atypical CP     None now x On going at CT site  Anginal CP     Dyspnea x none  at rest  with exertion         improved  unchanged  worse              PND x none  overnight       Orthopnea x none  improved  unchanged  worse   Presyncope x none  improved  unchanged  worse   Ambulated in hallway without symptoms   Yes   Ambulated in room without symptoms  Yes     ROS Hematuria:  Yes X No Dysuria:  Yes X No                (2+  other systems) Cough: Yes X No Sputum: Yes X No                 BRBPR:  Yes X No Melena: Yes X No   No change in family and social history from H&P/Consult note.   Objective:    Physical Exam:  24 hr VS reviewed, overall VSSAF  Temp (24hrs), Av °F (36.7 °C), Min:97.7 °F (36.5 °C), Max:98.4 °F (36.9 °C)    Patient Vitals for the past 8 hrs:   Pulse   23 1945 69   23 1800 69   23 1700 68   23 1600 71   23 1500 73       Patient Vitals for the past 8 hrs:   Resp   23 1800 15   23 1700 15   23 1600 20   23 1500 17       Patient Vitals for the past 8 hrs:   BP   23 1945 (!) 172/86   23 1800 (!) 156/85   23 1700 (!) 167/87   23 1600 (!) 161/85   23 1500 (!) 161/97         No intake or output data in the 24 hours ending 23 2206    General:  WD,WN  Elderly  Cachetic x NAD     Agitated  Lethargic  Arousable  Obtunded     Sedated  On Bipap Intubated x NC                ENT/Palate:  WNL x Dry MM x anicteric  ETT in place              Respiratory: X CTA ant  Nl resp effort  Increased effort  No significant change     rhonchi  rales  improved  worse              Cardiovasc: X RRR  IRRR X Nl S1, S2 x No rub     No murmur X No new murmur  Murmur c/w: x No gallop    x No edema  BLE edema:+  RLE edema:+  LLE edema:+     Edema less  Edema more  Edema same  Edema worse    X Nl JVP  Elevated JVP  JVP same  JVP worse    X Carotid wnl x abd aorta not palpated X no peripheral emboli noted                GI: X abd soft x nondistended X BS present X No organo- megaly noted              Skin: X Warm, dry  Cold extremites                  Neuro: x A/O x Grossly non- focal  Obtunded  Sedated     Lethargic  Arousable  intubated x Flat/depressed affect     cath site intact w/o hematoma or new bruit; distal pulse unchanged  Yes   Data Review:     Telemetry independently reviewed x sinus  chronic afib  parox afib  NSVT     ECG independently reviewed  NSR  afib  no significant changes  NSST-Tw chgs     x no new ECG provided for review   Lab results reviewed as noted below. Current medications reviewed as noted below. No results for input(s): PH, PCO2, PO2 in the last 72 hours. No results for input(s): CPK, CKMB, CKNDX, TROIQ in the last 72 hours.   Recent Labs     01/14/23  1609 01/14/23  0520 01/13/23  0512 01/12/23  0410    140 140 139   K 4.3 4.1 4.7 4.4    101 106 104   CO2 29 29 26 25   BUN 48* 40* 51* 30*   CREA 4.75* 3.71* 3.60* 2.15*   * 143* 146* 228*   PHOS 4.9* 4.8* 3.9 1.9*   CA 8.2* 7.9* 7.5* 7.4*   ALB  --   --  1.7* 1.8*   WBC 18.6* 20.3* 17.5* 14.7*   HGB 9.0* 9.4* 6.9* 7.7*   HCT 26.5* 28.6* 20.6* 22.3*   PLT 86* 85* 70* 83*       Recent Labs     01/13/23  0512 01/12/23  0410   AP 1,067* 1,268*   TBILI 8.1* 8.0*   TP 5.0* 5.3*   ALB 1.7* 1.8*   GLOB 3.3 3.5       Recent Labs     01/13/23  0512 01/12/23  0410   INR 1.9* 2.5*   PTP 18.8* 24.3*   APTT 39.9*  --         Recent Labs     01/12/23  0410   TIBC 178*   PSAT INDETERMINATE - SENT TO REFERENCE LAB FOR ADDITIONAL TESTING.         Lab Results   Component Value Date/Time    Glucose (POC) 131 (H) 01/14/2023 06:03 PM    Glucose (POC) 169 (H) 01/14/2023 11:46 AM    Glucose (POC) 138 (H) 01/14/2023 05:30 AM    Glucose (POC) 113 01/13/2023 11:25 PM    Glucose (POC) 112 01/13/2023 07:19 PM       Current Facility-Administered Medications   Medication Dose Route Frequency    carvediloL (COREG) tablet 6.25 mg  6.25 mg Oral BID WITH MEALS    0.9% sodium chloride infusion 250 mL  250 mL IntraVENous PRN    lidocaine 4 % patch 1 Patch  1 Patch TransDERmal Q24H    piperacillin-tazobactam (ZOSYN) 3.375 g in 0.9% sodium chloride (MBP/ADV) 100 mL MBP  3.375 g IntraVENous Q12H    glucose chewable tablet 16 g  4 Tablet Oral PRN    glucagon (GLUCAGEN) injection 1 mg  1 mg IntraMUSCular PRN    dextrose 10% infusion 0-250 mL  0-250 mL IntraVENous PRN    insulin lispro (HUMALOG) injection   SubCUTAneous Q6H    methylPREDNISolone (PF) (SOLU-MEDROL) injection 20 mg  20 mg IntraVENous Q12H    epoetin tyler-epbx (RETACRIT) injection 10,000 Units  10,000 Units SubCUTAneous Q TUE, THU & SAT    alcohol 62% (NOZIN) nasal  1 Ampule  1 Ampule Topical Q12H    balsam peru-castor oiL (VENELEX) ointment   Topical BID    dexmedeTOMidine in 0.9 % NaCl (PRECEDEX) 400 mcg/100 mL (4 mcg/mL) infusion soln  0.1-1.5 mcg/kg/hr IntraVENous TITRATE    PARoxetine (PAXIL) tablet 20 mg  20 mg Per NG tube DAILY    famotidine (PF) (PEPCID) 20 mg in 0.9% sodium chloride 10 mL injection  20 mg IntraVENous Q12H    heparin (porcine) injection 5,000 Units  5,000 Units SubCUTAneous Q12H    HYDROmorphone (DILAUDID) injection 0.5 mg  0.5 mg IntraVENous Q4H PRN    metoprolol (LOPRESSOR) injection 2.5 mg  2.5 mg IntraVENous Q6H PRN    lidocaine (XYLOCAINE) 4 % cream   Topical PRN    [Held by provider] gabapentin (NEURONTIN) capsule 100 mg  100 mg Oral TID    sodium chloride (NS) flush 5-40 mL  5-40 mL IntraVENous Q8H    sodium chloride (NS) flush 5-40 mL  5-40 mL IntraVENous PRN    acetaminophen (TYLENOL) tablet 650 mg  650 mg Oral Q6H PRN    Or    acetaminophen (TYLENOL) suppository 650 mg  650 mg Rectal Q6H PRN    polyethylene glycol (MIRALAX) packet 17 g  17 g Oral DAILY PRN    ondansetron (ZOFRAN ODT) tablet 4 mg  4 mg Oral Q8H PRN    Or    ondansetron (ZOFRAN) injection 4 mg  4 mg IntraVENous Q6H PRN         Edith Beckham MD

## 2023-01-15 NOTE — PROGRESS NOTES
End of Shift Note    Bedside shift change report given to Bhupendra Warren (oncoming nurse) by Arlene Yarbrough (offgoing nurse). Report included the following information SBAR and Kardex    Shift worked:  [id number]     Shift summary and any significant changes:    Pt had increased confusion overnight. Attempted to initiate avasys, none are available. Baby monitor put in room with her sister's permisson. Concerns for physician to address: Pt is now on clear liquid diet - does she still nee to be Q6 BS? Pt had no urine output overnight. Zone phone for oncoming shift:       Activity:  Activity Level: Bed Rest  Number times ambulated in hallways past shift: 0  Number of times OOB to chair past shift: 0    Cardiac:   Cardiac Monitoring: Yes      Cardiac Rhythm: Sinus Rhythm    Access:  Current line(s): PIV     Genitourinary:   Urinary status: anuric    Respiratory:   O2 Device: None (Room air)  Chronic home O2 use?: NO  Incentive spirometer at bedside: N/A       GI:  Last Bowel Movement Date: 01/14/23  Current diet:  ADULT DIET Clear Liquid  Passing flatus: YES  Tolerating current diet: YES       Pain Management:   Patient states pain is manageable on current regimen: YES    Skin:  William Score: 16  Interventions: turn team, speciality bed, and increase time out of bed    Patient Safety:  Fall Score:  Total Score: 4  Interventions: bed/chair alarm, gripper socks, and tele sitter   High Fall Risk: Yes    Length of Stay:  Expected LOS: 5d 0h  Actual LOS: [id number] [place]

## 2023-01-15 NOTE — PROGRESS NOTES
Problem: Falls - Risk of  Goal: *Absence of Falls  Description: Document [place] Fall Risk and appropriate interventions in the flowsheet. Outcome: Progressing Towards Goal  Note: Fall Risk Interventions:  Mobility Interventions: Bed/chair exit alarm, Patient to call before getting OOB, Strengthening exercises (ROM-active/passive), Utilize gait belt for transfers/ambulation    Mentation Interventions: Adequate sleep, hydration, pain control, Door open when patient unattended, Bed/chair exit alarm, More frequent rounding, Increase mobility, Reorient patient, Toileting rounds, Update white board    Medication Interventions: Patient to call before getting OOB, Teach patient to arise slowly, Utilize gait belt for transfers/ambulation    Elimination Interventions: Call light in reach, Toileting schedule/hourly rounds, Toilet paper/wipes in reach, Stay With Me (per policy), Patient to call for help with toileting needs              Problem: Patient Education: Go to Patient Education Activity  Goal: Patient/Family Education  Outcome: Progressing Towards Goal     Problem: Pressure Injury - Risk of  Goal: *Prevention of pressure injury  Description: Document William Scale and appropriate interventions in the flowsheet. Outcome: Progressing Towards Goal  Note: Pressure Injury Interventions:  Sensory Interventions: Turn and reposition approx.  every two hours (pillows and wedges if needed), Float heels, Discuss PT/OT consult with provider, Avoid rigorous massage over bony prominences, Assess need for specialty bed, Pressure redistribution bed/mattress (bed type)    Moisture Interventions: Absorbent underpads, Check for incontinence Q2 hours and as needed    Activity Interventions: Assess need for specialty bed, Pressure redistribution bed/mattress(bed type), Increase time out of bed, Chair cushion    Mobility Interventions: Assess need for specialty bed, Float heels, HOB 30 degrees or less, Pressure redistribution bed/mattress (bed type), Suspension boots, Turn and reposition approx.  every two hours(pillow and wedges)    Nutrition Interventions: Document food/fluid/supplement intake, Offer support with meals,snacks and hydration    Friction and Shear Interventions: Apply protective barrier, creams and emollients, Feet elevated on foot rest, HOB 30 degrees or less, Foam dressings/transparent film/skin sealants, Lift team/patient mobility team, Minimize layers, Transferring/repositioning devices                Problem: Patient Education: Go to Patient Education Activity  Goal: Patient/Family Education  Outcome: Progressing Towards Goal

## 2023-01-15 NOTE — PROGRESS NOTES
Transition of Care Plan:    RUR: 22  ALEXANDRA: 1/17? Disposition: SNF: prefrences pending  Once bed is secured Auth will need to be obtained from [address]. RAPID COVID will be needed prior to DC.      2:52 PM   Therapy rec SNF. CM called Pt Sister: Nenita Ortega and let her know therapy rec. CM emailed her at [email or web address] to offer FOC. Sister will review and get back with us by tomorrow with SNF preferences. If SNF or IPR: Date FOC offered:1/15  Date FOC received:  Date authorization started with reference number:  Date authorization received and expires:  Accepting facility:    Follow up appointments: after rehab  DME needed: TBD after rehab. Transportation at Discharge: stretcher will need to be arranged. Keys or means to access home:      n/a  IM Medicare Letter: n/a has KIRILL  Is patient a  and connected with the [address]?              no  If yes, was [employer] transfer form completed and [facility] notified? Caregiver Contact: Sister: Nenita Ortega: [phone]  Discharge Caregiver contacted prior to discharge? yes  Care Conference needed?:      no    CM will continue to monitor discharge plan.      Letitia Sahu, Weekend CM  Ext [phone]

## 2023-01-15 NOTE — PROGRESS NOTES
Hospitalist Progress Note    Subjective:   Daily Progress Note: 1/15/2023 3:42 PM    Hospital Course:  57year old Female with hx of left breast cancer, recently admitted to [facility] for sepsis s/s COVID-19, CHRISTA, NSTEMI, MSSA pneumonia(12/28-1/1) presented to ED with c/o shortness of breath and hemoptysis. IN ED, patient was febrile to 100.8F. CXR demonstrated increased left pleural effusion with unchanged patchy B/L consolidation L>R. Patient had IR thoracentesis on 1/4/23- bloody tap. CT chest on 1/5 showed left sided hemothorax. Hb dropped from 9.1 to 5.6. s/p left chest tube placement on 1/5/23. S/p PRBC transfusion. Txed to ICU 1/8 for hypotension requiring levophed, she was intubated on 1/9/23 for respiratory distress. Pt was started on CRRT on 1/10. Pt self extubated on 1/12/23. Subjective:  No events overnight    Assessment / Plan:  Acute hypoxic respiratory failure  Multifocal pneumonia/keytruda pneumonitis  S/p left chest tube placement for left sided hemothorax by IR on 1/5/23  S/p intubation 1/9, self extubated 1/12  Continue antibiotics- zosyn. Dc levaquine  Continue supplemental oxygen and titrate as needed  -chest tube removed  1/13  Cont steroids  Pleural effusion neg for malignant cells. 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 Anuric currently. Will need permcath        Sepsis vs SIRS due to malignancy:  Hypovolemia, requiring levophed   Continue current abx  Follow procal      Acute blood loss anemia:  Anemia of chronic disease  S/s left hemothorax  S/p PRBC transfusion on 1/5/23.  Hb post transfusion stable  Will monitor cbc and transfuse if Hb<7  Receive 1unit prbc    NSTEMI:  Likely type II MI  Outpatient cardiology follow up        Invasive ductal breast cancer:  Appreciate oncology consult  Appreciate palliative care consult         Body mass index is 29.84 kg/m².     Code status: Full  Prophylaxis: SCD's  Recommended Disposition: HH PT, OT, RN  ALEXANDRA>48hours  Barrier: clinical improvement  Need SNF on DC       Current Facility-Administered Medications   Medication Dose Route Frequency    carvediloL (COREG) tablet 6.25 mg  6.25 mg Oral BID WITH MEALS    0.9% sodium chloride infusion 250 mL  250 mL IntraVENous PRN    lidocaine 4 % patch 1 Patch  1 Patch TransDERmal Q24H    piperacillin-tazobactam (ZOSYN) 3.375 g in 0.9% sodium chloride (MBP/ADV) 100 mL MBP  3.375 g IntraVENous Q12H    glucose chewable tablet 16 g  4 Tablet Oral PRN    glucagon (GLUCAGEN) injection 1 mg  1 mg IntraMUSCular PRN    dextrose 10% infusion 0-250 mL  0-250 mL IntraVENous PRN    insulin lispro (HUMALOG) injection   SubCUTAneous Q6H    methylPREDNISolone (PF) (SOLU-MEDROL) injection 20 mg  20 mg IntraVENous Q12H    epoetin tyler-epbx (RETACRIT) injection 10,000 Units  10,000 Units SubCUTAneous Q TUE, THU & SAT    alcohol 62% (NOZIN) nasal  1 Ampule  1 Ampule Topical Q12H    balsam peru-castor oiL (VENELEX) ointment   Topical BID    dexmedeTOMidine in 0.9 % NaCl (PRECEDEX) 400 mcg/100 mL (4 mcg/mL) infusion soln  0.1-1.5 mcg/kg/hr IntraVENous TITRATE    PARoxetine (PAXIL) tablet 20 mg  20 mg Per NG tube DAILY    famotidine (PF) (PEPCID) 20 mg in 0.9% sodium chloride 10 mL injection  20 mg IntraVENous Q12H    heparin (porcine) injection 5,000 Units  5,000 Units SubCUTAneous Q12H    HYDROmorphone (DILAUDID) injection 0.5 mg  0.5 mg IntraVENous Q4H PRN    metoprolol (LOPRESSOR) injection 2.5 mg  2.5 mg IntraVENous Q6H PRN    lidocaine (XYLOCAINE) 4 % cream   Topical PRN    [Held by provider] gabapentin (NEURONTIN) capsule 100 mg  100 mg Oral TID    sodium chloride (NS) flush 5-40 mL  5-40 mL IntraVENous Q8H    sodium chloride (NS) flush 5-40 mL  5-40 mL IntraVENous PRN    acetaminophen (TYLENOL) tablet 650 mg  650 mg Oral Q6H PRN    Or    acetaminophen (TYLENOL) suppository 650 mg  650 mg Rectal Q6H PRN    polyethylene glycol (MIRALAX) packet 17 g  17 g Oral DAILY PRN    ondansetron (ZOFRAN ODT) tablet 4 mg  4 mg Oral Q8H PRN    Or    ondansetron (ZOFRAN) injection 4 mg  4 mg IntraVENous Q6H PRN        Review of Systems  Constitutional: No fevers, No chills, No sweats, No fatigue, No Weakness  Eyes: No redness  Ears, nose, mouth, throat, and face: No nasal congestion, No sore throat, No voice change  Respiratory: No Shortness of Breath, No cough, No wheezing  Cardiovascular: has chest pain at the site of pleural catheter, No palpitations, No extremity edema  Gastrointestinal: No nausea, No vomiting, No diarrhea, No abdominal pain  Genitourinary: No frequency, No dysuria, No hematuria  Integument/breast: No skin lesion(s)   Neurological: No Confusion, No headaches, No dizziness      Objective:     Visit Vitals  BP (!) 179/88   Pulse 86   Temp 97.5 °F (36.4 °C)   Resp 25   Ht 5' (1.524 m)   Wt 69.3 kg (152 lb 12.5 oz)   SpO2 98%   BMI 29.84 kg/m²    O2 Flow Rate (L/min): 2 l/min O2 Device: None (Room air)    Temp (24hrs), Av.7 °F (36.5 °C), Min:97.3 °F (36.3 °C), Max:98 °F (36.7 °C)      01/15 0701 - 01/15 1900  In: 240 [P.O.:240]  Out: 0   No intake/output data recorded. PHYSICAL EXAM:  Constitutional: No acute distress  Skin: Extremities and face reveal no rashes. HEENT: Sclerae anicteric. Extra-occular muscles are intact. No oral ulcers. The neck is supple and no masses. Cardiovascular: Regular rate and rhythm. Respiratory:  decrease breath sound on left side. Left chest tube in place. GI: Abdomen nondistended, soft, and nontender. Normal active bowel sounds. Musculoskeletal: No pitting edema of the lower legs. Able to move all ext  Neurological:  Patient is alert and oriented.  Cranial nerves II-XII grossly intact  Psychiatric: Mood appears appropriate       Data Review    Recent Results (from the past 24 hour(s))   GLUCOSE, POC    Collection Time: 01/14/23 11:46 AM   Result Value Ref Range    Glucose (POC) 169 (H) 65 - 117 mg/dL    Performed by Isak (LAVERNE RN)    METABOLIC PANEL, BASIC    Collection Time: 01/14/23  4:09 PM   Result Value Ref Range    Sodium 140 136 - 145 mmol/L    Potassium 4.3 3.5 - 5.1 mmol/L    Chloride 100 97 - 108 mmol/L    CO2 29 21 - 32 mmol/L    Anion gap 11 5 - 15 mmol/L    Glucose 144 (H) 65 - 100 mg/dL    BUN 48 (H) 6 - 20 MG/DL    Creatinine 4.75 (H) 0.55 - 1.02 MG/DL    BUN/Creatinine ratio 10 (L) 12 - 20      eGFR 10 (L) >60 ml/min/1.73m2    Calcium 8.2 (L) 8.5 - 10.1 MG/DL   PHOSPHORUS    Collection Time: 01/14/23  4:09 PM   Result Value Ref Range    Phosphorus 4.9 (H) 2.6 - 4.7 MG/DL   MAGNESIUM    Collection Time: 01/14/23  4:09 PM   Result Value Ref Range    Magnesium 2.8 (H) 1.6 - 2.4 mg/dL   CBC WITH AUTOMATED DIFF    Collection Time: 01/14/23  4:09 PM   Result Value Ref Range    WBC 18.6 (H) 3.6 - 11.0 K/uL    RBC 3.05 (L) 3.80 - 5.20 M/uL    HGB 9.0 (L) 11.5 - 16.0 g/dL    HCT 26.5 (L) 35.0 - 47.0 %    MCV 86.9 80.0 - 99.0 FL    MCH 29.5 26.0 - 34.0 PG    MCHC 34.0 30.0 - 36.5 g/dL    RDW 16.7 (H) 11.5 - 14.5 %    PLATELET 86 (L) 540 - 400 K/uL    MPV 12.0 8.9 - 12.9 FL    NRBC 2.6 (H) 0  WBC    ABSOLUTE NRBC 0.48 (H) 0.00 - 0.01 K/uL    NEUTROPHILS 79 (H) 32 - 75 %    LYMPHOCYTES 10 (L) 12 - 49 %    MONOCYTES 9 5 - 13 %    EOSINOPHILS 0 0 - 7 %    BASOPHILS 0 0 - 1 %    IMMATURE GRANULOCYTES 2 (H) 0.0 - 0.5 %    ABS. NEUTROPHILS 14.6 (H) 1.8 - 8.0 K/UL    ABS. LYMPHOCYTES 1.9 0.8 - 3.5 K/UL    ABS. MONOCYTES 1.7 (H) 0.0 - 1.0 K/UL    ABS. EOSINOPHILS 0.0 0.0 - 0.4 K/UL    ABS. BASOPHILS 0.0 0.0 - 0.1 K/UL    ABS. IMM.  GRANS. 0.4 (H) 0.00 - 0.04 K/UL    DF SMEAR SCANNED      RBC COMMENTS ANISOCYTOSIS  1+       GLUCOSE, POC    Collection Time: 01/14/23  6:03 PM   Result Value Ref Range    Glucose (POC) 131 (H) 65 - 117 mg/dL    Performed by Isak (LAVERNE RN)    GLUCOSE, POC    Collection Time: 01/15/23 12:10 AM   Result Value Ref Range    Glucose (POC) 156 (H) 65 - 117 mg/dL    Performed by Archer Mrurell RN    METABOLIC PANEL, BASIC    Collection Time: 01/15/23  3:30 AM   Result Value Ref Range    Sodium 138 136 - 145 mmol/L    Potassium 4.6 3.5 - 5.1 mmol/L    Chloride 101 97 - 108 mmol/L    CO2 26 21 - 32 mmol/L    Anion gap 11 5 - 15 mmol/L    Glucose 161 (H) 65 - 100 mg/dL    BUN 63 (H) 6 - 20 MG/DL    Creatinine 5.75 (H) 0.55 - 1.02 MG/DL    BUN/Creatinine ratio 11 (L) 12 - 20      eGFR 8 (L) >60 ml/min/1.73m2    Calcium 8.2 (L) 8.5 - 10.1 MG/DL   PHOSPHORUS    Collection Time: 01/15/23  3:30 AM   Result Value Ref Range    Phosphorus 5.6 (H) 2.6 - 4.7 MG/DL   MAGNESIUM    Collection Time: 01/15/23  3:30 AM   Result Value Ref Range    Magnesium 2.8 (H) 1.6 - 2.4 mg/dL   CBC WITH AUTOMATED DIFF    Collection Time: 01/15/23  3:30 AM   Result Value Ref Range    WBC 17.4 (H) 3.6 - 11.0 K/uL    RBC 3.15 (L) 3.80 - 5.20 M/uL    HGB 9.2 (L) 11.5 - 16.0 g/dL    HCT 28.0 (L) 35.0 - 47.0 %    MCV 88.9 80.0 - 99.0 FL    MCH 29.2 26.0 - 34.0 PG    MCHC 32.9 30.0 - 36.5 g/dL    RDW 17.4 (H) 11.5 - 14.5 %    PLATELET 99 (L) 715 - 400 K/uL    NRBC 2.9 (H) 0  WBC    ABSOLUTE NRBC 0.51 (H) 0.00 - 0.01 K/uL    NEUTROPHILS 80 (H) 32 - 75 %    BAND NEUTROPHILS 2 %    LYMPHOCYTES 10 (L) 12 - 49 %    MONOCYTES 8 5 - 13 %    EOSINOPHILS 0 0 - 7 %    BASOPHILS 0 0 - 1 %    IMMATURE GRANULOCYTES 0 0.0 - 0.5 %    ABS. NEUTROPHILS 14.3 (H) 1.8 - 8.0 K/UL    ABS. LYMPHOCYTES 1.7 0.8 - 3.5 K/UL    ABS. MONOCYTES 1.4 (H) 0.0 - 1.0 K/UL    ABS. EOSINOPHILS 0.0 0.0 - 0.4 K/UL    ABS. BASOPHILS 0.0 0.0 - 0.1 K/UL    ABS. IMM.  GRANS. 0.0 0.00 - 0.04 K/UL    DF MANUAL      RBC COMMENTS TARGET CELLS  PRESENT        RBC COMMENTS ANISOCYTOSIS  1+        RBC COMMENTS POLYCHROMASIA  PRESENT                     Total critical care time spent 50 minutes involving direct patient care as well as reviewing patient's labs and coordination of care with nursing staff     Care Plan discussed with: Patient/Family/RN/Case Management        Total time spent with patient: 50 minutes.

## 2023-01-15 NOTE — PROGRESS NOTES
Avasys Telesitter Monitor initiated on 1/14/2023 at 2330 for the following reason(s): Fall Prevention/Restlessness/Confusion . Patient educated on use of camera and is in agreement. If patient unable to verbalize understanding of camera necessity, the responsible party notified and educated:  Frank Youngblood or N/A      2350: Attempted to put on avasys, was notified that none are available. Put pt on baby monitor pending availability of an avasys monitor.

## 2023-01-15 NOTE — PROGRESS NOTES
Pt continues to be very confused, pts also are also jaundice this morning and no Ammonia level drawn on pt recently. Made the MD aware and asked if we could draw one. He stated yes and LFT's as well.

## 2023-01-15 NOTE — PROGRESS NOTES
Nephrology Progress Note  [facility], Twila Maldonado, [address]  Phone - [phone]  Fax - [phone]                 Patient: Sebastian Nuñez                   YOB: 1960        Date- 1/15/2023                      Admit Date: 1/3/2023  CC: Follow up for CHRISTA   IMPRESSION & PLAN:   1. CHRISTA due to acute tubular necrosis, serology workup  overall negative. 2.  Hypophosphatemia  3. Left pneumothorax requiring chest tube placement. 4.  Pleural effusion. 5.  Anemia requiring blood transfusion. 6.  History of breast cancer. 7.  Recent history of COVID-19 infection. 8.  Multifocal pneumonia.- RESP FAILURE on vent  9. Status post left thoracocentesis. 10.  Non-ST elevation myocardial infarction. PLAN-  HD tomorrow and MWF next week   Dialysis dependent at this time. Epogen for anemia  Avoid hypotension  Daily labs and monitor of UOP for renal recovery and needs for HD  D/W ICU RN       Subjective:  Out of ICU   Doing well   Reports lack of apetite   Unhappy to hear that she needs dialysis tomorrow     Objective:   Vitals:    01/15/23 0725 01/15/23 0800 01/15/23 1031 01/15/23 1200   BP: 109/67  (!) 179/88    Pulse: 86 78 79 80   Resp: 25  19    Temp: 97.5 °F (36.4 °C)  97.9 °F (36.6 °C)    SpO2:   100%    Weight:       Height:          No intake/output data recorded. Last 3 Recorded Weights in this Encounter    01/11/23 0600 01/12/23 0620 01/14/23 1241   Weight: 69.8 kg (153 lb 14.1 oz) 69.3 kg (152 lb 12.5 oz) 69.3 kg (152 lb 12.5 oz)        Physical exam:  GEN:  NAD  NECK:  Supple, no thyromegaly  RESP: decreased BS  b/l, no  wheezing,   CVS: RRR,S1,S2   NEURO: non focal  EXT: Edema +nt           Chart reviewed. Pertinent Notes reviewed.      Data Review :  Recent Labs     01/15/23  0330 01/14/23  1609 01/14/23  0520    140 140   K 4.6 4.3 4.1    100 101   CO2 26 29 29 BUN 63* 48* 40*   CREA 5.75* 4.75* 3.71*   * 144* 143*   CA 8.2* 8.2* 7.9*   MG 2.8* 2.8* 2.5*   PHOS 5.6* 4.9* 4.8*       Recent Labs     01/15/23  0330 01/14/23  1609 01/14/23  0520   WBC 17.4* 18.6* 20.3*   HGB 9.2* 9.0* 9.4*   HCT 28.0* 26.5* 28.6*   PLT 99* 86* 85*       No results for input(s): FE, TIBC, PSAT, FERR in the last 72 hours.      No results found for: HBA1C, NRL5OOED, QDP3YBAM   No results found for: MCACR, MCA1, MCA2, MCA3, MCAU, MCAU2, MCALPOCT  US Results (most recent):  Medication list  reviewed    Current Facility-Administered Medications   Medication Dose Route Frequency    carvediloL (COREG) tablet 6.25 mg  6.25 mg Oral BID WITH MEALS    0.9% sodium chloride infusion 250 mL  250 mL IntraVENous PRN    lidocaine 4 % patch 1 Patch  1 Patch TransDERmal Q24H    piperacillin-tazobactam (ZOSYN) 3.375 g in 0.9% sodium chloride (MBP/ADV) 100 mL MBP  3.375 g IntraVENous Q12H    glucose chewable tablet 16 g  4 Tablet Oral PRN    glucagon (GLUCAGEN) injection 1 mg  1 mg IntraMUSCular PRN    dextrose 10% infusion 0-250 mL  0-250 mL IntraVENous PRN    insulin lispro (HUMALOG) injection   SubCUTAneous Q6H    methylPREDNISolone (PF) (SOLU-MEDROL) injection 20 mg  20 mg IntraVENous Q12H    epoetin tyler-epbx (RETACRIT) injection 10,000 Units  10,000 Units SubCUTAneous Q TUE, THU & SAT    alcohol 62% (NOZIN) nasal  1 Ampule  1 Ampule Topical Q12H    balsam peru-castor oiL (VENELEX) ointment   Topical BID    PARoxetine (PAXIL) tablet 20 mg  20 mg Per NG tube DAILY    famotidine (PF) (PEPCID) 20 mg in 0.9% sodium chloride 10 mL injection  20 mg IntraVENous Q12H    heparin (porcine) injection 5,000 Units  5,000 Units SubCUTAneous Q12H    HYDROmorphone (DILAUDID) injection 0.5 mg  0.5 mg IntraVENous Q4H PRN    metoprolol (LOPRESSOR) injection 2.5 mg  2.5 mg IntraVENous Q6H PRN    lidocaine (XYLOCAINE) 4 % cream   Topical PRN    [Held by provider] gabapentin (NEURONTIN) capsule 100 mg  100 mg Oral TID    sodium chloride (NS) flush 5-40 mL  5-40 mL IntraVENous Q8H    sodium chloride (NS) flush 5-40 mL  5-40 mL IntraVENous PRN    acetaminophen (TYLENOL) tablet 650 mg  650 mg Oral Q6H PRN    Or    acetaminophen (TYLENOL) suppository 650 mg  650 mg Rectal Q6H PRN    polyethylene glycol (MIRALAX) packet 17 g  17 g Oral DAILY PRN    ondansetron (ZOFRAN ODT) tablet 4 mg  4 mg Oral Q8H PRN    Or    ondansetron (ZOFRAN) injection 4 mg  4 mg IntraVENous Q6H PRN        Lew Braga MD  1/15/2023

## 2023-01-15 NOTE — PROGRESS NOTES
Problem: Falls - Risk of  Goal: *Absence of Falls  Description: Document Green Salvia Fall Risk and appropriate interventions in the flowsheet. Outcome: Progressing Towards Goal  Note: Fall Risk Interventions:  Mobility Interventions: Bed/chair exit alarm, Patient to call before getting OOB, Strengthening exercises (ROM-active/passive), Utilize gait belt for transfers/ambulation    Mentation Interventions: Adequate sleep, hydration, pain control, Door open when patient unattended, Bed/chair exit alarm, More frequent rounding, Increase mobility, Reorient patient, Toileting rounds, Update white board    Medication Interventions: Patient to call before getting OOB, Teach patient to arise slowly, Utilize gait belt for transfers/ambulation    Elimination Interventions: Call light in reach, Toileting schedule/hourly rounds, Toilet paper/wipes in reach, Stay With Me (per policy), Patient to call for help with toileting needs              Problem: Patient Education: Go to Patient Education Activity  Goal: Patient/Family Education  Outcome: Progressing Towards Goal     Problem: Pressure Injury - Risk of  Goal: *Prevention of pressure injury  Description: Document William Scale and appropriate interventions in the flowsheet.   Outcome: Progressing Towards Goal  Note: Pressure Injury Interventions:  Sensory Interventions: Assess changes in LOC    Moisture Interventions: Absorbent underpads    Activity Interventions: Assess need for specialty bed, PT/OT evaluation    Mobility Interventions: Assess need for specialty bed    Nutrition Interventions: Document food/fluid/supplement intake    Friction and Shear Interventions: Apply protective barrier, creams and emollients                Problem: Patient Education: Go to Patient Education Activity  Goal: Patient/Family Education  Outcome: Progressing Towards Goal     Problem: Patient Education: Go to Patient Education Activity  Goal: Patient/Family Education  Outcome: Progressing Towards Goal

## 2023-01-16 ENCOUNTER — APPOINTMENT (OUTPATIENT)
Dept: GENERAL RADIOLOGY | Age: 63
DRG: 208 | End: 2023-01-16
Attending: INTERNAL MEDICINE
Payer: COMMERCIAL

## 2023-01-16 LAB
ALBUMIN SERPL-MCNC: 2.3 G/DL (ref 3.5–5)
ALBUMIN/GLOB SERPL: 0.6 (ref 1.1–2.2)
ALP SERPL-CCNC: 1010 U/L (ref 45–117)
ALT SERPL-CCNC: 517 U/L (ref 12–78)
ANION GAP SERPL CALC-SCNC: 14 MMOL/L (ref 5–15)
APTT PPP: 53.8 SEC (ref 22.1–31)
AST SERPL-CCNC: 297 U/L (ref 15–37)
BASOPHILS # BLD: 0 K/UL (ref 0–0.1)
BASOPHILS NFR BLD: 0 % (ref 0–1)
BILIRUB SERPL-MCNC: 9.8 MG/DL (ref 0.2–1)
BUN SERPL-MCNC: 87 MG/DL (ref 6–20)
BUN/CREAT SERPL: 11 (ref 12–20)
CALCIUM SERPL-MCNC: 8.6 MG/DL (ref 8.5–10.1)
CHLORIDE SERPL-SCNC: 98 MMOL/L (ref 97–108)
CO2 SERPL-SCNC: 25 MMOL/L (ref 21–32)
CREAT SERPL-MCNC: 8.05 MG/DL (ref 0.55–1.02)
DIFFERENTIAL METHOD BLD: ABNORMAL
EOSINOPHIL # BLD: 0 K/UL (ref 0–0.4)
EOSINOPHIL NFR BLD: 0 % (ref 0–7)
ERYTHROCYTE [DISTWIDTH] IN BLOOD BY AUTOMATED COUNT: 18.6 % (ref 11.5–14.5)
GLOBULIN SER CALC-MCNC: 3.6 G/DL (ref 2–4)
GLUCOSE BLD STRIP.AUTO-MCNC: 108 MG/DL (ref 65–117)
GLUCOSE BLD STRIP.AUTO-MCNC: 137 MG/DL (ref 65–117)
GLUCOSE BLD STRIP.AUTO-MCNC: 137 MG/DL (ref 65–117)
GLUCOSE BLD STRIP.AUTO-MCNC: 138 MG/DL (ref 65–117)
GLUCOSE BLD STRIP.AUTO-MCNC: 153 MG/DL (ref 65–117)
GLUCOSE SERPL-MCNC: 194 MG/DL (ref 65–100)
HCT VFR BLD AUTO: 29 % (ref 35–47)
HGB BLD-MCNC: 9.6 G/DL (ref 11.5–16)
IMM GRANULOCYTES # BLD AUTO: 0 K/UL (ref 0–0.04)
IMM GRANULOCYTES NFR BLD AUTO: 0 % (ref 0–0.5)
INR PPP: 4.2 (ref 0.9–1.1)
LYMPHOCYTES # BLD: 0.9 K/UL (ref 0.8–3.5)
LYMPHOCYTES NFR BLD: 6 % (ref 12–49)
MAGNESIUM SERPL-MCNC: 2.9 MG/DL (ref 1.6–2.4)
MCH RBC QN AUTO: 29.4 PG (ref 26–34)
MCHC RBC AUTO-ENTMCNC: 33.1 G/DL (ref 30–36.5)
MCV RBC AUTO: 88.7 FL (ref 80–99)
MONOCYTES # BLD: 0.6 K/UL (ref 0–1)
MONOCYTES NFR BLD: 4 % (ref 5–13)
NEUTS BAND NFR BLD MANUAL: 2 %
NEUTS SEG # BLD: 12.8 K/UL (ref 1.8–8)
NEUTS SEG NFR BLD: 88 % (ref 32–75)
NRBC # BLD: 1.06 K/UL (ref 0–0.01)
NRBC BLD-RTO: 7.4 PER 100 WBC
PHOSPHATE SERPL-MCNC: 7 MG/DL (ref 2.6–4.7)
PLATELET # BLD AUTO: 126 K/UL (ref 150–400)
PMV BLD AUTO: 12.7 FL (ref 8.9–12.9)
POTASSIUM SERPL-SCNC: 4.5 MMOL/L (ref 3.5–5.1)
PROT SERPL-MCNC: 5.9 G/DL (ref 6.4–8.2)
PROTHROMBIN TIME: 39.8 SEC (ref 9–11.1)
RBC # BLD AUTO: 3.27 M/UL (ref 3.8–5.2)
RBC MORPH BLD: ABNORMAL
SERVICE CMNT-IMP: ABNORMAL
SERVICE CMNT-IMP: NORMAL
SODIUM SERPL-SCNC: 137 MMOL/L (ref 136–145)
THERAPEUTIC RANGE,PTTT: ABNORMAL SECS (ref 58–77)
WBC # BLD AUTO: 14.3 K/UL (ref 3.6–11)

## 2023-01-16 PROCEDURE — 80053 COMPREHEN METABOLIC PANEL: CPT

## 2023-01-16 PROCEDURE — 83735 ASSAY OF MAGNESIUM: CPT

## 2023-01-16 PROCEDURE — 36415 COLL VENOUS BLD VENIPUNCTURE: CPT

## 2023-01-16 PROCEDURE — 71045 X-RAY EXAM CHEST 1 VIEW: CPT

## 2023-01-16 PROCEDURE — 93005 ELECTROCARDIOGRAM TRACING: CPT

## 2023-01-16 PROCEDURE — 85730 THROMBOPLASTIN TIME PARTIAL: CPT

## 2023-01-16 PROCEDURE — 74011250637 HC RX REV CODE- 250/637: Performed by: INTERNAL MEDICINE

## 2023-01-16 PROCEDURE — 74011000258 HC RX REV CODE- 258: Performed by: ANESTHESIOLOGY

## 2023-01-16 PROCEDURE — 90935 HEMODIALYSIS ONE EVALUATION: CPT

## 2023-01-16 PROCEDURE — 65270000046 HC RM TELEMETRY

## 2023-01-16 PROCEDURE — 74011000250 HC RX REV CODE- 250: Performed by: NURSE PRACTITIONER

## 2023-01-16 PROCEDURE — 74011000250 HC RX REV CODE- 250: Performed by: STUDENT IN AN ORGANIZED HEALTH CARE EDUCATION/TRAINING PROGRAM

## 2023-01-16 PROCEDURE — 74011250636 HC RX REV CODE- 250/636: Performed by: INTERNAL MEDICINE

## 2023-01-16 PROCEDURE — 74011250636 HC RX REV CODE- 250/636: Performed by: ANESTHESIOLOGY

## 2023-01-16 PROCEDURE — 74011250637 HC RX REV CODE- 250/637: Performed by: NURSE PRACTITIONER

## 2023-01-16 PROCEDURE — 74011250636 HC RX REV CODE- 250/636: Performed by: NURSE PRACTITIONER

## 2023-01-16 PROCEDURE — 82962 GLUCOSE BLOOD TEST: CPT

## 2023-01-16 PROCEDURE — 85025 COMPLETE CBC W/AUTO DIFF WBC: CPT

## 2023-01-16 PROCEDURE — 85610 PROTHROMBIN TIME: CPT

## 2023-01-16 PROCEDURE — 84100 ASSAY OF PHOSPHORUS: CPT

## 2023-01-16 RX ORDER — BUMETANIDE 1 MG/1
2 TABLET ORAL 2 TIMES DAILY
Status: DISCONTINUED | OUTPATIENT
Start: 2023-01-16 | End: 2023-01-24 | Stop reason: HOSPADM

## 2023-01-16 RX ORDER — CARVEDILOL 3.12 MG/1
3.12 TABLET ORAL 2 TIMES DAILY WITH MEALS
Status: DISCONTINUED | OUTPATIENT
Start: 2023-01-16 | End: 2023-01-24 | Stop reason: HOSPADM

## 2023-01-16 RX ORDER — ASPIRIN 81 MG/1
81 TABLET ORAL DAILY
Status: DISCONTINUED | OUTPATIENT
Start: 2023-01-16 | End: 2023-01-24 | Stop reason: HOSPADM

## 2023-01-16 RX ADMIN — PAROXETINE HYDROCHLORIDE 20 MG: 20 TABLET, FILM COATED ORAL at 10:06

## 2023-01-16 RX ADMIN — BUMETANIDE 2 MG: 1 TABLET ORAL at 12:34

## 2023-01-16 RX ADMIN — ASPIRIN 81 MG: 81 TABLET, COATED ORAL at 10:06

## 2023-01-16 RX ADMIN — PIPERACILLIN AND TAZOBACTAM 3.38 G: 3; .375 INJECTION, POWDER, FOR SOLUTION INTRAVENOUS at 19:03

## 2023-01-16 RX ADMIN — HEPARIN SODIUM 1300 UNITS: 1000 INJECTION INTRAVENOUS; SUBCUTANEOUS at 18:41

## 2023-01-16 RX ADMIN — SODIUM CHLORIDE, PRESERVATIVE FREE 10 ML: 5 INJECTION INTRAVENOUS at 14:32

## 2023-01-16 RX ADMIN — PIPERACILLIN AND TAZOBACTAM 3.38 G: 3; .375 INJECTION, POWDER, FOR SOLUTION INTRAVENOUS at 02:35

## 2023-01-16 RX ADMIN — SODIUM CHLORIDE, PRESERVATIVE FREE 10 ML: 5 INJECTION INTRAVENOUS at 05:58

## 2023-01-16 RX ADMIN — SODIUM CHLORIDE, PRESERVATIVE FREE 20 MG: 5 INJECTION INTRAVENOUS at 09:58

## 2023-01-16 RX ADMIN — SODIUM CHLORIDE, PRESERVATIVE FREE 10 ML: 5 INJECTION INTRAVENOUS at 00:08

## 2023-01-16 RX ADMIN — METHYLPREDNISOLONE SODIUM SUCCINATE 20 MG: 40 INJECTION, POWDER, FOR SOLUTION INTRAMUSCULAR; INTRAVENOUS at 09:58

## 2023-01-16 RX ADMIN — CASTOR OIL AND BALSAM, PERU: 788; 87 OINTMENT TOPICAL at 10:07

## 2023-01-16 RX ADMIN — SODIUM CHLORIDE, PRESERVATIVE FREE 10 ML: 5 INJECTION INTRAVENOUS at 22:40

## 2023-01-16 RX ADMIN — METHYLPREDNISOLONE SODIUM SUCCINATE 20 MG: 40 INJECTION, POWDER, FOR SOLUTION INTRAMUSCULAR; INTRAVENOUS at 22:38

## 2023-01-16 NOTE — PROCEDURES
[facility]                      [phone]  Vitals Pre Post Assessment Pre Post   BP BP: (!) 169/70 (01/16/23 1515)   122/80 LOC AOX3 AOX3   HR Pulse (Heart Rate): 70 (01/16/23 1515) 84 Lungs CTA, denies SOB CTA    Resp Resp Rate: 18 (01/16/23 1515) 18 Cardiac NRR NRR   Temp Temp: 98 °F (36.7 °C) (01/16/23 1440) 97.6 F Skin Warm/dry Warm/dry   Weight Not taken Not taken Edema +1 BLE No change   Pain Pain Intensity 1: 0 (01/16/23 1440) 0/10 Tele Status Bedside monitor Bedside monitor     Orders   Duration: Start: 5124 End: 1845 Total: 3.5 hrs   Dialyzer: Dialyzer/Set Up Inspection: Say Jain (01/13/23 0945)   K Bath: Dialysate K (mEq/L): 2 (01/13/23 0945)   Ca Bath: Dialysate CA (mEq/L): 2.5 (01/13/23 0945)   Na: Dialysate NA (mEq/L): 140 (01/13/23 0945)   Bicarb:  35   Target Fluid Removal: 2000 ml     Access   Type & Location: R CVC   Comments:Access dressing is clean and intact, no evidence of used and dated. Each catheter limb disinfected per p&p, caps removed, hubs disinfected per p&p. Both lumen flushes without issues.                                      Labs   HBsAg (Antigen) / date:  1/11/23 Negative                               HBsAb (Antibody) / date:  1/9/23 Susceptible   Source: epic   Obtained/Reviewed  Critical Results Called HGB   Date Value Ref Range Status   01/16/2023 9.6 (L) 11.5 - 16.0 g/dL Final     Potassium   Date Value Ref Range Status   01/15/2023 4.6 3.5 - 5.1 mmol/L Final     Calcium   Date Value Ref Range Status   01/15/2023 8.2 (L) 8.5 - 10.1 MG/DL Final     BUN   Date Value Ref Range Status   01/15/2023 63 (H) 6 - 20 MG/DL Final     Creatinine   Date Value Ref Range Status   01/15/2023 5.75 (H) 0.55 - 1.02 MG/DL Final        Meds Given   Name Dose Route   heparin 1300 units Red port   heparin 1300 units Blue port          Adequacy / Fluid    Total Liters Process:                   73 Liters   Net Fluid Removed:                    2000ml      Comments   Time Out Done: 0542 Admitting Diagnosis: CHRISTA   Consent obtained/signed: Informed Consent Verified: Yes (01/13/23 0945)   Machine / RO # Machine Number: [id number] (01/13/23 0945)   Primary Nurse Rpt Pre: Blaze Rosales RN   Primary Nurse Rpt Post: Blaze Rosales RN   Pt Education: HD procedure   Care Plan: HD as ordered   Pts outpatient clinic:      Tx Summary   Comments:   1500: Lab drawn using dialysis catheter  1510: Time out done, order parameters checked, Dialysis related medications and consent have been reviewed. 1515: Treatment started with slow flow, gradually increased to ordered BF. Monitored closely. Lines visible and secured at all time. Extracorporeal lines flushed with 100CC NS to monitor for clots. 1845: Treatment completed, all blood returned. Each dialysis catheter limb disinfected per p&p,  post dialysis catheter dwell instilled per order, and caps applied. Dressing kept intact and dated. Treatment tolerated well. Comfort and care provided, needs attended, questions answered. Call bell within reach, bed to lowest position.

## 2023-01-16 NOTE — PROGRESS NOTES
If SNF or IPR: Date FOC offered:1/15  Date FOC received:1/16/23  Date authorization started with reference number:  Date authorization received and expires:  Accepting facility:     Follow up appointments: after rehab  DME needed: TBD after rehab. Transportation at Discharge: stretcher will need to be arranged. Keys or means to access home:      n/a  IM Medicare Letter: n/a has [employer]  Is patient a [place] and connected with the [place]?              no  If yes, was [employer] transfer form completed and [facility] notified? Caregiver Contact: Sister: Nenita Ortega: [phone]  Discharge Caregiver contacted prior to discharge? yes  Care Conference needed?:      no    CM spoke with patient's sister today and received SNF preferences. Referrals will be sent.       [address] [facility] [place] [address]

## 2023-01-16 NOTE — PROGRESS NOTES
Problem: Falls - Risk of  Goal: *Absence of Falls  Description: Document Antoinette Rangel Fall Risk and appropriate interventions in the flowsheet. Outcome: Progressing Towards Goal  Note: Fall Risk Interventions:  Mobility Interventions: Bed/chair exit alarm, Patient to call before getting OOB, Strengthening exercises (ROM-active/passive), Utilize gait belt for transfers/ambulation    Mentation Interventions: Adequate sleep, hydration, pain control, Door open when patient unattended, Bed/chair exit alarm, More frequent rounding, Increase mobility, Reorient patient, Toileting rounds, Update white board    Medication Interventions: Patient to call before getting OOB, Teach patient to arise slowly, Utilize gait belt for transfers/ambulation    Elimination Interventions: Call light in reach, Toileting schedule/hourly rounds, Toilet paper/wipes in reach, Stay With Me (per policy), Patient to call for help with toileting needs              Problem: Pressure Injury - Risk of  Goal: *Prevention of pressure injury  Description: Document William Scale and appropriate interventions in the flowsheet. Outcome: Progressing Towards Goal  Note: Pressure Injury Interventions:  Sensory Interventions: Assess changes in LOC, Assess need for specialty bed, Avoid rigorous massage over bony prominences, Float heels, Keep linens dry and wrinkle-free, Maintain/enhance activity level, Minimize linen layers    Moisture Interventions: Assess need for specialty bed, Apply protective barrier, creams and emollients, Absorbent underpads, Check for incontinence Q2 hours and as needed    Activity Interventions: Assess need for specialty bed, Increase time out of bed, PT/OT evaluation, Pressure redistribution bed/mattress(bed type)    Mobility Interventions: Assess need for specialty bed, Float heels, HOB 30 degrees or less, Pressure redistribution bed/mattress (bed type), PT/OT evaluation, Turn and reposition approx.  every two hours(pillow and wedges)    Nutrition Interventions: Document food/fluid/supplement intake, Discuss nutritional consult with provider, Offer support with meals,snacks and hydration    Friction and Shear Interventions: Apply protective barrier, creams and emollients, Lift sheet, Lift team/patient mobility team, Minimize layers

## 2023-01-16 NOTE — PROGRESS NOTES
Hospitalist Progress Note    Subjective:   Daily Progress Note: 1/16/2023 3:42 PM    Hospital Course:  57year old Female with hx of left breast cancer, recently admitted to  for sepsis s/s COVID-19, CHRISTA, NSTEMI, MSSA pneumonia(12/28-1/1) presented to ED with c/o shortness of breath and hemoptysis. IN ED, patient was febrile to 100.8F. CXR demonstrated increased left pleural effusion with unchanged patchy B/L consolidation L>R. Patient had IR thoracentesis on 1/4/23- bloody tap. CT chest on 1/5 showed left sided hemothorax. Hb dropped from 9.1 to 5.6. s/p left chest tube placement on 1/5/23. S/p PRBC transfusion. Txed to ICU 1/8 for hypotension requiring levophed, she was intubated on 1/9/23 for respiratory distress. Pt was started on CRRT on 1/10. Pt self extubated on 1/12/23. Subjective:  No events overnight. Intermittently refusing meds and lab draws    Assessment / Plan:  Acute hypoxic respiratory failure  Multifocal pneumonia/keytruda pneumonitis  S/p left chest tube placement for left sided hemothorax by IR on 1/5/23  S/p intubation 1/9, self extubated 1/12  Continue antibiotics- zosyn. Dc levaquine  Continue supplemental oxygen and titrate as needed  -chest tube removed  1/13  Cont steroids  Pleural effusion neg for malignant cells. 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 Anuric currently. Will need permcath        Sepsis vs SIRS due to malignancy:  Hypovolemia, requiring levophed   Continue current abx  Follow procal      Acute blood loss anemia:  Anemia of chronic disease  S/s left hemothorax  S/p PRBC transfusion on 1/5/23.  Hb post transfusion stable  Will monitor cbc and transfuse if Hb<7  Receive 1unit prbc    NSTEMI:  Sinus jose  Likely type II MI  Cards on board  Holding coreg        Invasive ductal breast cancer:  Appreciate oncology consult  Appreciate palliative care consult, following      Nutrition- currently CLD, advance diet to solid/easy to chew       Body mass index is 28.68 kg/m².     Code status: Full  Prophylaxis: SCD's  Recommended Disposition: HH PT, OT, RN  ALEXANDRA>48hours  Barrier: clinical improvement  Need SNF on DC       Current Facility-Administered Medications   Medication Dose Route Frequency    aspirin delayed-release tablet 81 mg  81 mg Oral DAILY    carvediloL (COREG) tablet 3.125 mg  3.125 mg Oral BID WITH MEALS    nitroglycerin (NITROBID) 2 % ointment 1 Inch  1 Inch Topical Q6H PRN    [START ON 1/17/2023] famotidine (PF) (PEPCID) 20 mg in 0.9% sodium chloride 10 mL injection  20 mg IntraVENous DAILY    bumetanide (BUMEX) tablet 2 mg  2 mg Oral BID    0.9% sodium chloride infusion 250 mL  250 mL IntraVENous PRN    lidocaine 4 % patch 1 Patch  1 Patch TransDERmal Q24H    piperacillin-tazobactam (ZOSYN) 3.375 g in 0.9% sodium chloride (MBP/ADV) 100 mL MBP  3.375 g IntraVENous Q12H    glucose chewable tablet 16 g  4 Tablet Oral PRN    glucagon (GLUCAGEN) injection 1 mg  1 mg IntraMUSCular PRN    dextrose 10% infusion 0-250 mL  0-250 mL IntraVENous PRN    insulin lispro (HUMALOG) injection   SubCUTAneous Q6H    methylPREDNISolone (PF) (SOLU-MEDROL) injection 20 mg  20 mg IntraVENous Q12H    epoetin tyler-epbx (RETACRIT) injection 10,000 Units  10,000 Units SubCUTAneous Q TUE, THU & SAT    balsam peru-castor oiL (VENELEX) ointment   Topical BID    PARoxetine (PAXIL) tablet 20 mg  20 mg Per NG tube DAILY    heparin (porcine) injection 5,000 Units  5,000 Units SubCUTAneous Q12H    HYDROmorphone (DILAUDID) injection 0.5 mg  0.5 mg IntraVENous Q4H PRN    metoprolol (LOPRESSOR) injection 2.5 mg  2.5 mg IntraVENous Q6H PRN    lidocaine (XYLOCAINE) 4 % cream   Topical PRN    [Held by provider] gabapentin (NEURONTIN) capsule 100 mg  100 mg Oral TID    sodium chloride (NS) flush 5-40 mL  5-40 mL IntraVENous Q8H    sodium chloride (NS) flush 5-40 mL  5-40 mL IntraVENous PRN    acetaminophen (TYLENOL) tablet 650 mg  650 mg Oral Q6H PRN    Or    acetaminophen (TYLENOL) suppository 650 mg  650 mg Rectal Q6H PRN    polyethylene glycol (MIRALAX) packet 17 g  17 g Oral DAILY PRN    ondansetron (ZOFRAN ODT) tablet 4 mg  4 mg Oral Q8H PRN    Or    ondansetron (ZOFRAN) injection 4 mg  4 mg IntraVENous Q6H PRN        Review of Systems  Constitutional: No fevers, No chills, No sweats, No fatigue, No Weakness  Eyes: No redness  Ears, nose, mouth, throat, and face: No nasal congestion, No sore throat, No voice change  Respiratory: No Shortness of Breath, No cough, No wheezing  Cardiovascular: has chest pain at the site of pleural catheter, No palpitations, No extremity edema  Gastrointestinal: No nausea, No vomiting, No diarrhea, No abdominal pain  Genitourinary: No frequency, No dysuria, No hematuria  Integument/breast: No skin lesion(s)   Neurological: No Confusion, No headaches, No dizziness      Objective:     Visit Vitals  BP (!) 160/75 (BP 1 Location: Right upper arm, BP Patient Position: Sitting)   Pulse (!) 58   Temp 97.8 °F (36.6 °C)   Resp 12   Ht 5' (1.524 m)   Wt 66.6 kg (146 lb 13.2 oz)   SpO2 95%   BMI 28.68 kg/m²    O2 Flow Rate (L/min): 2 l/min O2 Device: None (Room air)    Temp (24hrs), Av.5 °F (36.4 °C), Min:97 °F (36.1 °C), Max:97.8 °F (36.6 °C)      No intake/output data recorded.  1901 -  0700  In: 80 [P.O.:690]  Out: 0     PHYSICAL EXAM:  Constitutional: No acute distress  Skin: Extremities and face reveal no rashes. HEENT: Sclerae anicteric. Extra-occular muscles are intact. No oral ulcers. The neck is supple and no masses. Cardiovascular: Regular rate and rhythm. Respiratory:  decrease breath sound on left side. Left chest tube in place. GI: Abdomen nondistended, soft, and nontender. Normal active bowel sounds. Musculoskeletal: No pitting edema of the lower legs.  Able to move all ext  Neurological:  Patient is alert and oriented. Cranial nerves II-XII grossly intact  Psychiatric: Mood appears appropriate       Data Review    Recent Results (from the past 24 hour(s))   GLUCOSE, POC    Collection Time: 01/15/23  4:09 PM   Result Value Ref Range    Glucose (POC) 177 (H) 65 - 117 mg/dL    Performed by Chay Sultana PCT    GLUCOSE, POC    Collection Time: 01/15/23  8:55 PM   Result Value Ref Range    Glucose (POC) 214 (H) 65 - 117 mg/dL    Performed by Carlos BRITO    GLUCOSE, POC    Collection Time: 01/16/23 12:45 AM   Result Value Ref Range    Glucose (POC) 138 (H) 65 - 117 mg/dL    Performed by Gerardo Peñaloza RN    GLUCOSE, POC    Collection Time: 01/16/23  4:57 AM   Result Value Ref Range    Glucose (POC) 153 (H) 65 - 117 mg/dL    Performed by Jhony Barrow PCT    GLUCOSE, POC    Collection Time: 01/16/23  5:54 AM   Result Value Ref Range    Glucose (POC) 137 (H) 65 - 117 mg/dL    Performed by Carlos BRITO    EKG, 12 LEAD, SUBSEQUENT    Collection Time: 01/16/23  8:33 AM   Result Value Ref Range    Ventricular Rate 43 BPM    Atrial Rate 43 BPM    P-R Interval 146 ms    QRS Duration 86 ms    Q-T Interval 534 ms    QTC Calculation (Bezet) 451 ms    Calculated P Axis 62 degrees    Calculated R Axis -13 degrees    Calculated T Axis -63 degrees    Diagnosis       Marked sinus bradycardia  Minimal voltage criteria for LVH, may be normal variant ( R in aVL )  ST & T wave abnormality, consider inferior ischemia  ST & T wave abnormality, consider anterolateral ischemia  Abnormal ECG  When compared with ECG of 03-JAN-2023 15:50,  Vent.  rate has decreased BY  45 BPM  T wave inversion now evident in Inferior leads  Inverted T waves have replaced nonspecific T wave abnormality in Anterior   leads  T wave inversion less evident in Lateral leads     GLUCOSE, POC    Collection Time: 01/16/23 11:43 AM   Result Value Ref Range    Glucose (POC) 137 (H) 65 - 117 mg/dL    Performed by Eleonora BRITO                  Total critical care time spent 50 minutes involving direct patient care as well as reviewing patient's labs and coordination of care with nursing staff     Care Plan discussed with: Patient/Family/RN/Case Management        Total time spent with patient: 50 minutes.

## 2023-01-16 NOTE — PROGRESS NOTES
Problem: Falls - Risk of  Goal: *Absence of Falls  Description: Document Lion Zuñiga Fall Risk and appropriate interventions in the flowsheet. Outcome: Progressing Towards Goal  Note: Fall Risk Interventions:  Mobility Interventions: Bed/chair exit alarm, Patient to call before getting OOB, Strengthening exercises (ROM-active/passive), Utilize gait belt for transfers/ambulation    Mentation Interventions: Adequate sleep, hydration, pain control, Door open when patient unattended, Bed/chair exit alarm, More frequent rounding, Increase mobility, Reorient patient, Toileting rounds, Update white board    Medication Interventions: Patient to call before getting OOB, Teach patient to arise slowly, Utilize gait belt for transfers/ambulation    Elimination Interventions: Call light in reach, Toileting schedule/hourly rounds, Toilet paper/wipes in reach, Stay With Me (per policy), Patient to call for help with toileting needs              Problem: Pressure Injury - Risk of  Goal: *Prevention of pressure injury  Description: Document William Scale and appropriate interventions in the flowsheet.   Outcome: Progressing Towards Goal  Note: Pressure Injury Interventions:  Sensory Interventions: Assess changes in LOC    Moisture Interventions: Absorbent underpads    Activity Interventions: Assess need for specialty bed    Mobility Interventions: Assess need for specialty bed    Nutrition Interventions: Document food/fluid/supplement intake    Friction and Shear Interventions: Apply protective barrier, creams and emollients                Problem: Patient Education: Go to Patient Education Activity  Goal: Patient/Family Education  Outcome: Progressing Towards Goal     Problem: Patient Education: Go to Patient Education Activity  Goal: Patient/Family Education  Outcome: Progressing Towards Goal

## 2023-01-16 NOTE — PROGRESS NOTES
0245:  pt refused scheduled blood draw due to confusion/agitation. Will try again when pt less agitated. 0330:  Pt still refusing labs due to confusion/agitation. 0445:  pt again refusing labs. 0500:  Call bell rang - I went to answer, and I entered room and pt was sitting on the floor next to her bed. Per sitter in the room she was sitting on the side of the bed and slid onto the floor. Myself, tech and charge nurse assisted pt back to bed. Per sitter [place], pt did not hit anything in the fall. Took vitals (see flow sheed), . NP and nursing supervisor notified. Xrays done. Post fall audit form and safecare completed. End of Shift Note    Bedside shift change report given to RN (oncoming nurse) by Douglas Montero (offgoing nurse). Report included the following information SBAR and Kardex    Shift worked:  [id number]     Shift summary and any significant changes:    See above. Pt refused labs, meds due to agitation. Concerns for physician to address:    Zone phone for oncoming shift:       Activity:  Activity Level: Up with Assistance  Number times ambulated in hallways past shift: 0  Number of times OOB to chair past shift: 0    Cardiac:   Cardiac Monitoring: Yes      Cardiac Rhythm: Sinus Rhythm    Access:  Current line(s): PIV     Genitourinary:   Urinary status: anuric    Respiratory:   O2 Device: None (Room air)  Chronic home O2 use?: NO  Incentive spirometer at bedside: N/A       GI:  Last Bowel Movement Date: 01/15/23  Current diet:  ADULT DIET Clear Liquid  Passing flatus: YES  Tolerating current diet: YES       Pain Management:   Patient states pain is manageable on current regimen: YES    Skin:  William Score: 16  Interventions: speciality bed and float heels    Patient Safety:  Fall Score:  Total Score: 4  Interventions: bed/chair alarm, gripper socks, and pt to call before getting OOB  High Fall Risk: Yes    Length of Stay:  Expected LOS: 5d 0h  Actual LOS: [facility]

## 2023-01-16 NOTE — PROGRESS NOTES
Cardiology Progress Note  1/16/2023     Admit Date: 1/3/2023  Admit Diagnosis: Staphylococcus aureus pneumonia ([facility] [address] [J15.211]  CC: none currently    Cardiologist:  Dr Dean Villanueva. Cardiac Assessment/Plan:    1) Atypical CP, for yrs: Neg [facility] 9/2021 (6:00, -CP, -ecg; Mild LVH). 2) Abnl ecg c/w LVH. *\"EF 45-50%; No valve dz:\" @ [facility] by RCA. (EF not that low on my interp; EF 50-55%). 3) HTN  4) Palpitations (few x/yr; few sec). 5) FHx early CAD/CHF  6) Breast CA, left; Chemo (Dr GÉNESIS Knott Hazard @ [address] [place] [address])    Admitted from 12/28 to 1/1/23 @ [address] [place] [address] for PNA/hemoptysis/covid/sepsis/CHRISTA/NQWMI w/ trop 100s    Admitted 1/3/23 w/ dyspnea/sepsis; subsequent renal failure/hypotension/hemothorax w/ chest tube; Resp failure 1/9 w/ intubation. Type II NQWMI. Neg blood Cx. Hepatic failure. Chest tube 1/5/23. Intubated 1/9/23. Self-extubated 1/12/23. Off pressors 1/10/23. Echo 1/10/23: Left Ventricle: Mildly reduced left ventricular systolic function with a visually estimated EF of 45 - 50%. Left ventricle size is normal. Increased wall thickness. CXR 1/13: \"The pulmonary vasculature is within normal limits. Volume loss left lung base. Mild patchy airspace opacity in the right lung base  improved. Left-sided chest tube is stable. Small left pleural effusion suspected. \"    1/13: Self extubated; CT remains in. No CP except at CT site; no resting dyspnea  BPs 75-130s, current 130s; HR 50s-80s; sinus; anuric  WBC 17; Hg 6.9; plt 70. K 4.7; Cr 3.6; lfts 600s/800s; procal 6 from 13. INR 1.9  Cardiac meds; None (off coreg 6.25 bid & crestor 10). Rec: Add back asa when able (not now w/ recurrent anemia); Restart coreg @ 3.125 bid when BPs consistently stable. 1/16: No CP/dyspnea at rest. With sitter. BPs 110-180; HR 60-70s, then to 40s; Sinus; 96% RA. Anuric. No labs    Cardiac meds: coreg 6.25 bid;     Rec: add ARB if ok w/ renal; if not, hydralazine/imdur. Add asa 81.   Coreg to 3.125 bid d/t ASx sinus bradycardia  _________________________________________________________  Echo 12/27/22: Left Ventricle: Normal left ventricular systolic function with a visually estimated EF of 50 - 55%. Left ventricle size is normal. Normal wall thickness. Mild hypokinesis of the following segments: basal inferolateral and mid inferolateral. Unfortunately, imaging quality limis the accuracy     Chest tube 1/5/23. Intubated 1/9/23. Complicated clinical course as noted below; Currently intubated/sedated  Current cardiac meds:  Levo @ 15; crestor 10 (holding coreg 6.25 bid)  IMP: type II NQWMI;      Rec: cont supportive care; wean pressors as able; hold crestor. Future aspirin when bleeding resolved. ____________________________________________________________________  Tonya Essex is a 57 y.o. female presents with Staphylococcus aureus pneumonia ([address] Mikal Rodriguez. Admitted from 12/28 to 1/1/23 @ [facility] for PNA/hemoptysis/covid/sepsis/CHRISTA/NQWMI w/ trop 100s        As noted in H&P: \"56 y.o.  female with pertinent past medical history of breast cancer currently off chemotherapy, MDD/TAIWO, hypothyroidism, GERD, and recent hospitalization from 12/28-1/1 for multifocal pneumonia secondary to MSSA pneumonia superimposed on COVID-19 pneumonia with course complicated by hemoptysis, sepsis, CHRISTA, and type II MI completed course of cefepime and doxycycline transition to Augmentin on time of discharge-patient unsure if she has been taking that who presents with complaints of persistent decline since discharge from OSH. She complains of malaise, fever/chills, cough productive of blood-tinged sputum, shortness of breath/dyspnea on exertion. She reports she does not feel she is doing well at home and think she needs to be in the hospital for further management. She denies tobacco, alcohol, or illicit drug use. ROS otherwise negative.      In the ED, patient febrile to 100.8 °F, hemodynamically stable (hypertensive 150s/60s), saturating mid 90s on room air. ECG demonstrates NSR with LVH criteria, CXR demonstrates increased left pleural effusion and left lower lobe airspace opacity and CT chest demonstrates increased moderately large left pleural effusion with unchanged patchy bilateral consolidation left greater than right compatible with pneumonia. Rapid and flu negative. Point-of-care lactic 0.7, proBNP 1091(Previously 866), high since her troponin 116 down trended to 107 (600s at prior hospitalization, sodium 142, potassium 3.4, BUN 38, creatinine 3.0 (baseline), WBC 13.3 (slightly increased from time of discharge), hemoglobin 9.1 (improved from prior), platelets 505. Given recent hospitalization and concern for hep patient given vancomycin, Levaquin, and cefepime by ED provider. \"     ICU MD 1/8/22: \"Patient being transferred to ICU as the PCU nurses do not feel comfortable keeping the patient on the floor as she was hypotensive earlier and has been refusing po meds     57year old Female with hx of left breast cancer, recently admitted to [facility] for sepsis s/s COVID-19, CHRISTA, NSTEMI, MSSA pneumonia(12/28-1/1) presented to ED with c/o shortness of breath and hemoptysis. IN ED, patient was febrile to 100.8F. CXR demonstrated increased left pleural effusion with unchanged patchy B/L consolidation L>R. Patient had IR thoracentesis on 1/4/23- bloody tap. CT chest on 1/5 showed left sided hemothorax. Hb dropped from 9.1 to 5.6. s/p left chest tube placement on 1/5/23. S/p PRBC transfusion. \"     ______________________________________________________________________     The patient is intubated; can provide no history. On accelerating levophed (15 currently); on HD. No PND, orthopnea, palpitations, pre-syncope, syncope, peripheral edema, or decrease in exercise tolerance. No current complaints. ECG 1/3/22:   Sinus, leftward axis, no acute changes. LVH. Tele: sinus  CXR: \"1.  Endotracheal tube terminates 1 cm above the marvin. Consider retraction 1 to  2 cm. 2. Appropriate positioning of left IJ central venous catheter. No pneumothorax. 3. Slight interval improvement in extensive bilateral airspace disease. 4. Left chest tube remains in place. Suspected small left pleural effusion. \"     Notable labs: K 4.5; BUN 61; Cr 7.35 (prev 3 range). Alb 1.9; LFTs 900s/2000; Prev procal 7; proBNP 9k; neg covid 1/3/23. INR 2.7.  ___________________________________________________  Notable prior cardiac history:  @ [facility] 10/27/22:  Ms Margherita Leyden has a h/o:  1) Atypical CP, for yrs: Neg [facility] 9/2021 (6:00, -CP, -ecg; Mild LVH). 2) Abnl ecg c/w LVH. *\"EF 45-50%; No valve dz:\" @ [address] by RCA. (EF not that low on my interp; EF 50-55%). 3) HTN  4) Palpitations (few x/yr; few sec). 5) FHx early CAD/CHF  6) Breast CA, left; Chemo pending (Dr Dora Cabrera @ [facility])  No ethanol, added salt, or nicotine. 1 caff johnny/d. 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 She would like an annual visit here. 8/2021:  She has atypical chest pain (for years; 0-1 X/mo; sharp sensation; few seconds; random onset/offset). She also has intermittent palpitations (fast HR sensation, few X/year; few seconds duration, not related to chest pain). No MCCALL with good exercise tolerance. No previous cardiac evaluation. PCP note from 4/28/06 reviewed; ECG from then interpreted today as noted below. 2/2022: no recent chest pain, dyspnea, or palpitations. She would like an annual visit here. 10/2022: Chest pain remains resolved. No dyspnea nor palpitations. Reportedly mild LV dysfunction as noted above; in my review of the echo, the EF is not that low. NP F/U: Doing well. No CP, MCCALL, palps, edema or syncope. Home SBPs in the 130s. IMPRESSION AND PLAN  01. Other chest pain (R07.89): Atypical chest pain remains resolved; Neg [facility] 9/2021. We discussed the signs and symptoms of unstable angina, myocardial infarction and malignant arrhythmia.   The patient knows to seek immediate medical attention should they occur. 02. Essential (primary) hypertension (I10):  Reasonably well controlled; continue Losartan 25mg QD and increase Coreg to 6.25mg BID. Continue to monitor BP.   03. Other cardiomyopathies (I42.8):  EF looks reassuring on echo per review; repeat planned for next month. 04. Abnormal electrocardiogram [ECG] [EKG] (R94.31):  Nonspecific finding. This condition is stable. 05. Malignant neoplasm of left breast in female, estrogen receptor negative, unspecified site of breast (C50.912): Managed by: Other Physician   06. Long term (current) use of aspirin (Z79.82)   07. Body mass index [BMI] 27.0-27.9, adult (Z68.27)      ORDERS:    Dietary management education, guidance, and counseling           10/27/22 MEDICATION LIST  Medication Sig Desc Non-VCS   aspirin 81 mg tablet,delayed release take 1 tablet by oral route  every day Y   carvedilol 6.25 mg tablet take 1 tablet by oral route 2 times every day with food N   Crestor take 1 Tablet by oral route  every week Y   losartan 25 mg tablet take 1 tablet by oral route  every day N   Multi Vitamin 9 mg iron/15 mL oral liquid   Y   paroxetine 20 mg tablet take 1 tablet by oral route  every day Y      ______________________________________  CARDIAC HISTORY  RISK FACTORS:  1 Hypertension         CARDIOVASCULAR PROCEDURES  Procedure Date Results   Echo 10/05/2022 \"EF 45-50%; No valve dz:\" @ [facility] by RCA. (EF not that low on my interp). EKG 10/13/2022 Sinus Rhythm, LVH; axis -15°; nonspecific T-wave changes. [facility] 10/01/2021 Normal EF, No Ischemia, 6:00, -CP, -ecg; Mild LVH. For other plans, see orders.   Hospital problem list     Active Hospital Problems    Diagnosis Date Noted    Goals of care, counseling/discussion 01/13/2023    Advanced directives, counseling/discussion 01/13/2023    Hemothorax 01/06/2023    Shortness of breath 01/05/2023    Acute blood loss anemia 01/05/2023    Hemothorax on left 01/05/2023 Anxiety 2023    Palliative care by specialist 2023    Staphylococcus aureus pneumonia ([facility] [address] 2023        Subjective: Daniel Chuckie reports       Chest pain X none  consistent with:  Non-cardiac CP         Atypical CP     None now  On going  Anginal CP     Dyspnea X none  at rest  with exertion         improved  unchanged  worse              PND X none  overnight       Orthopnea X none  improved  unchanged  worse   Presyncope X none  improved  unchanged  worse     Ambulated in hallway without symptoms   Yes   Ambulated in room without symptoms  Yes   Objective:     Physical Exam:  Overall VSSAF;  Visit Vitals  BP (!) 155/87   Pulse 65   Temp 97.6 °F (36.4 °C)   Resp 18   Ht 5' (1.524 m)   Wt 66.6 kg (146 lb 13.2 oz)   SpO2 96%   BMI 28.68 kg/m²     Temp (24hrs), Av.5 °F (36.4 °C), Min:97 °F (36.1 °C), Max:97.9 °F (36.6 °C)    Patient Vitals for the past 8 hrs:   Pulse   23 0750 65   23 0706 60   23 0500 (!) 47   23 0240 62   23 0041 61     Patient Vitals for the past 8 hrs:   Resp   23 0706 18   23 0500 20   23 0240 16   23 0041 17     Patient Vitals for the past 8 hrs:   BP   23 0750 (!) 155/87   23 0706 (!) 156/75   23 0500 (!) 187/88   23 0240 (!) 167/80   23 0041 (!) 153/84       Intake/Output Summary (Last 24 hours) at 2023 0816  Last data filed at 1/15/2023 1508  Gross per 24 hour   Intake 450 ml   Output 0 ml   Net 450 ml     General Appearance: Well developed, well nourished, no acute distress. Ears/Nose/Mouth/Throat:   Normal MM; anicteric. JVP: WNL   Resp:   Lungs clear to auscultation bilaterally. Nl resp effort. Cardiovascular:  RRR, S1, S2 normal, no new murmur. No gallop or rub. Abdomen:   Soft, non-tender, bowel sounds are present. Extremities: No edema bilaterally. Skin:  Neuro: Warm and dry.   A, grossly nonfocal; calm; confused       cath site intact w/o hematoma or new bruit; distal pulse unchanged  Yes   Data Review:     Telemetry independently reviewed x sinus  chronic afib  parox afib  NSVT     ECG independently reviewed  NSR  afib  no significant changes  NSST-Tw chgs     x no new ECG provided for review   Lab results reviewed as noted below. Current medications reviewed as noted below. No results for input(s): PH, PCO2, PO2 in the last 72 hours. No results for input(s): CPK, CKMB, CKNDX, TROIQ in the last 72 hours. Recent Labs     01/15/23  1045 01/15/23  0330 01/14/23  1609 01/14/23  0520   NA  --  138 140 140   K  --  4.6 4.3 4.1   CL  --  101 100 101   CO2  --  26 29 29   BUN  --  63* 48* 40*   CREA  --  5.75* 4.75* 3.71*   GLU  --  161* 144* 143*   PHOS  --  5.6* 4.9* 4.8*   CA  --  8.2* 8.2* 7.9*   ALB 2.3*  --   --   --    WBC  --  17.4* 18.6* 20.3*   HGB  --  9.2* 9.0* 9.4*   HCT  --  28.0* 26.5* 28.6*   PLT  --  99* 86* 85*     No results found for: CHOL, CHOLX, CHLST, CHOLV, HDL, HDLP, LDL, LDLC, DLDLP, TGLX, TRIGL, TRIGP, CHHD, CHHDX  Recent Labs     01/15/23  1045   AP 1,062*   TP 6.5   ALB 2.3*   GLOB 4.2*     No results for input(s): INR, PTP, APTT, INREXT in the last 72 hours.    No components found for: GLPOC    Current Facility-Administered Medications   Medication Dose Route Frequency    carvediloL (COREG) tablet 6.25 mg  6.25 mg Oral BID WITH MEALS    0.9% sodium chloride infusion 250 mL  250 mL IntraVENous PRN    lidocaine 4 % patch 1 Patch  1 Patch TransDERmal Q24H    piperacillin-tazobactam (ZOSYN) 3.375 g in 0.9% sodium chloride (MBP/ADV) 100 mL MBP  3.375 g IntraVENous Q12H    glucose chewable tablet 16 g  4 Tablet Oral PRN    glucagon (GLUCAGEN) injection 1 mg  1 mg IntraMUSCular PRN    dextrose 10% infusion 0-250 mL  0-250 mL IntraVENous PRN    insulin lispro (HUMALOG) injection   SubCUTAneous Q6H    methylPREDNISolone (PF) (SOLU-MEDROL) injection 20 mg  20 mg IntraVENous Q12H    epoetin tyler-epbx (RETACRIT) injection 10,000 Units  10,000 Units SubCUTAneous Q TUE, THU & SAT    alcohol 62% (NOZIN) nasal  1 Ampule  1 Ampule Topical Q12H    balsam peru-castor oiL (VENELEX) ointment   Topical BID    PARoxetine (PAXIL) tablet 20 mg  20 mg Per NG tube DAILY    famotidine (PF) (PEPCID) 20 mg in 0.9% sodium chloride 10 mL injection  20 mg IntraVENous Q12H    heparin (porcine) injection 5,000 Units  5,000 Units SubCUTAneous Q12H    HYDROmorphone (DILAUDID) injection 0.5 mg  0.5 mg IntraVENous Q4H PRN    metoprolol (LOPRESSOR) injection 2.5 mg  2.5 mg IntraVENous Q6H PRN    lidocaine (XYLOCAINE) 4 % cream   Topical PRN    [Held by provider] gabapentin (NEURONTIN) capsule 100 mg  100 mg Oral TID    sodium chloride (NS) flush 5-40 mL  5-40 mL IntraVENous Q8H    sodium chloride (NS) flush 5-40 mL  5-40 mL IntraVENous PRN    acetaminophen (TYLENOL) tablet 650 mg  650 mg Oral Q6H PRN    Or    acetaminophen (TYLENOL) suppository 650 mg  650 mg Rectal Q6H PRN    polyethylene glycol (MIRALAX) packet 17 g  17 g Oral DAILY PRN    ondansetron (ZOFRAN ODT) tablet 4 mg  4 mg Oral Q8H PRN    Or    ondansetron (ZOFRAN) injection 4 mg  4 mg IntraVENous Q6H PRN        Seth Wilson MD

## 2023-01-16 NOTE — PROGRESS NOTES
01/16/23 0825   Vitals   Temp 97.6 °F (36.4 °C)   Temp Source Oral   Pulse (Heart Rate) (!) 47   Heart Rate Source Monitor   Resp Rate 14   O2 Sat (%) 96 %   Level of Consciousness 0   BP (!) 173/74   MAP (Monitor) 94   MAP (Calculated) 107   Cardiac Rhythm Sinus Hermann   MEWS Score 1   Oxygen Therapy   Pulse via Oximetry 47 beats per minute     Hospitalist and Cardiologist made aware of patient's HR dipping into the 40s. EKG ordered and performed. Results placed on patient's paper chart.

## 2023-01-16 NOTE — PROGRESS NOTES
Cardiology Progress Note  1/15/2023    Admit Date: 1/3/2023  Admit Diagnosis: Staphylococcus aureus pneumonia ([facility] [address] [J15.211]  CC:  None currently  Cardiologist:  Dr Marjorie Crum. Cardiac Assessment/Plan:    1) Atypical CP, for yrs: Neg [facility] 9/2021 (6:00, -CP, -ecg; Mild LVH). 2) Abnl ecg c/w LVH. *\"EF 45-50%; No valve dz:\" @ [facility] by RCA. (EF not that low on my interp; EF 50-55%). 3) HTN  4) Palpitations (few x/yr; few sec). 5) FHx early CAD/CHF  6) Breast CA, left; Chemo (Dr GÉNESIS Salinas @ [address] [place] [address])    Admitted from 12/28 to 1/1/23 @ [address] [place] [address] for PNA/hemoptysis/covid/sepsis/CHRISTA/NQWMI w/ trop 100s    Admitted 1/3/23 w/ dyspnea/sepsis; subsequent renal failure/hypotension/hemothorax w/ chest tube; Resp failure 1/9 w/ intubation. Type II NQWMI. Neg blood Cx. Hepatic failure. Chest tube 1/5/23. Intubated 1/9/23. Self-extubated 1/12/23. Off pressors 1/10/23. Echo 1/10/23: Left Ventricle: Mildly reduced left ventricular systolic function with a visually estimated EF of 45 - 50%. Left ventricle size is normal. Increased wall thickness. CXR: 1/10/23: \"The pulmonary vasculature is within normal limits. Diffuse airspace opacity in the right lung. Partial opacification of the left upper lobe. No change. \"  CXR 1/13: \"The pulmonary vasculature is within normal limits. Volume loss left lung base. Mild patchy airspace opacity in the right lung base  improved. Left-sided chest tube is stable. Small left pleural effusion suspected. \"    1/12: Remains intubated/sedated; CT remains in.  BPs 73-110s; Hr 70-80s; Sinus; 100% vent (30%; PEEP 5). Anuric. WBC 15; Hg 7.7; plt 83. Cr 2.15; alb 1.8; LFTs 1ks; INR 2.5    1/13: Self extubated; CT remains in. No CP except at CT site; no resting dyspnea  BPs 75-130s, current 130s; HR 50s-80s; sinus; anuric  WBC 17; Hg 6.9; plt 70. K 4.7; Cr 3.6; lfts 600s/800s; procal 6 from 13. INR 1.9    Cardiac meds; None (off coreg 6.25 bid & crestor 10).     Rec: Add back asa when able (not now w/ recurrent anemia); Restart coreg @ 3.125 bid when BPs consistently stable. 1/14:  No changes to recs. Still confused. 1/15:  No changes from a cardiology standpoint. She's on carvedilol 6.25 mg BID again. Pulmonary, Palliative Care, Oncology, Nephrology, and GI consult recs noted from this admission. _________________________________________________________  Echo 12/27/22: Left Ventricle: Normal left ventricular systolic function with a visually estimated EF of 50 - 55%. Left ventricle size is normal. Normal wall thickness. Mild hypokinesis of the following segments: basal inferolateral and mid inferolateral. Unfortunately, imaging quality limis the accuracy     Chest tube 1/5/23. Intubated 1/9/23. Complicated clinical course as noted below; Currently intubated/sedated  Current cardiac meds:  Levo @ 15; crestor 10 (holding coreg 6.25 bid)  IMP: type II NQWMI;      Rec: cont supportive care; wean pressors as able; hold crestor. Future aspirin when bleeding resolved. ____________________________________________________________________  Neelima Cain is a 57 y.o. female presents with Staphylococcus aureus pneumonia ([address] Judi Crain. Admitted from 12/28 to 1/1/23 @ [address] for PNA/hemoptysis/covid/sepsis/CHRISTA/NQWMI w/ trop 100s        As noted in H&P: \"56 y.o.  female with pertinent past medical history of breast cancer currently off chemotherapy, MDD/TAIWO, hypothyroidism, GERD, and recent hospitalization from 12/28-1/1 for multifocal pneumonia secondary to MSSA pneumonia superimposed on COVID-19 pneumonia with course complicated by hemoptysis, sepsis, CHRISTA, and type II MI completed course of cefepime and doxycycline transition to Augmentin on time of discharge-patient unsure if she has been taking that who presents with complaints of persistent decline since discharge from OSH.   She complains of malaise, fever/chills, cough productive of blood-tinged sputum, shortness of breath/dyspnea on exertion. She reports she does not feel she is doing well at home and think she needs to be in the hospital for further management. She denies tobacco, alcohol, or illicit drug use. ROS otherwise negative. In the ED, patient febrile to 100.8 °F, hemodynamically stable (hypertensive 150s/60s), saturating mid 90s on room air. ECG demonstrates NSR with LVH criteria, CXR demonstrates increased left pleural effusion and left lower lobe airspace opacity and CT chest demonstrates increased moderately large left pleural effusion with unchanged patchy bilateral consolidation left greater than right compatible with pneumonia. Rapid and flu negative. Point-of-care lactic 0.7, proBNP 1091(Previously 866), high since her troponin 116 down trended to 107 (600s at prior hospitalization, sodium 142, potassium 3.4, BUN 38, creatinine 3.0 (baseline), WBC 13.3 (slightly increased from time of discharge), hemoglobin 9.1 (improved from prior), platelets 025. Given recent hospitalization and concern for hep patient given vancomycin, Levaquin, and cefepime by ED provider. \"     ICU MD 1/8/22: \"Patient being transferred to ICU as the PCU nurses do not feel comfortable keeping the patient on the floor as she was hypotensive earlier and has been refusing po meds     57year old Female with hx of left breast cancer, recently admitted to [facility] for sepsis s/s COVID-19, CHRISTA, NSTEMI, MSSA pneumonia(12/28-1/1) presented to ED with c/o shortness of breath and hemoptysis. IN ED, patient was febrile to 100.8F. CXR demonstrated increased left pleural effusion with unchanged patchy B/L consolidation L>R. Patient had IR thoracentesis on 1/4/23- bloody tap. CT chest on 1/5 showed left sided hemothorax. Hb dropped from 9.1 to 5.6. s/p left chest tube placement on 1/5/23. S/p PRBC transfusion. \"     ______________________________________________________________________     The patient is intubated; can provide no history. On accelerating levophed (15 currently); on HD. No PND, orthopnea, palpitations, pre-syncope, syncope, peripheral edema, or decrease in exercise tolerance. No current complaints. ECG 1/3/22:   Sinus, leftward axis, no acute changes. LVH. Tele: sinus  CXR: \"1. Endotracheal tube terminates 1 cm above the marvin. Consider retraction 1 to  2 cm. 2. Appropriate positioning of left IJ central venous catheter. No pneumothorax. 3. Slight interval improvement in extensive bilateral airspace disease. 4. Left chest tube remains in place. Suspected small left pleural effusion. \"     Notable labs: K 4.5; BUN 61; Cr 7.35 (prev 3 range). Alb 1.9; LFTs 900s/2000; Prev procal 7; proBNP 9k; neg covid 1/3/23. INR 2.7.  ___________________________________________________  Notable prior cardiac history:  @ NP [facility] 10/27/22:  Ms Cecille Dupree has a h/o:  1) Atypical CP, for yrs: Neg [facility] 9/2021 (6:00, -CP, -ecg; Mild LVH). 2) Abnl ecg c/w LVH. *\"EF 45-50%; No valve dz:\" @ [facility] by RCA. (EF not that low on my interp; EF 50-55%). 3) HTN  4) Palpitations (few x/yr; few sec). 5) FHx early CAD/CHF  6) Breast CA, left; Chemo pending (Dr Kiley Gan @ [facility])  No ethanol, added salt, or nicotine. 1 caff johnny/d. 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 She would like an annual visit here. 8/2021:  She has atypical chest pain (for years; 0-1 X/mo; sharp sensation; few seconds; random onset/offset). She also has intermittent palpitations (fast HR sensation, few X/year; few seconds duration, not related to chest pain). No MCCALL with good exercise tolerance. No previous cardiac evaluation. PCP note from 4/28/06 reviewed; ECG from then interpreted today as noted below. 2/2022: no recent chest pain, dyspnea, or palpitations. She would like an annual visit here. 10/2022: Chest pain remains resolved. No dyspnea nor palpitations.   Reportedly mild LV dysfunction as noted above; in my review of the echo, the EF is not that low.  NP F/U: Doing well. No CP, MCCALL, palps, edema or syncope. Home SBPs in the 130s. IMPRESSION AND PLAN  01. Other chest pain (R07.89): Atypical chest pain remains resolved; Neg [facility] 9/2021. We discussed the signs and symptoms of unstable angina, myocardial infarction and malignant arrhythmia. The patient knows to seek immediate medical attention should they occur. 02. Essential (primary) hypertension (I10):  Reasonably well controlled; continue Losartan 25mg QD and increase Coreg to 6.25mg BID. Continue to monitor BP.   03. Other cardiomyopathies (I42.8):  EF looks reassuring on echo per review; repeat planned for next month. 04. Abnormal electrocardiogram [ECG] [EKG] (R94.31):  Nonspecific finding. This condition is stable. 05. Malignant neoplasm of left breast in female, estrogen receptor negative, unspecified site of breast (C50.912): Managed by: Other Physician   06. Long term (current) use of aspirin (Z79.82)   07. Body mass index [BMI] 27.0-27.9, adult (Z68.27)      ORDERS:    Dietary management education, guidance, and counseling           10/27/22 MEDICATION LIST  Medication Sig Desc Non-VCS   aspirin 81 mg tablet,delayed release take 1 tablet by oral route  every day Y   carvedilol 6.25 mg tablet take 1 tablet by oral route 2 times every day with food N   Crestor take 1 Tablet by oral route  every week Y   losartan 25 mg tablet take 1 tablet by oral route  every day N   Multi Vitamin 9 mg iron/15 mL oral liquid   Y   paroxetine 20 mg tablet take 1 tablet by oral route  every day Y      ______________________________________  CARDIAC HISTORY  RISK FACTORS:  1 Hypertension         CARDIOVASCULAR PROCEDURES  Procedure Date Results   Echo 10/05/2022 \"EF 45-50%; No valve dz:\" @ [address] by RCA. (EF not that low on my interp). EKG 10/13/2022 Sinus Rhythm, LVH; axis -15°; nonspecific T-wave changes. [facility] 10/01/2021 Normal EF, No Ischemia, 6:00, -CP, -ecg; Mild LVH.       For other plans, see orders. High complexity decision making was performed  X Yes   High-risk of decompensation with multiple organ involvement X Yes   Hospital problem list     Active Hospital Problems    Diagnosis Date Noted    Goals of care, counseling/discussion 2023    Advanced directives, counseling/discussion 2023    Hemothorax 2023    Shortness of breath 2023    Acute blood loss anemia 2023    Hemothorax on left 2023    Anxiety 2023    Palliative care by specialist 2023    Staphylococcus aureus pneumonia ([facility] [address] 2023                                                         Subjective:  Patient reports  []   nothing; unable to communicate    []   intubated     Chest pain  none  consistent with:  Non-cardiac CP         Atypical CP     None now x On going at CT site  Anginal CP     Dyspnea x none  at rest  with exertion         improved  unchanged  worse              PND x none  overnight       Orthopnea x none  improved  unchanged  worse   Presyncope x none  improved  unchanged  worse   Ambulated in hallway without symptoms   Yes   Ambulated in room without symptoms  Yes     ROS Hematuria:  Yes X No Dysuria:  Yes X No                (2+  other systems) Cough: Yes X No Sputum: Yes X No                 BRBPR:  Yes X No Melena: Yes X No   No change in family and social history from H&P/Consult note.   Objective:    Physical Exam:  24 hr VS reviewed, overall VSSAF  Temp (24hrs), Av.7 °F (36.5 °C), Min:97.3 °F (36.3 °C), Max:98 °F (36.7 °C)    Patient Vitals for the past 8 hrs:   Pulse   01/15/23 1930 79   01/15/23 1508 66       Patient Vitals for the past 8 hrs:   Resp   01/15/23 1930 23   01/15/23 1508 15       Patient Vitals for the past 8 hrs:   BP   01/15/23 1930 (!) 169/88   01/15/23 1508 (!) 158/81           Intake/Output Summary (Last 24 hours) at 1/15/2023 2026  Last data filed at 1/15/2023 1508  Gross per 24 hour   Intake 690 ml   Output 0 ml   Net 690 ml General:  WD,WN  Elderly  Cachetic x NAD     Agitated  Lethargic  Arousable  Obtunded     Sedated  On Bipap  Intubated x NC                ENT/Palate:  WNL x Dry MM x anicteric  ETT in place              Respiratory: X CTA ant  Nl resp effort  Increased effort  No significant change     rhonchi  rales  improved  worse              Cardiovasc: X RRR  IRRR X Nl S1, S2 x No rub     No murmur X No new murmur  Murmur c/w: x No gallop    x No edema  BLE edema:+  RLE edema:+  LLE edema:+     Edema less  Edema more  Edema same  Edema worse    X Nl JVP  Elevated JVP  JVP same  JVP worse    X Carotid wnl x abd aorta not palpated X no peripheral emboli noted                GI: X abd soft x nondistended X BS present X No organo- megaly noted              Skin: X Warm, dry  Cold extremites                  Neuro: x A/O x Grossly non- focal  Obtunded  Sedated     Lethargic  Arousable  intubated x Flat/depressed affect     cath site intact w/o hematoma or new bruit; distal pulse unchanged  Yes   Data Review:     Telemetry independently reviewed x sinus  chronic afib  parox afib  NSVT     ECG independently reviewed  NSR  afib  no significant changes  NSST-Tw chgs     x no new ECG provided for review   Lab results reviewed as noted below. Current medications reviewed as noted below. No results for input(s): PH, PCO2, PO2 in the last 72 hours. No results for input(s): CPK, CKMB, CKNDX, TROIQ in the last 72 hours.   Recent Labs     01/15/23  1045 01/15/23  0330 01/14/23  1609 01/14/23  0520 01/13/23  0512   NA  --  138 140 140 140   K  --  4.6 4.3 4.1 4.7   CL  --  101 100 101 106   CO2  --  26 29 29 26   BUN  --  63* 48* 40* 51*   CREA  --  5.75* 4.75* 3.71* 3.60*   GLU  --  161* 144* 143* 146*   PHOS  --  5.6* 4.9* 4.8* 3.9   CA  --  8.2* 8.2* 7.9* 7.5*   ALB 2.3*  --   --   --  1.7*   WBC  --  17.4* 18.6* 20.3* 17.5*   HGB  --  9.2* 9.0* 9.4* 6.9*   HCT  --  28.0* 26.5* 28.6* 20.6*   PLT  --  99* 86* 85* 70*       Recent Labs     01/15/23  1045 01/13/23  0512   AP 1,062* 1,067*   TBILI 11.5* 8.1*   TP 6.5 5.0*   ALB 2.3* 1.7*   GLOB 4.2* 3.3       Recent Labs     01/13/23  0512   INR 1.9*   PTP 18.8*   APTT 39.9*        No results for input(s): FE, TIBC, PSAT, FERR in the last 72 hours.      Lab Results   Component Value Date/Time    Glucose (POC) 177 (H) 01/15/2023 04:09 PM    Glucose (POC) 134 (H) 01/15/2023 11:27 AM    Glucose (POC) 156 (H) 01/15/2023 12:10 AM    Glucose (POC) 131 (H) 01/14/2023 06:03 PM    Glucose (POC) 169 (H) 01/14/2023 11:46 AM       Current Facility-Administered Medications   Medication Dose Route Frequency    carvediloL (COREG) tablet 6.25 mg  6.25 mg Oral BID WITH MEALS    0.9% sodium chloride infusion 250 mL  250 mL IntraVENous PRN    lidocaine 4 % patch 1 Patch  1 Patch TransDERmal Q24H    piperacillin-tazobactam (ZOSYN) 3.375 g in 0.9% sodium chloride (MBP/ADV) 100 mL MBP  3.375 g IntraVENous Q12H    glucose chewable tablet 16 g  4 Tablet Oral PRN    glucagon (GLUCAGEN) injection 1 mg  1 mg IntraMUSCular PRN    dextrose 10% infusion 0-250 mL  0-250 mL IntraVENous PRN    insulin lispro (HUMALOG) injection   SubCUTAneous Q6H    methylPREDNISolone (PF) (SOLU-MEDROL) injection 20 mg  20 mg IntraVENous Q12H    epoetin tyler-epbx (RETACRIT) injection 10,000 Units  10,000 Units SubCUTAneous Q TUE, THU & SAT    alcohol 62% (NOZIN) nasal  1 Ampule  1 Ampule Topical Q12H    balsam peru-castor oiL (VENELEX) ointment   Topical BID    PARoxetine (PAXIL) tablet 20 mg  20 mg Per NG tube DAILY    famotidine (PF) (PEPCID) 20 mg in 0.9% sodium chloride 10 mL injection  20 mg IntraVENous Q12H    heparin (porcine) injection 5,000 Units  5,000 Units SubCUTAneous Q12H    HYDROmorphone (DILAUDID) injection 0.5 mg  0.5 mg IntraVENous Q4H PRN    metoprolol (LOPRESSOR) injection 2.5 mg  2.5 mg IntraVENous Q6H PRN    lidocaine (XYLOCAINE) 4 % cream   Topical PRN    [Held by provider] gabapentin (NEURONTIN) capsule 100 mg 100 mg Oral TID    sodium chloride (NS) flush 5-40 mL  5-40 mL IntraVENous Q8H    sodium chloride (NS) flush 5-40 mL  5-40 mL IntraVENous PRN    acetaminophen (TYLENOL) tablet 650 mg  650 mg Oral Q6H PRN    Or    acetaminophen (TYLENOL) suppository 650 mg  650 mg Rectal Q6H PRN    polyethylene glycol (MIRALAX) packet 17 g  17 g Oral DAILY PRN    ondansetron (ZOFRAN ODT) tablet 4 mg  4 mg Oral Q8H PRN    Or    ondansetron (ZOFRAN) injection 4 mg  4 mg IntraVENous Q6H PRN         Jenniffer Cifuentes MD

## 2023-01-16 NOTE — PROGRESS NOTES
Nephrology Progress Note  Formerly [facility] / [facility], Sarai Perezu, [address]  Phone - [phone]  Fax - [phone]                 Patient: Richard Leiva                   YOB: 1960        Date- 1/16/2023                      Admit Date: 1/3/2023  CC: Follow up for CHRISTA   IMPRESSION & PLAN:   1. CHRISTA due to acute tubular necrosis, serology workup  overall negative. 2.  Hypophosphatemia  3. Left pneumothorax requiring chest tube placement. 4.  Pleural effusion. 5.  Anemia requiring blood transfusion. 6.  History of breast cancer. 7.  Recent history of COVID-19 infection. 8.  Multifocal pneumonia.- RESP FAILURE on vent  9. Status post left thoracocentesis. 10.  Non-ST elevation myocardial infarction. PLAN-  HD today  We will add Bumex 2 mg p.o. twice daily  Will keep her on Monday Wednesday Friday schedule  Not much urine output noted, suspect she might need HD as an outpatient. She will also need a permacath placement. Will monitor for renal recovery with daily labs. If no recovery seen in a day or 2 then we will plan for permacath and outpatient HD placement. Dialysis dependent at this time.    Epogen for anemia  Avoid hypotension         Subjective:  Seen and examined today  Daughter is at bedside    Objective:   Vitals:    01/16/23 0706 01/16/23 0750 01/16/23 0825 01/16/23 1103   BP: (!) 156/75 (!) 155/87 (!) 173/74 (!) 160/75   Pulse: 60 65 (!) 47 (!) 58   Resp: 18  14 12   Temp: 97.6 °F (36.4 °C)  97.6 °F (36.4 °C) 97.8 °F (36.6 °C)   SpO2: 96%  96% 95%   Weight:       Height:          01/15 0701 - 01/16 0700  In: 690 [P.O.:690]  Out: 0     Last 3 Recorded Weights in this Encounter    01/14/23 1241 01/15/23 2137 01/16/23 0628   Weight: 69.3 kg (152 lb 12.5 oz) 66.6 kg (146 lb 13.2 oz) 66.6 kg (146 lb 13.2 oz)        Physical exam:  GEN:  NAD  NECK:  Supple, no thyromegaly  RESP: decreased BS b/l, no  wheezing,   CVS: RRR,S1,S2   NEURO: non focal  EXT: Edema +nt           Chart reviewed. Pertinent Notes reviewed. Data Review :  Recent Labs     01/15/23  0330 01/14/23  1609 01/14/23  0520    140 140   K 4.6 4.3 4.1    100 101   CO2 26 29 29   BUN 63* 48* 40*   CREA 5.75* 4.75* 3.71*   * 144* 143*   CA 8.2* 8.2* 7.9*   MG 2.8* 2.8* 2.5*   PHOS 5.6* 4.9* 4.8*       Recent Labs     01/15/23  0330 01/14/23  1609 01/14/23  0520   WBC 17.4* 18.6* 20.3*   HGB 9.2* 9.0* 9.4*   HCT 28.0* 26.5* 28.6*   PLT 99* 86* 85*       No results for input(s): FE, TIBC, PSAT, FERR in the last 72 hours.      No results found for: HBA1C, JHV1LLBL, ESR8CDKO   No results found for: MCACR, MCA1, MCA2, MCA3, MCAU, MCAU2, MCALPOCT  US Results (most recent):  Medication list  reviewed    Current Facility-Administered Medications   Medication Dose Route Frequency    aspirin delayed-release tablet 81 mg  81 mg Oral DAILY    carvediloL (COREG) tablet 3.125 mg  3.125 mg Oral BID WITH MEALS    nitroglycerin (NITROBID) 2 % ointment 1 Inch  1 Inch Topical Q6H PRN    [START ON 1/17/2023] famotidine (PF) (PEPCID) 20 mg in 0.9% sodium chloride 10 mL injection  20 mg IntraVENous DAILY    0.9% sodium chloride infusion 250 mL  250 mL IntraVENous PRN    lidocaine 4 % patch 1 Patch  1 Patch TransDERmal Q24H    piperacillin-tazobactam (ZOSYN) 3.375 g in 0.9% sodium chloride (MBP/ADV) 100 mL MBP  3.375 g IntraVENous Q12H    glucose chewable tablet 16 g  4 Tablet Oral PRN    glucagon (GLUCAGEN) injection 1 mg  1 mg IntraMUSCular PRN    dextrose 10% infusion 0-250 mL  0-250 mL IntraVENous PRN    insulin lispro (HUMALOG) injection   SubCUTAneous Q6H    methylPREDNISolone (PF) (SOLU-MEDROL) injection 20 mg  20 mg IntraVENous Q12H    epoetin tyler-epbx (RETACRIT) injection 10,000 Units  10,000 Units SubCUTAneous Q TUE, THU & SAT    balsam peru-castor oiL (VENELEX) ointment   Topical BID    PARoxetine (PAXIL) tablet 20 mg  20 mg Per NG tube DAILY    heparin (porcine) injection 5,000 Units  5,000 Units SubCUTAneous Q12H    HYDROmorphone (DILAUDID) injection 0.5 mg  0.5 mg IntraVENous Q4H PRN    metoprolol (LOPRESSOR) injection 2.5 mg  2.5 mg IntraVENous Q6H PRN    lidocaine (XYLOCAINE) 4 % cream   Topical PRN    [Held by provider] gabapentin (NEURONTIN) capsule 100 mg  100 mg Oral TID    sodium chloride (NS) flush 5-40 mL  5-40 mL IntraVENous Q8H    sodium chloride (NS) flush 5-40 mL  5-40 mL IntraVENous PRN    acetaminophen (TYLENOL) tablet 650 mg  650 mg Oral Q6H PRN    Or    acetaminophen (TYLENOL) suppository 650 mg  650 mg Rectal Q6H PRN    polyethylene glycol (MIRALAX) packet 17 g  17 g Oral DAILY PRN    ondansetron (ZOFRAN ODT) tablet 4 mg  4 mg Oral Q8H PRN    Or    ondansetron (ZOFRAN) injection 4 mg  4 mg IntraVENous Q6H PRN        Marnie Staley MD  1/16/2023

## 2023-01-16 NOTE — PROGRESS NOTES
8627: Bedside shift change report given to TOM Rodriguez (oncoming nurse) by Jhony Meza RN (offgoing nurse). Report included the following information SBAR, Kardex, Intake/Output, MAR, and Recent Results. Patient refusing labs and is agitated. 0825: Patient bradycardic and feeling tired. EKG obtained and Hospitalist and Cardiologist notified. 1007: Patient agreeable to take medications but still refusing lab draws. Patient is supposed to received bedside dialysis today, will ask dialysis nurse if they can draw labs off of HD access prior to starting session. 1500: Dialysis started at bedside. Labs drawn off HD line and sent to lab.    1900:   End of Shift Note    Bedside shift change report given to  (oncoming nurse) by Clay Finley RN (offgoing nurse). Report included the following information SBAR, Kardex, Intake/Output, MAR, and Recent Results    Shift worked:  [id number]     Shift summary and any significant changes:     Please see above notes. Patient refused evening meds after dialysis. Saravanan zapata after dialysis finished   Concerns for physician to address:       Zone phone for oncoming shift:          Activity:  Activity Level: Up with Assistance  Number times ambulated in hallways past shift: 0  Number of times OOB to chair past shift: 2    Cardiac:   Cardiac Monitoring: Yes      Cardiac Rhythm: Sinus Hermann    Access:  Current line(s): PIV     Genitourinary:   Urinary status: oliguric and anuric    Respiratory:   O2 Device: None (Room air)  Chronic home O2 use?: NO  Incentive spirometer at bedside: NO       GI:  Last Bowel Movement Date: 01/15/23  Current diet:  ADULT DIET Easy to Chew  Passing flatus: YES  Tolerating current diet: YES       Pain Management:   Patient states pain is manageable on current regimen: N/A    Skin:  William Score: 16  Interventions: speciality bed, float heels, increase time out of bed, PT/OT consult, and nutritional support     Patient Safety:  Fall Score:  Total Score: 4  Interventions: bed/chair alarm, assistive device (walker, cane, etc), gripper socks, pt to call before getting OOB, stay with me (per policy), and sitter at bedside   High Fall Risk: Yes    Length of Stay:  Expected LOS: 5d 0h  Actual LOS: Fausto Almeida, RN

## 2023-01-16 NOTE — PROGRESS NOTES
Palliative Medicine      Code Status: Full Code    LCSW attempted to see patient, but while reviewing her chart, dialysis team arrived and placed her in bed for HD. This writer will come back tomorrow when patient has some privacy to offer support and review her wishes regarding AMD.    Thank you for including Palliative Medicine in the care of Ms. Jorge Luis Salgado.     Yaa Robles LCSW  [phone] ([phone])

## 2023-01-17 ENCOUNTER — APPOINTMENT (OUTPATIENT)
Dept: GENERAL RADIOLOGY | Age: 63
DRG: 208 | End: 2023-01-17
Attending: INTERNAL MEDICINE
Payer: COMMERCIAL

## 2023-01-17 LAB
ACID FAST STN SPEC: NEGATIVE
ATRIAL RATE: 43 BPM
ATRIAL RATE: 48 BPM
CALCULATED P AXIS, ECG09: 53 DEGREES
CALCULATED P AXIS, ECG09: 62 DEGREES
CALCULATED R AXIS, ECG10: -13 DEGREES
CALCULATED R AXIS, ECG10: -14 DEGREES
CALCULATED T AXIS, ECG11: -63 DEGREES
CALCULATED T AXIS, ECG11: -66 DEGREES
DIAGNOSIS, 93000: NORMAL
DIAGNOSIS, 93000: NORMAL
GLUCOSE BLD STRIP.AUTO-MCNC: 166 MG/DL (ref 65–117)
GLUCOSE BLD STRIP.AUTO-MCNC: 168 MG/DL (ref 65–117)
MYCOBACTERIUM SPEC QL CULT: NORMAL
P-R INTERVAL, ECG05: 146 MS
P-R INTERVAL, ECG05: 148 MS
Q-T INTERVAL, ECG07: 530 MS
Q-T INTERVAL, ECG07: 534 MS
QRS DURATION, ECG06: 82 MS
QRS DURATION, ECG06: 86 MS
QTC CALCULATION (BEZET), ECG08: 451 MS
QTC CALCULATION (BEZET), ECG08: 473 MS
SERVICE CMNT-IMP: ABNORMAL
SERVICE CMNT-IMP: ABNORMAL
SPECIMEN PREPARATION: NORMAL
SPECIMEN SOURCE: NORMAL
VENTRICULAR RATE, ECG03: 43 BPM
VENTRICULAR RATE, ECG03: 48 BPM

## 2023-01-17 PROCEDURE — 74011636637 HC RX REV CODE- 636/637: Performed by: NURSE PRACTITIONER

## 2023-01-17 PROCEDURE — 74011250636 HC RX REV CODE- 250/636: Performed by: INTERNAL MEDICINE

## 2023-01-17 PROCEDURE — 74011250637 HC RX REV CODE- 250/637: Performed by: INTERNAL MEDICINE

## 2023-01-17 PROCEDURE — 74011000250 HC RX REV CODE- 250: Performed by: INTERNAL MEDICINE

## 2023-01-17 PROCEDURE — 82962 GLUCOSE BLOOD TEST: CPT

## 2023-01-17 PROCEDURE — 74011000258 HC RX REV CODE- 258: Performed by: ANESTHESIOLOGY

## 2023-01-17 PROCEDURE — 74011000250 HC RX REV CODE- 250: Performed by: NURSE PRACTITIONER

## 2023-01-17 PROCEDURE — 74011250636 HC RX REV CODE- 250/636: Performed by: ANESTHESIOLOGY

## 2023-01-17 PROCEDURE — 74011250637 HC RX REV CODE- 250/637: Performed by: NURSE PRACTITIONER

## 2023-01-17 PROCEDURE — 65660000001 HC RM ICU INTERMED STEPDOWN

## 2023-01-17 PROCEDURE — 74011000250 HC RX REV CODE- 250: Performed by: STUDENT IN AN ORGANIZED HEALTH CARE EDUCATION/TRAINING PROGRAM

## 2023-01-17 PROCEDURE — 71045 X-RAY EXAM CHEST 1 VIEW: CPT

## 2023-01-17 RX ORDER — HYDRALAZINE HYDROCHLORIDE 10 MG/1
10 TABLET, FILM COATED ORAL 2 TIMES DAILY
Status: DISCONTINUED | OUTPATIENT
Start: 2023-01-17 | End: 2023-01-20

## 2023-01-17 RX ORDER — ISOSORBIDE MONONITRATE 30 MG/1
15 TABLET, EXTENDED RELEASE ORAL DAILY
Status: DISCONTINUED | OUTPATIENT
Start: 2023-01-17 | End: 2023-01-20

## 2023-01-17 RX ADMIN — METHYLPREDNISOLONE SODIUM SUCCINATE 20 MG: 40 INJECTION, POWDER, FOR SOLUTION INTRAMUSCULAR; INTRAVENOUS at 21:02

## 2023-01-17 RX ADMIN — SODIUM CHLORIDE, PRESERVATIVE FREE 10 ML: 5 INJECTION INTRAVENOUS at 14:22

## 2023-01-17 RX ADMIN — CARVEDILOL 3.12 MG: 3.12 TABLET, FILM COATED ORAL at 17:36

## 2023-01-17 RX ADMIN — ISOSORBIDE MONONITRATE 15 MG: 30 TABLET, EXTENDED RELEASE ORAL at 11:04

## 2023-01-17 RX ADMIN — FAMOTIDINE 20 MG: 10 INJECTION, SOLUTION INTRAVENOUS at 08:46

## 2023-01-17 RX ADMIN — Medication 2 UNITS: at 06:06

## 2023-01-17 RX ADMIN — HYDRALAZINE HYDROCHLORIDE 10 MG: 10 TABLET, FILM COATED ORAL at 17:36

## 2023-01-17 RX ADMIN — SODIUM CHLORIDE, PRESERVATIVE FREE 10 ML: 5 INJECTION INTRAVENOUS at 21:02

## 2023-01-17 RX ADMIN — HEPARIN SODIUM 5000 UNITS: 5000 INJECTION INTRAVENOUS; SUBCUTANEOUS at 06:07

## 2023-01-17 RX ADMIN — BUMETANIDE 2 MG: 1 TABLET ORAL at 08:47

## 2023-01-17 RX ADMIN — PIPERACILLIN AND TAZOBACTAM 3.38 G: 3; .375 INJECTION, POWDER, FOR SOLUTION INTRAVENOUS at 01:19

## 2023-01-17 RX ADMIN — SODIUM CHLORIDE, PRESERVATIVE FREE 10 ML: 5 INJECTION INTRAVENOUS at 06:07

## 2023-01-17 RX ADMIN — BUMETANIDE 2 MG: 1 TABLET ORAL at 17:36

## 2023-01-17 RX ADMIN — ASPIRIN 81 MG: 81 TABLET, COATED ORAL at 08:47

## 2023-01-17 RX ADMIN — HYDRALAZINE HYDROCHLORIDE 10 MG: 10 TABLET, FILM COATED ORAL at 11:04

## 2023-01-17 RX ADMIN — CARVEDILOL 3.12 MG: 3.12 TABLET, FILM COATED ORAL at 08:47

## 2023-01-17 RX ADMIN — METHYLPREDNISOLONE SODIUM SUCCINATE 20 MG: 40 INJECTION, POWDER, FOR SOLUTION INTRAMUSCULAR; INTRAVENOUS at 08:47

## 2023-01-17 RX ADMIN — PAROXETINE HYDROCHLORIDE 20 MG: 20 TABLET, FILM COATED ORAL at 08:47

## 2023-01-17 NOTE — CONSULTS
PSYCHIATRY CONSULT NOTE    REASON FOR CONSULT: depression      HISTORY OF PRESENTING COMPLAINT:  Tatiana Marc is a 57 y.o. BLACK/ female who is currently admitted to the medical floor at [facility]. Psychiatry consulted due to concern for depression. Ms. Louis Egan has been selectively compliant with her care and medications. Her nurse today reports she has been taking her medications and allowed vital signs this morning. She is guarded when speaking with me. She denies any psychiatric history. She is irritable when asked for any social history. She denies SI/HI/AH/VH. Mood is \"I'm fine. \" She states that things are going well in the hospital. She declines to participate any further. PAST PSYCHIATRIC HISTORY and SUBSTANCE ABUSE HISTORY:  denies      PAST MEDICAL HISTORY:    Please see H&P for details. Past Medical History:   Diagnosis Date    Cancer [facility])     BREAST CANCER    GERD (gastroesophageal reflux disease)     HTN (hypertension) 07/18/2011    Psychiatric disorder     ANIXETY AND DEPRESSION    Thyroid disease     HYPOTHYROID     Prior to Admission medications    Medication Sig Start Date End Date Taking? Authorizing Provider   carvediloL (COREG) 6.25 mg tablet Take 6.25 mg by mouth two (2) times daily (with meals). 11/3/22  Yes Provider, Historical   famotidine (PEPCID) 40 mg/5 mL (8 mg/mL) suspension Take 2.5 mL by mouth two (2) times a day. 11/30/22  Yes Farrukh Forte, NP   gabapentin (NEURONTIN) 100 mg capsule Take 1 Capsule by mouth three (3) times daily. Max Daily Amount: 300 mg. 11/30/22  Yes Farrukh Forte, STANLEY   PARoxetine (PAXIL) 20 mg tablet Take 20 mg by mouth daily. Yes Provider, Historical   rosuvastatin (CRESTOR) 10 mg tablet Take 10 mg by mouth nightly.  8/15/22  Yes Provider, Historical   OTHER,NON-FORMULARY, IV chemotherapy    Provider, Historical   dexAMETHasone (DECADRON) 4 mg tablet Take 2 tabs (8mg) once daily on days 2 & 3 of chemotherapy  Patient not taking: Reported on 1/4/2023 9/28/22   Ladonna Solis NP   ondansetron (ZOFRAN ODT) 8 mg disintegrating tablet Take 1 Tablet by mouth every eight (8) hours as needed for Nausea or Vomiting. 9/28/22   Ladonna Solis NP   prochlorperazine (Compazine) 5 mg tablet Take 1 Tablet by mouth every six (6) hours as needed for Nausea or Vomiting. 9/28/22   Ladonna Solis NP   lidocaine-prilocaine (EMLA) topical cream Apply  to affected area as needed for Pain. Apply a thick layer over port a cath 30-60 minutes prior to port access 9/28/22   Ladonna Solis NP   multivitamin, tx-iron-ca-min (THERA-M w/ IRON) 9 mg iron-400 mcg tab tablet Take 1 Tablet by mouth daily. Provider, Historical     Vitals:    01/17/23 0641 01/17/23 0844 01/17/23 1056 01/17/23 1503   BP:  (!) 169/78 (!) 165/110 (!) 155/78   Pulse:  79 64 79   Resp:  19 15 15   Temp:  98.1 °F (36.7 °C)  98.5 °F (36.9 °C)   TempSrc:       SpO2:  98%  97%   Weight: 64.1 kg (141 lb 5 oz)      Height: 5' (1.524 m)        Lab Results   Component Value Date/Time    WBC 14.3 (H) 01/16/2023 02:33 PM    HGB 9.6 (L) 01/16/2023 02:33 PM    HCT 29.0 (L) 01/16/2023 02:33 PM    PLATELET 590 (L) 55/72/8310 02:33 PM    MCV 88.7 01/16/2023 02:33 PM     Lab Results   Component Value Date/Time    Sodium 137 01/16/2023 02:33 PM    Potassium 4.5 01/16/2023 02:33 PM    Chloride 98 01/16/2023 02:33 PM    CO2 25 01/16/2023 02:33 PM    Anion gap 14 01/16/2023 02:33 PM    Glucose 194 (H) 01/16/2023 02:33 PM    BUN 87 (H) 01/16/2023 02:33 PM    Creatinine 8.05 (H) 01/16/2023 02:33 PM    BUN/Creatinine ratio 11 (L) 01/16/2023 02:33 PM    GFR est AA >60 09/26/2022 03:26 PM    GFR est non-AA >60 09/26/2022 03:26 PM    Calcium 8.6 01/16/2023 02:33 PM    Bilirubin, total 9.8 (H) 01/16/2023 02:33 PM    Alk.  phosphatase 1,010 (H) 01/16/2023 02:33 PM    Protein, total 5.9 (L) 01/16/2023 02:33 PM    Albumin 2.3 (L) 01/16/2023 02:33 PM    Globulin 3.6 01/16/2023 02:33 PM    A-G Ratio 0.6 (L) 01/16/2023 02:33 PM ALT (SGPT) 517 (H) 01/16/2023 02:33 PM    AST (SGOT) 297 (H) 01/16/2023 02:33 PM     No results found for: VALF2, VALAC, VALP, VALPR, DS6, CRBAM, CRBAMP, CARB2, XCRBAM  No results found for: LITHM  RADIOLOGY REPORTS:(reviewed/updated 1/17/2023)  NM LUNG SCAN PERF    Result Date: 12/26/2022  Clinical history: Elevated dimer, chest pain INDICATION: Elevated d-dimer FINDINGS: A V/Q scan was performed with   4.4 mCi Tc MAA injected intravenously. Correlation with chest x-ray 12/26/2022. Bilateral airspace disease greater on the right than on the left. Perfusion images demonstrates right-sided perfusion defects concordant to areas of parenchymal opacity on chest x-ray. 1. Low probability for pulmonary embolism. IR THORACENTESIS/INSERT CHEST TUBE    Result Date: 1/6/2023  CLINICAL DATA: 22-year-old female with left pleural effusion/hemothorax presents for chest tube placement. DATE: 1/5/2023 4:48 PM : Yandy Garnica MD ESTIMATED BLOOD LOSS: Minimal COMPLICATIONS: None SPECIMEN REMOVED: None PROCEDURE SUMMARY: 1. Ultrasound-guided percutaneous access of left pleural collection. 2.  Fluoroscopic guidance for placement of 14 Vietnamese left pleural pigtail catheter. FLUOROSCOPY TIME: 2.7 min FLUOROSCOPY DOSE (air kerma): 22.1 mGy PROCEDURE DETAILS: The risks and benefits of the procedure were explained and informed written consent was obtained. A timeout was performed to ensure proper patient, site, and procedure. Patient was placed supine on the fluoroscopic table. The left chest wall was prepped and draped in sterile fashion. Initial ultrasound evaluation confirmed a large left pleural effusion. 1% lidocaine was used for local anesthesia. Under ultrasound guidance, a 7 cm 20-gauge micropuncture needle was advanced into the left pleural space and there was return of blood-tinged pleural fluid. A microwire was advanced into the collection, and a micropuncture was placed.  A 0.035 inch guidewire was placed in the pleural space, serial dilation was performed, and a new 14 [place] multi-sidehole pigtail drainage catheter was advanced into position under fluoroscopic guidance. The pigtail was formed and locked, and the catheter was secured to the skin using silk suture and sterile bandaging. The catheter was attached to a Pleur-evac. The patient tolerated the procedure well, and left the angiographic suite in stable condition. 1.  Placement of left 14 [place]an chest tube, and attached to Pleur-evac. CT CHEST WO CONT    Result Date: 1/8/2023  INDICATION: Pleural effusion. COMPARISON: Earlier radiograph, CT 1/5/2023 CONTRAST: None. TECHNIQUE:  5 mm axial images were obtained through the chest. Coronal and sagittal reformats were generated. CT dose reduction was achieved through use of a standardized protocol tailored for this examination and automatic exposure control for dose modulation. The absence of intravenous contrast reduces the sensitivity for evaluation of the mediastinum, brigette, vasculature, and upper abdominal organs. FINDINGS: CHEST WALL: Left chest tube. Right IJ portacatheter terminating in superior vena cava. No mass or adenopathy. THYROID: No nodule. MEDIASTINUM: Small prevascular, aorticopulmonary and right paratracheal lymph nodes again shown. BRIGETTE: Not assessable. THORACIC AORTA: No aneurysm. MAIN PULMONARY ARTERY: Normal in caliber. TRACHEA/BRONCHI: Patent. ESOPHAGUS: No wall thickening or dilatation. HEART: Normal in size. Coronary artery calcium: absent PLEURA: Small to moderate loculated left pleural fluid is substantially less than prior CT. Small to moderate layering right pleural effusion is now shown. LUNGS: Severe bilateral pulmonary groundglass opacification with crazy paving, especially prominent in the upper lobes bilaterally. Findings in keeping with clinical diagnosis of Covid 19 pneumonia, progressed substantially in the interval..  Patchy atelectasis versus consolidation in the dependent lower lobes bilaterally has progressed. INCIDENTALLY IMAGED UPPER ABDOMEN: No significant abnormality in the incidentally imaged upper abdomen. BONES: No destructive bone lesion. 1. Left chest tube. Interval substantially diminished size of loculated left pleural effusion with small-moderate residual. 2. Interval demonstration of small-moderate layering right pleural effusion. 3. Substantial interval worsening of bilateral pulmonary groundglass opacifications with crazy paving. Bibasilar dependent consolidations versus atelectasis are also shown. CT CHEST WO CONT    Result Date: 1/5/2023  INDICATION: Anemia, recent thoracentesis. COMPARISON: January 3 CONTRAST: None. TECHNIQUE:  5 mm axial images were obtained through the chest. Coronal and sagittal reformats were generated. CT dose reduction was achieved through use of a standardized protocol tailored for this examination and automatic exposure control for dose modulation. The absence of intravenous contrast reduces the sensitivity for evaluation of the mediastinum, mayra, vasculature, and upper abdominal organs. FINDINGS: There is a pneumothorax on the left with somewhat complicated fluid also present in a loculated fashion towards the apex. There is no definite shift of the midline. There is no significant effusion on the right. There is basilar infiltrate or atelectasis. On the right patchy infiltrate is seen in the middle and upper lobes . Infusion catheter is in place. 1. Left-sided hemothorax with loculation toward the apex. 2. Right upper lobe and middle lobe infiltrates. CT CHEST WO CONT    Result Date: 1/3/2023  INDICATION: Pleural effusion COMPARISON: December 28, 2022 CONTRAST: None. TECHNIQUE:  5 mm axial images were obtained through the chest. Coronal and sagittal reformats were generated.   CT dose reduction was achieved through use of a standardized protocol tailored for this examination and automatic exposure control for dose modulation. The absence of intravenous contrast reduces the sensitivity for evaluation of the mediastinum, brigette, vasculature, and upper abdominal organs. FINDINGS: CHEST WALL: No mass or axillary lymphadenopathy. Right internal jugular Port-A-Cath is stable in position. THYROID: No nodule. MEDIASTINUM: No mass or lymphadenopathy. BRIGETTE: No mass or lymphadenopathy. THORACIC AORTA: No aneurysm. MAIN PULMONARY ARTERY: Normal in caliber. TRACHEA/BRONCHI: Patent. ESOPHAGUS: No wall thickening or dilatation. HEART: Normal in size. Coronary artery calcium: absent PLEURA: Increased moderately large left pleural effusion. LUNGS: Unchanged patchy consolidation in both lungs, left greater than right. INCIDENTALLY IMAGED UPPER ABDOMEN: No significant abnormality in the incidentally imaged upper abdomen. BONES: No destructive bone lesion. Increased moderately large left pleural effusion. Unchanged patchy bilateral consolidation, left greater than right, compatible with pneumonia. CT CHEST WO CONT    Result Date: 12/28/2022  INDICATION: Hemoptysis COMPARISON: Lung VQ scan 12/26/2022, chest radiograph 12/26/2022 CONTRAST: None. TECHNIQUE:  5 mm axial images were obtained through the chest. Coronal and sagittal reformats were generated. CT dose reduction was achieved through use of a standardized protocol tailored for this examination and automatic exposure control for dose modulation. The absence of intravenous contrast reduces the sensitivity for evaluation of the mediastinum, brigette, vasculature, and upper abdominal organs. FINDINGS: CHEST WALL: No mass or axillary lymphadenopathy. Right pectoral infusion port. THYROID: Heterogeneous with scattered calcifications, without definite dominant nodule. MEDIASTINUM: No mass or lymphadenopathy. BRIGETTE: No mass or lymphadenopathy. THORACIC AORTA: No aneurysm. Atherosclerosis. MAIN PULMONARY ARTERY: Normal in caliber. TRACHEA/BRONCHI: Patent.  ESOPHAGUS: No wall thickening or dilatation. HEART: Normal in size. PLEURA: Moderate left and trace right pleural effusions. No pneumothorax. LUNGS: Scattered patchy bilateral intermixed groundglass and consolidative airspace opacities throughout all lobes, most confluent in the left lung base; a subpleural region of peripheral curvilinear consolidation and central groundglass in the dependent left lower lobe (4-35) may reflect organizing pneumonia. INCIDENTALLY IMAGED UPPER ABDOMEN: No significant abnormality in the incidentally imaged upper abdomen. BONES: No destructive bone lesion. Degenerative changes. 1.  Multifocal heterogeneous bilateral airspace opacities concerning for multifocal pneumonia. A focus in the dependent left lower lobe may reflect organizing pneumonia. Follow-up CT in 3 months is recommended to document improvement/resolution. 2.  Moderate left and small right pleural effusions. Attention on follow-up. CT ABD PELV WO CONT    Result Date: 12/26/2022  INDICATION: Left flank pain. Exam: Noncontrast CT of the abdomen and pelvis is performed with 5 mm collimation. Sagittal and coronal reformatted images were also performed. CT dose reduction was achieved through the use of a standardized protocol tailored for this examination and automatic exposure control for dose modulation. There is no prior study for direct comparison. FINDINGS: There is bibasilar patchy multifocal airspace disease. Abdomen: Liver: The liver is normal on noncontrast images. Spleen: The spleen is normal on noncontrast images. Adrenals: The adrenals are normal on noncontrast images. Pancreas: The pancreas is normal on noncontrast images. Gallbladder: The gallbladder is normal on noncontrast images. Kidneys: There is no perinephric stranding, hydronephrosis or hydroureter. No renal, ureteral bladder calculus is visualized. Bowel: No thickened or dilated loop of large or small bowel seen.  Appendix: The appendix is normal. Pelvis: Urinary bladder is partially filled and grossly normal. Miscellaneous: There is no free intraperitoneal gas or fluid. There is no focal fluid collection to suggest abscess. Calcified and noncalcified fibroids are noted within the uterus. 1. No renal calculi or evidence of obstructive uropathy. 2. Bibasilar multifocal airspace disease. US ABD COMP    Result Date: 12/29/2022  EXAM: US ABD COMP INDICATION: Elevated LFTs. COMPARISON: None. TECHNIQUE: Real-time sonography of the abdomen was performed with multiple static images of the liver, gallbladder, pancreas, spleen, kidneys and retroperitoneum obtained. FINDINGS: LIVER: The liver is normal in echotexture with no mass or other focal abnormality. LIVER VASCULATURE: The portal vein flow is antegrade. GALLBLADDER: The gallbladder is partly contracted, without stones There is no inflammatory wall thickening or fluid around the gallbladder. COMMON BILE DUCT: There is no biliary duct dilatation and the common duct measures 6 mm in diameter. PANCREAS: The visualized pancreas is unremarkable. SPLEEN: The spleen is normal in echotexture and size and measures 7.3 cm in length. RIGHT KIDNEY: The right kidney demonstrates normal echogenicity with no mass, stone or hydronephrosis. The right kidney measures 10.6 cm in length. LEFT KIDNEY: The left kidney demonstrates normal echogenicity with no mass, stone or hydronephrosis. The left kidney measures 10.7 cm in length. RETROPERITONEUM: The aorta tapers normally. The IVC is normal. No ascites. Unremarkable abdominal ultrasound examination. US ABD LTD    Result Date: 1/10/2023  EXAM:  US ABD LTD INDICATION: Acute liver failure  - pt with known malignancy COMPARISON: Ultrasound abdomen 12/29/2022. TECHNIQUE: Real-time sonography of the abdomen was performed with multiple static images of the liver, gallbladder, pancreas, and right kidney obtained. FINDINGS: LIVER: The liver is normal in echotexture with no mass or other focal abnormality.  Small right pleural effusion noted. LIVER VASCULATURE: The portal vein flow is patent with appropriate directional flow. Scottie Jesse GALLBLADDER: The gallbladder is normal and no stones are identified. There is no wall thickening or fluid around the gallbladder. COMMON BILE DUCT: There is no biliary duct dilatation and the common duct measures 4 mm in diameter. PANCREAS: The visualized pancreas is normal. RIGHT KIDNEY: The right kidney measures 10.7 cm in length. The right kidney demonstrates increased parenchymal echogenicity with no contour deforming mass. No hydronephrosis. 1. Increased parenchymal echogenicity of the right kidney, consistent with medical renal disease. No hydronephrosis. 2. Small right pleural effusion. 3. No sonographic abnormality of the liver. XR CHEST PORT    Result Date: 1/17/2023  EXAM:  XR CHEST PORT INDICATION: Small left pleural effusion and left basilar opacity. COMPARISON: 1/16/2023 at 0506 hours TECHNIQUE: Portable AP semiupright chest view at 0428 hours FINDINGS: The right IJ catheter and right chest Port-A-Cath are stable. The cardiomediastinal contours are stable. A small left pleural effusion and left basilar opacity are unchanged. The right lung and pleural space are clear. There is no pneumothorax. The bones and upper abdomen are stable. Stable small left pleural effusion and left basilar opacity. XR CHEST PORT    Result Date: 1/16/2023  EXAM:  XR CHEST PORT INDICATION: Small left pleural effusion and basilar opacity. COMPARISON: 1/15/2023 and 0458 hours TECHNIQUE: Portable AP chest view at 0506 hours FINDINGS: The right chest Port-A-Cath and right IJ catheter are stable. The cardiomediastinal contours are stable. The pulmonary vasculature is within normal limits. The small left pleural effusion and left basilar opacity are unchanged. The right lung and pleural space are clear. There is no pneumothorax. The bones and upper abdomen are stable.      Stable small left pleural effusion and left basilar opacity. XR CHEST PORT    Result Date: 1/15/2023  EXAM:  XR CHEST PORT INDICATION: Small left pleural effusion and bibasilar opacities. COMPARISON: 1/14/2023 at 0414 hours TECHNIQUE: Portable AP upright chest view at 0458 hours FINDINGS: The right chest Port-A-Cath and right IJ catheter are stable. The cardiomediastinal contours are stable. A small left pleural effusion and left greater than right basilar opacities are decreased. There is no pneumothorax. The bones and upper abdomen are stable. Decreased small left pleural effusion and left greater than right basilar opacities. XR CHEST PORT    Result Date: 1/14/2023  EXAM:  CR chest portable INDICATION: Left basilar opacity. COMPARISON: 1/13/2023 at 0502 hours. TECHNIQUE: Portable AP upright chest view at 0414 hours FINDINGS: The left chest tube has been removed. The right chest Port-A-Cath and right IJ catheter are stable. The cardiomediastinal contours are stable. A small left pleural effusion and left greater than right basilar opacities are stable. There is no pneumothorax. The bones and upper abdomen are stable. No pneumothorax. Stable small left pleural effusion and left greater than right basilar opacities. XR CHEST PORT    Result Date: 1/13/2023  EXAM:  XR CHEST PORT INDICATION: Intubated COMPARISON: 1/12/2023 TECHNIQUE: Upright portable chest AP view FINDINGS: Extubated. Nasogastric tube. The tubes and lines are stable. The cardiac silhouette is within normal limits. The pulmonary vasculature is within normal limits. Volume loss left lung base. Mild patchy airspace opacity in the right lung base improved. Left-sided chest tube is stable. Small left pleural effusion suspected. The visualized bones and upper abdomen are age-appropriate. Extubated. Improved aeration right lung base.  Residual airspace opacity and possible small left effusion in the left lung base     XR CHEST PORT    Result Date: 1/12/2023  EXAM:  XR CHEST PORT INDICATION: Intubated COMPARISON: 1/11/2023 TECHNIQUE: Semierect portable chest AP view FINDINGS: Tubes and lines are stable. The cardiac silhouette is within normal limits. Significant improvement and pulmonary edema with some residual. Bibasilar airspace opacities improved but remain. Small left pleural effusions unchanged. The visualized bones and upper abdomen are age-appropriate. Improved interstitial opacities and airspace opacities bilaterally with some residual noted in the lung bases. Small left pleural effusion. .     XR CHEST PORT    Result Date: 1/11/2023  EXAM:  XR CHEST PORT INDICATION: Intubated COMPARISON: 1/10/2023 TECHNIQUE: Semierect portable chest AP view FINDINGS: Tubes and lines are stable. Heart size enlarged. The pulmonary vasculature is within normal limits. Small left pleural effusion persists status post left-sided chest tube placement. End preliminary aeration the right upper lobe. Improved aeration the right upper lobe otherwise no significant change     XR CHEST PORT    Result Date: 1/10/2023  EXAM:  XR CHEST PORT INDICATION: Intubated COMPARISON: 1/9/2023 TECHNIQUE: Semiupright portable chest AP view FINDINGS: Endotracheal tube is 2.9 cm above the marvin. Other tubes and lines are stable. The cardiac silhouette is within normal limits. The pulmonary vasculature is within normal limits. Diffuse airspace opacity in the right lung. Partial opacification of the left upper lobe. The visualized bones and upper abdomen are age-appropriate. No significant change. XR CHEST PORT    Result Date: 1/9/2023  EXAM:  XR CHEST PORT INDICATION:  Blayne line placement COMPARISON: 1/9/2023 TECHNIQUE: AP portable upright chest view FINDINGS: Right transjugular dialysis catheter terminates in the right atrium. Left transjugular central line, right chest port, and enteric tube are in appropriate position.  Stable position of the endotracheal tube which terminates less than 2 cm above the marvin. Bibasilar airspace disease. No pneumothorax. Appropriate position of right transjugular dialysis catheter. XR CHEST PORT    Result Date: 1/9/2023  EXAM:  XR CHEST PORT INDICATION:   intubation, central line placed COMPARISON: Chest radiograph 1/9/2023. FINDINGS: AP radiograph of the chest was obtained. Endotracheal tube terminates 1 cm above the marvin. Left IJ central venous catheter terminates at the cavoatrial junction. Right chest port in stable positioning. No pneumothorax. Extensive bilateral airspace disease is slightly improved. Suspected small left pleural effusion, with a left chest tube remaining in place. Stable cardiomediastinal silhouette. 1. Endotracheal tube terminates 1 cm above the marvin. Consider retraction 1 to 2 cm. 2. Appropriate positioning of left IJ central venous catheter. No pneumothorax. 3. Slight interval improvement in extensive bilateral airspace disease. 4. Left chest tube remains in place. Suspected small left pleural effusion. XR CHEST PORT    Result Date: 1/9/2023  EXAM:  XR CHEST PORT INDICATION: Respiratory failure COMPARISON: 1/8/2022 TECHNIQUE: Supine portable chest AP view FINDINGS: Right IJ Port-A-Cath is in stable position. The cardiac silhouette is within normal limits. The pulmonary vasculature is within normal limits. Diffuse bilateral airspace opacities. The visualized bones and upper abdomen are age-appropriate. Diffuse bilateral airspace opacities. This has increased in the interval.     XR CHEST PORT    Result Date: 1/8/2023  EXAM: Portable CXR. 1403 hours. COMPARISON: 1/6/2023. INDICATION: pleural effusion FINDINGS: Left pleural pigtail catheter is stable with no pneumothorax, with diminished left pleural effusion. There is unchanged prominent bilateral airspace disease/edema. Heart size is normal. Port catheter remains. Decreased left pleural effusion with stable pleural drain. No pneumothorax. Unchanged prominent bilateral airspace disease/edema. XR CHEST PORT    Result Date: 1/6/2023  EXAM:  XR CHEST PORT INDICATION: Shortness of breath COMPARISON: 1/5/2023 TECHNIQUE: Portable chest AP view FINDINGS: Right internal jugular Port-A-Cath is stable in position. There is a new left basilar chest tube. The cardiac silhouette is within normal limits. Previously seen moderately large left pleural effusion is significantly decreased in size. There is persistent left basilar atelectasis. There is new right upper lobe atelectasis. Lung volumes are decreased. The visualized bones and upper abdomen are unremarkable. Significantly diminished left pleural effusion following left chest tube placement. No pneumothorax. Imaged lung volumes with new right upper lobe atelectasis. Left basilar atelectasis persists. XR CHEST PORT    Result Date: 1/5/2023  EXAM:  XR CHEST PORT INDICATION: Pleural effusion COMPARISON: January 4, 2023 TECHNIQUE: Portable chest AP view FINDINGS: Right internal jugular Port-A-Cath is stable in position. The cardiac silhouette is within normal limits. Moderately large left pleural effusion is slightly increased. There is increased left-sided airspace disease. The visualized bones and upper abdomen are unremarkable. Slightly increased moderately large left pleural effusion with increased left-sided airspace disease. XR CHEST PORT    Result Date: 1/4/2023  EXAM:  XR CHEST PORT INDICATION: Thoracentesis COMPARISON: January 3 TECHNIQUE: portable chest AP view FINDINGS: The cardiac silhouette is within normal limits. The pulmonary vasculature is within normal limits. The lungs and pleural spaces are clear. The visualized bones and upper abdomen are age-appropriate. No acute process on portable chest. Decrease in size of the left pleural effusion, left lower lobe infiltrate, no pneumothorax. No change on the right.  Next IMPRESSION: Decrease in size of left pleural effusion, left lower lobe infiltrate. XR CHEST PORT    Result Date: 1/3/2023  EXAM:  XR CHEST PORT INDICATION: Shortness of breath COMPARISON: 12/30/2022 TECHNIQUE: Erect portable chest AP view FINDINGS: Right IJ Port-A-Cath is in stable position. The cardiac silhouette is within normal limits. The pulmonary vasculature is within normal limits. Increased left pleural effusion and left lower lobe airspace opacity. The visualized bones and upper abdomen are age-appropriate. Increased left pleural effusion and left lower lobe airspace opacity. XR CHEST PORT    Result Date: 1/1/2023  INDICATION:  bilateral pneumonia EXAM: Chest single view. COMPARISON: 12/30/2022. FINDINGS: A single frontal view of the chest at 0804 hours shows bibasilar interstitial prominence left greater than right, with patchy left basilar infiltrate and elevation of the left hemidiaphragm which is stable. Port-A-Cath device on the right is stable. .  The heart, mediastinum and pulmonary vasculature are stable . The bony thorax is unremarkable for age. .     Interstitial prominence with left basilar patchy infiltrate. These findings are stable. .     XR CHEST PORT    Result Date: 12/30/2022  EXAM: Portable CXR. 1710 hours. COMPARISON: Chest CT 12/28/2022. INDICATION: dyspnea FINDINGS: Bilateral airspace disease/pneumonia shows no significant change. Left pleural effusion, better seen by CT, shows no increase. Heart size is normal. There is no pulmonary edema, pneumothorax or midline shift. Port catheter is stable. Stable bilateral pneumonia. No increase of left pleural effusion. XR CHEST PORT    Result Date: 12/26/2022  INDICATION: L flank pain, evaluate for PNA EXAM:  AP CHEST RADIOGRAPH COMPARISON: 9/27/2022 FINDINGS: AP portable view of the chest demonstrates unchanged right IJ Port-A-Cath. Airspace disease in the right midlung and bilateral lower lung zones. Slight elevation left hemidiaphragm. No pneumothorax.  The osseous structures are unremarkable. Bilateral airspace disease as above. Follow-up to complete resolution in 6-8 weeks recommended. IR INSERT NON TUNL CVC OVER 5 YRS    Result Date: 1/9/2023  PROCEDURE:  ULTRASOUND GUIDED NON-TUNNELED DIALYSIS CATHETER PLACEMENT HISTORY: Woodrow Lima is a 57years old Female with renal failure. :  Belkys Cotton NP ATTENDING:  Bebeto Chacon MD CONSENT:  After full discussion of the procedure, including risks, benefits and alternatives, both verbal and written consent were obtained. TECHNIQUE: A timeout was called to verify the correct patient, procedure, site and allergies. This procedure was performed at the bedside with ultrasound guidance. Preliminary ultrasound imaging of the right neck demonstrated a patent and compressible internal jugular vein. Images were archived to PACS. The skin was prepped and draped utilizing maximal sterile barrier technique. 1% lidocaine was utilized for local anesthesia. The right internal jugular vein was accessed under direct sonographic guidance using a 21 gauge micropuncture needle. A 0.018 inch guidewire was advanced through the needle into the vein. The needle was then exchanged for a 4 Tamazight micropuncture sheath. A 0.035 inch J wire was advanced through the sheath into the inferior vena cava. The sheath was removed and serial dilatation of the the tract was performed. A 20 cm temporary dialysis catheter was advanced over the the wire. The wire was removed. Each port was aspirated and flushed with sterile saline and then instilled with the appropriate amount of heparin solution. The catheter was secured to the patient's skin with 2-0 non-absorbable suture. A dry sterile dressing was applied. There were no immediate complications. The patient tolerated the procedure without difficulty.  COMPLICATIONS:  None ESTIMATED BLOOD LOSS:  < 5 ml     Technically successful ultrasound guided placement of a right internal jugular vein temporary dialysis catheter. A post procedure chest x-ray is pending. US THORACENTESIS CATH W IMAGE    Result Date: 1/4/2023  PROCEDURE:  ULTRASOUND GUIDED THORACENTESIS HISTORY: Rula Lan is a 57years old Female with left pleural effusion. :  Sasha Velasco NP ATTENDING:  Rodrigo Perez MD CONSENT:  After full discussion of the procedure, including risks, benefits and alternatives, both verbal and written consent were obtained. TECHNIQUE: A timeout was called to verify the correct patient, procedure, site and allergies. Preliminary ultrasound imaging of the left hemithorax demonstrated pleural effusion amenable to thoracentesis. An appropriate site for thoracentesis was marked. Images were archived to PACS. The skin was prepped and draped in sterile fashion. 1% lidocaine was utilized for local anesthesia. A small dermatotomy was made. A centesis catheter needle was then inserted into the pleural space using direct sonographic guidance. There was return of serosanguineous fluid through the needle. The needle was removed and the catheter was placed to suction. A total of 820 ml of fluid was evacuated. The catheter was removed. Pressure was applied locally at the puncture site. A dry sterile dressing was applied. There were no immediate complications. The patient tolerated the procedure without difficulty. ESTIMATED BLOOD LOSS:  < 1 ml SPECIMENS:  Fluid sent for analysis     Technically successful ultrasound guided left thoracentesis with evacuation of 820 ml of fluid. A post procedure chest x-ray is pending. ECHO ADULT FOLLOW-UP OR LIMITED    Result Date: 1/10/2023    Left Ventricle: Mildly reduced left ventricular systolic function with a visually estimated EF of 45 - 50%. Left ventricle size is normal. Increased wall thickness. ECHO ADULT FOLLOW-UP OR LIMITED    Result Date: 12/27/2022    Left Ventricle: Normal left ventricular systolic function with a visually estimated EF of 50 - 55%. Left ventricle size is normal. Normal wall thickness. Mild hypokinesis of the following segments: basal inferolateral and mid inferolateral.Unfortunately, imaging quality limis the accuracy   Technical qualifiers: Echo study was limited due to patient tolerance. DUPLEX LOWER EXT VENOUS BILAT    Result Date: 1/8/2023  · No evidence of acute deep vein thrombosis in the right common femoral, femoral, popliteal, posterior tibial, and peroneal veins. The veins were imaged in the transverse and longitudinal planes. The vessels showed normal color filling and compressibility. Doppler interrogation showed phasic and spontaneous flow. · No evidence of acute deep vein thrombosis in the left common femoral, femoral, popliteal, posterior tibial, and peroneal veins. The veins were imaged in the transverse and longitudinal planes. The vessels showed normal color filling and compressibility. Doppler interrogation showed phasic and spontaneous flow. DUPLEX LOWER EXT VENOUS BILAT    Result Date: 12/27/2022  · No evidence of acute deep vein thrombosis in the right or left lower extremity      No results found for: Alveta Cooks, [id number], [id number], [id number], PREGU, POCHCG, MHCGN, HCGQR, THCGA1, SHCG, HCGN, HCGSERUM, HCGURQLPOC    PSYCHOSOCIAL HISTORY:  Refuses to discuss      MENTAL STATUS EXAM:    General appearance: appropriately groomed, psychomotor activity is slowed  Eye contact: Avoids eye contact  Speech: Spontaneous  Affect : decreased range  Mood: \"I'm fine \"  Thought Process: Logical, goal directed  Perception: Denies AH or VH. Thought Content: SI or Plan  Insight: Partial  Judgement: Fair  Cognition: Intact grossly. ASSESSMENT AND PLAN:  Colt Ramirez meets criteria for a diagnosis of  psychological factors affecting general medical condition (pneumonia). At this time, Ms. Junior León does not feel that she needs any treatment for her mood.  Should she agree, if would be reasonable to increase paxil to 40mg daily to address anxiety and depression. Thank you for your consult. Please feel free to consult us again as needed.

## 2023-01-17 NOTE — CONSULTS
Palliative Medicine  [phone]    Have been trying to work with Ms Yanira Juarez to at least assign mPOA but she has been very resistant to talking to us or completing any documents. This has not changed, if anything it is getting worse    At this point we cannot have goals of care conversations, and her siblings do not agree on what level of care she would want.     If her situation changes, or she is agreeable to assigning mPOA (she has verbally named Dread Boss but would not agree to sign an AMD so it is not official) we are happy to come back, but at this time we will sign off    Netta Knox NP

## 2023-01-17 NOTE — CONSULTS
Psychiatric consult acknowledged. Contacted unit to attempt consult. Patient being moved to a new room.  Left call back number to conduct consult once patient is moved    SERA Christie-BC

## 2023-01-17 NOTE — PROGRESS NOTES
Physical Therapy Note    PT tx deferred. Patient is received supine in bed and lethargic. She does not open her eyes or attempt to communicate with therapist. Will re-attempt Tx as able.

## 2023-01-17 NOTE — PROGRESS NOTES
End of Shift Note    Bedside shift change report given to [address] (oncoming nurse) by Kala Garza RN (offgoing nurse). Report included the following information SBAR    Shift worked:  7am-7pm     Shift summary and any significant changes:     Sleep all day took her medication refused to check her BS and taking heparin shote     Concerns for physician to address:  None      Zone phone for oncoming shift:          Activity:  Activity Level: Bed Rest  Number times ambulated in hallways past shift: 0  Number of times OOB to chair past shift: 0    Cardiac:   Cardiac Monitoring: Yes      Cardiac Rhythm: Sinus Rhythm    Access:  Current line(s): PIV     Genitourinary:   Urinary status: oliguric    Respiratory:   O2 Device: None (Room air)  Chronic home O2 use?: YES  Incentive spirometer at bedside: YES       GI:  Last Bowel Movement Date: 01/15/23  Current diet:  ADULT DIET Easy to Chew; Low Phosphorus (Less than 1000 mg)  ADULT ORAL NUTRITION SUPPLEMENT Breakfast, Dinner; Renal Supplement  Passing flatus: YES  Tolerating current diet: YES       Pain Management:   Patient states pain is manageable on current regimen: YES    Skin:  William Score: 12  Interventions: float heels    Patient Safety:  Fall Score:  Total Score: 2  Interventions: bed/chair alarm  High Fall Risk: Yes    Length of Stay:  Expected LOS: 5d 0h  Actual LOS: Grayson Fall RN

## 2023-01-17 NOTE — PROGRESS NOTES
Speech path   Patient refused to eat anything with me for purpose of evaluation. She is on an easy to chew diet and refusing po. Did not eat breakfast.   Follow up tomorrow.   Pilar Rodriguez, SLP

## 2023-01-17 NOTE — PROGRESS NOTES
OT note:  OT tx deferred. Patient is received supine in bed and lethargic. She does not open her eyes or attempt to communicate with therapist. Will re-attempt Tx as able.

## 2023-01-17 NOTE — PROGRESS NOTES
Problem: Falls - Risk of  Goal: *Absence of Falls  Description: Document Santy Barroso Fall Risk and appropriate interventions in the flowsheet. Outcome: Progressing Towards Goal  Note: Fall Risk Interventions:  Mobility Interventions: Bed/chair exit alarm, Patient to call before getting OOB, Strengthening exercises (ROM-active/passive), Utilize gait belt for transfers/ambulation    Mentation Interventions: Adequate sleep, hydration, pain control, Door open when patient unattended, Bed/chair exit alarm, More frequent rounding, Increase mobility, Reorient patient, Toileting rounds, Update white board    Medication Interventions: Patient to call before getting OOB, Teach patient to arise slowly, Utilize gait belt for transfers/ambulation    Elimination Interventions: Call light in reach, Toileting schedule/hourly rounds, Toilet paper/wipes in reach, Stay With Me (per policy), Patient to call for help with toileting needs              Problem: Patient Education: Go to Patient Education Activity  Goal: Patient/Family Education  Outcome: Progressing Towards Goal     Problem: Pressure Injury - Risk of  Goal: *Prevention of pressure injury  Description: Document William Scale and appropriate interventions in the flowsheet. Outcome: Progressing Towards Goal  Note: Pressure Injury Interventions:  Sensory Interventions: Assess changes in LOC, Assess need for specialty bed, Avoid rigorous massage over bony prominences, Check visual cues for pain, Discuss PT/OT consult with provider, Float heels, Keep linens dry and wrinkle-free, Maintain/enhance activity level, Minimize linen layers, Monitor skin under medical devices, Pad between skin to skin, Pressure redistribution bed/mattress (bed type), Sit a 90-degree angle/use footstool if needed, Suspension boots, Turn and reposition approx.  every two hours (pillows and wedges if needed)    Moisture Interventions: Limit adult briefs, Maintain skin hydration (lotion/cream)    Activity Interventions: Assess need for specialty bed, Chair cushion, Increase time out of bed, Pressure redistribution bed/mattress(bed type), PT/OT evaluation    Mobility Interventions: Assess need for specialty bed, Chair cushion, Float heels, HOB 30 degrees or less, Pressure redistribution bed/mattress (bed type), PT/OT evaluation, Suspension boots, Trapeze to reposition    Nutrition Interventions: Document food/fluid/supplement intake, Discuss nutritional consult with provider, Offer support with meals,snacks and hydration    Friction and Shear Interventions: Apply protective barrier, creams and emollients, Feet elevated on foot rest, Foam dressings/transparent film/skin sealants, Lift sheet, Lift team/patient mobility team, Minimize layers, Transfer aides:transfer board/Eve lift/ceiling lift, Transferring/repositioning devices                Problem: Patient Education: Go to Patient Education Activity  Goal: Patient/Family Education  Outcome: Progressing Towards Goal     Problem: Patient Education: Go to Patient Education Activity  Goal: Patient/Family Education  Outcome: Progressing Towards Goal     Problem: Patient Education: Go to Patient Education Activity  Goal: Patient/Family Education  Outcome: Progressing Towards Goal     Problem: Patient Education: Go to Patient Education Activity  Goal: Patient/Family Education  Outcome: Progressing Towards Goal

## 2023-01-17 NOTE — PROGRESS NOTES
Cardiology Progress Note  1/17/2023     Admit Date: 1/3/2023  Admit Diagnosis: Staphylococcus aureus pneumonia ([facility] [J15.211]  CC: none currently    Cardiologist:  Dr Yonny Treadwell. Cardiac Assessment/Plan:    1) Atypical CP, for yrs: Neg [facility] 9/2021 (6:00, -CP, -ecg; Mild LVH). 2) Abnl ecg c/w LVH. *\"EF 45-50%; No valve dz:\" @ [facility] by RCA. (EF not that low on my interp; EF 50-55%). 3) HTN  4) Palpitations (few x/yr; few sec). 5) FHx early CAD/CHF  6) Breast CA, left; Chemo (Dr GÉNESIS Lai @ [address] [place] [address])    Admitted from 12/28 to 1/1/23 @ [facility] for PNA/hemoptysis/covid/sepsis/CHRISTA/NQWMI w/ trop 100s    Admitted 1/3/23 w/ dyspnea/sepsis; subsequent renal failure/hypotension/hemothorax w/ chest tube; Resp failure 1/9 w/ intubation. Type II NQWMI. Neg blood Cx. Hepatic failure. Chest tube 1/5/23. Intubated 1/9/23. Self-extubated 1/12/23. Off pressors 1/10/23. Coreg to 3.125 bid d/t ASx sinus bradycardia; add ARB if ok w/ renal; if not, hydralazine/imdur. Echo 1/10/23: Left Ventricle: Mildly reduced left ventricular systolic function with a visually estimated EF of 45 - 50%. Left ventricle size is normal. Increased wall thickness. CXR 1/13: \"The pulmonary vasculature is within normal limits. Volume loss left lung base. Mild patchy airspace opacity in the right lung base  improved. Left-sided chest tube is stable. Small left pleural effusion suspected. \"    1/16: No CP/dyspnea at rest. With sitter. BPs 110-180; HR 60-70s, then to 40s; Sinus; 96% RA. Anuric. No labs  Cardiac meds: coreg 6.25 bid;   Rec: add ARB if ok w/ renal; if not, hydralazine/imdur. Add asa 81. Coreg to 3.125 bid d/t ASx sinus bradycardia    1/17: No CP/dyspnea at rest. With sitter. BPs 120-170; HR 60-100s; sinus; 98% RA  No labs today (yesterday afternoon: cr 8; lfts 500s/300; Hg 9.6).     Cardiac meds: coreg 3.125 bid; asa; bumex 2 bid;     Rec: add ARB if ok w/ renal; if not, hydralazine/imdur.    _________________________________________________________  Echo 12/27/22: Left Ventricle: Normal left ventricular systolic function with a visually estimated EF of 50 - 55%. Left ventricle size is normal. Normal wall thickness. Mild hypokinesis of the following segments: basal inferolateral and mid inferolateral. Unfortunately, imaging quality limis the accuracy     Chest tube 1/5/23. Intubated 1/9/23. Complicated clinical course as noted below; Currently intubated/sedated  Current cardiac meds:  Levo @ 15; crestor 10 (holding coreg 6.25 bid)  IMP: type II NQWMI;      Rec: cont supportive care; wean pressors as able; hold crestor. Future aspirin when bleeding resolved. ____________________________________________________________________  Ember Barclay is a 57 y.o. female presents with Staphylococcus aureus pneumonia ([address] Lizeth Rowe. Admitted from 12/28 to 1/1/23 @ [facility] for PNA/hemoptysis/covid/sepsis/CHRISTA/NQWMI w/ trop 100s        As noted in H&P: \"56 y.o.  female with pertinent past medical history of breast cancer currently off chemotherapy, MDD/TAIWO, hypothyroidism, GERD, and recent hospitalization from 12/28-1/1 for multifocal pneumonia secondary to MSSA pneumonia superimposed on COVID-19 pneumonia with course complicated by hemoptysis, sepsis, CHRISTA, and type II MI completed course of cefepime and doxycycline transition to Augmentin on time of discharge-patient unsure if she has been taking that who presents with complaints of persistent decline since discharge from OSH. She complains of malaise, fever/chills, cough productive of blood-tinged sputum, shortness of breath/dyspnea on exertion. She reports she does not feel she is doing well at home and think she needs to be in the hospital for further management. She denies tobacco, alcohol, or illicit drug use. ROS otherwise negative.      In the ED, patient febrile to 100.8 °F, hemodynamically stable (hypertensive 150s/60s), saturating mid 90s on room air. ECG demonstrates NSR with LVH criteria, CXR demonstrates increased left pleural effusion and left lower lobe airspace opacity and CT chest demonstrates increased moderately large left pleural effusion with unchanged patchy bilateral consolidation left greater than right compatible with pneumonia. Rapid and flu negative. Point-of-care lactic 0.7, proBNP 1091(Previously 866), high since her troponin 116 down trended to 107 (600s at prior hospitalization, sodium 142, potassium 3.4, BUN 38, creatinine 3.0 (baseline), WBC 13.3 (slightly increased from time of discharge), hemoglobin 9.1 (improved from prior), platelets 162. Given recent hospitalization and concern for hep patient given vancomycin, Levaquin, and cefepime by ED provider. \"     ICU MD 1/8/22: \"Patient being transferred to ICU as the PCU nurses do not feel comfortable keeping the patient on the floor as she was hypotensive earlier and has been refusing po meds     57year old Female with hx of left breast cancer, recently admitted to [facility] for sepsis s/s COVID-19, CHRISTA, NSTEMI, MSSA pneumonia(12/28-1/1) presented to ED with c/o shortness of breath and hemoptysis. IN ED, patient was febrile to 100.8F. CXR demonstrated increased left pleural effusion with unchanged patchy B/L consolidation L>R. Patient had IR thoracentesis on 1/4/23- bloody tap. CT chest on 1/5 showed left sided hemothorax. Hb dropped from 9.1 to 5.6. s/p left chest tube placement on 1/5/23. S/p PRBC transfusion. \"     ______________________________________________________________________     The patient is intubated; can provide no history. On accelerating levophed (15 currently); on HD. No PND, orthopnea, palpitations, pre-syncope, syncope, peripheral edema, or decrease in exercise tolerance. No current complaints. ECG 1/3/22:   Sinus, leftward axis, no acute changes. LVH. Tele: sinus  CXR: \"1.  Endotracheal tube terminates 1 cm above the marvin. Consider retraction 1 to  2 cm. 2. Appropriate positioning of left IJ central venous catheter. No pneumothorax. 3. Slight interval improvement in extensive bilateral airspace disease. 4. Left chest tube remains in place. Suspected small left pleural effusion. \"     Notable labs: K 4.5; BUN 61; Cr 7.35 (prev 3 range). Alb 1.9; LFTs 900s/2000; Prev procal 7; proBNP 9k; neg covid 1/3/23. INR 2.7.  ___________________________________________________  Notable prior cardiac history:  @ NP OV 10/27/22:  Ms Louis Egan has a h/o:  1) Atypical CP, for yrs: Neg SE 9/2021 (6:00, -CP, -ecg; Mild LVH). 2) Abnl ecg c/w LVH. *\"EF 45-50%; No valve dz:\" @ [facility] by RCA. (EF not that low on my interp; EF 50-55%). 3) HTN  4) Palpitations (few x/yr; few sec). 5) FHx early CAD/CHF  6) Breast CA, left; Chemo pending (Dr Sharla Eduardo @ [facility])  No ethanol, added salt, or nicotine. 1 caff johnny/d. 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 She would like an annual visit here. 8/2021:  She has atypical chest pain (for years; 0-1 X/mo; sharp sensation; few seconds; random onset/offset). She also has intermittent palpitations (fast HR sensation, few X/year; few seconds duration, not related to chest pain). No MCCALL with good exercise tolerance. No previous cardiac evaluation. PCP note from 4/28/06 reviewed; ECG from then interpreted today as noted below. 2/2022: no recent chest pain, dyspnea, or palpitations. She would like an annual visit here. 10/2022: Chest pain remains resolved. No dyspnea nor palpitations. Reportedly mild LV dysfunction as noted above; in my review of the echo, the EF is not that low. NP F/U: Doing well. No CP, MCCALL, palps, edema or syncope. Home SBPs in the 130s. IMPRESSION AND PLAN  01. Other chest pain (R07.89): Atypical chest pain remains resolved; Neg [facility] 9/2021. We discussed the signs and symptoms of unstable angina, myocardial infarction and malignant arrhythmia.   The patient knows to seek immediate medical attention should they occur. 02. Essential (primary) hypertension (I10):  Reasonably well controlled; continue Losartan 25mg QD and increase Coreg to 6.25mg BID. Continue to monitor BP.   03. Other cardiomyopathies (I42.8):  EF looks reassuring on echo per review; repeat planned for next month. 04. Abnormal electrocardiogram [ECG] [EKG] (R94.31):  Nonspecific finding. This condition is stable. 05. Malignant neoplasm of left breast in female, estrogen receptor negative, unspecified site of breast (C50.912): Managed by: Other Physician   06. Long term (current) use of aspirin (Z79.82)   07. Body mass index [BMI] 27.0-27.9, adult (Z68.27)      ORDERS:    Dietary management education, guidance, and counseling           10/27/22 MEDICATION LIST  Medication Sig Desc Non-VCS   aspirin 81 mg tablet,delayed release take 1 tablet by oral route  every day Y   carvedilol 6.25 mg tablet take 1 tablet by oral route 2 times every day with food N   Crestor take 1 Tablet by oral route  every week Y   losartan 25 mg tablet take 1 tablet by oral route  every day N   Multi Vitamin 9 mg iron/15 mL oral liquid   Y   paroxetine 20 mg tablet take 1 tablet by oral route  every day Y      ______________________________________  CARDIAC HISTORY  RISK FACTORS:  1 Hypertension         CARDIOVASCULAR PROCEDURES  Procedure Date Results   Echo 10/05/2022 \"EF 45-50%; No valve dz:\" @ [facility] by RCA. (EF not that low on my interp). EKG 10/13/2022 Sinus Rhythm, LVH; axis -15°; nonspecific T-wave changes. [facility] 10/01/2021 Normal EF, No Ischemia, 6:00, -CP, -ecg; Mild LVH. For other plans, see orders.   Hospital problem list     Active Hospital Problems    Diagnosis Date Noted    Goals of care, counseling/discussion 01/13/2023    Advanced directives, counseling/discussion 01/13/2023    Hemothorax 01/06/2023    Shortness of breath 01/05/2023    Acute blood loss anemia 01/05/2023    Hemothorax on left 01/05/2023    Anxiety 2023    Palliative care by specialist 2023    Staphylococcus aureus pneumonia ([facility] 2023        Subjective: Daisy Valentin reports       Chest pain X none  consistent with:  Non-cardiac CP         Atypical CP     None now  On going  Anginal CP     Dyspnea X none  at rest  with exertion         improved  unchanged  worse              PND X none  overnight       Orthopnea X none  improved  unchanged  worse   Presyncope X none  improved  unchanged  worse     Ambulated in hallway without symptoms   Yes   Ambulated in room without symptoms  Yes   Objective:     Physical Exam:  Overall VSSAF;  Visit Vitals  BP (!) 169/78 (BP 1 Location: Right upper arm, BP Patient Position: At rest)   Pulse 79   Temp 98.1 °F (36.7 °C)   Resp 19   Ht 5' (1.524 m)   Wt 64.1 kg (141 lb 5 oz)   SpO2 98%   BMI 27.60 kg/m²     Temp (24hrs), Av.9 °F (36.6 °C), Min:97.6 °F (36.4 °C), Max:98.1 °F (36.7 °C)    Patient Vitals for the past 8 hrs:   Pulse   23 79       Patient Vitals for the past 8 hrs:   Resp   2344 19       Patient Vitals for the past 8 hrs:   BP   23 0844 (!) 169/78         Intake/Output Summary (Last 24 hours) at 2023 0938  Last data filed at 2023 1845  Gross per 24 hour   Intake --   Output 2000 ml   Net -2000 ml       General Appearance: Well developed, well nourished, no acute distress. Ears/Nose/Mouth/Throat:   Normal MM; anicteric. JVP: WNL   Resp:   Lungs clear to auscultation bilaterally. Nl resp effort. Cardiovascular:  RRR, S1, S2 normal, no new murmur. No gallop or rub. Abdomen:   Soft, non-tender, bowel sounds are present. Extremities: No edema bilaterally. Skin:  Neuro: Warm and dry.   A, grossly nonfocal; calm;        cath site intact w/o hematoma or new bruit; distal pulse unchanged  Yes   Data Review:     Telemetry independently reviewed x sinus  chronic afib  parox afib  NSVT     ECG independently reviewed  NSR  afib  no significant changes  NSST-Tw chgs     x no new ECG provided for review   Lab results reviewed as noted below. Current medications reviewed as noted below. No results for input(s): PH, PCO2, PO2 in the last 72 hours. No results for input(s): CPK, CKMB, CKNDX, TROIQ in the last 72 hours.   Recent Labs     01/16/23  1433 01/15/23  1045 01/15/23  0330 01/14/23  1609     --  138 140   K 4.5  --  4.6 4.3   CL 98  --  101 100   CO2 25  --  26 29   BUN 87*  --  63* 48*   CREA 8.05*  --  5.75* 4.75*   *  --  161* 144*   PHOS 7.0*  --  5.6* 4.9*   CA 8.6  --  8.2* 8.2*   ALB 2.3* 2.3*  --   --    WBC 14.3*  --  17.4* 18.6*   HGB 9.6*  --  9.2* 9.0*   HCT 29.0*  --  28.0* 26.5*   *  --  99* 86*       No results found for: CHOL, CHOLX, CHLST, CHOLV, HDL, HDLP, LDL, LDLC, DLDLP, TGLX, TRIGL, TRIGP, CHHD, CHHDX  Recent Labs     01/16/23  1433 01/15/23  1045   AP 1,010* 1,062*   TP 5.9* 6.5   ALB 2.3* 2.3*   GLOB 3.6 4.2*       Recent Labs     01/16/23  1433   INR 4.2*   PTP 39.8*   APTT 53.8*        No components found for: GLPOC    Current Facility-Administered Medications   Medication Dose Route Frequency    aspirin delayed-release tablet 81 mg  81 mg Oral DAILY    carvediloL (COREG) tablet 3.125 mg  3.125 mg Oral BID WITH MEALS    nitroglycerin (NITROBID) 2 % ointment 1 Inch  1 Inch Topical Q6H PRN    famotidine (PF) (PEPCID) 20 mg in 0.9% sodium chloride 10 mL injection  20 mg IntraVENous DAILY    bumetanide (BUMEX) tablet 2 mg  2 mg Oral BID    heparin (porcine) 1,000 unit/mL injection 1,300 Units  1,300 Units InterCATHeter DIALYSIS PRN    And    heparin (porcine) 1,000 unit/mL injection 1,300 Units  1,300 Units InterCATHeter DIALYSIS PRN    0.9% sodium chloride infusion 250 mL  250 mL IntraVENous PRN    lidocaine 4 % patch 1 Patch  1 Patch TransDERmal Q24H    piperacillin-tazobactam (ZOSYN) 3.375 g in 0.9% sodium chloride (MBP/ADV) 100 mL MBP  3.375 g IntraVENous Q12H    glucose chewable tablet 16 g  4 Tablet Oral PRN glucagon (GLUCAGEN) injection 1 mg  1 mg IntraMUSCular PRN    dextrose 10% infusion 0-250 mL  0-250 mL IntraVENous PRN    insulin lispro (HUMALOG) injection   SubCUTAneous Q6H    methylPREDNISolone (PF) (SOLU-MEDROL) injection 20 mg  20 mg IntraVENous Q12H    epoetin tyler-epbx (RETACRIT) injection 10,000 Units  10,000 Units SubCUTAneous Q TUE, THU & SAT    balsam peru-castor oiL (VENELEX) ointment   Topical BID    PARoxetine (PAXIL) tablet 20 mg  20 mg Per NG tube DAILY    heparin (porcine) injection 5,000 Units  5,000 Units SubCUTAneous Q12H    HYDROmorphone (DILAUDID) injection 0.5 mg  0.5 mg IntraVENous Q4H PRN    metoprolol (LOPRESSOR) injection 2.5 mg  2.5 mg IntraVENous Q6H PRN    lidocaine (XYLOCAINE) 4 % cream   Topical PRN    [Held by provider] gabapentin (NEURONTIN) capsule 100 mg  100 mg Oral TID    sodium chloride (NS) flush 5-40 mL  5-40 mL IntraVENous Q8H    sodium chloride (NS) flush 5-40 mL  5-40 mL IntraVENous PRN    acetaminophen (TYLENOL) tablet 650 mg  650 mg Oral Q6H PRN    Or    acetaminophen (TYLENOL) suppository 650 mg  650 mg Rectal Q6H PRN    polyethylene glycol (MIRALAX) packet 17 g  17 g Oral DAILY PRN    ondansetron (ZOFRAN ODT) tablet 4 mg  4 mg Oral Q8H PRN    Or    ondansetron (ZOFRAN) injection 4 mg  4 mg IntraVENous Q6H PRN        Paulo Rizvi MD

## 2023-01-17 NOTE — PROGRESS NOTES
Hospitalist Progress Note    Subjective:   Daily Progress Note: 1/17/2023 3:42 PM    Hospital Course:  57year old Female with hx of left breast cancer, recently admitted to [facility] for sepsis s/s COVID-19, CHRISTA, NSTEMI, MSSA pneumonia(12/28-1/1) presented to ED with c/o shortness of breath and hemoptysis. IN ED, patient was febrile to 100.8F. CXR demonstrated increased left pleural effusion with unchanged patchy B/L consolidation L>R. Patient had IR thoracentesis on 1/4/23- bloody tap. CT chest on 1/5 showed left sided hemothorax. Hb dropped from 9.1 to 5.6. s/p left chest tube placement on 1/5/23. S/p PRBC transfusion. Txed to ICU 1/8 for hypotension requiring levophed, she was intubated on 1/9/23 for respiratory distress. Pt was started on CRRT on 1/10. Pt self extubated on 1/12/23. Now on stepdown, hemodynamically stable, remains anuric, getting HD. Non complaint with meds has been as inssue    Subjective:  No events overnight. Intermittently refusing meds and lab draws. Patient wanted solid food    Assessment / Plan:  Acute hypoxic respiratory failure  Multifocal pneumonia/keytruda pneumonitis  S/p left thoracentesis by IR on 1/4/23  S/p left chest tube placement for left sided hemothorax by IR on 1/5/23  S/p intubation 1/9, self extubated 1/12    -Suspected initial hypoxia from parapneumonic effusion, with recent history of MSSA pneumonia, nucleated cells 4400, exudative fluid. Culture was not sent!!   -As above patient developed hemothorax, requiring chest tube, eventually requiring ICU transfer for vasopressors likely from volume/blood loss. Patient required intubation on 1/9, likely from volume overload from CHRISTA, requiring CRRT on 1/10, now switched to HD and patient extubated on 1/12.  -Chest tube has been removed 1/13  -Currently on 2 L nasal cannula    -Culture from the [address] [place] [address] Ave on 1/11, showed some Candida, no bacteria.   Likely colonization  -Patient has been on multiple antibiotics since day 1, recently on Zosyn. 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 Anuric currently. Will need permcath        Sepsis POA  Hypovolemia, requiring levophed   Finished antibiotics    Elevated LFTs  -From shock , improving LFTs. Bilirubin elevated, but usually it lag behind clinical improvement.  -Ammonia normal  -Ultrasound abdomen okay  -Patient refused labs today, recheck tomorrow      Acute blood loss anemia:  Anemia of chronic disease  S/s left hemothorax  S/p PRBC transfusion on 1/5/23. Hb post transfusion stable  Will monitor cbc and transfuse if Hb<7  Received PRBC    NSTEMI:  Sinus jose 1/16  Likely type II MI  Cards on board  -Decreased Coreg, monitor on telemetry        Invasive ductal breast cancer:  Appreciate oncology consult  Appreciate palliative care consult, following      Nutrition- currently CLD, advance diet to solid/easy to chew    Underlying depression/? Negative symptoms of schizo-we will ask psych to help       Body mass index is 27.6 kg/m².     Code status: Full  Prophylaxis: SCD's  Recommended Disposition:  PT, OT, RN  ALEXANDRA>48hours  Barrier: clinical improvement  Need SNF on DC       Current Facility-Administered Medications   Medication Dose Route Frequency    hydrALAZINE (APRESOLINE) tablet 10 mg  10 mg Oral BID    isosorbide mononitrate ER (IMDUR) tablet 15 mg  15 mg Oral DAILY    aspirin delayed-release tablet 81 mg  81 mg Oral DAILY    carvediloL (COREG) tablet 3.125 mg  3.125 mg Oral BID WITH MEALS    nitroglycerin (NITROBID) 2 % ointment 1 Inch  1 Inch Topical Q6H PRN    famotidine (PF) (PEPCID) 20 mg in 0.9% sodium chloride 10 mL injection  20 mg IntraVENous DAILY    bumetanide (BUMEX) tablet 2 mg  2 mg Oral BID    heparin (porcine) 1,000 unit/mL injection 1,300 Units  1,300 Units InterCATHeter DIALYSIS PRN    And    heparin (porcine) 1,000 unit/mL injection 1,300 Units  1,300 Units InterCATHeter DIALYSIS PRN    0.9% sodium chloride infusion 250 mL  250 mL IntraVENous PRN    lidocaine 4 % patch 1 Patch  1 Patch TransDERmal Q24H    piperacillin-tazobactam (ZOSYN) 3.375 g in 0.9% sodium chloride (MBP/ADV) 100 mL MBP  3.375 g IntraVENous Q12H    glucose chewable tablet 16 g  4 Tablet Oral PRN    glucagon (GLUCAGEN) injection 1 mg  1 mg IntraMUSCular PRN    dextrose 10% infusion 0-250 mL  0-250 mL IntraVENous PRN    insulin lispro (HUMALOG) injection   SubCUTAneous Q6H    methylPREDNISolone (PF) (SOLU-MEDROL) injection 20 mg  20 mg IntraVENous Q12H    epoetin tyler-epbx (RETACRIT) injection 10,000 Units  10,000 Units SubCUTAneous Q TUE, THU & SAT    balsam peru-castor oiL (VENELEX) ointment   Topical BID    PARoxetine (PAXIL) tablet 20 mg  20 mg Per NG tube DAILY    heparin (porcine) injection 5,000 Units  5,000 Units SubCUTAneous Q12H    HYDROmorphone (DILAUDID) injection 0.5 mg  0.5 mg IntraVENous Q4H PRN    metoprolol (LOPRESSOR) injection 2.5 mg  2.5 mg IntraVENous Q6H PRN    lidocaine (XYLOCAINE) 4 % cream   Topical PRN    [Held by provider] gabapentin (NEURONTIN) capsule 100 mg  100 mg Oral TID    sodium chloride (NS) flush 5-40 mL  5-40 mL IntraVENous Q8H    sodium chloride (NS) flush 5-40 mL  5-40 mL IntraVENous PRN    acetaminophen (TYLENOL) tablet 650 mg  650 mg Oral Q6H PRN    Or    acetaminophen (TYLENOL) suppository 650 mg  650 mg Rectal Q6H PRN    polyethylene glycol (MIRALAX) packet 17 g  17 g Oral DAILY PRN    ondansetron (ZOFRAN ODT) tablet 4 mg  4 mg Oral Q8H PRN    Or    ondansetron (ZOFRAN) injection 4 mg  4 mg IntraVENous Q6H PRN        Review of Systems  Constitutional: No fevers, No chills, No sweats, No fatigue, No Weakness  Eyes: No redness  Ears, nose, mouth, throat, and face: No nasal congestion, No sore throat, No voice change  Respiratory: No Shortness of Breath, No cough, No wheezing  Cardiovascular: has chest pain at the site of pleural catheter, No palpitations, No extremity edema  Gastrointestinal: No nausea, No vomiting, No diarrhea, No abdominal pain  Genitourinary: No frequency, No dysuria, No hematuria  Integument/breast: No skin lesion(s)   Neurological: No Confusion, No headaches, No dizziness      Objective:     Visit Vitals  BP (!) 169/78 (BP 1 Location: Right upper arm, BP Patient Position: At rest)   Pulse 79   Temp 98.1 °F (36.7 °C)   Resp 19   Ht 5' (1.524 m)   Wt 64.1 kg (141 lb 5 oz)   SpO2 98%   BMI 27.60 kg/m²    O2 Flow Rate (L/min): 2 l/min O2 Device: None (Room air)    Temp (24hrs), Av.9 °F (36.6 °C), Min:97.6 °F (36.4 °C), Max:98.1 °F (36.7 °C)      No intake/output data recorded. 01/15 1901 -  0700  In: -   Out:      PHYSICAL EXAM:  Constitutional: No acute distress  Skin: Extremities and face reveal no rashes. HEENT: Sclerae anicteric. Extra-occular muscles are intact. No oral ulcers. The neck is supple and no masses. Cardiovascular: Regular rate and rhythm. Respiratory:  decrease breath sound on left side. Left chest tube in place. GI: Abdomen nondistended, soft, and nontender. Normal active bowel sounds. Musculoskeletal: No pitting edema of the lower legs. Able to move all ext  Neurological:  Patient is alert and oriented.  Cranial nerves II-XII grossly intact  Psychiatric: Mood appears appropriate       Data Review    Recent Results (from the past 24 hour(s))   PHOSPHORUS    Collection Time: 23  2:33 PM   Result Value Ref Range    Phosphorus 7.0 (H) 2.6 - 4.7 MG/DL   MAGNESIUM    Collection Time: 23  2:33 PM   Result Value Ref Range    Magnesium 2.9 (H) 1.6 - 2.4 mg/dL   CBC WITH AUTOMATED DIFF    Collection Time: 23  2:33 PM   Result Value Ref Range    WBC 14.3 (H) 3.6 - 11.0 K/uL    RBC 3.27 (L) 3.80 - 5.20 M/uL    HGB 9.6 (L) 11.5 - 16.0 g/dL    HCT 29.0 (L) 35.0 - 47.0 %    MCV 88.7 80.0 - 99.0 FL    MCH 29.4 26.0 - 34.0 PG    MCHC 33.1 30.0 - 36.5 g/dL    RDW 18.6 (H) 11.5 - 14.5 %    PLATELET 629 (L) 003 - 400 K/uL    MPV 12.7 8.9 - 12.9 FL    NRBC 7.4 (H) 0  WBC    ABSOLUTE NRBC 1.06 (H) 0.00 - 0.01 K/uL    NEUTROPHILS 88 (H) 32 - 75 %    BAND NEUTROPHILS 2 %    LYMPHOCYTES 6 (L) 12 - 49 %    MONOCYTES 4 (L) 5 - 13 %    EOSINOPHILS 0 0 - 7 %    BASOPHILS 0 0 - 1 %    IMMATURE GRANULOCYTES 0 0.0 - 0.5 %    ABS. NEUTROPHILS 12.8 (H) 1.8 - 8.0 K/UL    ABS. LYMPHOCYTES 0.9 0.8 - 3.5 K/UL    ABS. MONOCYTES 0.6 0.0 - 1.0 K/UL    ABS. EOSINOPHILS 0.0 0.0 - 0.4 K/UL    ABS. BASOPHILS 0.0 0.0 - 0.1 K/UL    ABS. IMM. GRANS. 0.0 0.00 - 0.04 K/UL    DF MANUAL      RBC COMMENTS ANISOCYTOSIS  1+       PROTHROMBIN TIME + INR    Collection Time: 01/16/23  2:33 PM   Result Value Ref Range    INR 4.2 (H) 0.9 - 1.1      Prothrombin time 39.8 (H) 9.0 - 11.1 sec   PTT    Collection Time: 01/16/23  2:33 PM   Result Value Ref Range    aPTT 53.8 (H) 22.1 - 31.0 sec    aPTT, therapeutic range     58.0 - 61.5 SECS   METABOLIC PANEL, COMPREHENSIVE    Collection Time: 01/16/23  2:33 PM   Result Value Ref Range    Sodium 137 136 - 145 mmol/L    Potassium 4.5 3.5 - 5.1 mmol/L    Chloride 98 97 - 108 mmol/L    CO2 25 21 - 32 mmol/L    Anion gap 14 5 - 15 mmol/L    Glucose 194 (H) 65 - 100 mg/dL    BUN 87 (H) 6 - 20 MG/DL    Creatinine 8.05 (H) 0.55 - 1.02 MG/DL    BUN/Creatinine ratio 11 (L) 12 - 20      eGFR 5 (L) >60 ml/min/1.73m2    Calcium 8.6 8.5 - 10.1 MG/DL    Bilirubin, total 9.8 (H) 0.2 - 1.0 MG/DL    ALT (SGPT) 517 (H) 12 - 78 U/L    AST (SGOT) 297 (H) 15 - 37 U/L    Alk.  phosphatase 1,010 (H) 45 - 117 U/L    Protein, total 5.9 (L) 6.4 - 8.2 g/dL    Albumin 2.3 (L) 3.5 - 5.0 g/dL    Globulin 3.6 2.0 - 4.0 g/dL    A-G Ratio 0.6 (L) 1.1 - 2.2     GLUCOSE, POC    Collection Time: 01/16/23  5:37 PM   Result Value Ref Range    Glucose (POC) 108 65 - 117 mg/dL    Performed by Johnny Cogan CON    GLUCOSE, POC    Collection Time: 01/17/23 12:47 AM   Result Value Ref Range    Glucose (POC) 168 (H) 65 - 117 mg/dL    Performed by Cinthia Lees RN    GLUCOSE, POC    Collection Time: 01/17/23  5:54 AM   Result Value Ref Range    Glucose (POC) 166 (H) 65 - 117 mg/dL    Performed by Negar IBRAHIM                  Total critical care time spent 50 minutes involving direct patient care as well as reviewing patient's labs and coordination of care with nursing staff     Care Plan discussed with: Patient/Family/RN/Case Management        Total time spent with patient: 50 minutes.

## 2023-01-17 NOTE — BSMART NOTE
Initial BSMART Liaison Assessment Form     Section I - Integrated Summary    Chief Complaint is depression. LOS:  13 Days    Presenting problem/Summary:      Tonya Essex is a 58 yo female who has been admitted to the medical floor at [address] [facility]. Liaison met w/ pt face to face w/ 1:1 sitter present at bedside. Upon entering the room, pt is laying in bed. When asked how pt is feeling today, she responds, \"I feel better\". Pt reports feeling \"a little\" depressed, but does not wish to disclose further information stating \"life\" when asked about recent stressors. Pt denies SI/HI/VH/AH; no evidence of psychosis or delusional thoughts. Pt denies any history of suicide attempts or self-harm. Pt denies any history of mental health diagnoses, out-pt treatment or previous psychiatric hospitalizations. Pt denies any concerns regarding sleep or appetite. Pt denies any substance use, access to firearms or pending legal charges. Pt declined answering questions related to her previous work experience or education background. Pt presents as guarded and irritable w/ soft mumbled speech and poor eye-contact. Pt denies having any concerns or needs in regards to her mental health. Please defer to the psychiatric NP note for recommendations when consult has been completed. Precipitant Factors are \"life\". The information is given by the patient. Current Psychiatrist and/or  is N/A  Previous Hospitalizations/Treatment: None Reported    Lethality Assessment:  The potential for suicide noted by the following: Not noted. The potential for homicide is not noted. The patient has not been a perpetrator of sexual or physical abuse. There are not pending charges. The patient is not felt to be at risk for self-harm or harm to others. Section II - Psychosocial  The patient's overall mood and attitude is guarded and irritable. Feelings of helplessness and hopelessness are not observed.   Generalized anxiety is not observed. Panic is not observed. Phobias are not observed. Obsessive compulsive tendencies are not observed. Section III - Mental Status Exam  The patient's appearance shows no evidence of impairment. The patient's behavior is guarded and shows poor eye contact. The patient is oriented to time, place, person and situation. The patient's speech is soft. The patient's mood is irritable. The range of affect shows no evidence of impairment. The patient's thought content demonstrates no evidence of impairment. The thought process shows no evidence of impairment. The patient's memory shows no evidence of impairment. The patient's appetite shows no evidence of impairment. The patient's sleep shows no evidence of impairment. The patient's insight shows no evidence of impairment. The patient's judgement shows no evidence of impairment. The patient has demonstrated mental capacity to provide informed consent. Section IV - Substance Abuse  The patient is not using substances. Section V - Medical  Past Medical History:   Diagnosis Date    Cancer ([facility] [address]     BREAST CANCER    GERD (gastroesophageal reflux disease)     HTN (hypertension) 07/18/2011    Psychiatric disorder     ANIXETY AND DEPRESSION    Thyroid disease     HYPOTHYROID     Section VI - Living Arrangements  The patient is single. The patient lives alone. The patient has no children. It is unknown if the patient plans to return home upon discharge. The patient's source of income comes from unknown. The patient's greatest support comes from her sister, Antonette Moy and it is unknown if this person will be involved in treatment. The patient has not been in an event described as horrible or outside the realm of ordinary life experience either currently or in the past. The patient has not been a victim of sexual/physical abuse.     Section VII - Other Areas of Clinical Concern    The highest grade achieved is unknown with the overall quality of school experience being described as unknown d/t patient refusing to answer. The patient is currently unemployed and speaks [place] as a primary language. The patient has no communication impairments affecting communication. The patient's preference for learning can be described as: can read and write adequately.   The patient's hearing is normal.  The patient's vision is normal.    The patient reports coping skills include: Not assessed at this time    Belkys Gil LMSW

## 2023-01-17 NOTE — PROGRESS NOTES
f SNF or IPR: Date FOC offered:1/15  Date FOC received:1/16/23  Date authorization started with reference number:  Date authorization received and expires:  Accepting facility:     Follow up appointments: after rehab  DME needed: TBD after rehab. Transportation at Discharge: stretcher will need to be arranged. Keys or means to access home:      n/a  IM Medicare Letter: n/a has [employer]  Is patient a [place] and connected with the [place]?              no  If yes, was [employer] transfer form completed and [facility] notified? Caregiver Contact: Sister: Bruce Nicholas: [phone]  Discharge Caregiver contacted prior to discharge? yes  Care Conference needed?:      no    CM update: [facility] has accepted. CM sent message asking them to initiate Auth since patient has [place]. Also asked availability of bed when patient is discharged.      [address] [facility]  9723

## 2023-01-17 NOTE — PROGRESS NOTES
Nephrology Progress Note  [facility] / [facility] [address], [place], [address]  Phone - [phone]  Fax - [phone]                 Patient: Ajay Almanzar                   YOB: 1960        Date- 1/17/2023                      Admit Date: 1/3/2023  CC: Follow up for CHRISTA   IMPRESSION & PLAN:   1. CHRISTA due to acute tubular necrosis, serology workup  overall negative. 2.  Hypophosphatemia  3. Left pneumothorax requiring chest tube placement. 4.  Pleural effusion. 5.  Anemia requiring blood transfusion. 6.  History of breast cancer. 7.  Recent history of COVID-19 infection. 8.  Multifocal pneumonia.- RESP FAILURE on vent  9. Status post left thoracocentesis. 10.  Non-ST elevation myocardial infarction. PLAN-  Last HD 1/16  Pending lab today  Cont Bumex 2 mg p.o. BID  Strict I/Os  Will keep her on Monday Wednesday Friday schedule  Not much urine output noted, suspect she might need HD as an outpatient. Current Access: [facility]. She will also need a permacath placement. Will monitor for renal recovery with daily labs. If no recovery seen in a day or 2 then we will plan for permacath and outpatient HD placement. Dialysis dependent at this time.    Epogen for anemia  Avoid hypotension     Subjective:  Seen and examined today  For HD tomorrow  UO not charted  Has a sitter at bedside    Objective:   Vitals:    01/16/23 1845 01/17/23 0641 01/17/23 0844 01/17/23 1056   BP: 122/80  (!) 169/78 (!) 165/110   Pulse: 84  79 64   Resp: 18  19 15   Temp: 97.6 °F (36.4 °C)  98.1 °F (36.7 °C)    TempSrc:       SpO2: 100%  98%    Weight:  64.1 kg (141 lb 5 oz)     Height:  5' (1.524 m)        01/16 0701 - 01/17 0700  In: -   Out: 2000     Last 3 Recorded Weights in this Encounter    01/15/23 2137 01/16/23 0628 01/17/23 0641   Weight: 66.6 kg (146 lb 13.2 oz) 66.6 kg (146 lb 13.2 oz) 64.1 kg (141 lb 5 oz) Physical exam:  GEN:  NAD  NECK:  Supple, no thyromegaly  RESP: decreased BS  b/l, no  wheezing,   CVS: RRR,S1,S2   NEURO: non focal  EXT: Edema +nt           Chart reviewed. Pertinent Notes reviewed. Data Review :  Recent Labs     01/16/23  1433 01/15/23  0330 01/14/23  1609    138 140   K 4.5 4.6 4.3   CL 98 101 100   CO2 25 26 29   BUN 87* 63* 48*   CREA 8.05* 5.75* 4.75*   * 161* 144*   CA 8.6 8.2* 8.2*   MG 2.9* 2.8* 2.8*   PHOS 7.0* 5.6* 4.9*     Recent Labs     01/16/23  1433 01/15/23  0330 01/14/23  1609   WBC 14.3* 17.4* 18.6*   HGB 9.6* 9.2* 9.0*   HCT 29.0* 28.0* 26.5*   * 99* 86*     No results for input(s): FE, TIBC, PSAT, FERR in the last 72 hours.      No results found for: HBA1C, GHR3PBHM, VUM3NJZB   No results found for: MCACR, MCA1, MCA2, MCA3, MCAU, MCAU2, MCALPOCT  US Results (most recent):  Medication list  reviewed    Current Facility-Administered Medications   Medication Dose Route Frequency    hydrALAZINE (APRESOLINE) tablet 10 mg  10 mg Oral BID    isosorbide mononitrate ER (IMDUR) tablet 15 mg  15 mg Oral DAILY    aspirin delayed-release tablet 81 mg  81 mg Oral DAILY    carvediloL (COREG) tablet 3.125 mg  3.125 mg Oral BID WITH MEALS    nitroglycerin (NITROBID) 2 % ointment 1 Inch  1 Inch Topical Q6H PRN    famotidine (PF) (PEPCID) 20 mg in 0.9% sodium chloride 10 mL injection  20 mg IntraVENous DAILY    bumetanide (BUMEX) tablet 2 mg  2 mg Oral BID    heparin (porcine) 1,000 unit/mL injection 1,300 Units  1,300 Units InterCATHeter DIALYSIS PRN    And    heparin (porcine) 1,000 unit/mL injection 1,300 Units  1,300 Units InterCATHeter DIALYSIS PRN    0.9% sodium chloride infusion 250 mL  250 mL IntraVENous PRN    lidocaine 4 % patch 1 Patch  1 Patch TransDERmal Q24H    glucose chewable tablet 16 g  4 Tablet Oral PRN    glucagon (GLUCAGEN) injection 1 mg  1 mg IntraMUSCular PRN    dextrose 10% infusion 0-250 mL  0-250 mL IntraVENous PRN    insulin lispro (HUMALOG) injection   SubCUTAneous Q6H    methylPREDNISolone (PF) (SOLU-MEDROL) injection 20 mg  20 mg IntraVENous Q12H    epoetin tyler-epbx (RETACRIT) injection 10,000 Units  10,000 Units SubCUTAneous Q TUE, THU & SAT    balsam peru-castor oiL (VENELEX) ointment   Topical BID    PARoxetine (PAXIL) tablet 20 mg  20 mg Per NG tube DAILY    heparin (porcine) injection 5,000 Units  5,000 Units SubCUTAneous Q12H    HYDROmorphone (DILAUDID) injection 0.5 mg  0.5 mg IntraVENous Q4H PRN    metoprolol (LOPRESSOR) injection 2.5 mg  2.5 mg IntraVENous Q6H PRN    lidocaine (XYLOCAINE) 4 % cream   Topical PRN    [Held by provider] gabapentin (NEURONTIN) capsule 100 mg  100 mg Oral TID    sodium chloride (NS) flush 5-40 mL  5-40 mL IntraVENous Q8H    sodium chloride (NS) flush 5-40 mL  5-40 mL IntraVENous PRN    acetaminophen (TYLENOL) tablet 650 mg  650 mg Oral Q6H PRN    Or    acetaminophen (TYLENOL) suppository 650 mg  650 mg Rectal Q6H PRN    polyethylene glycol (MIRALAX) packet 17 g  17 g Oral DAILY PRN    ondansetron (ZOFRAN ODT) tablet 4 mg  4 mg Oral Q8H PRN    Or    ondansetron (ZOFRAN) injection 4 mg  4 mg IntraVENous Q6H PRN        Aayna Rodríguez MD  1/17/2023

## 2023-01-17 NOTE — PROGRESS NOTES
Problem: Falls - Risk of  Goal: *Absence of Falls  Description: Document Adriana Grimes Fall Risk and appropriate interventions in the flowsheet. Outcome: Progressing Towards Goal  Note: Fall Risk Interventions:  Mobility Interventions: Assess mobility with egress test, Bed/chair exit alarm    Mentation Interventions: Adequate sleep, hydration, pain control, Bed/chair exit alarm    Medication Interventions: Assess postural VS orthostatic hypotension, Bed/chair exit alarm    Elimination Interventions: Bed/chair exit alarm, Call light in reach              Problem: Pressure Injury - Risk of  Goal: *Prevention of pressure injury  Description: Document William Scale and appropriate interventions in the flowsheet.   Outcome: Progressing Towards Goal  Note: Pressure Injury Interventions:  Sensory Interventions: Assess changes in LOC, Assess need for specialty bed    Moisture Interventions: Absorbent underpads, Apply protective barrier, creams and emollients    Activity Interventions: Assess need for specialty bed    Mobility Interventions: Assess need for specialty bed    Nutrition Interventions: Document food/fluid/supplement intake    Friction and Shear Interventions: Apply protective barrier, creams and emollients, Feet elevated on foot rest

## 2023-01-17 NOTE — PROGRESS NOTES
0700: End of Shift Note    Bedside shift change report given to TOM Marin (oncoming nurse) by Dean Multani RN (offgoing nurse). Report included the following information SBAR, Kardex, ED Summary, Intake/Output, MAR, and Cardiac Rhythm NSR; Sinus Peggy Osier, Sinus Tach    Shift worked:  [id number]     Shift summary and any significant changes:    -1900: Shift Assessment. Pt sitting on side of bed, refusing to lay back down, yet complaining of being tired. Family and sitter at bedside. Pt agitated and refusing cardiac monitor and pulse Oximeter monitoring. Educated pt on importance. Pt continued to refuse and would not allow RN to obtain VS. Pt is impulsive and made several attempts to get up out of bed while RN at bedside. Pt was not sure why she wanted to get up, but was directible and sat back down.    -2100: Pt sitting on side of bed. Continues to be impulsive and becomes agitated when re-directing. Continues to trying to get up. Sitter at bedside. Pt still refusing cardiac leads at this time. Pt refused venelex. -5198: Pt refused VS. Pt became agitated when trying to educate.     -0000: Pt sleeping with blankets pulled up over head. Pt did let me obtain a BG check. No insulin coverage needed. -0345: Pt refused VS and labwork. Pt pulled blankets back over head and stated \"I need to sleep\".      -0559: . 2 units insulin given per slinging scale     Concerns for physician to address:      Zone phone for oncoming shift:            Dean Multani RN

## 2023-01-17 NOTE — PROGRESS NOTES
[facility] [place] at [address], [place], [address]  W: [phone] F: [phone]    Social visit    Patient is followed by Dr. Rk Pike at [facility]. Currently receiving outpatient systemic therapy for breast cancer. Attempted to see patient today and she was resting quietly. No role for systemic therapy at this time. We will continue to follow while remains inpatient.

## 2023-01-17 NOTE — PROGRESS NOTES
Comprehensive Nutrition Assessment    Type and Reason for Visit: Reassess    Nutrition Recommendations/Plan:   Continue diet per SLP. Add phos restriction  Add Nepro shakes BID  Please document % meals and supplements consumed in flowsheet I/O's under intake      Malnutrition Assessment:  Malnutrition Status:  Insufficient data (01/10/23 1552)        Nutrition Assessment:     Chart reviewed and case discussed during IDR. Pt noted for acute hypoxic respiratory failure, pna, s/p intubation/extubation, new HD, sepsis vs SIRS d/t malignancy, NSTEMI, invasive ductal breast ca. Pt has been agitated, impulsive and confused. Sitter at bedside this morning, pt sleeping soundly and did not wake during visit. Sitter reports pt did not touch her breakfast. Will add ONS and asked sitter to encourage supplement intake and food. Patient Vitals for the past 168 hrs:   % Diet Eaten   01/15/23 1508 26 - 50%   01/15/23 1031 26 - 50%     Wt Readings from Last 5 Encounters:   01/17/23 64.1 kg (141 lb 5 oz)   01/01/23 65.6 kg (144 lb 11.2 oz)   12/27/22 65.3 kg (144 lb)   12/21/22 65.6 kg (144 lb 9.6 oz)   12/14/22 66.7 kg (147 lb)   ]    Nutrition Related Findings:    No labs today. Meds: bumex, pepcid, humalog, solu-medrol, zosyn. Edema: 1+BLE, trace generalized & BUE. BM 1/15. Wound Type: None    Current Nutrition Intake & Therapies:  Average Meal Intake: 0%     ADULT DIET Easy to Chew; Low Phosphorus (Less than 1000 mg)  ADULT ORAL NUTRITION SUPPLEMENT Breakfast, Dinner; Renal Supplement    Anthropometric Measures:  Height: 5' (152.4 cm)  Ideal Body Weight (IBW): 100 lbs (45 kg)     Current Body Wt:  64.1 kg (141 lb 5 oz), 163.1 % IBW. Bed scale  Current BMI (kg/m2): 27.6                          BMI Category: Overweight (BMI 25.0-29. 9)    Estimated Daily Nutrient Needs:  Energy Requirements Based On: Formula  Weight Used for Energy Requirements: Current  Energy (kcal/day): MSJ 1550 (1165 x 1.3)     Protein (g/day): 74-89g (1-1.2gPro/kg)  Method Used for Fluid Requirements: Other (comment)  Fluid (ml/day): per MD    Nutrition Diagnosis:   Inadequate protein-energy intake related to impaired respiratory function as evidenced by NPO or clear liquid status due to medical condition  Dx improved: diet advanced but PO intake is poor.      Nutrition Interventions:   Food and/or Nutrient Delivery: Continue current diet, Start oral nutrition supplement  Nutrition Education/Counseling: No recommendations at this time  Coordination of Nutrition Care: Continue to monitor while inpatient, Interdisciplinary rounds       Goals:  Previous Goal Met: Progressing toward goal(s)  Goals: PO intake 50% or greater, by next RD assessment       Nutrition Monitoring and Evaluation:   Behavioral-Environmental Outcomes: None identified  Food/Nutrient Intake Outcomes: Food and nutrient intake, Supplement intake  Physical Signs/Symptoms Outcomes: Biochemical data, Nutrition focused physical findings, Skin, Weight    Discharge Planning:    Continue current diet, Continue oral nutrition supplement    Elisabeth Patino RD  Contact: [phone]

## 2023-01-18 LAB
ALBUMIN SERPL-MCNC: 2.1 G/DL (ref 3.5–5)
ALBUMIN/GLOB SERPL: 0.6 (ref 1.1–2.2)
ALP SERPL-CCNC: 940 U/L (ref 45–117)
ALT SERPL-CCNC: 371 U/L (ref 12–78)
ANION GAP SERPL CALC-SCNC: 13 MMOL/L (ref 5–15)
AST SERPL-CCNC: 200 U/L (ref 15–37)
BILIRUB DIRECT SERPL-MCNC: 4.1 MG/DL (ref 0–0.2)
BILIRUB SERPL-MCNC: 4.8 MG/DL (ref 0.2–1)
BUN SERPL-MCNC: 66 MG/DL (ref 6–20)
BUN/CREAT SERPL: 9 (ref 12–20)
CALCIUM SERPL-MCNC: 8.4 MG/DL (ref 8.5–10.1)
CHLORIDE SERPL-SCNC: 99 MMOL/L (ref 97–108)
CO2 SERPL-SCNC: 26 MMOL/L (ref 21–32)
CREAT SERPL-MCNC: 7.33 MG/DL (ref 0.55–1.02)
ERYTHROCYTE [DISTWIDTH] IN BLOOD BY AUTOMATED COUNT: 20.6 % (ref 11.5–14.5)
GLOBULIN SER CALC-MCNC: 3.8 G/DL (ref 2–4)
GLUCOSE SERPL-MCNC: 119 MG/DL (ref 65–100)
HCT VFR BLD AUTO: 26.5 % (ref 35–47)
HGB BLD-MCNC: 8.9 G/DL (ref 11.5–16)
MAGNESIUM SERPL-MCNC: 2.8 MG/DL (ref 1.6–2.4)
MCH RBC QN AUTO: 29.8 PG (ref 26–34)
MCHC RBC AUTO-ENTMCNC: 33.6 G/DL (ref 30–36.5)
MCV RBC AUTO: 88.6 FL (ref 80–99)
NRBC # BLD: 0.8 K/UL (ref 0–0.01)
NRBC BLD-RTO: 7.2 PER 100 WBC
PHOSPHATE SERPL-MCNC: 6.4 MG/DL (ref 2.6–4.7)
PLATELET # BLD AUTO: 133 K/UL (ref 150–400)
PMV BLD AUTO: 12.3 FL (ref 8.9–12.9)
POTASSIUM SERPL-SCNC: 4.3 MMOL/L (ref 3.5–5.1)
PROT SERPL-MCNC: 5.9 G/DL (ref 6.4–8.2)
RBC # BLD AUTO: 2.99 M/UL (ref 3.8–5.2)
SODIUM SERPL-SCNC: 138 MMOL/L (ref 136–145)
WBC # BLD AUTO: 11.1 K/UL (ref 3.6–11)

## 2023-01-18 PROCEDURE — 90935 HEMODIALYSIS ONE EVALUATION: CPT

## 2023-01-18 PROCEDURE — 80076 HEPATIC FUNCTION PANEL: CPT

## 2023-01-18 PROCEDURE — 74011000250 HC RX REV CODE- 250: Performed by: STUDENT IN AN ORGANIZED HEALTH CARE EDUCATION/TRAINING PROGRAM

## 2023-01-18 PROCEDURE — 36415 COLL VENOUS BLD VENIPUNCTURE: CPT

## 2023-01-18 PROCEDURE — 84100 ASSAY OF PHOSPHORUS: CPT

## 2023-01-18 PROCEDURE — 74011250636 HC RX REV CODE- 250/636: Performed by: ANESTHESIOLOGY

## 2023-01-18 PROCEDURE — 74011250636 HC RX REV CODE- 250/636: Performed by: INTERNAL MEDICINE

## 2023-01-18 PROCEDURE — 65660000001 HC RM ICU INTERMED STEPDOWN

## 2023-01-18 PROCEDURE — 83735 ASSAY OF MAGNESIUM: CPT

## 2023-01-18 PROCEDURE — 80048 BASIC METABOLIC PNL TOTAL CA: CPT

## 2023-01-18 PROCEDURE — 85027 COMPLETE CBC AUTOMATED: CPT

## 2023-01-18 RX ORDER — LANOLIN ALCOHOL/MO/W.PET/CERES
3 CREAM (GRAM) TOPICAL
Status: DISCONTINUED | OUTPATIENT
Start: 2023-01-18 | End: 2023-01-24 | Stop reason: HOSPADM

## 2023-01-18 RX ORDER — DIPHENHYDRAMINE HCL 12.5MG/5ML
25 LIQUID (ML) ORAL
Status: DISCONTINUED | OUTPATIENT
Start: 2023-01-18 | End: 2023-01-24 | Stop reason: HOSPADM

## 2023-01-18 RX ORDER — GUAIFENESIN 100 MG/5ML
100 SOLUTION ORAL
Status: DISCONTINUED | OUTPATIENT
Start: 2023-01-18 | End: 2023-01-24 | Stop reason: HOSPADM

## 2023-01-18 RX ORDER — ALBUTEROL SULFATE 2.5 MG/.5ML
1.25 SOLUTION RESPIRATORY (INHALATION)
Status: DISCONTINUED | OUTPATIENT
Start: 2023-01-18 | End: 2023-01-24 | Stop reason: HOSPADM

## 2023-01-18 RX ADMIN — SODIUM CHLORIDE, PRESERVATIVE FREE 10 ML: 5 INJECTION INTRAVENOUS at 21:31

## 2023-01-18 RX ADMIN — METHYLPREDNISOLONE SODIUM SUCCINATE 20 MG: 40 INJECTION, POWDER, FOR SOLUTION INTRAMUSCULAR; INTRAVENOUS at 21:31

## 2023-01-18 RX ADMIN — SODIUM CHLORIDE, PRESERVATIVE FREE 10 ML: 5 INJECTION INTRAVENOUS at 05:18

## 2023-01-18 RX ADMIN — HEPARIN SODIUM 1300 UNITS: 1000 INJECTION INTRAVENOUS; SUBCUTANEOUS at 11:40

## 2023-01-18 NOTE — DIALYSIS
TRANSFER - OUT REPORT:    Verbal report given to TOM Tolliver on Astrid Ferrell  being transferred to [facility] for routine progression of care       Report consisted of patients Situation, Background, Assessment and   Recommendations(SBAR). Information from the following report(s) SBAR, Procedure Summary, Intake/Output, MAR, and Cardiac Rhythm SB-NSR  was reviewed with the receiving nurse. Lines:   Venous Access Device Portacath 01/05/23 Upper chest (subclavicular area, right (Active)   Central Line Being Utilized Yes 01/18/23 0337   Criteria for Appropriate Use Dialysis/apheresis 01/18/23 0337   Site Assessment Clean, dry, & intact 01/18/23 0337   Date of Last Dressing Change 01/13/23 01/18/23 0337   Dressing Status Clean, dry, & intact 01/18/23 0337   Dressing Type Bacteriocidal;Transparent 01/18/23 0337   Action Taken Open ports on tubing capped 01/17/23 0345   Date Accessed (Medial Site) 01/05/23 01/18/23 0337   Access Time (Medial Site) 1304 01/05/23 1303   Access Needle Size (Site #1) 20 G 01/05/23 1303   Access Needle Length (Medial Site) 1 inch 01/05/23 1303   Positive Blood Return (Medial Site) Yes 01/13/23 1145   Action Taken (Medial Site) Infusing 01/16/23 1903   Date Needle Changed (Medial Site) 01/05/23 01/11/23 0800   Positive Blood Return (Lateral Site) Yes 01/17/23 0345   Action Taken (Lateral Site) Infusing 01/17/23 0345   Alcohol Cap Used Yes 01/18/23 0337       Peripheral IV 01/08/23 Distal;Left;Upper (Active)   Site Assessment Clean, dry, & intact 01/18/23 0337   Phlebitis Assessment 0 01/18/23 0337   Infiltration Assessment 0 01/18/23 0337   Dressing Status Clean, dry, & intact 01/18/23 0337   Dressing Type Transparent;Tape 01/18/23 0337   Hub Color/Line Status Pink;Capped 01/18/23 9666   Action Taken Open ports on tubing capped 01/18/23 0337   Alcohol Cap Used Yes 01/18/23 5897        Opportunity for questions and clarification was provided.       Patient transported with:   [employer]

## 2023-01-18 NOTE — DIALYSIS
TRANSFER - IN REPORT:    Verbal report received from Honey Mueller RN on Bree Pineda  being received from [facility] - tele patient for ordered procedure      Report consisted of patients Situation, Background, Assessment and   Recommendations(SBAR). Information from the following report(s) SBAR, Kardex, Recent Results, and Cardiac Rhythm NSR  was reviewed with the receiving nurse. Opportunity for questions and clarification was provided. Assessment completed upon patients arrival to unit and care assumed.

## 2023-01-18 NOTE — PROGRESS NOTES
Problem: Falls - Risk of  Goal: *Absence of Falls  Description: Document Marybeth Ardon Fall Risk and appropriate interventions in the flowsheet. Outcome: Progressing Towards Goal  Note: Fall Risk Interventions:  Mobility Interventions: Assess mobility with egress test, Bed/chair exit alarm    Mentation Interventions: Adequate sleep, hydration, pain control, Bed/chair exit alarm    Medication Interventions: Assess postural VS orthostatic hypotension, Bed/chair exit alarm    Elimination Interventions: Bed/chair exit alarm, Call light in reach              Problem: Patient Education: Go to Patient Education Activity  Goal: Patient/Family Education  Outcome: Progressing Towards Goal     Problem: Pressure Injury - Risk of  Goal: *Prevention of pressure injury  Description: Document William Scale and appropriate interventions in the flowsheet.   Outcome: Progressing Towards Goal  Note: Pressure Injury Interventions:  Sensory Interventions: Assess changes in LOC, Assess need for specialty bed, Avoid rigorous massage over bony prominences, Keep linens dry and wrinkle-free, Maintain/enhance activity level, Minimize linen layers    Moisture Interventions: Absorbent underpads, Apply protective barrier, creams and emollients, Limit adult briefs, Assess need for specialty bed    Activity Interventions: Assess need for specialty bed, Increase time out of bed, Pressure redistribution bed/mattress(bed type), PT/OT evaluation    Mobility Interventions: Assess need for specialty bed    Nutrition Interventions: Document food/fluid/supplement intake    Friction and Shear Interventions: Apply protective barrier, creams and emollients, Lift sheet, Lift team/patient mobility team, Minimize layers                Problem: Patient Education: Go to Patient Education Activity  Goal: Patient/Family Education  Outcome: Progressing Towards Goal     Problem: Patient Education: Go to Patient Education Activity  Goal: Patient/Family Education  Outcome: Progressing Towards Goal     Problem: Patient Education: Go to Patient Education Activity  Goal: Patient/Family Education  Outcome: Progressing Towards Goal     Problem: Patient Education: Go to Patient Education Activity  Goal: Patient/Family Education  Outcome: Progressing Towards Goal

## 2023-01-18 NOTE — PROGRESS NOTES
Nephrology Progress Note  [facility] / [facility], [place], [address]  Phone - [phone]  Fax - [phone]                 Patient: Sukhjinder Jensen                   YOB: 1960        Date- 1/18/2023                      Admit Date: 1/3/2023  CC: Follow up for CHRISTA   IMPRESSION & PLAN:   1. CHRISTA due to acute tubular necrosis, serology workup  overall negative. 2.  Hypophosphatemia  3. Left pneumothorax requiring chest tube placement. 4.  Pleural effusion. 5.  Anemia requiring blood transfusion. 6.  History of breast cancer. 7.  Recent history of COVID-19 infection. 8.  Multifocal pneumonia.- RESP FAILURE on vent  9. Status post left thoracocentesis. 10.  Non-ST elevation myocardial infarction. PLAN-  Last HD 1/16  Cont Bumex 2 mg p.o. BID  Strict I/Os - get purewick cath  Will keep her on MWF HD schedule  Not much urine output noted, suspect she might need HD as an outpatient. Current Access: [facility]. She will also need a permacath placement if long-term. Will monitor for renal recovery with daily labs. Dialysis dependent at this time. Epogen for anemia  Avoid hypotension     Subjective:  Seen and examined today  Seen in HD unit  UO not charted  Has a sitter at bedside    Objective:   Vitals:    01/18/23 1100 01/18/23 1115 01/18/23 1130 01/18/23 1135   BP: (!) 129/56 (!) 140/74 127/78 (!) 152/63   Pulse: 70 72 65 (!) 58   Resp: 15 15 15 15   Temp:    98 °F (36.7 °C)   TempSrc:    Axillary   SpO2:       Weight:       Height:          No intake/output data recorded.     Last 3 Recorded Weights in this Encounter    01/16/23 0628 01/17/23 0641 01/18/23 0337   Weight: 66.6 kg (146 lb 13.2 oz) 64.1 kg (141 lb 5 oz) 64.7 kg (142 lb 10.2 oz)        Physical exam:  GEN:  NAD  NECK:  Supple, no thyromegaly  RESP: decreased BS  b/l, no  wheezing,   CVS: RRR,S1,S2   NEURO: non focal  EXT: Edema +nt           Chart reviewed. Pertinent Notes reviewed. Data Review :  Recent Labs     01/18/23  0804 01/16/23  1433    137   K 4.3 4.5   CL 99 98   CO2 26 25   BUN 66* 87*   CREA 7.33* 8.05*   * 194*   CA 8.4* 8.6   MG 2.8* 2.9*   PHOS 6.4* 7.0*     Recent Labs     01/18/23  0804 01/16/23  1433   WBC 11.1* 14.3*   HGB 8.9* 9.6*   HCT 26.5* 29.0*   * 126*     No results for input(s): FE, TIBC, PSAT, FERR in the last 72 hours.      No results found for: HBA1C, [id number], QRX2MXHX   No results found for: MCACR, MCA1, MCA2, MCA3, MCAU, MCAU2, MCALPOCT  US Results (most recent):  Medication list  reviewed    Current Facility-Administered Medications   Medication Dose Route Frequency    hydrALAZINE (APRESOLINE) tablet 10 mg  10 mg Oral BID    isosorbide mononitrate ER (IMDUR) tablet 15 mg  15 mg Oral DAILY    aspirin delayed-release tablet 81 mg  81 mg Oral DAILY    carvediloL (COREG) tablet 3.125 mg  3.125 mg Oral BID WITH MEALS    nitroglycerin (NITROBID) 2 % ointment 1 Inch  1 Inch Topical Q6H PRN    famotidine (PF) (PEPCID) 20 mg in 0.9% sodium chloride 10 mL injection  20 mg IntraVENous DAILY    bumetanide (BUMEX) tablet 2 mg  2 mg Oral BID    heparin (porcine) 1,000 unit/mL injection 1,300 Units  1,300 Units InterCATHeter DIALYSIS PRN    And    heparin (porcine) 1,000 unit/mL injection 1,300 Units  1,300 Units InterCATHeter DIALYSIS PRN    0.9% sodium chloride infusion 250 mL  250 mL IntraVENous PRN    lidocaine 4 % patch 1 Patch  1 Patch TransDERmal Q24H    glucose chewable tablet 16 g  4 Tablet Oral PRN    glucagon (GLUCAGEN) injection 1 mg  1 mg IntraMUSCular PRN    dextrose 10% infusion 0-250 mL  0-250 mL IntraVENous PRN    insulin lispro (HUMALOG) injection   SubCUTAneous Q6H    methylPREDNISolone (PF) (SOLU-MEDROL) injection 20 mg  20 mg IntraVENous Q12H    epoetin tyler-epbx (RETACRIT) injection 10,000 Units  10,000 Units SubCUTAneous Q TUE, THU & SAT    balsam peru-castor oiL (VENELEX) ointment   Topical BID    PARoxetine (PAXIL) tablet 20 mg  20 mg Per NG tube DAILY    heparin (porcine) injection 5,000 Units  5,000 Units SubCUTAneous Q12H    HYDROmorphone (DILAUDID) injection 0.5 mg  0.5 mg IntraVENous Q4H PRN    metoprolol (LOPRESSOR) injection 2.5 mg  2.5 mg IntraVENous Q6H PRN    lidocaine (XYLOCAINE) 4 % cream   Topical PRN    [Held by provider] gabapentin (NEURONTIN) capsule 100 mg  100 mg Oral TID    sodium chloride (NS) flush 5-40 mL  5-40 mL IntraVENous Q8H    sodium chloride (NS) flush 5-40 mL  5-40 mL IntraVENous PRN    acetaminophen (TYLENOL) tablet 650 mg  650 mg Oral Q6H PRN    Or    acetaminophen (TYLENOL) suppository 650 mg  650 mg Rectal Q6H PRN    polyethylene glycol (MIRALAX) packet 17 g  17 g Oral DAILY PRN    ondansetron (ZOFRAN ODT) tablet 4 mg  4 mg Oral Q8H PRN    Or    ondansetron (ZOFRAN) injection 4 mg  4 mg IntraVENous Q6H PRN        Adis Minor MD  1/18/2023

## 2023-01-18 NOTE — PROGRESS NOTES
Cardiology Progress Note  1/18/2023     Admit Date: 1/3/2023  Admit Diagnosis: St[facility] [J15.211]  CC: none currently    Cardiologist:  Dr Dom Aguilera. Cardiac Assessment/Plan:    1) Atypical CP, for yrs: Neg [facility] 9/2021 (6:00, -CP, -ecg; Mild LVH). 2) Abnl ecg c/w LVH. *\"EF 45-50%; No valve dz:\" @ [address] by RCA. (EF not that low on my interp; EF 50-55%). 3) HTN  4) Palpitations (few x/yr; few sec). 5) FHx early CAD/CHF  6) Breast CA, left; Chemo (Dr GÉNESIS Salazar Pall @ [facility])    Admitted from 12/28 to 1/1/23 @ [facility] for PNA/hemoptysis/covid/sepsis/CHRISTA/NQWMI w/ trop 100s    Admitted 1/3/23 w/ dyspnea/sepsis; subsequent renal failure/hypotension/hemothorax w/ chest tube; Resp failure 1/9 w/ intubation. Type II NQWMI. Neg blood Cx. Hepatic failure. Chest tube 1/5/23. Intubated 1/9/23. Self-extubated 1/12/23. Off pressors 1/10/23. Coreg to 3.125 bid d/t ASx sinus bradycardia; add ARB if ok w/ renal; if not, hydralazine/imdur. Echo 1/10/23: Left Ventricle: Mildly reduced left ventricular systolic function with a visually estimated EF of 45 - 50%. Left ventricle size is normal. Increased wall thickness. CXR 1/13: \"The pulmonary vasculature is within normal limits. Volume loss left lung base. Mild patchy airspace opacity in the right lung base  improved. Left-sided chest tube is stable. Small left pleural effusion suspected. \"    1/17: No CP/dyspnea at rest. With sitter. BPs 120-170; HR 60-100s; sinus; 98% RA  No labs today (yesterday afternoon: cr 8; lfts 500s/300; Hg 9.6). Cardiac meds: coreg 3.125 bid; asa; bumex 2 bid;     Rec: add ARB if ok w/ renal; if not, hydralazine/imdur. 1/18: No CP/dyspnea; getting HD  BPs 120-160s; HR 60-80s; 97% RA  WBC 11; Hg 8.9; K 4.3; Cr 7; LFTs 300s/200s; alb 2.1. Cardiac meds: coreg 3.125 bid; asa; bumex 2 bid; hydralazine 10 bid. Imdur 15. No new cardiac recs.  Uptitrate BPs meds as needed. _________________________________________________________  Echo 12/27/22: Left Ventricle: Normal left ventricular systolic function with a visually estimated EF of 50 - 55%. Left ventricle size is normal. Normal wall thickness. Mild hypokinesis of the following segments: basal inferolateral and mid inferolateral. Unfortunately, imaging quality limis the accuracy     Chest tube 1/5/23. Intubated 1/9/23. Complicated clinical course as noted below; Currently intubated/sedated  Current cardiac meds:  Levo @ 15; crestor 10 (holding coreg 6.25 bid)  IMP: type II NQWMI;      Rec: cont supportive care; wean pressors as able; hold crestor. Future aspirin when bleeding resolved. ____________________________________________________________________  Elizabeth Wilson is a 57 y.o. female presents with Staphylococcus aureus pneumonia ([address] [facility]. Admitted from 12/28 to 1/1/23 @ [facility] for PNA/hemoptysis/covid/sepsis/CHRISTA/NQWMI w/ trop 100s        As noted in H&P: \"56 y.o.  female with pertinent past medical history of breast cancer currently off chemotherapy, MDD/TAIWO, hypothyroidism, GERD, and recent hospitalization from 12/28-1/1 for multifocal pneumonia secondary to MSSA pneumonia superimposed on COVID-19 pneumonia with course complicated by hemoptysis, sepsis, CHRISTA, and type II MI completed course of cefepime and doxycycline transition to Augmentin on time of discharge-patient unsure if she has been taking that who presents with complaints of persistent decline since discharge from OSH. She complains of malaise, fever/chills, cough productive of blood-tinged sputum, shortness of breath/dyspnea on exertion. She reports she does not feel she is doing well at home and think she needs to be in the hospital for further management. She denies tobacco, alcohol, or illicit drug use. ROS otherwise negative.      In the ED, patient febrile to 100.8 °F, hemodynamically stable (hypertensive 150s/60s), saturating mid 90s on room air. ECG demonstrates NSR with LVH criteria, CXR demonstrates increased left pleural effusion and left lower lobe airspace opacity and CT chest demonstrates increased moderately large left pleural effusion with unchanged patchy bilateral consolidation left greater than right compatible with pneumonia. Rapid and flu negative. Point-of-care lactic 0.7, proBNP 1091(Previously 866), high since her troponin 116 down trended to 107 (600s at prior hospitalization, sodium 142, potassium 3.4, BUN 38, creatinine 3.0 (baseline), WBC 13.3 (slightly increased from time of discharge), hemoglobin 9.1 (improved from prior), platelets 529. Given recent hospitalization and concern for hep patient given vancomycin, Levaquin, and cefepime by ED provider. \"     ICU MD 1/8/22: \"Patient being transferred to ICU as the PCU nurses do not feel comfortable keeping the patient on the floor as she was hypotensive earlier and has been refusing po meds     57year old Female with hx of left breast cancer, recently admitted to [facility] for sepsis s/s COVID-19, CHRISTA, NSTEMI, MSSA pneumonia(12/28-1/1) presented to ED with c/o shortness of breath and hemoptysis. IN ED, patient was febrile to 100.8F. CXR demonstrated increased left pleural effusion with unchanged patchy B/L consolidation L>R. Patient had IR thoracentesis on 1/4/23- bloody tap. CT chest on 1/5 showed left sided hemothorax. Hb dropped from 9.1 to 5.6. s/p left chest tube placement on 1/5/23. S/p PRBC transfusion. \"     ______________________________________________________________________     The patient is intubated; can provide no history. On accelerating levophed (15 currently); on HD. No PND, orthopnea, palpitations, pre-syncope, syncope, peripheral edema, or decrease in exercise tolerance. No current complaints. ECG 1/3/22:   Sinus, leftward axis, no acute changes. LVH. Tele: sinus  CXR: \"1. Endotracheal tube terminates 1 cm above the marvin.  Consider retraction 1 to  2 cm. 2. Appropriate positioning of left IJ central venous catheter. No pneumothorax. 3. Slight interval improvement in extensive bilateral airspace disease. 4. Left chest tube remains in place. Suspected small left pleural effusion. \"     Notable labs: K 4.5; BUN 61; Cr 7.35 (prev 3 range). Alb 1.9; LFTs 900s/2000; Prev procal 7; proBNP 9k; neg covid 1/3/23. INR 2.7.  ___________________________________________________  Notable prior cardiac history:  @ NP OV 10/27/22:  Ms Daisy Kuhn has a h/o:  1) Atypical CP, for yrs: Neg [facility] 9/2021 (6:00, -CP, -ecg; Mild LVH). 2) Abnl ecg c/w LVH. *\"EF 45-50%; No valve dz:\" @ [address] by RCA. (EF not that low on my interp; EF 50-55%). 3) HTN  4) Palpitations (few x/yr; few sec). 5) FHx early CAD/CHF  6) Breast CA, left; Chemo pending (Dr Quincy Velasco @ [facility])  No ethanol, added salt, or nicotine. 1 caff johnny/d. 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 She would like an annual visit here. 8/2021:  She has atypical chest pain (for years; 0-1 X/mo; sharp sensation; few seconds; random onset/offset). She also has intermittent palpitations (fast HR sensation, few X/year; few seconds duration, not related to chest pain). No MCCALL with good exercise tolerance. No previous cardiac evaluation. PCP note from 4/28/06 reviewed; ECG from then interpreted today as noted below. 2/2022: no recent chest pain, dyspnea, or palpitations. She would like an annual visit here. 10/2022: Chest pain remains resolved. No dyspnea nor palpitations. Reportedly mild LV dysfunction as noted above; in my review of the echo, the EF is not that low. NP F/U: Doing well. No CP, MCCALL, palps, edema or syncope. Home SBPs in the 130s. IMPRESSION AND PLAN  01. Other chest pain (R07.89): Atypical chest pain remains resolved; Neg [facility] 9/2021. We discussed the signs and symptoms of unstable angina, myocardial infarction and malignant arrhythmia.   The patient knows to seek immediate medical attention should they occur. 02. Essential (primary) hypertension (I10):  Reasonably well controlled; continue Losartan 25mg QD and increase Coreg to 6.25mg BID. Continue to monitor BP.   03. Other cardiomyopathies (I42.8):  EF looks reassuring on echo per review; repeat planned for next month. 04. Abnormal electrocardiogram [ECG] [EKG] (R94.31):  Nonspecific finding. This condition is stable. 05. Malignant neoplasm of left breast in female, estrogen receptor negative, unspecified site of breast (C50.912): Managed by: Other Physician   06. Long term (current) use of aspirin (Z79.82)   07. Body mass index [BMI] 27.0-27.9, adult (Z68.27)      ORDERS:    Dietary management education, guidance, and counseling           10/27/22 MEDICATION LIST  Medication Sig Desc Non-VCS   aspirin 81 mg tablet,delayed release take 1 tablet by oral route  every day Y   carvedilol 6.25 mg tablet take 1 tablet by oral route 2 times every day with food N   Crestor take 1 Tablet by oral route  every week Y   losartan 25 mg tablet take 1 tablet by oral route  every day N   Multi Vitamin 9 mg iron/15 mL oral liquid   Y   paroxetine 20 mg tablet take 1 tablet by oral route  every day Y      ______________________________________  CARDIAC HISTORY  RISK FACTORS:  1 Hypertension         CARDIOVASCULAR PROCEDURES  Procedure Date Results   Echo 10/05/2022 \"EF 45-50%; No valve dz:\" @ [address] by RCA. (EF not that low on my interp). EKG 10/13/2022 Sinus Rhythm, LVH; axis -15°; nonspecific T-wave changes. [facility] 10/01/2021 Normal EF, No Ischemia, 6:00, -CP, -ecg; Mild LVH. For other plans, see orders.   Hospital problem list     Active Hospital Problems    Diagnosis Date Noted    Goals of care, counseling/discussion 01/13/2023    Advanced directives, counseling/discussion 01/13/2023    Hemothorax 01/06/2023    Shortness of breath 01/05/2023    Acute blood loss anemia 01/05/2023    Hemothorax on left 01/05/2023    Anxiety 01/05/2023 Palliative care by specialist 2023    Staphylococcus aureus pneumonia ([facility] 2023        Subjective: Chayito Lozoya reports       Chest pain X none  consistent with:  Non-cardiac CP         Atypical CP     None now  On going  Anginal CP     Dyspnea X none  at rest  with exertion         improved  unchanged  worse              PND X none  overnight       Orthopnea X none  improved  unchanged  worse   Presyncope X none  improved  unchanged  worse     Ambulated in hallway without symptoms   Yes   Ambulated in room without symptoms  Yes   Objective:     Physical Exam:  Overall VSSAF;  Visit Vitals  BP (!) 155/78 (BP 1 Location: Right upper arm, BP Patient Position: At rest)   Pulse 78   Temp 98.5 °F (36.9 °C)   Resp 15   Ht 5' (1.524 m)   Wt 64.7 kg (142 lb 10.2 oz)   SpO2 97%   BMI 27.86 kg/m²     Temp (24hrs), Av.3 °F (36.8 °C), Min:98.1 °F (36.7 °C), Max:98.5 °F (36.9 °C)    Patient Vitals for the past 8 hrs:   Pulse   23 0337 78       No data found. No data found. No intake or output data in the 24 hours ending 23 0843    General Appearance: Well developed, well nourished, no acute distress. Ears/Nose/Mouth/Throat:   Normal MM; anicteric. JVP: WNL   Resp:   Lungs clear to auscultation bilaterally. Nl resp effort. Cardiovascular:  RRR, S1, S2 normal, no new murmur. No gallop or rub. Abdomen:   Soft, non-tender, bowel sounds are present. Extremities: No edema bilaterally. Skin:  Neuro: Warm and dry. A, grossly nonfocal; calm;        cath site intact w/o hematoma or new bruit; distal pulse unchanged  Yes   Data Review:     Telemetry independently reviewed x sinus  chronic afib  parox afib  NSVT     ECG independently reviewed  NSR  afib  no significant changes  NSST-Tw chgs     x no new ECG provided for review   Lab results reviewed as noted below. Current medications reviewed as noted below. No results for input(s): PH, PCO2, PO2 in the last 72 hours.   No results for input(s): CPK, CKMB, CKNDX, TROIQ in the last 72 hours.   Recent Labs     01/18/23  0804 01/16/23  1433 01/15/23  1045    137  --    K 4.3 4.5  --    CL 99 98  --    CO2 26 25  --    BUN 66* 87*  --    CREA 7.33* 8.05*  --    * 194*  --    PHOS 6.4* 7.0*  --    CA 8.4* 8.6  --    ALB 2.1* 2.3* 2.3*   WBC 11.1* 14.3*  --    HGB 8.9* 9.6*  --    HCT 26.5* 29.0*  --    * 126*  --        No results found for: CHOL, CHOLX, CHLST, CHOLV, HDL, HDLP, LDL, LDLC, DLDLP, Bucklin Cumber, CHHD, CHHDX  Recent Labs     01/18/23  0804 01/16/23  1433 01/15/23  1045   * 1,010* 1,062*   TP 5.9* 5.9* 6.5   ALB 2.1* 2.3* 2.3*   GLOB 3.8 3.6 4.2*       Recent Labs     01/16/23  1433   INR 4.2*   PTP 39.8*   APTT 53.8*        No components found for: GLPOC    Current Facility-Administered Medications   Medication Dose Route Frequency    hydrALAZINE (APRESOLINE) tablet 10 mg  10 mg Oral BID    isosorbide mononitrate ER (IMDUR) tablet 15 mg  15 mg Oral DAILY    aspirin delayed-release tablet 81 mg  81 mg Oral DAILY    carvediloL (COREG) tablet 3.125 mg  3.125 mg Oral BID WITH MEALS    nitroglycerin (NITROBID) 2 % ointment 1 Inch  1 Inch Topical Q6H PRN    famotidine (PF) (PEPCID) 20 mg in 0.9% sodium chloride 10 mL injection  20 mg IntraVENous DAILY    bumetanide (BUMEX) tablet 2 mg  2 mg Oral BID    heparin (porcine) 1,000 unit/mL injection 1,300 Units  1,300 Units InterCATHeter DIALYSIS PRN    And    heparin (porcine) 1,000 unit/mL injection 1,300 Units  1,300 Units InterCATHeter DIALYSIS PRN    0.9% sodium chloride infusion 250 mL  250 mL IntraVENous PRN    lidocaine 4 % patch 1 Patch  1 Patch TransDERmal Q24H    glucose chewable tablet 16 g  4 Tablet Oral PRN    glucagon (GLUCAGEN) injection 1 mg  1 mg IntraMUSCular PRN    dextrose 10% infusion 0-250 mL  0-250 mL IntraVENous PRN    insulin lispro (HUMALOG) injection   SubCUTAneous Q6H    methylPREDNISolone (PF) (SOLU-MEDROL) injection 20 mg  20 mg IntraVENous Q12H    epoetin tyler-epbx (RETACRIT) injection 10,000 Units  10,000 Units SubCUTAneous Q TUE, THU & SAT    balsam peru-castor oiL (VENELEX) ointment   Topical BID    PARoxetine (PAXIL) tablet 20 mg  20 mg Per NG tube DAILY    heparin (porcine) injection 5,000 Units  5,000 Units SubCUTAneous Q12H    HYDROmorphone (DILAUDID) injection 0.5 mg  0.5 mg IntraVENous Q4H PRN    metoprolol (LOPRESSOR) injection 2.5 mg  2.5 mg IntraVENous Q6H PRN    lidocaine (XYLOCAINE) 4 % cream   Topical PRN    [Held by provider] gabapentin (NEURONTIN) capsule 100 mg  100 mg Oral TID    sodium chloride (NS) flush 5-40 mL  5-40 mL IntraVENous Q8H    sodium chloride (NS) flush 5-40 mL  5-40 mL IntraVENous PRN    acetaminophen (TYLENOL) tablet 650 mg  650 mg Oral Q6H PRN    Or    acetaminophen (TYLENOL) suppository 650 mg  650 mg Rectal Q6H PRN    polyethylene glycol (MIRALAX) packet 17 g  17 g Oral DAILY PRN    ondansetron (ZOFRAN ODT) tablet 4 mg  4 mg Oral Q8H PRN    Or    ondansetron (ZOFRAN) injection 4 mg  4 mg IntraVENous Q6H PRN        Simona Lagunas MD

## 2023-01-18 NOTE — PROGRESS NOTES
Transition of Care Plan:      RUR: 23% (high)  Disposition: SNF - [facility]. If SNF or IPR: Date FOC offered: 1/15/23  Date [address] received: 1/17/23  Date authorization started with reference number: 1/17/2023, [id number]. Date authorization received and expires: 1/18/2023 - 1/20/2023  Accepting facility: [facility]. Follow up appointments: defer to SNF  DME needed: defer to SNF. Transportation at Discharge: [facility] transport is needed. Keys or means to access home:  Facility to provide. IM Medicare Letter: N/A Commercial Insurance. Is patient a [place] and connected with the [place]? N/A  If yes, was [place] transfer form completed and VA notified? No.  Caregiver Contact: Sister Tom Winkler [facility] - [phone]  Discharge Caregiver contacted prior to discharge? CM to contact. Care Conference needed?: No.    [facility] is not in network with patient's insurance, referral previously sent to [facility]. CM will continue to follow for any discharge needs. [facility] [address] liaison notified CM that [facility] was previously approved. Liaison to check if bed is ready tomorrow and when auth. expires/reference number.       CARLOS Mcnair, RN    Care Management  [phone]

## 2023-01-18 NOTE — PROGRESS NOTES
Problem: Falls - Risk of  Goal: *Absence of Falls  Description: Document Jessica Sweet Fall Risk and appropriate interventions in the flowsheet. Outcome: Progressing Towards Goal  Note: Fall Risk Interventions:  Mobility Interventions: Assess mobility with egress test, Bed/chair exit alarm    Mentation Interventions: Adequate sleep, hydration, pain control, Bed/chair exit alarm    Medication Interventions: Assess postural VS orthostatic hypotension, Bed/chair exit alarm    Elimination Interventions: Bed/chair exit alarm, Call light in reach              Problem: Patient Education: Go to Patient Education Activity  Goal: Patient/Family Education  Outcome: Progressing Towards Goal     Problem: Pressure Injury - Risk of  Goal: *Prevention of pressure injury  Description: Document William Scale and appropriate interventions in the flowsheet.   Outcome: Progressing Towards Goal  Note: Pressure Injury Interventions:  Sensory Interventions: Assess changes in LOC, Assess need for specialty bed    Moisture Interventions: Absorbent underpads, Apply protective barrier, creams and emollients    Activity Interventions: Assess need for specialty bed    Mobility Interventions: Assess need for specialty bed, Float heels    Nutrition Interventions: Document food/fluid/supplement intake    Friction and Shear Interventions: Apply protective barrier, creams and emollients, Feet elevated on foot rest

## 2023-01-18 NOTE — PROGRESS NOTES
Hospitalist Progress Note    Subjective:   Daily Progress Note: 1/18/2023 3:42 PM    Hospital Course:  57year old Female with hx of left breast cancer, recently admitted to [facility] for sepsis s/s COVID-19, CHRISTA, NSTEMI, MSSA pneumonia(12/28-1/1) presented to ED with c/o shortness of breath and hemoptysis. IN ED, patient was febrile to 100.8F. CXR demonstrated increased left pleural effusion with unchanged patchy B/L consolidation L>R. Patient had IR thoracentesis on 1/4/23- bloody tap. CT chest on 1/5 showed left sided hemothorax. Hb dropped from 9.1 to 5.6. s/p left chest tube placement on 1/5/23. S/p PRBC transfusion. Txed to ICU 1/8 for hypotension requiring levophed, she was intubated on 1/9/23 for respiratory distress. Pt was started on CRRT on 1/10. Pt self extubated on 1/12/23. Now on stepdown, hemodynamically stable, remains anuric, getting HD. Non complaint with meds has been as inssue    Subjective:  No events overnight. Intermittently refusing meds and lab draws. Patient wanted solid food    Assessment / Plan:  Acute hypoxic respiratory failure  Multifocal pneumonia/keytruda pneumonitis  S/p left thoracentesis by IR on 1/4/23  S/p left chest tube placement for left sided hemothorax by IR on 1/5/23  S/p intubation 1/9, self extubated 1/12    -Suspected initial hypoxia from parapneumonic effusion, with recent history of MSSA pneumonia, nucleated cells 4400, exudative fluid. Culture was not sent!!   -As above patient developed hemothorax, requiring chest tube, eventually requiring ICU transfer for vasopressors likely from volume/blood loss. Patient required intubation on 1/9, likely from volume overload from CHRISTA, requiring CRRT on 1/10, now switched to HD and patient extubated on 1/12.  -Chest tube has been removed 1/13  -Currently on 2 L nasal cannula    -Culture from the [address] [place] [address] Ave on 1/11, showed some Candida, no bacteria.   Likely colonization  -Patient has been on multiple antibiotics since day 1, recently on Zosyn. 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 Bilirubin elevated, but usually it lag behind clinical improvement.  -Ammonia normal  -Ultrasound abdomen okay  -Patient refused labs today, recheck tomorrow      Acute blood loss anemia:  Anemia of chronic disease  S/s left hemothorax  S/p PRBC transfusion on 1/5/23. Hb post transfusion stable  Will monitor cbc and transfuse if Hb<7  Received PRBC    NSTEMI:  Sinus jose 1/16  Likely type II MI  Cards on board  -Decreased Coreg, monitor on telemetry        Invasive ductal breast cancer:  Appreciate oncology consult  Appreciate palliative care consult, following      Nutrition- currently CLD, advance diet to solid/easy to chew    Underlying depression/?   psych consult        Body mass index is 27.86 kg/m².     Code status: Full  Prophylaxis: SCD's  Recommended Disposition:  PT, OT, RN  ALEXANDRA>48hours  Barrier: clinical improvement, nephrology consult   Need SNF on DC       Current Facility-Administered Medications   Medication Dose Route Frequency    hydrALAZINE (APRESOLINE) tablet 10 mg  10 mg Oral BID    isosorbide mononitrate ER (IMDUR) tablet 15 mg  15 mg Oral DAILY    aspirin delayed-release tablet 81 mg  81 mg Oral DAILY    carvediloL (COREG) tablet 3.125 mg  3.125 mg Oral BID WITH MEALS    nitroglycerin (NITROBID) 2 % ointment 1 Inch  1 Inch Topical Q6H PRN    famotidine (PF) (PEPCID) 20 mg in 0.9% sodium chloride 10 mL injection  20 mg IntraVENous DAILY    bumetanide (BUMEX) tablet 2 mg  2 mg Oral BID    heparin (porcine) 1,000 unit/mL injection 1,300 Units  1,300 Units InterCATHeter DIALYSIS PRN    And    heparin (porcine) 1,000 unit/mL injection 1,300 Units  1,300 Units InterCATHeter DIALYSIS PRN    0.9% sodium chloride infusion 250 mL  250 mL IntraVENous PRN    lidocaine 4 % patch 1 Patch  1 Patch TransDERmal Q24H    glucose chewable tablet 16 g  4 Tablet Oral PRN    glucagon (GLUCAGEN) injection 1 mg  1 mg IntraMUSCular PRN    dextrose 10% infusion 0-250 mL  0-250 mL IntraVENous PRN    insulin lispro (HUMALOG) injection   SubCUTAneous Q6H    methylPREDNISolone (PF) (SOLU-MEDROL) injection 20 mg  20 mg IntraVENous Q12H    epoetin tyler-epbx (RETACRIT) injection 10,000 Units  10,000 Units SubCUTAneous Q TUE, THU & SAT    balsam peru-castor oiL (VENELEX) ointment   Topical BID    PARoxetine (PAXIL) tablet 20 mg  20 mg Per NG tube DAILY    heparin (porcine) injection 5,000 Units  5,000 Units SubCUTAneous Q12H    HYDROmorphone (DILAUDID) injection 0.5 mg  0.5 mg IntraVENous Q4H PRN    metoprolol (LOPRESSOR) injection 2.5 mg  2.5 mg IntraVENous Q6H PRN    lidocaine (XYLOCAINE) 4 % cream   Topical PRN    [Held by provider] gabapentin (NEURONTIN) capsule 100 mg  100 mg Oral TID    sodium chloride (NS) flush 5-40 mL  5-40 mL IntraVENous Q8H    sodium chloride (NS) flush 5-40 mL  5-40 mL IntraVENous PRN    acetaminophen (TYLENOL) tablet 650 mg  650 mg Oral Q6H PRN    Or    acetaminophen (TYLENOL) suppository 650 mg  650 mg Rectal Q6H PRN    polyethylene glycol (MIRALAX) packet 17 g  17 g Oral DAILY PRN    ondansetron (ZOFRAN ODT) tablet 4 mg  4 mg Oral Q8H PRN    Or    ondansetron (ZOFRAN) injection 4 mg  4 mg IntraVENous Q6H PRN        Review of Systems  Constitutional: No fevers, No chills, No sweats, No fatigue, No Weakness  Eyes: No redness  Ears, nose, mouth, throat, and face: No nasal congestion, No sore throat, No voice change  Respiratory: No Shortness of Breath, No cough, No wheezing  Cardiovascular: has chest pain at the site of pleural catheter, No palpitations, No extremity edema  Gastrointestinal: No nausea, No vomiting, No diarrhea, No abdominal pain  Genitourinary: No frequency, No dysuria, No hematuria  Integument/breast: No skin lesion(s)   Neurological: No Confusion, No headaches, No dizziness      Objective:     Visit Vitals  BP (!) 152/63   Pulse (!) 58   Temp 98 °F (36.7 °C) (Axillary)   Resp 15   Ht 5' (1.524 m)   Wt 64.7 kg (142 lb 10.2 oz)   SpO2 97%   BMI 27.86 kg/m²    O2 Flow Rate (L/min): 2 l/min O2 Device: None (Room air)    Temp (24hrs), Av.3 °F (36.8 °C), Min:98 °F (36.7 °C), Max:98.5 °F (36.9 °C)       0701 -  1900  In: -   Out: 1000   No intake/output data recorded. PHYSICAL EXAM:  Constitutional: No acute distress  Skin: Extremities and face reveal no rashes. HEENT: Sclerae anicteric. Extra-occular muscles are intact. No oral ulcers. The neck is supple and no masses. Cardiovascular: Regular rate and rhythm. Respiratory:  decrease breath sound on left side. Left chest tube in place. GI: Abdomen nondistended, soft, and nontender. Normal active bowel sounds. Musculoskeletal: No pitting edema of the lower legs. Able to move all ext  Neurological:  Patient is alert and oriented.  Cranial nerves II-XII grossly intact  Psychiatric: Mood appears appropriate       Data Review    Recent Results (from the past 24 hour(s))   CBC W/O DIFF    Collection Time: 23  8:04 AM   Result Value Ref Range    WBC 11.1 (H) 3.6 - 11.0 K/uL    RBC 2.99 (L) 3.80 - 5.20 M/uL    HGB 8.9 (L) 11.5 - 16.0 g/dL    HCT 26.5 (L) 35.0 - 47.0 %    MCV 88.6 80.0 - 99.0 FL    MCH 29.8 26.0 - 34.0 PG    MCHC 33.6 30.0 - 36.5 g/dL    RDW 20.6 (H) 11.5 - 14.5 %    PLATELET 230 (L) 342 - 400 K/uL    MPV 12.3 8.9 - 12.9 FL    NRBC 7.2 (H) 0  WBC    ABSOLUTE NRBC 0.80 (H) 0.00 - 5.17 K/uL   METABOLIC PANEL, BASIC    Collection Time: 23  8:04 AM   Result Value Ref Range    Sodium 138 136 - 145 mmol/L    Potassium 4.3 3.5 - 5.1 mmol/L    Chloride 99 97 - 108 mmol/L    CO2 26 21 - 32 mmol/L    Anion gap 13 5 - 15 mmol/L    Glucose 119 (H) 65 - 100 mg/dL    BUN 66 (H) 6 - 20 MG/DL    Creatinine 7.33 (H) 0.55 - 1.02 MG/DL    BUN/Creatinine ratio 9 (L) 12 - 20      eGFR 6 (L) >60 ml/min/1.73m2    Calcium 8.4 (L) 8.5 - 10.1 MG/DL   HEPATIC FUNCTION PANEL    Collection Time: 01/18/23  8:04 AM   Result Value Ref Range    Protein, total 5.9 (L) 6.4 - 8.2 g/dL    Albumin 2.1 (L) 3.5 - 5.0 g/dL    Globulin 3.8 2.0 - 4.0 g/dL    A-G Ratio 0.6 (L) 1.1 - 2.2      Bilirubin, total 4.8 (H) 0.2 - 1.0 MG/DL    Bilirubin, direct 4.1 (H) 0.0 - 0.2 MG/DL    Alk. phosphatase 940 (H) 45 - 117 U/L    AST (SGOT) 200 (H) 15 - 37 U/L    ALT (SGPT) 371 (H) 12 - 78 U/L   MAGNESIUM    Collection Time: 01/18/23  8:04 AM   Result Value Ref Range    Magnesium 2.8 (H) 1.6 - 2.4 mg/dL   PHOSPHORUS    Collection Time: 01/18/23  8:04 AM   Result Value Ref Range    Phosphorus 6.4 (H) 2.6 - 4.7 MG/DL                 Total critical care time spent 50 minutes involving direct patient care as well as reviewing patient's labs and coordination of care with nursing staff     Care Plan discussed with: Patient/Family/RN/Case Management        Total time spent with patient: 50 minutes.

## 2023-01-18 NOTE — PROGRESS NOTES
End of Shift Note    Bedside shift change report given to Unruly RN (oncoming nurse) by Kisha Barboza RN (offgoing nurse). Report included the following information SBAR, Kardex, MAR, and Recent Results    Shift worked:  7p-7a     Shift summary and any significant changes:     Pt refused vitals throughout the night, informed pt on importance of getting vitals. Pt refused blood sugar checks, education provided. Pt refused AM labs. Concerns for physician to address:       Zone phone for oncoming shift:          Activity:  Activity Level: Bed Rest  Number times ambulated in hallways past shift: 0  Number of times OOB to chair past shift: 0    Cardiac:   Cardiac Monitoring: Yes      Cardiac Rhythm: Sinus Rhythm    Access:  Current line(s): PIV, port, and HD access     Genitourinary:   Urinary status: anuric    Respiratory:   O2 Device: None (Room air)  Chronic home O2 use?: NO  Incentive spirometer at bedside: NO       GI:  Last Bowel Movement Date: 01/15/23  Current diet:  ADULT DIET Easy to Chew; Low Phosphorus (Less than 1000 mg)  ADULT ORAL NUTRITION SUPPLEMENT Breakfast, Dinner; Renal Supplement  Passing flatus: YES  Tolerating current diet: YES       Pain Management:   Patient states pain is manageable on current regimen: YES    Skin:  William Score: 14  Interventions: turn team, speciality bed, float heels, PT/OT consult, and limit briefs    Patient Safety:  Fall Score:  Total Score: 2  Interventions: bed/chair alarm, assistive device (walker, cane, etc), gripper socks, pt to call before getting OOB, and stay with me (per policy)  High Fall Risk: Yes    Length of Stay:  Expected LOS: 5d 0h  Actual LOS: [address] [facility],[address], RN

## 2023-01-18 NOTE — PROGRESS NOTES
Physical Therapy:  Patient is currently [facility] for HD. Will defer PT session and continue to follow.      Carolina Brooks PT, DPT

## 2023-01-18 NOTE — PROGRESS NOTES
Speech path   Patient has been refusing to eat and drink when we come by. She has eaten nothing today and had very poor intake yesterday. Currently on HD and has no IVs. Concern for dehydration.    Isela Fajardo, SLP

## 2023-01-18 NOTE — PROCEDURES
Hemodialysis / [phone]    Vitals Pre Post Assessment Pre Post   BP BP: (!) 163/82 (01/18/23 0805)   152/63 LOC A&Ox self, resting quietly with sitter present A&Ox self, resting quietly with sitter present   HR Pulse (Heart Rate): (!) 59 (01/18/23 0805)   58 Lungs Clear clear   Resp Resp Rate: 15 (01/18/23 0805) 15 Cardiac Tele, NSR Tele, NSR   Temp Temp: 98.4 °F (36.9 °C) (01/18/23 0805) 98.0, axilla Skin Blanchable sacrum; flaky, dry, skin tears Blanchable sacrum; flaky, dry, skin tears   Weight  N/a N/a Edema none none   Tele status remote remote Pain 0 0     Orders   Duration: Start: 0805 End: 1135 Total: 3.5hr   Dialyzer: Dialyzer/Set Up Inspection: Blanka Hess (01/18/23 0805)   K Bath: Dialysate K (mEq/L): 3 (01/18/23 0805)   Ca Bath: Dialysate CA (mEq/L): 2.5 (01/18/23 0805)   Na: Dialysate NA (mEq/L): 138 (01/18/23 0805)   Bicarb: Dialysate HCO3 (mEq/L): 35 (01/18/23 0805)   Target Fluid Removal: Goal/Amount of Fluid to Remove (mL): 2000 mL (01/18/23 0805)     Access   Type & Location: [facility]   Comments:  Pre- Assessment: Dressing CDI, dated 1/13/23. No s/s of infection. Both lumens aspirate & flush well. Running well at . Post assessment: Dressing CDI. No changes.                                       Labs   HBsAg (Antigen) / date: 1/9/23 Neg                                          HBsAb (Antibody) / date: 1/9/23 Susceptible   Source: EPIC   Obtained/Reviewed  Critical Results Called HGB   Date Value Ref Range Status   01/18/2023 8.9 (L) 11.5 - 16.0 g/dL Final     Potassium   Date Value Ref Range Status   01/18/2023 4.3 3.5 - 5.1 mmol/L Final     Comment:     ICTERIC SPECIMEN     Calcium   Date Value Ref Range Status   01/18/2023 8.4 (L) 8.5 - 10.1 MG/DL Final     BUN   Date Value Ref Range Status   01/18/2023 66 (H) 6 - 20 MG/DL Final     Creatinine   Date Value Ref Range Status   01/18/2023 7.33 (H) 0.55 - 1.02 MG/DL Final        Meds Given   Name Dose Route   Heparin 1:1000 1.3 & 1.3 Dwell in CVC after HD               Adequacy / Fluid    Total Liters Process: 79.6L   Net Fluid Removed: 1000ml      Comments   Time Out Done:   (Time) 0803   Admitting Diagnosis: PNA/ SOB/ Anemia/ Hemothorax   Consent obtained/signed: Informed Consent Verified: Yes (01/18/23 0805)   Machine / RO # Machine Number: [id number] (01/18/23 0805)   Primary Nurse Rpt Pre: Alexandra Cerna RN   Primary Nurse Rpt Post: Tracie Sams RN   Pt Education: Procedural/ BP cuff to go off every 15min   Care Plan: ongoing   Pts outpatient clinic: N/A     Tx Summary   Comments:          SBAR received from Primary RN. Patient status is telemetry now. Patient not allowing BG or labs overnight. Will draw with HD. Patient came with sitter. Pt arrived to HD suite A&Ox self. Consent signed & on file   0805: Each catheter limb disinfected per p&p, caps removed, hubs disinfected per p&p. Each lumen aspirated for blood return and flushed with Normal Saline per policy. Labs drawn per request/ order. VSS. Dialysis Tx initiated. 5573: Spoke to Dr Marisa Bryant about patient BP dropping from SBP 160s to 110 over last 2hrs. Ok to decrease goal to 1kg. Patient is CHRISTA; prevent hypotension. No patient complaints at this time. Changes made to machine settings. No further issues*    1135: Tx ended. VSS. Each dialysis catheter limb disinfected per p&p, all possible blood returned per p&p, and each dialysis hub disinfected per p&p. Each lumen flushed, post dialysis catheter Heparin dwell instilled per order, and caps applied. Bed locked and in the lowest position, call bell and belongings in reach. SBAR given to Primary, RN. Patient is stable at time of their departure. All Dialysis related medications have been reviewed.

## 2023-01-18 NOTE — PROGRESS NOTES
End of Shift Note    Bedside shift change report given to [address]  (oncoming nurse) by Neelima Goodman RN (offgoing nurse). Report included the following information SBAR    Shift worked:  7pm-7am     Shift summary and any significant changes:     Patient had HD Patient has been refusing taking her medication, checking her BS, V/S   after coming back from dialyses floor. And also no eating or drinking  today. Concerns for physician to address:  Very sleepy      Zone phone for oncoming shift:          Activity:  Activity Level: Bed Rest  Number times ambulated in hallways past shift: 0  Number of times OOB to chair past shift: 0    Cardiac:   Cardiac Monitoring: Yes      Cardiac Rhythm: Sinus Rhythm    Access:  Current line(s): PIV     Genitourinary:   Urinary status: anuric    Respiratory:   O2 Device: None (Room air)  Chronic home O2 use?: NO  Incentive spirometer at bedside: NO       GI:  Last Bowel Movement Date: 01/15/23  Current diet:  ADULT DIET Easy to Chew; Low Phosphorus (Less than 1000 mg)  ADULT ORAL NUTRITION SUPPLEMENT Breakfast, Dinner; Renal Supplement  Passing flatus: Yes  Tolerating current diet: NO       Pain Management:   Patient states pain is manageable on current regimen: YES    Skin:  William Score: 13  Interventions: float heels    Patient Safety:  Fall Score:  Total Score: 3  Interventions: bed/chair alarm  High Fall Risk: Yes    Length of Stay:  Expected LOS: 5d 0h  Actual LOS: [facility], RN

## 2023-01-18 NOTE — PROGRESS NOTES
Patient has been refusing taking her medication, checking her BS, V/S   after coming back from dialyses floor. And also no eating or drinking  today. Dr Yancy Alonzo notified. Jacqui Reeves

## 2023-01-19 LAB
GLUCOSE BLD STRIP.AUTO-MCNC: 158 MG/DL (ref 65–117)
SERVICE CMNT-IMP: ABNORMAL

## 2023-01-19 PROCEDURE — 82962 GLUCOSE BLOOD TEST: CPT

## 2023-01-19 PROCEDURE — 74011000250 HC RX REV CODE- 250: Performed by: INTERNAL MEDICINE

## 2023-01-19 PROCEDURE — 74011250637 HC RX REV CODE- 250/637: Performed by: INTERNAL MEDICINE

## 2023-01-19 PROCEDURE — 97110 THERAPEUTIC EXERCISES: CPT

## 2023-01-19 PROCEDURE — 97530 THERAPEUTIC ACTIVITIES: CPT

## 2023-01-19 PROCEDURE — 97535 SELF CARE MNGMENT TRAINING: CPT

## 2023-01-19 PROCEDURE — 74011250636 HC RX REV CODE- 250/636: Performed by: INTERNAL MEDICINE

## 2023-01-19 PROCEDURE — 74011000250 HC RX REV CODE- 250: Performed by: STUDENT IN AN ORGANIZED HEALTH CARE EDUCATION/TRAINING PROGRAM

## 2023-01-19 PROCEDURE — 74011250636 HC RX REV CODE- 250/636: Performed by: ANESTHESIOLOGY

## 2023-01-19 PROCEDURE — 74011000250 HC RX REV CODE- 250: Performed by: NURSE PRACTITIONER

## 2023-01-19 PROCEDURE — 65660000001 HC RM ICU INTERMED STEPDOWN

## 2023-01-19 PROCEDURE — 74011250637 HC RX REV CODE- 250/637: Performed by: NURSE PRACTITIONER

## 2023-01-19 RX ORDER — BALSAM PERU/CASTOR OIL
OINTMENT (GRAM) TOPICAL 2 TIMES DAILY
Status: DISCONTINUED | OUTPATIENT
Start: 2023-01-19 | End: 2023-01-24 | Stop reason: HOSPADM

## 2023-01-19 RX ADMIN — BUMETANIDE 2 MG: 1 TABLET ORAL at 18:16

## 2023-01-19 RX ADMIN — CARVEDILOL 3.12 MG: 3.12 TABLET, FILM COATED ORAL at 18:16

## 2023-01-19 RX ADMIN — FAMOTIDINE 20 MG: 10 INJECTION, SOLUTION INTRAVENOUS at 09:21

## 2023-01-19 RX ADMIN — EPOETIN ALFA-EPBX 10000 UNITS: 10000 INJECTION, SOLUTION INTRAVENOUS; SUBCUTANEOUS at 23:24

## 2023-01-19 RX ADMIN — ASPIRIN 81 MG: 81 TABLET, COATED ORAL at 09:20

## 2023-01-19 RX ADMIN — HEPARIN SODIUM 5000 UNITS: 5000 INJECTION INTRAVENOUS; SUBCUTANEOUS at 18:16

## 2023-01-19 RX ADMIN — HYDRALAZINE HYDROCHLORIDE 10 MG: 10 TABLET, FILM COATED ORAL at 19:24

## 2023-01-19 RX ADMIN — SODIUM CHLORIDE, PRESERVATIVE FREE 10 ML: 5 INJECTION INTRAVENOUS at 23:25

## 2023-01-19 RX ADMIN — METHYLPREDNISOLONE SODIUM SUCCINATE 20 MG: 40 INJECTION, POWDER, FOR SOLUTION INTRAMUSCULAR; INTRAVENOUS at 23:23

## 2023-01-19 RX ADMIN — BUMETANIDE 2 MG: 1 TABLET ORAL at 12:21

## 2023-01-19 RX ADMIN — ISOSORBIDE MONONITRATE 15 MG: 30 TABLET, EXTENDED RELEASE ORAL at 09:20

## 2023-01-19 RX ADMIN — SODIUM CHLORIDE, PRESERVATIVE FREE 10 ML: 5 INJECTION INTRAVENOUS at 18:20

## 2023-01-19 RX ADMIN — METHYLPREDNISOLONE SODIUM SUCCINATE 20 MG: 40 INJECTION, POWDER, FOR SOLUTION INTRAMUSCULAR; INTRAVENOUS at 09:21

## 2023-01-19 RX ADMIN — SODIUM CHLORIDE, PRESERVATIVE FREE 10 ML: 5 INJECTION INTRAVENOUS at 05:14

## 2023-01-19 RX ADMIN — CASTOR OIL AND BALSAM, PERU: 788; 87 OINTMENT TOPICAL at 18:20

## 2023-01-19 RX ADMIN — CARVEDILOL 3.12 MG: 3.12 TABLET, FILM COATED ORAL at 09:21

## 2023-01-19 RX ADMIN — PAROXETINE HYDROCHLORIDE 20 MG: 20 TABLET, FILM COATED ORAL at 09:21

## 2023-01-19 NOTE — PROGRESS NOTES
[facility] at [address], [place], [address] [facility]  W: [phone] F: [phone]    Social visit    Patient is currently being treated by Dr. Darien Collier at [facility] for invasive ductal breast cancer. No role for systemic therapy at this time, further discussion regarding resumption of treatment will have to occur in the outpatient setting with primary oncologist pending patient progress this admission. Seen today at bedside, she appears a little bit more upbeat today. She had no questions. We will continue to follow and visit socially is no role for oncology at this time. Please call with questions and concerns.

## 2023-01-19 NOTE — PROGRESS NOTES
CM was notified this morning that patient would need new HD set up with [facility] prior to discharge. CM attempted to contact patient's sister regarding HD information, unable to receive answer and unable to leave . ARTEMIO requested that MD orders total core hep lab for new HD set up. Clinicals to be sent to [facility] Intake for new HD, permacath to be placed.       Artemio Osullivan, BERYLN, RN    Care Management  [phone]

## 2023-01-19 NOTE — PROGRESS NOTES
Cardiology Progress Note  1/19/2023     Admit Date: 1/3/2023  Admit Diagnosis: Staphylococcus aureus pneumonia ([facility] [J15.211]  CC: none currently    Cardiologist:  Dr Yonny Treadwell. Cardiac Assessment/Plan:    1) Atypical CP, for yrs: Neg [facility] 9/2021 (6:00, -CP, -ecg; Mild LVH). 2) Abnl ecg c/w LVH. *\"EF 45-50%; No valve dz:\" @ [address] by RCA. (EF not that low on my interp; EF 50-55%). 3) HTN  4) Palpitations (few x/yr; few sec). 5) FHx early CAD/CHF  6) Breast CA, left; Chemo (Dr GÉNESIS Lai @ [address] [place])    Admitted from 12/28 to 1/1/23 @ [facility] for PNA/hemoptysis/covid/sepsis/CHRISTA/NQWMI w/ trop 100s    Admitted 1/3/23 w/ dyspnea/sepsis; subsequent renal failure/hypotension/hemothorax w/ chest tube; Resp failure 1/9 w/ intubation. Type II NQWMI. Neg blood Cx. Hepatic failure. Chest tube 1/5/23. Intubated 1/9/23. Self-extubated 1/12/23. Off pressors 1/10/23. Coreg to 3.125 bid d/t ASx sinus bradycardia; add ARB if ok w/ renal; if not, hydralazine/imdur. Echo 1/10/23: Left Ventricle: Mildly reduced left ventricular systolic function with a visually estimated EF of 45 - 50%. Left ventricle size is normal. Increased wall thickness. CXR 1/13: \"The pulmonary vasculature is within normal limits. Volume loss left lung base. Mild patchy airspace opacity in the right lung base  improved. Left-sided chest tube is stable. Small left pleural effusion suspected. \"    1/17: No CP/dyspnea at rest. With sitter. BPs 120-170; HR 60-100s; sinus; 98% RA  No labs today (yesterday afternoon: cr 8; lfts 500s/300; Hg 9.6). Cardiac meds: coreg 3.125 bid; asa; bumex 2 bid;   Rec: add ARB if ok w/ renal; if not, hydralazine/imdur. 1/18: No CP/dyspnea; getting HD  BPs 120-160s; HR 60-80s; 97% RA  WBC 11; Hg 8.9; K 4.3; Cr 7; LFTs 300s/200s; alb 2.1.    1/19: No CP/dyspnea; intermittent refusal of meds; took today so far. BPs 120-160; HR 60-90s; sinus; 97% RA.   No labs    Cardiac meds: coreg 3.125 bid; asa; bumex 2 bid; hydralazine 10 bid. Imdur 15. No new cardiac recs. Uptitrate BPs meds as needed, but she needs to take consistently first.    _________________________________________________________  Echo 12/27/22: Left Ventricle: Normal left ventricular systolic function with a visually estimated EF of 50 - 55%. Left ventricle size is normal. Normal wall thickness. Mild hypokinesis of the following segments: basal inferolateral and mid inferolateral. Unfortunately, imaging quality limis the accuracy     Chest tube 1/5/23. Intubated 1/9/23. Complicated clinical course as noted below; Currently intubated/sedated  Current cardiac meds:  Levo @ 15; crestor 10 (holding coreg 6.25 bid)  IMP: type II NQWMI;      Rec: cont supportive care; wean pressors as able; hold crestor. Future aspirin when bleeding resolved. ____________________________________________________________________  ying Yared is a 57 y.o. female presents with Staphylococcus aureus pneumonia ([address] Ethyl Cane. Admitted from 12/28 to 1/1/23 @ [facility] for PNA/hemoptysis/covid/sepsis/CHRISTA/NQWMI w/ trop 100s        As noted in H&P: \"56 y.o.  female with pertinent past medical history of breast cancer currently off chemotherapy, MDD/TAIWO, hypothyroidism, GERD, and recent hospitalization from 12/28-1/1 for multifocal pneumonia secondary to MSSA pneumonia superimposed on COVID-19 pneumonia with course complicated by hemoptysis, sepsis, CHRISTA, and type II MI completed course of cefepime and doxycycline transition to Augmentin on time of discharge-patient unsure if she has been taking that who presents with complaints of persistent decline since discharge from OSH. She complains of malaise, fever/chills, cough productive of blood-tinged sputum, shortness of breath/dyspnea on exertion. She reports she does not feel she is doing well at home and think she needs to be in the hospital for further management.   She denies tobacco, alcohol, or illicit drug use. ROS otherwise negative. In the ED, patient febrile to 100.8 °F, hemodynamically stable (hypertensive 150s/60s), saturating mid 90s on room air. ECG demonstrates NSR with LVH criteria, CXR demonstrates increased left pleural effusion and left lower lobe airspace opacity and CT chest demonstrates increased moderately large left pleural effusion with unchanged patchy bilateral consolidation left greater than right compatible with pneumonia. Rapid and flu negative. Point-of-care lactic 0.7, proBNP 1091(Previously 866), high since her troponin 116 down trended to 107 (600s at prior hospitalization, sodium 142, potassium 3.4, BUN 38, creatinine 3.0 (baseline), WBC 13.3 (slightly increased from time of discharge), hemoglobin 9.1 (improved from prior), platelets 164. Given recent hospitalization and concern for hep patient given vancomycin, Levaquin, and cefepime by ED provider. \"     ICU MD 1/8/22: \"Patient being transferred to ICU as the PCU nurses do not feel comfortable keeping the patient on the floor as she was hypotensive earlier and has been refusing po meds     57year old Female with hx of left breast cancer, recently admitted to [facility] for sepsis s/s COVID-19, CHRISTA, NSTEMI, MSSA pneumonia(12/28-1/1) presented to ED with c/o shortness of breath and hemoptysis. IN ED, patient was febrile to 100.8F. CXR demonstrated increased left pleural effusion with unchanged patchy B/L consolidation L>R. Patient had IR thoracentesis on 1/4/23- bloody tap. CT chest on 1/5 showed left sided hemothorax. Hb dropped from 9.1 to 5.6. s/p left chest tube placement on 1/5/23. S/p PRBC transfusion. \"     ______________________________________________________________________     The patient is intubated; can provide no history. On accelerating levophed (15 currently); on HD. No PND, orthopnea, palpitations, pre-syncope, syncope, peripheral edema, or decrease in exercise tolerance. No current complaints.      ECG 1/3/22:   Sinus, leftward axis, no acute changes. LVH. Tele: sinus  CXR: \"1. Endotracheal tube terminates 1 cm above the marvin. Consider retraction 1 to  2 cm. 2. Appropriate positioning of left IJ central venous catheter. No pneumothorax. 3. Slight interval improvement in extensive bilateral airspace disease. 4. Left chest tube remains in place. Suspected small left pleural effusion. \"     Notable labs: K 4.5; BUN 61; Cr 7.35 (prev 3 range). Alb 1.9; LFTs 900s/2000; Prev procal 7; proBNP 9k; neg covid 1/3/23. INR 2.7.  ___________________________________________________  Notable prior cardiac history:  @ NP OV 10/27/22:  Ms Matthew Coates has a h/o:  1) Atypical CP, for yrs: Neg [facility] 9/2021 (6:00, -CP, -ecg; Mild LVH). 2) Abnl ecg c/w LVH. *\"EF 45-50%; No valve dz:\" @ [facility] by RCA. (EF not that low on my interp; EF 50-55%). 3) HTN  4) Palpitations (few x/yr; few sec). 5) FHx early CAD/CHF  6) Breast CA, left; Chemo pending (Dr Eileen Garcia @ [facility])  No ethanol, added salt, or nicotine. 1 caff johnny/d. 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 She would like an annual visit here. 8/2021:  She has atypical chest pain (for years; 0-1 X/mo; sharp sensation; few seconds; random onset/offset). She also has intermittent palpitations (fast HR sensation, few X/year; few seconds duration, not related to chest pain). No MCCALL with good exercise tolerance. No previous cardiac evaluation. PCP note from 4/28/06 reviewed; ECG from then interpreted today as noted below. 2/2022: no recent chest pain, dyspnea, or palpitations. She would like an annual visit here. 10/2022: Chest pain remains resolved. No dyspnea nor palpitations. Reportedly mild LV dysfunction as noted above; in my review of the echo, the EF is not that low. NP F/U: Doing well. No CP, MCCALL, palps, edema or syncope. Home SBPs in the 130s. IMPRESSION AND PLAN  01. Other chest pain (R07.89): Atypical chest pain remains resolved; Neg [facility] 9/2021.      We discussed the signs and symptoms of unstable angina, myocardial infarction and malignant arrhythmia. The patient knows to seek immediate medical attention should they occur. 02. Essential (primary) hypertension (I10):  Reasonably well controlled; continue Losartan 25mg QD and increase Coreg to 6.25mg BID. Continue to monitor BP.   03. Other cardiomyopathies (I42.8):  EF looks reassuring on echo per review; repeat planned for next month. 04. Abnormal electrocardiogram [ECG] [EKG] (R94.31):  Nonspecific finding. This condition is stable. 05. Malignant neoplasm of left breast in female, estrogen receptor negative, unspecified site of breast (C50.912): Managed by: Other Physician   06. Long term (current) use of aspirin (Z79.82)   07. Body mass index [BMI] 27.0-27.9, adult (Z68.27)      ORDERS:    Dietary management education, guidance, and counseling           10/27/22 MEDICATION LIST  Medication Sig Desc Non-VCS   aspirin 81 mg tablet,delayed release take 1 tablet by oral route  every day Y   carvedilol 6.25 mg tablet take 1 tablet by oral route 2 times every day with food N   Crestor take 1 Tablet by oral route  every week Y   losartan 25 mg tablet take 1 tablet by oral route  every day N   Multi Vitamin 9 mg iron/15 mL oral liquid   Y   paroxetine 20 mg tablet take 1 tablet by oral route  every day Y      ______________________________________  CARDIAC HISTORY  RISK FACTORS:  1 Hypertension         CARDIOVASCULAR PROCEDURES  Procedure Date Results   Echo 10/05/2022 \"EF 45-50%; No valve dz:\" @ [facility] by RCA. (EF not that low on my interp). EKG 10/13/2022 Sinus Rhythm, LVH; axis -15°; nonspecific T-wave changes. [facility] 10/01/2021 Normal EF, No Ischemia, 6:00, -CP, -ecg; Mild LVH. For other plans, see orders.   Hospital problem list     Active Hospital Problems    Diagnosis Date Noted    Goals of care, counseling/discussion 01/13/2023    Advanced directives, counseling/discussion 01/13/2023    Hemothorax 2023    Shortness of breath 2023    Acute blood loss anemia 2023    Hemothorax on left 2023    Anxiety 2023    Palliative care by specialist 2023    Staphylococcus aureus pneumonia ([facility] 2023        Subjective: Colt Ramirez reports       Chest pain X none  consistent with:  Non-cardiac CP         Atypical CP     None now  On going  Anginal CP     Dyspnea X none  at rest  with exertion         improved  unchanged  worse              PND X none  overnight       Orthopnea X none  improved  unchanged  worse   Presyncope X none  improved  unchanged  worse     Ambulated in hallway without symptoms   Yes   Ambulated in room without symptoms  Yes   Objective:     Physical Exam:  Overall VSSAF;  Visit Vitals  BP (!) 149/77   Pulse 87   Temp 98.2 °F (36.8 °C)   Resp 17   Ht 5' (1.524 m)   Wt 64.1 kg (141 lb 5 oz)   SpO2 97%   BMI 27.60 kg/m²     Temp (24hrs), Av.2 °F (36.8 °C), Min:98.2 °F (36.8 °C), Max:98.2 °F (36.8 °C)    Patient Vitals for the past 8 hrs:   Pulse   23 0800 87       Patient Vitals for the past 8 hrs:   Resp   23 0800 17       Patient Vitals for the past 8 hrs:   BP   23 0800 (!) 149/77         Intake/Output Summary (Last 24 hours) at 2023 1154  Last data filed at 2023 0435  Gross per 24 hour   Intake 100 ml   Output --   Net 100 ml       General Appearance: Well developed, well nourished, no acute distress. Ears/Nose/Mouth/Throat:   Normal MM; anicteric. JVP: WNL   Resp:   Lungs clear to auscultation bilaterally. Nl resp effort. Cardiovascular:  RRR, S1, S2 normal, no new murmur. No gallop or rub. Abdomen:   Soft, non-tender, bowel sounds are present. Extremities: No edema bilaterally. Skin:  Neuro: Warm and dry.   A, grossly nonfocal; calm;        cath site intact w/o hematoma or new bruit; distal pulse unchanged  Yes   Data Review:     Telemetry independently reviewed x sinus  chronic afib  parox afib  NSVT     ECG independently reviewed  NSR  afib  no significant changes  NSST-Tw chgs     x no new ECG provided for review   Lab results reviewed as noted below. Current medications reviewed as noted below. No results for input(s): PH, PCO2, PO2 in the last 72 hours. No results for input(s): CPK, CKMB, CKNDX, TROIQ in the last 72 hours.   Recent Labs     01/18/23  0804 01/16/23  1433    137   K 4.3 4.5   CL 99 98   CO2 26 25   BUN 66* 87*   CREA 7.33* 8.05*   * 194*   PHOS 6.4* 7.0*   CA 8.4* 8.6   ALB 2.1* 2.3*   WBC 11.1* 14.3*   HGB 8.9* 9.6*   HCT 26.5* 29.0*   * 126*       No results found for: CHOL, CHOLX, CHLST, CHOLV, HDL, HDLP, LDL, LDLC, DLDLP, TGLX, TRIGL, TRIGP, CHHD, CHHDX  Recent Labs     01/18/23  0804 01/16/23  1433   * 1,010*   TP 5.9* 5.9*   ALB 2.1* 2.3*   GLOB 3.8 3.6       Recent Labs     01/16/23  1433   INR 4.2*   PTP 39.8*   APTT 53.8*        No components found for: GLPOC    Current Facility-Administered Medications   Medication Dose Route Frequency    diphenhydrAMINE (BENADRYL) 12.5 mg/5 mL oral liquid 25 mg  25 mg Oral Q6H PRN    melatonin tablet 3 mg  3 mg Oral QHS PRN    guaiFENesin (ROBITUSSIN) 100 mg/5 mL oral liquid 100 mg  100 mg Oral Q4H PRN    albuterol CONCENTRATE 2.5mg/0.5 mL neb soln  1.25 mg Nebulization Q6H PRN    hydrALAZINE (APRESOLINE) tablet 10 mg  10 mg Oral BID    isosorbide mononitrate ER (IMDUR) tablet 15 mg  15 mg Oral DAILY    aspirin delayed-release tablet 81 mg  81 mg Oral DAILY    carvediloL (COREG) tablet 3.125 mg  3.125 mg Oral BID WITH MEALS    nitroglycerin (NITROBID) 2 % ointment 1 Inch  1 Inch Topical Q6H PRN    famotidine (PF) (PEPCID) 20 mg in 0.9% sodium chloride 10 mL injection  20 mg IntraVENous DAILY    bumetanide (BUMEX) tablet 2 mg  2 mg Oral BID    heparin (porcine) 1,000 unit/mL injection 1,300 Units  1,300 Units InterCATHeter DIALYSIS PRN    And    heparin (porcine) 1,000 unit/mL injection 1,300 Units  1,300 Units InterCATHeter DIALYSIS PRN    0.9% sodium chloride infusion 250 mL  250 mL IntraVENous PRN    lidocaine 4 % patch 1 Patch  1 Patch TransDERmal Q24H    glucose chewable tablet 16 g  4 Tablet Oral PRN    glucagon (GLUCAGEN) injection 1 mg  1 mg IntraMUSCular PRN    dextrose 10% infusion 0-250 mL  0-250 mL IntraVENous PRN    insulin lispro (HUMALOG) injection   SubCUTAneous Q6H    methylPREDNISolone (PF) (SOLU-MEDROL) injection 20 mg  20 mg IntraVENous Q12H    epoetin tyler-epbx (RETACRIT) injection 10,000 Units  10,000 Units SubCUTAneous Q TUE, THU & SAT    balsam peru-castor oiL (VENELEX) ointment   Topical BID    PARoxetine (PAXIL) tablet 20 mg  20 mg Per NG tube DAILY    heparin (porcine) injection 5,000 Units  5,000 Units SubCUTAneous Q12H    HYDROmorphone (DILAUDID) injection 0.5 mg  0.5 mg IntraVENous Q4H PRN    metoprolol (LOPRESSOR) injection 2.5 mg  2.5 mg IntraVENous Q6H PRN    lidocaine (XYLOCAINE) 4 % cream   Topical PRN    [Held by provider] gabapentin (NEURONTIN) capsule 100 mg  100 mg Oral TID    sodium chloride (NS) flush 5-40 mL  5-40 mL IntraVENous Q8H    sodium chloride (NS) flush 5-40 mL  5-40 mL IntraVENous PRN    acetaminophen (TYLENOL) tablet 650 mg  650 mg Oral Q6H PRN    Or    acetaminophen (TYLENOL) suppository 650 mg  650 mg Rectal Q6H PRN    polyethylene glycol (MIRALAX) packet 17 g  17 g Oral DAILY PRN    ondansetron (ZOFRAN ODT) tablet 4 mg  4 mg Oral Q8H PRN    Or    ondansetron (ZOFRAN) injection 4 mg  4 mg IntraVENous Q6H PRN        Rachael Johnson MD

## 2023-01-19 NOTE — PROGRESS NOTES
Hospitalist Progress Note    Subjective:   Daily Progress Note: 1/19/2023 3:42 PM    Hospital Course:  57year old Female with hx of left breast cancer, recently admitted to [facility] for sepsis s/s COVID-19, CHRISTA, NSTEMI, MSSA pneumonia(12/28-1/1) presented to ED with c/o shortness of breath and hemoptysis. IN ED, patient was febrile to 100.8F. CXR demonstrated increased left pleural effusion with unchanged patchy B/L consolidation L>R. Patient had IR thoracentesis on 1/4/23- bloody tap. CT chest on 1/5 showed left sided hemothorax. Hb dropped from 9.1 to 5.6. s/p left chest tube placement on 1/5/23. S/p PRBC transfusion. Txed to ICU 1/8 for hypotension requiring levophed, she was intubated on 1/9/23 for respiratory distress. Pt was started on CRRT on 1/10. Pt self extubated on 1/12/23. Now on stepdown, hemodynamically stable, remains anuric, getting HD. Non complaint with meds has been as inssue    Subjective:  No events overnight. Intermittently refusing meds and lab draws. Patient wanted solid food    Assessment / Plan:  Acute hypoxic respiratory failure  Multifocal pneumonia/keytruda pneumonitis  S/p left thoracentesis by IR on 1/4/23  S/p left chest tube placement for left sided hemothorax by IR on 1/5/23  S/p intubation 1/9, self extubated 1/12    -Suspected initial hypoxia from parapneumonic effusion, with recent history of MSSA pneumonia, nucleated cells 4400, exudative fluid. Culture was not sent!!   -As above patient developed hemothorax, requiring chest tube, eventually requiring ICU transfer for vasopressors likely from volume/blood loss. Patient required intubation on 1/9, likely from volume overload from CHRISTA, requiring CRRT on 1/10, now switched to HD and patient extubated on 1/12.  -Chest tube has been removed 1/13  -Currently on 2 L nasal cannula    -Culture from the [address] [place] [address] Ave on 1/11, showed some Candida, no bacteria.   Likely colonization  -Patient has been on multiple antibiotics since day 1, recently on Zosyn. 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 Bilirubin elevated, but usually it lag behind clinical improvement.  -Ammonia normal  -Ultrasound abdomen okay  -Patient refused labs today, recheck tomorrow      Acute blood loss anemia:  Anemia of chronic disease  S/s left hemothorax  S/p PRBC transfusion on 1/5/23. Hb post transfusion stable  Will monitor cbc and transfuse if Hb<7  Received PRBC    NSTEMI:  Sinus jose 1/16  Likely type II MI  Cards on board  -Decreased Coreg, monitor on telemetry        Invasive ductal breast cancer:  Appreciate oncology consult  Appreciate palliative care consult, following      Nutrition- currently CLD, advance diet to solid/easy to chew    Underlying depression/?   psych consult        Body mass index is 27.6 kg/m².     Code status: Full  Prophylaxis: SCD's  Recommended Disposition:  PT, OT, RN  ALEXANDRA>48hours  Barrier: clinical improvement, nephrology consult   Need SNF on DC       Current Facility-Administered Medications   Medication Dose Route Frequency    diphenhydrAMINE (BENADRYL) 12.5 mg/5 mL oral liquid 25 mg  25 mg Oral Q6H PRN    melatonin tablet 3 mg  3 mg Oral QHS PRN    guaiFENesin (ROBITUSSIN) 100 mg/5 mL oral liquid 100 mg  100 mg Oral Q4H PRN    albuterol CONCENTRATE 2.5mg/0.5 mL neb soln  1.25 mg Nebulization Q6H PRN    hydrALAZINE (APRESOLINE) tablet 10 mg  10 mg Oral BID    isosorbide mononitrate ER (IMDUR) tablet 15 mg  15 mg Oral DAILY    aspirin delayed-release tablet 81 mg  81 mg Oral DAILY    carvediloL (COREG) tablet 3.125 mg  3.125 mg Oral BID WITH MEALS    nitroglycerin (NITROBID) 2 % ointment 1 Inch  1 Inch Topical Q6H PRN    famotidine (PF) (PEPCID) 20 mg in 0.9% sodium chloride 10 mL injection  20 mg IntraVENous DAILY    bumetanide (BUMEX) tablet 2 mg  2 mg Oral BID    heparin (porcine) 1,000 unit/mL injection 1,300 Units  1,300 Units InterCATHeter DIALYSIS PRN    And    heparin (porcine) 1,000 unit/mL injection 1,300 Units  1,300 Units InterCATHeter DIALYSIS PRN    0.9% sodium chloride infusion 250 mL  250 mL IntraVENous PRN    lidocaine 4 % patch 1 Patch  1 Patch TransDERmal Q24H    glucose chewable tablet 16 g  4 Tablet Oral PRN    glucagon (GLUCAGEN) injection 1 mg  1 mg IntraMUSCular PRN    dextrose 10% infusion 0-250 mL  0-250 mL IntraVENous PRN    insulin lispro (HUMALOG) injection   SubCUTAneous Q6H    methylPREDNISolone (PF) (SOLU-MEDROL) injection 20 mg  20 mg IntraVENous Q12H    epoetin tyler-epbx (RETACRIT) injection 10,000 Units  10,000 Units SubCUTAneous Q TUE, THU & SAT    balsam peru-castor oiL (VENELEX) ointment   Topical BID    PARoxetine (PAXIL) tablet 20 mg  20 mg Per NG tube DAILY    heparin (porcine) injection 5,000 Units  5,000 Units SubCUTAneous Q12H    HYDROmorphone (DILAUDID) injection 0.5 mg  0.5 mg IntraVENous Q4H PRN    metoprolol (LOPRESSOR) injection 2.5 mg  2.5 mg IntraVENous Q6H PRN    lidocaine (XYLOCAINE) 4 % cream   Topical PRN    [Held by provider] gabapentin (NEURONTIN) capsule 100 mg  100 mg Oral TID    sodium chloride (NS) flush 5-40 mL  5-40 mL IntraVENous Q8H    sodium chloride (NS) flush 5-40 mL  5-40 mL IntraVENous PRN    acetaminophen (TYLENOL) tablet 650 mg  650 mg Oral Q6H PRN    Or    acetaminophen (TYLENOL) suppository 650 mg  650 mg Rectal Q6H PRN    polyethylene glycol (MIRALAX) packet 17 g  17 g Oral DAILY PRN    ondansetron (ZOFRAN ODT) tablet 4 mg  4 mg Oral Q8H PRN    Or    ondansetron (ZOFRAN) injection 4 mg  4 mg IntraVENous Q6H PRN        Review of Systems  Constitutional: No fevers, No chills, No sweats, No fatigue, No Weakness  Eyes: No redness  Ears, nose, mouth, throat, and face: No nasal congestion, No sore throat, No voice change  Respiratory: No Shortness of Breath, No cough, No wheezing  Cardiovascular: has chest pain at the site of pleural catheter, No palpitations, No extremity edema  Gastrointestinal: No nausea, No vomiting, No diarrhea, No abdominal pain  Genitourinary: No frequency, No dysuria, No hematuria  Integument/breast: No skin lesion(s)   Neurological: No Confusion, No headaches, No dizziness      Objective:     Visit Vitals  /82   Pulse 95   Temp 98.2 °F (36.8 °C)   Resp 18   Ht 5' (1.524 m)   Wt 64.1 kg (141 lb 5 oz)   SpO2 97%   BMI 27.60 kg/m²    O2 Flow Rate (L/min): 2 l/min O2 Device: None (Room air)    Temp (24hrs), Av.2 °F (36.8 °C), Min:98.2 °F (36.8 °C), Max:98.2 °F (36.8 °C)      No intake/output data recorded.  1901 -  0700  In: 100 [P.O.:100]  Out: 1000     PHYSICAL EXAM:  Constitutional: No acute distress  Skin: Extremities and face reveal no rashes. HEENT: Sclerae anicteric. Extra-occular muscles are intact. No oral ulcers. The neck is supple and no masses. Cardiovascular: Regular rate and rhythm. Respiratory:  decrease breath sound on left side. Left chest tube in place. GI: Abdomen nondistended, soft, and nontender. Normal active bowel sounds. Musculoskeletal: No pitting edema of the lower legs. Able to move all ext  Neurological:  Patient is alert and oriented. Cranial nerves II-XII grossly intact  Psychiatric: Mood appears appropriate       Data Review    No results found for this or any previous visit (from the past 24 hour(s)). Total critical care time spent 50 minutes involving direct patient care as well as reviewing patient's labs and coordination of care with nursing staff     Care Plan discussed with: Patient/Family/RN/Case Management        Total time spent with patient: 35minutes.

## 2023-01-19 NOTE — PROGRESS NOTES
Problem: Mobility Impaired (Adult and Pediatric)  Goal: *Acute Goals and Plan of Care (Insert Text)  Description: FUNCTIONAL STATUS PRIOR TO ADMISSION: Patient was independent and active without use of DME.    HOME SUPPORT PRIOR TO ADMISSION: The patient lived alone with no local support. Physical Therapy Goals  Reviewed 1/21/2023, continue with current goals  Initiated 1/14/2023  1. Patient will move from supine to sit and sit to supine, scoot up and down, and roll side to side in bed with moderate assist of 1 person within 7 day(s). 2.  Patient will sit edge of bed with stand-by-assist for 5 minutes within 7 day(s). 3.  Patient will transfer from bed to chair and chair to bed with moderate assist using the least restrictive device within 7 day(s). 4.  Patient will perform sit to stand with moderate assistance/contact guard assist within 7 day(s). 5.  Patient will ambulate with moderate assist for 5 feet with the least restrictive device within 7 day(s). Outcome: Progressing Towards Goal   PHYSICAL THERAPY TREATMENT: WEEKLY REASSESSMENT  Patient: Conor Cordero (54 y.o. female)  Date: 1/19/2023  Primary Diagnosis[facility] St[facility] [J15.211]       Precautions:          ASSESSMENT  Patient continues with skilled PT services and is progressing slowly towards goals. Pt in bed, agreeable to PT session stating she has been wanting to get up all day, but they wouldn't let her. Pt appears confused, but able to follow commands. She also reports that she had more energy this am and is tired now. She required mod/max x 2 and extra time to move to sitting eob. Once seated she participated in BLE exercises. Pt also required mod to max A x 2 to transfer to the bedside chair. Once seated she stated that she did not want to sit up in recliner and have nursing get her back to bed and requested to return to bed. Rehab remains appropriate at discharge.       Patient's progression toward goals since last assessment: less physical assistance, able to transfer to chair     Current Level of Function Impacting Discharge (mobility/balance): mod/max a x 2. Other factors to consider for discharge: lives alone, functioning below baseline         PLAN :  Goals have been updated based on progression since last assessment. Patient continues to benefit from skilled intervention to address the above impairments. Recommendations and Planned Interventions: bed mobility training, transfer training, gait training, therapeutic exercises, patient and family training/education, and therapeutic activities      Frequency/Duration: Patient will be followed by physical therapy:  3 times a week to address goals. Recommendation for discharge: (in order for the patient to meet his/her long term goals)  Therapy up to 5 days/week in SNF setting    This discharge recommendation:  Has been made in collaboration with the attending provider and/or case management    IF patient discharges home will need the following DME: to be determined (TBD)         SUBJECTIVE:   Patient stated my legs hurt because no one has touched them.     OBJECTIVE DATA SUMMARY:   HISTORY:    Past Medical History:   Diagnosis Date    Cancer ([facility]     BREAST CANCER    GERD (gastroesophageal reflux disease)     HTN (hypertension) 07/18/2011    Psychiatric disorder     ANIXETY AND DEPRESSION    Thyroid disease     HYPOTHYROID     Past Surgical History:   Procedure Laterality Date    HX COLONOSCOPY      HX MYOMECTOMY  05/02/1996    x 1    HX WISDOM TEETH EXTRACTION      IR INSERT NON TUNL CVC OVER 5 YRS  1/9/2023    IR THORACENTESIS/INSERT CHEST TUBE  1/5/2023       Personal factors and/or comorbidities impacting plan of care: confusion    Home Situation  Home Environment: Private residence  # Steps to Enter: 5  Rails to Enter: Yes  Hand Rails : Bilateral (wide)  One/Two Story Residence: Two story  # of Interior Steps: [address]: Left  Lift Chair Available: No  Living Alone: Yes  Support Systems: Other Family Member(s)  Patient Expects to be Discharged to[de-identified] Skilled nursing facility  Current DME Used/Available at Home: Other (comment)  Tub or Shower Type: Tub    EXAMINATION/PRESENTATION/DECISION MAKING:   Critical Behavior:  Neurologic State: Alert, Confused  Orientation Level: Oriented to person  Cognition: Impaired decision making, Follows commands  Safety/Judgement: Lack of insight into deficits  Hearing: Auditory  Auditory Impairment: None     Functional Mobility:  Bed Mobility:  Rolling: Moderate assistance;Maximum assistance;Assist x2  Supine to Sit: Maximum assistance; Moderate assistance;Assist x2  Sit to Supine: Moderate assistance;Assist x2  Scooting: Total assistance;Assist x2  Transfers:  Sit to Stand: Moderate assistance;Maximum assistance;Assist x2  Stand to Sit: Maximum assistance; Moderate assistance;Assist x2        Bed to Chair: Maximum assistance; Moderate assistance;Assist x2         Balance:   Sitting: Impaired; Without support  Sitting - Static: Good (unsupported)  Sitting - Dynamic: Fair (occasional)  Standing: Impaired; With support  Standing - Static: Poor;Constant support  Standing - Dynamic : Poor;Constant support          Activity Tolerance:   Fair, limited by pain and weakness    After treatment patient left in no apparent distress:   Patient positioned in R sidelying for pressure relief, Call bell within reach, and Side rails x 3    COMMUNICATION/EDUCATION:   The patients plan of care was discussed with: Occupational therapist and Registered nurse. Fall prevention education was provided and the patient/caregiver indicated understanding., Patient/family have participated as able in goal setting and plan of care. , and Patient/family agree to work toward stated goals and plan of care.     Thank you for this referral.  Imani Castellanos, PT   Time Calculation: 32 mins

## 2023-01-19 NOTE — PROGRESS NOTES
Problem: Self Care Deficits Care Plan (Adult)  Goal: *Acute Goals and Plan of Care (Insert Text)  Description: FUNCTIONAL STATUS PRIOR TO ADMISSION: Patient was independent and active without use of DME.     HOME SUPPORT: The patient lived alone with no local support. Goals revised 1/14/2023  1. Pt will perform grooming seated edge of bed with min A within 7 hernandez. 2. Pt will sit at edge of bed for 8 minutes without LOB during ADLs with supervision within 7 days. 3. Pt will complete UE ADLs with supervision within 7 days. 4. Pt will complete LE ADLs with mod A within 7 days  5. Pt will complete functional transfers with mod A within 7 days  6. Pt will participate in UE exercises for 10 minutes with supervision within 7 days. Occupational Therapy Goals  Initiated 1/6/2023  1. Patient will perform grooming at the sink with supervision/set-up within 7 day(s). 2.  Patient will perform bathing at the sink with minimal assistance/contact guard assist within 7 day(s). 3.  Patient will perform lower body dressing with moderate assistance  within 7 day(s). 4.  Patient will perform toilet transfers with supervision/set-up within 7 day(s). 5.  Patient will perform all aspects of toileting with supervision/set-up within 7 day(s). 6.  Patient will participate in upper extremity therapeutic exercise/activities with supervision/set-up for 5 minutes within 7 day(s). 7.  Patient will utilize energy conservation techniques during functional activities with verbal cues within 7 day(s). Weekly reassess, 1/19/23  1. Pt will perform grooming seated edge of bed with setup within 7 days. 2. Pt will sit at edge of bed for 8 minutes without LOB during ADLs with supervision within 7 days. 3. Pt will complete UE ADLs with supervision within 7 days. 4. Pt will complete LB ADLs with max assist in standing within 7 days  5. Pt will complete functional transfers with mod A of 2 within 7 days  6.  Pt will participate in UE exercises for 10 minutes with supervision within 7 days. Outcome: Progressing Towards Goal   OCCUPATIONAL THERAPY TREATMENT/WEEKLY RE-EVALUATION  Patient: Conor Cordero (19 y.o. female)  Date: 1/19/2023  Diagnosis: Staphylococcus aureus pneumonia [facility] [J15.211] <principal problem not specified>      Precautions:    Chart, occupational therapy assessment, plan of care, and goals were reviewed. ASSESSMENT  Based on the objective data described below, pt was supine in bed and appears confused but stating that she wanted to get up earlier this am and now she is tired. She did work with OT and PT, and was able to come supine to sit with mod to max of 2 with extra time . Pt was max to mod or 2 to stand and transfer to recliner with no AD. Pt stated that she did not want to sit up in recliner and have nursing get her back to bed and asked to get back to bed at that time. She was mod to max of 2 for standing and transfer back to bed. Pt was left in side lying in bed with call bell. Pt continues to state that her legs hurt because no one has touched her legs in a long while. Current Level of Function Impacting Discharge (ADLs): max for LB ADLs and min to CGA/setup for UB ADLs, GW and decreased cognition              PLAN :  Goals have been updated based on progression since last assessment. Patient continues to benefit from skilled intervention to address the above impairments. Continue to follow patient 3 times a week to address goals.     Recommend with staff: HOB elevated for meals     Recommend next OT session: work on functional [facility]    Recommendation for discharge: (in order for the patient to meet his/her long term goals)  Therapy up to 5 days/week in SNF setting    This discharge recommendation:  Has been made in collaboration with the attending provider and/or case management    Equipment recommendations for successful discharge (if) home: orthotic device and tbd SUBJECTIVE:   Patient stated I dont want them to get me back to bed. I want to get back to bed now.     OBJECTIVE DATA SUMMARY:   Cognitive/Behavioral Status:  Neurologic State: Alert;Confused  Orientation Level: Oriented to person  Cognition: Impaired decision making; Follows commands  Perception: Appears intact  Perseveration: No perseveration noted  Safety/Judgement: Lack of insight into deficits    Functional Mobility and Transfers for ADLs:  Bed Mobility:  Rolling: Moderate assistance;Maximum assistance;Assist x2  Supine to Sit: Maximum assistance; Moderate assistance;Assist x2  Sit to Supine: Moderate assistance;Assist x2  Scooting: Total assistance;Assist x2    Transfers:  Sit to Stand: Moderate assistance;Maximum assistance;Assist x2     Bed to Chair: Maximum assistance; Moderate assistance;Assist x2    Balance:  Sitting: Impaired; Without support  Sitting - Static: Good (unsupported)  Sitting - Dynamic: Fair (occasional)  Standing: Impaired; With support  Standing - Static: Poor;Constant support  Standing - Dynamic : Poor;Constant support    ADL Intervention:  Feeding  Feeding Assistance: Contact guard assistance;Set-up    Grooming  Position Performed: Long sitting on bed  Washing Face: Contact guard assistance;Set-up  Washing Hands: Contact guard assistance;Set-up  Brushing Teeth: Minimum assistance;Contact guard assistance    Upper Body Bathing  Bathing Assistance: Minimum assistance; Moderate assistance  Position Performed: Long sitting on bed    Lower Body Bathing  Bathing Assistance: Total assistance(dependent)  Perineal  : Total assistance (dependent)  Position Performed: Supine              Toileting  Toileting Assistance: Total assistance(dependent); Maximum assistance  Bladder Hygiene: Total assistance (dependent); Maximum assistance  Bowel Hygiene: Total assistance (dependent); Maximum assistance    Cognitive Retraining  Orientation Retraining: Situation  Safety/Judgement: Lack of insight into deficits    Pain:  Pt stated she had pain in legs but did not rate     Activity Tolerance:   Fair    After treatment patient left in no apparent distress:   Supine in bed, Call bell within reach, and Bed / chair alarm activated    COMMUNICATION/COLLABORATION:   The patients plan of care was discussed with: Physical Therapist and Registered Nurse    Javier Orona OT  Time Calculation: 31 mins

## 2023-01-19 NOTE — PROGRESS NOTES
Nephrology Progress Note  [facility], Griselda Poles [place], [address]  Phone - [phone]  Fax - [phone]                 Patient: Layla Vann                   YOB: 1960        Date- 1/19/2023                      Admit Date: 1/3/2023  CC: Follow up for christa  IMPRESSION & PLAN:   1. CHRISTA due to acute tubular necrosis, serology workup  overall negative. 2.  Hypophosphatemia  3. Left pneumothorax requiring chest tube placement. 4.  Pleural effusion. 5.  Anemia requiring blood transfusion. 6.  History of breast cancer. 7.  Recent history of COVID-19 infection. 8.  Multifocal pneumonia.- RESP FAILURE on vent  9. Status post left thoracocentesis. 10.  Non-ST elevation myocardial infarction. PLAN-  NO HD TODAY  Continue hd mwf schedule  She will also need a permacath placement on Friday  Set up out pt hd unit  Epogen for anemia     Subjective:  Seen and examined today  Bp stable  No sob  Objective:   Vitals:    01/18/23 2329 01/19/23 0308 01/19/23 0316 01/19/23 0800   BP: (!) 145/80  (!) 166/89 (!) 149/77   Pulse: 92  97 87   Resp: 16  16 17   Temp:       TempSrc:       SpO2:   97%    Weight:  64.1 kg (141 lb 5 oz)     Height:          01/18 0701 - 01/19 0700  In: 100 [P.O.:100]  Out: 1000     Last 3 Recorded Weights in this Encounter    01/17/23 0641 01/18/23 0337 01/19/23 0308   Weight: 64.1 kg (141 lb 5 oz) 64.7 kg (142 lb 10.2 oz) 64.1 kg (141 lb 5 oz)        Physical exam:  GEN: nad  NECK:  no mass, jose alberto cath +  RESP: decreased BS  b/l, no  wheezing,   CVS: RRR,s1,s2  NEURO: non focal  PSYCH: Can't assess due to patient's current condition           Chart reviewed. Pertinent Notes reviewed.      Data Review :  Recent Labs     01/18/23  0804 01/16/23  1433    137   K 4.3 4.5   CL 99 98   CO2 26 25   BUN 66* 87*   CREA 7.33* 8.05*   * 194*   CA 8.4* 8.6   MG 2.8* 2.9* PHOS 6.4* 7.0*       Recent Labs     01/18/23  0804 01/16/23  1433   WBC 11.1* 14.3*   HGB 8.9* 9.6*   HCT 26.5* 29.0*   * 126*       No results for input(s): FE, TIBC, PSAT, FERR in the last 72 hours.      No results found for: HBA1C, BPD9ASCV, LEJ6GISL   No results found for: MCACR, MCA1, MCA2, MCA3, MCAU, MCAU2, MCALPOCT  US Results (most recent):  Medication list  reviewed    Current Facility-Administered Medications   Medication Dose Route Frequency    diphenhydrAMINE (BENADRYL) 12.5 mg/5 mL oral liquid 25 mg  25 mg Oral Q6H PRN    melatonin tablet 3 mg  3 mg Oral QHS PRN    guaiFENesin (ROBITUSSIN) 100 mg/5 mL oral liquid 100 mg  100 mg Oral Q4H PRN    albuterol CONCENTRATE 2.5mg/0.5 mL neb soln  1.25 mg Nebulization Q6H PRN    hydrALAZINE (APRESOLINE) tablet 10 mg  10 mg Oral BID    isosorbide mononitrate ER (IMDUR) tablet 15 mg  15 mg Oral DAILY    aspirin delayed-release tablet 81 mg  81 mg Oral DAILY    carvediloL (COREG) tablet 3.125 mg  3.125 mg Oral BID WITH MEALS    nitroglycerin (NITROBID) 2 % ointment 1 Inch  1 Inch Topical Q6H PRN    famotidine (PF) (PEPCID) 20 mg in 0.9% sodium chloride 10 mL injection  20 mg IntraVENous DAILY    bumetanide (BUMEX) tablet 2 mg  2 mg Oral BID    heparin (porcine) 1,000 unit/mL injection 1,300 Units  1,300 Units InterCATHeter DIALYSIS PRN    And    heparin (porcine) 1,000 unit/mL injection 1,300 Units  1,300 Units InterCATHeter DIALYSIS PRN    0.9% sodium chloride infusion 250 mL  250 mL IntraVENous PRN    lidocaine 4 % patch 1 Patch  1 Patch TransDERmal Q24H    glucose chewable tablet 16 g  4 Tablet Oral PRN    glucagon (GLUCAGEN) injection 1 mg  1 mg IntraMUSCular PRN    dextrose 10% infusion 0-250 mL  0-250 mL IntraVENous PRN    insulin lispro (HUMALOG) injection   SubCUTAneous Q6H    methylPREDNISolone (PF) (SOLU-MEDROL) injection 20 mg  20 mg IntraVENous Q12H    epoetin tyler-epbx (RETACRIT) injection 10,000 Units  10,000 Units SubCUTAneous Q TUE, THU & SAT    balsam peru-castor oiL (VENELEX) ointment   Topical BID    PARoxetine (PAXIL) tablet 20 mg  20 mg Per NG tube DAILY    heparin (porcine) injection 5,000 Units  5,000 Units SubCUTAneous Q12H    HYDROmorphone (DILAUDID) injection 0.5 mg  0.5 mg IntraVENous Q4H PRN    metoprolol (LOPRESSOR) injection 2.5 mg  2.5 mg IntraVENous Q6H PRN    lidocaine (XYLOCAINE) 4 % cream   Topical PRN    [Held by provider] gabapentin (NEURONTIN) capsule 100 mg  100 mg Oral TID    sodium chloride (NS) flush 5-40 mL  5-40 mL IntraVENous Q8H    sodium chloride (NS) flush 5-40 mL  5-40 mL IntraVENous PRN    acetaminophen (TYLENOL) tablet 650 mg  650 mg Oral Q6H PRN    Or    acetaminophen (TYLENOL) suppository 650 mg  650 mg Rectal Q6H PRN    polyethylene glycol (MIRALAX) packet 17 g  17 g Oral DAILY PRN    ondansetron (ZOFRAN ODT) tablet 4 mg  4 mg Oral Q8H PRN    Or    ondansetron (ZOFRAN) injection 4 mg  4 mg IntraVENous Q6H PRN        Lela Oliver MD  1/19/2023

## 2023-01-19 NOTE — PROGRESS NOTES
Kout Telesitter Monitor initiated on 1/19/2023 at 1500 for the following reason(s): Fall Prevention. Patient educated on use of camera and is in agreement. If patient unable to verbalize understanding of camera necessity, the responsible party notified and educated: N/A - Pt consented to use of Telesitter.

## 2023-01-19 NOTE — PROGRESS NOTES
Problem: Falls - Risk of  Goal: *Absence of Falls  Description: Document Adriana Grimes Fall Risk and appropriate interventions in the flowsheet.   Outcome: Progressing Towards Goal  Note: Fall Risk Interventions:  Mobility Interventions: Assess mobility with egress test, Bed/chair exit alarm    Mentation Interventions: Adequate sleep, hydration, pain control, Bed/chair exit alarm    Medication Interventions: Assess postural VS orthostatic hypotension, Bed/chair exit alarm    Elimination Interventions: Bed/chair exit alarm, Call light in reach

## 2023-01-20 LAB
COMMENT, HOLDF: NORMAL
ERYTHROCYTE [DISTWIDTH] IN BLOOD BY AUTOMATED COUNT: 21.6 % (ref 11.5–14.5)
GLUCOSE BLD STRIP.AUTO-MCNC: 115 MG/DL (ref 65–117)
HCT VFR BLD AUTO: 30.8 % (ref 35–47)
HGB BLD-MCNC: 10.2 G/DL (ref 11.5–16)
MAGNESIUM SERPL-MCNC: 2.7 MG/DL (ref 1.6–2.4)
MCH RBC QN AUTO: 30.1 PG (ref 26–34)
MCHC RBC AUTO-ENTMCNC: 33.1 G/DL (ref 30–36.5)
MCV RBC AUTO: 90.9 FL (ref 80–99)
NRBC # BLD: 0.08 K/UL (ref 0–0.01)
NRBC BLD-RTO: 0.8 PER 100 WBC
PHOSPHATE SERPL-MCNC: 9.9 MG/DL (ref 2.6–4.7)
PLATELET # BLD AUTO: 185 K/UL (ref 150–400)
PMV BLD AUTO: 12.1 FL (ref 8.9–12.9)
RBC # BLD AUTO: 3.39 M/UL (ref 3.8–5.2)
SAMPLES BEING HELD,HOLD: NORMAL
SERVICE CMNT-IMP: NORMAL
WBC # BLD AUTO: 9.9 K/UL (ref 3.6–11)

## 2023-01-20 PROCEDURE — 65660000001 HC RM ICU INTERMED STEPDOWN

## 2023-01-20 PROCEDURE — 82962 GLUCOSE BLOOD TEST: CPT

## 2023-01-20 PROCEDURE — 94760 N-INVAS EAR/PLS OXIMETRY 1: CPT

## 2023-01-20 PROCEDURE — 74011000250 HC RX REV CODE- 250: Performed by: STUDENT IN AN ORGANIZED HEALTH CARE EDUCATION/TRAINING PROGRAM

## 2023-01-20 PROCEDURE — 83735 ASSAY OF MAGNESIUM: CPT

## 2023-01-20 PROCEDURE — 84100 ASSAY OF PHOSPHORUS: CPT

## 2023-01-20 PROCEDURE — 74011250636 HC RX REV CODE- 250/636: Performed by: INTERNAL MEDICINE

## 2023-01-20 PROCEDURE — 90935 HEMODIALYSIS ONE EVALUATION: CPT

## 2023-01-20 PROCEDURE — 85027 COMPLETE CBC AUTOMATED: CPT

## 2023-01-20 PROCEDURE — 36415 COLL VENOUS BLD VENIPUNCTURE: CPT

## 2023-01-20 PROCEDURE — 74011250636 HC RX REV CODE- 250/636: Performed by: ANESTHESIOLOGY

## 2023-01-20 RX ORDER — HYDRALAZINE HYDROCHLORIDE 25 MG/1
25 TABLET, FILM COATED ORAL 2 TIMES DAILY
Status: DISCONTINUED | OUTPATIENT
Start: 2023-01-20 | End: 2023-01-24 | Stop reason: HOSPADM

## 2023-01-20 RX ORDER — ISOSORBIDE MONONITRATE 30 MG/1
30 TABLET, EXTENDED RELEASE ORAL DAILY
Status: DISCONTINUED | OUTPATIENT
Start: 2023-01-20 | End: 2023-01-24 | Stop reason: HOSPADM

## 2023-01-20 RX ADMIN — SODIUM CHLORIDE, PRESERVATIVE FREE 10 ML: 5 INJECTION INTRAVENOUS at 05:44

## 2023-01-20 RX ADMIN — HEPARIN SODIUM 1300 UNITS: 1000 INJECTION INTRAVENOUS; SUBCUTANEOUS at 16:20

## 2023-01-20 RX ADMIN — METHYLPREDNISOLONE SODIUM SUCCINATE 20 MG: 40 INJECTION, POWDER, FOR SOLUTION INTRAMUSCULAR; INTRAVENOUS at 09:12

## 2023-01-20 RX ADMIN — METHYLPREDNISOLONE SODIUM SUCCINATE 20 MG: 40 INJECTION, POWDER, FOR SOLUTION INTRAMUSCULAR; INTRAVENOUS at 22:11

## 2023-01-20 RX ADMIN — SODIUM CHLORIDE, PRESERVATIVE FREE 10 ML: 5 INJECTION INTRAVENOUS at 22:10

## 2023-01-20 NOTE — PROGRESS NOTES
Transition of Care Plan:      RUR: 22% (high)  Disposition: SNF - [facility] and Rehab and OP HD Jake Maldonado M/W/F  If SNF or IPR: Date FOC offered: 1/15/23  Date [address] received: 1/17/23  Date authorization started with reference number: 1/17/2023, [id number]. Date authorization received and expires: 1/18/2023 - 1/20/2023  Accepting facility: [facility]. Follow up appointments: defer to SNF  DME needed: defer to SNF. Transportation at Discharge: Diveboarder transport is needed. Keys or means to access home:  Facility to provide. IM Medicare Letter: N/A Commercial Insurance. Is patient a  and connected with the [place]? N/A  If yes, was [place] transfer form completed and VA notified? No.  Caregiver Contact: Sister Tom Gonzalez - [phone]  Discharge Caregiver contacted prior to discharge? CM to contact. Care Conference needed?: No.    CM received VM from [facility] admissions regarding [facility] needing patient's hepatitis panel and TB test.  Both hep panel and chest xray was sent yesterday, information faxed again to [facility] Intake and [facility] alternate fax number: [phone]. Reji Damon will provide chair time once patient is approved (ext. [id number]). Patient expected to be accepted at [facility] M/W/F 2nd shift (10:00 am to 11:45 am). Michael liaison confirmed transportation would be about $225 per week for a wheelchair van and $1000 per week for stretcher if [facility] is to set up transport, expenses to be paid by patient/family out of pocket. CM notified liaison to confirm how long authorization will be good for. Reena Arce at [facility] contact CM to say patient has been accepted into their facility and she is working on chair time. Vanessa with [facility] admissions said [facility], 2nd shift. First treatment on 1/25/2023, needs to be there at 10:30 am and 11:00 am at all appointments after that.     Patient needs permacath placed.       Cuco Trotter, BERYLN, RN    Care Management  [phone]

## 2023-01-20 NOTE — ROUTINE PROCESS
0800- Report per [facility] for care. Pt alert but agitated. Doesn't want nursing staff to touch her. Paranoid of care. Sitter at bedside. Refusing PO intake. 1730- Pt on bedpan. 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 Pericare given. Venelex cream applied to buttocks and heels. Wound care consult ordered. Turned and positioned , pericare and bath given. Foam pad to sacrum. Pt more cooperative with staff. 1730- Report to  nurse for care.  Nurse and pt aware that she is to be NPO for permcath placement in AM.

## 2023-01-20 NOTE — PROGRESS NOTES
Problem: Falls - Risk of  Goal: *Absence of Falls  Description: Document Salvatore Peguero Fall Risk and appropriate interventions in the flowsheet. Outcome: Progressing Towards Goal  Note: Fall Risk Interventions:  Mobility Interventions: Bed/chair exit alarm, Patient to call before getting OOB    Mentation Interventions: Bed/chair exit alarm, Family/sitter at bedside    Medication Interventions: Patient to call before getting OOB, Teach patient to arise slowly    Elimination Interventions: Call light in reach, Patient to call for help with toileting needs              Problem: Patient Education: Go to Patient Education Activity  Goal: Patient/Family Education  Outcome: Progressing Towards Goal     Problem: Pressure Injury - Risk of  Goal: *Prevention of pressure injury  Description: Document William Scale and appropriate interventions in the flowsheet. Outcome: Progressing Towards Goal  Note: Pressure Injury Interventions:  Sensory Interventions: Assess changes in LOC, Float heels, Keep linens dry and wrinkle-free    Moisture Interventions: Minimize layers, Moisture barrier    Activity Interventions: Increase time out of bed    Mobility Interventions: Turn and reposition approx.  every two hours(pillow and wedges), Float heels    Nutrition Interventions: Document food/fluid/supplement intake, Offer support with meals,snacks and hydration    Friction and Shear Interventions: Apply protective barrier, creams and emollients, Feet elevated on foot rest, Minimize layers

## 2023-01-20 NOTE — PROGRESS NOTES
Nephrology Progress Note  [facility] [address], Lorena Maldonado, [address]  Phone - [phone]  Fax - [phone]                 Patient: Tammy Benson                   YOB: 1960        Date- 1/20/2023                      Admit Date: 1/3/2023  CC: Follow up for christa  IMPRESSION & PLAN:   1. CHRISTA due to acute tubular necrosis, serology workup  overall negative. 2.  Hypophosphatemia  3. Left pneumothorax requiring chest tube placement. 4.  Pleural effusion. 5.  Anemia requiring blood transfusion. 6.  History of breast cancer. 7.  Recent history of COVID-19 infection. 8.  Multifocal pneumonia.- RESP FAILURE on vent  9. Status post left thoracocentesis. 10.  Non-ST elevation myocardial infarction. PLAN-  Plan for hemodialysis today  She will also get permacath placement by IR today   working for outpatient placement for hemodialysis. Next HD will be on Monday     Subjective:  Seen and examined today  Confused this morning  Objective:   Vitals:    01/20/23 0530 01/20/23 0735 01/20/23 0837 01/20/23 1038   BP: (!) 163/90 (!) 151/95  (!) 144/91   Pulse: 77 (!) 59  76   Resp: 18   18   Temp: 98.1 °F (36.7 °C)   98.1 °F (36.7 °C)   TempSrc:       SpO2:       Weight:   64.1 kg (141 lb 5 oz)    Height:          No intake/output data recorded. Last 3 Recorded Weights in this Encounter    01/18/23 0337 01/19/23 0308 01/20/23 0837   Weight: 64.7 kg (142 lb 10.2 oz) 64.1 kg (141 lb 5 oz) 64.1 kg (141 lb 5 oz)        Physical exam:  GEN: nad  NECK:  no mass, jose alberto cath +  RESP: decreased BS  b/l, no  wheezing,   CVS: RRR,s1,s2  NEURO: non focal  PSYCH: Can't assess due to patient's current condition           Chart reviewed. Pertinent Notes reviewed.      Data Review :  Recent Labs     01/18/23  0804      K 4.3   CL 99   CO2 26   BUN 66*   CREA 7.33*   *   CA 8.4*   MG 2.8* PHOS 6.4*       Recent Labs     01/18/23  0804   WBC 11.1*   HGB 8.9*   HCT 26.5*   *       No results for input(s): FE, TIBC, PSAT, FERR in the last 72 hours.      No results found for: HBA1C, XUJ7GTIM, ZNO1ZMFS   No results found for: MCACR, MCA1, MCA2, MCA3, MCAU, MCAU2, MCALPOCT  US Results (most recent):  Medication list  reviewed    Current Facility-Administered Medications   Medication Dose Route Frequency    isosorbide mononitrate ER (IMDUR) tablet 30 mg  30 mg Oral DAILY    hydrALAZINE (APRESOLINE) tablet 25 mg  25 mg Oral BID    balsam peru-castor oiL (VENELEX) ointment   Topical BID    diphenhydrAMINE (BENADRYL) 12.5 mg/5 mL oral liquid 25 mg  25 mg Oral Q6H PRN    melatonin tablet 3 mg  3 mg Oral QHS PRN    guaiFENesin (ROBITUSSIN) 100 mg/5 mL oral liquid 100 mg  100 mg Oral Q4H PRN    albuterol CONCENTRATE 2.5mg/0.5 mL neb soln  1.25 mg Nebulization Q6H PRN    aspirin delayed-release tablet 81 mg  81 mg Oral DAILY    carvediloL (COREG) tablet 3.125 mg  3.125 mg Oral BID WITH MEALS    nitroglycerin (NITROBID) 2 % ointment 1 Inch  1 Inch Topical Q6H PRN    famotidine (PF) (PEPCID) 20 mg in 0.9% sodium chloride 10 mL injection  20 mg IntraVENous DAILY    bumetanide (BUMEX) tablet 2 mg  2 mg Oral BID    heparin (porcine) 1,000 unit/mL injection 1,300 Units  1,300 Units InterCATHeter DIALYSIS PRN    And    heparin (porcine) 1,000 unit/mL injection 1,300 Units  1,300 Units InterCATHeter DIALYSIS PRN    0.9% sodium chloride infusion 250 mL  250 mL IntraVENous PRN    lidocaine 4 % patch 1 Patch  1 Patch TransDERmal Q24H    glucose chewable tablet 16 g  4 Tablet Oral PRN    glucagon (GLUCAGEN) injection 1 mg  1 mg IntraMUSCular PRN    dextrose 10% infusion 0-250 mL  0-250 mL IntraVENous PRN    insulin lispro (HUMALOG) injection   SubCUTAneous Q6H    methylPREDNISolone (PF) (SOLU-MEDROL) injection 20 mg  20 mg IntraVENous Q12H    epoetin tyler-epbx (RETACRIT) injection 10,000 Units  10,000 Units SubCUTAneous Q TUE, THU & SAT    balsam peru-castor oiL (VENELEX) ointment   Topical BID    PARoxetine (PAXIL) tablet 20 mg  20 mg Per NG tube DAILY    heparin (porcine) injection 5,000 Units  5,000 Units SubCUTAneous Q12H    HYDROmorphone (DILAUDID) injection 0.5 mg  0.5 mg IntraVENous Q4H PRN    metoprolol (LOPRESSOR) injection 2.5 mg  2.5 mg IntraVENous Q6H PRN    lidocaine (XYLOCAINE) 4 % cream   Topical PRN    [Held by provider] gabapentin (NEURONTIN) capsule 100 mg  100 mg Oral TID    sodium chloride (NS) flush 5-40 mL  5-40 mL IntraVENous Q8H    sodium chloride (NS) flush 5-40 mL  5-40 mL IntraVENous PRN    acetaminophen (TYLENOL) tablet 650 mg  650 mg Oral Q6H PRN    Or    acetaminophen (TYLENOL) suppository 650 mg  650 mg Rectal Q6H PRN    polyethylene glycol (MIRALAX) packet 17 g  17 g Oral DAILY PRN    ondansetron (ZOFRAN ODT) tablet 4 mg  4 mg Oral Q8H PRN    Or    ondansetron (ZOFRAN) injection 4 mg  4 mg IntraVENous Q6H PRN        Kaiden Overton MD  1/20/2023

## 2023-01-20 NOTE — PROGRESS NOTES
Unable to get phone consent for perm placement.   Patient has periods of confusion per primary nurse, Tiera Kendall

## 2023-01-20 NOTE — PROGRESS NOTES
Problem: Falls - Risk of  Goal: *Absence of Falls  Description: Document Antoinette Rangel Fall Risk and appropriate interventions in the flowsheet.   Outcome: Progressing Towards Goal  Note: Fall Risk Interventions:  Mobility Interventions: Bed/chair exit alarm, Patient to call before getting OOB    Mentation Interventions: Bed/chair exit alarm, Family/sitter at bedside    Medication Interventions: Patient to call before getting OOB, Teach patient to arise slowly    Elimination Interventions: Call light in reach, Patient to call for help with toileting needs              Problem: Patient Education: Go to Patient Education Activity  Goal: Patient/Family Education  Outcome: Progressing Towards Goal

## 2023-01-20 NOTE — PROGRESS NOTES
Sister, Mirella Nicolas does not je consent to place perm cath today. She wants patient to be more awake before placing perm catheter. States patient she is always out of it and wants to talk to the doctor about further treatment.

## 2023-01-20 NOTE — PROGRESS NOTES
Cardiology Progress Note  1/20/2023     Admit Date: 1/3/2023  Admit Diagnosis: Staphylococcus aureus pneumonia ([facility] [address] [J15.211]  CC: none currently    Cardiologist:  Dr Calos Pearson. Cardiac Assessment/Plan:    1) Atypical CP, for yrs: Neg [facility] 9/2021 (6:00, -CP, -ecg; Mild LVH). 2) Abnl ecg c/w LVH. *\"EF 45-50%; No valve dz:\" @ [address] by RCA. (EF not that low on my interp; EF 50-55%). 3) HTN  4) Palpitations (few x/yr; few sec). 5) FHx early CAD/CHF  6) Breast CA, left; Chemo (Dr GÉNESIS Rose @ [facility])    Admitted from 12/28 to 1/1/23 @ [facility] for PNA/hemoptysis/covid/sepsis/CHRISTA/NQWMI w/ trop 100s    Admitted 1/3/23 w/ dyspnea/sepsis; subsequent renal failure/hypotension/hemothorax w/ chest tube; Resp failure 1/9 w/ intubation. Type II NQWMI. Neg blood Cx. Hepatic failure. Chest tube 1/5/23. Intubated 1/9/23. Self-extubated 1/12/23. Off pressors 1/10/23. Coreg to 3.125 bid d/t ASx sinus bradycardia; add ARB if ok w/ renal; if not, hydralazine/imdur. Echo 1/10/23: Left Ventricle: Mildly reduced left ventricular systolic function with a visually estimated EF of 45 - 50%. Left ventricle size is normal. Increased wall thickness. CXR 1/13: \"The pulmonary vasculature is within normal limits. Volume loss left lung base. Mild patchy airspace opacity in the right lung base  improved. Left-sided chest tube is stable. Small left pleural effusion suspected. \"    1/19: No CP/dyspnea; intermittent refusal of meds; took today so far. BPs 120-160; HR 60-90s; sinus; 97% RA. No labs  Cardiac meds: coreg 3.125 bid; asa; bumex 2 bid; hydralazine 10 bid. Imdur 15. No new cardiac recs. Uptitrate BPs meds as needed, but she needs to take consistently first.    1/20: No CP/dyspnea; took BP meds yesterday  BPs 120-160; HR 70-90s; sinus; 95% RA  No labs. Cardiac meds: coreg 3.125 bid; asa; bumex 2 bid; hydralazine 10 bid. Imdur 15.     Imdur to 30 and hydralazine to 25 bid.    _________________________________________________________  Echo 12/27/22: Left Ventricle: Normal left ventricular systolic function with a visually estimated EF of 50 - 55%. Left ventricle size is normal. Normal wall thickness. Mild hypokinesis of the following segments: basal inferolateral and mid inferolateral. Unfortunately, imaging quality limis the accuracy     Chest tube 1/5/23. Intubated 1/9/23. Complicated clinical course as noted below; Currently intubated/sedated  Current cardiac meds:  Levo @ 15; crestor 10 (holding coreg 6.25 bid)  IMP: type II NQWMI;      Rec: cont supportive care; wean pressors as able; hold crestor. Future aspirin when bleeding resolved. ____________________________________________________________________  Richard Leiva is a 57 y.o. female presents with Staphylococcus aureus pneumonia ([address] [facility]. Admitted from 12/28 to 1/1/23 @ [facility] for PNA/hemoptysis/covid/sepsis/CHRISTA/NQWMI w/ trop 100s        As noted in H&P: \"56 y.o.  female with pertinent past medical history of breast cancer currently off chemotherapy, MDD/TAIWO, hypothyroidism, GERD, and recent hospitalization from 12/28-1/1 for multifocal pneumonia secondary to MSSA pneumonia superimposed on COVID-19 pneumonia with course complicated by hemoptysis, sepsis, CHRISTA, and type II MI completed course of cefepime and doxycycline transition to Augmentin on time of discharge-patient unsure if she has been taking that who presents with complaints of persistent decline since discharge from OSH. She complains of malaise, fever/chills, cough productive of blood-tinged sputum, shortness of breath/dyspnea on exertion. She reports she does not feel she is doing well at home and think she needs to be in the hospital for further management. She denies tobacco, alcohol, or illicit drug use. ROS otherwise negative.      In the ED, patient febrile to 100.8 °F, hemodynamically stable (hypertensive 150s/60s), saturating mid 90s on room air. ECG demonstrates NSR with LVH criteria, CXR demonstrates increased left pleural effusion and left lower lobe airspace opacity and CT chest demonstrates increased moderately large left pleural effusion with unchanged patchy bilateral consolidation left greater than right compatible with pneumonia. Rapid and flu negative. Point-of-care lactic 0.7, proBNP 1091(Previously 866), high since her troponin 116 down trended to 107 (600s at prior hospitalization, sodium 142, potassium 3.4, BUN 38, creatinine 3.0 (baseline), WBC 13.3 (slightly increased from time of discharge), hemoglobin 9.1 (improved from prior), platelets 830. Given recent hospitalization and concern for hep patient given vancomycin, Levaquin, and cefepime by ED provider. \"     ICU MD 1/8/22: \"Patient being transferred to ICU as the PCU nurses do not feel comfortable keeping the patient on the floor as she was hypotensive earlier and has been refusing po meds     57year old Female with hx of left breast cancer, recently admitted to [facility] for sepsis s/s COVID-19, CHRISTA, NSTEMI, MSSA pneumonia(12/28-1/1) presented to ED with c/o shortness of breath and hemoptysis. IN ED, patient was febrile to 100.8F. CXR demonstrated increased left pleural effusion with unchanged patchy B/L consolidation L>R. Patient had IR thoracentesis on 1/4/23- bloody tap. CT chest on 1/5 showed left sided hemothorax. Hb dropped from 9.1 to 5.6. s/p left chest tube placement on 1/5/23. S/p PRBC transfusion. \"     ______________________________________________________________________     The patient is intubated; can provide no history. On accelerating levophed (15 currently); on HD. No PND, orthopnea, palpitations, pre-syncope, syncope, peripheral edema, or decrease in exercise tolerance. No current complaints. ECG 1/3/22:   Sinus, leftward axis, no acute changes. LVH. Tele: sinus  CXR: \"1. Endotracheal tube terminates 1 cm above the marvin.  Consider retraction 1 to  2 cm. 2. Appropriate positioning of left IJ central venous catheter. No pneumothorax. 3. Slight interval improvement in extensive bilateral airspace disease. 4. Left chest tube remains in place. Suspected small left pleural effusion. \"     Notable labs: K 4.5; BUN 61; Cr 7.35 (prev 3 range). Alb 1.9; LFTs 900s/2000; Prev procal 7; proBNP 9k; neg covid 1/3/23. INR 2.7.  ___________________________________________________  Notable prior cardiac history:  @ NP OV 10/27/22:  Ms Dora Abdul has a h/o:  1) Atypical CP, for yrs: Neg SEH 9/2021 (6:00, -CP, -ecg; Mild LVH). 2) Abnl ecg c/w LVH. *\"EF 45-50%; No valve dz:\" @ [address] by RCA. (EF not that low on my interp; EF 50-55%). 3) HTN  4) Palpitations (few x/yr; few sec). 5) FHx early CAD/CHF  6) Breast CA, left; Chemo pending (Dr Constance Redman @ [facility])  No ethanol, added salt, or nicotine. 1 caff johnny/d. 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 She would like an annual visit here. 8/2021:  She has atypical chest pain (for years; 0-1 X/mo; sharp sensation; few seconds; random onset/offset). She also has intermittent palpitations (fast HR sensation, few X/year; few seconds duration, not related to chest pain). No MCCALL with good exercise tolerance. No previous cardiac evaluation. PCP note from 4/28/06 reviewed; ECG from then interpreted today as noted below. 2/2022: no recent chest pain, dyspnea, or palpitations. She would like an annual visit here. 10/2022: Chest pain remains resolved. No dyspnea nor palpitations. Reportedly mild LV dysfunction as noted above; in my review of the echo, the EF is not that low. NP F/U: Doing well. No CP, MCCALL, palps, edema or syncope. Home SBPs in the 130s. IMPRESSION AND PLAN  01. Other chest pain (R07.89): Atypical chest pain remains resolved; Neg [facility] 9/2021. We discussed the signs and symptoms of unstable angina, myocardial infarction and malignant arrhythmia.   The patient knows to seek immediate medical attention should they occur. 02. Essential (primary) hypertension (I10):  Reasonably well controlled; continue Losartan 25mg QD and increase Coreg to 6.25mg BID. Continue to monitor BP.   03. Other cardiomyopathies (I42.8):  EF looks reassuring on echo per review; repeat planned for next month. 04. Abnormal electrocardiogram [ECG] [EKG] (R94.31):  Nonspecific finding. This condition is stable. 05. Malignant neoplasm of left breast in female, estrogen receptor negative, unspecified site of breast (C50.912): Managed by: Other Physician   06. Long term (current) use of aspirin (Z79.82)   07. Body mass index [BMI] 27.0-27.9, adult (Z68.27)      ORDERS:    Dietary management education, guidance, and counseling           10/27/22 MEDICATION LIST  Medication Sig Desc Non-VCS   aspirin 81 mg tablet,delayed release take 1 tablet by oral route  every day Y   carvedilol 6.25 mg tablet take 1 tablet by oral route 2 times every day with food N   Crestor take 1 Tablet by oral route  every week Y   losartan 25 mg tablet take 1 tablet by oral route  every day N   Multi Vitamin 9 mg iron/15 mL oral liquid   Y   paroxetine 20 mg tablet take 1 tablet by oral route  every day Y      ______________________________________  CARDIAC HISTORY  RISK FACTORS:  1 Hypertension         CARDIOVASCULAR PROCEDURES  Procedure Date Results   Echo 10/05/2022 \"EF 45-50%; No valve dz:\" @ [facility] by RCA. (EF not that low on my interp). EKG 10/13/2022 Sinus Rhythm, LVH; axis -15°; nonspecific T-wave changes. [facility] 10/01/2021 Normal EF, No Ischemia, 6:00, -CP, -ecg; Mild LVH. For other plans, see orders.   Hospital problem list     Active Hospital Problems    Diagnosis Date Noted    Goals of care, counseling/discussion 01/13/2023    Advanced directives, counseling/discussion 01/13/2023    Hemothorax 01/06/2023    Shortness of breath 01/05/2023    Acute blood loss anemia 01/05/2023    Hemothorax on left 01/05/2023    Anxiety 01/05/2023 Palliative care by specialist 2023    Staphylococcus aureus pneumonia ([facility] [address] 2023        Subjective: Bree Pineda reports       Chest pain X none  consistent with:  Non-cardiac CP         Atypical CP     None now  On going  Anginal CP     Dyspnea X none  at rest  with exertion         improved  unchanged  worse              PND X none  overnight       Orthopnea X none  improved  unchanged  worse   Presyncope X none  improved  unchanged  worse     Ambulated in hallway without symptoms   Yes   Ambulated in room without symptoms  Yes   Objective:     Physical Exam:  Overall VSSAF;  Visit Vitals  BP (!) 163/90   Pulse 77   Temp 98.1 °F (36.7 °C)   Resp 18   Ht 5' (1.524 m)   Wt 64.1 kg (141 lb 5 oz)   SpO2 95%   BMI 27.60 kg/m²     Temp (24hrs), Av °F (36.7 °C), Min:97.9 °F (36.6 °C), Max:98.1 °F (36.7 °C)    Patient Vitals for the past 8 hrs:   Pulse   23 0530 77       Patient Vitals for the past 8 hrs:   Resp   23 0530 18       Patient Vitals for the past 8 hrs:   BP   23 0530 (!) 163/90       No intake or output data in the 24 hours ending 23 0806    General Appearance: Well developed, well nourished, no acute distress. Ears/Nose/Mouth/Throat:   Normal MM; anicteric. JVP: WNL   Resp:   Lungs clear to auscultation bilaterally. Nl resp effort. Cardiovascular:  RRR, S1, S2 normal, no new murmur. No gallop or rub. Abdomen:   Soft, non-tender, bowel sounds are present. Extremities: No edema bilaterally. Skin:  Neuro: Warm and dry. Sleepy, grossly nonfocal;        cath site intact w/o hematoma or new bruit; distal pulse unchanged  Yes   Data Review:     Telemetry independently reviewed x sinus  chronic afib  parox afib  NSVT     ECG independently reviewed  NSR  afib  no significant changes  NSST-Tw chgs     x no new ECG provided for review   Lab results reviewed as noted below. Current medications reviewed as noted below.     No results for input(s): PH, PCO2, PO2 in the last 72 hours. No results for input(s): CPK, CKMB, CKNDX, TROIQ in the last 72 hours. Recent Labs     01/18/23  0804      K 4.3   CL 99   CO2 26   BUN 66*   CREA 7.33*   *   PHOS 6.4*   CA 8.4*   ALB 2.1*   WBC 11.1*   HGB 8.9*   HCT 26.5*   *       No results found for: CHOL, CHOLX, CHLST, CHOLV, HDL, HDLP, LDL, LDLC, DLDLP, TGLX, TRIGL, TRIGP, CHHD, CHHDX  Recent Labs     01/18/23  0804   *   TP 5.9*   ALB 2.1*   GLOB 3.8       No results for input(s): INR, PTP, APTT, INREXT, INREXT in the last 72 hours.      No components found for: GLPOC    Current Facility-Administered Medications   Medication Dose Route Frequency    balsam peru-castor oiL (VENELEX) ointment   Topical BID    diphenhydrAMINE (BENADRYL) 12.5 mg/5 mL oral liquid 25 mg  25 mg Oral Q6H PRN    melatonin tablet 3 mg  3 mg Oral QHS PRN    guaiFENesin (ROBITUSSIN) 100 mg/5 mL oral liquid 100 mg  100 mg Oral Q4H PRN    albuterol CONCENTRATE 2.5mg/0.5 mL neb soln  1.25 mg Nebulization Q6H PRN    hydrALAZINE (APRESOLINE) tablet 10 mg  10 mg Oral BID    isosorbide mononitrate ER (IMDUR) tablet 15 mg  15 mg Oral DAILY    aspirin delayed-release tablet 81 mg  81 mg Oral DAILY    carvediloL (COREG) tablet 3.125 mg  3.125 mg Oral BID WITH MEALS    nitroglycerin (NITROBID) 2 % ointment 1 Inch  1 Inch Topical Q6H PRN    famotidine (PF) (PEPCID) 20 mg in 0.9% sodium chloride 10 mL injection  20 mg IntraVENous DAILY    bumetanide (BUMEX) tablet 2 mg  2 mg Oral BID    heparin (porcine) 1,000 unit/mL injection 1,300 Units  1,300 Units InterCATHeter DIALYSIS PRN    And    heparin (porcine) 1,000 unit/mL injection 1,300 Units  1,300 Units InterCATHeter DIALYSIS PRN    0.9% sodium chloride infusion 250 mL  250 mL IntraVENous PRN    lidocaine 4 % patch 1 Patch  1 Patch TransDERmal Q24H    glucose chewable tablet 16 g  4 Tablet Oral PRN    glucagon (GLUCAGEN) injection 1 mg  1 mg IntraMUSCular PRN    dextrose 10% infusion 0-250 mL  0-250 mL IntraVENous PRN    insulin lispro (HUMALOG) injection   SubCUTAneous Q6H    methylPREDNISolone (PF) (SOLU-MEDROL) injection 20 mg  20 mg IntraVENous Q12H    epoetin tyler-epbx (RETACRIT) injection 10,000 Units  10,000 Units SubCUTAneous Q TUE, THU & SAT    balsam peru-castor oiL (VENELEX) ointment   Topical BID    PARoxetine (PAXIL) tablet 20 mg  20 mg Per NG tube DAILY    heparin (porcine) injection 5,000 Units  5,000 Units SubCUTAneous Q12H    HYDROmorphone (DILAUDID) injection 0.5 mg  0.5 mg IntraVENous Q4H PRN    metoprolol (LOPRESSOR) injection 2.5 mg  2.5 mg IntraVENous Q6H PRN    lidocaine (XYLOCAINE) 4 % cream   Topical PRN    [Held by provider] gabapentin (NEURONTIN) capsule 100 mg  100 mg Oral TID    sodium chloride (NS) flush 5-40 mL  5-40 mL IntraVENous Q8H    sodium chloride (NS) flush 5-40 mL  5-40 mL IntraVENous PRN    acetaminophen (TYLENOL) tablet 650 mg  650 mg Oral Q6H PRN    Or    acetaminophen (TYLENOL) suppository 650 mg  650 mg Rectal Q6H PRN    polyethylene glycol (MIRALAX) packet 17 g  17 g Oral DAILY PRN    ondansetron (ZOFRAN ODT) tablet 4 mg  4 mg Oral Q8H PRN    Or    ondansetron (ZOFRAN) injection 4 mg  4 mg IntraVENous Q6H PRN        Angelique Fitch MD

## 2023-01-20 NOTE — PROGRESS NOTES
6030 told in report that patient has been refusing medication, labs and other treatments. Patient lethargic this morning but woke up when I talked to her. I asked if I could bring in her medication and she said no she wouldn't take them. 4617 able to talk patient into letting me give her her IV steroids. Still refusing all PO medication and ointment.

## 2023-01-20 NOTE — PROGRESS NOTES
Physical Therapy:  Patient current [facility] for HD, unavailable for PT session. Will defer and continue to follow.      Carolina Ching PT, DPT

## 2023-01-20 NOTE — WOUND CARE
Wound care nurse consult for open area to gluteal cleft. Chart revoewed and patient assessed. 58 y/o AAF admitted for Staph aureus pneumonia. Past Medical History:   Diagnosis Date    Cancer [facility])     BREAST CANCER    GERD (gastroesophageal reflux disease)     HTN (hypertension) 07/18/2011    Psychiatric disorder     ANIXETY AND DEPRESSION    Thyroid disease     HYPOTHYROID     Past Surgical History:   Procedure Laterality Date    HX COLONOSCOPY      HX MYOMECTOMY  05/02/1996    x 1    HX WISDOM TEETH EXTRACTION      IR INSERT NON TUNL CVC OVER 5 YRS  1/9/2023    IR THORACENTESIS/INSERT CHEST TUBE  1/5/2023     Patient having loose stools due to abxs and is incontinent of urine and stool.     Gluteal cleft fissure from incontinence MASD:    Wound Gluteal fold/cleft Incontienence MASD 01/20/23 (Active)   Wound Image   01/20/23 1053   Wound Etiology Other (Comment) 01/20/23 1053   Dressing Status New dressing applied 01/20/23 1053   Cleansed Soap and water 01/20/23 1053   Dressing/Treatment Zinc paste 01/20/23 1053   Wound Assessment Pink/red 01/20/23 1053   Drainage Amount None 01/20/23 1053   Wound Odor None 01/20/23 1053   Mariana-Wound/Incision Assessment Maceration 01/20/23 1053   Edges Defined edges;Flat/open edges 01/20/23 1053   Wound Thickness Description Partial thickness 01/20/23 1053   Number of days: 0      Recommend:    Gluteal cleft fissure: BID and PRN incontinence clean up, cleanse gently with foamy soap and water, pat completely dry and apply Z-guard zinc cream.    Jose Antonio Rodriguez RN, Herminio Tyler

## 2023-01-20 NOTE — PROGRESS NOTES
Hospitalist Progress Note    Subjective:   Daily Progress Note: 1/20/2023 3:42 PM    Hospital Course:  57year old Female with hx of left breast cancer, recently admitted to [facility] for sepsis s/s COVID-19, CHRISTA, NSTEMI, MSSA pneumonia(12/28-1/1) presented to ED with c/o shortness of breath and hemoptysis. IN ED, patient was febrile to 100.8F. CXR demonstrated increased left pleural effusion with unchanged patchy B/L consolidation L>R. Patient had IR thoracentesis on 1/4/23- bloody tap. CT chest on 1/5 showed left sided hemothorax. Hb dropped from 9.1 to 5.6. s/p left chest tube placement on 1/5/23. S/p PRBC transfusion. Txed to ICU 1/8 for hypotension requiring levophed, she was intubated on 1/9/23 for respiratory distress. Pt was started on CRRT on 1/10. Pt self extubated on 1/12/23. Now on stepdown, hemodynamically stable, remains anuric, getting HD. Non complaint with meds has been as inssue    Subjective:  No events overnight. Intermittently refusing meds and lab draws. Patient wanted solid food    Assessment / Plan:  Acute hypoxic respiratory failure  Multifocal pneumonia/keytruda pneumonitis  S/p left thoracentesis by IR on 1/4/23  S/p left chest tube placement for left sided hemothorax by IR on 1/5/23  S/p intubation 1/9, self extubated 1/12    -Suspected initial hypoxia from parapneumonic effusion, with recent history of MSSA pneumonia, nucleated cells 4400, exudative fluid. Culture was not sent!!   -As above patient developed hemothorax, requiring chest tube, eventually requiring ICU transfer for vasopressors likely from volume/blood loss. Patient required intubation on 1/9, likely from volume overload from CHRISTA, requiring CRRT on 1/10, now switched to HD and patient extubated on 1/12.  -Chest tube has been removed 1/13  -Currently on 2 L nasal cannula    -Culture from the [address] [place] [address] Ave on 1/11, showed some Candida, no bacteria.   Likely colonization  -Patient has been on multiple antibiotics since day 1, recently on Zosyn. 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 Bilirubin elevated, but usually it lag behind clinical improvement.  -Ammonia normal  -Ultrasound abdomen okay  -Patient refused labs today, recheck tomorrow      Acute blood loss anemia:  Anemia of chronic disease  S/s left hemothorax  S/p PRBC transfusion on 1/5/23. Hb post transfusion stable  Will monitor cbc and transfuse if Hb<7  Received PRBC    NSTEMI:  Sinus jose 1/16  Likely type II MI  Cards on board  -Decreased Coreg, monitor on telemetry        Invasive ductal breast cancer:  Appreciate oncology consult  Appreciate palliative care consult, following      Nutrition- currently CLD, advance diet to solid/easy to chew    Underlying depression/?   psych consult        Body mass index is 27.6 kg/m².     Code status: Full  Prophylaxis: SCD's  Recommended Disposition: SNF     Barrier: Permacath          Current Facility-Administered Medications   Medication Dose Route Frequency    isosorbide mononitrate ER (IMDUR) tablet 30 mg  30 mg Oral DAILY    hydrALAZINE (APRESOLINE) tablet 25 mg  25 mg Oral BID    balsam peru-castor oiL (VENELEX) ointment   Topical BID    diphenhydrAMINE (BENADRYL) 12.5 mg/5 mL oral liquid 25 mg  25 mg Oral Q6H PRN    melatonin tablet 3 mg  3 mg Oral QHS PRN    guaiFENesin (ROBITUSSIN) 100 mg/5 mL oral liquid 100 mg  100 mg Oral Q4H PRN    albuterol CONCENTRATE 2.5mg/0.5 mL neb soln  1.25 mg Nebulization Q6H PRN    aspirin delayed-release tablet 81 mg  81 mg Oral DAILY    carvediloL (COREG) tablet 3.125 mg  3.125 mg Oral BID WITH MEALS    nitroglycerin (NITROBID) 2 % ointment 1 Inch  1 Inch Topical Q6H PRN    famotidine (PF) (PEPCID) 20 mg in 0.9% sodium chloride 10 mL injection  20 mg IntraVENous DAILY    bumetanide (BUMEX) tablet 2 mg  2 mg Oral BID    heparin (porcine) 1,000 unit/mL injection 1,300 Units  1,300 Units InterCATHeter DIALYSIS PRN    And    heparin (porcine) 1,000 unit/mL injection 1,300 Units  1,300 Units InterCATHeter DIALYSIS PRN    0.9% sodium chloride infusion 250 mL  250 mL IntraVENous PRN    lidocaine 4 % patch 1 Patch  1 Patch TransDERmal Q24H    glucose chewable tablet 16 g  4 Tablet Oral PRN    glucagon (GLUCAGEN) injection 1 mg  1 mg IntraMUSCular PRN    dextrose 10% infusion 0-250 mL  0-250 mL IntraVENous PRN    insulin lispro (HUMALOG) injection   SubCUTAneous Q6H    methylPREDNISolone (PF) (SOLU-MEDROL) injection 20 mg  20 mg IntraVENous Q12H    epoetin tyler-epbx (RETACRIT) injection 10,000 Units  10,000 Units SubCUTAneous Q TUE, THU & SAT    balsam peru-castor oiL (VENELEX) ointment   Topical BID    PARoxetine (PAXIL) tablet 20 mg  20 mg Per NG tube DAILY    heparin (porcine) injection 5,000 Units  5,000 Units SubCUTAneous Q12H    HYDROmorphone (DILAUDID) injection 0.5 mg  0.5 mg IntraVENous Q4H PRN    metoprolol (LOPRESSOR) injection 2.5 mg  2.5 mg IntraVENous Q6H PRN    lidocaine (XYLOCAINE) 4 % cream   Topical PRN    [Held by provider] gabapentin (NEURONTIN) capsule 100 mg  100 mg Oral TID    sodium chloride (NS) flush 5-40 mL  5-40 mL IntraVENous Q8H    sodium chloride (NS) flush 5-40 mL  5-40 mL IntraVENous PRN    acetaminophen (TYLENOL) tablet 650 mg  650 mg Oral Q6H PRN    Or    acetaminophen (TYLENOL) suppository 650 mg  650 mg Rectal Q6H PRN    polyethylene glycol (MIRALAX) packet 17 g  17 g Oral DAILY PRN    ondansetron (ZOFRAN ODT) tablet 4 mg  4 mg Oral Q8H PRN    Or    ondansetron (ZOFRAN) injection 4 mg  4 mg IntraVENous Q6H PRN        Review of Systems  Constitutional: No fevers, No chills, No sweats, No fatigue, No Weakness  Eyes: No redness  Ears, nose, mouth, throat, and face: No nasal congestion, No sore throat, No voice change  Respiratory: No Shortness of Breath, No cough, No wheezing  Cardiovascular: has chest pain at the site of pleural catheter, No palpitations, No extremity edema  Gastrointestinal: No nausea, No vomiting, No diarrhea, No abdominal pain  Genitourinary: No frequency, No dysuria, No hematuria  Integument/breast: No skin lesion(s)   Neurological: No Confusion, No headaches, No dizziness      Objective:     Visit Vitals  BP (!) 173/97   Pulse 73   Temp 97.8 °F (36.6 °C) (Oral)   Resp 18   Ht 5' (1.524 m)   Wt 64.1 kg (141 lb 5 oz)   SpO2 95%   BMI 27.60 kg/m²    O2 Flow Rate (L/min): 2 l/min O2 Device: None (Room air)    Temp (24hrs), Av °F (36.7 °C), Min:97.8 °F (36.6 °C), Max:98.1 °F (36.7 °C)      No intake/output data recorded.  1901 -  0700  In: 100 [P.O.:100]  Out: -     PHYSICAL EXAM:  Constitutional: No acute distress  Skin: Extremities and face reveal no rashes. HEENT: Sclerae anicteric. Extra-occular muscles are intact. No oral ulcers. The neck is supple and no masses. Cardiovascular: Regular rate and rhythm. Respiratory:  decrease breath sound on left side. Left chest tube in place. GI: Abdomen nondistended, soft, and nontender. Normal active bowel sounds. Musculoskeletal: No pitting edema of the lower legs. Able to move all ext  Neurological:  Patient is alert and oriented.  Cranial nerves II-XII grossly intact  Psychiatric: Mood appears appropriate       Data Review    Recent Results (from the past 24 hour(s))   GLUCOSE, POC    Collection Time: 23  6:11 PM   Result Value Ref Range    Glucose (POC) 158 (H) 65 - 117 mg/dL    Performed by Jacek Leahy, POC    Collection Time: 23 11:53 AM   Result Value Ref Range    Glucose (POC) 115 65 - 117 mg/dL    Performed by Graciela Patel PCT    CBC W/O DIFF    Collection Time: 23 12:48 PM   Result Value Ref Range    WBC 9.9 3.6 - 11.0 K/uL    RBC 3.39 (L) 3.80 - 5.20 M/uL    HGB 10.2 (L) 11.5 - 16.0 g/dL    HCT 30.8 (L) 35.0 - 47.0 %    MCV 90.9 80.0 - 99.0 FL    MCH 30.1 26.0 - 34.0 PG    MCHC 33.1 30.0 - 36.5 g/dL    RDW 21.6 (H) 11.5 - 14.5 %    PLATELET 058 195 - 550 K/uL    MPV 12.1 8.9 - 12.9 FL    NRBC 0.8 (H) 0  WBC    ABSOLUTE NRBC 0.08 (H) 0.00 - 0.01 K/uL   PHOSPHORUS    Collection Time: 01/20/23 12:48 PM   Result Value Ref Range    Phosphorus 9.9 (H) 2.6 - 4.7 MG/DL   MAGNESIUM    Collection Time: 01/20/23 12:48 PM   Result Value Ref Range    Magnesium 2.7 (H) 1.6 - 2.4 mg/dL                   Total critical care time spent 50 minutes involving direct patient care as well as reviewing patient's labs and coordination of care with nursing staff     Care Plan discussed with: Patient/Family/RN/Case Management        Total time spent with patient: 35minutes.

## 2023-01-20 NOTE — PROCEDURES
Hemodialysis / [phone]    Vitals Pre Post Assessment Pre Post   BP BP: (!) 174/91 (01/20/23 1248)   167/93 LOC See flow sheet See flow sheet   HR Pulse (Heart Rate): 68 (01/20/23 1248) 83 Lungs See flow sheet See flow sheet   Resp Resp Rate: 18 (01/20/23 1248) 18 Cardiac See flow sheet See flow sheet   Temp Temp: 97.8 °F (36.6 °C) (01/20/23 1248) 97.7 Skin See flow sheet See flow sheet   Weight    Edema See flow sheet See flow sheet   Tele status Remote tele Remote tele Pain Pain Intensity 1: 0 (01/18/23 1528)      Orders   Duration: Start: 2281 End: 4092 Total: 3.5hrs   Dialyzer: Dialyzer/Set Up Inspection: Revaclear (01/20/23 1248)   K Bath: Dialysate K (mEq/L): 3 (01/20/23 1248)   Ca Bath: Dialysate CA (mEq/L): 2.5 (01/20/23 1248)   Na: Dialysate NA (mEq/L): 138 (01/20/23 1248)   Bicarb: Dialysate HCO3 (mEq/L): 35 (01/20/23 1248)   Target Fluid Removal: Goal/Amount of Fluid to Remove (mL): 1000 mL (01/20/23 1248)     Access   Type & Location: R [place]ay[place]: Pre- Assessment: Dressing dated 1/13/23 CDI. No s/s of infection. Both lumens aspirate & flush well. Running well at . SBAR received from Primary RN. Pt arrived to HD suite A&Ox4. Consent signed & on file OR Chronic consent applies. Each catheter limb disinfected per p&p, caps removed, hubs disinfected per p&p. Each lumen aspirated for blood return and flushed with Normal Saline per policy. Labs drawn per request/ order. VSS. Dialysis Tx initiated. Comments:   Dressing dated 1/13/23 CDI. No s/s of infection noted.                                     Labs   HBsAg (Antigen) / date: Neg 1/9/23                                              HBsAb (Antibody) / date: Susc 1/9/23   Source: EPIC   Obtained/Reviewed  Critical Results Called HGB   Date Value Ref Range Status   01/18/2023 8.9 (L) 11.5 - 16.0 g/dL Final     Potassium   Date Value Ref Range Status   01/18/2023 4.3 3.5 - 5.1 mmol/L Final     Comment:     ICTERIC SPECIMEN     Calcium   Date Value Ref Range Status   01/18/2023 8.4 (L) 8.5 - 10.1 MG/DL Final     BUN   Date Value Ref Range Status   01/18/2023 66 (H) 6 - 20 MG/DL Final     Creatinine   Date Value Ref Range Status   01/18/2023 7.33 (H) 0.55 - 1.02 MG/DL Final        Meds Given   Name Dose Route   Heparin 1:1000 1.3 [email or web address]   Heparin 1:1000 13 [email or web address]          Adequacy / Fluid    Total Liters Process: 79.0   Net Fluid Removed: 1000mL      Comments   Time Out Done:   (Time) 1243   Admitting Diagnosis: SOB, Hemothorax, Anemia   Consent obtained/signed: Informed Consent Verified: Yes (01/20/23 1248)   Machine / RO # Machine Number: Lilly [id number] (01/20/23 1248)   Primary Nurse Rpt Pre: Tenzin Curry, RN   Primary Nurse Rpt Post: Attempted to call report x3 w/ no answer   Pt Education: procedural   Care Plan: On going   Pts outpatient clinic: TBD     Tx Summary  1248:  R Blayne: Pre- Assessment: Dressing dated 1/13/23 CDI. No s/s of infection. Both lumens aspirate & flush well. Running well at . SBAR received from Primary RN. Pt arrived to HD suite A&Ox4. Consent signed & on file OR Chronic consent applies. Each catheter limb disinfected per p&p, caps removed, hubs disinfected per p&p. Each lumen aspirated for blood return and flushed with Normal Saline per policy. Labs drawn per request/ order. VSS. Dialysis Tx initiated. **Pt tolerated tx well. No issues or complaints**    1629: Tx ended. VSS. Each dialysis catheter limb disinfected per p&p, all possible blood returned per p&p, and each dialysis hub disinfected per p&p. Each lumen flushed, post dialysis catheter Heparin dwell instilled per order, and caps applied. Bed locked and in the lowest position, call bell and belongings in reach. Attempted to call report x3 with no answer; asked transporter to please have Primary RN call for report. Patient is stable at time of their/ my departure. Post assessment: Dressing CDI. No changes. All Dialysis related medications have been reviewed. Comments:   Assessment performed by RN. Procedure and documentation observed and reviewed by Braulio Eisenmenger, RN.

## 2023-01-20 NOTE — DIALYSIS
[facility]    Post procedure report given to Kellie Aguilar RN at 1730  Pt status and VS were noted:    UF:  1000 ml  BP:  167/93  HR:  83  Temp:  97.7    Pt tolerated 3.5 hr tx well.

## 2023-01-21 LAB
GLUCOSE BLD STRIP.AUTO-MCNC: 113 MG/DL (ref 65–117)
SERVICE CMNT-IMP: NORMAL

## 2023-01-21 PROCEDURE — 74011250636 HC RX REV CODE- 250/636: Performed by: ANESTHESIOLOGY

## 2023-01-21 PROCEDURE — 82962 GLUCOSE BLOOD TEST: CPT

## 2023-01-21 PROCEDURE — 74011250637 HC RX REV CODE- 250/637: Performed by: INTERNAL MEDICINE

## 2023-01-21 PROCEDURE — 74011000250 HC RX REV CODE- 250: Performed by: INTERNAL MEDICINE

## 2023-01-21 PROCEDURE — 74011000250 HC RX REV CODE- 250: Performed by: STUDENT IN AN ORGANIZED HEALTH CARE EDUCATION/TRAINING PROGRAM

## 2023-01-21 PROCEDURE — 65660000001 HC RM ICU INTERMED STEPDOWN

## 2023-01-21 PROCEDURE — 74011250636 HC RX REV CODE- 250/636: Performed by: INTERNAL MEDICINE

## 2023-01-21 RX ADMIN — SODIUM CHLORIDE, PRESERVATIVE FREE 10 ML: 5 INJECTION INTRAVENOUS at 22:09

## 2023-01-21 RX ADMIN — CARVEDILOL 3.12 MG: 3.12 TABLET, FILM COATED ORAL at 17:07

## 2023-01-21 RX ADMIN — CASTOR OIL AND BALSAM, PERU: 788; 87 OINTMENT TOPICAL at 22:09

## 2023-01-21 RX ADMIN — HYDRALAZINE HYDROCHLORIDE 25 MG: 25 TABLET, FILM COATED ORAL at 17:07

## 2023-01-21 RX ADMIN — SODIUM CHLORIDE, PRESERVATIVE FREE 10 ML: 5 INJECTION INTRAVENOUS at 13:28

## 2023-01-21 RX ADMIN — BUMETANIDE 2 MG: 1 TABLET ORAL at 17:07

## 2023-01-21 RX ADMIN — METHYLPREDNISOLONE SODIUM SUCCINATE 20 MG: 40 INJECTION, POWDER, FOR SOLUTION INTRAMUSCULAR; INTRAVENOUS at 09:00

## 2023-01-21 RX ADMIN — SODIUM CHLORIDE, PRESERVATIVE FREE 10 ML: 5 INJECTION INTRAVENOUS at 06:12

## 2023-01-21 RX ADMIN — FAMOTIDINE 20 MG: 10 INJECTION, SOLUTION INTRAVENOUS at 09:00

## 2023-01-21 NOTE — PROGRESS NOTES
Cardiology Progress Note  1/21/2023     Admit Date: 1/3/2023  Admit Diagnosis: Staphylococcus aureus pneumonia ([facility] [address] [J15.211]  CC: none currently    Cardiologist:  Dr Claudeen Blew. Cardiac Assessment/Plan:    1) Atypical CP, for yrs: Neg [facility] 9/2021 (6:00, -CP, -ecg; Mild LVH). 2) Abnl ecg c/w LVH. *\"EF 45-50%; No valve dz:\" @ [address] by RCA. (EF not that low on my interp; EF 50-55%). 3) HTN  4) Palpitations (few x/yr; few sec). 5) FHx early CAD/CHF  6) Breast CA, left; Chemo (Dr GÉNESIS Padilla [place]ost[place] @ [address] [facility] [place]note 2[place]    Admitted from 12/28 to 1/1/23 @ [address] [facility] for PNA/hemoptysis/covid/sepsis/CHRISTA/NQWMI w/ trop 100s    Admitted 1/3/23 w/ dyspnea/sepsis; subsequent renal failure/hypotension/hemothorax w/ chest tube; Resp failure 1/9 w/ intubation. Type II NQWMI. Neg blood Cx. Hepatic failure. Chest tube 1/5/23. Intubated 1/9/23. Self-extubated 1/12/23. Off pressors 1/10/23. Coreg to 3.125 bid d/t ASx sinus bradycardia; add ARB if ok w/ renal; if not, hydralazine/imdur. Echo 1/10/23: Left Ventricle: Mildly reduced left ventricular systolic function with a visually estimated EF of 45 - 50%. Left ventricle size is normal. Increased wall thickness. CXR 1/13: \"The pulmonary vasculature is within normal limits. Volume loss left lung base. Mild patchy airspace opacity in the right lung base  improved. Left-sided chest tube is stable. Small left pleural effusion suspected. \"    1/21: No CP/dyspnea; refusing meds again. BPs 140-160s; HR 70-90s; sinus; 95% RA. No labs. Cardiac meds: coreg 3.125 bid; asa; bumex 2 bid; hydralazine 25 bid. Imdur 30. No new cardiac recs. _________________________________________________________  Echo 12/27/22: Left Ventricle: Normal left ventricular systolic function with a visually estimated EF of 50 - 55%. Left ventricle size is normal. Normal wall thickness.  Mild hypokinesis of the following segments: basal inferolateral and mid inferolateral. Unfortunately, imaging quality limis the accuracy     Chest tube 1/5/23. Intubated 1/9/23. Complicated clinical course as noted below; Currently intubated/sedated  Current cardiac meds:  Levo @ 15; crestor 10 (holding coreg 6.25 bid)  IMP: type II NQWMI;      Rec: cont supportive care; wean pressors as able; hold crestor. Future aspirin when bleeding resolved. ____________________________________________________________________  Néstor Pruett is a 57 y.o. female presents with Staphylococcus aureus pneumonia ([address] Radha [place]. Admitted from 12/28 to 1/1/23 @ [address] [facility] [address] for PNA/hemoptysis/covid/sepsis/CHRISTA/NQWMI w/ trop 100s        As noted in H&P: \"56 y.o.  female with pertinent past medical history of breast cancer currently off chemotherapy, MDD/TAIWO, hypothyroidism, GERD, and recent hospitalization from 12/28-1/1 for multifocal pneumonia secondary to MSSA pneumonia superimposed on COVID-19 pneumonia with course complicated by hemoptysis, sepsis, CHRISTA, and type II MI completed course of cefepime and doxycycline transition to Augmentin on time of discharge-patient unsure if she has been taking that who presents with complaints of persistent decline since discharge from OSH. She complains of malaise, fever/chills, cough productive of blood-tinged sputum, shortness of breath/dyspnea on exertion. She reports she does not feel she is doing well at home and think she needs to be in the hospital for further management. She denies tobacco, alcohol, or illicit drug use. ROS otherwise negative. In the ED, patient febrile to 100.8 °F, hemodynamically stable (hypertensive 150s/60s), saturating mid 90s on room air. ECG demonstrates NSR with LVH criteria, CXR demonstrates increased left pleural effusion and left lower lobe airspace opacity and CT chest demonstrates increased moderately large left pleural effusion with unchanged patchy bilateral consolidation left greater than right compatible with pneumonia.   Rapid and flu negative. Point-of-care lactic 0.7, proBNP 1091(Previously 866), high since her troponin 116 down trended to 107 (600s at prior hospitalization, sodium 142, potassium 3.4, BUN 38, creatinine 3.0 (baseline), WBC 13.3 (slightly increased from time of discharge), hemoglobin 9.1 (improved from prior), platelets 289. Given recent hospitalization and concern for hep patient given vancomycin, Levaquin, and cefepime by ED provider. \"     ICU MD 1/8/22: \"Patient being transferred to ICU as the PCU nurses do not feel comfortable keeping the patient on the floor as she was hypotensive earlier and has been refusing po meds     57year old Female with hx of left breast cancer, recently admitted to [facility] for sepsis s/s COVID-19, CHRISTA, NSTEMI, MSSA pneumonia(12/28-1/1) presented to ED with c/o shortness of breath and hemoptysis. IN ED, patient was febrile to 100.8F. CXR demonstrated increased left pleural effusion with unchanged patchy B/L consolidation L>R. Patient had IR thoracentesis on 1/4/23- bloody tap. CT chest on 1/5 showed left sided hemothorax. Hb dropped from 9.1 to 5.6. s/p left chest tube placement on 1/5/23. S/p PRBC transfusion. \"     ______________________________________________________________________     The patient is intubated; can provide no history. On accelerating levophed (15 currently); on HD. No PND, orthopnea, palpitations, pre-syncope, syncope, peripheral edema, or decrease in exercise tolerance. No current complaints. ECG 1/3/22:   Sinus, leftward axis, no acute changes. LVH. Tele: sinus  CXR: \"1. Endotracheal tube terminates 1 cm above the marvin. Consider retraction 1 to  2 cm. 2. Appropriate positioning of left IJ central venous catheter. No pneumothorax. 3. Slight interval improvement in extensive bilateral airspace disease. 4. Left chest tube remains in place. Suspected small left pleural effusion. \"     Notable labs: K 4.5; BUN 61; Cr 7.35 (prev 3 range).  Alb 1.9; LFTs 900s/2000; Prev procal 7; proBNP 9k; neg covid 1/3/23. INR 2.7.  ___________________________________________________  Notable prior cardiac history:  @ NP OV 10/27/22:  Ms Boston Moore has a h/o:  1) Atypical CP, for yrs: Neg SEH 9/2021 (6:00, -CP, -ecg; Mild LVH). 2) Abnl ecg c/w LVH. *\"EF 45-50%; No valve dz:\" @ [facility] by RCA. (EF not that low on my interp; EF 50-55%). 3) HTN  4) Palpitations (few x/yr; few sec). 5) FHx early CAD/CHF  6) Breast CA, left; Chemo pending (Dr Nelson Pulse @ [facility])  No ethanol, added salt, or nicotine. 1 caff johnny/d. 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 She would like an annual visit here. 8/2021:  She has atypical chest pain (for years; 0-1 X/mo; sharp sensation; few seconds; random onset/offset). She also has intermittent palpitations (fast HR sensation, few X/year; few seconds duration, not related to chest pain). No MCCALL with good exercise tolerance. No previous cardiac evaluation. PCP note from 4/28/06 reviewed; ECG from then interpreted today as noted below. 2/2022: no recent chest pain, dyspnea, or palpitations. She would like an annual visit here. 10/2022: Chest pain remains resolved. No dyspnea nor palpitations. Reportedly mild LV dysfunction as noted above; in my review of the echo, the EF is not that low. NP F/U: Doing well. No CP, MCCALL, palps, edema or syncope. Home SBPs in the 130s. IMPRESSION AND PLAN  01. Other chest pain (R07.89): Atypical chest pain remains resolved; Neg [facility] [place] 9/2021. We discussed the signs and symptoms of unstable angina, myocardial infarction and malignant arrhythmia. The patient knows to seek immediate medical attention should they occur. 02. Essential (primary) hypertension (I10):  Reasonably well controlled; continue Losartan 25mg QD and increase Coreg to 6.25mg BID. Continue to monitor BP.   03. Other cardiomyopathies (I42.8):  EF looks reassuring on echo per review; repeat planned for next month. 04.  Abnormal electrocardiogram [ECG] [EKG] (R94.31):  Nonspecific finding. This condition is stable. 05. Malignant neoplasm of left breast in female, estrogen receptor negative, unspecified site of breast (C50.912): Managed by: Other Physician   06. Long term (current) use of aspirin (Z79.82)   07. Body mass index [BMI] 27.0-27.9, adult (Z68.27)      ORDERS:    Dietary management education, guidance, and counseling           10/27/22 MEDICATION LIST  Medication Sig Desc Non-VCS   aspirin 81 mg tablet,delayed release take 1 tablet by oral route  every day Y   carvedilol 6.25 mg tablet take 1 tablet by oral route 2 times every day with food N   Crestor take 1 Tablet by oral route  every week Y   losartan 25 mg tablet take 1 tablet by oral route  every day N   Multi Vitamin 9 mg iron/15 mL oral liquid   Y   paroxetine 20 mg tablet take 1 tablet by oral route  every day Y      ______________________________________  CARDIAC HISTORY  RISK FACTORS:  1 Hypertension         CARDIOVASCULAR PROCEDURES  Procedure Date Results   Echo 10/05/2022 \"EF 45-50%; No valve dz:\" @ [address] by RCA. (EF not that low on my interp). EKG 10/13/2022 Sinus Rhythm, LVH; axis -15°; nonspecific T-wave changes. [facility] [place] 10/01/2021 Normal EF, No Ischemia, 6:00, -CP, -ecg; Mild LVH. For other plans, see orders.   Hospital problem list     Active Hospital Problems    Diagnosis Date Noted    Goals of care, counseling/discussion 01/13/2023    Advanced directives, counseling/discussion 01/13/2023    Hemothorax 01/06/2023    Shortness of breath 01/05/2023    Acute blood loss anemia 01/05/2023    Hemothorax on left 01/05/2023    Anxiety 01/05/2023    Palliative care by specialist 01/05/2023    Staphylococcus aureus pneumonia ([facility] [address] 01/04/2023        Subjective: Tammy Benson reports       Chest pain X none  consistent with:  Non-cardiac CP         Atypical CP     None now  On going  Anginal CP     Dyspnea X none  at rest  with exertion         improved  unchanged  worse PND X none  overnight       Orthopnea X none  improved  unchanged  worse   Presyncope X none  improved  unchanged  worse     Ambulated in hallway without symptoms   Yes   Ambulated in room without symptoms  Yes   Objective:     Physical Exam:  Overall VSSAF;  Visit Vitals  BP (!) 159/72   Pulse (!) 59   Temp 98.2 °F (36.8 °C)   Resp 16   Ht 5' (1.524 m)   Wt 64.1 kg (141 lb 5 oz)   SpO2 95%   BMI 27.60 kg/m²     Temp (24hrs), Av.1 °F (36.7 °C), Min:97.6 °F (36.4 °C), Max:98.7 °F (37.1 °C)    Patient Vitals for the past 8 hrs:   Pulse   23 1040 (!) 59   23 0748 67       Patient Vitals for the past 8 hrs:   Resp   23 1040 16   23 0748 18       Patient Vitals for the past 8 hrs:   BP   23 1040 (!) 159/72   23 0748 (!) 157/80         Intake/Output Summary (Last 24 hours) at 2023 1246  Last data filed at 2023 1629  Gross per 24 hour   Intake --   Output 1000 ml   Net -1000 ml       General Appearance: Well developed, well nourished, no acute distress. Ears/Nose/Mouth/Throat:   Normal MM; anicteric. JVP: WNL   Resp:   Lungs clear to auscultation bilaterally. Nl resp effort. Cardiovascular:  RRR, S1, S2 normal, no new murmur. No gallop or rub. Abdomen:   Soft, non-tender, bowel sounds are present. Extremities: No edema bilaterally. Skin:  Neuro: Warm and dry. A, grossly nonfocal;        cath site intact w/o hematoma or new bruit; distal pulse unchanged  Yes   Data Review:     Telemetry independently reviewed x sinus  chronic afib  parox afib  NSVT     ECG independently reviewed  NSR  afib  no significant changes  NSST-Tw chgs     x no new ECG provided for review   Lab results reviewed as noted below. Current medications reviewed as noted below. No results for input(s): PH, PCO2, PO2 in the last 72 hours. No results for input(s): CPK, CKMB, CKNDX, TROIQ in the last 72 hours.   Recent Labs     23  1248   PHOS 9.9*   WBC 9.9   HGB 10.2* HCT 30.8*          No results found for: CHOL, CHOLX, CHLST, CHOLV, HDL, HDLP, LDL, LDLC, DLDLP, TGLX, TRIGL, TRIGP, CHHD, CHHDX  No results for input(s): AP, TBIL, TP, ALB, GLOB, GGT, AML, LPSE in the last 72 hours. No lab exists for component: SGOT, GPT, AMYP, HLPSE    No results for input(s): INR, PTP, APTT, INREXT, INREXT in the last 72 hours.      No components found for: GLPOC    Current Facility-Administered Medications   Medication Dose Route Frequency    [START ON 1/22/2023] methylPREDNISolone (PF) (SOLU-MEDROL) injection 20 mg  20 mg IntraVENous DAILY    isosorbide mononitrate ER (IMDUR) tablet 30 mg  30 mg Oral DAILY    hydrALAZINE (APRESOLINE) tablet 25 mg  25 mg Oral BID    balsam peru-castor oiL (VENELEX) ointment   Topical BID    diphenhydrAMINE (BENADRYL) 12.5 mg/5 mL oral liquid 25 mg  25 mg Oral Q6H PRN    melatonin tablet 3 mg  3 mg Oral QHS PRN    guaiFENesin (ROBITUSSIN) 100 mg/5 mL oral liquid 100 mg  100 mg Oral Q4H PRN    albuterol CONCENTRATE 2.5mg/0.5 mL neb soln  1.25 mg Nebulization Q6H PRN    aspirin delayed-release tablet 81 mg  81 mg Oral DAILY    carvediloL (COREG) tablet 3.125 mg  3.125 mg Oral BID WITH MEALS    nitroglycerin (NITROBID) 2 % ointment 1 Inch  1 Inch Topical Q6H PRN    famotidine (PF) (PEPCID) 20 mg in 0.9% sodium chloride 10 mL injection  20 mg IntraVENous DAILY    bumetanide (BUMEX) tablet 2 mg  2 mg Oral BID    heparin (porcine) 1,000 unit/mL injection 1,300 Units  1,300 Units InterCATHeter DIALYSIS PRN    And    heparin (porcine) 1,000 unit/mL injection 1,300 Units  1,300 Units InterCATHeter DIALYSIS PRN    0.9% sodium chloride infusion 250 mL  250 mL IntraVENous PRN    lidocaine 4 % patch 1 Patch  1 Patch TransDERmal Q24H    glucose chewable tablet 16 g  4 Tablet Oral PRN    glucagon (GLUCAGEN) injection 1 mg  1 mg IntraMUSCular PRN    dextrose 10% infusion 0-250 mL  0-250 mL IntraVENous PRN    insulin lispro (HUMALOG) injection   SubCUTAneous Q6H epoetin tyler-epbx (RETACRIT) injection 10,000 Units  10,000 Units SubCUTAneous Q TUE, THU & SAT    balsam peru-castor oiL (VENELEX) ointment   Topical BID    PARoxetine (PAXIL) tablet 20 mg  20 mg Per NG tube DAILY    heparin (porcine) injection 5,000 Units  5,000 Units SubCUTAneous Q12H    HYDROmorphone (DILAUDID) injection 0.5 mg  0.5 mg IntraVENous Q4H PRN    metoprolol (LOPRESSOR) injection 2.5 mg  2.5 mg IntraVENous Q6H PRN    lidocaine (XYLOCAINE) 4 % cream   Topical PRN    [Held by provider] gabapentin (NEURONTIN) capsule 100 mg  100 mg Oral TID    sodium chloride (NS) flush 5-40 mL  5-40 mL IntraVENous Q8H    sodium chloride (NS) flush 5-40 mL  5-40 mL IntraVENous PRN    acetaminophen (TYLENOL) tablet 650 mg  650 mg Oral Q6H PRN    Or    acetaminophen (TYLENOL) suppository 650 mg  650 mg Rectal Q6H PRN    polyethylene glycol (MIRALAX) packet 17 g  17 g Oral DAILY PRN    ondansetron (ZOFRAN ODT) tablet 4 mg  4 mg Oral Q8H PRN    Or    ondansetron (ZOFRAN) injection 4 mg  4 mg IntraVENous Q6H PRN        Carolina Streeter MD

## 2023-01-21 NOTE — ROUTINE PROCESS
Bedside shift change report given to Brandon Kirk (oncoming nurse) by Alejandro STALLINGS RN (offgoing nurse). Report included the following information SBAR, Kardex and MAR.

## 2023-01-21 NOTE — PROGRESS NOTES
Hospitalist Progress Note    Subjective:   Daily Progress Note: 1/21/2023 3:42 PM    Hospital Course:  57year old Female with hx of left breast cancer, recently admitted to [facility] for sepsis s/s COVID-19, CHRISTA, NSTEMI, MSSA pneumonia(12/28-1/1) presented to ED with c/o shortness of breath and hemoptysis. IN ED, patient was febrile to 100.8F. CXR demonstrated increased left pleural effusion with unchanged patchy B/L consolidation L>R. Patient had IR thoracentesis on 1/4/23- bloody tap. CT chest on 1/5 showed left sided hemothorax. Hb dropped from 9.1 to 5.6. s/p left chest tube placement on 1/5/23. S/p PRBC transfusion. Txed to ICU 1/8 for hypotension requiring levophed, she was intubated on 1/9/23 for respiratory distress. Pt was started on CRRT on 1/10. Pt self extubated on 1/12/23. Now on stepdown, hemodynamically stable, remains anuric, getting HD. Non complaint with meds has been as inssue    Subjective:  No events overnight. Intermittently refusing meds and lab draws. Patient wanted solid food    Assessment / Plan:  Acute hypoxic respiratory failure  Multifocal pneumonia/keytruda pneumonitis  S/p left thoracentesis by IR on 1/4/23  S/p left chest tube placement for left sided hemothorax by IR on 1/5/23  S/p intubation 1/9, self extubated 1/12    -Suspected initial hypoxia from parapneumonic effusion, with recent history of MSSA pneumonia, nucleated cells 4400, exudative fluid. Culture was not sent!!   -As above patient developed hemothorax, requiring chest tube, eventually requiring ICU transfer for vasopressors likely from volume/blood loss. Patient required intubation on 1/9, likely from volume overload from CHRISTA, requiring CRRT on 1/10, now switched to HD and patient extubated on 1/12.  -Chest tube has been removed 1/13  -Currently on 2 L nasal cannula    -Culture from the [address] [place] [address] Ave on 1/11, showed some Candida, no bacteria.   Likely colonization  -Patient has been on multiple antibiotics since day 1, recently on Zosyn. 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 Bilirubin elevated, but usually it lag behind clinical improvement.  -Ammonia normal  -Ultrasound abdomen okay  -Patient refused labs today, recheck tomorrow      Acute blood loss anemia:  Anemia of chronic disease  S/s left hemothorax  S/p PRBC transfusion on 1/5/23. Hb post transfusion stable  Will monitor cbc and transfuse if Hb<7  Received PRBC    NSTEMI:  Sinus jose 1/16  Likely type II MI  Cards on board  -Decreased Coreg, monitor on telemetry        Invasive ductal breast cancer:  Appreciate oncology consult  Appreciate palliative care consult, following      Nutrition- currently CLD, advance diet to solid/easy to chew    Underlying depression/?   psych consult        Body mass index is 27.6 kg/m².     Code status: Full  Prophylaxis: SCD's  Recommended Disposition: SNF     Barrier: Permacath          Current Facility-Administered Medications   Medication Dose Route Frequency    [START ON 1/22/2023] methylPREDNISolone (PF) (SOLU-MEDROL) injection 20 mg  20 mg IntraVENous DAILY    isosorbide mononitrate ER (IMDUR) tablet 30 mg  30 mg Oral DAILY    hydrALAZINE (APRESOLINE) tablet 25 mg  25 mg Oral BID    balsam peru-castor oiL (VENELEX) ointment   Topical BID    diphenhydrAMINE (BENADRYL) 12.5 mg/5 mL oral liquid 25 mg  25 mg Oral Q6H PRN    melatonin tablet 3 mg  3 mg Oral QHS PRN    guaiFENesin (ROBITUSSIN) 100 mg/5 mL oral liquid 100 mg  100 mg Oral Q4H PRN    albuterol CONCENTRATE 2.5mg/0.5 mL neb soln  1.25 mg Nebulization Q6H PRN    aspirin delayed-release tablet 81 mg  81 mg Oral DAILY    carvediloL (COREG) tablet 3.125 mg  3.125 mg Oral BID WITH MEALS    nitroglycerin (NITROBID) 2 % ointment 1 Inch  1 Inch Topical Q6H PRN    famotidine (PF) (PEPCID) 20 mg in 0.9% sodium chloride 10 mL injection  20 mg IntraVENous DAILY    bumetanide (BUMEX) tablet 2 mg  2 mg Oral BID    heparin (porcine) 1,000 unit/mL injection 1,300 Units  1,300 Units InterCATHeter DIALYSIS PRN    And    heparin (porcine) 1,000 unit/mL injection 1,300 Units  1,300 Units InterCATHeter DIALYSIS PRN    0.9% sodium chloride infusion 250 mL  250 mL IntraVENous PRN    lidocaine 4 % patch 1 Patch  1 Patch TransDERmal Q24H    glucose chewable tablet 16 g  4 Tablet Oral PRN    glucagon (GLUCAGEN) injection 1 mg  1 mg IntraMUSCular PRN    dextrose 10% infusion 0-250 mL  0-250 mL IntraVENous PRN    insulin lispro (HUMALOG) injection   SubCUTAneous Q6H    epoetin tyler-epbx (RETACRIT) injection 10,000 Units  10,000 Units SubCUTAneous Q TUE, THU & SAT    balsam peru-castor oiL (VENELEX) ointment   Topical BID    PARoxetine (PAXIL) tablet 20 mg  20 mg Per NG tube DAILY    heparin (porcine) injection 5,000 Units  5,000 Units SubCUTAneous Q12H    HYDROmorphone (DILAUDID) injection 0.5 mg  0.5 mg IntraVENous Q4H PRN    metoprolol (LOPRESSOR) injection 2.5 mg  2.5 mg IntraVENous Q6H PRN    lidocaine (XYLOCAINE) 4 % cream   Topical PRN    [Held by provider] gabapentin (NEURONTIN) capsule 100 mg  100 mg Oral TID    sodium chloride (NS) flush 5-40 mL  5-40 mL IntraVENous Q8H    sodium chloride (NS) flush 5-40 mL  5-40 mL IntraVENous PRN    acetaminophen (TYLENOL) tablet 650 mg  650 mg Oral Q6H PRN    Or    acetaminophen (TYLENOL) suppository 650 mg  650 mg Rectal Q6H PRN    polyethylene glycol (MIRALAX) packet 17 g  17 g Oral DAILY PRN    ondansetron (ZOFRAN ODT) tablet 4 mg  4 mg Oral Q8H PRN    Or    ondansetron (ZOFRAN) injection 4 mg  4 mg IntraVENous Q6H PRN        Review of Systems  Constitutional: No fevers, No chills, No sweats, No fatigue, No Weakness  Eyes: No redness  Ears, nose, mouth, throat, and face: No nasal congestion, No sore throat, No voice change  Respiratory: No Shortness of Breath, No cough, No wheezing  Cardiovascular: has chest pain at the site of pleural catheter, No palpitations, No extremity edema  Gastrointestinal: No nausea, No vomiting, No diarrhea, No abdominal pain  Genitourinary: No frequency, No dysuria, No hematuria  Integument/breast: No skin lesion(s)   Neurological: No Confusion, No headaches, No dizziness      Objective:     Visit Vitals  BP (!) 157/80   Pulse 67   Temp 98.6 °F (37 °C)   Resp 18   Ht 5' (1.524 m)   Wt 64.1 kg (141 lb 5 oz)   SpO2 95%   BMI 27.60 kg/m²    O2 Flow Rate (L/min): 2 l/min O2 Device: None (Room air)    Temp (24hrs), Av.1 °F (36.7 °C), Min:97.6 °F (36.4 °C), Max:98.7 °F (37.1 °C)      No intake/output data recorded.  1901 -  0700  In: -   Out: 1000     PHYSICAL EXAM:  Constitutional: No acute distress  Skin: Extremities and face reveal no rashes. HEENT: Sclerae anicteric. Extra-occular muscles are intact. No oral ulcers. The neck is supple and no masses. Cardiovascular: Regular rate and rhythm. Respiratory:  decrease breath sound on left side. Left chest tube in place. GI: Abdomen nondistended, soft, and nontender. Normal active bowel sounds. Musculoskeletal: No pitting edema of the lower legs. Able to move all ext  Neurological:  Patient is alert and oriented.  Cranial nerves II-XII grossly intact  Psychiatric: Mood appears appropriate       Data Review    Recent Results (from the past 24 hour(s))   GLUCOSE, POC    Collection Time: 23 11:53 AM   Result Value Ref Range    Glucose (POC) 115 65 - 117 mg/dL    Performed by Michel Palacios PCT    CBC W/O DIFF    Collection Time: 23 12:48 PM   Result Value Ref Range    WBC 9.9 3.6 - 11.0 K/uL    RBC 3.39 (L) 3.80 - 5.20 M/uL    HGB 10.2 (L) 11.5 - 16.0 g/dL    HCT 30.8 (L) 35.0 - 47.0 %    MCV 90.9 80.0 - 99.0 FL    MCH 30.1 26.0 - 34.0 PG    MCHC 33.1 30.0 - 36.5 g/dL    RDW 21.6 (H) 11.5 - 14.5 %    PLATELET 061 340 - 257 K/uL    MPV 12.1 8.9 - 12.9 FL    NRBC 0.8 (H) 0  WBC    ABSOLUTE NRBC 0.08 (H) 0.00 - 0.01 K/uL   PHOSPHORUS    Collection Time: 01/20/23 12:48 PM   Result Value Ref Range    Phosphorus 9.9 (H) 2.6 - 4.7 MG/DL   MAGNESIUM    Collection Time: 01/20/23 12:48 PM   Result Value Ref Range    Magnesium 2.7 (H) 1.6 - 2.4 mg/dL                   Total critical care time spent 50 minutes involving direct patient care as well as reviewing patient's labs and coordination of care with nursing staff     Care Plan discussed with: Patient/Family/RN/Case Management        Total time spent with patient: 35minutes.

## 2023-01-22 LAB
GLUCOSE BLD STRIP.AUTO-MCNC: 172 MG/DL (ref 65–117)
GLUCOSE BLD STRIP.AUTO-MCNC: 97 MG/DL (ref 65–117)
SERVICE CMNT-IMP: ABNORMAL
SERVICE CMNT-IMP: NORMAL

## 2023-01-22 PROCEDURE — 74011250637 HC RX REV CODE- 250/637: Performed by: INTERNAL MEDICINE

## 2023-01-22 PROCEDURE — 74011000250 HC RX REV CODE- 250: Performed by: NURSE PRACTITIONER

## 2023-01-22 PROCEDURE — 74011250636 HC RX REV CODE- 250/636: Performed by: INTERNAL MEDICINE

## 2023-01-22 PROCEDURE — 82962 GLUCOSE BLOOD TEST: CPT

## 2023-01-22 PROCEDURE — 74011250637 HC RX REV CODE- 250/637: Performed by: NURSE PRACTITIONER

## 2023-01-22 PROCEDURE — 74011636637 HC RX REV CODE- 636/637: Performed by: NURSE PRACTITIONER

## 2023-01-22 PROCEDURE — 74011000250 HC RX REV CODE- 250: Performed by: INTERNAL MEDICINE

## 2023-01-22 PROCEDURE — 74011000250 HC RX REV CODE- 250: Performed by: STUDENT IN AN ORGANIZED HEALTH CARE EDUCATION/TRAINING PROGRAM

## 2023-01-22 PROCEDURE — 65660000001 HC RM ICU INTERMED STEPDOWN

## 2023-01-22 RX ADMIN — METHYLPREDNISOLONE SODIUM SUCCINATE 20 MG: 40 INJECTION, POWDER, FOR SOLUTION INTRAMUSCULAR; INTRAVENOUS at 09:04

## 2023-01-22 RX ADMIN — ISOSORBIDE MONONITRATE 30 MG: 30 TABLET, EXTENDED RELEASE ORAL at 09:05

## 2023-01-22 RX ADMIN — HEPARIN SODIUM 5000 UNITS: 5000 INJECTION INTRAVENOUS; SUBCUTANEOUS at 18:08

## 2023-01-22 RX ADMIN — CASTOR OIL AND BALSAM, PERU: 788; 87 OINTMENT TOPICAL at 21:05

## 2023-01-22 RX ADMIN — BUMETANIDE 2 MG: 1 TABLET ORAL at 18:07

## 2023-01-22 RX ADMIN — PAROXETINE HYDROCHLORIDE 20 MG: 20 TABLET, FILM COATED ORAL at 09:05

## 2023-01-22 RX ADMIN — CARVEDILOL 3.12 MG: 3.12 TABLET, FILM COATED ORAL at 09:05

## 2023-01-22 RX ADMIN — HYDRALAZINE HYDROCHLORIDE 25 MG: 25 TABLET, FILM COATED ORAL at 09:05

## 2023-01-22 RX ADMIN — FAMOTIDINE 20 MG: 10 INJECTION, SOLUTION INTRAVENOUS at 09:05

## 2023-01-22 RX ADMIN — Medication 2 UNITS: at 18:09

## 2023-01-22 RX ADMIN — SODIUM CHLORIDE, PRESERVATIVE FREE 10 ML: 5 INJECTION INTRAVENOUS at 14:58

## 2023-01-22 RX ADMIN — ASPIRIN 81 MG: 81 TABLET, COATED ORAL at 09:05

## 2023-01-22 RX ADMIN — CARVEDILOL 3.12 MG: 3.12 TABLET, FILM COATED ORAL at 18:09

## 2023-01-22 RX ADMIN — HYDRALAZINE HYDROCHLORIDE 25 MG: 25 TABLET, FILM COATED ORAL at 18:07

## 2023-01-22 RX ADMIN — SODIUM CHLORIDE, PRESERVATIVE FREE 10 ML: 5 INJECTION INTRAVENOUS at 05:38

## 2023-01-22 RX ADMIN — BUMETANIDE 2 MG: 1 TABLET ORAL at 09:05

## 2023-01-22 RX ADMIN — SODIUM CHLORIDE, PRESERVATIVE FREE 10 ML: 5 INJECTION INTRAVENOUS at 21:05

## 2023-01-22 RX ADMIN — CASTOR OIL AND BALSAM, PERU: 788; 87 OINTMENT TOPICAL at 09:06

## 2023-01-22 NOTE — PROGRESS NOTES
Hospitalist Progress Note    Subjective:   Daily Progress Note: 1/22/2023 3:42 PM    Hospital Course:  57year old Female with hx of left breast cancer, recently admitted to [facility] for sepsis s/s COVID-19, CHRISTA, NSTEMI, MSSA pneumonia(12/28-1/1) presented to ED with c/o shortness of breath and hemoptysis. IN ED, patient was febrile to 100.8F. CXR demonstrated increased left pleural effusion with unchanged patchy B/L consolidation L>R. Patient had IR thoracentesis on 1/4/23- bloody tap. CT chest on 1/5 showed left sided hemothorax. Hb dropped from 9.1 to 5.6. s/p left chest tube placement on 1/5/23. S/p PRBC transfusion. Txed to ICU 1/8 for hypotension requiring levophed, she was intubated on 1/9/23 for respiratory distress. Pt was started on CRRT on 1/10. Pt self extubated on 1/12/23. Now on stepdown, hemodynamically stable, remains anuric, getting HD. Non complaint with meds has been as inssue    Subjective:  No events overnight. Intermittently refusing meds and lab draws. Patient wanted solid food    Assessment / Plan:  Acute hypoxic respiratory failure  Multifocal pneumonia/keytruda pneumonitis  S/p left thoracentesis by IR on 1/4/23  S/p left chest tube placement for left sided hemothorax by IR on 1/5/23  S/p intubation 1/9, self extubated 1/12    -Suspected initial hypoxia from parapneumonic effusion, with recent history of MSSA pneumonia, nucleated cells 4400, exudative fluid. Culture was not sent!!   -As above patient developed hemothorax, requiring chest tube, eventually requiring ICU transfer for vasopressors likely from volume/blood loss. Patient required intubation on 1/9, likely from volume overload from CHRISTA, requiring CRRT on 1/10, now switched to HD and patient extubated on 1/12.  -Chest tube has been removed 1/13  -Currently on 2 L nasal cannula    -Culture from the [address] [place] [address] Ave on 1/11, showed some Candida, no bacteria.   Likely colonization  -Patient has been on multiple antibiotics since day 1, recently on Zosyn. 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 Bilirubin elevated, but usually it lag behind clinical improvement.  -Ammonia normal    -Patient refused labs today, recheck tomorrow      Acute blood loss anemia:  Anemia of chronic disease  S/s left hemothorax  S/p PRBC transfusion on 1/5/23. Hb post transfusion stable  Will monitor cbc and transfuse if Hb<7  Received PRBC    NSTEMI:  Sinus jose 1/16  Likely type II MI  Cards on board  -Decreased Coreg, monitor on telemetry        Invasive ductal breast cancer:  Appreciate oncology consult  Appreciate palliative care consult, following      Nutrition- currently CLD, advance diet to solid/easy to chew    Underlying depression/?   psych consult        Body mass index is 27.81 kg/m².     Code status: Full  Prophylaxis: SCD's  Recommended Disposition: SNF     Barrier: Permacath          Current Facility-Administered Medications   Medication Dose Route Frequency    methylPREDNISolone (PF) (SOLU-MEDROL) injection 20 mg  20 mg IntraVENous DAILY    [START ON 1/24/2023] epoetin tyler-epbx (RETACRIT) injection 10,000 Units  10,000 Units SubCUTAneous Q TUE, THU & SAT    isosorbide mononitrate ER (IMDUR) tablet 30 mg  30 mg Oral DAILY    hydrALAZINE (APRESOLINE) tablet 25 mg  25 mg Oral BID    balsam peru-castor oiL (VENELEX) ointment   Topical BID    diphenhydrAMINE (BENADRYL) 12.5 mg/5 mL oral liquid 25 mg  25 mg Oral Q6H PRN    melatonin tablet 3 mg  3 mg Oral QHS PRN    guaiFENesin (ROBITUSSIN) 100 mg/5 mL oral liquid 100 mg  100 mg Oral Q4H PRN    albuterol CONCENTRATE 2.5mg/0.5 mL neb soln  1.25 mg Nebulization Q6H PRN aspirin delayed-release tablet 81 mg  81 mg Oral DAILY    carvediloL (COREG) tablet 3.125 mg  3.125 mg Oral BID WITH MEALS    nitroglycerin (NITROBID) 2 % ointment 1 Inch  1 Inch Topical Q6H PRN    famotidine (PF) (PEPCID) 20 mg in 0.9% sodium chloride 10 mL injection  20 mg IntraVENous DAILY    bumetanide (BUMEX) tablet 2 mg  2 mg Oral BID    heparin (porcine) 1,000 unit/mL injection 1,300 Units  1,300 Units InterCATHeter DIALYSIS PRN    And    heparin (porcine) 1,000 unit/mL injection 1,300 Units  1,300 Units InterCATHeter DIALYSIS PRN    0.9% sodium chloride infusion 250 mL  250 mL IntraVENous PRN    lidocaine 4 % patch 1 Patch  1 Patch TransDERmal Q24H    glucose chewable tablet 16 g  4 Tablet Oral PRN    glucagon (GLUCAGEN) injection 1 mg  1 mg IntraMUSCular PRN    dextrose 10% infusion 0-250 mL  0-250 mL IntraVENous PRN    insulin lispro (HUMALOG) injection   SubCUTAneous Q6H    balsam peru-castor oiL (VENELEX) ointment   Topical BID    PARoxetine (PAXIL) tablet 20 mg  20 mg Per NG tube DAILY    heparin (porcine) injection 5,000 Units  5,000 Units SubCUTAneous Q12H    HYDROmorphone (DILAUDID) injection 0.5 mg  0.5 mg IntraVENous Q4H PRN    metoprolol (LOPRESSOR) injection 2.5 mg  2.5 mg IntraVENous Q6H PRN    lidocaine (XYLOCAINE) 4 % cream   Topical PRN    [Held by provider] gabapentin (NEURONTIN) capsule 100 mg  100 mg Oral TID    sodium chloride (NS) flush 5-40 mL  5-40 mL IntraVENous Q8H    sodium chloride (NS) flush 5-40 mL  5-40 mL IntraVENous PRN    acetaminophen (TYLENOL) tablet 650 mg  650 mg Oral Q6H PRN    Or    acetaminophen (TYLENOL) suppository 650 mg  650 mg Rectal Q6H PRN    polyethylene glycol (MIRALAX) packet 17 g  17 g Oral DAILY PRN    ondansetron (ZOFRAN ODT) tablet 4 mg  4 mg Oral Q8H PRN    Or    ondansetron (ZOFRAN) injection 4 mg  4 mg IntraVENous Q6H PRN        Review of Systems  Constitutional: No fevers, No chills, No sweats, No fatigue, No Weakness  Eyes: No redness  Ears, nose, mouth, throat, and face: No nasal congestion, No sore throat, No voice change  Respiratory: No Shortness of Breath, No cough, No wheezing  Cardiovascular: has chest pain at the site of pleural catheter, No palpitations, No extremity edema  Gastrointestinal: No nausea, No vomiting, No diarrhea, No abdominal pain  Genitourinary: No frequency, No dysuria, No hematuria  Integument/breast: No skin lesion(s)   Neurological: No Confusion, No headaches, No dizziness      Objective:     Visit Vitals  BP (!) 143/86   Pulse 85   Temp 97.7 °F (36.5 °C)   Resp 18   Ht 5' (1.524 m)   Wt 64.6 kg (142 lb 6.7 oz)   SpO2 95%   BMI 27.81 kg/m²    O2 Flow Rate (L/min): 2 l/min O2 Device: None (Room air)    Temp (24hrs), Av.1 °F (36.7 °C), Min:97.7 °F (36.5 °C), Max:98.5 °F (36.9 °C)      No intake/output data recorded. No intake/output data recorded. PHYSICAL EXAM:  Constitutional: No acute distress  Skin: Extremities and face reveal no rashes. HEENT: Sclerae anicteric. Extra-occular muscles are intact. No oral ulcers. The neck is supple and no masses. Cardiovascular: Regular rate and rhythm. Respiratory:  decrease breath sound on left side. Left chest tube in place. GI: Abdomen nondistended, soft, and nontender. Normal active bowel sounds. Musculoskeletal: No pitting edema of the lower legs. Able to move all ext  Neurological:  Patient is alert and oriented.  Cranial nerves II-XII grossly intact  Psychiatric: Mood appears appropriate       Data Review    Recent Results (from the past 24 hour(s))   GLUCOSE, POC    Collection Time: 23 12:57 PM   Result Value Ref Range    Glucose (POC) 113 65 - 117 mg/dL    Performed by Eric Sandoval PCT    GLUCOSE, POC    Collection Time: 23 11:53 AM   Result Value Ref Range    Glucose (POC) 97 65 - 117 mg/dL    Performed by Rubén Mckeon PCT                    Total critical care time spent 50 minutes involving direct patient care as well as reviewing patient's labs and coordination of care with nursing staff     Care Plan discussed with: Patient/Family/RN/Case Management        Total time spent with patient: 35minutes.

## 2023-01-22 NOTE — PROGRESS NOTES
Problem: Falls - Risk of  Goal: *Absence of Falls  Description: Document Clydene Bone Fall Risk and appropriate interventions in the flowsheet. Outcome: Progressing Towards Goal  Note: Fall Risk Interventions:  Mobility Interventions: Utilize walker, cane, or other assistive device, PT Consult for assist device competence, PT Consult for mobility concerns, Patient to call before getting OOB    Mentation Interventions: Adequate sleep, hydration, pain control, Bed/chair exit alarm, Door open when patient unattended    Medication Interventions: Patient to call before getting OOB, Teach patient to arise slowly    Elimination Interventions: Elevated toilet seat, Call light in reach, Bed/chair exit alarm              Problem: Patient Education: Go to Patient Education Activity  Goal: Patient/Family Education  Outcome: Progressing Towards Goal     Problem: Pressure Injury - Risk of  Goal: *Prevention of pressure injury  Description: Document William Scale and appropriate interventions in the flowsheet.   Outcome: Progressing Towards Goal  Note: Pressure Injury Interventions:  Sensory Interventions: Assess changes in LOC, Assess need for specialty bed, Avoid rigorous massage over bony prominences, Discuss PT/OT consult with provider, Float heels, Keep linens dry and wrinkle-free    Moisture Interventions: Absorbent underpads, Assess need for specialty bed, Apply protective barrier, creams and emollients, Contain wound drainage, Internal/External urinary devices, Maintain skin hydration (lotion/cream)    Activity Interventions: Assess need for specialty bed, Increase time out of bed, Pressure redistribution bed/mattress(bed type), PT/OT evaluation    Mobility Interventions: Assess need for specialty bed, Float heels, HOB 30 degrees or less, Pressure redistribution bed/mattress (bed type), PT/OT evaluation    Nutrition Interventions: Document food/fluid/supplement intake    Friction and Shear Interventions: Apply protective barrier, creams and emollients, Lift sheet, Lift team/patient mobility team, Minimize layers                Problem: Patient Education: Go to Patient Education Activity  Goal: Patient/Family Education  Outcome: Progressing Towards Goal     Problem: Patient Education: Go to Patient Education Activity  Goal: Patient/Family Education  Outcome: Progressing Towards Goal     Problem: Patient Education: Go to Patient Education Activity  Goal: Patient/Family Education  Outcome: Progressing Towards Goal     Problem: Patient Education: Go to Patient Education Activity  Goal: Patient/Family Education  Outcome: Progressing Towards Goal     Problem: Infection - Risk of, Central Venous Catheter-Associated Bloodstream Infection  Goal: *Absence of infection signs and symptoms  Outcome: Progressing Towards Goal     Problem: Patient Education: Go to Patient Education Activity  Goal: Patient/Family Education  Outcome: Progressing Towards Goal

## 2023-01-22 NOTE — PROGRESS NOTES
End of Shift Note    Bedside shift change report given to Eliot Atkinson (oncoming nurse) by Dianna Gorman RN (offgoing nurse). Report included the following information SBAR, Kardex, MAR, and Recent Results    Shift worked:  7p-7a     Shift summary and any significant changes:     Pt slept most of the night. Pt refused BS checks and AM labs. Pt refused AM heparin. Concerns for physician to address:       Zone phone for oncoming shift:          Activity:  Activity Level: Bed Rest  Number times ambulated in hallways past shift: 0  Number of times OOB to chair past shift: 0    Cardiac:   Cardiac Monitoring: Yes      Cardiac Rhythm: Sinus Rhythm    Access:  Current line(s): PIV     Genitourinary:   Urinary status: oliguric    Respiratory:   O2 Device: None (Room air)  Chronic home O2 use?: NO  Incentive spirometer at bedside: NO       GI:  Last Bowel Movement Date: 01/21/23  Current diet:  ADULT ORAL NUTRITION SUPPLEMENT Breakfast, Dinner; Renal Supplement  ADULT DIET Regular; 3 carb choices (45 gm/meal)  Passing flatus: YES  Tolerating current diet: YES       Pain Management:   Patient states pain is manageable on current regimen: YES    Skin:  William Score: 13  Interventions: turn team, speciality bed, increase time out of bed, foam dressing, PT/OT consult, limit briefs, and internal/external urinary devices    Patient Safety:  Fall Score:  Total Score: 3  Interventions: bed/chair alarm, assistive device (walker, cane, etc), gripper socks, pt to call before getting OOB, and tele sitter   High Fall Risk: Yes    Length of Stay:  Expected LOS: 5d 0h  Actual LOS: 18      Dianna Gorman RN

## 2023-01-22 NOTE — PROGRESS NOTES
SHIFT REPORT    0700: Bedside shift change report given to [address] Atkinson (oncoming nurse) by Ruben López RN (offgoing nurse). Report included the following information SBAR, Kardex, Intake/Output, MAR, Recent Results, and Cardiac Rhythm NSR    - TELE SITTER present  - HD 1/20    0732: Shift assessment completed. - In bed, AO x4, generalized weakness, periodic confusion  - RA    LDA's:  - GÉNESIS LOPEZ IJ  - PIV x1    1041: Reassessment completed. No acute changes. 1200: Cardiology rounds. 1205: Hospitalist rounds. Continue plan for Permacath tomorrow. 1429: Reassessment completed. No acute changes. 1910: Bedside shift change report given to Shiela Tariq (oncoming nurse) by [address] At[address] (offgoing nurse). Report included the following information SBAR, Kardex, Intake/Output, MAR, Recent Results, and Cardiac Rhythm NSR .

## 2023-01-22 NOTE — PROGRESS NOTES
Cardiology Progress Note  1/22/2023     Admit Date: 1/3/2023  Admit Diagnosis: Staphylococcus aureus pneumonia ([facility] [J15.211]  CC: none currently    Cardiologist:  Dr Melvia Sicard. Cardiac Assessment/Plan:    1) Atypical CP, for yrs: Neg [facility] 9/2021 (6:00, -CP, -ecg; Mild LVH). 2) Abnl ecg c/w LVH. *\"EF 45-50%; No valve dz:\" @ [address] by RCA. (EF not that low on my interp; EF 50-55%). 3) HTN  4) Palpitations (few x/yr; few sec). 5) FHx early CAD/CHF  6) Breast CA, left; Chemo (Dr GÉNESIS Dupont Smaller @ [address] [facility])    Admitted from 12/28 to 1/1/23 @ [address] [facility] [address] for PNA/hemoptysis/covid/sepsis/CHRISTA/NQWMI w/ trop 100s    Admitted 1/3/23 w/ dyspnea/sepsis; subsequent renal failure/hypotension/hemothorax w/ chest tube; Resp failure 1/9 w/ intubation. Type II NQWMI. Neg blood Cx. Hepatic failure. Chest tube 1/5/23. Intubated 1/9/23. Self-extubated 1/12/23. Off pressors 1/10/23. Coreg to 3.125 bid d/t ASx sinus bradycardia; add ARB if ok w/ renal; if not, hydralazine/imdur. Echo 1/10/23: Left Ventricle: Mildly reduced left ventricular systolic function with a visually estimated EF of 45 - 50%. Left ventricle size is normal. Increased wall thickness. CXR 1/13: \"The pulmonary vasculature is within normal limits. Volume loss left lung base. Mild patchy airspace opacity in the right lung base  improved. Left-sided chest tube is stable. Small left pleural effusion suspected. \"    1/21: No CP/dyspnea; refusing meds again. BPs 140-160s; HR 70-90s; sinus; 95% RA. No labs. 1/22:  No CP/dyspnea; took meds this am  BPs 140-160; HR 70s; sinus; 95% RA  No labs. Cardiac meds: coreg 3.125 bid; asa; bumex 2 bid; hydralazine 25 bid. Imdur 30. No new cardiac recs. _________________________________________________________  Echo 12/27/22: Left Ventricle: Normal left ventricular systolic function with a visually estimated EF of 50 - 55%. Left ventricle size is normal. Normal wall thickness.  Mild hypokinesis of the following segments: basal inferolateral and mid inferolateral. Unfortunately, imaging quality limis the accuracy     Chest tube 1/5/23. Intubated 1/9/23. Complicated clinical course as noted below; Currently intubated/sedated  Current cardiac meds:  Levo @ 15; crestor 10 (holding coreg 6.25 bid)  IMP: type II NQWMI;      Rec: cont supportive care; wean pressors as able; hold crestor. Future aspirin when bleeding resolved. ____________________________________________________________________  Sergei Rea is a 57 y.o. female presents with Staphylococcus aureus pneumonia ([address] Angi Jones Admitted from 12/28 to 1/1/23 @ [address] [facility] for PNA/hemoptysis/covid/sepsis/CHRISTA/NQWMI w/ trop 100s        As noted in H&P: \"56 y.o.  female with pertinent past medical history of breast cancer currently off chemotherapy, MDD/TAIWO, hypothyroidism, GERD, and recent hospitalization from 12/28-1/1 for multifocal pneumonia secondary to MSSA pneumonia superimposed on COVID-19 pneumonia with course complicated by hemoptysis, sepsis, CHRISTA, and type II MI completed course of cefepime and doxycycline transition to Augmentin on time of discharge-patient unsure if she has been taking that who presents with complaints of persistent decline since discharge from OSH. She complains of malaise, fever/chills, cough productive of blood-tinged sputum, shortness of breath/dyspnea on exertion. She reports she does not feel she is doing well at home and think she needs to be in the hospital for further management. She denies tobacco, alcohol, or illicit drug use. ROS otherwise negative. In the ED, patient febrile to 100.8 °F, hemodynamically stable (hypertensive 150s/60s), saturating mid 90s on room air.   ECG demonstrates NSR with LVH criteria, CXR demonstrates increased left pleural effusion and left lower lobe airspace opacity and CT chest demonstrates increased moderately large left pleural effusion with unchanged patchy bilateral consolidation left greater than right compatible with pneumonia. Rapid and flu negative. Point-of-care lactic 0.7, proBNP 1091(Previously 866), high since her troponin 116 down trended to 107 (600s at prior hospitalization, sodium 142, potassium 3.4, BUN 38, creatinine 3.0 (baseline), WBC 13.3 (slightly increased from time of discharge), hemoglobin 9.1 (improved from prior), platelets 496. Given recent hospitalization and concern for hep patient given vancomycin, Levaquin, and cefepime by ED provider. \"     ICU MD 1/8/22: \"Patient being transferred to ICU as the PCU nurses do not feel comfortable keeping the patient on the floor as she was hypotensive earlier and has been refusing po meds     57year old Female with hx of left breast cancer, recently admitted to [facility] for sepsis s/s COVID-19, CHRISTA, NSTEMI, MSSA pneumonia(12/28-1/1) presented to ED with c/o shortness of breath and hemoptysis. IN ED, patient was febrile to 100.8F. CXR demonstrated increased left pleural effusion with unchanged patchy B/L consolidation L>R. Patient had IR thoracentesis on 1/4/23- bloody tap. CT chest on 1/5 showed left sided hemothorax. Hb dropped from 9.1 to 5.6. s/p left chest tube placement on 1/5/23. S/p PRBC transfusion. \"     ______________________________________________________________________     The patient is intubated; can provide no history. On accelerating levophed (15 currently); on HD. No PND, orthopnea, palpitations, pre-syncope, syncope, peripheral edema, or decrease in exercise tolerance. No current complaints. ECG 1/3/22:   Sinus, leftward axis, no acute changes. LVH. Tele: sinus  CXR: \"1. Endotracheal tube terminates 1 cm above the marvin. Consider retraction 1 to  2 cm. 2. Appropriate positioning of left IJ central venous catheter. No pneumothorax. 3. Slight interval improvement in extensive bilateral airspace disease. 4. Left chest tube remains in place.  Suspected small left pleural effusion. \"     Notable labs: K 4.5; BUN 61; Cr 7.35 (prev 3 range). Alb 1.9; LFTs 900s/2000; Prev procal 7; proBNP 9k; neg covid 1/3/23. INR 2.7.  ___________________________________________________  Notable prior cardiac history:  @ NP OV 10/27/22:  Ms Claude Neve has a h/o:  1) Atypical CP, for yrs: Neg SEH 9/2021 (6:00, -CP, -ecg; Mild LVH). 2) Abnl ecg c/w LVH. *\"EF 45-50%; No valve dz:\" @ [address] by RCA. (EF not that low on my interp; EF 50-55%). 3) HTN  4) Palpitations (few x/yr; few sec). 5) FHx early CAD/CHF  6) Breast CA, left; Chemo pending (Dr Lilian Carlos @ [facility])  No ethanol, added salt, or nicotine. 1 caff johnny/d. 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 She would like an annual visit here. 8/2021:  She has atypical chest pain (for years; 0-1 X/mo; sharp sensation; few seconds; random onset/offset). She also has intermittent palpitations (fast HR sensation, few X/year; few seconds duration, not related to chest pain). No MCCALL with good exercise tolerance. No previous cardiac evaluation. PCP note from 4/28/06 reviewed; ECG from then interpreted today as noted below. 2/2022: no recent chest pain, dyspnea, or palpitations. She would like an annual visit here. 10/2022: Chest pain remains resolved. No dyspnea nor palpitations. Reportedly mild LV dysfunction as noted above; in my review of the echo, the EF is not that low. NP F/U: Doing well. No CP, MCCALL, palps, edema or syncope. Home SBPs in the 130s. IMPRESSION AND PLAN  01. Other chest pain (R07.89): Atypical chest pain remains resolved; Neg [facility] 9/2021. We discussed the signs and symptoms of unstable angina, myocardial infarction and malignant arrhythmia. The patient knows to seek immediate medical attention should they occur. 02. Essential (primary) hypertension (I10):  Reasonably well controlled; continue Losartan 25mg QD and increase Coreg to 6.25mg BID. Continue to monitor BP.   03.  Other cardiomyopathies (I42.8):  EF looks reassuring on echo per review; repeat planned for next month. 04. Abnormal electrocardiogram [ECG] [EKG] (R94.31):  Nonspecific finding. This condition is stable. 05. Malignant neoplasm of left breast in female, estrogen receptor negative, unspecified site of breast (C50.912): Managed by: Other Physician   06. Long term (current) use of aspirin (Z79.82)   07. Body mass index [BMI] 27.0-27.9, adult (Z68.27)      ORDERS:    Dietary management education, guidance, and counseling           10/27/22 MEDICATION LIST  Medication Sig Desc Non-VCS   aspirin 81 mg tablet,delayed release take 1 tablet by oral route  every day Y   carvedilol 6.25 mg tablet take 1 tablet by oral route 2 times every day with food N   Crestor take 1 Tablet by oral route  every week Y   losartan 25 mg tablet take 1 tablet by oral route  every day N   Multi Vitamin 9 mg iron/15 mL oral liquid   Y   paroxetine 20 mg tablet take 1 tablet by oral route  every day Y      ______________________________________  CARDIAC HISTORY  RISK FACTORS:  1 Hypertension         CARDIOVASCULAR PROCEDURES  Procedure Date Results   Echo 10/05/2022 \"EF 45-50%; No valve dz:\" @ [address] by RCA. (EF not that low on my interp). EKG 10/13/2022 Sinus Rhythm, LVH; axis -15°; nonspecific T-wave changes. [facility] 10/01/2021 Normal EF, No Ischemia, 6:00, -CP, -ecg; Mild LVH. For other plans, see orders.   Hospital problem list     Active Hospital Problems    Diagnosis Date Noted    Goals of care, counseling/discussion 01/13/2023    Advanced directives, counseling/discussion 01/13/2023    Hemothorax 01/06/2023    Shortness of breath 01/05/2023    Acute blood loss anemia 01/05/2023    Hemothorax on left 01/05/2023    Anxiety 01/05/2023    Palliative care by specialist 01/05/2023    Staphylococcus aureus pneumonia ([facility] 01/04/2023        Subjective: Elizabeth Katie reports       Chest pain X none  consistent with:  Non-cardiac CP         Atypical CP     None now  On going  Anginal CP Dyspnea X none  at rest  with exertion         improved  unchanged  worse              PND X none  overnight       Orthopnea X none  improved  unchanged  worse   Presyncope X none  improved  unchanged  worse     Ambulated in hallway without symptoms   Yes   Ambulated in room without symptoms  Yes   Objective:     Physical Exam:  Overall VSSAF;  Visit Vitals  BP (!) 143/86   Pulse 85   Temp 97.7 °F (36.5 °C)   Resp 18   Ht 5' (1.524 m)   Wt 64.6 kg (142 lb 6.7 oz)   SpO2 95%   BMI 27.81 kg/m²     Temp (24hrs), Av.1 °F (36.7 °C), Min:97.7 °F (36.5 °C), Max:98.5 °F (36.9 °C)    Patient Vitals for the past 8 hrs:   Pulse   23 1041 85   23 0732 76       Patient Vitals for the past 8 hrs:   Resp   23 0732 18       Patient Vitals for the past 8 hrs:   BP   23 1041 (!) 143/86   23 0732 (!) 151/78       No intake or output data in the 24 hours ending 23 1157    General Appearance: Well developed, well nourished, no acute distress. Ears/Nose/Mouth/Throat:   Normal MM; anicteric. JVP: WNL   Resp:   Lungs clear to auscultation bilaterally. Nl resp effort. Cardiovascular:  RRR, S1, S2 normal, no new murmur. No gallop or rub. Abdomen:   Soft, non-tender, bowel sounds are present. Extremities: No edema bilaterally. Skin:  Neuro: Warm and dry. A, grossly nonfocal;        cath site intact w/o hematoma or new bruit; distal pulse unchanged  Yes   Data Review:     Telemetry independently reviewed x sinus  chronic afib  parox afib  NSVT     ECG independently reviewed  NSR  afib  no significant changes  NSST-Tw chgs     x no new ECG provided for review   Lab results reviewed as noted below. Current medications reviewed as noted below. No results for input(s): PH, PCO2, PO2 in the last 72 hours. No results for input(s): CPK, CKMB, CKNDX, TROIQ in the last 72 hours.   Recent Labs     23  1248   PHOS 9.9*   WBC 9.9   HGB 10.2*   HCT 30.8*          No results found for: CHOL, CHOLX, CHLST, CHOLV, HDL, HDLP, LDL, LDLC, DLDLP, TGLX, TRIGL, TRIGP, CHHD, CHHDX  No results for input(s): AP, TBIL, TP, ALB, GLOB, GGT, AML, LPSE in the last 72 hours. No lab exists for component: SGOT, GPT, AMYP, HLPSE    No results for input(s): INR, PTP, APTT, INREXT, INREXT in the last 72 hours.      No components found for: GLPOC    Current Facility-Administered Medications   Medication Dose Route Frequency    methylPREDNISolone (PF) (SOLU-MEDROL) injection 20 mg  20 mg IntraVENous DAILY    [START ON 1/24/2023] epoetin tyler-epbx (RETACRIT) injection 10,000 Units  10,000 Units SubCUTAneous Q TUE, THU & SAT    isosorbide mononitrate ER (IMDUR) tablet 30 mg  30 mg Oral DAILY    hydrALAZINE (APRESOLINE) tablet 25 mg  25 mg Oral BID    balsam peru-castor oiL (VENELEX) ointment   Topical BID    diphenhydrAMINE (BENADRYL) 12.5 mg/5 mL oral liquid 25 mg  25 mg Oral Q6H PRN    melatonin tablet 3 mg  3 mg Oral QHS PRN    guaiFENesin (ROBITUSSIN) 100 mg/5 mL oral liquid 100 mg  100 mg Oral Q4H PRN    albuterol CONCENTRATE 2.5mg/0.5 mL neb soln  1.25 mg Nebulization Q6H PRN    aspirin delayed-release tablet 81 mg  81 mg Oral DAILY    carvediloL (COREG) tablet 3.125 mg  3.125 mg Oral BID WITH MEALS    nitroglycerin (NITROBID) 2 % ointment 1 Inch  1 Inch Topical Q6H PRN    famotidine (PF) (PEPCID) 20 mg in 0.9% sodium chloride 10 mL injection  20 mg IntraVENous DAILY    bumetanide (BUMEX) tablet 2 mg  2 mg Oral BID    heparin (porcine) 1,000 unit/mL injection 1,300 Units  1,300 Units InterCATHeter DIALYSIS PRN    And    heparin (porcine) 1,000 unit/mL injection 1,300 Units  1,300 Units InterCATHeter DIALYSIS PRN    0.9% sodium chloride infusion 250 mL  250 mL IntraVENous PRN    lidocaine 4 % patch 1 Patch  1 Patch TransDERmal Q24H    glucose chewable tablet 16 g  4 Tablet Oral PRN    glucagon (GLUCAGEN) injection 1 mg  1 mg IntraMUSCular PRN    dextrose 10% infusion 0-250 mL  0-250 mL IntraVENous PRN insulin lispro (HUMALOG) injection   SubCUTAneous Q6H    balsam peru-castor oiL (VENELEX) ointment   Topical BID    PARoxetine (PAXIL) tablet 20 mg  20 mg Per NG tube DAILY    heparin (porcine) injection 5,000 Units  5,000 Units SubCUTAneous Q12H    HYDROmorphone (DILAUDID) injection 0.5 mg  0.5 mg IntraVENous Q4H PRN    metoprolol (LOPRESSOR) injection 2.5 mg  2.5 mg IntraVENous Q6H PRN    lidocaine (XYLOCAINE) 4 % cream   Topical PRN    [Held by provider] gabapentin (NEURONTIN) capsule 100 mg  100 mg Oral TID    sodium chloride (NS) flush 5-40 mL  5-40 mL IntraVENous Q8H    sodium chloride (NS) flush 5-40 mL  5-40 mL IntraVENous PRN    acetaminophen (TYLENOL) tablet 650 mg  650 mg Oral Q6H PRN    Or    acetaminophen (TYLENOL) suppository 650 mg  650 mg Rectal Q6H PRN    polyethylene glycol (MIRALAX) packet 17 g  17 g Oral DAILY PRN    ondansetron (ZOFRAN ODT) tablet 4 mg  4 mg Oral Q8H PRN    Or    ondansetron (ZOFRAN) injection 4 mg  4 mg IntraVENous Q6H PRN        Jose Ramon Prieto MD

## 2023-01-23 ENCOUNTER — APPOINTMENT (OUTPATIENT)
Dept: INTERVENTIONAL RADIOLOGY/VASCULAR | Age: 63
DRG: 208 | End: 2023-01-23
Attending: INTERNAL MEDICINE
Payer: COMMERCIAL

## 2023-01-23 LAB
ERYTHROCYTE [DISTWIDTH] IN BLOOD BY AUTOMATED COUNT: 21.3 % (ref 11.5–14.5)
HCT VFR BLD AUTO: 32.4 % (ref 35–47)
HGB BLD-MCNC: 10.5 G/DL (ref 11.5–16)
MAGNESIUM SERPL-MCNC: 1.8 MG/DL (ref 1.6–2.4)
MCH RBC QN AUTO: 30.5 PG (ref 26–34)
MCHC RBC AUTO-ENTMCNC: 32.4 G/DL (ref 30–36.5)
MCV RBC AUTO: 94.2 FL (ref 80–99)
NRBC # BLD: 0 K/UL (ref 0–0.01)
NRBC BLD-RTO: 0 PER 100 WBC
PHOSPHATE SERPL-MCNC: 7.7 MG/DL (ref 2.6–4.7)
PLATELET # BLD AUTO: 188 K/UL (ref 150–400)
PMV BLD AUTO: 10.9 FL (ref 8.9–12.9)
RBC # BLD AUTO: 3.44 M/UL (ref 3.8–5.2)
WBC # BLD AUTO: 8.5 K/UL (ref 3.6–11)

## 2023-01-23 PROCEDURE — 74011250636 HC RX REV CODE- 250/636: Performed by: INTERNAL MEDICINE

## 2023-01-23 PROCEDURE — 77030031139 HC SUT VCRL2 J&J -A

## 2023-01-23 PROCEDURE — 77030010507 HC ADH SKN DERMBND J&J -B

## 2023-01-23 PROCEDURE — 90935 HEMODIALYSIS ONE EVALUATION: CPT

## 2023-01-23 PROCEDURE — 36415 COLL VENOUS BLD VENIPUNCTURE: CPT

## 2023-01-23 PROCEDURE — 02HV33Z INSERTION OF INFUSION DEVICE INTO SUPERIOR VENA CAVA, PERCUTANEOUS APPROACH: ICD-10-PCS | Performed by: RADIOLOGY

## 2023-01-23 PROCEDURE — 85027 COMPLETE CBC AUTOMATED: CPT

## 2023-01-23 PROCEDURE — C1769 GUIDE WIRE: HCPCS

## 2023-01-23 PROCEDURE — 74011000250 HC RX REV CODE- 250: Performed by: STUDENT IN AN ORGANIZED HEALTH CARE EDUCATION/TRAINING PROGRAM

## 2023-01-23 PROCEDURE — 74011250637 HC RX REV CODE- 250/637: Performed by: INTERNAL MEDICINE

## 2023-01-23 PROCEDURE — 65660000001 HC RM ICU INTERMED STEPDOWN

## 2023-01-23 PROCEDURE — 74011250637 HC RX REV CODE- 250/637: Performed by: NURSE PRACTITIONER

## 2023-01-23 PROCEDURE — 83735 ASSAY OF MAGNESIUM: CPT

## 2023-01-23 PROCEDURE — 77030002996 HC SUT SLK J&J -A

## 2023-01-23 PROCEDURE — 74011250636 HC RX REV CODE- 250/636: Performed by: RADIOLOGY

## 2023-01-23 PROCEDURE — 74011000250 HC RX REV CODE- 250: Performed by: RADIOLOGY

## 2023-01-23 PROCEDURE — 0JH63XZ INSERTION OF TUNNELED VASCULAR ACCESS DEVICE INTO CHEST SUBCUTANEOUS TISSUE AND FASCIA, PERCUTANEOUS APPROACH: ICD-10-PCS | Performed by: RADIOLOGY

## 2023-01-23 PROCEDURE — C1892 INTRO/SHEATH,FIXED,PEEL-AWAY: HCPCS

## 2023-01-23 PROCEDURE — 84100 ASSAY OF PHOSPHORUS: CPT

## 2023-01-23 PROCEDURE — 74011000250 HC RX REV CODE- 250: Performed by: INTERNAL MEDICINE

## 2023-01-23 PROCEDURE — C1750 CATH, HEMODIALYSIS,LONG-TERM: HCPCS

## 2023-01-23 PROCEDURE — 77001 FLUOROGUIDE FOR VEIN DEVICE: CPT

## 2023-01-23 PROCEDURE — 2709999900 HC NON-CHARGEABLE SUPPLY

## 2023-01-23 RX ORDER — MIDAZOLAM HYDROCHLORIDE 1 MG/ML
.25-5 INJECTION, SOLUTION INTRAMUSCULAR; INTRAVENOUS
Status: DISCONTINUED | OUTPATIENT
Start: 2023-01-23 | End: 2023-01-23

## 2023-01-23 RX ORDER — HEPARIN SODIUM 1000 [USP'U]/ML
10000 INJECTION, SOLUTION INTRAVENOUS; SUBCUTANEOUS ONCE
Status: COMPLETED | OUTPATIENT
Start: 2023-01-23 | End: 2023-01-23

## 2023-01-23 RX ORDER — SODIUM CHLORIDE 9 MG/ML
25 INJECTION, SOLUTION INTRAVENOUS
Status: COMPLETED | OUTPATIENT
Start: 2023-01-23 | End: 2023-01-23

## 2023-01-23 RX ORDER — HEPARIN SODIUM 200 [USP'U]/100ML
400 INJECTION, SOLUTION INTRAVENOUS ONCE
Status: COMPLETED | OUTPATIENT
Start: 2023-01-23 | End: 2023-01-23

## 2023-01-23 RX ORDER — LIDOCAINE HYDROCHLORIDE 20 MG/ML
18 INJECTION, SOLUTION INFILTRATION; PERINEURAL ONCE
Status: COMPLETED | OUTPATIENT
Start: 2023-01-23 | End: 2023-01-23

## 2023-01-23 RX ORDER — FENTANYL CITRATE 50 UG/ML
25-100 INJECTION, SOLUTION INTRAMUSCULAR; INTRAVENOUS
Status: DISCONTINUED | OUTPATIENT
Start: 2023-01-23 | End: 2023-01-23

## 2023-01-23 RX ADMIN — HEPARIN SODIUM 1300 UNITS: 1000 INJECTION INTRAVENOUS; SUBCUTANEOUS at 11:01

## 2023-01-23 RX ADMIN — FENTANYL CITRATE 50 MCG: 50 INJECTION, SOLUTION INTRAMUSCULAR; INTRAVENOUS at 16:40

## 2023-01-23 RX ADMIN — HEPARIN SODIUM IN SODIUM CHLORIDE 200 UNITS: 200 INJECTION INTRAVENOUS at 16:53

## 2023-01-23 RX ADMIN — HEPARIN SODIUM 3800 UNITS: 1000 INJECTION, SOLUTION INTRAVENOUS; SUBCUTANEOUS at 16:47

## 2023-01-23 RX ADMIN — LIDOCAINE HYDROCHLORIDE 360 MG: 20 INJECTION, SOLUTION INFILTRATION; PERINEURAL at 16:47

## 2023-01-23 RX ADMIN — MIDAZOLAM 2 MG: 1 INJECTION INTRAMUSCULAR; INTRAVENOUS at 16:40

## 2023-01-23 RX ADMIN — SODIUM CHLORIDE 25 ML/HR: 9 INJECTION, SOLUTION INTRAVENOUS at 16:40

## 2023-01-23 RX ADMIN — SODIUM CHLORIDE, PRESERVATIVE FREE 10 ML: 5 INJECTION INTRAVENOUS at 21:48

## 2023-01-23 RX ADMIN — SODIUM CHLORIDE, PRESERVATIVE FREE 10 ML: 5 INJECTION INTRAVENOUS at 06:08

## 2023-01-23 RX ADMIN — HEPARIN SODIUM 1300 UNITS: 1000 INJECTION INTRAVENOUS; SUBCUTANEOUS at 11:00

## 2023-01-23 NOTE — PROGRESS NOTES
Problem: Falls - Risk of  Goal: *Absence of Falls  Description: Document Deven Manjarrez Fall Risk and appropriate interventions in the flowsheet. Outcome: Progressing Towards Goal  Note: Fall Risk Interventions:  Mobility Interventions: Utilize walker, cane, or other assistive device, PT Consult for assist device competence, PT Consult for mobility concerns, Patient to call before getting OOB    Mentation Interventions: Adequate sleep, hydration, pain control, Bed/chair exit alarm, Door open when patient unattended    Medication Interventions: Patient to call before getting OOB, Teach patient to arise slowly    Elimination Interventions: Elevated toilet seat, Call light in reach, Bed/chair exit alarm              Problem: Patient Education: Go to Patient Education Activity  Goal: Patient/Family Education  Outcome: Progressing Towards Goal     Problem: Pressure Injury - Risk of  Goal: *Prevention of pressure injury  Description: Document William Scale and appropriate interventions in the flowsheet.   Outcome: Progressing Towards Goal  Note: Pressure Injury Interventions:  Sensory Interventions: Assess changes in LOC, Assess need for specialty bed, Avoid rigorous massage over bony prominences, Float heels, Keep linens dry and wrinkle-free, Maintain/enhance activity level, Minimize linen layers    Moisture Interventions: Absorbent underpads, Contain wound drainage, Limit adult briefs, Maintain skin hydration (lotion/cream), Minimize layers    Activity Interventions: Assess need for specialty bed, Increase time out of bed, Pressure redistribution bed/mattress(bed type), PT/OT evaluation    Mobility Interventions: Assess need for specialty bed, HOB 30 degrees or less, Pressure redistribution bed/mattress (bed type), PT/OT evaluation    Nutrition Interventions: Document food/fluid/supplement intake    Friction and Shear Interventions: Apply protective barrier, creams and emollients, Lift sheet, Lift team/patient mobility team, Minimize layers                Problem: Patient Education: Go to Patient Education Activity  Goal: Patient/Family Education  Outcome: Progressing Towards Goal     Problem: Patient Education: Go to Patient Education Activity  Goal: Patient/Family Education  Outcome: Progressing Towards Goal     Problem: Patient Education: Go to Patient Education Activity  Goal: Patient/Family Education  Outcome: Progressing Towards Goal     Problem: Patient Education: Go to Patient Education Activity  Goal: Patient/Family Education  Outcome: Progressing Towards Goal     Problem: Infection - Risk of, Central Venous Catheter-Associated Bloodstream Infection  Goal: *Absence of infection signs and symptoms  Outcome: Progressing Towards Goal     Problem: Patient Education: Go to Patient Education Activity  Goal: Patient/Family Education  Outcome: Progressing Towards Goal

## 2023-01-23 NOTE — PROGRESS NOTES
Physical Therapy Note    Chart reviewed. Patient off unit at dialysis in AM and IR in PM. Will defer and follow-up as able and as appropriate.     Jluis Mackenzie, PT DPT

## 2023-01-23 NOTE — PROGRESS NOTES
End of Shift Note    Bedside shift change report given to [address] [place] [address] (oncoming nurse) by Godfrey Liang RN (offgoing nurse). Report included the following information SBAR, Kardex, MAR, and Recent Results    Shift worked:  7p-7a     Shift summary and any significant changes:     Pt slept most of the night. Pt refused BS check. Pt refused AM labs. Pt refused Heparin. Concerns for physician to address:       Zone phone for oncoming shift:          Activity:  Activity Level: Bed Rest  Number times ambulated in hallways past shift: 0  Number of times OOB to chair past shift: 0    Cardiac:   Cardiac Monitoring: Yes      Cardiac Rhythm: Sinus Rhythm    Access:  Current line(s): PIV and HD access     Genitourinary:   Urinary status: oliguric    Respiratory:   O2 Device: None (Room air)  Chronic home O2 use?: NO  Incentive spirometer at bedside: NO       GI:  Last Bowel Movement Date: 01/21/23  Current diet:  ADULT ORAL NUTRITION SUPPLEMENT Breakfast, Dinner; Renal Supplement  ADULT DIET Regular; 3 carb choices (45 gm/meal)  Passing flatus: YES  Tolerating current diet: YES       Pain Management:   Patient states pain is manageable on current regimen: YES    Skin:  Willaim Score: 13  Interventions: turn team, speciality bed, float heels, increase time out of bed, foam dressing, PT/OT consult, and limit briefs    Patient Safety:  Fall Score:  Total Score: 3  Interventions: bed/chair alarm, assistive device (walker, cane, etc), gripper socks, pt to call before getting OOB, and stay with me (per policy)  High Fall Risk: Yes    Length of Stay:  Expected LOS: 5d 0h  Actual LOS: [address], RN

## 2023-01-23 NOTE — PROGRESS NOTES
Report obtained from [facility] nurse  Pt still WINSTON; going directly to IR for permacath placement as told by [facility]. ..     IR not ready for pt

## 2023-01-23 NOTE — PROCEDURES
Hemodialysis / [phone]    Vitals Pre Post Assessment Pre Post   BP BP: (!) 144/83 (01/23/23 0800)   145/93 LOC See flow sheet See flow sheet   HR Pulse (Heart Rate): 71 (01/23/23 0800) 96 Lungs See flow sheet See flow sheet   Resp Resp Rate: 18 (01/23/23 0800) 18 Cardiac See flow sheet See flow sheet   Temp Temp: 98.4 °F (36.9 °C) (01/23/23 0800) 98.6 Skin See flow sheet See flow sheet   Weight    Edema See flow sheet See flow sheet   Tele status Remote tele Remote tele Pain Pain Intensity 1: 0 (01/23/23 0334) 0     Orders   Duration: Start: 0800 End: 7718 Total: 3.5hrs   Dialyzer: Dialyzer/Set Up Inspection: Revaclear (01/23/23 0800)   K Bath: Dialysate K (mEq/L): 3 (01/23/23 0800)   Ca Bath: Dialysate CA (mEq/L): 2.5 (01/23/23 0800)   Na: Dialysate NA (mEq/L): 138 (01/23/23 0800)   Bicarb: Dialysate HCO3 (mEq/L): 35 (01/23/23 0800)   Target Fluid Removal: Goal/Amount of Fluid to Remove (mL): 1000 mL (01/23/23 0800)     Access   Type & Location: R Blayne: Pre- Assessment: Dressing changed. No s/s of infection. Both lumens aspirate & flush well. Running well at . SBAR received from Primary RN. Pt arrived to HD suite A&Ox3. Consent signed & on file OR Chronic consent applies. Each catheter limb disinfected per p&p, caps removed, hubs disinfected per p&p. Each lumen aspirated for blood return and flushed with Normal Saline per policy. Labs drawn per request/ order. VSS. Dialysis Tx initiated. Comments:   Dressing changed. No s/s of infection noted.                                      Labs   HBsAg (Antigen) / date: Neg 1/9/23                                              HBsAb (Antibody) / date: Susc 1/9/23   Source: EPIC   Obtained/Reviewed  Critical Results Called HGB   Date Value Ref Range Status   01/23/2023 10.5 (L) 11.5 - 16.0 g/dL Final     Potassium   Date Value Ref Range Status   01/18/2023 4.3 3.5 - 5.1 mmol/L Final     Comment:     ICTERIC SPECIMEN     Calcium   Date Value Ref Range Status   01/18/2023 8.4 (L) 8.5 - 10.1 MG/DL Final     BUN   Date Value Ref Range Status   01/18/2023 66 (H) 6 - 20 MG/DL Final     Creatinine   Date Value Ref Range Status   01/18/2023 7.33 (H) 0.55 - 1.02 MG/DL Final        Meds Given   Name Dose Route   Heparin 1:1000 1.3 [email or web address]   Heparin 1:1000 1.3 [email or web address]          Adequacy / Fluid    Total Liters Process: 77.6   Net Fluid Removed: 1000mL      Comments   Time Out Done:   (Time) 0755   Admitting Diagnosis: Staphylococcus aureus pneumonia   Consent obtained/signed: Informed Consent Verified: Yes (01/23/23 0800)   Machine / RO # Machine Number: [id number] (01/23/23 0800)   Primary Nurse Rpt Pre: Alexandra Cerna RN   Primary Nurse Rpt Post: Frank Gan RN   Pt Education: procedural   Care Plan: On going   Pts outpatient clinic: TBD     Tx Summary  0800:  R Blayne: Pre- Assessment: Dressing changed. No s/s of infection. Both lumens aspirate & flush well. Running well at . SBAR received from Primary RN. Pt arrived to HD suite A&Ox3. Consent signed & on file OR Chronic consent applies. Each catheter limb disinfected per p&p, caps removed, hubs disinfected per p&p. Each lumen aspirated for blood return and flushed with Normal Saline per policy. Labs drawn per request/ order. VSS. Dialysis Tx initiated. **Pt tolerated tx well. No issues or complaints voiced. **    1134: Tx ended. VSS. Each dialysis catheter limb disinfected per p&p, all possible blood returned per p&p, and each dialysis hub disinfected per p&p. Each lumen flushed, post dialysis catheter Heparin dwell instilled per order, and caps applied. Bed locked and in the lowest position, call bell and belongings in reach. SBAR given to Primary, RN. Patient is stable at time of their/ my departure. Post assessment: Dressing CDI. No changes. All Dialysis related medications have been reviewed. Comments:   Assessment performed by RN.   Procedure and documentation observed and reviewed by [facility] Jan Euceda RN.

## 2023-01-23 NOTE — ROUTINE PROCESS
1435:    Pt arrived to [address] [place] [address] for permcath placement. Pt is A/O x3 with no complaints of pain. VS to be obtained. Call bell within reach and bed in the lowest position. [phone]:    Name of procedure: Permcath placement    Sedation medications given:    Versed: 2mg    Fentanyl: 50 mcg    Sedation tolerated: Yes    Sedation start 1640  Sedation end:  1650    Vital Signs: at baseline    Fluids removed:None    Samples sent to lab: None    Any complications related to procedure: None    Post Procedure Care Needed/order sets placed in [facility] care. Yes    Pt placed back on tele monitor      1740:    TRANSFER - OUT REPORT:    Verbal report given to [facility], RN(name) on Bree Pineda  being transferred to [facility](unit) for routine post - op       Report consisted of patients Situation, Background, Assessment and   Recommendations(SBAR). Information from the following report(s) SBAR and Procedure Summary was reviewed with the receiving nurse. Opportunity for questions and clarification was provided. Pt transported back with tele box in place. Site on left bandage with small amount of drainage.

## 2023-01-23 NOTE — PROGRESS NOTES
Transition of Care Plan:      RUR: 20% (high)  Disposition: SNF - [facility] [facility] and OP HD Jake Maldonado M/W/F  If SNF or IPR: Date FOC offered: 1/15/23  Date [address] received: 23  Date authorization started with reference number: 2023, [id number]. Date authorization received and expires: 2023 - 2023  Accepting facility: [facility]. Follow up appointments: defer to SNF  DME needed: defer to SNF. Transportation at Discharge: [facility] transport is needed. Keys or means to access home:  Facility to provide. IM Medicare Letter: N/A Commercial Insurance. Is patient a  and connected with the [place]? N/A  If yes, was [place] transfer form completed and [facility] notified? No.  Caregiver Contact: Sister - Ceci Koroma - [phone]  Discharge Caregiver contacted prior to discharge? CM to contact. Care Conference needed?: No    [place] liaison checking on authorization status and whether or not a new authorization is needed for patient. ARTEMIO spoke with patient's sister, Parish Ontiveros, who understands patient has been accepted at [facility] M/W/F 10:30 am - 11:30 am.  She is agreeable to patient going to [facility] [facility]. She is agreeable to assisting with transportation if staff can assist patient with safe ambulation. Suzi at [facility] said she will call [facility] to determine when authorization will be . They may have beds available tomorrow or Wednesday. She confirmed that the transportation cost is $225/week for wheelchair vans and $1000/week for stretcher transport. Patient is to have permacath placed today. [facility] confirmed authorization is good until 2023 and they have beds for today. ARTEMIO contacted [address] at [phone] who confirmed that they can provide wheelchair and shania lift but they cannot assist patient in and out of vehicle.       Tana Luna, BERYLN, RN    Care Management  [phone]

## 2023-01-23 NOTE — PROGRESS NOTES
Transport to  pt to go to IR  Pt refusing and requesting to speak with MD  Explained the importance of the permacath and that she will not be able to leave the hospital with her temporary evelyn    Medications remained in cup untouched; meds wasted with pixis    Pt WINSTON to IR

## 2023-01-23 NOTE — PROGRESS NOTES
1526 Bedside and Verbal shift change report given to TOM Carroll (oncoming nurse) by Tangela Otoole RN (offgoing nurse). Report included the following information SBAR, Kardex, MAR, and Cardiac Rhythm   . Patient off floor for perm cath insertion. 1743. Patient returned to floor via stretcher. 1900 End of Shift Note    Bedside shift change report given to Merline Ambrosia, RN (oncoming nurse) by Calos Sparks RN (offgoing nurse). Report included the following information SBAR, Kardex, MAR, and Cardiac Rhythm NSR    Shift worked:  3-7PM     Shift summary and any significant changes:     Perm cath placed this shift. Patient refused all medications this shift. Concerns for physician to address:       Zone phone for oncoming shift:          Activity:  Activity Level: Bed Rest  Number times ambulated in hallways past shift: 0  Number of times OOB to chair past shift: 0    Cardiac:   Cardiac Monitoring: Yes      Cardiac Rhythm: Sinus Tachy    Access:  Current line(s): PIV and port HD    Genitourinary:   Urinary status:     Respiratory:   O2 Device: None (Room air)  Chronic home O2 use?: NO  Incentive spirometer at bedside: NO       GI:  Last Bowel Movement Date: 01/21/23  Current diet:  ADULT ORAL NUTRITION SUPPLEMENT Breakfast, Dinner; Renal Supplement  ADULT DIET Regular; 3 carb choices (45 gm/meal)  Passing flatus: NO  Tolerating current diet: NO       Pain Management:   Patient states pain is manageable on current regimen: N/A    Skin:  William Score: 13  Interventions: PT/OT consult and nutritional support turn team    Patient Safety:  Fall Score:  Total Score: 3  Interventions: bed/chair alarm and gripper socks  High Fall Risk: Yes    Length of Stay:  Expected LOS: 5d 0h  Actual LOS: [address], RN

## 2023-01-23 NOTE — PROGRESS NOTES
Nephrology Progress Note  [facility] / [facility], [address]  [place], [address]  Phone - [phone]  Fax - [phone]                 Patient: Akhil Alegria                   YOB: 1960        Date- 1/23/2023                      Admit Date: 1/3/2023  CC: Follow up for christa  IMPRESSION & PLAN:   1. CHRISTA due to acute tubular necrosis, serology workup  overall negative. 2.  Hypophosphatemia  3. Left pneumothorax requiring chest tube placement. 4.  Pleural effusion. 5.  Anemia requiring blood transfusion. 6.  History of breast cancer. 7.  Recent history of COVID-19 infection. 8.  Multifocal pneumonia.- RESP FAILURE on vent  9. Status post left thoracocentesis. 10.  Non-ST elevation myocardial infarction. PLAN-  Plan for hemodialysis today  She will also get permacath placement by IR today, was not able to get it on Friday.  working for outpatient placement for hemodialysis. Can be discharged once outpatient HD chair has been confirmed     Subjective:  Seen and examined today  Confused this morning  Objective:   Vitals:    01/23/23 1100 01/23/23 1115 01/23/23 1130 01/23/23 1134   BP: (!) 140/92 (!) 143/92 (!) 146/91 (!) 145/93   Pulse: 92 97 96 96   Resp: 18 18 18 18   Temp:    98.6 °F (37 °C)   TempSrc:    Oral   SpO2:       Weight:       Height:          01/22 0701 - 01/23 0700  In: 660 [P.O.:660]  Out: 0     Last 3 Recorded Weights in this Encounter    01/20/23 0837 01/22/23 0639 01/23/23 0414   Weight: 64.1 kg (141 lb 5 oz) 64.6 kg (142 lb 6.7 oz) 64.9 kg (143 lb 1.3 oz)        Physical exam:  GEN: nad  NECK:  no mass, jose alberto cath +  RESP: decreased BS  b/l, no  wheezing,   CVS: RRR,s1,s2  NEURO: non focal  PSYCH: Can't assess due to patient's current condition           Chart reviewed. Pertinent Notes reviewed.      Data Review :  Recent Labs     01/23/23  0744 01/20/23  1248   MG 1.8 2.7*   PHOS 7.7* 9.9*       Recent Labs     01/23/23  0744 01/20/23  1248   WBC 8.5 9.9   HGB 10.5* 10.2*   HCT 32.4* 30.8*    185       No results for input(s): FE, TIBC, PSAT, FERR in the last 72 hours.      No results found for: HBA1C, JBY9LJOI, SXA8PTEA   No results found for: MCACR, MCA1, MCA2, MCA3, MCAU, MCAU2, MCALPOCT  US Results (most recent):  Medication list  reviewed    Current Facility-Administered Medications   Medication Dose Route Frequency    methylPREDNISolone (PF) (SOLU-MEDROL) injection 20 mg  20 mg IntraVENous DAILY    [START ON 1/24/2023] epoetin tyler-epbx (RETACRIT) injection 10,000 Units  10,000 Units SubCUTAneous Q TUE, THU & SAT    isosorbide mononitrate ER (IMDUR) tablet 30 mg  30 mg Oral DAILY    hydrALAZINE (APRESOLINE) tablet 25 mg  25 mg Oral BID    balsam peru-castor oiL (VENELEX) ointment   Topical BID    diphenhydrAMINE (BENADRYL) 12.5 mg/5 mL oral liquid 25 mg  25 mg Oral Q6H PRN    melatonin tablet 3 mg  3 mg Oral QHS PRN    guaiFENesin (ROBITUSSIN) 100 mg/5 mL oral liquid 100 mg  100 mg Oral Q4H PRN    albuterol CONCENTRATE 2.5mg/0.5 mL neb soln  1.25 mg Nebulization Q6H PRN    aspirin delayed-release tablet 81 mg  81 mg Oral DAILY    carvediloL (COREG) tablet 3.125 mg  3.125 mg Oral BID WITH MEALS    nitroglycerin (NITROBID) 2 % ointment 1 Inch  1 Inch Topical Q6H PRN    famotidine (PF) (PEPCID) 20 mg in 0.9% sodium chloride 10 mL injection  20 mg IntraVENous DAILY    bumetanide (BUMEX) tablet 2 mg  2 mg Oral BID    heparin (porcine) 1,000 unit/mL injection 1,300 Units  1,300 Units InterCATHeter DIALYSIS PRN    And    heparin (porcine) 1,000 unit/mL injection 1,300 Units  1,300 Units InterCATHeter DIALYSIS PRN    0.9% sodium chloride infusion 250 mL  250 mL IntraVENous PRN    lidocaine 4 % patch 1 Patch  1 Patch TransDERmal Q24H    glucose chewable tablet 16 g  4 Tablet Oral PRN    glucagon (GLUCAGEN) injection 1 mg  1 mg IntraMUSCular PRN    dextrose 10% infusion 0-250 mL  0-250 mL IntraVENous PRN    insulin lispro (HUMALOG) injection   SubCUTAneous Q6H    balsam peru-castor oiL (VENELEX) ointment   Topical BID    PARoxetine (PAXIL) tablet 20 mg  20 mg Per NG tube DAILY    heparin (porcine) injection 5,000 Units  5,000 Units SubCUTAneous Q12H    HYDROmorphone (DILAUDID) injection 0.5 mg  0.5 mg IntraVENous Q4H PRN    metoprolol (LOPRESSOR) injection 2.5 mg  2.5 mg IntraVENous Q6H PRN    lidocaine (XYLOCAINE) 4 % cream   Topical PRN    [Held by provider] gabapentin (NEURONTIN) capsule 100 mg  100 mg Oral TID    sodium chloride (NS) flush 5-40 mL  5-40 mL IntraVENous Q8H    sodium chloride (NS) flush 5-40 mL  5-40 mL IntraVENous PRN    acetaminophen (TYLENOL) tablet 650 mg  650 mg Oral Q6H PRN    Or    acetaminophen (TYLENOL) suppository 650 mg  650 mg Rectal Q6H PRN    polyethylene glycol (MIRALAX) packet 17 g  17 g Oral DAILY PRN    ondansetron (ZOFRAN ODT) tablet 4 mg  4 mg Oral Q8H PRN    Or    ondansetron (ZOFRAN) injection 4 mg  4 mg IntraVENous Q6H PRN        Bill Lund MD  1/23/2023

## 2023-01-23 NOTE — PROGRESS NOTES
Cardiology Progress Note  1/23/2023     Admit Date: 1/3/2023  Admit Diagnosis: Staphylococcus aureus pneumonia ([facility] [address] [J15.211]  CC: none currently    Cardiologist:  Dr Toney Pi. Cardiac Assessment/Plan:    1) Atypical CP, for yrs: Neg [facility] 9/2021 (6:00, -CP, -ecg; Mild LVH). 2) Abnl ecg c/w LVH. *\"EF 45-50%; No valve dz:\" @ [facility] by R[facility]. (EF not that low on my interp; EF 50-55%). 3) HTN  4) Palpitations (few x/yr; few sec). 5) FHx early CAD/CHF  6) Breast CA, left; Chemo (Dr GÉNESIS Raygoza @ [address] [place] [address])    Admitted from 12/28 to 1/1/23 @ [address] [place] [address] for PNA/hemoptysis/covid/sepsis/CHRISTA/NQWMI w/ trop 100s    Admitted 1/3/23 w/ dyspnea/sepsis; subsequent renal failure/hypotension/hemothorax w/ chest tube; Resp failure 1/9 w/ intubation. Type II NQWMI. Neg blood Cx. Hepatic failure. Chest tube 1/5/23. Intubated 1/9/23. Self-extubated 1/12/23. Off pressors 1/10/23. Coreg to 3.125 bid d/t ASx sinus bradycardia; add ARB if ok w/ renal; if not, hydralazine/imdur. Echo 1/10/23: Left Ventricle: Mildly reduced left ventricular systolic function with a visually estimated EF of 45 - 50%. Left ventricle size is normal. Increased wall thickness. CXR 1/13: \"The pulmonary vasculature is within normal limits. Volume loss left lung base. Mild patchy airspace opacity in the right lung base  improved. Left-sided chest tube is stable. Small left pleural effusion suspected. \"    1/21: No CP/dyspnea; refusing meds again. BPs 140-160s; HR 70-90s; sinus; 95% RA. No labs. 1/22:  No CP/dyspnea; took meds this am  BPs 140-160; HR 70s; sinus; 95% RA  No labs. Cardiac meds: coreg 3.125 bid; asa; bumex 2 bid; hydralazine 25 bid. Imdur 30. No new cardiac recs. 1/23: On HD; no complaints. BPs 140-160; HR 70-80s; 95% RA. Hg 10.5;     Cardiac meds: coreg 3.125 bid; asa; bumex 2 bid; hydralazine 25 bid. Imdur 30.     No active cardiac issues; will sign-off; Up-titrate BP meds if needed, (when taken consistently); Please call if questions/issues. F/U with me after d/c. Will plan outpt stress test.  Thanks. _________________________________________________________  Echo 12/27/22: Left Ventricle: Normal left ventricular systolic function with a visually estimated EF of 50 - 55%. Left ventricle size is normal. Normal wall thickness. Mild hypokinesis of the following segments: basal inferolateral and mid inferolateral. Unfortunately, imaging quality limis the accuracy     Chest tube 1/5/23. Intubated 1/9/23. Complicated clinical course as noted below; Currently intubated/sedated  Current cardiac meds:  Levo @ 15; crestor 10 (holding coreg 6.25 bid)  IMP: type II NQWMI;      Rec: cont supportive care; wean pressors as able; hold crestor. Future aspirin when bleeding resolved. ____________________________________________________________________  Maria Guadalupe Parker is a 57 y.o. female presents with Staphylococcus aureus pneumonia ([facility] [address] Nila Siddiqi. Admitted from 12/28 to 1/1/23 @ [facility] for PNA/hemoptysis/covid/sepsis/CHRISTA/NQWMI w/ trop 100s        As noted in H&P: \"56 y.o.  female with pertinent past medical history of breast cancer currently off chemotherapy, MDD/TAIWO, hypothyroidism, GERD, and recent hospitalization from 12/28-1/1 for multifocal pneumonia secondary to MSSA pneumonia superimposed on COVID-19 pneumonia with course complicated by hemoptysis, sepsis, CHRISTA, and type II MI completed course of cefepime and doxycycline transition to Augmentin on time of discharge-patient unsure if she has been taking that who presents with complaints of persistent decline since discharge from OSH. She complains of malaise, fever/chills, cough productive of blood-tinged sputum, shortness of breath/dyspnea on exertion. She reports she does not feel she is doing well at home and think she needs to be in the hospital for further management. She denies tobacco, alcohol, or illicit drug use.   ROS otherwise negative. In the ED, patient febrile to 100.8 °F, hemodynamically stable (hypertensive 150s/60s), saturating mid 90s on room air. ECG demonstrates NSR with LVH criteria, CXR demonstrates increased left pleural effusion and left lower lobe airspace opacity and CT chest demonstrates increased moderately large left pleural effusion with unchanged patchy bilateral consolidation left greater than right compatible with pneumonia. Rapid and flu negative. Point-of-care lactic 0.7, proBNP 1091(Previously 866), high since her troponin 116 down trended to 107 (600s at prior hospitalization, sodium 142, potassium 3.4, BUN 38, creatinine 3.0 (baseline), WBC 13.3 (slightly increased from time of discharge), hemoglobin 9.1 (improved from prior), platelets 705. Given recent hospitalization and concern for hep patient given vancomycin, Levaquin, and cefepime by ED provider. \"     ICU MD 1/8/22: \"Patient being transferred to ICU as the PCU nurses do not feel comfortable keeping the patient on the floor as she was hypotensive earlier and has been refusing po meds     57year old Female with hx of left breast cancer, recently admitted to [facility] for sepsis s/s COVID-19, CHRISTA, NSTEMI, MSSA pneumonia(12/28-1/1) presented to ED with c/o shortness of breath and hemoptysis. IN ED, patient was febrile to 100.8F. CXR demonstrated increased left pleural effusion with unchanged patchy B/L consolidation L>R. Patient had IR thoracentesis on 1/4/23- bloody tap. CT chest on 1/5 showed left sided hemothorax. Hb dropped from 9.1 to 5.6. s/p left chest tube placement on 1/5/23. S/p PRBC transfusion. \"     ______________________________________________________________________     The patient is intubated; can provide no history. On accelerating levophed (15 currently); on HD. No PND, orthopnea, palpitations, pre-syncope, syncope, peripheral edema, or decrease in exercise tolerance. No current complaints.      ECG 1/3/22:   Sinus, leftward axis, no acute changes. LVH. Tele: sinus  CXR: \"1. Endotracheal tube terminates 1 cm above the marvni. Consider retraction 1 to  2 cm. 2. Appropriate positioning of left IJ central venous catheter. No pneumothorax. 3. Slight interval improvement in extensive bilateral airspace disease. 4. Left chest tube remains in place. Suspected small left pleural effusion. \"     Notable labs: K 4.5; BUN 61; Cr 7.35 (prev 3 range). Alb 1.9; LFTs 900s/2000; Prev procal 7; proBNP 9k; neg covid 1/3/23. INR 2.7.  ___________________________________________________  Notable prior cardiac history:  @ NP OV 10/27/22:  Ms Nora Goldberg has a h/o:  1) Atypical CP, for yrs: Neg [facility] 9/2021 (6:00, -CP, -ecg; Mild LVH). 2) Abnl ecg c/w LVH. *\"EF 45-50%; No valve dz:\" @ [id number] by RCA. (EF not that low on my interp; EF 50-55%). 3) HTN  4) Palpitations (few x/yr; few sec). 5) FHx early CAD/CHF  6) Breast CA, left; Chemo pending (Dr Ever Prabhakar @ [facility])  No ethanol, added salt, or nicotine. 1 caff johnny/d. 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 She would like an annual visit here. 8/2021:  She has atypical chest pain (for years; 0-1 X/mo; sharp sensation; few seconds; random onset/offset). She also has intermittent palpitations (fast HR sensation, few X/year; few seconds duration, not related to chest pain). No MCCALL with good exercise tolerance. No previous cardiac evaluation. PCP note from 4/28/06 reviewed; ECG from then interpreted today as noted below. 2/2022: no recent chest pain, dyspnea, or palpitations. She would like an annual visit here. 10/2022: Chest pain remains resolved. No dyspnea nor palpitations. Reportedly mild LV dysfunction as noted above; in my review of the echo, the EF is not that low. NP F/U: Doing well. No CP, MCCALL, palps, edema or syncope. Home SBPs in the 130s. IMPRESSION AND PLAN  01. Other chest pain (R07.89): Atypical chest pain remains resolved; Neg [facility] [place] 9/2021.      We discussed the signs and symptoms of unstable angina, myocardial infarction and malignant arrhythmia. The patient knows to seek immediate medical attention should they occur. 02. Essential (primary) hypertension (I10):  Reasonably well controlled; continue Losartan 25mg QD and increase Coreg to 6.25mg BID. Continue to monitor BP.   03. Other cardiomyopathies (I42.8):  EF looks reassuring on echo per review; repeat planned for next month. 04. Abnormal electrocardiogram [ECG] [EKG] (R94.31):  Nonspecific finding. This condition is stable. 05. Malignant neoplasm of left breast in female, estrogen receptor negative, unspecified site of breast (C50.912): Managed by: Other Physician   06. Long term (current) use of aspirin (Z79.82)   07. Body mass index [BMI] 27.0-27.9, adult (Z68.27)      ORDERS:    Dietary management education, guidance, and counseling           10/27/22 MEDICATION LIST  Medication Sig Desc Non-VCS   aspirin 81 mg tablet,delayed release take 1 tablet by oral route  every day Y   carvedilol 6.25 mg tablet take 1 tablet by oral route 2 times every day with food N   Crestor take 1 Tablet by oral route  every week Y   losartan 25 mg tablet take 1 tablet by oral route  every day N   Multi Vitamin 9 mg iron/15 mL oral liquid   Y   paroxetine 20 mg tablet take 1 tablet by oral route  every day Y      ______________________________________  CARDIAC HISTORY  RISK FACTORS:  1 Hypertension         CARDIOVASCULAR PROCEDURES  Procedure Date Results   Echo 10/05/2022 \"EF 45-50%; No valve dz:\" @ [address] by RCA. (EF not that low on my interp). EKG 10/13/2022 Sinus Rhythm, LVH; axis -15°; nonspecific T-wave changes. [facility] 10/01/2021 Normal EF, No Ischemia, 6:00, -CP, -ecg; Mild LVH. For other plans, see orders.   Hospital problem list     Active Hospital Problems    Diagnosis Date Noted    Goals of care, counseling/discussion 01/13/2023    Advanced directives, counseling/discussion 01/13/2023    Hemothorax 01/06/2023    Shortness of breath 2023    Acute blood loss anemia 2023    Hemothorax on left 2023    Anxiety 2023    Palliative care by specialist 2023    Staphylococcus aureus pneumonia ([address] 2023        Subjective: Yamilet Zurita reports       Chest pain X none  consistent with:  Non-cardiac CP         Atypical CP     None now  On going  Anginal CP     Dyspnea X none  at rest  with exertion         improved  unchanged  worse              PND X none  overnight       Orthopnea X none  improved  unchanged  worse   Presyncope X none  improved  unchanged  worse     Ambulated in hallway without symptoms   Yes   Ambulated in room without symptoms  Yes   Objective:     Physical Exam:  Overall VSSAF;  Visit Vitals  BP (!) 145/94   Pulse 87   Temp 98.4 °F (36.9 °C) (Oral)   Resp 18   Ht 5' (1.524 m)   Wt 64.9 kg (143 lb 1.3 oz)   SpO2 95%   BMI 27.94 kg/m²     Temp (24hrs), Av.1 °F (36.7 °C), Min:97.7 °F (36.5 °C), Max:98.4 °F (36.9 °C)    Patient Vitals for the past 8 hrs:   Pulse   23 0945 87   23 0930 82   23 0915 79   23 0900 78   23 0845 86   23 0830 68   23 0815 70   23 0800 71   23 0334 71       Patient Vitals for the past 8 hrs:   Resp   23 0945 18   23 0930 18   23 0915 18   23 0900 18   23 0845 18   23 0830 18   23 0815 18   23 0800 18   23 0334 18       Patient Vitals for the past 8 hrs:   BP   23 0945 (!) 145/94   23 0930 (!) 151/87   23 0915 (!) 151/90   23 0900 (!) 146/93   23 0845 (!) 162/89   23 0830 (!) 160/88   23 0815 (!) 140/90   23 0800 (!) 144/83   23 0334 (!) 148/86         Intake/Output Summary (Last 24 hours) at 2023 0953  Last data filed at 2023 0413  Gross per 24 hour   Intake 610 ml   Output 0 ml   Net 610 ml       General Appearance: Well developed, well nourished, no acute distress.    Ears/Nose/Mouth/Throat:   Normal MM; anicteric. JVP: WNL   Resp:   Lungs clear to auscultation bilaterally. Nl resp effort. Cardiovascular:  RRR, S1, S2 normal, no new murmur. No gallop or rub. Abdomen:   Soft, non-tender, bowel sounds are present. Extremities: No edema bilaterally. Skin:  Neuro: Warm and dry. Sleepy on HD, grossly nonfocal;        cath site intact w/o hematoma or new bruit; distal pulse unchanged  Yes   Data Review:     Telemetry independently reviewed x sinus  chronic afib  parox afib  NSVT     ECG independently reviewed  NSR  afib  no significant changes  NSST-Tw chgs     x no new ECG provided for review   Lab results reviewed as noted below. Current medications reviewed as noted below. No results for input(s): PH, PCO2, PO2 in the last 72 hours. No results for input(s): CPK, CKMB, CKNDX, TROIQ in the last 72 hours. Recent Labs     01/23/23  0744 01/20/23  1248   PHOS 7.7* 9.9*   WBC 8.5 9.9   HGB 10.5* 10.2*   HCT 32.4* 30.8*    185       No results found for: CHOL, CHOLX, CHLST, CHOLV, HDL, HDLP, LDL, LDLC, DLDLP, TGLX, TRIGL, TRIGP, CHHD, CHHDX  No results for input(s): AP, TBIL, TP, ALB, GLOB, GGT, AML, LPSE in the last 72 hours. No lab exists for component: SGOT, GPT, AMYP, HLPSE    No results for input(s): INR, PTP, APTT, INREXT, INREXT in the last 72 hours.      No components found for: GLPOC    Current Facility-Administered Medications   Medication Dose Route Frequency    methylPREDNISolone (PF) (SOLU-MEDROL) injection 20 mg  20 mg IntraVENous DAILY    [START ON 1/24/2023] epoetin tyler-epbx (RETACRIT) injection 10,000 Units  10,000 Units SubCUTAneous Q TUE, THU & SAT    isosorbide mononitrate ER (IMDUR) tablet 30 mg  30 mg Oral DAILY    hydrALAZINE (APRESOLINE) tablet 25 mg  25 mg Oral BID    balsam peru-castor oiL (VENELEX) ointment   Topical BID    diphenhydrAMINE (BENADRYL) 12.5 mg/5 mL oral liquid 25 mg  25 mg Oral Q6H PRN    melatonin tablet 3 mg  3 mg Oral QHS PRN    guaiFENesin (ROBITUSSIN) 100 mg/5 mL oral liquid 100 mg  100 mg Oral Q4H PRN    albuterol CONCENTRATE 2.5mg/0.5 mL neb soln  1.25 mg Nebulization Q6H PRN    aspirin delayed-release tablet 81 mg  81 mg Oral DAILY    carvediloL (COREG) tablet 3.125 mg  3.125 mg Oral BID WITH MEALS    nitroglycerin (NITROBID) 2 % ointment 1 Inch  1 Inch Topical Q6H PRN    famotidine (PF) (PEPCID) 20 mg in 0.9% sodium chloride 10 mL injection  20 mg IntraVENous DAILY    bumetanide (BUMEX) tablet 2 mg  2 mg Oral BID    heparin (porcine) 1,000 unit/mL injection 1,300 Units  1,300 Units InterCATHeter DIALYSIS PRN    And    heparin (porcine) 1,000 unit/mL injection 1,300 Units  1,300 Units InterCATHeter DIALYSIS PRN    0.9% sodium chloride infusion 250 mL  250 mL IntraVENous PRN    lidocaine 4 % patch 1 Patch  1 Patch TransDERmal Q24H    glucose chewable tablet 16 g  4 Tablet Oral PRN    glucagon (GLUCAGEN) injection 1 mg  1 mg IntraMUSCular PRN    dextrose 10% infusion 0-250 mL  0-250 mL IntraVENous PRN    insulin lispro (HUMALOG) injection   SubCUTAneous Q6H    balsam peru-castor oiL (VENELEX) ointment   Topical BID    PARoxetine (PAXIL) tablet 20 mg  20 mg Per NG tube DAILY    heparin (porcine) injection 5,000 Units  5,000 Units SubCUTAneous Q12H    HYDROmorphone (DILAUDID) injection 0.5 mg  0.5 mg IntraVENous Q4H PRN    metoprolol (LOPRESSOR) injection 2.5 mg  2.5 mg IntraVENous Q6H PRN    lidocaine (XYLOCAINE) 4 % cream   Topical PRN    [Held by provider] gabapentin (NEURONTIN) capsule 100 mg  100 mg Oral TID    sodium chloride (NS) flush 5-40 mL  5-40 mL IntraVENous Q8H    sodium chloride (NS) flush 5-40 mL  5-40 mL IntraVENous PRN    acetaminophen (TYLENOL) tablet 650 mg  650 mg Oral Q6H PRN    Or    acetaminophen (TYLENOL) suppository 650 mg  650 mg Rectal Q6H PRN    polyethylene glycol (MIRALAX) packet 17 g  17 g Oral DAILY PRN    ondansetron (ZOFRAN ODT) tablet 4 mg  4 mg Oral Q8H PRN    Or    ondansetron (ZOFRAN) injection 4 mg  4 mg IntraVENous Q6H PRN Paulo Rizvi MD

## 2023-01-23 NOTE — PROGRESS NOTES
OT note:  OT reviewed chart and attempted to see pt and she was [place] for HD. Will defer and continue to follow.

## 2023-01-23 NOTE — PROGRESS NOTES
Pt quiet, appears solemn  Went to give pt am meds missed while she was in HD  Scanned all meds and pt is unsure if she wants to take them;   Education on medications and their purpose provided  says she will think about it; requests venelex and cup of PO medications on bedside table to self-administer  Refusing lidocaine; no c/o pain  Refusing Pepcid  Refusing solumedrol  Will check back later to see if pt will take medication

## 2023-01-23 NOTE — PROGRESS NOTES
Hospitalist Progress Note    Subjective:   Daily Progress Note: 1/23/2023 3:42 PM    Hospital Course:  57year old Female with hx of left breast cancer, recently admitted to [facility] for sepsis s/s COVID-19, CHRISTA, NSTEMI, MSSA pneumonia(12/28-1/1) presented to ED with c/o shortness of breath and hemoptysis. IN ED, patient was febrile to 100.8F. CXR demonstrated increased left pleural effusion with unchanged patchy B/L consolidation L>R. Patient had IR thoracentesis on 1/4/23- bloody tap. CT chest on 1/5 showed left sided hemothorax. Hb dropped from 9.1 to 5.6. s/p left chest tube placement on 1/5/23. S/p PRBC transfusion. Txed to ICU 1/8 for hypotension requiring levophed, she was intubated on 1/9/23 for respiratory distress. Pt was started on CRRT on 1/10. Pt self extubated on 1/12/23. Now on stepdown, hemodynamically stable, remains anuric, getting HD. Non complaint with meds has been as inssue    Subjective:  No events overnight. Intermittently refusing meds and lab draws. Patient wanted solid food    Assessment / Plan:  Acute hypoxic respiratory failure  Multifocal pneumonia/keytruda pneumonitis  S/p left thoracentesis by IR on 1/4/23  S/p left chest tube placement for left sided hemothorax by IR on 1/5/23  S/p intubation 1/9, self extubated 1/12    -Suspected initial hypoxia from parapneumonic effusion, with recent history of MSSA pneumonia, nucleated cells 4400, exudative fluid. Culture was not sent!!   -As above patient developed hemothorax, requiring chest tube, eventually requiring ICU transfer for vasopressors likely from volume/blood loss. Patient required intubation on 1/9, likely from volume overload from CHRISTA, requiring CRRT on 1/10, now switched to HD and patient extubated on 1/12.  -Chest tube has been removed 1/13  -Currently on 2 L nasal cannula    -Culture from the [address] [place] [address] Ave on 1/11, showed some Candida, no bacteria.   Likely colonization  -Patient has been on multiple antibiotics since day 1, recently on Zosyn. 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 Bilirubin elevated, but usually it lag behind clinical improvement.  -Ammonia normal    -Patient refused labs today, recheck tomorrow      Acute blood loss anemia:  Anemia of chronic disease  S/s left hemothorax  S/p PRBC transfusion on 1/5/23. Hb post transfusion stable  Will monitor cbc and transfuse if Hb<7  Received PRBC    NSTEMI:  Sinus jose 1/16  Likely type II MI  Cards on board  -Decreased Coreg, monitor on telemetry        Invasive ductal breast cancer:  Appreciate oncology consult  Appreciate palliative care consult, following      Nutrition- currently CLD, advance diet to solid/easy to chew    Underlying depression/?   psych consult        Body mass index is 27.94 kg/m².     Code status: Full  Prophylaxis: SCD's  Recommended Disposition: SNF     Barrier: Ready to be discharged         Current Facility-Administered Medications   Medication Dose Route Frequency    lidocaine (XYLOCAINE) 20 mg/mL (2 %) injection 360 mg  18 mL SubCUTAneous ONCE    heparin (porcine) 1,000 unit/mL injection 10,000 Units  10,000 Units IntraVENous ONCE    heparinized saline 2 units/mL infusion 800 Units  400 mL Irrigation ONCE    0.9% sodium chloride infusion  25 mL/hr IntraVENous RAD ONCE    fentaNYL citrate (PF) injection  mcg   mcg IntraVENous Rad Multiple    midazolam (VERSED) injection 0.25-5 mg  0.25-5 mg IntraVENous Multiple    methylPREDNISolone (PF) (SOLU-MEDROL) injection 20 mg  20 mg IntraVENous DAILY    [START ON 1/24/2023] epoetin tyler-epbx (RETACRIT) injection 10,000 Units  10,000 Units SubCUTAneous Q TUE, THU & SAT    isosorbide mononitrate ER (IMDUR) tablet 30 mg  30 mg Oral DAILY    hydrALAZINE (APRESOLINE) tablet 25 mg  25 mg Oral BID    balsam peru-castor oiL (VENELEX) ointment   Topical BID    diphenhydrAMINE (BENADRYL) 12.5 mg/5 mL oral liquid 25 mg  25 mg Oral Q6H PRN    melatonin tablet 3 mg  3 mg Oral QHS PRN    guaiFENesin (ROBITUSSIN) 100 mg/5 mL oral liquid 100 mg  100 mg Oral Q4H PRN    albuterol CONCENTRATE 2.5mg/0.5 mL neb soln  1.25 mg Nebulization Q6H PRN    aspirin delayed-release tablet 81 mg  81 mg Oral DAILY    carvediloL (COREG) tablet 3.125 mg  3.125 mg Oral BID WITH MEALS    nitroglycerin (NITROBID) 2 % ointment 1 Inch  1 Inch Topical Q6H PRN    famotidine (PF) (PEPCID) 20 mg in 0.9% sodium chloride 10 mL injection  20 mg IntraVENous DAILY    bumetanide (BUMEX) tablet 2 mg  2 mg Oral BID    heparin (porcine) 1,000 unit/mL injection 1,300 Units  1,300 Units InterCATHeter DIALYSIS PRN    And    heparin (porcine) 1,000 unit/mL injection 1,300 Units  1,300 Units InterCATHeter DIALYSIS PRN    0.9% sodium chloride infusion 250 mL  250 mL IntraVENous PRN    lidocaine 4 % patch 1 Patch  1 Patch TransDERmal Q24H    glucose chewable tablet 16 g  4 Tablet Oral PRN    glucagon (GLUCAGEN) injection 1 mg  1 mg IntraMUSCular PRN    dextrose 10% infusion 0-250 mL  0-250 mL IntraVENous PRN    insulin lispro (HUMALOG) injection   SubCUTAneous Q6H    balsam peru-castor oiL (VENELEX) ointment   Topical BID    PARoxetine (PAXIL) tablet 20 mg  20 mg Per NG tube DAILY    heparin (porcine) injection 5,000 Units  5,000 Units SubCUTAneous Q12H    HYDROmorphone (DILAUDID) injection 0.5 mg  0.5 mg IntraVENous Q4H PRN    metoprolol (LOPRESSOR) injection 2.5 mg  2.5 mg IntraVENous Q6H PRN    lidocaine (XYLOCAINE) 4 % cream   Topical PRN    [Held by provider] gabapentin (NEURONTIN) capsule 100 mg  100 mg Oral TID    sodium chloride (NS) flush 5-40 mL  5-40 mL IntraVENous Q8H    sodium chloride (NS) flush 5-40 mL  5-40 mL IntraVENous PRN    acetaminophen (TYLENOL) tablet 650 mg  650 mg Oral Q6H PRN    Or    acetaminophen (TYLENOL) suppository 650 mg  650 mg Rectal Q6H PRN    polyethylene glycol (MIRALAX) packet 17 g  17 g Oral DAILY PRN    ondansetron (ZOFRAN ODT) tablet 4 mg  4 mg Oral Q8H PRN    Or    ondansetron (ZOFRAN) injection 4 mg  4 mg IntraVENous Q6H PRN        Review of Systems  Constitutional: No fevers, No chills, No sweats, No fatigue, No Weakness  Eyes: No redness  Ears, nose, mouth, throat, and face: No nasal congestion, No sore throat, No voice change  Respiratory: No Shortness of Breath, No cough, No wheezing  Cardiovascular: has chest pain at the site of pleural catheter, No palpitations, No extremity edema  Gastrointestinal: No nausea, No vomiting, No diarrhea, No abdominal pain  Genitourinary: No frequency, No dysuria, No hematuria  Integument/breast: No skin lesion(s)   Neurological: No Confusion, No headaches, No dizziness      Objective:     Visit Vitals  BP (!) 144/79 (BP 1 Location: Right upper arm, BP Patient Position: At rest)   Pulse 95   Temp 99.8 °F (37.7 °C)   Resp 20   Ht 5' (1.524 m)   Wt 64.9 kg (143 lb 1.3 oz)   SpO2 96%   BMI 27.94 kg/m²    O2 Flow Rate (L/min): 2 l/min O2 Device: None (Room air)    Temp (24hrs), Av.5 °F (36.9 °C), Min:98 °F (36.7 °C), Max:99.8 °F (37.7 °C)      701 - 1900  In: -   Out: 1000   1901 -  0700  In: 660 [P.O.:660]  Out: 0     PHYSICAL EXAM:  Constitutional: No acute distress  Skin: Extremities and face reveal no rashes. HEENT: Sclerae anicteric. Extra-occular muscles are intact. No oral ulcers. The neck is supple and no masses. Cardiovascular: Regular rate and rhythm. Respiratory:  decrease breath sound on left side. Left chest tube in place. GI: Abdomen nondistended, soft, and nontender. Normal active bowel sounds. Musculoskeletal: No pitting edema of the lower legs.  Able to move all ext  Neurological:  Patient is alert and oriented. Cranial nerves II-XII grossly intact  Psychiatric: Mood appears appropriate       Data Review    Recent Results (from the past 24 hour(s))   CBC W/O DIFF    Collection Time: 01/23/23  7:44 AM   Result Value Ref Range    WBC 8.5 3.6 - 11.0 K/uL    RBC 3.44 (L) 3.80 - 5.20 M/uL    HGB 10.5 (L) 11.5 - 16.0 g/dL    HCT 32.4 (L) 35.0 - 47.0 %    MCV 94.2 80.0 - 99.0 FL    MCH 30.5 26.0 - 34.0 PG    MCHC 32.4 30.0 - 36.5 g/dL    RDW 21.3 (H) 11.5 - 14.5 %    PLATELET 781 729 - 127 K/uL    MPV 10.9 8.9 - 12.9 FL    NRBC 0.0 0  WBC    ABSOLUTE NRBC 0.00 0.00 - 0.01 K/uL   PHOSPHORUS    Collection Time: 01/23/23  7:44 AM   Result Value Ref Range    Phosphorus 7.7 (H) 2.6 - 4.7 MG/DL   MAGNESIUM    Collection Time: 01/23/23  7:44 AM   Result Value Ref Range    Magnesium 1.8 1.6 - 2.4 mg/dL                   Total critical care time spent 50 minutes involving direct patient care as well as reviewing patient's labs and coordination of care with nursing staff     Care Plan discussed with: Patient/Family/RN/Case Management        Total time spent with patient: 35minutes.

## 2023-01-24 VITALS
OXYGEN SATURATION: 97 % | TEMPERATURE: 98.6 F | DIASTOLIC BLOOD PRESSURE: 85 MMHG | RESPIRATION RATE: 17 BRPM | BODY MASS INDEX: 28.48 KG/M2 | HEIGHT: 60 IN | SYSTOLIC BLOOD PRESSURE: 150 MMHG | HEART RATE: 99 BPM | WEIGHT: 145.06 LBS

## 2023-01-24 LAB
GLUCOSE BLD STRIP.AUTO-MCNC: 70 MG/DL (ref 65–117)
GLUCOSE BLD STRIP.AUTO-MCNC: 80 MG/DL (ref 65–117)
SERVICE CMNT-IMP: NORMAL
SERVICE CMNT-IMP: NORMAL

## 2023-01-24 PROCEDURE — 74011000250 HC RX REV CODE- 250: Performed by: NURSE PRACTITIONER

## 2023-01-24 PROCEDURE — 82962 GLUCOSE BLOOD TEST: CPT

## 2023-01-24 PROCEDURE — 74011000250 HC RX REV CODE- 250: Performed by: STUDENT IN AN ORGANIZED HEALTH CARE EDUCATION/TRAINING PROGRAM

## 2023-01-24 RX ORDER — ASPIRIN 81 MG/1
81 TABLET ORAL DAILY
Qty: 30 TABLET | Refills: 0 | Status: SHIPPED | OUTPATIENT
Start: 2023-01-25 | End: 2023-02-24

## 2023-01-24 RX ADMIN — SODIUM CHLORIDE, PRESERVATIVE FREE 10 ML: 5 INJECTION INTRAVENOUS at 05:49

## 2023-01-24 RX ADMIN — CASTOR OIL AND BALSAM, PERU: 788; 87 OINTMENT TOPICAL at 09:00

## 2023-01-24 RX ADMIN — DEXTROSE MONOHYDRATE 120 ML: 100 INJECTION, SOLUTION INTRAVENOUS at 01:26

## 2023-01-24 NOTE — PROGRESS NOTES
Problem: Falls - Risk of  Goal: *Absence of Falls  Description: Document Salvatore Peguero Fall Risk and appropriate interventions in the flowsheet. Outcome: Progressing Towards Goal  Note: Fall Risk Interventions:  Mobility Interventions: Patient to call before getting OOB, Bed/chair exit alarm, PT Consult for mobility concerns, OT consult for ADLs    Mentation Interventions: Adequate sleep, hydration, pain control, Bed/chair exit alarm, Door open when patient unattended, More frequent rounding, Increase mobility, Update white board    Medication Interventions: Bed/chair exit alarm, Patient to call before getting OOB, Teach patient to arise slowly    Elimination Interventions: Bed/chair exit alarm, Elevated toilet seat, Call light in reach, Patient to call for help with toileting needs              Problem: Pressure Injury - Risk of  Goal: *Prevention of pressure injury  Description: Document William Scale and appropriate interventions in the flowsheet.   Outcome: Progressing Towards Goal  Note: Pressure Injury Interventions:  Sensory Interventions: Assess need for specialty bed, Float heels, Keep linens dry and wrinkle-free, Maintain/enhance activity level, Minimize linen layers, Monitor skin under medical devices, Assess changes in LOC, Check visual cues for pain    Moisture Interventions: Absorbent underpads, Assess need for specialty bed, Apply protective barrier, creams and emollients, Moisture barrier    Activity Interventions: Assess need for specialty bed, Increase time out of bed, PT/OT evaluation, Pressure redistribution bed/mattress(bed type)    Mobility Interventions: Assess need for specialty bed, HOB 30 degrees or less, Pressure redistribution bed/mattress (bed type), PT/OT evaluation    Nutrition Interventions: Document food/fluid/supplement intake    Friction and Shear Interventions: Apply protective barrier, creams and emollients, HOB 30 degrees or less, Minimize layers, Lift team/patient mobility team Problem: Infection - Risk of, Central Venous Catheter-Associated Bloodstream Infection  Goal: *Absence of infection signs and symptoms  Outcome: Progressing Towards Goal

## 2023-01-24 NOTE — PROGRESS NOTES
Nursing: For any issues with transportation, please contact [facility] at [phone]. Patient is clear from a CM standpoint. Transition of Care Plan:  Linnea Raygoza M/W/F     RUR: 20% (high)  Disposition: SNF - [address] [place] M/W/F  If SNF or IPR: Date FOC offered: 1/15/23  Date [address] received: 1/17/23  Date authorization started with reference number: 1/17/2023, [id number]. Date authorization received and expires: 1/18/2023 - 2/2/2023. Accepting facility: [facility]. Follow up appointments: defer to SNF  DME needed: defer to SNF. Transportation at Discharge: [facility] at 12pm, to [facility] and [address] [place] [address], room 314, nursing call report to: [phone]. Keys or means to access home:  Facility to provide.  Medicare Letter: N/A Commercial Insurance. Is patient a [place] and connected with the [place]? N/A  If yes, was [place] transfer form completed and VA notified? No.  Caregiver Contact: Sister Tom Powell - [phone]  Discharge Caregiver contacted prior to discharge? Aware of discharge today. Care Conference needed?: No.    ARTEMIO spoke with rehab who confirmed shania lift/stretcher transport would be most appropriate for patient. ARTEMIO spoke with patient's sister who was agreeable to working with [facility] to [facility] HD transport for $225/week for wheelchair with shania lift pad until patient is strong enough to transport on her own. Jake confirmed yesterday they have shania lift available to assist with patient's transportation. CM made sister aware to be at first dialysis appointment tomorrow at 10:30 am.  [facility] and [employer] address and phone number provided to sister as well. She is agreeable to discharge today at 12pm to [facility] via [facility] and had no further questions or concerns for CM.     ARTEMIO contacted [place] liaison to notify them that Sepsis Week 1 Survey      Responses   [facility] patient discharged from?  [facility]   Does the patient have one of the following disease processes/diagnoses(primary or secondary)?  Sepsis   Is there a successful TCM telephone encounter documented?  No   Week 1 attempt successful?  Yes   Call start time  0831   Call end time  0842   Is patient permission given to speak with other caregiver?  Yes   List who call center can speak with  Wally-daughter   Person spoke with today (if not patient) and relationship  Wally   Meds reviewed with patient/caregiver?  Yes   Is the patient having any side effects they believe may be caused by any medication additions or changes?  No   Does the patient have all medications related to this admission filled (includes all antibiotics, inhalers, nebulizers,steroids,etc.)  Yes   Is the patient taking all medications as directed (includes completed medication regime)?  Yes   Does the patient have a primary care provider?   Yes   Does the patient have an appointment with their PCP within 7 days of discharge?  Yes   Has the patient kept scheduled appointments due by today?  N/A   Comments  Appt with Cornelio Harrison was changed to 03/12/20.   What is the Home health agency?   [facility]   Has home health visited the patient within 72 hours of discharge?  Call prior to 72 hours   Has all DME been delivered?  Yes   DME comments  Patient declined hospital bed at this time.   Psychosocial issues?  No   Psychosocial comments  LIves with daughter.   Did the patient receive a copy of their discharge instructions?  Yes   Nursing interventions  Reviewed instructions with patient   What is the patient's perception of their health status since discharge?  Improving   Nursing interventions  Nurse provided patient education   Is the patient/caregiver able to teach back Sepsis?  S - Shivering,fever or very cold, E - Extreme pain or generalized discomfort (worst ever,especially abdomen), P - Pale or  discolored skin, S - Sleepy, difficult to arouse,confused, I -   I feel like I might die-a feeling of hopelessness, S - Short of breath   Is patient/caregiver able to teach back steps to recovery at home?  Set small, achievable goals for return to baseline health, Rest and regain strength, Talk about feelings with family/friends, Record milestones and struggles in a journal, Eat a balanced diet, Exercise as tolerated, Make a list of questions for PCP appoinment   Is the patient/caregiver able to teach back signs and symptoms of worsening condition:  Fever, Rapid heart rate (>90), Altered mental status(confusion/coma), Hyperthermia, Shortness of breath/rapid respiratory rate, Edema   Is the patient/caregiver able to teach back the hierarchy of who to call/visit for symptoms/problems? PCP, Specialist, Home health nurse, Urgent Care, ED, 911  Yes   Week 1 call completed?  Yes   Wrap up additional comments  Daughter states has improved. States appetite good. States using rolling walker and BSC-declined hospital bed. Denies any complaints today.          Grazyna Garrett RN   transport is set up for 12pm to go to [facility] today and they will check on ability to do shania lift/wheelchair transport to and from dialysis. Liaison confirmed that transport is $225/week for wheelchair. They are aware patient needs to be at facility at 10:30 am tomorrow for first appointment. 10:00 am transport set up with the [facility] ([phone]) for dialysis transport tomorrow morning for patient (wheelchair Bandar Cagle), [facility] notified of this and the need for shania lift pad to be under patient. A team number provided by Isabella Wise with admissions at [facility] and [address] [place] [address]. Patient and DTR made aware that Lizzie Credit is present in the facility per Michael's request.      Transition of Care Plan to SNF/Rehab    Communication to Patient/Family:  Met with patient and family and they are agreeable to the transition plan. The Plan for Transition of Care is related to the following treatment goals: PT/OT    The Patient and/or patient representative was provided with a choice of provider and agrees  with the discharge plan. Yes [x] No []    A Freedom of choice list was provided with basic dialogue that supports the patient's individualized plan of care/goals and shares the quality data associated with the providers. Yes [x] No []    SNF/Rehab Transition:  Patient has been accepted to [facility] SN[facility]/Rehab and meets criteria for admission. Patient will [facility] and expected to leave at 12 pm.    Communication to SNF/Rehab:  Bedside RN has been notified to update the transition plan to the facility and call report (phone number). Discharge information has been updated on the AVS. And communicated to facility via [employer], or CC link. Discharge instructions to be sent with patient via paper copy. Nursing Please include all hard scripts for controlled substances, med rec and dc summary, and AVS in packet.      Reviewed and confirmed with facility, [facility], can manage the patient care needs for the following:     Kamille  with (X) only those applicable:  Medication:  [x]Medications are available at the facility  []IV Antibiotics    []Controlled Substance - hard copies available sent. []Weekly Labs    Equipment:  []CPAP/BiPAP  []Wound Vacuum  [][place]ons or Urinary Device  []PICC/Central Line  []Nebulizer  []Ventilator    Treatment:  []Isolation (for MRSA, VRE, etc.)  []Surgical Drain Management  []Tracheostomy Care  []Dressing Changes  []Dialysis with transportation  []PEG Care  []Oxygen  []Daily Weights for Heart Failure    Dietary:  []Any diet limitations  []Tube Feedings   []Total Parenteral Management (TPN)    Financial Resources:  []Medicaid Application Completed    []UAI Completed and copy given to pt/family  and copy given to pt/family  []A screening has previously been completed. []Level II Completed    [] Private pay individual who will not become   financially eligible for Medicaid within 6 months from admission to a [address] [place] [address] facility. [] Individual refused to have screening conducted. []Medicaid Application Completed    []The screening denied because it was determined individual did not need/did not qualify for nursing facility level of care. [] Out of state residents seeking direct admission to a [address] facility. [] Individuals who are inpatients of an out of state hospital, or in state or out of state veterans/ hospital and seek direct admission to a [address] facility  [] Individuals who are pateints or residents of a state owned/operated facility that is licensed by Department of [employer] (DBS) and seek direct admission to [address] [place] [address]  [] A screening not required for enrollment in [address] [address] [address] services as set out in [facility]-  [] [facility]) staff shall perform screenings of the [facility] clients.     Advanced Care Plan:  []Surrogate Decision Maker of Care  []POA  []Communicated Code Status and copy sent.     Other:

## 2023-01-24 NOTE — BSMART NOTE
BSMART Liaison Team Note     LOS:  20 Days     Patient goal(s) for today: Take medications as prescribed, communicate needs to staff appropriately  [facility] Liaison team focus/goals: Assess needs, provide education and support, engage in brief therapy session when appropriate    Progress note: Liaison met w/ pt face to face. Upon entering the room, pt is laying in bed. When asked how pt is feeling today she responds softly, \"I want to go home\". Pt refused to answer when asked about her current mood. Pt denies SI/HI/VH/AH; no evidence of psychosis or delusional thoughts. Pt presents w/ soft speech and depressed mood. Pt reports difficulty sleeping since being admitted to the hospital d/t the noise and commenting that the hospital bed is uncomfortable. Pt also reports a decrease in appetite since admission. Pt denies having any mental health needs and does not feel that she needs treatment for her mood. Pt commented again that she wants to return home. Liaison provided validation and emotional support. Spoke w/ pt's primary nurse, Dale Aguilera RN who explains there are no updates at this time and care management has been looking for SNF Placement. Liaison will continue to follow     Barriers to Discharge: SNF Placement    Outpatient provider(s):  None Reported  Insurance info/prescription coverage:  CIGNA/[employer] CIGNA FLEXCARE HMO    Diagnosis: Per JHOANA LozoyaP consult note on 1/17, \"Cara López meets criteria for a diagnosis of psychological factors affecting general medical condition (pneumonia). Plan:  Please defer to psychiatric NP note for recommendations  Follow up Psych Consult placed? No  Psychiatrist updated?  No      Participating treatment team members: Melissa Ludwig LMSW

## 2023-01-24 NOTE — PROGRESS NOTES
0915: Pt is refusing meds and barely talking. Assessment complete. 1055: Tried callling report at [employer] and [address] [place] [address]. Facility is still waiting for confirmation from sister d/t Yarelis Almeida being on the same floor as Covid pts. 1115: Transport is here and sister is bedside. 1138: Still on phone to give report to facility. 1145: Report given to Emerson Villa. 1150: PT D/C with transport. PIV taken out.

## 2023-01-24 NOTE — PROGRESS NOTES
Nephrology Progress Note  [facility] / [facility], Twila Maldonado, [address]  Phone - [phone]  Fax - [phone]                 Patient: Sebastian Nuñez                   YOB: 1960        Date- 1/24/2023                      Admit Date: 1/3/2023  CC: Follow up for christa  IMPRESSION & PLAN:   1. CHRISTA due to acute tubular necrosis, serology workup  overall negative. 2.  Hypophosphatemia  3. Left pneumothorax requiring chest tube placement. 4.  Pleural effusion. 5.  Anemia requiring blood transfusion. 6.  History of breast cancer. 7.  Recent history of COVID-19 infection. 8.  Multifocal pneumonia.- RESP FAILURE on vent  9. Status post left thoracocentesis. 10.  Non-ST elevation myocardial infarction. PLAN-  Plan for hemodialysis tomorrow. Permacath placed yesterday 1/23/2023  Per 's note she has been accepted at [facility]. Can be discharged once outpatient HD chair has been confirmed     Subjective:  Seen and examined today  Status post PC placement yesterday  Tolerated HD well yesterday  Objective:   Vitals:    01/23/23 2322 01/24/23 0330 01/24/23 0655 01/24/23 0741   BP: (!) 147/86 (!) 141/81  (!) 151/101   Pulse: 100 94  (!) 111   Resp: 18 17     Temp: 98.9 °F (37.2 °C) 98.4 °F (36.9 °C)  99.3 °F (37.4 °C)   TempSrc:       SpO2:    95%   Weight:   65.8 kg (145 lb 1 oz)    Height:          01/23 0701 - 01/24 0700  In: -   Out: 1000     Last 3 Recorded Weights in this Encounter    01/22/23 0639 01/23/23 0414 01/24/23 0655   Weight: 64.6 kg (142 lb 6.7 oz) 64.9 kg (143 lb 1.3 oz) 65.8 kg (145 lb 1 oz)        Physical exam:  GEN: nad  NECK:  no mass, jose alberto cath +  RESP: decreased BS  b/l, no  wheezing,   CVS: RRR,s1,s2  NEURO: non focal  PSYCH: Can't assess due to patient's current condition           Chart reviewed. Pertinent Notes reviewed.      Data Review :  Recent Labs     01/23/23  0744   MG 1.8   PHOS 7.7*       Recent Labs     01/23/23  0744   WBC 8.5   HGB 10.5*   HCT 32.4*          No results for input(s): FE, TIBC, PSAT, FERR in the last 72 hours.      No results found for: HBA1C, EZF2IWKS, [id number]   No results found for: MCACR, MCA1, MCA2, MCA3, MCAU, MCAU2, MCALPOCT  US Results (most recent):  Medication list  reviewed    Current Facility-Administered Medications   Medication Dose Route Frequency    [START ON 1/26/2023] epoetin tyler-epbx (RETACRIT) injection 10,000 Units  10,000 Units SubCUTAneous Q TUE, THU & SAT    methylPREDNISolone (PF) (SOLU-MEDROL) injection 20 mg  20 mg IntraVENous DAILY    isosorbide mononitrate ER (IMDUR) tablet 30 mg  30 mg Oral DAILY    hydrALAZINE (APRESOLINE) tablet 25 mg  25 mg Oral BID    balsam peru-castor oiL (VENELEX) ointment   Topical BID    diphenhydrAMINE (BENADRYL) 12.5 mg/5 mL oral liquid 25 mg  25 mg Oral Q6H PRN    melatonin tablet 3 mg  3 mg Oral QHS PRN    guaiFENesin (ROBITUSSIN) 100 mg/5 mL oral liquid 100 mg  100 mg Oral Q4H PRN    albuterol CONCENTRATE 2.5mg/0.5 mL neb soln  1.25 mg Nebulization Q6H PRN    aspirin delayed-release tablet 81 mg  81 mg Oral DAILY    carvediloL (COREG) tablet 3.125 mg  3.125 mg Oral BID WITH MEALS    nitroglycerin (NITROBID) 2 % ointment 1 Inch  1 Inch Topical Q6H PRN    famotidine (PF) (PEPCID) 20 mg in 0.9% sodium chloride 10 mL injection  20 mg IntraVENous DAILY    bumetanide (BUMEX) tablet 2 mg  2 mg Oral BID    heparin (porcine) 1,000 unit/mL injection 1,300 Units  1,300 Units InterCATHeter DIALYSIS PRN    And    heparin (porcine) 1,000 unit/mL injection 1,300 Units  1,300 Units InterCATHeter DIALYSIS PRN    0.9% sodium chloride infusion 250 mL  250 mL IntraVENous PRN    lidocaine 4 % patch 1 Patch  1 Patch TransDERmal Q24H    glucose chewable tablet 16 g  4 Tablet Oral PRN    glucagon (GLUCAGEN) injection 1 mg  1 mg IntraMUSCular PRN    dextrose 10% infusion 0-250 mL  0-250 mL IntraVENous PRN    insulin lispro (HUMALOG) injection   SubCUTAneous Q6H    balsam peru-castor oiL (VENELEX) ointment   Topical BID    PARoxetine (PAXIL) tablet 20 mg  20 mg Per NG tube DAILY    heparin (porcine) injection 5,000 Units  5,000 Units SubCUTAneous Q12H    HYDROmorphone (DILAUDID) injection 0.5 mg  0.5 mg IntraVENous Q4H PRN    metoprolol (LOPRESSOR) injection 2.5 mg  2.5 mg IntraVENous Q6H PRN    lidocaine (XYLOCAINE) 4 % cream   Topical PRN    [Held by provider] gabapentin (NEURONTIN) capsule 100 mg  100 mg Oral TID    sodium chloride (NS) flush 5-40 mL  5-40 mL IntraVENous Q8H    sodium chloride (NS) flush 5-40 mL  5-40 mL IntraVENous PRN    acetaminophen (TYLENOL) tablet 650 mg  650 mg Oral Q6H PRN    Or    acetaminophen (TYLENOL) suppository 650 mg  650 mg Rectal Q6H PRN    polyethylene glycol (MIRALAX) packet 17 g  17 g Oral DAILY PRN    ondansetron (ZOFRAN ODT) tablet 4 mg  4 mg Oral Q8H PRN    Or    ondansetron (ZOFRAN) injection 4 mg  4 mg IntraVENous Q6H PRN        Kunal Kaufman MD  1/24/2023

## 2023-01-24 NOTE — DISCHARGE INSTRUCTIONS
Pneumonia: Care Instructions  Overview     Pneumonia is an infection of the lungs. Most cases are caused by infections from bacteria or viruses. Pneumonia may be mild or very severe. If it is caused by bacteria, you will be treated with antibiotics. It may take a few weeks to a few months to recover fully from pneumonia, depending on how sick you were and whether your overall health is good. Follow-up care is a key part of your treatment and safety. Be sure to make and go to all appointments, and call your doctor if you are having problems. It's also a good idea to know your test results and keep a list of the medicines you take. How can you care for yourself at home? Take your antibiotics exactly as directed. Do not stop taking the medicine just because you are feeling better. You need to take the full course of antibiotics. Take your medicines exactly as prescribed. Call your doctor if you think you are having a problem with your medicine. Get plenty of rest and sleep. You may feel weak and tired for a while, but your energy level will improve with time. To prevent dehydration, drink plenty of fluids. Choose water and other clear liquids. If you have kidney, heart, or liver disease and have to limit fluids, talk with your doctor before you increase the amount of fluids you drink. Take care of your cough so you can rest. A cough that brings up mucus from your lungs is common with pneumonia. It is one way your body gets rid of the infection. But if coughing keeps you from resting or causes severe fatigue and chest-wall pain, talk to your doctor. Your doctor may suggest that you take a medicine to reduce the cough. Use a vaporizer or humidifier to add moisture to your bedroom. Follow the directions for cleaning the machine. Do not smoke or allow others to smoke around you. Smoke will make your cough last longer. If you need help quitting, talk to your doctor about stop-smoking programs and medicines. These can increase your chances of quitting for good. Take an over-the-counter pain medicine, such as acetaminophen (Tylenol), ibuprofen (Advil, Motrin), or naproxen (Aleve). Read and follow all instructions on the label. Do not take two or more pain medicines at the same time unless the doctor told you to. Many pain medicines have acetaminophen, which is Tylenol. Too much acetaminophen (Tylenol) can be harmful. If you were given a spirometer to measure how well your lungs are working, use it as instructed. This can help your doctor tell how your recovery is going. To prevent pneumonia in the future, talk to your doctor about getting a yearly flu vaccine and a pneumococcal vaccine. When should you call for help? Call 911 anytime you think you may need emergency care. For example, call if:    You have severe trouble breathing. Call your doctor now or seek immediate medical care if:    You cough up dark brown or bloody mucus (sputum). You have new or worse trouble breathing. You are dizzy or lightheaded, or you feel like you may faint. Watch closely for changes in your health, and be sure to contact your doctor if:    You have a new or higher fever. You are coughing more deeply or more often. You are not getting better after 2 days (48 hours). You do not get better as expected. Where can you learn more? Go to http://www.cohen.com/  Enter D336 in the search box to learn more about \"Pneumonia: Care Instructions. \"  Current as of: February 9, 2022               Content Version: 13.4  © 2006-2022 [employer]. Care instructions adapted under license by [employer] (which disclaims liability or warranty for this information). If you have questions about a medical condition or this instruction, always ask your healthcare professional. [address] any warranty or liability for your use of this information.

## 2023-01-24 NOTE — PROGRESS NOTES
1900H: Bedside shift change report given to [address] (oncoming nurse) by Billie Leslie RN (offgoing nurse). Report included the following information SBAR, Kardex, Intake/Output, MAR, Recent Results, Med Rec Status, and Cardiac Rhythm SINUS RHYTHM . End of Shift Note    Bedside shift change report given to LAVONNE SANTOS (oncoming nurse) by Erik Arriaza RN (offgoing nurse). Report included the following information SBAR, Kardex, Intake/Output, MAR, Recent Results, Med Rec Status, and Cardiac Rhythm SINUS RHYTHM    Shift worked:  [id number]     Shift summary and any significant changes:     Patient has been refusing most of care beng offered; Blood sugar taken at midnight is 70, D10 125ml iV PRN given as ordered for hypoglycemia; Re-checked BS is 80. Patient refusing to get stuck again for morning labs. Concerns for physician to address:       Zone phone for oncoming shift:          Activity:  Activity Level: Bed Rest  Number times ambulated in hallways past shift: 0  Number of times OOB to chair past shift: 0    Cardiac:   Cardiac Monitoring: Yes      Cardiac Rhythm: Sinus Rhythm    Access:  Current line(s): PIV and HD access     Genitourinary:   Urinary status: voiding    Respiratory:   O2 Device: None (Room air)  Chronic home O2 use?: NO  Incentive spirometer at bedside: NO       GI:  Last Bowel Movement Date: 01/21/23  Current diet:  ADULT ORAL NUTRITION SUPPLEMENT Breakfast, Dinner; Renal Supplement  ADULT DIET Regular; 3 carb choices (45 gm/meal)  Passing flatus: YES  Tolerating current diet: YES       Pain Management:   Patient states pain is manageable on current regimen: YES    Skin:  William Score: 16  Interventions: speciality bed, float heels, increase time out of bed, and PT/OT consult    Patient Safety:  Fall Score:  Total Score: 3  Interventions: bed/chair alarm, assistive device (walker, cane, etc), gripper socks, and pt to call before getting OOB  High Fall Risk: Yes    Length of Stay:  Expected LOS: 5d 0h  Actual LOS: [address], [address]

## 2023-01-25 ENCOUNTER — TELEPHONE (OUTPATIENT)
Dept: ONCOLOGY | Age: 63
End: 2023-01-25

## 2023-01-25 ENCOUNTER — DOCUMENTATION ONLY (OUTPATIENT)
Dept: ONCOLOGY | Age: 63
End: 2023-01-25

## 2023-01-25 NOTE — TELEPHONE ENCOUNTER
Called patient's home (number not in service), called patient's mobile (mailbox full). Called patient's sister Sunny Peterson) who provided contact information for the patient. Contact states, the patient is at [facility] at [phone], Room 214. Alert:  Patient has previously stated that she does not want to release medical information to anyone without her consent. Unclear if that includes the contacts listed in her chart. Contact states that she wants to be kept in the loop regarding her sister and can be reached at:  [phone] [facility]) or [phone] [facility]). [facility], to patient room, but patient did not answer. Patient has been Tentatively scheduled for 2/17/23 @ 1045pm, will continue to try to reach patient to confirm date/time.

## 2023-01-25 NOTE — PROGRESS NOTES
Pt was d/w from [facility] yesterday. Needs OV with Dr. Wenceslao Ko in person or VV in 2 weeks. Please schedule.

## 2023-02-15 ENCOUNTER — APPOINTMENT (OUTPATIENT)
Dept: CT IMAGING | Age: 63
DRG: 441 | End: 2023-02-15
Attending: STUDENT IN AN ORGANIZED HEALTH CARE EDUCATION/TRAINING PROGRAM
Payer: COMMERCIAL

## 2023-02-15 ENCOUNTER — HOSPITAL ENCOUNTER (INPATIENT)
Age: 63
LOS: 8 days | Discharge: NURSING, REHAB OR LONG-TERM CARE | DRG: 441 | End: 2023-02-23
Attending: STUDENT IN AN ORGANIZED HEALTH CARE EDUCATION/TRAINING PROGRAM | Admitting: STUDENT IN AN ORGANIZED HEALTH CARE EDUCATION/TRAINING PROGRAM
Payer: COMMERCIAL

## 2023-02-15 ENCOUNTER — APPOINTMENT (OUTPATIENT)
Dept: GENERAL RADIOLOGY | Age: 63
DRG: 441 | End: 2023-02-15
Attending: STUDENT IN AN ORGANIZED HEALTH CARE EDUCATION/TRAINING PROGRAM
Payer: COMMERCIAL

## 2023-02-15 DIAGNOSIS — K72.90 LIVER FAILURE WITHOUT HEPATIC COMA, UNSPECIFIED CHRONICITY (HCC): ICD-10-CM

## 2023-02-15 DIAGNOSIS — K85.90 ACUTE PANCREATITIS, UNSPECIFIED COMPLICATION STATUS, UNSPECIFIED PANCREATITIS TYPE: ICD-10-CM

## 2023-02-15 DIAGNOSIS — C50.011 BILATERAL MALIGNANT NEOPLASM INVOLVING BOTH NIPPLE AND AREOLA IN FEMALE, UNSPECIFIED ESTROGEN RECEPTOR STATUS (HCC): ICD-10-CM

## 2023-02-15 DIAGNOSIS — I21.4 NSTEMI (NON-ST ELEVATED MYOCARDIAL INFARCTION) (HCC): ICD-10-CM

## 2023-02-15 DIAGNOSIS — C50.012 BILATERAL MALIGNANT NEOPLASM INVOLVING BOTH NIPPLE AND AREOLA IN FEMALE, UNSPECIFIED ESTROGEN RECEPTOR STATUS (HCC): ICD-10-CM

## 2023-02-15 DIAGNOSIS — R79.89 ELEVATED LFTS: Primary | ICD-10-CM

## 2023-02-15 PROBLEM — S36.119A LIVER INJURY: Status: ACTIVE | Noted: 2023-02-15

## 2023-02-15 LAB
ALBUMIN SERPL-MCNC: 1.5 G/DL (ref 3.5–5)
ALBUMIN/GLOB SERPL: 0.4 (ref 1.1–2.2)
ALP SERPL-CCNC: 793 U/L (ref 45–117)
ALT SERPL-CCNC: 543 U/L (ref 12–78)
AMMONIA PLAS-SCNC: <10 UMOL/L
ANION GAP SERPL CALC-SCNC: 12 MMOL/L (ref 5–15)
APAP SERPL-MCNC: 5 UG/ML (ref 10–30)
AST SERPL-CCNC: 617 U/L (ref 15–37)
BASOPHILS # BLD: 0 K/UL (ref 0–0.1)
BASOPHILS NFR BLD: 0 % (ref 0–1)
BILIRUB SERPL-MCNC: 11.2 MG/DL (ref 0.2–1)
BUN SERPL-MCNC: 30 MG/DL (ref 6–20)
BUN/CREAT SERPL: 6 (ref 12–20)
CALCIUM SERPL-MCNC: 8.2 MG/DL (ref 8.5–10.1)
CHLORIDE SERPL-SCNC: 96 MMOL/L (ref 97–108)
CO2 SERPL-SCNC: 26 MMOL/L (ref 21–32)
CREAT SERPL-MCNC: 4.91 MG/DL (ref 0.55–1.02)
DIFFERENTIAL METHOD BLD: ABNORMAL
EOSINOPHIL # BLD: 0 K/UL (ref 0–0.4)
EOSINOPHIL NFR BLD: 0 % (ref 0–7)
ERYTHROCYTE [DISTWIDTH] IN BLOOD BY AUTOMATED COUNT: 18 % (ref 11.5–14.5)
GLOBULIN SER CALC-MCNC: 3.8 G/DL (ref 2–4)
GLUCOSE BLD STRIP.AUTO-MCNC: 201 MG/DL (ref 65–117)
GLUCOSE SERPL-MCNC: 43 MG/DL (ref 65–100)
HCT VFR BLD AUTO: 28.5 % (ref 35–47)
HGB BLD-MCNC: 8.9 G/DL (ref 11.5–16)
IMM GRANULOCYTES # BLD AUTO: 0 K/UL (ref 0–0.04)
IMM GRANULOCYTES NFR BLD AUTO: 0 % (ref 0–0.5)
LACTATE SERPL-SCNC: 0.4 MMOL/L (ref 0.4–2)
LIPASE SERPL-CCNC: 1301 U/L (ref 73–393)
LYMPHOCYTES # BLD: 1.1 K/UL (ref 0.8–3.5)
LYMPHOCYTES NFR BLD: 18 % (ref 12–49)
MCH RBC QN AUTO: 29.2 PG (ref 26–34)
MCHC RBC AUTO-ENTMCNC: 31.2 G/DL (ref 30–36.5)
MCV RBC AUTO: 93.4 FL (ref 80–99)
MONOCYTES # BLD: 0.5 K/UL (ref 0–1)
MONOCYTES NFR BLD: 9 % (ref 5–13)
NEUTS BAND NFR BLD MANUAL: 3 %
NEUTS SEG # BLD: 4.4 K/UL (ref 1.8–8)
NEUTS SEG NFR BLD: 70 % (ref 32–75)
NRBC # BLD: 0 K/UL (ref 0–0.01)
NRBC BLD-RTO: 0 PER 100 WBC
PLATELET # BLD AUTO: 253 K/UL (ref 150–400)
PMV BLD AUTO: 9.3 FL (ref 8.9–12.9)
POTASSIUM SERPL-SCNC: 3.1 MMOL/L (ref 3.5–5.1)
PROT SERPL-MCNC: 5.3 G/DL (ref 6.4–8.2)
RBC # BLD AUTO: 3.05 M/UL (ref 3.8–5.2)
RBC MORPH BLD: ABNORMAL
SERVICE CMNT-IMP: ABNORMAL
SODIUM SERPL-SCNC: 134 MMOL/L (ref 136–145)
TROPONIN I SERPL HS-MCNC: 209 NG/L (ref 0–51)
WBC # BLD AUTO: 6 K/UL (ref 3.6–11)

## 2023-02-15 PROCEDURE — 74011250636 HC RX REV CODE- 250/636: Performed by: INTERNAL MEDICINE

## 2023-02-15 PROCEDURE — 83690 ASSAY OF LIPASE: CPT

## 2023-02-15 PROCEDURE — 5A1D70Z PERFORMANCE OF URINARY FILTRATION, INTERMITTENT, LESS THAN 6 HOURS PER DAY: ICD-10-PCS | Performed by: INTERNAL MEDICINE

## 2023-02-15 PROCEDURE — 74177 CT ABD & PELVIS W/CONTRAST: CPT

## 2023-02-15 PROCEDURE — 90935 HEMODIALYSIS ONE EVALUATION: CPT

## 2023-02-15 PROCEDURE — 71045 X-RAY EXAM CHEST 1 VIEW: CPT

## 2023-02-15 PROCEDURE — 82962 GLUCOSE BLOOD TEST: CPT

## 2023-02-15 PROCEDURE — 74011000636 HC RX REV CODE- 636: Performed by: STUDENT IN AN ORGANIZED HEALTH CARE EDUCATION/TRAINING PROGRAM

## 2023-02-15 PROCEDURE — 84484 ASSAY OF TROPONIN QUANT: CPT

## 2023-02-15 PROCEDURE — 93005 ELECTROCARDIOGRAM TRACING: CPT

## 2023-02-15 PROCEDURE — 82140 ASSAY OF AMMONIA: CPT

## 2023-02-15 PROCEDURE — 74011000250 HC RX REV CODE- 250: Performed by: STUDENT IN AN ORGANIZED HEALTH CARE EDUCATION/TRAINING PROGRAM

## 2023-02-15 PROCEDURE — 85025 COMPLETE CBC W/AUTO DIFF WBC: CPT

## 2023-02-15 PROCEDURE — P9047 ALBUMIN (HUMAN), 25%, 50ML: HCPCS | Performed by: INTERNAL MEDICINE

## 2023-02-15 PROCEDURE — 80143 DRUG ASSAY ACETAMINOPHEN: CPT

## 2023-02-15 PROCEDURE — 83605 ASSAY OF LACTIC ACID: CPT

## 2023-02-15 PROCEDURE — 36415 COLL VENOUS BLD VENIPUNCTURE: CPT

## 2023-02-15 PROCEDURE — 65270000029 HC RM PRIVATE

## 2023-02-15 PROCEDURE — 99285 EMERGENCY DEPT VISIT HI MDM: CPT

## 2023-02-15 PROCEDURE — 80053 COMPREHEN METABOLIC PANEL: CPT

## 2023-02-15 RX ORDER — FAMOTIDINE 20 MG/1
20 TABLET, FILM COATED ORAL 2 TIMES DAILY
Status: DISCONTINUED | OUTPATIENT
Start: 2023-02-15 | End: 2023-02-16

## 2023-02-15 RX ORDER — PAROXETINE HYDROCHLORIDE 20 MG/1
20 TABLET, FILM COATED ORAL DAILY
Status: DISCONTINUED | OUTPATIENT
Start: 2023-02-16 | End: 2023-02-23 | Stop reason: HOSPADM

## 2023-02-15 RX ORDER — ONDANSETRON 2 MG/ML
4 INJECTION INTRAMUSCULAR; INTRAVENOUS
Status: DISCONTINUED | OUTPATIENT
Start: 2023-02-15 | End: 2023-02-23 | Stop reason: HOSPADM

## 2023-02-15 RX ORDER — ONDANSETRON 4 MG/1
4 TABLET, ORALLY DISINTEGRATING ORAL
Status: DISCONTINUED | OUTPATIENT
Start: 2023-02-15 | End: 2023-02-23 | Stop reason: HOSPADM

## 2023-02-15 RX ORDER — ALBUMIN HUMAN 250 G/1000ML
25 SOLUTION INTRAVENOUS
Status: DISCONTINUED | OUTPATIENT
Start: 2023-02-15 | End: 2023-02-23 | Stop reason: HOSPADM

## 2023-02-15 RX ORDER — SODIUM CHLORIDE 0.9 % (FLUSH) 0.9 %
5-40 SYRINGE (ML) INJECTION EVERY 8 HOURS
Status: DISCONTINUED | OUTPATIENT
Start: 2023-02-15 | End: 2023-02-23 | Stop reason: HOSPADM

## 2023-02-15 RX ORDER — CARVEDILOL 3.12 MG/1
3.12 TABLET ORAL 2 TIMES DAILY WITH MEALS
Status: DISCONTINUED | OUTPATIENT
Start: 2023-02-15 | End: 2023-02-23 | Stop reason: HOSPADM

## 2023-02-15 RX ORDER — SODIUM CHLORIDE 0.9 % (FLUSH) 0.9 %
5-40 SYRINGE (ML) INJECTION AS NEEDED
Status: DISCONTINUED | OUTPATIENT
Start: 2023-02-15 | End: 2023-02-23 | Stop reason: HOSPADM

## 2023-02-15 RX ORDER — DEXTROSE MONOHYDRATE 100 MG/ML
0-250 INJECTION, SOLUTION INTRAVENOUS AS NEEDED
Status: DISCONTINUED | OUTPATIENT
Start: 2023-02-15 | End: 2023-02-23 | Stop reason: HOSPADM

## 2023-02-15 RX ORDER — ACETAMINOPHEN 650 MG/1
650 SUPPOSITORY RECTAL
Status: DISCONTINUED | OUTPATIENT
Start: 2023-02-15 | End: 2023-02-20

## 2023-02-15 RX ORDER — POLYETHYLENE GLYCOL 3350 17 G/17G
17 POWDER, FOR SOLUTION ORAL DAILY PRN
Status: DISCONTINUED | OUTPATIENT
Start: 2023-02-15 | End: 2023-02-23 | Stop reason: HOSPADM

## 2023-02-15 RX ORDER — ACETAMINOPHEN 325 MG/1
650 TABLET ORAL
Status: DISCONTINUED | OUTPATIENT
Start: 2023-02-15 | End: 2023-02-23 | Stop reason: HOSPADM

## 2023-02-15 RX ADMIN — IOPAMIDOL 100 ML: 755 INJECTION, SOLUTION INTRAVENOUS at 09:20

## 2023-02-15 RX ADMIN — DEXTROSE MONOHYDRATE 250 ML: 100 INJECTION, SOLUTION INTRAVENOUS at 09:50

## 2023-02-15 RX ADMIN — ALBUMIN (HUMAN) 12.5 G: 0.25 INJECTION, SOLUTION INTRAVENOUS at 16:23

## 2023-02-15 NOTE — PROGRESS NOTES
Nephrology Progress Note  [facility] / [facility], [address]  [place], [address]  Phone - [phone]  Fax - [phone]                 Patient: Glenn Adrian                   YOB: 1960        Date- 2/15/2023                      Admit Date: 2/15/2023  CC: Follow up for CHRISTA stage III          IMPRESSION & PLAN:   CHRISTA stage III, MWF [facility], left chest permacath  Elevated LFTs  History of pneumothorax in the past requiring chest tube on left  Anemia of CKD  History of breast cancer  Hypoglycemia  Hypokalemia  Left-sided pleural effusion      PLAN-  Plan for hemodialysis today. [facility] has been notified  Will add Epogen for anemia  Will be getting admitted for further work-up of elevated LFTs. Spoke to the ED physician  Thank you for the consult       Subjective: Interval History:   -Patient was seen and examined in the ED. She is well-known to me from her previous admission in January where she was placed on dialysis for CHRISTA. She currently gets dialyzed on MWF schedule at [facility] with a left chest PermCath. She presented to the ED with not feeling well. Renal consult requested for evaluation and management of hemodialysis    Objective:   Vitals:    02/15/23 0755   BP: 121/63   Pulse: 79   Resp: 16   Temp: 97.9 °F (36.6 °C)   SpO2: 99%   Weight: 65 kg (143 lb 4.8 oz)   Height: 5' (1.524 m)      No intake/output data recorded. Last 3 Recorded Weights in this Encounter    02/15/23 0755   Weight: 65 kg (143 lb 4.8 oz)        Physical exam:    GEN: NAD  NECK- no mass  RESP: No wheezing, decreased BS b/l  CVS: S1,S2  RRR  NEURO: Normal speech, Non focal  EXT: No Edema   HD Access: Left chest PermCath    Chart reviewed. Pertinent Notes reviewed.      Data Review :  Recent Labs     02/15/23  0814   *   K 3.1*   CL 96*   CO2 26   BUN 30*   CREA 4.91*   GLU 43*   CA 8.2* Recent Labs     02/15/23  0814   WBC 6.0   HGB 8.9*   HCT 28.5*        No results for input(s): FE, TIBC, PSAT, FERR in the last 72 hours. No results found for: HBA1C, [id number]   No results found for: Charleen Spray, MCA2, MCA3, MCAU, MCAU2, MCALPOCT  US Results (most recent):  Medication list  reviewed  Current Facility-Administered Medications   Medication Dose Route Frequency    dextrose 10% infusion 0-250 mL  0-250 mL IntraVENous PRN     Current Outpatient Medications   Medication Sig    aspirin delayed-release 81 mg tablet Take 1 Tablet by mouth daily for 30 days. OTHER,NON-FORMULARY, IV chemotherapy    carvediloL (COREG) 6.25 mg tablet Take 6.25 mg by mouth two (2) times daily (with meals). famotidine (PEPCID) 40 mg/5 mL (8 mg/mL) suspension Take 2.5 mL by mouth two (2) times a day. ondansetron (ZOFRAN ODT) 8 mg disintegrating tablet Take 1 Tablet by mouth every eight (8) hours as needed for Nausea or Vomiting.    lidocaine-prilocaine (EMLA) topical cream Apply  to affected area as needed for Pain. Apply a thick layer over port a cath 30-60 minutes prior to port access    PARoxetine (PAXIL) 20 mg tablet Take 20 mg by mouth daily. multivitamin, tx-iron-ca-min (THERA-M w/ IRON) 9 mg iron-400 mcg tab tablet Take 1 Tablet by mouth daily.         Shelby Sommer MD  2/15/2023

## 2023-02-15 NOTE — CONSULTS
GI CONSULTATION NOTE  Jason Plaza, NP  [phone] NP in-hospital cell phone M-F until 4:30  After 5pm or on weekends, please call  for physician on call    NAME: Peri Radford   :  1960   MRN:  [id number]   Attending:  Dr Sandy Can  Primary GI:  Dr Bailey Sammamish  Date/Time:  2/15/2023 1:44 PM  Assessment:   Recurrent elevated LFTs  Elevated lipase   - TB 11.2, , , , Lipase 1301  - Negative hepatic serology completed 2023 during last admission for viral or autoimmune disease.  - Hgb 8.9, MCV 93.4, Plt 253. Hx of elevated LFTS 2023 after she was hospitalized from septic shock, had shock liver, pneumonia, STEMI, anemia. 2022:  CT abd/pel without contrast with no acute GI findings  01/10/2023:  Abdominal US with no sonographic abnormality of the liver. 2/15/23: CT abd/pel with contrast : LIVER: No mass. BILIARY TREE: Gallbladder is within normal limits. CBD is not dilated. SPLEEN: within normal limits. PANCREAS: No mass or ductal dilatation.    - Denies any abdominal pain, nausea, vomiting.  - Unsure if any changes in stool or urine  - No recent chemo therapy. Acute on Chronic Anemia. ESRD on HD  Breast Cancer Stage II  - Recent Keytruda : last dose 2022  - Last Chemotherapy: 2022  Hx of PNA likely 2/2 to cancer  Hypotension  Treatment per primary team      Plan:   There is not evidence to believe that the elevation in LFTs is 2/2 biliary obstruction or acute pancreatitis - no stone on imaging, normal pancrease on imaging. No benefit from further imaging including MRI/MRCP at this time. Consult Oncology for concern for elevated LFTs 2/2 chemotherapy or other medications. Can consider liver biopsy for possible diffuse infiltrative carcinoma that is sometimes not seen on imaging. Upon review of routine medications, none clearly would cause elevated LFTs.   Avoid hepatotoxic drugs  Follow LFTs    Addendum: Spoke to [facility] NP extensively regarding pt's case. She will discuss with Dr Geovanna Martinez but feels that the patient is too decompensated to have a liver biopsy and she is not a good candidate to continue chemo therapy. She has not received chemotherapy since 12/2022 as she is very weak s/p her near death hospitalization in 1/2023. Plan discussed with Dr Lee Ann Lam. Thank you for consultation. Subjective:     HISTORY OF PRESENT ILLNESS:     Odilia Dean is an 57 y.o.  female who we are asked to see for complaint of  female who we are asked to see for complaint of elevated LFT's   Patient has a hx of left breast cancer, recently admitted to [facility] for complicated admission including sepsis s/s COVID-19, CHRISTA, NSTEMI, MSSA pneumonia(12/28-1/1), then progressed to requiring intubation from pneumonitis and hemothorax likely 2/2 progression of cancer. She is now here for progressive weakness. She is a bit confused with where she is. She denies any abdominal pain, nausea, vomiting, diarrhea, changes in stools. She does feel weak and has to use a bedpan now. She is not currently getting chemo therapy due to debilitation from last hospitalization. We saw her last hospitalization for elevated LFTs that were higher than they are now, negative serologic work up then. Unremarkable liver on imaging.        Past Medical History:   Diagnosis Date    Cancer [facility])     BREAST CANCER    GERD (gastroesophageal reflux disease)     HTN (hypertension) 07/18/2011    Psychiatric disorder     ANIXETY AND DEPRESSION    Thyroid disease     HYPOTHYROID      Past Surgical History:   Procedure Laterality Date    HX COLONOSCOPY      HX MYOMECTOMY  05/02/1996    x 1    HX WISDOM TEETH EXTRACTION      IR INSERT NON TUNL CVC OVER 5 YRS  1/9/2023    IR INSERT TUNL CVC W/O PORT OVER 5 YR  1/23/2023    IR THORACENTESIS/INSERT CHEST TUBE  1/5/2023     Social History     Tobacco Use    Smoking status: Never     Passive exposure: Never    Smokeless tobacco: Never   Substance Use Topics    Alcohol use: Never      Family History   Problem Relation Age of Onset    Heart Failure Mother     Cancer Father     Diabetes Sister     Kidney Disease Sister     Anesth Problems Neg Hx       Allergies   Allergen Reactions    Sulfa (Sulfonamide Antibiotics) Itching, Swelling and Unknown (comments)      Prior to Admission medications    Medication Sig Start Date End Date Taking? Authorizing Provider   aspirin delayed-release 81 mg tablet Take 1 Tablet by mouth daily for 30 days. 1/25/23 2/24/23  MD Mariaelena Burciaga, IV chemotherapy    Provider, Historical   carvediloL (COREG) 6.25 mg tablet Take 6.25 mg by mouth two (2) times daily (with meals). 11/3/22   Provider, Historical   famotidine (PEPCID) 40 mg/5 mL (8 mg/mL) suspension Take 2.5 mL by mouth two (2) times a day. 11/30/22   Jessica Boast, NP   ondansetron (ZOFRAN ODT) 8 mg disintegrating tablet Take 1 Tablet by mouth every eight (8) hours as needed for Nausea or Vomiting. 9/28/22   Jessica Boast, NP   lidocaine-prilocaine (EMLA) topical cream Apply  to affected area as needed for Pain. Apply a thick layer over port a cath 30-60 minutes prior to port access 9/28/22   Jessica Boast, NP   PARoxetine (PAXIL) 20 mg tablet Take 20 mg by mouth daily. Provider, Historical   multivitamin, tx-iron-ca-min (THERA-M w/ IRON) 9 mg iron-400 mcg tab tablet Take 1 Tablet by mouth daily.     Provider, Historical       Patient Active Problem List   Diagnosis Code    Menopausal symptoms N95.1    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 ([facility] [address] C50.011, C50.012    Pneumonia J18.9    CHRISTA (acute kidney injury) ([facility] [address] N17.9    COVID-19 U07.1    Troponin level elevated R77.8    Elevated LFTs R79.89    Bacterial pneumonia J15.9    Staphylococcus aureus pneumonia (HCC) J15.211    Shortness of breath R06.02    Acute blood loss anemia D62    Hemothorax on left J94.2    Anxiety F41.9    Palliative care by specialist Z51.5    Hemothorax J94.2    Goals of care, counseling/discussion Z71.89    Advanced directives, counseling/discussion Z71.89    Liver injury S36.119A       REVIEW OF SYSTEMS:      Patient was not able to provide review of systems due to mental status change/acute illness      Objective:   VITALS:    Visit Vitals  BP (!) 114/59   Pulse 73   Temp 97.9 °F (36.6 °C)   Resp 20   Ht 5' (1.524 m)   Wt 65 kg (143 lb 4.8 oz)   SpO2 99%   BMI 27.99 kg/m²       PHYSICAL EXAM:   General:          Alert, WD, frail, cooperative, no distress, appears stated age.  +jaundice. Head:               Normocephalic, without obvious abnormality, atraumatic. Eyes:               icteric sclerae. Pupils are equal  Nose:               Nares normal. No drainage or sinus tenderness. Throat:             Lips, mucosa, and tongue normal.  No Thrush  Neck:               Supple, symmetrical,  no adenopathy, thyroid: non tender  Back:               Symmetric,  No CVA tenderness. Lungs:             CTA bilaterally. No wheezing/rhonchi/rales. Chest wall:      No tenderness or deformity. No Accessory muscle use. Heart:              Regular rate and rhythm,  no murmur, rub or gallop. Abdomen:        Soft, non-tender. Not distended. Bowel sounds normal. No masses  Extremities:     Atraumatic, No cyanosis. No edema. No clubbing  Skin:                Texture, turgor normal. No rashes/lesions/jaundice  Lymph:            Cervical, supraclavicular normal.  Psych:             poor insight. Not depressed. Not anxious or agitated. Neurologic:      EOMs intact. No facial asymmetry. No aphasia or slurred speech. Weak strength, A/O X name only.      LAB DATA REVIEWED:    Recent Results (from the past 24 hour(s))   CBC WITH AUTOMATED DIFF    Collection Time: 02/15/23  8:14 AM   Result Value Ref Range    WBC 6.0 3.6 - 11.0 K/uL    RBC 3.05 (L) 3.80 - 5.20 M/uL    HGB 8.9 (L) 11.5 - 16.0 g/dL    HCT 28.5 (L) 35.0 - 47.0 %    MCV 93.4 80.0 - 99.0 FL    MCH 29.2 26.0 - 34.0 PG    MCHC 31.2 30.0 - 36.5 g/dL    RDW 18.0 (H) 11.5 - 14.5 %    PLATELET 390 324 - 283 K/uL    MPV 9.3 8.9 - 12.9 FL    NRBC 0.0 0  WBC    ABSOLUTE NRBC 0.00 0.00 - 0.01 K/uL    NEUTROPHILS 70 32 - 75 %    BAND NEUTROPHILS 3 %    LYMPHOCYTES 18 12 - 49 %    MONOCYTES 9 5 - 13 %    EOSINOPHILS 0 0 - 7 %    BASOPHILS 0 0 - 1 %    IMMATURE GRANULOCYTES 0 0.0 - 0.5 %    ABS. NEUTROPHILS 4.4 1.8 - 8.0 K/UL    ABS. LYMPHOCYTES 1.1 0.8 - 3.5 K/UL    ABS. MONOCYTES 0.5 0.0 - 1.0 K/UL    ABS. EOSINOPHILS 0.0 0.0 - 0.4 K/UL    ABS. BASOPHILS 0.0 0.0 - 0.1 K/UL    ABS. IMM. GRANS. 0.0 0.00 - 0.04 K/UL    DF MANUAL      RBC COMMENTS HYPOCHROMIA  1+        RBC COMMENTS OVALOCYTES  1+        RBC COMMENTS ANISOCYTOSIS  1+       METABOLIC PANEL, COMPREHENSIVE    Collection Time: 02/15/23  8:14 AM   Result Value Ref Range    Sodium 134 (L) 136 - 145 mmol/L    Potassium 3.1 (L) 3.5 - 5.1 mmol/L    Chloride 96 (L) 97 - 108 mmol/L    CO2 26 21 - 32 mmol/L    Anion gap 12 5 - 15 mmol/L    Glucose 43 (LL) 65 - 100 mg/dL    BUN 30 (H) 6 - 20 MG/DL    Creatinine 4.91 (H) 0.55 - 1.02 MG/DL    BUN/Creatinine ratio 6 (L) 12 - 20      eGFR 9 (L) >60 ml/min/1.73m2    Calcium 8.2 (L) 8.5 - 10.1 MG/DL    Bilirubin, total 11.2 (H) 0.2 - 1.0 MG/DL    ALT (SGPT) 543 (H) 12 - 78 U/L    AST (SGOT) 617 (H) 15 - 37 U/L    Alk.  phosphatase 793 (H) 45 - 117 U/L    Protein, total 5.3 (L) 6.4 - 8.2 g/dL    Albumin 1.5 (L) 3.5 - 5.0 g/dL    Globulin 3.8 2.0 - 4.0 g/dL    A-G Ratio 0.4 (L) 1.1 - 2.2     LIPASE    Collection Time: 02/15/23  8:14 AM   Result Value Ref Range    Lipase 1,301 (H) 73 - 393 U/L   LACTIC ACID    Collection Time: 02/15/23  8:14 AM   Result Value Ref Range    Lactic acid 0.4 0.4 - 2.0 MMOL/L   TROPONIN-HIGH SENSITIVITY    Collection Time: 02/15/23  8:14 AM   Result Value Ref Range    Troponin-High Sensitivity 209 (HH) 0 - 51 ng/L   ACETAMINOPHEN    Collection Time: 02/15/23  8:14 AM   Result Value Ref Range    Acetaminophen level 5 (L) 10 - 30 ug/mL   EKG, 12 LEAD, INITIAL    Collection Time: 02/15/23  8:18 AM   Result Value Ref Range    Ventricular Rate 71 BPM    Atrial Rate 71 BPM    P-R Interval 144 ms    QRS Duration 82 ms    Q-T Interval 406 ms    QTC Calculation (Bezet) 441 ms    Calculated P Axis 53 degrees    Calculated R Axis 10 degrees    Calculated T Axis -86 degrees    Diagnosis       Normal sinus rhythm  ST & T wave abnormality, consider inferior ischemia  ST & T wave abnormality, consider anterolateral ischemia  When compared with ECG of 16-JAN-2023 08:34,  premature atrial complexes are no longer present  Vent. rate has increased BY  23 BPM  T wave inversion more evident in Lateral leads     AMMONIA    Collection Time: 02/15/23  9:49 AM   Result Value Ref Range    Ammonia, plasma <10 <32 UMOL/L   GLUCOSE, POC    Collection Time: 02/15/23 10:15 AM   Result Value Ref Range    Glucose (POC) 201 (H) 65 - 117 mg/dL    Performed by Remy Howell RN        IMAGING RESULTS:  I have personally reviewed the imaging reports      Total time spent with patient: 50 minutes ________________________________________________________________________  Care Plan discussed with:  Patient y   Family     TOM Sandoval in ER              Consultant:  Marsha Cruz NP     CT  2/15/2023:  ________________________________________________________________________    ___________________________________________________  Consulting Provider:  Cris Ruiz NP      2/15/2023  1:44 PM

## 2023-02-15 NOTE — PROGRESS NOTES
[facility] at [address], [place], [address]  W: [phone] F: [phone]    Patient is well-known to oncology practice given last admission. Her primary oncologist is Dr. Di Carty at [facility] and she has been treated in the past for breast cancer. Her last chemotherapy and immunotherapy was in December 2022. Last month she was admitted for pneumonia and recurrent malignant pleural effusions. Unfortunately she suffered a hemothorax after thoracentesis. Her condition continued to decline and she was transferred to the ICU for septic shock and was intubated for period of time. She experienced an CHRISTA related to hypotension and septic shock, she was followed by nephrology and however her renal function did not recover and she was started on hemodialysis at that time. Upon discharge she was transferred to SNF, she has been unable to follow-up with her primary oncology team.      Patient was readmitted from SNF for overall weakness. She has been receiving hemodialysis at [address]. Her LFTs are elevated with a T bili of 11.2. At this time it is unclear the cause of her elevated LFTs as CT does not show any concern for malignancy in the liver. She has undergone GI evaluation which did not yield cause of increased LFTs. Very little concern for immunotherapy related cause of elevated LFTs as last immunotherapy was 2 months ago. Discussed concern for infiltrative carcinoma in the liver with GI team.  Would not pursue liver biopsy at this time given patient's poor functional status and unfortunately she is not a candidate for further systemic therapy. Will start full dose steroids at this time and continue to monitor CMP. Palliative is consulted to readdress goals of care as her short-term prognosis is very poor and hospice is very appropriate.     Attempted to see patient at bedside today, she was off the floor and hemodialysis. She has a difficult personality and did not want to discuss overall poor prognosis without privacy while she was in the dialysis unit. We will see tomorrow at bedside and full consult note to follow.

## 2023-02-15 NOTE — ED NOTES
ADMISSION SBAR NOTE    IP UNIT CALLED NOTE IS READY: Yes Spoke to barb  IF there are questions Call Gerardo Milton at phone # [phone]    SITUATION/BACKGROUND:    Patient is being transferred to [address], Room# 91 15 31    Patient's Chief Complaint was weakness  and is admitted for liver injury . CODE STATUS: Full Code    ISOLATION/PRECAUTIONS: Yes   ISOLATION TYPE: n/a    Called outstanding consults: Yes     Are there still sign and held orders that need to be released? No     STAT labs collected: Yes  REPEAT LACTIC ACID DUE? No  TIME DUE: n/a      All STAT orders are complete: Yes    The following personal items will be sent with the patient during transfer to the floor: All valuables:   ITEM:    ITEM:    ITEM:    ITEM:    ITEM:         ASSESSMENT:    CIWA Assessment: No  Last Score: n/a      NEURO: GCS15    NIH SCORE:    VICKY SCREENING:      NEURO ASSESSMENT:      Is patient impulsive? No   Is patient oriented? Yes   Do they follow commands? Yes  Is the patient ambulatory? no Device need bedside rails     FALL RISK? Yes   INTERVENTIONS: bedside rails     RESPIRATORY: Is patient on Oxygen? No    OXYGEN: Oxygen Therapy  O2 Device: None (Room air) (02/15/23 1500)    CARDIAC: Is cardiac monitoring ordered? Yes Last Rhythm: sinus rythym   Patient to transfer with tele box on? Yes  Is patient using a LIFE VEST? No     LINE ACCESS:   Venous Access Device 02/15/23 (Active)       Hemodialysis Access (Active)   Criteria for Appropriate Use Dialysis/apheresis 02/15/23 1515   Date Accessed  02/15/23 02/15/23 1515   Access Time  1515 02/15/23 1515   Site Assessment Clean, dry, & intact 02/15/23 1515   Date of Last Dressing Change 02/15/23 02/15/23 1515   Dressing Status Clean, dry, & intact 02/15/23 1515   Dressing Type Bacteriocidal 02/15/23 1515   Proximal Hub Color/Line Status Red; Infusing;Flushed 02/15/23 1515   Distal Hub Color/Line Status Blue; Infusing;Flushed 02/15/23 1515        /GI: CONTINENT BOWEL/BLADDER? Yes  URINARY OUTPUT: voiding  CHRONIC OR ACUTE? N/a   If CHRONIC, is it 1days old, was it changed prior to specimen collection? No   WAS UA WITH REFLEX SENT TO LAB? No  IF NO, COLLECT AND SEND PRIOR TO TRANSPORT TO INPATIENT AREA    INTEGUMENTARY:   IS THE PATIENT UNDRESSED? Yes  ARE THERE WOUNDS PRESENT? No   ARE THE WOUNDS DOCUMENTED? No     RESTRAINTS IN USE: No      IS DOCUMENTATION COMPLETE: no  Is there a current Order? No  When does it ?  N/a      Vital Signs  Level of Consciousness: Alert (0) (02/15/23 0755)  Temp: 98.1 °F (36.7 °C) (02/15/23 1500)  Temp Source: Oral (02/15/23 1500)  Pulse (Heart Rate): 75 (02/15/23 1630)  Heart Rate Source: Monitor (02/15/23 0755)  Cardiac Rhythm: Sinus Rhythm (02/15/23 0755)  Resp Rate: 16 (02/15/23 1630)  BP: (!) 98/45 (02/15/23 1630)  MAP (Monitor): 84 (02/15/23 1400)  MAP (Calculated): (!) 63 (02/15/23 1630)  BP 1 Location: Right upper arm (02/15/23 0755)  MEWS Score: 1 (02/15/23 075)  Pain 1  Pain Scale 1: Numeric (0 - 10) (02/15/23 1500)  Pain Intensity 1: 0 (02/15/23 1500)  Patient Stated Pain Goal: 0 (02/15/23 1500)      REVIEW: Quality 111:Pneumonia Vaccination Status For Older Adults: Pneumococcal Vaccination not Administered or Previously Received, Reason not Otherwise Specified Quality 154 Part A: Falls: Risk Assessment (Should Be Reported With Measure 155.): Falls risk assessment completed and documented in the past 12 months. Quality 137: Melanoma: Continuity Of Care - Recall System: Recall system not utilized, reason not otherwise specified Quality 431: Preventive Care And Screening: Unhealthy Alcohol Use - Screening: Patient not identified as an unhealthy alcohol user when screened for unhealthy alcohol use using a systematic screening method Quality 226: Preventive Care And Screening: Tobacco Use: Screening And Cessation Intervention: Patient screened for tobacco use and is an ex/non-smoker Quality 110: Preventive Care And Screening: Influenza Immunization: Influenza immunization was not ordered or administered, reason not given Quality 130: Documentation Of Current Medications In The Medical Record: Current Medications Documented Quality 47: Advance Care Plan: Advance Care Planning discussed and documented; advance care plan or surrogate decision maker documented in the medical record. Additional Notes: Due to COVID-19, at today’s office visit we utilized face masks, wipes, and other additional supplies,materials, and clinical staff time over and above those usually included in an office visit, which was performed during the Coronavirus-related Public Health Emergency. Detail Level: Detailed Quality 431: Preventive Care And Screening: Unhealthy Alcohol Use - Screening: Patient screened for unhealthy alcohol use using a single question and scores less than 2 times per year Quality 154 Part B: Falls: Risk Screening (Should Be Reported With Measure 155.): Patient screened for future fall risk; documentation of no falls in the past year or only one fall without injury in the past year

## 2023-02-15 NOTE — PROCEDURES
[facility]                      [phone]  Vitals Pre Post Assessment Pre Post   BP BP: (!) 98/51 (02/15/23 1715)   124/58 LOC AOX3 AOX3   HR Pulse (Heart Rate): 78 (02/15/23 1715) 83 Lungs CTA, denies SOB CTA   Resp Resp Rate: 16 (02/15/23 1715) 18 Cardiac NRR NRR   Temp Temp: 98.1 °F (36.7 °C) (02/15/23 1500)  Skin WDI WDI   Weight 65 kg  Edema NO NO   Pain Pain Intensity 1: 0 (02/15/23 1500) 0 Tele Status No No     Orders   Duration: Start: 3738 End: 1835 Total: 3.18 mins   Dialyzer: Dialyzer/Set Up Inspection: Stephanie Garcia (02/15/23 1517)   K Bath: Dialysate K (mEq/L): 4 (02/15/23 1517)   Ca Bath: Dialysate CA (mEq/L): 2.5 (02/15/23 1517)   Na: Dialysate NA (mEq/L): 138 (02/15/23 1517)   Bicarb:  35   Target Fluid Removal:  1000 ml changed to zero due to soft BP     Access   Type & Location: L chest tunneled catheter   Comments: Access Dressing is clean and intact, no evidence of used and dated. Each catheter limb disinfected per p&p, caps removed, hubs disinfected per p&p. Both lumen flushes without issues.   Dressing changed as per policy, dated 6/54/6342                                         Labs   HBsAg (Antigen) / date:  Negative 1/25/2023                            HBsAb (Antibody) / date:  Susceptible 1/25/2023   Source: Epic   Obtained/Reviewed  Critical Results Called HGB   Date Value Ref Range Status   02/15/2023 8.9 (L) 11.5 - 16.0 g/dL Final     Potassium   Date Value Ref Range Status   02/15/2023 3.1 (L) 3.5 - 5.1 mmol/L Final     Comment:     ICTERIC SPECIMEN     Calcium   Date Value Ref Range Status   02/15/2023 8.2 (L) 8.5 - 10.1 MG/DL Final     BUN   Date Value Ref Range Status   02/15/2023 30 (H) 6 - 20 MG/DL Final     Creatinine   Date Value Ref Range Status   02/15/2023 4.91 (H) 0.55 - 1.02 MG/DL Final        Meds Given   Name Dose Route   Albumin 12.5g IVD               Adequacy / Fluid    Total Liters Process:                  63 Liters   Net Fluid Removed:                   0 ml- BP soft      Comments   Time Out Done: 0091   Admitting Diagnosis: ESRD on HD   Consent obtained/signed:  YES, Chronic care applied   Machine / RO # Machine Number: [id number] (02/15/23 6227)   Primary Nurse Rpt Pre: Beckie Crow RN   Primary Nurse Rpt Post: Brittany oGnzales RN   Pt Education: Procedural   Care Plan: HD as ordered   [facility]     Tx Summary   Comments:     Time out done, order parameters checked, Dialysis related medications and consent have been reviewed. Treatment started with slow flow, gradually increased to ordered BF. Monitored closely. Lines visible and secured at all time. BP soft, notified Dr. Marky Hidalgo, ordered to stop UF, give 2 bottles of Albumin. Extracorporeal lines flushed with 100CC NS to monitor for clots. Patient became irritable, wanted to come off the machine, was able to convince to continue until past 3 hours. Blood rinsed back as pt. demanded to end the treatment. Patients vitally stable. All blood returned. Each dialysis catheter limb disinfected per p&p,  post dialysis catheter and caps applied. Dressing kept intact and dated. Comfort and care provided, needs attended, questions answered. Call bell within reach, bed to lowest position. Reports given to primary nurse.

## 2023-02-15 NOTE — ED PROVIDER NOTES
[id number]       Pt Name: Dorothy Alvarez  MRN: [id number]  [place] 1960  Date of evaluation: 2/15/2023  Provider: Fernando Chester DO   PCP: Tereza Gar MD  Note Started: 8:11 AM 2/15/23     CHIEF COMPLAINT       Chief Complaint   Patient presents with    Fatigue     Patient came in due to weakness   Started 2 days ago, from Front Desk HQ   Hemodialysis patient (M-W-F)   Has a history of renal and liver problems   Sent thru EMS said patient need further work up? HISTORY OF PRESENT ILLNESS: 1 or more elements      History From: Patient  HPI Limitations : None     Dorothy Alvarez is a 57 y.o. female who presents with chief complaint of weakness for the past 2 days. Patient is a at [facility], HD patient Monday/Wednesday/Friday with history of breast cancer and recent admission for multifocal pneumonia/Keytruda pneumonitis status post left chest tube for left-sided hemothorax and acute hypoxic respiratory failure with shock LFTs that were improving upon discharge. Patient states that she was sent for \"further work-up\" because the \"numbers were not making sense\". She arrived with hepatic function panel from yesterday that revealed elevated LFTs. She denies any focal complaints. She denies any chest pain, shortness of breath, abdominal pain, nausea, vomiting, diarrhea. Nursing Notes were all reviewed and agreed with or any disagreements were addressed in the HPI. REVIEW OF SYSTEMS      Review of Systems   Constitutional:  Negative for chills and fever. Respiratory:  Negative for shortness of breath. Gastrointestinal:  Negative for abdominal pain, diarrhea, nausea and vomiting. Skin:  Negative for rash. Neurological:  Positive for weakness. Negative for headaches. Positives and Pertinent negatives as per HPI.     PAST HISTORY     Past Medical History:  Past Medical History:   Diagnosis Date    Cancer ([facility] [address]     BREAST CANCER GERD (gastroesophageal reflux disease)     HTN (hypertension) 07/18/2011    Psychiatric disorder     ANIXETY AND DEPRESSION    Thyroid disease     HYPOTHYROID       Past Surgical History:  Past Surgical History:   Procedure Laterality Date    HX COLONOSCOPY      HX MYOMECTOMY  05/02/1996    x 1    HX WISDOM TEETH EXTRACTION      IR INSERT NON TUNL CVC OVER 5 YRS  1/9/2023    IR INSERT TUNL CVC W/O PORT OVER 5 YR  1/23/2023    IR THORACENTESIS/INSERT CHEST TUBE  1/5/2023       Family History:  Family History   Problem Relation Age of Onset    Heart Failure Mother     Cancer Father     Diabetes Sister     Kidney Disease Sister     Anesth Problems Neg Hx        Social History:  Social History     Tobacco Use    Smoking status: Never     Passive exposure: Never    Smokeless tobacco: Never   Vaping Use    Vaping Use: Never used   Substance Use Topics    Alcohol use: Never    Drug use: Not Currently     Types: Marijuana     Comment: IN HIGH SCHOOL ONLY       Allergies: Allergies   Allergen Reactions    Sulfa (Sulfonamide Antibiotics) Itching, Swelling and Unknown (comments)       CURRENT MEDICATIONS      Current Discharge Medication List        CONTINUE these medications which have NOT CHANGED    Details   aspirin delayed-release 81 mg tablet Take 1 Tablet by mouth daily for 30 days. Qty: 30 Tablet, Refills: 0      OTHER,NON-FORMULARY, IV chemotherapy      carvediloL (COREG) 6.25 mg tablet Take 6.25 mg by mouth two (2) times daily (with meals). famotidine (PEPCID) 40 mg/5 mL (8 mg/mL) suspension Take 2.5 mL by mouth two (2) times a day. Qty: 50 mL, Refills: 3    Associated Diagnoses: 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, unspecified estrogen receptor status (HCC)      ondansetron (ZOFRAN ODT) 8 mg disintegrating tablet Take 1 Tablet by mouth every eight (8) hours as needed for Nausea or Vomiting.   Qty: 30 Tablet, Refills: 3    Associated Diagnoses: 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, unspecified estrogen receptor status (HCC)      lidocaine-prilocaine (EMLA) topical cream Apply  to affected area as needed for Pain. Apply a thick layer over port a cath 30-60 minutes prior to port access  Qty: 30 g, Refills: 3    Associated Diagnoses: 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, unspecified estrogen receptor status (HCC)      PARoxetine (PAXIL) 20 mg tablet Take 20 mg by mouth daily. multivitamin, tx-iron-ca-min (THERA-M w/ IRON) 9 mg iron-400 mcg tab tablet Take 1 Tablet by mouth daily. SCREENINGS               No data recorded         PHYSICAL EXAM      ED Triage Vitals [02/15/23 0755]   ED Encounter Vitals Group      /63      Pulse (Heart Rate) 79      Resp Rate 16      Temp 97.9 °F (36.6 °C)      Temp src       O2 Sat (%) 99 %      Weight 143 lb 4.8 oz      Height 5'        Physical Exam  Vitals and nursing note reviewed. Constitutional:       Appearance: Normal appearance. HENT:      Head: Normocephalic and atraumatic. Mouth/Throat:      Mouth: Mucous membranes are moist.   Eyes:      General: Scleral icterus present. Conjunctiva/sclera: Conjunctivae normal.   Cardiovascular:      Rate and Rhythm: Normal rate and regular rhythm. Pulmonary:      Effort: Pulmonary effort is normal.      Breath sounds: Normal breath sounds. Abdominal:      General: Abdomen is flat. Palpations: Abdomen is soft. Musculoskeletal:      Right lower leg: No edema. Left lower leg: No edema. Skin:     General: Skin is warm. Capillary Refill: Capillary refill takes less than 2 seconds. Neurological:      Mental Status: She is alert. Mental status is at baseline.         DIAGNOSTIC RESULTS   LABS:  Recent Results (from the past 24 hour(s))   CBC WITH AUTOMATED DIFF    Collection Time: 02/15/23  8:14 AM   Result Value Ref Range    WBC 6.0 3.6 - 11.0 K/uL    RBC 3.05 (L) 3.80 - 5.20 M/uL HGB 8.9 (L) 11.5 - 16.0 g/dL    HCT 28.5 (L) 35.0 - 47.0 %    MCV 93.4 80.0 - 99.0 FL    MCH 29.2 26.0 - 34.0 PG    MCHC 31.2 30.0 - 36.5 g/dL    RDW 18.0 (H) 11.5 - 14.5 %    PLATELET 096 962 - 981 K/uL    MPV 9.3 8.9 - 12.9 FL    NRBC 0.0 0  WBC    ABSOLUTE NRBC 0.00 0.00 - 0.01 K/uL    NEUTROPHILS 70 32 - 75 %    BAND NEUTROPHILS 3 %    LYMPHOCYTES 18 12 - 49 %    MONOCYTES 9 5 - 13 %    EOSINOPHILS 0 0 - 7 %    BASOPHILS 0 0 - 1 %    IMMATURE GRANULOCYTES 0 0.0 - 0.5 %    ABS. NEUTROPHILS 4.4 1.8 - 8.0 K/UL    ABS. LYMPHOCYTES 1.1 0.8 - 3.5 K/UL    ABS. MONOCYTES 0.5 0.0 - 1.0 K/UL    ABS. EOSINOPHILS 0.0 0.0 - 0.4 K/UL    ABS. BASOPHILS 0.0 0.0 - 0.1 K/UL    ABS. IMM. GRANS. 0.0 0.00 - 0.04 K/UL    DF MANUAL      RBC COMMENTS HYPOCHROMIA  1+        RBC COMMENTS OVALOCYTES  1+        RBC COMMENTS ANISOCYTOSIS  1+       METABOLIC PANEL, COMPREHENSIVE    Collection Time: 02/15/23  8:14 AM   Result Value Ref Range    Sodium 134 (L) 136 - 145 mmol/L    Potassium 3.1 (L) 3.5 - 5.1 mmol/L    Chloride 96 (L) 97 - 108 mmol/L    CO2 26 21 - 32 mmol/L    Anion gap 12 5 - 15 mmol/L    Glucose 43 (LL) 65 - 100 mg/dL    BUN 30 (H) 6 - 20 MG/DL    Creatinine 4.91 (H) 0.55 - 1.02 MG/DL    BUN/Creatinine ratio 6 (L) 12 - 20      eGFR 9 (L) >60 ml/min/1.73m2    Calcium 8.2 (L) 8.5 - 10.1 MG/DL    Bilirubin, total 11.2 (H) 0.2 - 1.0 MG/DL    ALT (SGPT) 543 (H) 12 - 78 U/L    AST (SGOT) 617 (H) 15 - 37 U/L    Alk.  phosphatase 793 (H) 45 - 117 U/L    Protein, total 5.3 (L) 6.4 - 8.2 g/dL    Albumin 1.5 (L) 3.5 - 5.0 g/dL    Globulin 3.8 2.0 - 4.0 g/dL    A-G Ratio 0.4 (L) 1.1 - 2.2     LIPASE    Collection Time: 02/15/23  8:14 AM   Result Value Ref Range    Lipase 1,301 (H) 73 - 393 U/L   LACTIC ACID    Collection Time: 02/15/23  8:14 AM   Result Value Ref Range    Lactic acid 0.4 0.4 - 2.0 MMOL/L   TROPONIN-HIGH SENSITIVITY    Collection Time: 02/15/23  8:14 AM   Result Value Ref Range    Troponin-High Sensitivity 209 (HH) 0 - 51 ng/L   ACETAMINOPHEN    Collection Time: 02/15/23  8:14 AM   Result Value Ref Range    Acetaminophen level 5 (L) 10 - 30 ug/mL   EKG, 12 LEAD, INITIAL    Collection Time: 02/15/23  8:18 AM   Result Value Ref Range    Ventricular Rate 71 BPM    Atrial Rate 71 BPM    P-R Interval 144 ms    QRS Duration 82 ms    Q-T Interval 406 ms    QTC Calculation (Bezet) 441 ms    Calculated P Axis 53 degrees    Calculated R Axis 10 degrees    Calculated T Axis -86 degrees    Diagnosis       Normal sinus rhythm  ST & T wave abnormality, consider inferior ischemia  ST & T wave abnormality, consider anterolateral ischemia  When compared with ECG of 16-JAN-2023 08:34,  premature atrial complexes are no longer present  Vent. rate has increased BY  23 BPM  T wave inversion more evident in Lateral leads     AMMONIA    Collection Time: 02/15/23  9:49 AM   Result Value Ref Range    Ammonia, plasma <10 <32 UMOL/L   GLUCOSE, POC    Collection Time: 02/15/23 10:15 AM   Result Value Ref Range    Glucose (POC) 201 (H) 65 - 117 mg/dL    Performed by Jerardo Mejía RN          EKG interpreted by me: nsr, t wave inversions in lateral leads slightly worse than previous      RADIOLOGY:  Non-plain film images such as CT, Ultrasound and MRI are read by the radiologist. Plain radiographic images are visualized and preliminarily interpreted by the ED Provider with the below findings:        Interpretation per the Radiologist below, if available at the time of this note:     CT ABD PELV W CONT    Result Date: 2/15/2023  EXAM: CT ABD PELV W CONT INDICATION: elevated lfts COMPARISON: CT abdomen pelvis December 26, 2022 CONTRAST: 100 mL of Isovue-370. ORAL CONTRAST: None TECHNIQUE: Following the uneventful intravenous administration of contrast, thin axial images were obtained through the abdomen and pelvis. Coronal and sagittal reconstructions were generated.  CT dose reduction was achieved through use of a standardized protocol tailored for this examination and automatic exposure control for dose modulation. FINDINGS: LOWER THORAX: Left-sided pleural effusion, with left basilar atelectasis. LIVER: No mass. BILIARY TREE: Gallbladder is within normal limits. CBD is not dilated. SPLEEN: within normal limits. PANCREAS: No mass or ductal dilatation. ADRENALS: Unremarkable. KIDNEYS: No mass, calculus, or hydronephrosis. STOMACH: Unremarkable. SMALL BOWEL: No dilatation or wall thickening. COLON: No dilatation or wall thickening. Colonic diverticulosis without evidence of diverticulitis. APPENDIX: Unremarkable. PERITONEUM: No ascites or pneumoperitoneum. RETROPERITONEUM: No lymphadenopathy or aortic aneurysm. REPRODUCTIVE ORGANS: Multiple uterine fibroids URINARY BLADDER: No mass or calculus. BONES: No destructive bone lesion. ABDOMINAL WALL: No mass or hernia. ADDITIONAL COMMENTS: N/A     1. No acute findings in the abdomen or pelvis. Chronic and incidental findings as detailed above. 2. Left pleural effusion, with left basilar atelectasis. XR CHEST PORT    Result Date: 2/15/2023  EXAM: Chest radiograph INDICATION: Weakness and hemoptysis COMPARISON: 1/17/2023 FINDINGS: A portable AP radiograph of the chest was obtained at 0 834 hours. The patient is on a cardiac monitor. The Port-A-Cath terminates at the cavoatrial junction. Dual lumen catheter extends to the right atrium. There is no pneumothorax. There is a persistent vague left  basilar opacity that may represent a combination of pleural thickening and subsegmental atelectasis. No new finding. The cardiac and mediastinal contours and pulmonary vascularity are stable. The bones and soft tissues are grossly within normal limits. Stable chest.       PROCEDURES   Unless otherwise noted below, none  Procedures     CRITICAL CARE TIME   I have spent 41 minutes of critical care time in evaluating and treating this patient.   This includes time spent at bedside, time with family and decision makers, documentation, review of labs and imaging, and/or consultation with specialists. It does not include time spent on separately billed procedures. This patient presents with a critical illness or injury that acutely impairs one or more vital organ systems such that there is a high probability of imminent or life threatening deterioration in the patient's condition. This case involved decision making of high complexity to assess, manipulate, and support vital organ system failure and/or to prevent further life threatening deterioration of the patient's condition. Failure to initiate these interventions on an urgent basis would likely result in sudden, clinically significant or life threatening deterioration in the patient's condition.     Abnormal findings supporting critical care: hypoglycemia, transaminitis, hyperbilirubinemia, pancreatitis, nstemi  Interventions to support critical care: GI, hospitalist, nephrology consultation, fluids, dextrose   Failure to intervene may result in: shock, coma, death      EMERGENCY DEPARTMENT COURSE and DIFFERENTIAL DIAGNOSIS/MDM   Vitals:    Vitals:    02/15/23 1715 02/15/23 1730 02/15/23 1745 02/15/23 2050   BP: (!) 98/51 (!) 98/53 (!) 104/57 (!) 134/97   Pulse: 78 77 74 88   Resp: 16 16 16 18   Temp:    98.1 °F (36.7 °C)   TempSrc:       SpO2:    100%   Weight:       Height:            Patient was given the following medications:  Medications   dextrose 10% infusion 0-250 mL (250 mL IntraVENous New Bag 2/15/23 0950)   methylPREDNISolone (PF) (Solu-MEDROL) injection 100 mg (has no administration in time range)   carvediloL (COREG) tablet 3.125 mg (has no administration in time range)   famotidine (PEPCID) tablet 20 mg (has no administration in time range)   multivitamin, tx-iron-ca-min (THERA-M w/ IRON) tablet 1 Tablet (has no administration in time range)   PARoxetine (PAXIL) tablet 20 mg (has no administration in time range)   sodium chloride (NS) flush 5-40 mL (has no administration in time range)   sodium chloride (NS) flush 5-40 mL (has no administration in time range)   acetaminophen (TYLENOL) tablet 650 mg (has no administration in time range)     Or   acetaminophen (TYLENOL) suppository 650 mg (has no administration in time range)   polyethylene glycol (MIRALAX) packet 17 g (has no administration in time range)   ondansetron (ZOFRAN ODT) tablet 4 mg (has no administration in time range)     Or   ondansetron (ZOFRAN) injection 4 mg (has no administration in time range)   albumin human 25% (BUMINATE) solution 25 g (12.5 g IntraVENous New Bag 2/15/23 1623)   iopamidoL (ISOVUE-370) 370 mg iodine /mL (76 %) injection 100 mL (100 mL IntraVENous Given 2/15/23 0920)       CONSULTS: (Who and What was discussed)  IP CONSULT TO GASTROENTEROLOGY  IP CONSULT TO ONCOLOGY  IP CONSULT TO PALLIATIVE CARE - PROVIDER    Chronic Conditions: ESRD, Monday Wednesday Friday, history of breast cancer, MSSA pneumonia    Social Determinants affecting Dx or Tx: None    Records Reviewed (source and summary of external notes): Prior medical records, Previous Radiology studies, and Previous Laboratory studies including prior hospitalization    CC/HPI Summary, DDx, ED Course, and Reassessment: Patient presenting to the emergency department with chief complaint of nonspecific weakness. On exam she is hemodynamically stable, afebrile, abdomen is benign, she does have scleral icterus bilaterally jaundiced. History of ESRD, has not had dialysis today. Had labs sent with her from [place] [id number] and rehab showing elevation of the LFTs into the 500s, it appears that when she was discharged from [facility] her LFTs were in the 200s. She arrived with no other lab work. Will initiate broad work-up including CBC to assess for anemia, CMP to assess LFTs, bilirubin, potassium, kidney function. Will send troponin, EKG, chest x-ray given nonspecific weakness.   Labs CT abdomen pelvis given elevated LFTs to assess for biliary pathology, obstructive pathology. Disposition pending work-up. ED Course as of 02/15/23 2155   Wed Feb 15, 2023   1002 Nephro at bedside, will dialyze patient when admitted [NM]   1043 On and is elevated but appears chronically elevated, lipase is elevated to 1300, bilirubin is 11, ALT and AST are elevated as well, lactate is within normal limits, CT abdomen without dilated CBD gallbladder or liver pathology concern for choledocholithiasis. Admitted to hospitalist.  GI consult placed. [NM]      ED Course User Index  [NM] Stephanie Snell DO       Disposition Considerations (Tests not done, Shared Decision Making, Pt Expectation of Test or Tx.):      FINAL IMPRESSION     1. Elevated LFTs    2. NSTEMI (non-ST elevated myocardial infarction) ([facility] [address]    3. Acute pancreatitis, unspecified complication status, unspecified pancreatitis type          DISPOSITION/PLAN   Admitted         PATIENT REFERRED TO:  Follow-up Information    None           DISCHARGE MEDICATIONS:  Current Discharge Medication List            DISCONTINUED MEDICATIONS:  Current Discharge Medication List            (Please note that parts of this dictation were completed with voice recognition software. Quite often unanticipated grammatical, syntax, homophones, and other interpretive errors are inadvertently transcribed by the computer software. Please disregards these errors.  Please excuse any errors that have escaped final proofreading.)    Keren Castaneda DO

## 2023-02-15 NOTE — ED NOTES
Patient came in due to weakness 2 days ago    from [facility]  Hemodialysis patient (M-W-F)  Has a history of renal and liver problems   Sent by her doctor for further work up   Patient has central line   No complaints of pain

## 2023-02-15 NOTE — ED NOTES
Started a central line initiated by [email or web address] taken and sent to laboratory   Needs attended, no complaints of pain

## 2023-02-15 NOTE — H&P
Hospitalist Admission Note    NAME:  Oksana Pinon   :  1960   MRN:  [id number]     Date/Time:  2/15/2023 3:31 PM    Patient PCP: Marci Lopez MD  ______________________________________________________________________  Given the patient's current clinical presentation, I have a high level of concern for decompensation if discharged from the emergency department. Complex decision making was performed, which includes reviewing the patient's available past medical records, laboratory results, and x-ray films. Assessment / Plan:  Acute liver injury  Elevated lipase  AMS  INR pending, does have mental status changes. AMS likely secondary to liver injury. Recent episode of shock liver in 2023.  - GI consulted, appreciate assistance. Has undergone recent full GI workup a few weeks ago that was unrevealing. No need for additional imaging per GI. Unlikely to be chemo related as patient has been off chemotherapy since December, and would be poor candidate for liver biopsy per Oncology. CT abdomen this admission without acute findings, no biliary dilation, no hepatic mass, no pancreatitis. - trend LFTs, INR  - no other infectious etiology based on symptoms at this time, afebrile, WBC normal    Hx of breast cancer  Last chemotherapy and immunotherpay in Dec 2022.  - recent admission for pneumonia and recurrent malignant pleural effusion, complicated by hemothorax after thoracentesis  - Oncology consulted, appreciate assistance  - started steroids  - Palliative consulted by Oncology, agree with this    ESRD  - continue MWF HD, Nephrology following, appreciate assistance    Elevated troponin  209 on admission. Last trop 300 José Miguel 10. Denies chest pain, SOB. EKG with     L basilar pleural effusion  Recent multifocal pneumonia, hemothorax, possible Keytruda pneumonitis  Currently satting well on room air. Imaging stable, denies SOB.  Recent admission complicated by hemothorax.  - monitor respiratory status  - volume management with HD    Code Status: Full  Surrogate Decision Maker:  Lizz Obrien (Sister)   [phone] (Mobile)    DVT Prophylaxis: holding given possible upcoming procedures, ?biopsy  GI Prophylaxis: not indicated  Disposition: pending medical stability      Subjective:   CHIEF COMPLAINT: lethargy    HISTORY OF PRESENT ILLNESS:     Violeta Russell is a 57 y.o.   female with hx of breast cancer (last treatment Dec 2022), recent hospitalization with shock liver, hemothorax, ESRD, who presents with lethargy. Of note, patient AAOx4 but does not wish to engage in extended questioning. Patient notes feeling generally tired for the last week. No nausea, vomiting, fevers, chills, chest pain, SOB. States her bowel movements are different this week but does not elaborate. She is unsure if her bowel movements are bloody, stating she did not look. No diet changes, denies weight changes. States she was told to come to the hospital after labs, presumably taken at SNF, were \"off\". In the ED, noted to have elevated liver enzymes, lipase, bilirubin. We were asked to admit for work up and evaluation of the above problems.      Past Medical History:   Diagnosis Date    Cancer ([address]     BREAST CANCER    GERD (gastroesophageal reflux disease)     HTN (hypertension) 07/18/2011    Psychiatric disorder     ANIXETY AND DEPRESSION    Thyroid disease     HYPOTHYROID        Past Surgical History:   Procedure Laterality Date    HX COLONOSCOPY      HX MYOMECTOMY  05/02/1996    x 1    HX WISDOM TEETH EXTRACTION      IR INSERT NON TUNL CVC OVER 5 YRS  1/9/2023    IR INSERT TUNL CVC W/O PORT OVER 5 YR  1/23/2023    IR THORACENTESIS/INSERT CHEST TUBE  1/5/2023       Social History     Tobacco Use    Smoking status: Never     Passive exposure: Never    Smokeless tobacco: Never   Substance Use Topics    Alcohol use: Never        Family History   Problem Relation Age of Onset    Heart Failure Mother     Cancer Father Diabetes Sister     Kidney Disease Sister     Anesth Problems Neg Hx        Allergies   Allergen Reactions    Sulfa (Sulfonamide Antibiotics) Itching, Swelling and Unknown (comments)        Prior to Admission medications    Medication Sig Start Date End Date Taking? Authorizing Provider   aspirin delayed-release 81 mg tablet Take 1 Tablet by mouth daily for 30 days. 1/25/23 2/24/23  Enis Klinefelter, MD Pixie Poser, IV chemotherapy    Provider, Historical   carvediloL (COREG) 6.25 mg tablet Take 6.25 mg by mouth two (2) times daily (with meals). 11/3/22   Provider, Historical   famotidine (PEPCID) 40 mg/5 mL (8 mg/mL) suspension Take 2.5 mL by mouth two (2) times a day. 11/30/22   Sharon Bueno NP   ondansetron (ZOFRAN ODT) 8 mg disintegrating tablet Take 1 Tablet by mouth every eight (8) hours as needed for Nausea or Vomiting. 9/28/22   Sharon Bueno NP   lidocaine-prilocaine (EMLA) topical cream Apply  to affected area as needed for Pain. Apply a thick layer over port a cath 30-60 minutes prior to port access 9/28/22   Sharon Bueno NP   PARoxetine (PAXIL) 20 mg tablet Take 20 mg by mouth daily. Provider, Historical   multivitamin, tx-iron-ca-min (THERA-M w/ IRON) 9 mg iron-400 mcg tab tablet Take 1 Tablet by mouth daily. Provider, Historical       REVIEW OF SYSTEMS:     14 point ROS reviewed with patient and negative except per above. Objective:   VITALS:    Visit Vitals  /71   Pulse 85   Temp 97.9 °F (36.6 °C)   Resp 15   Ht 5' (1.524 m)   Wt 65 kg (143 lb 4.8 oz)   SpO2 99%   BMI 27.99 kg/m²       PHYSICAL EXAM:  General:   No acute distress, Answering questions appropriately  HEENT:  PERRL, EOMI, icteric sclera. MMM  Neck:   Supple, no LAD, no thyromegaly  Resp:    CTAB, non-labored, No wheezes or crackles  Cardio:  regular rate, normal rhythm, no murmurs, no JVD  GI:    Soft, Non-tender, Non-distended, Normal bowel sounds.     Extremities:  Warm, well perfused, no LE edema, pulses 2+ and symmetric. Skin:    Warm, Dry, Pink, Intact. Neurologic:   Alert and Oriented x4, mild asterixis present, No focal defects  _____________________________________________________________________    Procedures: see electronic medical records for all procedures/Xrays and details which were not copied into this note but were reviewed prior to creation of Plan. LAB DATA REVIEWED:    Recent Results (from the past 24 hour(s))   CBC WITH AUTOMATED DIFF    Collection Time: 02/15/23  8:14 AM   Result Value Ref Range    WBC 6.0 3.6 - 11.0 K/uL    RBC 3.05 (L) 3.80 - 5.20 M/uL    HGB 8.9 (L) 11.5 - 16.0 g/dL    HCT 28.5 (L) 35.0 - 47.0 %    MCV 93.4 80.0 - 99.0 FL    MCH 29.2 26.0 - 34.0 PG    MCHC 31.2 30.0 - 36.5 g/dL    RDW 18.0 (H) 11.5 - 14.5 %    PLATELET 764 806 - 436 K/uL    MPV 9.3 8.9 - 12.9 FL    NRBC 0.0 0  WBC    ABSOLUTE NRBC 0.00 0.00 - 0.01 K/uL    NEUTROPHILS 70 32 - 75 %    BAND NEUTROPHILS 3 %    LYMPHOCYTES 18 12 - 49 %    MONOCYTES 9 5 - 13 %    EOSINOPHILS 0 0 - 7 %    BASOPHILS 0 0 - 1 %    IMMATURE GRANULOCYTES 0 0.0 - 0.5 %    ABS. NEUTROPHILS 4.4 1.8 - 8.0 K/UL    ABS. LYMPHOCYTES 1.1 0.8 - 3.5 K/UL    ABS. MONOCYTES 0.5 0.0 - 1.0 K/UL    ABS. EOSINOPHILS 0.0 0.0 - 0.4 K/UL    ABS. BASOPHILS 0.0 0.0 - 0.1 K/UL    ABS. IMM.  GRANS. 0.0 0.00 - 0.04 K/UL    DF MANUAL      RBC COMMENTS HYPOCHROMIA  1+        RBC COMMENTS OVALOCYTES  1+        RBC COMMENTS ANISOCYTOSIS  1+       METABOLIC PANEL, COMPREHENSIVE    Collection Time: 02/15/23  8:14 AM   Result Value Ref Range    Sodium 134 (L) 136 - 145 mmol/L    Potassium 3.1 (L) 3.5 - 5.1 mmol/L    Chloride 96 (L) 97 - 108 mmol/L    CO2 26 21 - 32 mmol/L    Anion gap 12 5 - 15 mmol/L    Glucose 43 (LL) 65 - 100 mg/dL    BUN 30 (H) 6 - 20 MG/DL    Creatinine 4.91 (H) 0.55 - 1.02 MG/DL    BUN/Creatinine ratio 6 (L) 12 - 20      eGFR 9 (L) >60 ml/min/1.73m2    Calcium 8.2 (L) 8.5 - 10.1 MG/DL    Bilirubin, total 11.2 (H) 0.2 - 1.0 MG/DL    ALT (SGPT) 543 (H) 12 - 78 U/L    AST (SGOT) 617 (H) 15 - 37 U/L    Alk. phosphatase 793 (H) 45 - 117 U/L    Protein, total 5.3 (L) 6.4 - 8.2 g/dL    Albumin 1.5 (L) 3.5 - 5.0 g/dL    Globulin 3.8 2.0 - 4.0 g/dL    A-G Ratio 0.4 (L) 1.1 - 2.2     LIPASE    Collection Time: 02/15/23  8:14 AM   Result Value Ref Range    Lipase 1,301 (H) 73 - 393 U/L   LACTIC ACID    Collection Time: 02/15/23  8:14 AM   Result Value Ref Range    Lactic acid 0.4 0.4 - 2.0 MMOL/L   TROPONIN-HIGH SENSITIVITY    Collection Time: 02/15/23  8:14 AM   Result Value Ref Range    Troponin-High Sensitivity 209 (HH) 0 - 51 ng/L   ACETAMINOPHEN    Collection Time: 02/15/23  8:14 AM   Result Value Ref Range    Acetaminophen level 5 (L) 10 - 30 ug/mL   EKG, 12 LEAD, INITIAL    Collection Time: 02/15/23  8:18 AM   Result Value Ref Range    Ventricular Rate 71 BPM    Atrial Rate 71 BPM    P-R Interval 144 ms    QRS Duration 82 ms    Q-T Interval 406 ms    QTC Calculation (Bezet) 441 ms    Calculated P Axis 53 degrees    Calculated R Axis 10 degrees    Calculated T Axis -86 degrees    Diagnosis       Normal sinus rhythm  ST & T wave abnormality, consider inferior ischemia  ST & T wave abnormality, consider anterolateral ischemia  When compared with ECG of 16-JAN-2023 08:34,  premature atrial complexes are no longer present  Vent. rate has increased BY  23 BPM  T wave inversion more evident in Lateral leads     AMMONIA    Collection Time: 02/15/23  9:49 AM   Result Value Ref Range    Ammonia, plasma <10 <32 UMOL/L   GLUCOSE, POC    Collection Time: 02/15/23 10:15 AM   Result Value Ref Range    Glucose (POC) 201 (H) 65 - 117 mg/dL    Performed by Rebeca Valdes RN        >50% of visit spent in counseling and coordination of care.     Signed: Naomi Whittaker MD

## 2023-02-16 LAB
ALBUMIN SERPL-MCNC: 1.7 G/DL (ref 3.5–5)
ALBUMIN/GLOB SERPL: 0.5 (ref 1.1–2.2)
ALP SERPL-CCNC: 704 U/L (ref 45–117)
ALT SERPL-CCNC: 485 U/L (ref 12–78)
ANION GAP SERPL CALC-SCNC: 9 MMOL/L (ref 5–15)
AST SERPL-CCNC: 509 U/L (ref 15–37)
ATRIAL RATE: 71 BPM
BASOPHILS # BLD: 0 K/UL (ref 0–0.1)
BASOPHILS NFR BLD: 0 % (ref 0–1)
BILIRUB DIRECT SERPL-MCNC: 8.5 MG/DL (ref 0–0.2)
BILIRUB SERPL-MCNC: 11.1 MG/DL (ref 0.2–1)
BUN SERPL-MCNC: 13 MG/DL (ref 6–20)
BUN/CREAT SERPL: 4 (ref 12–20)
CALCIUM SERPL-MCNC: 8.2 MG/DL (ref 8.5–10.1)
CALCULATED P AXIS, ECG09: 53 DEGREES
CALCULATED R AXIS, ECG10: 10 DEGREES
CALCULATED T AXIS, ECG11: -86 DEGREES
CHLORIDE SERPL-SCNC: 100 MMOL/L (ref 97–108)
CO2 SERPL-SCNC: 27 MMOL/L (ref 21–32)
CREAT SERPL-MCNC: 3.14 MG/DL (ref 0.55–1.02)
DIAGNOSIS, 93000: NORMAL
DIFFERENTIAL METHOD BLD: ABNORMAL
EOSINOPHIL # BLD: 0 K/UL (ref 0–0.4)
EOSINOPHIL NFR BLD: 0 % (ref 0–7)
ERYTHROCYTE [DISTWIDTH] IN BLOOD BY AUTOMATED COUNT: 17.8 % (ref 11.5–14.5)
GLOBULIN SER CALC-MCNC: 3.7 G/DL (ref 2–4)
GLUCOSE BLD STRIP.AUTO-MCNC: 109 MG/DL (ref 65–117)
GLUCOSE SERPL-MCNC: 60 MG/DL (ref 65–100)
HCT VFR BLD AUTO: 27.5 % (ref 35–47)
HGB BLD-MCNC: 8.3 G/DL (ref 11.5–16)
IMM GRANULOCYTES # BLD AUTO: 0 K/UL (ref 0–0.04)
IMM GRANULOCYTES NFR BLD AUTO: 0 % (ref 0–0.5)
INR PPP: 6 (ref 0.9–1.1)
LYMPHOCYTES # BLD: 0.6 K/UL (ref 0.8–3.5)
LYMPHOCYTES NFR BLD: 9 % (ref 12–49)
MAGNESIUM SERPL-MCNC: 1.9 MG/DL (ref 1.6–2.4)
MCH RBC QN AUTO: 28.3 PG (ref 26–34)
MCHC RBC AUTO-ENTMCNC: 30.2 G/DL (ref 30–36.5)
MCV RBC AUTO: 93.9 FL (ref 80–99)
MONOCYTES # BLD: 0.7 K/UL (ref 0–1)
MONOCYTES NFR BLD: 10 % (ref 5–13)
MYELOCYTES NFR BLD MANUAL: 2 %
NEUTS SEG # BLD: 5.5 K/UL (ref 1.8–8)
NEUTS SEG NFR BLD: 79 % (ref 32–75)
NRBC # BLD: 0 K/UL (ref 0–0.01)
NRBC BLD-RTO: 0 PER 100 WBC
P-R INTERVAL, ECG05: 144 MS
PHOSPHATE SERPL-MCNC: 2.2 MG/DL (ref 2.6–4.7)
PLATELET # BLD AUTO: 243 K/UL (ref 150–400)
PMV BLD AUTO: 9.7 FL (ref 8.9–12.9)
POTASSIUM SERPL-SCNC: 3.3 MMOL/L (ref 3.5–5.1)
PROT SERPL-MCNC: 5.4 G/DL (ref 6.4–8.2)
PROTHROMBIN TIME: 55.6 SEC (ref 9–11.1)
Q-T INTERVAL, ECG07: 406 MS
QRS DURATION, ECG06: 82 MS
QTC CALCULATION (BEZET), ECG08: 441 MS
RBC # BLD AUTO: 2.93 M/UL (ref 3.8–5.2)
RBC MORPH BLD: ABNORMAL
RBC MORPH BLD: ABNORMAL
SERVICE CMNT-IMP: NORMAL
SODIUM SERPL-SCNC: 136 MMOL/L (ref 136–145)
VENTRICULAR RATE, ECG03: 71 BPM
WBC # BLD AUTO: 6.9 K/UL (ref 3.6–11)

## 2023-02-16 PROCEDURE — 84100 ASSAY OF PHOSPHORUS: CPT

## 2023-02-16 PROCEDURE — 74011000250 HC RX REV CODE- 250: Performed by: STUDENT IN AN ORGANIZED HEALTH CARE EDUCATION/TRAINING PROGRAM

## 2023-02-16 PROCEDURE — 85610 PROTHROMBIN TIME: CPT

## 2023-02-16 PROCEDURE — 85025 COMPLETE CBC W/AUTO DIFF WBC: CPT

## 2023-02-16 PROCEDURE — 65270000029 HC RM PRIVATE

## 2023-02-16 PROCEDURE — 82962 GLUCOSE BLOOD TEST: CPT

## 2023-02-16 PROCEDURE — 80048 BASIC METABOLIC PNL TOTAL CA: CPT

## 2023-02-16 PROCEDURE — 74011250636 HC RX REV CODE- 250/636: Performed by: STUDENT IN AN ORGANIZED HEALTH CARE EDUCATION/TRAINING PROGRAM

## 2023-02-16 PROCEDURE — 36415 COLL VENOUS BLD VENIPUNCTURE: CPT

## 2023-02-16 PROCEDURE — 80076 HEPATIC FUNCTION PANEL: CPT

## 2023-02-16 PROCEDURE — 74011250637 HC RX REV CODE- 250/637: Performed by: STUDENT IN AN ORGANIZED HEALTH CARE EDUCATION/TRAINING PROGRAM

## 2023-02-16 PROCEDURE — 83735 ASSAY OF MAGNESIUM: CPT

## 2023-02-16 RX ORDER — FAMOTIDINE 20 MG/1
20 TABLET, FILM COATED ORAL DAILY
Status: DISCONTINUED | OUTPATIENT
Start: 2023-02-17 | End: 2023-02-23 | Stop reason: HOSPADM

## 2023-02-16 RX ADMIN — PAROXETINE HYDROCHLORIDE 20 MG: 20 TABLET, FILM COATED ORAL at 09:05

## 2023-02-16 RX ADMIN — SODIUM CHLORIDE, PRESERVATIVE FREE 10 ML: 5 INJECTION INTRAVENOUS at 00:20

## 2023-02-16 RX ADMIN — CARVEDILOL 3.12 MG: 3.12 TABLET, FILM COATED ORAL at 17:58

## 2023-02-16 RX ADMIN — DEXTROSE MONOHYDRATE 250 ML: 100 INJECTION, SOLUTION INTRAVENOUS at 13:53

## 2023-02-16 RX ADMIN — FAMOTIDINE 20 MG: 20 TABLET, FILM COATED ORAL at 09:05

## 2023-02-16 RX ADMIN — METHYLPREDNISOLONE SODIUM SUCCINATE 100 MG: 125 INJECTION, POWDER, FOR SOLUTION INTRAMUSCULAR; INTRAVENOUS at 15:14

## 2023-02-16 RX ADMIN — SODIUM CHLORIDE, PRESERVATIVE FREE 10 ML: 5 INJECTION INTRAVENOUS at 05:09

## 2023-02-16 RX ADMIN — SODIUM CHLORIDE, PRESERVATIVE FREE 10 ML: 5 INJECTION INTRAVENOUS at 21:22

## 2023-02-16 RX ADMIN — SODIUM CHLORIDE, PRESERVATIVE FREE 10 ML: 5 INJECTION INTRAVENOUS at 17:56

## 2023-02-16 RX ADMIN — SODIUM CHLORIDE, PRESERVATIVE FREE 10 ML: 5 INJECTION INTRAVENOUS at 14:00

## 2023-02-16 RX ADMIN — MULTIPLE VITAMINS W/ MINERALS TAB 1 TABLET: TAB at 09:05

## 2023-02-16 NOTE — PROGRESS NOTES
GI PROGRESS NOTE   Dariel Portia, STANLEY  [phone] NP in-hospital cell phone M-F until 4:30  After 5pm or on weekends, please call  for physician on call    Betty Tiwari :1960 HCM:[id number]   ATTG: Dr. Jerrell Davis  PCP: Bri Edouard MD  Date/Time:  2023 3:19 PM     Primary GI: Dr. Catherine Dow    Reason for following: Recurrent LFT elevation, elevated lipase, acute on chronic anemia    Assessment:     Recurrent elevated LFTs, no clear etiology, likely result of   Elevated lipase   Elevated INR, 6!  - Unlikely biliary obstruction, imaging not showing any obstruction or pancreatitis  - Negative hepatic serology completed 2023 during last admission for viral or autoimmune disease.  - plt count preserved, no ascites  Hx of elevated LFTS 2023 after she was hospitalized from septic shock, had shock liver, pneumonia, STEMI, anemia. 2022:  CT abd/pel without contrast with no acute GI findings  01/10/2023:  Abdominal US with no sonographic abnormality of the liver. 2/15/23: CT abd/pel with contrast : LIVER: No mass. BILIARY TREE: Gallbladder is within normal limits. CBD is not dilated. SPLEEN: within normal limits. PANCREAS: No mass or ductal dilatation. Soft blood pressures consistently in last 24-48 hours  Oncology giving trial of steroids to see if will help with LFTs       Acute on Chronic Anemia. ESRD on HD  Breast Cancer Stage II, heme/onc following  - Recent Keytruda : last dose 2022  - Last Chemotherapy: 2022    Hx of PNA likely 2/2 to cancer  Hypotension  Treatment per primary team     Plan:     -Liver enzyme elevation, unknown etiology. CT imaging isn't showing any obstructive process or pancreatitis which is reassuring. Patient also has no GI complaints at this time, again reassuring.    -Appreciate oncology input  -Needs management of INR given has increased to 6  -Avoid hepatotoxic medications  -Trend LFTs  -Agree with palliative consult to discuss goals of care  -Will sign off for now as GI has nothing further to add to plan. Please call GI with any further questions or concerns. Thank you! *Plan discussed with Dr. Bambi Kimbrough  Subjective:   Discussed with RN events overnight. No GI concerns. Patient denies any abdominal pain. No nausea or vomiting. Reports hungry and wants to eat. Reports is expecting liver bx today. Emotional, says her sister called and told her someone said she was going to die in a few weeks. Objective:   VITALS:   Last 24hrs VS reviewed since prior progress note. Most recent are:  Visit Vitals  BP 96/83 (BP 1 Location: Right upper arm)   Pulse 81   Temp 99.3 °F (37.4 °C)   Resp 18   Ht 5' (1.524 m)   Wt 65 kg (143 lb 4.8 oz)   SpO2 97%   BMI 27.99 kg/m²       Intake/Output Summary (Last 24 hours) at 2/16/2023 1519  Last data filed at 2/15/2023 2050  Gross per 24 hour   Intake 120 ml   Output 0 ml   Net 120 ml     PHYSICAL EXAM:  General: WD, WN. Alert, cooperative, no acute distress    HEENT: NC, Atraumatic. Anicteric sclerae. Lungs:  CTA Bilaterally. No Wheezing/Rhonchi/Rales. Heart:  Regular  rhythm,   No Rubs, No Gallops  Abdomen: Soft, Non distended, Non tender. +Bowel sounds, no HSM  Extremities: No c/c/e  Neurologic:  Alert and oriented X 3. No acute neurological distress   Psych:   Good insight. Not anxious nor agitated. Lab and Radiology Data Reviewed: (see below)    Medications Reviewed: (see below)  PMH/SH reviewed - no change compared to H&P  ________________________________________________________________________  Total time spent with patient: 30 minutes ________________________________________________________________________  Care Plan discussed with:  Patient Y   Family     RN Y-Jennifer              Consultant:       Eduardo Powers NP     Procedures: see electronic medical records for all procedures/Xrays and details which were not copied into this note but were reviewed prior to creation of Plan.       LABS:  Recent Labs     02/16/23  1150 02/15/23  0814   WBC 6.9 6.0   HGB 8.3* 8.9*   HCT 27.5* 28.5*    253     Recent Labs     02/16/23  1150 02/15/23  0814    134*   K 3.3* 3.1*    96*   CO2 27 26   BUN 13 30*   CREA 3.14* 4.91*   GLU 60* 43*   CA 8.2* 8.2*   MG 1.9  --    PHOS 2.2*  --      Recent Labs     02/16/23  1150 02/15/23  0814   * 793*   TP 5.4* 5.3*   ALB 1.7* 1.5*   GLOB 3.7 3.8   LPSE  --  1,301*     Recent Labs     02/16/23  1150   INR 6.0*   PTP 55.6*      No results for input(s): FE, TIBC, PSAT, FERR in the last 72 hours. No results found for: FOL, RBCF  No results for input(s): PH, PCO2, PO2 in the last 72 hours. No results for input(s): CPK, CKMB in the last 72 hours.     No lab exists for component: TROPONINI  Lab Results   Component Value Date/Time    Color DARK YELLOW 01/11/2023 10:34 AM    Appearance TURBID (A) 01/11/2023 10:34 AM    Specific gravity 1.012 01/11/2023 10:34 AM    pH (UA) 6.0 01/11/2023 10:34 AM    Protein 300 (A) 01/11/2023 10:34 AM    Glucose Negative 01/11/2023 10:34 AM    Ketone Negative 01/11/2023 10:34 AM    Bilirubin Negative 01/11/2023 10:34 AM    Urobilinogen 0.2 01/11/2023 10:34 AM    Nitrites Negative 01/11/2023 10:34 AM    Leukocyte Esterase LARGE (A) 01/11/2023 10:34 AM    Epithelial cells MANY (A) 01/11/2023 10:34 AM    Bacteria 1+ (A) 01/11/2023 10:34 AM    WBC >100 (H) 01/11/2023 10:34 AM    RBC >100 (H) 01/11/2023 10:34 AM       MEDICATIONS:  Current Facility-Administered Medications   Medication Dose Route Frequency    alteplase (CATHFLO) 0.5 mg in sterile water (preservative free) 0.5 mL injection  0.5 mg InterCATHeter ONCE    [START ON 2/17/2023] famotidine (PEPCID) tablet 20 mg  20 mg Oral DAILY    dextrose 10% infusion 0-250 mL  0-250 mL IntraVENous PRN    methylPREDNISolone (PF) (Solu-MEDROL) injection 100 mg  100 mg IntraVENous DAILY    carvediloL (COREG) tablet 3.125 mg  3.125 mg Oral BID WITH MEALS    multivitamin, tx-iron-ca-min (THERA-M w/ IRON) tablet 1 Tablet  1 Tablet Oral DAILY    PARoxetine (PAXIL) tablet 20 mg  20 mg Oral DAILY    sodium chloride (NS) flush 5-40 mL  5-40 mL IntraVENous Q8H    sodium chloride (NS) flush 5-40 mL  5-40 mL IntraVENous PRN    acetaminophen (TYLENOL) tablet 650 mg  650 mg Oral Q6H PRN    Or    acetaminophen (TYLENOL) suppository 650 mg  650 mg Rectal Q6H PRN    polyethylene glycol (MIRALAX) packet 17 g  17 g Oral DAILY PRN    ondansetron (ZOFRAN ODT) tablet 4 mg  4 mg Oral Q8H PRN    Or    ondansetron (ZOFRAN) injection 4 mg  4 mg IntraVENous Q6H PRN    albumin human 25% (BUMINATE) solution 25 g  25 g IntraVENous DIALYSIS PRN

## 2023-02-16 NOTE — CONSULTS
[facility] [place] at [address], [place], [address] [facility]  W: [phone] F: [phone]    Please see consult note from today.

## 2023-02-16 NOTE — PROGRESS NOTES
Palliative Medicine      Code Status: Full Code    Advance Care Planning:  Advance Care Planning 2/15/2023   Patient's Healthcare Decision Maker is: -legal nok   Confirm Advance Directive None   Patient Would Like to Complete Advance Directive -She is considering it. Does the patient have other document types -No     Patient / Family Encounter Documentation    Participants (names): Bri De La Garza, sister    Narrative: LCSW stopped by to offer support to patient and family. Patient asked if Palliative was \"because I'm dying\" and this writer explained we provide an extra layer of support to patients facing serious illness. She did not wish to speak further this morning, so LCSW stopped by in the afternoon and brought her a blank AMD and booklet about making medical decisions and completing AMD. Patient was pleasant and receptive to reading the booklet this evening and LCSW will revisit her tomorrow to follow up. Returned call to her sister Atif Chung later this afternoon and she said she had requested assistance with doing an AMD. This writer explained the encounter above. She asked about legal assistance and LCSW referred her to [email or web address] for financial POA, will, etc.  She expressed understanding and appreciation. Lizz plans to visit patient tomorrow around 10a. Goals of Care / Plan:   Patient's symptoms will be managed. Patient will complete AMD and discuss GOC, code status. Palliative team will provide assistance and support in completing Advance Directives. Palliative team will continue to provide education and support as appropriate. Thank you for including Palliative Medicine in the care of Ms. Marylee Abrahams.     Pawel Shelton LCSW  [phone] ([phone])

## 2023-02-16 NOTE — PROGRESS NOTES
Hospitalist Progress Note    NAME:  Alena Pal   :  1960   MRN:  [id number]     Assessment / Plan:  Acute liver injury - stable  Elevated lipase  AMS - improved  INR pending, does have mental status changes. AMS likely secondary to liver injury. Recent episode of shock liver in 2023.  - GI consulted, appreciate assistance. Has undergone recent full GI workup a few weeks ago that was unrevealing. No need for additional imaging per GI. Unlikely to be chemo related as patient has been off chemotherapy since December, and would be poor candidate for liver biopsy per Oncology. CT abdomen this admission without acute findings, no biliary dilation, no hepatic mass, no pancreatitis. - no other infectious etiology based on symptoms at this time, afebrile, WBC normal  - INR elevated but also drawn from possibly heparin locked port, no evidence of bleeding, holding on vitamin k so far  - recheck INR tomorrow  - LFTS mildly decreased but remain markedly elevated  - GI following, unknown etiology, no current GI complaints, imaging without obstructive process or pancreatitis, GI signed off  - Palliative consulted    Hx of breast cancer  Last chemotherapy and immunotherpay in Dec 2022.  - recent admission for pneumonia and recurrent malignant pleural effusion, complicated by hemothorax after thoracentesis  - Oncology consulted, appreciate assistance  - started steroids  - Palliative consulted by Oncology, eval pending     ESRD  - continue MWF HD, Nephrology following, appreciate assistance     Elevated troponin  209 on admission. Last trop 300 José Miguel 10. Denies chest pain, SOB. 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 Imaging stable, denies SOB.  Recent admission complicated by hemothorax.  - monitor respiratory status  - volume management with HD     Code Status: Full  Surrogate Decision Maker:  Lizz Obrien (Sister)   [phone] (Mobile)     DVT Prophylaxis: holding with elevated INR  GI Prophylaxis: not indicated  Disposition: pending medical stability  ALEXANDRA: 2/18/23    Recommended Disposition: pending palliative discussion  Barriers: INR decrease, GOC discussion     Subjective:     Chief Complaint  Followup ALI    Subjective  Discussed with RN events overnight. Patient has no complaints today, is more interactive. Review of Systems:  14 point ROS reviewed with patient and negative except per above. Objective:     VITALS:   Last 24hrs VS reviewed since prior progress note. Most recent are:  Patient Vitals for the past 24 hrs:   Temp Pulse Resp BP SpO2   02/16/23 0908 -- 81 18 96/83 97 %   02/16/23 0438 99.3 °F (37.4 °C) 92 16 120/65 97 %   02/15/23 2050 98.1 °F (36.7 °C) 88 18 (!) 134/97 100 %   02/15/23 1745 -- 74 16 (!) 104/57 --   02/15/23 1730 -- 77 16 (!) 98/53 --   02/15/23 1715 -- 78 16 (!) 98/51 --       Intake/Output Summary (Last 24 hours) at 2/16/2023 1706  Last data filed at 2/15/2023 2050  Gross per 24 hour   Intake 120 ml   Output 0 ml   Net 120 ml        I had a face to face encounter and independently examined this patient on 2/16/2023, as outlined below:    PHYSICAL EXAM:  General:          No acute distress, Answering questions appropriately  HEENT:           EOMI, icteric sclera. MMM  Neck:               Supple  Resp:               CTAB, non-labored, No wheezes or crackles  Cardio:            regular rate, normal rhythm, no murmurs, no JVD  GI:                   Soft, Non-tender, Non-distended, Normal bowel sounds. Extremities:     Warm, well perfused, no LE edema  Skin:                Warm, Dry, Pink, Intact.    Neurologic:      Alert and Oriented x4, No focal defects    Reviewed most current lab test results and cultures  YES  Reviewed most current radiology test results   YES  Review and summation of old records today NO  Reviewed patient's current orders and MAR    YES  PMH/SH reviewed - no change compared to H&P  ______________________________________________________________________    Procedures: see electronic medical records for all procedures/Xrays and details which were not copied into this note but were reviewed prior to creation of Plan. LABS:  I reviewed today's most current labs and imaging studies. Pertinent labs include:  Recent Labs     02/16/23  1150 02/15/23  0814   WBC 6.9 6.0   HGB 8.3* 8.9*   HCT 27.5* 28.5*    253     Recent Labs     02/16/23  1150 02/15/23  0814    134*   K 3.3* 3.1*    96*   CO2 27 26   GLU 60* 43*   BUN 13 30*   CREA 3.14* 4.91*   CA 8.2* 8.2*   MG 1.9  --    PHOS 2.2*  --    ALB 1.7* 1.5*   TBILI 11.1* 11.2*   * 543*   INR 6.0*  --        Imaging/Diagnostics:  CT ABD PELV W CONT  Narrative: EXAM: CT ABD PELV W CONT    INDICATION: elevated lfts    COMPARISON: CT abdomen pelvis December 26, 2022     CONTRAST: 100 mL of Isovue-370. ORAL CONTRAST: None    TECHNIQUE:   Following the uneventful intravenous administration of contrast, thin axial  images were obtained through the abdomen and pelvis. Coronal and sagittal  reconstructions were generated. CT dose reduction was achieved through use of a  standardized protocol tailored for this examination and automatic exposure  control for dose modulation. FINDINGS:   LOWER THORAX: Left-sided pleural effusion, with left basilar atelectasis. LIVER: No mass. BILIARY TREE: Gallbladder is within normal limits. CBD is not dilated. SPLEEN: within normal limits. PANCREAS: No mass or ductal dilatation. ADRENALS: Unremarkable. KIDNEYS: No mass, calculus, or hydronephrosis. STOMACH: Unremarkable. SMALL BOWEL: No dilatation or wall thickening. COLON: No dilatation or wall thickening. Colonic diverticulosis without evidence  of diverticulitis. APPENDIX: Unremarkable.   PERITONEUM: No ascites or pneumoperitoneum. RETROPERITONEUM: No lymphadenopathy or aortic aneurysm. REPRODUCTIVE ORGANS: Multiple uterine fibroids  URINARY BLADDER: No mass or calculus. BONES: No destructive bone lesion. ABDOMINAL WALL: No mass or hernia. ADDITIONAL COMMENTS: N/A  Impression: 1. No acute findings in the abdomen or pelvis. Chronic and incidental findings  as detailed above. 2. Left pleural effusion, with left basilar atelectasis. XR CHEST PORT  Narrative: EXAM: Chest radiograph    INDICATION: Weakness and hemoptysis    COMPARISON: 1/17/2023    FINDINGS: A portable AP radiograph of the chest was obtained at 0 834 hours. The  patient is on a cardiac monitor. The Port-A-Cath terminates at the cavoatrial  junction. Dual lumen catheter extends to the right atrium. There is no  pneumothorax. There is a persistent vague left  basilar opacity that may  represent a combination of pleural thickening and subsegmental atelectasis. No  new finding. The cardiac and mediastinal contours and pulmonary vascularity are  stable. The bones and soft tissues are grossly within normal limits. Impression: Stable chest.      Care Plan discussed with:    Comments   Patient y    Family  y    RN y    Care Manager     Consultant                       y Multidiciplinary team rounds were held today with , nursing, pharmacist and clinical coordinator. Patient's plan of care was discussed; medications were reviewed and discharge planning was addressed.      _    Signed: Shahzad Mcbride MD

## 2023-02-16 NOTE — PROGRESS NOTES
Famotidine dose adjusted for renal fxn. Est Crcl 15.2 ml/min (on dialysis)    Famotidine decreased to 20mg daily.

## 2023-02-16 NOTE — PROGRESS NOTES
CM unable to reach patient or family. Per chart review patient has had discussion about palliative/hospice care with oncology. Patient was at [facility] before this admission.

## 2023-02-16 NOTE — PROGRESS NOTES
2/16/23 3:55pm CM has reviewed notes, aware that Palliative will consult for GOC. CM has sent referral to [facility] should pt be returning there. Pt has been accepted back and NOLVIA Leon is having facility start Auth today 2/16/23.       91 Richard Bethea RN,MSN  Care Manager  [phone]

## 2023-02-16 NOTE — CONSULTS
[facility] [address] [facility] [address], [place] [facility] [address] [facility]  W: [phone] F: [phone]  Reason for Visit:   Kelly Gonzalez is a 57 y.o. female with left breast cancer-ER 0%, KS 0%, HER2/radha negative. She was recently admitted to [facility] with pneumonia and subsequently was treated for septic shock in the ICU. She was discharged to SNF and returned for overall generalized weakness. Treatment History:   10/1/22: Carboplatin taxol and keytruda followed by ddAC and Keytruda     History of Present Illness:   Mrs. Sara Reeves is a patient of Dr. Yanique Calabrese at [facility].  She is followed for treatment of left breast cancer. She was recently admitted to [facility] for sepsis r/t COVID-pneumonia, CHRISTA, NSTEMI, hemoptysis and anemia. Pertinent treatment history as follows: She had a screening mammogram on 7/20/2022 which showed a 1 cm irregular mass in the left breast at 3 o'clock position. Biopsy on 8/2/2022 showed invasive ductal carcinoma, grade 2-3, ER 0%, KS 0%, HER2/radha negative. Ki-67 85%. Declined genetic testing. Recent Keytruda : last dose 12/14/2022  Last Chemotherapy: 12/21/2022     She had a screening mammogram on 7/20/2022 which showed a 1 cm irregular mass in the left breast at 3 o'clock position. Biopsy on 8/2/2022 showed invasive ductal carcinoma, grade 2-3, ER 0%, KS 0%, HER2/radha negative. Ki-67 85%. Declined genetic testing. Patient has now returned to the ED with complaints of fever and shortness of breath. She was found to have a large left pleural effusion and CHRISTA. Patient seen at bedside in hallway bed in ED. Discussed plan for thoracentesis today. Will discuss further plan of care once she has been placed in her room given no privacy in hallway. Interval History:     2/16/2023: Patient is well-known to oncology practice given last admission.   Her primary oncologist is  Jeana Hayes at [facility] and she has been treated in the past for breast cancer. Her last chemotherapy and immunotherapy was in December 2022. Last month she was admitted for pneumonia and recurrent malignant pleural effusions. Unfortunately she suffered a hemothorax after thoracentesis. Her condition continued to decline and she was transferred to the ICU for septic shock and was intubated for period of time. She experienced an CHRISTA related to hypotension and septic shock, she was followed by nephrology and however her renal function did not recover and she was started on hemodialysis at that time. Upon discharge she was transferred to SNF, she has been unable to follow-up with her primary oncology team.       Patient was readmitted from SNF for overall weakness. She has been receiving hemodialysis at [address]. She was seen at bedside today. Allie Partida discussion regarding elevated LFTs with no clear cause at this point. Discussed with patient that short-term prognosis is poor and if we are unable to make improvements on her liver function then we will have to discuss comfort measures and hospice. She actually took this news rather well and participated in conversation regarding liver failure.        Past Medical History:   Diagnosis Date    Cancer ([address]     BREAST CANCER    GERD (gastroesophageal reflux disease)     HTN (hypertension) 07/18/2011    Psychiatric disorder     ANIXETY AND DEPRESSION    Thyroid disease     HYPOTHYROID      Past Surgical History:   Procedure Laterality Date    HX COLONOSCOPY      HX MYOMECTOMY  05/02/1996    x 1    HX WISDOM TEETH EXTRACTION      IR INSERT NON TUNL CVC OVER 5 YRS  1/9/2023    IR INSERT TUNL CVC W/O PORT OVER 5 YR  1/23/2023    IR THORACENTESIS/INSERT CHEST TUBE  1/5/2023      Social History     Tobacco Use    Smoking status: Never     Passive exposure: Never    Smokeless tobacco: Never   Substance Use Topics    Alcohol use: Never      Family History Problem Relation Age of Onset    Heart Failure Mother     Cancer Father     Diabetes Sister     Kidney Disease Sister     Anesth Problems Neg Hx      Current Facility-Administered Medications   Medication Dose Route Frequency    alteplase (CATHFLO) 0.5 mg in sterile water (preservative free) 0.5 mL injection  0.5 mg InterCATHeter ONCE    dextrose 10% infusion 0-250 mL  0-250 mL IntraVENous PRN    methylPREDNISolone (PF) (Solu-MEDROL) injection 100 mg  100 mg IntraVENous DAILY    carvediloL (COREG) tablet 3.125 mg  3.125 mg Oral BID WITH MEALS    famotidine (PEPCID) tablet 20 mg  20 mg Oral BID    multivitamin, tx-iron-ca-min (THERA-M w/ IRON) tablet 1 Tablet  1 Tablet Oral DAILY    PARoxetine (PAXIL) tablet 20 mg  20 mg Oral DAILY    sodium chloride (NS) flush 5-40 mL  5-40 mL IntraVENous Q8H    sodium chloride (NS) flush 5-40 mL  5-40 mL IntraVENous PRN    acetaminophen (TYLENOL) tablet 650 mg  650 mg Oral Q6H PRN    Or    acetaminophen (TYLENOL) suppository 650 mg  650 mg Rectal Q6H PRN    polyethylene glycol (MIRALAX) packet 17 g  17 g Oral DAILY PRN    ondansetron (ZOFRAN ODT) tablet 4 mg  4 mg Oral Q8H PRN    Or    ondansetron (ZOFRAN) injection 4 mg  4 mg IntraVENous Q6H PRN    albumin human 25% (BUMINATE) solution 25 g  25 g IntraVENous DIALYSIS PRN      Allergies   Allergen Reactions    Sulfa (Sulfonamide Antibiotics) Itching, Swelling and Unknown (comments)        Review of Systems: not obtained    Physical Exam:   Visit Vitals  BP 96/83 (BP 1 Location: Right upper arm)   Pulse 81   Temp 99.3 °F (37.4 °C)   Resp 18   Ht 5' (1.524 m)   Wt 143 lb 4.8 oz (65 kg)   SpO2 97%   BMI 27.99 kg/m²         Results:     Lab Results   Component Value Date/Time    WBC 6.9 02/16/2023 11:50 AM    HGB 8.3 (L) 02/16/2023 11:50 AM    HCT 27.5 (L) 02/16/2023 11:50 AM    PLATELET 406 89/65/1683 11:50 AM    MCV 93.9 02/16/2023 11:50 AM    ABS.  NEUTROPHILS PENDING 02/16/2023 11:50 AM     Lab Results   Component Value Date/Time Sodium 136 02/16/2023 11:50 AM    Potassium 3.3 (L) 02/16/2023 11:50 AM    Chloride 100 02/16/2023 11:50 AM    CO2 27 02/16/2023 11:50 AM    Glucose 60 (L) 02/16/2023 11:50 AM    BUN 13 02/16/2023 11:50 AM    Creatinine 3.14 (H) 02/16/2023 11:50 AM    GFR est AA >60 09/26/2022 03:26 PM    GFR est non-AA >60 09/26/2022 03:26 PM    Calcium 8.2 (L) 02/16/2023 11:50 AM    Glucose (POC) 201 (H) 02/15/2023 10:15 AM     Lab Results   Component Value Date/Time    Bilirubin, total 11.1 (H) 02/16/2023 11:50 AM    ALT (SGPT) 485 (H) 02/16/2023 11:50 AM    Alk. phosphatase 704 (H) 02/16/2023 11:50 AM    Protein, total 5.4 (L) 02/16/2023 11:50 AM    Albumin 1.7 (L) 02/16/2023 11:50 AM    Globulin 3.7 02/16/2023 11:50 AM       External   Records reviewed and summarized above. Pathology report(s) reviewed above. Radiology report(s) reviewed above. MRI  9/2022    LEFT BREAST: Conglomerate mass enhancement in the left breast at 4-5 o'clock,  combined dimension 3.7 x 1.9 x 1.4 cm. This is slightly more extensive than the  sonographic and mammographic findings. No lymphadenopathy. Physical Exam  Constitutional:       Appearance: She is normal weight. Interventions: She is sedated. HENT:      Head: Normocephalic. Eyes:      Pupils: Pupils are equal, round, and reactive to light. Abdominal:      Palpations: Abdomen is soft. Musculoskeletal:         General: Normal range of motion. Skin:     General: Skin is warm and dry. Neurological:      General: No focal deficit present. Mental Status: She is oriented to person, place, and time. Assessment/PLAN:     1) Left breast cancer  1 cm mass on mammogram  Palpated a lump  ER 0%, VA 0%, HER2/radha negative  Ki 67 was 85%.   MRI breast with a 3.7 cm Left breast mass  [id number]  AJCC 8th ed-Stage IIA  Genetic testing negative  Recommended chemotherapy + Keytruda based on the KEYNOTE-522 trial (taxol, carboplatin and [place] followed by [facility] as preoperative treatment, followed by surgery, and then adjuvant pembrolizumab for another nine cycles (27 weeks) after surgery). Recent admission and discharge from [facility] for COVID-pneumonia, sepsis, CHRISTA, NSTEMI and anemia    Last chemo with taxol 12/21/22. Last Keytruda 12/14/22. CT chest 1/3/2003: Increased moderately large left pleural effusion. Unchanged patchy bilateral  consolidation, left greater than right, compatible with pneumonia. Unlikely pneumonitis given CT results are consistent with COVID and viral pneumonia on admission     Repeat CT results noted from 1/8  1. Left chest tube. Interval substantially diminished size of loculated left  pleural effusion with small-moderate residual.  2. Interval demonstration of small-moderate layering right pleural effusion. 3. Substantial interval worsening of bilateral pulmonary groundglass  opacifications with crazy paving. Bibasilar dependent consolidations versus atelectasis are also shown.     Low concern for pneumonitis given CT results concerning for viral etiologies vs ARDS and not interstitial disease during last admission     > Patient is no longer a candidate for treatment given severe decline in functional status     2) ESRD on HD   Overall worsening creatinine since last dose of Keytruda on 12/14  Previously Baseline creatinine less than 1  Repeat creatinine on 12/26 showed creatinine bumped to 2.91  Concern for immunotherapy related nephritis vs ATN from hypotension  Held off on steroids initially with concern for underlying infection and pneumonia  Started on full dose steroids 1/8 with no improvement to renal function   Receiving HD   Nephrology following     3) Elevated LFTs   Experienced shock liver with septic shock during last admission, upon discharge LFTs and total bilirubin were trending down  T. bili on admission 11.2    Evaluated by GI with no clear cause for elevation in LFTs    CT abdomen does not show any concern for malignancy to liver    Would not pursue liver biopsy at this time to rule out infiltrative carcinoma as patient is functionally debilitated and not a candidate for further systemic therapy at this time. Low concern for immunotherapy r/t liver damage as patient has not received immunotherapy in 2 months. Also, recently suffered from septic shock and shock liver.     > Started on full dose steroids to see if any improvement in LFTs  > Consulted Palliative Care  > Ongoing goals of care discussions, hospice is very appropriate from a malignancy standpoint as well as now worsening liver failure  > Very poor short term prognosis     Discussed with Dr. Seferino Swartz and Oz Hi NP     We will continue to follow, although there is no much more for oncology team to add. At this time patient is very hospice appropriate and will continue goals of care discussions.     Signed By: Meaghan Lambert NP

## 2023-02-17 LAB
ALBUMIN SERPL-MCNC: 1.7 G/DL (ref 3.5–5)
ALBUMIN/GLOB SERPL: 0.4 (ref 1.1–2.2)
ALP SERPL-CCNC: 734 U/L (ref 45–117)
ALT SERPL-CCNC: 456 U/L (ref 12–78)
ANION GAP SERPL CALC-SCNC: 10 MMOL/L (ref 5–15)
AST SERPL-CCNC: 405 U/L (ref 15–37)
BASOPHILS # BLD: 0 K/UL (ref 0–0.1)
BASOPHILS NFR BLD: 0 % (ref 0–1)
BILIRUB DIRECT SERPL-MCNC: 8.8 MG/DL (ref 0–0.2)
BILIRUB SERPL-MCNC: 10.6 MG/DL (ref 0.2–1)
BUN SERPL-MCNC: 21 MG/DL (ref 6–20)
BUN/CREAT SERPL: 5 (ref 12–20)
CALCIUM SERPL-MCNC: 8.5 MG/DL (ref 8.5–10.1)
CHLORIDE SERPL-SCNC: 99 MMOL/L (ref 97–108)
CO2 SERPL-SCNC: 25 MMOL/L (ref 21–32)
CREAT SERPL-MCNC: 4.03 MG/DL (ref 0.55–1.02)
DIFFERENTIAL METHOD BLD: ABNORMAL
EOSINOPHIL # BLD: 0 K/UL (ref 0–0.4)
EOSINOPHIL NFR BLD: 0 % (ref 0–7)
ERYTHROCYTE [DISTWIDTH] IN BLOOD BY AUTOMATED COUNT: 17.8 % (ref 11.5–14.5)
GLOBULIN SER CALC-MCNC: 3.8 G/DL (ref 2–4)
GLUCOSE SERPL-MCNC: 144 MG/DL (ref 65–100)
HCT VFR BLD AUTO: 27.9 % (ref 35–47)
HGB BLD-MCNC: 8.7 G/DL (ref 11.5–16)
IMM GRANULOCYTES # BLD AUTO: 0 K/UL (ref 0–0.04)
IMM GRANULOCYTES NFR BLD AUTO: 0 % (ref 0–0.5)
INR PPP: 6.2 (ref 0.9–1.1)
LYMPHOCYTES # BLD: 0.6 K/UL (ref 0.8–3.5)
LYMPHOCYTES NFR BLD: 6 % (ref 12–49)
MAGNESIUM SERPL-MCNC: 2 MG/DL (ref 1.6–2.4)
MCH RBC QN AUTO: 29 PG (ref 26–34)
MCHC RBC AUTO-ENTMCNC: 31.2 G/DL (ref 30–36.5)
MCV RBC AUTO: 93 FL (ref 80–99)
MONOCYTES # BLD: 0.1 K/UL (ref 0–1)
MONOCYTES NFR BLD: 1 % (ref 5–13)
NEUTS BAND NFR BLD MANUAL: 2 %
NEUTS SEG # BLD: 9.8 K/UL (ref 1.8–8)
NEUTS SEG NFR BLD: 91 % (ref 32–75)
NRBC # BLD: 0 K/UL (ref 0–0.01)
NRBC BLD-RTO: 0 PER 100 WBC
PHOSPHATE SERPL-MCNC: 3.5 MG/DL (ref 2.6–4.7)
PLATELET # BLD AUTO: 271 K/UL (ref 150–400)
PMV BLD AUTO: 9.9 FL (ref 8.9–12.9)
POTASSIUM SERPL-SCNC: 3.7 MMOL/L (ref 3.5–5.1)
PROT SERPL-MCNC: 5.5 G/DL (ref 6.4–8.2)
PROTHROMBIN TIME: 58 SEC (ref 9–11.1)
RBC # BLD AUTO: 3 M/UL (ref 3.8–5.2)
RBC MORPH BLD: ABNORMAL
SODIUM SERPL-SCNC: 134 MMOL/L (ref 136–145)
WBC # BLD AUTO: 10.5 K/UL (ref 3.6–11)

## 2023-02-17 PROCEDURE — 85025 COMPLETE CBC W/AUTO DIFF WBC: CPT

## 2023-02-17 PROCEDURE — 85610 PROTHROMBIN TIME: CPT

## 2023-02-17 PROCEDURE — 36415 COLL VENOUS BLD VENIPUNCTURE: CPT

## 2023-02-17 PROCEDURE — 74011250636 HC RX REV CODE- 250/636: Performed by: NURSE PRACTITIONER

## 2023-02-17 PROCEDURE — 74011000250 HC RX REV CODE- 250: Performed by: STUDENT IN AN ORGANIZED HEALTH CARE EDUCATION/TRAINING PROGRAM

## 2023-02-17 PROCEDURE — 65270000029 HC RM PRIVATE

## 2023-02-17 PROCEDURE — 74011250637 HC RX REV CODE- 250/637: Performed by: NURSE PRACTITIONER

## 2023-02-17 PROCEDURE — 80076 HEPATIC FUNCTION PANEL: CPT

## 2023-02-17 PROCEDURE — 74011250637 HC RX REV CODE- 250/637: Performed by: STUDENT IN AN ORGANIZED HEALTH CARE EDUCATION/TRAINING PROGRAM

## 2023-02-17 PROCEDURE — 80048 BASIC METABOLIC PNL TOTAL CA: CPT

## 2023-02-17 PROCEDURE — 97165 OT EVAL LOW COMPLEX 30 MIN: CPT | Performed by: OCCUPATIONAL THERAPIST

## 2023-02-17 PROCEDURE — 83735 ASSAY OF MAGNESIUM: CPT

## 2023-02-17 PROCEDURE — 90935 HEMODIALYSIS ONE EVALUATION: CPT

## 2023-02-17 PROCEDURE — 97161 PT EVAL LOW COMPLEX 20 MIN: CPT

## 2023-02-17 PROCEDURE — 84100 ASSAY OF PHOSPHORUS: CPT

## 2023-02-17 RX ADMIN — SODIUM CHLORIDE, PRESERVATIVE FREE 10 ML: 5 INJECTION INTRAVENOUS at 13:15

## 2023-02-17 RX ADMIN — PHYTONADIONE 5 MG: 10 INJECTION, EMULSION INTRAMUSCULAR; INTRAVENOUS; SUBCUTANEOUS at 15:59

## 2023-02-17 RX ADMIN — CARVEDILOL 3.12 MG: 3.12 TABLET, FILM COATED ORAL at 18:05

## 2023-02-17 RX ADMIN — SODIUM CHLORIDE, PRESERVATIVE FREE 10 ML: 5 INJECTION INTRAVENOUS at 22:00

## 2023-02-17 RX ADMIN — SODIUM CHLORIDE, PRESERVATIVE FREE 10 ML: 5 INJECTION INTRAVENOUS at 05:40

## 2023-02-17 NOTE — PROGRESS NOTES
Problem: Falls - Risk of  Goal: *Absence of Falls  Description: Document Beatriz Avalos Fall Risk and appropriate interventions in the flowsheet. Outcome: Progressing Towards Goal  Note: Fall Risk Interventions:  Mobility Interventions: Bed/chair exit alarm              Elimination Interventions: Bed/chair exit alarm, Call light in reach              Problem: Patient Education: Go to Patient Education Activity  Goal: Patient/Family Education  Outcome: Progressing Towards Goal     Problem: Pressure Injury - Risk of  Goal: *Prevention of pressure injury  Description: Document William Scale and appropriate interventions in the flowsheet.   Outcome: Progressing Towards Goal  Note: Pressure Injury Interventions:  Sensory Interventions: Assess changes in LOC    Moisture Interventions: Absorbent underpads    Activity Interventions: Assess need for specialty bed    Mobility Interventions: Assess need for specialty bed    Nutrition Interventions: Document food/fluid/supplement intake    Friction and Shear Interventions: Apply protective barrier, creams and emollients, Feet elevated on foot rest                Problem: Patient Education: Go to Patient Education Activity  Goal: Patient/Family Education  Outcome: Progressing Towards Goal

## 2023-02-17 NOTE — PROGRESS NOTES
Palliative Medicine      Code Status: Full Code    Advance Care Planning:  Advance Care Planning 2/17/2023   Patient's Healthcare Decision Maker is: -legal NOK   Confirm Advance Directive None   Patient Would Like to Complete Advance Directive -considering this   Does the patient have other document types -       Patient / Family Encounter Documentation    Participants (names): Shilpi [place], Hawakenyetta Mesa, MARCELA    Narrative: After consulting Alexandra Kelly, NP, and JONNY Odom and reviewing chart, this writer met with patient to offer support. Patient had just returned from dialysis and had just been cleaned up after a BM. She appeared relaxed and tired, lying flat in bed, in no apparent distress. This writer asked her if there was anything she needed and she said, \"no. \" She declined offer of Music Therapy on 2/20/23 and when asked about next steps she said that she and her family \"will be talking about that. \"      She said, \"Thank you for being so kind. \"  This writer will check in with her next week. Called her sister Ky Johnston and she reported she has spoken with [address] and is working on getting a  appointment at the hospital Monday and is hoping to meet with a  before patient goes to dialysis. This writer encouraged her to go ahead and complete AMD this weekend if possible and advised her to request a  visit if needed. She confirmed that she and patient are planning to discuss post-hospital plans and she is aware that they may be considering hospice care on Monday. Psychosocial Issues Identified/ Resilience Factors:   Patient is single and has no children. She worked with [employer]. She has a nephew who lives with her. Her sister, Ky Johnston is closest to her and visits her almost every day. Caregiver [place]: Needs further assessment as discharge plans are made. Does the caregiver feel confident administering medication?    Does the caregiver need any help connecting with community resources? Does the caregiver feel confident assisting with activities of daily living? Goals of Care / Plan:   Patient's symptoms will be managed. Patient will complete AMD and other Advance Directives. Palliative team will continue to provide education and support as she considers GOC and decisions. Thank you for including Palliative Medicine in the care of Ms. Larissa Mccann.     Mando Argueta, [facility]  [phone] ([phone])

## 2023-02-17 NOTE — PROGRESS NOTES
Bedside shift change report given to Kayce Mensah LPN (oncoming nurse) by Hiral Read RN (offgoing nurse). Report included the following information SBAR, Kardex, Intake/Output, and MAR  PA informed about patient's INR level 6.2 on the unit. Orders put in for patient.

## 2023-02-17 NOTE — PROGRESS NOTES
Nephrology Progress Note  [facility] / [facility]  [address], [address]  [address] [facility] [address], [address]  Phone - [phone]  Fax - [phone]                 Patient: Garrick Uribe                   YOB: 1960        Date- 2/17/2023                      Admit Date: 2/15/2023  CC: Follow up for CHRISTA stage III          IMPRESSION & PLAN:   CHRISTA stage III, MWF [facility], left chest permacath  Elevated LFTs  History of pneumothorax in the past requiring chest tube on left  Anemia of CKD  History of breast cancer  Hypoglycemia  Hypokalemia  Left-sided pleural effusion      PLAN-  Plan for hemodialysis today. Epogen for anemia  GI following for elevated LFTs     Subjective: Interval History:   -Patient was seen and examined I  -Complains of weakness  Objective:   Vitals:    02/16/23 0908 02/16/23 1758 02/16/23 2014 02/17/23 0858   BP: 96/83 136/82 138/79 122/69   Pulse: 81 92 81 77   Resp: 18 18 16    Temp:   97.9 °F (36.6 °C) 97.3 °F (36.3 °C)   TempSrc:       SpO2: 97% 96% 100% 97%   Weight:       Height:          02/16 0701 - 02/17 0700  In: 250 [P.O.:250]  Out: -     Last 3 Recorded Weights in this Encounter    02/15/23 0755   Weight: 65 kg (143 lb 4.8 oz)        Physical exam:    GEN: NAD  NECK- no mass  RESP: No wheezing, decreased BS b/l  CVS: S1,S2  RRR  NEURO: Normal speech, Non focal  EXT: No Edema   HD Access: Left chest PermCath    Chart reviewed. Pertinent Notes reviewed.      Data Review :  Recent Labs     02/17/23  0221 02/16/23  1150 02/15/23  0814   * 136 134*   K 3.7 3.3* 3.1*   CL 99 100 96*   CO2 25 27 26   BUN 21* 13 30*   CREA 4.03* 3.14* 4.91*   * 60* 43*   CA 8.5 8.2* 8.2*   MG 2.0 1.9  --    PHOS 3.5 2.2*  --        Recent Labs     02/17/23  0221 02/16/23  1150 02/15/23  0814   WBC 10.5 6.9 6.0   HGB 8.7* 8.3* 8.9*   HCT 27.9* 27.5* 28.5*    243 253       No results for input(s): FE, TIBC, PSAT, FERR in the last 72 hours.    No results found for: HBA1C, SUM4IWQE, JEA3NITM   No results found for: MCACR, MCA1, MCA2, MCA3, MCAU, MCAU2, MCALPOCT  US Results (most recent):  Medication list  reviewed  Current Facility-Administered Medications   Medication Dose Route Frequency    phytonadione (VITAMIN K) 1 mg/mL oral solution 5 mg  5 mg Oral NOW    famotidine (PEPCID) tablet 20 mg  20 mg Oral DAILY    dextrose 10% infusion 0-250 mL  0-250 mL IntraVENous PRN    methylPREDNISolone (PF) (Solu-MEDROL) injection 100 mg  100 mg IntraVENous DAILY    carvediloL (COREG) tablet 3.125 mg  3.125 mg Oral BID WITH MEALS    multivitamin, tx-iron-ca-min (THERA-M w/ IRON) tablet 1 Tablet  1 Tablet Oral DAILY    PARoxetine (PAXIL) tablet 20 mg  20 mg Oral DAILY    sodium chloride (NS) flush 5-40 mL  5-40 mL IntraVENous Q8H    sodium chloride (NS) flush 5-40 mL  5-40 mL IntraVENous PRN    acetaminophen (TYLENOL) tablet 650 mg  650 mg Oral Q6H PRN    Or    acetaminophen (TYLENOL) suppository 650 mg  650 mg Rectal Q6H PRN    polyethylene glycol (MIRALAX) packet 17 g  17 g Oral DAILY PRN    ondansetron (ZOFRAN ODT) tablet 4 mg  4 mg Oral Q8H PRN    Or    ondansetron (ZOFRAN) injection 4 mg  4 mg IntraVENous Q6H PRN    albumin human 25% (BUMINATE) solution 25 g  25 g IntraVENous DIALYSIS PRN        Yamel Gomez MD  2/17/2023

## 2023-02-17 NOTE — PROGRESS NOTES
Problem: Self Care Deficits Care Plan (Adult)  Goal: *Acute Goals and Plan of Care (Insert Text)  Description: FUNCTIONAL STATUS PRIOR TO ADMISSION: At SNF rehab following complicated hospital admission/ medical course to include septic shock with CHRISTA/ initiation of HD, malignant pleural effusion, elevated LFTs with no known cause. Details of recent LOF at [facility] unclear. Of note, pt with h/o breast CA, last chemo Dec 2022. Occupational Therapy Goals:  Initiated 2/17/2023  1. Patient will perform grooming seated with good balance and supervision/set up within 7 days. 2. Patient will perform lower body dressing with moderate assistance  within 7 days. 3. Patient will perform toileting with moderate assistance  within 7 days. 4. Patient will perform UB dressing with supervision set up within 7 days. 5. Patient will transfer from bedside commode with contact guard assist using the least restrictive device and appropriate durable medical equipment within 7 days. Outcome: Not Met   OCCUPATIONAL THERAPY EVALUATION  Patient: Jean-Pierre Plaza (21 y.o. female)  Date: 2/17/2023  Primary Diagnosis: Liver injury [S36.119A]       Precautions: fall       ASSESSMENT  Limited assessment this session as pt was getting ready to leave for dialysis. Upon arrival to room pt was seated at bedside recliner chair and nursing was getting ready to assist pt back to bed. Min assist hand held assist x2 to stand pivot to bed without assist devices. Offered RW but pt declined using this device. Pt was at [facility] rehab prior to admit due to complicated medical stay at [facility]. Pt reports she was getting therapy services but was unable to provide details. She was somewhat irritable and reported that she didn't want people in her face. Therapist was 3 feet away and kneeled down in front of her to make eye contact. Her INR is elevated and this is due to her liver function.   Noted that palliative care is on board and hospice discussions are being made. Current Level of Function Impacting Discharge (ADLs/self-care): min hand held assist x2 stand pivot transfer, moderate assist x2 sit to supine    Feeding: Setup    Oral Facial Hygiene/Grooming: Setup    Bathing: Moderate assistance    Upper Body Dressing: Contact guard assistance    Lower Body Dressing: Maximum assistance    Toileting: Maximum assistance    Functional Outcome Measure: The patient scored 50/100 on the barthel outcome measure which is indicative of moderate decline in mobility and ADLS. Patient will benefit from skilled therapy intervention to address the above noted impairments. PLAN :  Recommendations and Planned Interventions: self care training, functional mobility training, therapeutic exercise, balance training, therapeutic activities, patient education, home safety training, and family training/education    Frequency/Duration: Patient will be followed by occupational therapy 3 times a week to address goals. Recommendation for discharge: (in order for the patient to meet his/her long term goals)  Therapy up to 5 days/week in SNF setting pending goals of care, hospice is being discussed    This discharge recommendation:  Has not yet been discussed the attending provider and/or case management    IF patient discharges home will need the following DME: TBD       SUBJECTIVE:   Patient stated I don't want people in my face.     OBJECTIVE DATA SUMMARY:   HISTORY:   Past Medical History:   Diagnosis Date    Cancer ([facility] [address]     BREAST CANCER    GERD (gastroesophageal reflux disease)     HTN (hypertension) 07/18/2011    Psychiatric disorder     ANIXETY AND DEPRESSION    Thyroid disease     HYPOTHYROID     Past Surgical History:   Procedure Laterality Date    HX COLONOSCOPY      HX MYOMECTOMY  05/02/1996    x 1    HX WISDOM TEETH EXTRACTION      IR INSERT NON TUNL CVC OVER 5 YRS  1/9/2023    IR INSERT TUNL CVC W/O PORT OVER 5 YR  1/23/2023    IR THORACENTESIS/INSERT CHEST TUBE  1/5/2023       Expanded or extensive additional review of patient history:     Home Situation  Home Environment: Skilled nursing facility  Support Systems: Other Family Member(s)  Patient Expects to be Discharged to[de-identified] Skilled nursing facility    Hand dominance: Right    EXAMINATION OF PERFORMANCE DEFICITS:  Cognitive/Behavioral Status:  Neurologic State: Alert  Orientation Level: Oriented X4  Cognition: Follows commands  Perception: Cues to maintain midline in standing  Perseveration: No perseveration noted  Safety/Judgement: Fall prevention      Hearing: Auditory  Auditory Impairment: None    Vision/Perceptual:                                     Range of Motion:    AROM: Generally decreased, functional                         Strength:    Strength: Generally decreased, functional                Coordination:  Coordination: Generally decreased, functional  Fine Motor Skills-Upper: Left Intact; Right Intact    Gross Motor Skills-Upper: Left Intact; Right Intact    Tone & Sensation:    Tone: Normal                         Balance:  Sitting: Impaired  Sitting - Static: Good (unsupported)  Sitting - Dynamic: Fair (occasional)  Standing: Impaired; With support  Standing - Static: Fair;Constant support (B HHA)  Standing - Dynamic : Fair;Constant support    Functional Mobility and Transfers for ADLs:  Bed Mobility:  Sit to Supine: Moderate assistance;Assist x2    Transfers:  Sit to Stand: Minimum assistance  Stand to Sit: Minimum assistance  Bed to Chair: Minimum assistance;Assist x2 (chair to bed stand pivot with B HHA (declined use of RW); noted small, shuffling steps and decreased stability/ balance)  Bathroom Mobility:  (unable at this time)  Toilet Transfer : Minimum assistance    ADL Assessment:  Feeding: Setup    Oral Facial Hygiene/Grooming: Setup    Bathing:  Moderate assistance    Upper Body Dressing: Contact guard assistance    Lower Body Dressing: Maximum assistance    Toileting: Maximum assistance Cognitive Retraining  Safety/Judgement: Fall prevention      Functional Measure:    Barthel Index:  Bathin  Bladder: 10  Bowels: 10  Groomin  Dressin  Feeding: 10  Mobility: 0  Stairs: 0  Toilet Use: 0  Transfer (Bed to Chair and Back): 10  Total: 50/100      The Barthel ADL Index: Guidelines  1. The index should be used as a record of what a patient does, not as a record of what a patient could do. 2. The main aim is to establish degree of independence from any help, physical or verbal, however minor and for whatever reason. 3. The need for supervision renders the patient not independent. 4. A patient's performance should be established using the best available evidence. Asking the patient, friends/relatives and nurses are the usual sources, but direct observation and common sense are also important. However direct testing is not needed. 5. Usually the patient's performance over the preceding 24-48 hours is important, but occasionally longer periods will be relevant. 6. Middle categories imply that the patient supplies over 50 per cent of the effort. 7. Use of aids to be independent is allowed. Score Interpretation (from [address] [address])    Independent   60-79 Minimally independent   40-59 Partially dependent   20-39 Very dependent   <20 Totally dependent     -Danuta Bejarano., Barthel, D.W. (1965). Functional evaluation: the Barthel Index. [address] [facility] ([address] (1997). The Barthel activities of daily living index: self-reporting versus actual performance in the old (> or = 75 years). Journal of [address] [place] [address] 45(7), [facility], J.J.M.F, Bonnie Yuen., Octavia Fleming. (). Measuring the change in disability after inpatient rehabilitation; comparison of the responsiveness of the Barthel Index and Functional Walker Measure. Journal of Neurology, Neurosurgery, and Psychiatry, 66(4), 282-408.   -PAULINE Bradley, Avni ron Campo M.A. (2004) Assessment of post-stroke quality of life in cost-effectiveness studies: The usefulness of the Barthel Index and the EuroQoL-5D. Quality of Life Research, 15, 358-29         Occupational Therapy Evaluation Charge Determination   History Examination Decision-Making   LOW Complexity : Brief history review  LOW Complexity : 1-3 performance deficits relating to physical, cognitive , or psychosocial skils that result in activity limitations and / or participation restrictions  HIGH Complexity : Patient presents with comorbidities that affect occupational performance. Signifigant modification of tasks or assistance (eg, physical or verbal) with assessment (s) is necessary to enable patient to complete evaluation       Based on the above components, the patient evaluation is determined to be of the following complexity level: LOW     Activity Tolerance:   Fair    After treatment patient left in no apparent distress:    Supine in bed, Call bell within reach, and transporter at bedside to take pt to dialysis    COMMUNICATION/EDUCATION:   The patients plan of care was discussed with: Physical therapist and Registered nurse. This patients plan of care is appropriate for delegation to [facility].     Thank you for this referral.  EVERARDO Iglesias/L  Time Calculation: 10 mins

## 2023-02-17 NOTE — PROGRESS NOTES
Hospitalist Progress Note    NAME:  Luz Collins   :  1960   MRN:  [id number]     Assessment / Plan:  Acute liver injury - stable  Elevated lipase  AMS - improved  Did have mental status changes. AMS was likely secondary to liver injury. Recent episode of shock liver in 2023.  - GI consulted, appreciate assistance. Has undergone recent full GI workup a few weeks ago that was unrevealing. No need for additional imaging per GI. Unlikely to be chemo related as patient has been off chemotherapy since December, and would be poor candidate for liver biopsy per Oncology. CT abdomen this admission without acute findings, no biliary dilation, no hepatic mass, no pancreatitis. - no other infectious etiology based on symptoms at this time, afebrile, WBC normal  - INR worsening, Vit K with Oncology 23  - trend INR   - LFTS  remain markedly elevated  - GI following, unknown etiology, no current GI complaints, imaging without obstructive process or pancreatitis, GI signed off  - Palliative consulted  - plan to monitor LFTs over the weekend. If no improvement, plan for hospice engagement on Monday    Hx of breast cancer  Last chemotherapy and immunotherpay in Dec 2022.  - recent admission for pneumonia and recurrent malignant pleural effusion, complicated by hemothorax after thoracentesis  - Oncology consulted, appreciate assistance  - started steroids  - Palliative consulted by Oncology     ESRD  - continue MWF HD, Nephrology following, appreciate assistance     Elevated troponin  209 on admission. Last trop [address] 10. Denies chest pain, SOB. 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 Imaging stable, denies SOB.  Recent admission complicated by hemothorax.  - monitor respiratory status  - volume management with HD     Code Status: Full  Surrogate Decision Maker:  Earle Lizama (Sister)   [phone] (Mobile)     DVT Prophylaxis: holding with elevated INR  GI Prophylaxis: not indicated  Disposition: pending medical stability  ALEXANDRA: 2/20/23    Recommended Disposition: likely hospice  Barriers: LFT improvement, if not, then hospice     Subjective:     Chief Complaint  Followup ALI    Subjective  Discussed with RN events overnight. Patient in a better mood today. Has no complaints. Review of Systems:  14 point ROS reviewed with patient and negative except per above. Objective:     VITALS:   Last 24hrs VS reviewed since prior progress note. Most recent are:  Patient Vitals for the past 24 hrs:   Temp Pulse Resp BP SpO2   02/17/23 1539 98.1 °F (36.7 °C) 88 15 108/80 97 %   02/17/23 1507 -- 75 18 119/64 --   02/17/23 1500 -- 78 18 (!) 82/58 --   02/17/23 1445 -- 99 18 99/66 --   02/17/23 1430 -- 98 18 102/64 --   02/17/23 1415 -- 100 18 93/69 --   02/17/23 1405 -- 93 18 (!) 90/58 --   02/17/23 1400 -- 99 18 (!) 82/53 --   02/17/23 1345 -- 94 18 (!) 87/62 --   02/17/23 1330 -- 92 18 (!) 87/62 --   02/17/23 1315 -- 96 18 97/64 --   02/17/23 1300 -- 94 18 (!) 81/55 --   02/17/23 1245 -- 94 18 (!) 81/54 --   02/17/23 1230 -- 97 18 (!) 91/57 --   02/17/23 1215 -- 90 18 (!) 93/55 --   02/17/23 1200 -- 92 18 (!) 92/58 --   02/17/23 1145 -- 88 18 (!) 100/56 --   02/17/23 1132 98.4 °F (36.9 °C) 84 18 108/60 --   02/17/23 0858 97.3 °F (36.3 °C) 77 -- 122/69 97 %   02/16/23 2014 97.9 °F (36.6 °C) 81 16 138/79 100 %   02/16/23 1758 -- 92 18 136/82 96 %         Intake/Output Summary (Last 24 hours) at 2/17/2023 1612  Last data filed at 2/17/2023 1507  Gross per 24 hour   Intake 250 ml   Output 1000 ml   Net -750 ml          I had a face to face encounter and independently examined this patient on 2/17/2023, as outlined below:    PHYSICAL EXAM:  General:          No acute distress, Answering questions appropriately  HEENT:           EOMI, icteric sclera.  MMM  Neck: Supple  Resp:               CTAB, non-labored, No wheezes or crackles  Cardio:            regular rate, normal rhythm, no murmurs, no JVD  GI:                   Soft, Non-tender, Non-distended, Normal bowel sounds. Extremities:     Warm, well perfused, no LE edema  Skin:                Warm, Dry, Pink, Intact. Neurologic:      Alert and Oriented x4, No focal defects    Reviewed most current lab test results and cultures  YES  Reviewed most current radiology test results   YES  Review and summation of old records today    NO  Reviewed patient's current orders and MAR    YES  PMH/SH reviewed - no change compared to H&P  ______________________________________________________________________    Procedures: see electronic medical records for all procedures/Xrays and details which were not copied into this note but were reviewed prior to creation of Plan. LABS:  I reviewed today's most current labs and imaging studies. Pertinent labs include:  Recent Labs     02/17/23 0221 02/16/23  1150 02/15/23  0814   WBC 10.5 6.9 6.0   HGB 8.7* 8.3* 8.9*   HCT 27.9* 27.5* 28.5*    243 253       Recent Labs     02/17/23 0221 02/16/23  1150 02/15/23  0814   * 136 134*   K 3.7 3.3* 3.1*   CL 99 100 96*   CO2 25 27 26   * 60* 43*   BUN 21* 13 30*   CREA 4.03* 3.14* 4.91*   CA 8.5 8.2* 8.2*   MG 2.0 1.9  --    PHOS 3.5 2.2*  --    ALB 1.7* 1.7* 1.5*   TBILI 10.6* 11.1* 11.2*   * 485* 543*   INR 6.2* 6.0*  --          Imaging/Diagnostics:  CT ABD PELV W CONT  Narrative: EXAM: CT ABD PELV W CONT    INDICATION: elevated lfts    COMPARISON: CT abdomen pelvis December 26, 2022     CONTRAST: 100 mL of Isovue-370. ORAL CONTRAST: None    TECHNIQUE:   Following the uneventful intravenous administration of contrast, thin axial  images were obtained through the abdomen and pelvis. Coronal and sagittal  reconstructions were generated.  CT dose reduction was achieved through use of a  standardized protocol tailored for this examination and automatic exposure  control for dose modulation. FINDINGS:   LOWER THORAX: Left-sided pleural effusion, with left basilar atelectasis. LIVER: No mass. BILIARY TREE: Gallbladder is within normal limits. CBD is not dilated. SPLEEN: within normal limits. PANCREAS: No mass or ductal dilatation. ADRENALS: Unremarkable. KIDNEYS: No mass, calculus, or hydronephrosis. STOMACH: Unremarkable. SMALL BOWEL: No dilatation or wall thickening. COLON: No dilatation or wall thickening. Colonic diverticulosis without evidence  of diverticulitis. APPENDIX: Unremarkable. PERITONEUM: No ascites or pneumoperitoneum. RETROPERITONEUM: No lymphadenopathy or aortic aneurysm. REPRODUCTIVE ORGANS: Multiple uterine fibroids  URINARY BLADDER: No mass or calculus. BONES: No destructive bone lesion. ABDOMINAL WALL: No mass or hernia. ADDITIONAL COMMENTS: N/A  Impression: 1. No acute findings in the abdomen or pelvis. Chronic and incidental findings  as detailed above. 2. Left pleural effusion, with left basilar atelectasis. XR CHEST PORT  Narrative: EXAM: Chest radiograph    INDICATION: Weakness and hemoptysis    COMPARISON: 1/17/2023    FINDINGS: A portable AP radiograph of the chest was obtained at 0 834 hours. The  patient is on a cardiac monitor. The Port-A-Cath terminates at the cavoatrial  junction. Dual lumen catheter extends to the right atrium. There is no  pneumothorax. There is a persistent vague left  basilar opacity that may  represent a combination of pleural thickening and subsegmental atelectasis. No  new finding. The cardiac and mediastinal contours and pulmonary vascularity are  stable. The bones and soft tissues are grossly within normal limits.    Impression: Stable chest.      Care Plan discussed with:    Comments   Patient y    Family  y    RN y    Care Manager     Consultant  y                     y Multidiciplinary team rounds were held today with , nursing, pharmacist and clinical coordinator. Patient's plan of care was discussed; medications were reviewed and discharge planning was addressed.      _    Signed: Milagro Woods MD

## 2023-02-17 NOTE — PROCEDURES
Hemodialysis / [phone]    Vitals Pre Post Assessment Pre Post   BP BP: 108/60 (02/17/23 1132) 119/64 LOC See flow sheet See flow sheet   HR Pulse (Heart Rate): 84 (02/17/23 1132) 75 Lungs See flow sheet See flow sheet   Resp Resp Rate: 18 (02/17/23 1132) 18 Cardiac See flow sheet See flow sheet   Temp Temp: 98.4 °F (36.9 °C) (02/17/23 1132) 97.9 Skin See flow sheet See flow sheet   Weight    Edema See flow sheet See flow sheet   Tele status None ordered None ordered Pain Pain Intensity 1: 0 (02/16/23 2014)      Orders   Duration: Start: 8239 End: 3457 Total: 3.5hrs   Dialyzer: Dialyzer/Set Up Inspection: Adolm Holstein (02/17/23 1132)   K Bath: Dialysate K (mEq/L): 4 (02/17/23 1132)   Ca Bath: Dialysate CA (mEq/L): 2.5 (02/17/23 1132)   Na: Dialysate NA (mEq/L): 138 (02/17/23 1132)   Bicarb: Dialysate HCO3 (mEq/L): 35 (02/17/23 1132)   Target Fluid Removal:       Access   Type & Location: L PC : Pre- Assessment: Dressing CDI. No s/s of infection. Both lumens aspirate & flush well. Running well at . SBAR received from Primary RN. Pt arrived to HD suite A&Ox4. Consent signed & on file OR Chronic consent applies. Each catheter limb disinfected per p&p, caps removed, hubs disinfected per p&p. Each lumen aspirated for blood return and flushed with Normal Saline per policy. VSS. Dialysis Tx initiated   Comments:   Dressing dated 2/15/23 CDI. No s/s of infection noted.                                    Labs   HBsAg (Antigen) / date: Neg 1/25/23                                             HBsAb (Antibody) / date: Susc 1/25/23   Source: [facility]   Obtained/Reviewed  Critical Results Called HGB   Date Value Ref Range Status   02/17/2023 8.7 (L) 11.5 - 16.0 g/dL Final     Potassium   Date Value Ref Range Status   02/17/2023 3.7 3.5 - 5.1 mmol/L Final     Comment:     SPECIMEN HEMOLYZED, RESULTS MAY BE AFFECTED     Calcium   Date Value Ref Range Status   02/17/2023 8.5 8.5 - 10.1 MG/DL Final     BUN Date Value Ref Range Status   02/17/2023 21 (H) 6 - 20 MG/DL Final     Creatinine   Date Value Ref Range Status   02/17/2023 4.03 (H) 0.55 - 1.02 MG/DL Final     Comment:     INVESTIGATED PER DELTA CHECK PROTOCOL        Meds Given   Name Dose Route   Heparin 1:1000 2.0 [email or web address]   Heparin 1:1000 2.1 [email or web address]          Adequacy / Fluid    Total Liters Process: 68.5   Net Fluid Removed: 1000mL      Comments   Time Out Done:   (Time) 1127   Admitting Diagnosis: Liver Injury   Consent obtained/signed: Informed Consent Verified: Yes (02/17/23 1132)   Machine / RO # Machine Number: Teresa Nguyen (02/17/23 1132)   Primary Nurse Rpt Pre: Maddie Lees RN   Primary Nurse Rpt Post: Maddie Lees RN   Pt Education: procedural   Care Plan: On going   Pts outpatient clinic: [facility]     Tx Summary  1132:  L PC : Pre- Assessment: Dressing CDI. No s/s of infection. Both lumens aspirate & flush well. Running well at . SBAR received from Primary RN. Pt arrived to HD suite A&Ox4. Consent signed & on file OR Chronic consent applies. Each catheter limb disinfected per p&p, caps removed, hubs disinfected per p&p. Each lumen aspirated for blood return and flushed with Normal Saline per policy. VSS. Dialysis Tx initiated. **Pt tolerated tx well. No issues or complaints voiced. **    1507: Tx ended. VSS. Each dialysis catheter limb disinfected per p&p, all possible blood returned per p&p, and each dialysis hub disinfected per p&p. Each lumen flushed, post dialysis catheter Heparin dwell instilled per order, and caps applied. Bed locked and in the lowest position, call bell and belongings in reach. SBAR given to Primary, RN. Patient is stable at time of their/ my departure. Post assessment: Dressing CDI. No changes. All Dialysis related medications have been reviewed. Comments:  Assessment performed by RN.   Procedure and documentation observed and reviewed by Vishnu Loya RN

## 2023-02-17 NOTE — PROGRESS NOTES
Problem: Mobility Impaired (Adult and Pediatric)  Goal: *Acute Goals and Plan of Care (Insert Text)  Description: FUNCTIONAL STATUS PRIOR TO ADMISSION: Pt recently residing at [facility] following complicated hospital admission/ medical course to include septic shock with CHRISTA/ initiation of HD, malignant pleural effusion, elevated LFTs with no known cause. Details of recent LOF at SNF unclear. Of note, pt with h/o breast CA, last chemo Dec 2022. Physical Therapy Goals  Initiated 2/17/2023  1. Patient will move from supine to sit and sit to supine  in bed with minimal assistance/contact guard assist within 7 day(s). 2.  Patient will transfer from bed to chair and chair to bed with minimal assistance/contact guard assist using the least restrictive device within 7 day(s). 3.  Patient will perform sit to stand with minimal assistance/contact guard assist within 7 day(s). 4.  Patient will ambulate with minimal assistance/contact guard assist for at least 10 feet with the least restrictive device within 7 day(s). Outcome: Not Met   PHYSICAL THERAPY EVALUATION  Patient: Sukhi Bryant (89 y.o. female)  Date: 2/17/2023  Primary Diagnosis: Liver injury [S36.119A]       Precautions: fall       ASSESSMENT  Based on the objective data described below, the patient presents with general weakness, decreased activity tolerance, impaired balance, decreased function with mobility following admit from SNF with weakness and liver injury. Pt received in chair, transport present to take to dialysis therefore session limited this date. Pt transferred chair to bed stand pivot with min HHA x 2 for lift/ balance. Required mod A x 2 for sit to supine. Session concluded at this time to allow pt transport to HD. Will con't to follow for mobility progression as appropriate pending [facility] decisions.     Current Level of Function Impacting Discharge (mobility/balance): chair to bed min A x 2, sit to supine mod A x 2    Other factors to consider for discharge: complex medical history      Patient will benefit from skilled therapy intervention to address the above noted impairments. PLAN :  Recommendations and Planned Interventions: bed mobility training, transfer training, gait training, therapeutic exercises, patient and family training/education, and therapeutic activities      Frequency/Duration: Patient will be followed by physical therapy:  3 times a week to address goals. Recommendation for discharge: (in order for the patient to meet his/her long term goals)  To be determined: SNF v hospice pending [address] decisions    This discharge recommendation:  Has not yet been discussed the attending provider and/or case management    IF patient discharges home will need the following DME: to be determined (TBD)         SUBJECTIVE:   Patient soft spoken and irritable with therapist attempt to lean closer to hear her speak. OBJECTIVE DATA SUMMARY:   HISTORY:    Past Medical History:   Diagnosis Date    Cancer ([facility] [address]     BREAST CANCER    GERD (gastroesophageal reflux disease)     HTN (hypertension) 07/18/2011    Psychiatric disorder     ANIXETY AND DEPRESSION    Thyroid disease     HYPOTHYROID     Past Surgical History:   Procedure Laterality Date    HX COLONOSCOPY      HX MYOMECTOMY  05/02/1996    x 1    HX WISDOM TEETH EXTRACTION      IR INSERT NON TUNL CVC OVER 5 YRS  1/9/2023    IR INSERT TUNL CVC W/O PORT OVER 5 YR  1/23/2023    IR THORACENTESIS/INSERT CHEST TUBE  1/5/2023       Personal factors and/or comorbidities impacting plan of care: breast CA, complex recent medical course    Home Situation  Home Environment: Skilled nursing facility  Support Systems: Other Family Member(s)  Patient Expects to be Discharged to[de-identified] Skilled nursing facility    EXAMINATION/PRESENTATION/DECISION MAKING:   Critical Behavior:  Neurologic State: Alert  Orientation Level: Oriented X4  Cognition: Follows commands     Hearing:   Auditory  Auditory Impairment: None  Range Of Motion:  AROM: Generally decreased, functional                       Strength:    Strength: Generally decreased, functional                    Tone & Sensation:   Tone: Normal                              Coordination:  Coordination: Generally decreased, functional  Vision:      Functional Mobility:  Bed Mobility:        Sit to Supine: Moderate assistance;Assist x2     Transfers:              Bed to Chair: Minimum assistance;Assist x2 (chair to bed stand pivot with B HHA (declined use of RW); noted small, shuffling steps and decreased stability/ balance)              Balance:   Sitting: Impaired  Sitting - Static: Good (unsupported)  Sitting - Dynamic: Fair (occasional)  Standing: Impaired; With support  Standing - Static: Fair;Constant support (B HHA)  Standing - Dynamic : Fair;Constant support       Physical Therapy Evaluation Charge Determination   History Examination Presentation Decision-Making   HIGH Complexity :3+ comorbidities / personal factors will impact the outcome/ POC  LOW Complexity : 1-2 Standardized tests and measures addressing body structure, function, activity limitation and / or participation in recreation  MEDIUM Complexity : Evolving with changing characteristics  LOW Complexity : FOTO score of       Based on the above components, the patient evaluation is determined to be of the following complexity level: LOW     Pain Rating:  No c/o pain    Activity Tolerance:   Poor    After treatment patient left in no apparent distress:   Patient positioned in right sidelying for pressure relief, Call bell within reach, Caregiver / family present, and Side rails x 3    COMMUNICATION/EDUCATION:   The patients plan of care was discussed with: Registered nurse. Fall prevention education was provided and the patient/caregiver indicated understanding.     Thank you for this referral.  May Liriano, PT   Time Calculation: 8 mins

## 2023-02-17 NOTE — PROGRESS NOTES
CM made attempt to contact pt and pt's sister Stella Lennon at [phone] to complete cm readmission assessment was unsuccessful. Phone was kept ringing. VM box was full. Floor Cm will follow up.     Naveen Jaramillo MSW     Ext [phone]

## 2023-02-17 NOTE — PROGRESS NOTES
Physician Progress Note      PATIENT:               David Tarango  CSN #:                  [id number]  :                       1960  ADMIT DATE:       2/15/2023 7:42 AM  DISCH DATE:  RESPONDING  PROVIDER #:        DAVID L MENDEL MD          QUERY TEXT:    Patient admitted with elevated LFTs. Noted documentation of esrd(H&P)  and CHRISTA stage III (nephro pn). If possible, please document in progress notes and discharge summary if you are evaluating and /or treating any of the following: The medical record reflects the following:  Risk Factors:  previous admission in January where she was placed on dialysis for CHRISTA  Clinical Indicators:nephro--CHRISTA stage III, placed on dialysis for CHRISTA. Treatment: Nephro cx, HD  Thanks,  Jono Abad RN/CDI   Options provided:  -- CHRISTA stage III confirmed and esrd ruled out  -- esrd confirmed and CHRISTA stage III ruled out  -- Other - I will add my own diagnosis  -- Disagree - Not applicable / Not valid  -- Disagree - Clinically unable to determine / Unknown  -- Refer to Clinical Documentation Reviewer    PROVIDER RESPONSE TEXT:    After study, esrd confirmed and CHRISTA stage III ruled out.     Query created by: Daniel Koehelr on 2023 10:39 AM      Electronically signed by:  Irene Guzman MD 2023 8:11 PM

## 2023-02-17 NOTE — PROGRESS NOTES
[facility] [place] at [address] [facility] [address] [facility] [address] [facility]  W: [phone] F: [phone]  Reason for Visit:   Nicole Uribe is a 57 y.o. female with left breast cancer-ER 0%, LA 0%, HER2/radha negative. She was recently admitted to [facility] with pneumonia and subsequently was treated for septic shock in the ICU. She was discharged to SNF and returned for overall generalized weakness. Treatment History:   10/1/22: Carboplatin taxol and keytruda followed by ddAC and Keytruda     History of Present Illness:   Mrs. Lisa Aguero is a patient of Dr. Nimco Tucker at [facility].  She is followed for treatment of left breast cancer. She was recently admitted to [facility] for sepsis r/t COVID-pneumonia, CHRISTA, NSTEMI, hemoptysis and anemia. Pertinent treatment history as follows: She had a screening mammogram on 7/20/2022 which showed a 1 cm irregular mass in the left breast at 3 o'clock position. Biopsy on 8/2/2022 showed invasive ductal carcinoma, grade 2-3, ER 0%, LA 0%, HER2/radha negative. Ki-67 85%. Declined genetic testing. Recent Keytruda : last dose 12/14/2022  Last Chemotherapy: 12/21/2022     She had a screening mammogram on 7/20/2022 which showed a 1 cm irregular mass in the left breast at 3 o'clock position. Biopsy on 8/2/2022 showed invasive ductal carcinoma, grade 2-3, ER 0%, LA 0%, HER2/radha negative. Ki-67 85%. Declined genetic testing. Patient has now returned to the ED with complaints of fever and shortness of breath. She was found to have a large left pleural effusion and CHRISTA. Patient seen at bedside in hallway bed in ED. Discussed plan for thoracentesis today. Will discuss further plan of care once she has been placed in her room given no privacy in hallway. Interval History:     2/16/2023: Patient is well-known to oncology practice given last admission.   Her primary oncologist is  Rita Wetzel at [facility] and she has been treated in the past for breast cancer. Her last chemotherapy and immunotherapy was in December 2022. Last month she was admitted for pneumonia and recurrent malignant pleural effusions. Unfortunately she suffered a hemothorax after thoracentesis. Her condition continued to decline and she was transferred to the ICU for septic shock and was intubated for period of time. She experienced an CHRITSA related to hypotension and septic shock, she was followed by nephrology and however her renal function did not recover and she was started on hemodialysis at that time. Upon discharge she was transferred to SNF, she has been unable to follow-up with her primary oncology team.       Patient was readmitted from SNF for overall weakness. She has been receiving hemodialysis at [address]. She was seen at bedside today. Eugene Martines discussion regarding elevated LFTs with no clear cause at this point. Discussed with patient that short-term prognosis is poor and if we are unable to make improvements on her liver function then we will have to discuss comfort measures and hospice. She actually took this news rather well and participated in conversation regarding liver failure. 2/17/2023: Patient seen at bedside today, she was being wheeled out of the room to dialysis unit. Her sister was at bedside today, long conversation regarding elevated LFTs. We discussed concern for infiltrative malignancy to liver although would need liver biopsy to confirm this. At this time liver biopsy is not recommended given patient is high risk with elevated LFTs and INR as well as she is not a candidate for further systemic treatment at this time. The sister was also in agreement and did not wish to pursue invasive tests or procedures. We discussed in depth her elevated LFTs as another barrier to receiving systemic therapy.   She has been arranging with patient and assistance from the outpatient setting to formalize power of , etc.  She verbalized understanding agreement for consideration of hospice early next week if patient condition does not improve. She also reported that the patient shared with her yesterday she knows she may not recover is considering comfort measures and enjoying time with family.       Past Medical History:   Diagnosis Date    Cancer ([facility] [address]     BREAST CANCER    GERD (gastroesophageal reflux disease)     HTN (hypertension) 07/18/2011    Psychiatric disorder     ANIXETY AND DEPRESSION    Thyroid disease     HYPOTHYROID      Past Surgical History:   Procedure Laterality Date    HX COLONOSCOPY      HX MYOMECTOMY  05/02/1996    x 1    HX WISDOM TEETH EXTRACTION      IR INSERT NON TUNL CVC OVER 5 YRS  1/9/2023    IR INSERT TUNL CVC W/O PORT OVER 5 YR  1/23/2023    IR THORACENTESIS/INSERT CHEST TUBE  1/5/2023      Social History     Tobacco Use    Smoking status: Never     Passive exposure: Never    Smokeless tobacco: Never   Substance Use Topics    Alcohol use: Never      Family History   Problem Relation Age of Onset    Heart Failure Mother     Cancer Father     Diabetes Sister     Kidney Disease Sister     Anesth Problems Neg Hx      Current Facility-Administered Medications   Medication Dose Route Frequency    phytonadione (VITAMIN K) 1 mg/mL oral solution 5 mg  5 mg Oral NOW    famotidine (PEPCID) tablet 20 mg  20 mg Oral DAILY    dextrose 10% infusion 0-250 mL  0-250 mL IntraVENous PRN    methylPREDNISolone (PF) (Solu-MEDROL) injection 100 mg  100 mg IntraVENous DAILY    carvediloL (COREG) tablet 3.125 mg  3.125 mg Oral BID WITH MEALS    multivitamin, tx-iron-ca-min (THERA-M w/ IRON) tablet 1 Tablet  1 Tablet Oral DAILY    PARoxetine (PAXIL) tablet 20 mg  20 mg Oral DAILY    sodium chloride (NS) flush 5-40 mL  5-40 mL IntraVENous Q8H    sodium chloride (NS) flush 5-40 mL  5-40 mL IntraVENous PRN    acetaminophen (TYLENOL) tablet 650 mg  650 mg Oral Q6H PRN    Or acetaminophen (TYLENOL) suppository 650 mg  650 mg Rectal Q6H PRN    polyethylene glycol (MIRALAX) packet 17 g  17 g Oral DAILY PRN    ondansetron (ZOFRAN ODT) tablet 4 mg  4 mg Oral Q8H PRN    Or    ondansetron (ZOFRAN) injection 4 mg  4 mg IntraVENous Q6H PRN    albumin human 25% (BUMINATE) solution 25 g  25 g IntraVENous DIALYSIS PRN      Allergies   Allergen Reactions    Sulfa (Sulfonamide Antibiotics) Itching, Swelling and Unknown (comments)        Review of Systems: not obtained    Physical Exam:   Visit Vitals  BP 97/64   Pulse 96   Temp 98.4 °F (36.9 °C)   Resp 18   Ht 5' (1.524 m)   Wt 143 lb 4.8 oz (65 kg)   SpO2 97%   BMI 27.99 kg/m²         Results:     Lab Results   Component Value Date/Time    WBC 10.5 02/17/2023 02:21 AM    HGB 8.7 (L) 02/17/2023 02:21 AM    HCT 27.9 (L) 02/17/2023 02:21 AM    PLATELET 820 37/47/0111 02:21 AM    MCV 93.0 02/17/2023 02:21 AM    ABS. NEUTROPHILS 9.8 (H) 02/17/2023 02:21 AM     Lab Results   Component Value Date/Time    Sodium 134 (L) 02/17/2023 02:21 AM    Potassium 3.7 02/17/2023 02:21 AM    Chloride 99 02/17/2023 02:21 AM    CO2 25 02/17/2023 02:21 AM    Glucose 144 (H) 02/17/2023 02:21 AM    BUN 21 (H) 02/17/2023 02:21 AM    Creatinine 4.03 (H) 02/17/2023 02:21 AM    GFR est AA >60 09/26/2022 03:26 PM    GFR est non-AA >60 09/26/2022 03:26 PM    Calcium 8.5 02/17/2023 02:21 AM    Glucose (POC) 109 02/16/2023 03:11 PM     Lab Results   Component Value Date/Time    Bilirubin, total 10.6 (H) 02/17/2023 02:21 AM    ALT (SGPT) 456 (H) 02/17/2023 02:21 AM    Alk. phosphatase 734 (H) 02/17/2023 02:21 AM    Protein, total 5.5 (L) 02/17/2023 02:21 AM    Albumin 1.7 (L) 02/17/2023 02:21 AM    Globulin 3.8 02/17/2023 02:21 AM       External   Records reviewed and summarized above. Pathology report(s) reviewed above. Radiology report(s) reviewed above.   MRI  9/2022    LEFT BREAST: Conglomerate mass enhancement in the left breast at 4-5 o'clock,  combined dimension 3.7 x 1.9 x 1. 4 cm. This is slightly more extensive than the  sonographic and mammographic findings. No lymphadenopathy. Physical Exam  Constitutional:       Appearance: She is normal weight. HENT:      Head: Normocephalic. Eyes:      Pupils: Pupils are equal, round, and reactive to light. Abdominal:      Palpations: Abdomen is soft. Musculoskeletal:         General: Normal range of motion. Skin:     General: Skin is warm and dry. Neurological:      General: No focal deficit present. Mental Status: She is oriented to person, place, and time. Assessment/PLAN:     1) Left breast cancer  1 cm mass on mammogram  Palpated a lump  ER 0%, CT 0%, HER2/radha negative  Ki 67 was 85%. MRI breast with a 3.7 cm Left breast mass  [id number]  AJCC 8th ed-Stage IIA  Genetic testing negative  Recommended chemotherapy + Keytruda based on the KEYNOTE-522 trial (taxol, carboplatin and [place] followed by [facility] as preoperative treatment, followed by surgery, and then adjuvant pembrolizumab for another nine cycles (27 weeks) after surgery). Recent admission and discharge from [facility] for COVID-pneumonia, sepsis, CHRISTA, NSTEMI and anemia    Last chemo with taxol 12/21/22. Last Keytruda 12/14/22. CT chest 1/3/2003: Increased moderately large left pleural effusion. Unchanged patchy bilateral  consolidation, left greater than right, compatible with pneumonia. Unlikely pneumonitis given CT results are consistent with COVID and viral pneumonia on admission     Repeat CT results noted from 1/8  1. Left chest tube. Interval substantially diminished size of loculated left  pleural effusion with small-moderate residual.  2. Interval demonstration of small-moderate layering right pleural effusion. 3. Substantial interval worsening of bilateral pulmonary groundglass  opacifications with crazy paving. Bibasilar dependent consolidations versus atelectasis are also shown.     Low concern for pneumonitis given CT results concerning for viral etiologies vs ARDS and not interstitial disease during last admission     > Patient is no longer a candidate for treatment given severe decline in functional status     2) ESRD on HD   Overall worsening creatinine since last dose of Keytruda on 12/14  Previously Baseline creatinine less than 1  Repeat creatinine on 12/26 showed creatinine bumped to 2.91  Concern for immunotherapy related nephritis vs ATN from hypotension  Held off on steroids initially with concern for underlying infection and pneumonia  Started on full dose steroids 1/8 with no improvement to renal function   Receiving HD   Nephrology following     3) Elevated LFTs and Supratherapeutic INR    Experienced shock liver with septic shock during last admission, upon discharge LFTs and total bilirubin were trending down  T. bili on admission 11.2    Evaluated by GI with no clear cause for elevation in LFTs    CT abdomen does not show any concern for malignancy to liver    Would not pursue liver biopsy at this time to rule out infiltrative carcinoma as patient is functionally debilitated and not a candidate for further systemic therapy at this time. Low concern for immunotherapy r/t liver damage as patient has not received immunotherapy in 2 months. Also, recently suffered from septic shock and shock liver.     > Started on full dose steroids to see if any improvement in LFTs on 2/15, so far no improvement    > Elevated INR r/t liver failure, INR 6 today. Will give dose of Vitamin K.   > Appreciate Palliative Care input   > Ongoing goals of care discussions, hospice is very appropriate from a malignancy standpoint as well as now worsening liver failure.  Sister was in agreement and will continue to address Monday if she shows no improvement to labs, discussed transition to hospice.   > Very poor short term prognosis     Discussed with Dr. Julia Cook and Laureen Escobar NP     We will continue to follow, although there is no much more for oncology team to add. At this time patient is very hospice appropriate, if no improvement to LFTs over weekend will plan for transition to hospice.      Signed By: Jaziel Dailey, NP

## 2023-02-18 LAB
ALBUMIN SERPL-MCNC: 1.8 G/DL (ref 3.5–5)
ALBUMIN/GLOB SERPL: 0.5 (ref 1.1–2.2)
ALP SERPL-CCNC: 670 U/L (ref 45–117)
ALT SERPL-CCNC: 373 U/L (ref 12–78)
ANION GAP SERPL CALC-SCNC: 11 MMOL/L (ref 5–15)
AST SERPL-CCNC: 239 U/L (ref 15–37)
BASOPHILS # BLD: 0.1 K/UL (ref 0–0.1)
BASOPHILS NFR BLD: 1 % (ref 0–1)
BILIRUB DIRECT SERPL-MCNC: 8.1 MG/DL (ref 0–0.2)
BILIRUB SERPL-MCNC: 10.1 MG/DL (ref 0.2–1)
BUN SERPL-MCNC: 13 MG/DL (ref 6–20)
BUN/CREAT SERPL: 5 (ref 12–20)
CALCIUM SERPL-MCNC: 8.6 MG/DL (ref 8.5–10.1)
CHLORIDE SERPL-SCNC: 99 MMOL/L (ref 97–108)
CO2 SERPL-SCNC: 26 MMOL/L (ref 21–32)
CREAT SERPL-MCNC: 2.66 MG/DL (ref 0.55–1.02)
DIFFERENTIAL METHOD BLD: ABNORMAL
EOSINOPHIL # BLD: 0.1 K/UL (ref 0–0.4)
EOSINOPHIL NFR BLD: 1 % (ref 0–7)
ERYTHROCYTE [DISTWIDTH] IN BLOOD BY AUTOMATED COUNT: 17.9 % (ref 11.5–14.5)
GLOBULIN SER CALC-MCNC: 3.8 G/DL (ref 2–4)
GLUCOSE SERPL-MCNC: 106 MG/DL (ref 65–100)
HCT VFR BLD AUTO: 26.1 % (ref 35–47)
HGB BLD-MCNC: 8.2 G/DL (ref 11.5–16)
IMM GRANULOCYTES # BLD AUTO: 0 K/UL (ref 0–0.04)
IMM GRANULOCYTES NFR BLD AUTO: 0 % (ref 0–0.5)
INR PPP: 1.7 (ref 0.9–1.1)
LYMPHOCYTES # BLD: 1.3 K/UL (ref 0.8–3.5)
LYMPHOCYTES NFR BLD: 10 % (ref 12–49)
MAGNESIUM SERPL-MCNC: 2 MG/DL (ref 1.6–2.4)
MCH RBC QN AUTO: 29.1 PG (ref 26–34)
MCHC RBC AUTO-ENTMCNC: 31.4 G/DL (ref 30–36.5)
MCV RBC AUTO: 92.6 FL (ref 80–99)
METAMYELOCYTES NFR BLD MANUAL: 1 %
MONOCYTES # BLD: 1.9 K/UL (ref 0–1)
MONOCYTES NFR BLD: 15 % (ref 5–13)
NEUTS SEG # BLD: 9 K/UL (ref 1.8–8)
NEUTS SEG NFR BLD: 72 % (ref 32–75)
NRBC # BLD: 0 K/UL (ref 0–0.01)
NRBC BLD-RTO: 0 PER 100 WBC
PHOSPHATE SERPL-MCNC: 2.1 MG/DL (ref 2.6–4.7)
PLATELET # BLD AUTO: 225 K/UL (ref 150–400)
PMV BLD AUTO: 9.5 FL (ref 8.9–12.9)
POTASSIUM SERPL-SCNC: 3.1 MMOL/L (ref 3.5–5.1)
PROT SERPL-MCNC: 5.6 G/DL (ref 6.4–8.2)
PROTHROMBIN TIME: 17.4 SEC (ref 9–11.1)
RBC # BLD AUTO: 2.82 M/UL (ref 3.8–5.2)
RBC MORPH BLD: ABNORMAL
RBC MORPH BLD: ABNORMAL
SODIUM SERPL-SCNC: 136 MMOL/L (ref 136–145)
WBC # BLD AUTO: 12.5 K/UL (ref 3.6–11)

## 2023-02-18 PROCEDURE — 85610 PROTHROMBIN TIME: CPT

## 2023-02-18 PROCEDURE — 84100 ASSAY OF PHOSPHORUS: CPT

## 2023-02-18 PROCEDURE — 74011000250 HC RX REV CODE- 250: Performed by: STUDENT IN AN ORGANIZED HEALTH CARE EDUCATION/TRAINING PROGRAM

## 2023-02-18 PROCEDURE — 80076 HEPATIC FUNCTION PANEL: CPT

## 2023-02-18 PROCEDURE — 85025 COMPLETE CBC W/AUTO DIFF WBC: CPT

## 2023-02-18 PROCEDURE — 65270000029 HC RM PRIVATE

## 2023-02-18 PROCEDURE — 74011250636 HC RX REV CODE- 250/636: Performed by: STUDENT IN AN ORGANIZED HEALTH CARE EDUCATION/TRAINING PROGRAM

## 2023-02-18 PROCEDURE — 83735 ASSAY OF MAGNESIUM: CPT

## 2023-02-18 PROCEDURE — 36415 COLL VENOUS BLD VENIPUNCTURE: CPT

## 2023-02-18 PROCEDURE — 80048 BASIC METABOLIC PNL TOTAL CA: CPT

## 2023-02-18 PROCEDURE — 74011250637 HC RX REV CODE- 250/637: Performed by: STUDENT IN AN ORGANIZED HEALTH CARE EDUCATION/TRAINING PROGRAM

## 2023-02-18 RX ADMIN — METHYLPREDNISOLONE SODIUM SUCCINATE 100 MG: 125 INJECTION, POWDER, FOR SOLUTION INTRAMUSCULAR; INTRAVENOUS at 08:35

## 2023-02-18 RX ADMIN — FAMOTIDINE 20 MG: 20 TABLET, FILM COATED ORAL at 08:35

## 2023-02-18 RX ADMIN — SODIUM CHLORIDE, PRESERVATIVE FREE 10 ML: 5 INJECTION INTRAVENOUS at 23:22

## 2023-02-18 RX ADMIN — PAROXETINE HYDROCHLORIDE 20 MG: 20 TABLET, FILM COATED ORAL at 08:35

## 2023-02-18 RX ADMIN — CARVEDILOL 3.12 MG: 3.12 TABLET, FILM COATED ORAL at 08:35

## 2023-02-18 RX ADMIN — CARVEDILOL 3.12 MG: 3.12 TABLET, FILM COATED ORAL at 17:25

## 2023-02-18 RX ADMIN — MULTIPLE VITAMINS W/ MINERALS TAB 1 TABLET: TAB at 08:35

## 2023-02-18 RX ADMIN — SODIUM CHLORIDE, PRESERVATIVE FREE 10 ML: 5 INJECTION INTRAVENOUS at 17:26

## 2023-02-18 RX ADMIN — SODIUM CHLORIDE, PRESERVATIVE FREE 10 ML: 5 INJECTION INTRAVENOUS at 05:18

## 2023-02-18 NOTE — PROGRESS NOTES
Problem: Falls - Risk of  Goal: *Absence of Falls  Description: Document Dorian Waters Fall Risk and appropriate interventions in the flowsheet. Outcome: Progressing Towards Goal  Note: Fall Risk Interventions:  Mobility Interventions: Bed/chair exit alarm              Elimination Interventions: Bed/chair exit alarm, Call light in reach              Problem: Patient Education: Go to Patient Education Activity  Goal: Patient/Family Education  Outcome: Progressing Towards Goal     Problem: Pressure Injury - Risk of  Goal: *Prevention of pressure injury  Description: Document William Scale and appropriate interventions in the flowsheet.   Outcome: Progressing Towards Goal  Note: Pressure Injury Interventions:  Sensory Interventions: Assess changes in LOC, Float heels, Keep linens dry and wrinkle-free, Minimize linen layers    Moisture Interventions: Absorbent underpads    Activity Interventions: Increase time out of bed, Pressure redistribution bed/mattress(bed type), PT/OT evaluation    Mobility Interventions: Assess need for specialty bed, Float heels, HOB 30 degrees or less, Pressure redistribution bed/mattress (bed type), PT/OT evaluation    Nutrition Interventions: Document food/fluid/supplement intake    Friction and Shear Interventions: Apply protective barrier, creams and emollients, HOB 30 degrees or less, Minimize layers                Problem: Patient Education: Go to Patient Education Activity  Goal: Patient/Family Education  Outcome: Progressing Towards Goal     Problem: Patient Education: Go to Patient Education Activity  Goal: Patient/Family Education  Outcome: Progressing Towards Goal     Problem: Patient Education: Go to Patient Education Activity  Goal: Patient/Family Education  Outcome: Progressing Towards Goal

## 2023-02-18 NOTE — PROGRESS NOTES
Problem: Falls - Risk of  Goal: *Absence of Falls  Description: Document Lesly Mend Fall Risk and appropriate interventions in the flowsheet. Outcome: Progressing Towards Goal  Note: Fall Risk Interventions:  Mobility Interventions: Bed/chair exit alarm              Elimination Interventions: Bed/chair exit alarm, Call light in reach              Problem: Patient Education: Go to Patient Education Activity  Goal: Patient/Family Education  Outcome: Progressing Towards Goal     Problem: Pressure Injury - Risk of  Goal: *Prevention of pressure injury  Description: Document William Scale and appropriate interventions in the flowsheet.   Outcome: Progressing Towards Goal  Note: Pressure Injury Interventions:  Sensory Interventions: Assess changes in LOC    Moisture Interventions: Absorbent underpads    Activity Interventions: Assess need for specialty bed    Mobility Interventions: Chair cushion    Nutrition Interventions: Document food/fluid/supplement intake    Friction and Shear Interventions: Apply protective barrier, creams and emollients                Problem: Patient Education: Go to Patient Education Activity  Goal: Patient/Family Education  Outcome: Progressing Towards Goal     Problem: Patient Education: Go to Patient Education Activity  Goal: Patient/Family Education  Outcome: Progressing Towards Goal     Problem: Patient Education: Go to Patient Education Activity  Goal: Patient/Family Education  Outcome: Progressing Towards Goal

## 2023-02-18 NOTE — PROGRESS NOTES
Hospitalist Progress Note    NAME:  Laura Mratínez   :  1960   MRN:  [id number]     Assessment / Plan:  Acute liver injury - slightly improved  Elevated lipase  AMS - resolved  Did have mental status changes. AMS was likely secondary to liver injury. Recent episode of shock liver in 2023.  - GI consulted, appreciate assistance. Has undergone recent full GI workup a few weeks ago that was unrevealing. No need for additional imaging per GI. Unlikely to be chemo related as patient has been off chemotherapy since December, and would be poor candidate for liver biopsy per Oncology. CT abdomen this admission without acute findings, no biliary dilation, no hepatic mass, no pancreatitis. - no other infectious etiology based on symptoms at this time, afebrile, WBC normal  - INR worsening, Vit K with Oncology 23  - trend INR   - LFTS  remain markedly elevated  - GI following, unknown etiology, no current GI complaints, imaging without obstructive process or pancreatitis, GI signed off  - Palliative consulted  - plan to monitor LFTs over the weekend, slightly downtrending. If no significant improvement, plan for hospice engagement on Monday  - labs pending on , not drawn overnight    Hx of breast cancer  Last chemotherapy and immunotherpay in Dec 2022.  - recent admission for pneumonia and recurrent malignant pleural effusion, complicated by hemothorax after thoracentesis  - Oncology consulted, appreciate assistance  - started steroids  - Palliative consulted by Oncology     ESRD  - continue MWF HD, Nephrology following, appreciate assistance     Elevated troponin  209 on admission. Last trop 300 José Miguel 10. Denies chest pain, SOB. 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 Imaging stable, denies SOB. Recent admission complicated by hemothorax.   - monitor respiratory status  - volume management with HD     Code Status: Full  Surrogate Decision Maker:  Lizz Obrien (Sister)   [phone] (Mobile)     DVT Prophylaxis: holding with elevated INR  GI Prophylaxis: not indicated  Disposition: pending medical stability  ALEXANDRA: 2/20/23    Recommended Disposition: likely hospice  Barriers: LFT improvement, if not, then hospice     Subjective:     Chief Complaint  Followup ALI    Subjective  Discussed with RN events overnight. Feels ok today. Review of Systems:  14 point ROS reviewed with patient and negative except per above. Objective:     VITALS:   Last 24hrs VS reviewed since prior progress note. Most recent are:  Patient Vitals for the past 24 hrs:   Temp Pulse Resp BP SpO2   02/18/23 0831 97.9 °F (36.6 °C) 76 16 123/69 97 %   02/17/23 2310 98.2 °F (36.8 °C) 85 16 113/76 97 %   02/17/23 1803 -- 86 16 123/86 98 %       No intake or output data in the 24 hours ending 02/18/23 1608       I had a face to face encounter and independently examined this patient on 2/18/2023, as outlined below:    PHYSICAL EXAM:  General:          No acute distress, Answering questions appropriately  HEENT:           EOMI, icteric sclera. MMM  Neck:               Supple  Resp:               CTAB, non-labored, No wheezes or crackles  Cardio:            regular rate, normal rhythm, no murmurs, no JVD  GI:                   Soft, Non-tender, Non-distended, Normal bowel sounds. Extremities:     Warm, well perfused, no LE edema  Skin:                Warm, Dry, Pink, Intact.    Neurologic:      Alert and Oriented x4, No focal defects    Reviewed most current lab test results and cultures  YES  Reviewed most current radiology test results   YES  Review and summation of old records today    NO  Reviewed patient's current orders and MAR    YES  PMH/SH reviewed - no change compared to H&P  ______________________________________________________________________    Procedures: see electronic medical records for all procedures/Xrays and details which were not copied into this note but were reviewed prior to creation of Plan. LABS:  I reviewed today's most current labs and imaging studies. Pertinent labs include:  Recent Labs     02/18/23  0615 02/17/23 0221 02/16/23  1150   WBC 12.5* 10.5 6.9   HGB 8.2* 8.7* 8.3*   HCT 26.1* 27.9* 27.5*    271 243       Recent Labs     02/18/23  0615 02/17/23 0221 02/16/23  1150    134* 136   K 3.1* 3.7 3.3*   CL 99 99 100   CO2 26 25 27   * 144* 60*   BUN 13 21* 13   CREA 2.66* 4.03* 3.14*   CA 8.6 8.5 8.2*   MG 2.0 2.0 1.9   PHOS 2.1* 3.5 2.2*   ALB 1.8* 1.7* 1.7*   TBILI 10.1* 10.6* 11.1*   * 456* 485*   INR 1.7* 6.2* 6.0*         Imaging/Diagnostics:  CT ABD PELV W CONT  Narrative: EXAM: CT ABD PELV W CONT    INDICATION: elevated lfts    COMPARISON: CT abdomen pelvis December 26, 2022     CONTRAST: 100 mL of Isovue-370. ORAL CONTRAST: None    TECHNIQUE:   Following the uneventful intravenous administration of contrast, thin axial  images were obtained through the abdomen and pelvis. Coronal and sagittal  reconstructions were generated. CT dose reduction was achieved through use of a  standardized protocol tailored for this examination and automatic exposure  control for dose modulation. FINDINGS:   LOWER THORAX: Left-sided pleural effusion, with left basilar atelectasis. LIVER: No mass. BILIARY TREE: Gallbladder is within normal limits. CBD is not dilated. SPLEEN: within normal limits. PANCREAS: No mass or ductal dilatation. ADRENALS: Unremarkable. KIDNEYS: No mass, calculus, or hydronephrosis. STOMACH: Unremarkable. SMALL BOWEL: No dilatation or wall thickening. COLON: No dilatation or wall thickening. Colonic diverticulosis without evidence  of diverticulitis. APPENDIX: Unremarkable. PERITONEUM: No ascites or pneumoperitoneum. RETROPERITONEUM: No lymphadenopathy or aortic aneurysm.   REPRODUCTIVE ORGANS: Multiple uterine fibroids  URINARY BLADDER: No mass or calculus. BONES: No destructive bone lesion. ABDOMINAL WALL: No mass or hernia. ADDITIONAL COMMENTS: N/A  Impression: 1. No acute findings in the abdomen or pelvis. Chronic and incidental findings  as detailed above. 2. Left pleural effusion, with left basilar atelectasis. XR CHEST PORT  Narrative: EXAM: Chest radiograph    INDICATION: Weakness and hemoptysis    COMPARISON: 1/17/2023    FINDINGS: A portable AP radiograph of the chest was obtained at 0 834 hours. The  patient is on a cardiac monitor. The Port-A-Cath terminates at the cavoatrial  junction. Dual lumen catheter extends to the right atrium. There is no  pneumothorax. There is a persistent vague left  basilar opacity that may  represent a combination of pleural thickening and subsegmental atelectasis. No  new finding. The cardiac and mediastinal contours and pulmonary vascularity are  stable. The bones and soft tissues are grossly within normal limits. Impression: Stable chest.      Care Plan discussed with:    Comments   Patient y    Family  y    RN y    Care Manager     Consultant                        Multidiciplinary team rounds were held today with , nursing, pharmacist and clinical coordinator. Patient's plan of care was discussed; medications were reviewed and discharge planning was addressed.      _    Signed: Milly Cook MD

## 2023-02-18 NOTE — PROGRESS NOTES
Spiritual Care Assessment/Progress Note  [facility]      NAME: Meghan Beyer      MRN: [id number]  AGE: 57 y.o.  SEX: female  Samaritan Affiliation: No Christianity   Language: English     2/18/2023     Total Time (in minutes): 38     Spiritual Assessment begun in [facility] through conversation with:         [x]Patient        [] Family    [] Friend(s)        Reason for Consult: Initial/Spiritual assessment, patient floor     Spiritual beliefs: (Please include comment if needed)     [] Identifies with a yodit tradition:         [] Supported by a yodit community:            [] Claims no spiritual orientation:           [] Seeking spiritual identity:                [] Adheres to an individual form of spirituality:           [x] Not able to assess:                           Identified resources for coping:      [] Prayer                               [] Music                  [] Guided Imagery     [x] Family/friends                 [] Pet visits     [] Devotional reading                         [] Unknown     [] Other:                                               Interventions offered during this visit: (See comments for more details)    Patient Interventions: Initial/Spiritual assessment, patient floor           Plan of Care:     [x] Support spiritual and/or cultural needs    [] Support AMD and/or advance care planning process      [] Support grieving process   [] Coordinate Rites and/or Rituals    [] Coordination with community clergy   [] No spiritual needs identified at this time   [] Detailed Plan of Care below (See Comments)  [] Make referral to Music Therapy  [] Make referral to Pet Therapy     [] Make referral to Addiction services  [] Make referral to [facility]  [] Make referral to Spiritual Care Partner  [] No future visits requested        [x] Contact Spiritual Care for further referrals     Comments:  received an daytime page from [place] in reference to a visit for Ms. Koko Rodrigues was on a different call and asked if the page was urgent. Carina Singer stated it wasn't and thanked the [address] [place] [address] Road.  was heading out of the office to meet with Ms. Ki Adrian when advise by the Spiritual Care volunteer stated the patient and family member needed a witness/signature from the [address] [place] [address] Road for an Advance Medical Directive.  wasn't aware of the need and did a chart review before meeting with the patient. The chart review mentions the patient's mental status was altered on 2/15 /23 and 2/17/23. Advised the  met with the patient's nurse Jennifer Friedman RN and advised her of the patient's notes. She stated the Advance Medical Directive was signed by her and Dr. Farzana Gorman as a witness to the patient's signature. On 2/16/23 Palliative Care/V. Marrow LSW advised the family of the Advance Medical Directive and provided forms and a booklet. The nurse Jennifer Friedman called the patient's sister and asked her to bring back the Advance Medical Directive because the hospital doesn't have a copy of the document. She also requested the  assist the patient with a new Advance Medical Directive.  met with Ms. Ki Adrian and advised I was meeting with another patient and apologized for the delay. Ms. Ki Adrian stated everyone leaves her out of things and the form is already done.  didn't understand and inquired further. She stated she is waiting for her water and didn't need pastoral care at this time.  thanked her for her time.  services are available 24 hours a day as requested. Rev. Jurline Gitelman, D.  [facility]   Paging Service [phone])

## 2023-02-18 NOTE — PROGRESS NOTES
Bedside shift change report given to [address] [place] [address] (oncoming nurse) by [employer] RN (offgoing nurse). Report included the following information SBAR, Kardex, Intake/Output, MAR, and Recent Results.

## 2023-02-19 LAB
ALBUMIN SERPL-MCNC: 1.9 G/DL (ref 3.5–5)
ALBUMIN/GLOB SERPL: 0.5 (ref 1.1–2.2)
ALP SERPL-CCNC: 707 U/L (ref 45–117)
ALT SERPL-CCNC: 318 U/L (ref 12–78)
ANION GAP SERPL CALC-SCNC: 11 MMOL/L (ref 5–15)
AST SERPL-CCNC: 172 U/L (ref 15–37)
BASOPHILS # BLD: 0 K/UL (ref 0–0.1)
BASOPHILS NFR BLD: 0 % (ref 0–1)
BILIRUB DIRECT SERPL-MCNC: 8.3 MG/DL (ref 0–0.2)
BILIRUB SERPL-MCNC: 10.1 MG/DL (ref 0.2–1)
BUN SERPL-MCNC: 32 MG/DL (ref 6–20)
BUN/CREAT SERPL: 7 (ref 12–20)
CALCIUM SERPL-MCNC: 8.6 MG/DL (ref 8.5–10.1)
CHLORIDE SERPL-SCNC: 96 MMOL/L (ref 97–108)
CO2 SERPL-SCNC: 27 MMOL/L (ref 21–32)
CREAT SERPL-MCNC: 4.3 MG/DL (ref 0.55–1.02)
DIFFERENTIAL METHOD BLD: ABNORMAL
EOSINOPHIL # BLD: 0 K/UL (ref 0–0.4)
EOSINOPHIL NFR BLD: 0 % (ref 0–7)
ERYTHROCYTE [DISTWIDTH] IN BLOOD BY AUTOMATED COUNT: 17.5 % (ref 11.5–14.5)
GLOBULIN SER CALC-MCNC: 3.8 G/DL (ref 2–4)
GLUCOSE SERPL-MCNC: 139 MG/DL (ref 65–100)
HCT VFR BLD AUTO: 26.8 % (ref 35–47)
HGB BLD-MCNC: 8.4 G/DL (ref 11.5–16)
IMM GRANULOCYTES # BLD AUTO: 0 K/UL (ref 0–0.04)
IMM GRANULOCYTES NFR BLD AUTO: 0 % (ref 0–0.5)
INR PPP: 1.3 (ref 0.9–1.1)
LYMPHOCYTES # BLD: 0.2 K/UL (ref 0.8–3.5)
LYMPHOCYTES NFR BLD: 2 % (ref 12–49)
MAGNESIUM SERPL-MCNC: 2.3 MG/DL (ref 1.6–2.4)
MCH RBC QN AUTO: 29.3 PG (ref 26–34)
MCHC RBC AUTO-ENTMCNC: 31.3 G/DL (ref 30–36.5)
MCV RBC AUTO: 93.4 FL (ref 80–99)
MONOCYTES # BLD: 0.2 K/UL (ref 0–1)
MONOCYTES NFR BLD: 2 % (ref 5–13)
MYELOCYTES NFR BLD MANUAL: 2 %
NEUTS BAND NFR BLD MANUAL: 2 %
NEUTS SEG # BLD: 9.9 K/UL (ref 1.8–8)
NEUTS SEG NFR BLD: 91 % (ref 32–75)
NRBC # BLD: 0 K/UL (ref 0–0.01)
NRBC BLD-RTO: 0 PER 100 WBC
PHOSPHATE SERPL-MCNC: 2.3 MG/DL (ref 2.6–4.7)
PLATELET # BLD AUTO: 219 K/UL (ref 150–400)
PLATELET COMMENTS,PCOM: ABNORMAL
PMV BLD AUTO: 9.9 FL (ref 8.9–12.9)
POTASSIUM SERPL-SCNC: 3.7 MMOL/L (ref 3.5–5.1)
PROMYELOCYTES NFR BLD MANUAL: 1 %
PROT SERPL-MCNC: 5.7 G/DL (ref 6.4–8.2)
PROTHROMBIN TIME: 13.6 SEC (ref 9–11.1)
RBC # BLD AUTO: 2.87 M/UL (ref 3.8–5.2)
RBC MORPH BLD: ABNORMAL
SODIUM SERPL-SCNC: 134 MMOL/L (ref 136–145)
WBC # BLD AUTO: 10.6 K/UL (ref 3.6–11)
WBC MORPH BLD: ABNORMAL

## 2023-02-19 PROCEDURE — 74011250636 HC RX REV CODE- 250/636: Performed by: STUDENT IN AN ORGANIZED HEALTH CARE EDUCATION/TRAINING PROGRAM

## 2023-02-19 PROCEDURE — 80076 HEPATIC FUNCTION PANEL: CPT

## 2023-02-19 PROCEDURE — 85025 COMPLETE CBC W/AUTO DIFF WBC: CPT

## 2023-02-19 PROCEDURE — 84100 ASSAY OF PHOSPHORUS: CPT

## 2023-02-19 PROCEDURE — 83735 ASSAY OF MAGNESIUM: CPT

## 2023-02-19 PROCEDURE — 36415 COLL VENOUS BLD VENIPUNCTURE: CPT

## 2023-02-19 PROCEDURE — 80048 BASIC METABOLIC PNL TOTAL CA: CPT

## 2023-02-19 PROCEDURE — 74011250637 HC RX REV CODE- 250/637: Performed by: STUDENT IN AN ORGANIZED HEALTH CARE EDUCATION/TRAINING PROGRAM

## 2023-02-19 PROCEDURE — 85610 PROTHROMBIN TIME: CPT

## 2023-02-19 PROCEDURE — 74011000250 HC RX REV CODE- 250: Performed by: STUDENT IN AN ORGANIZED HEALTH CARE EDUCATION/TRAINING PROGRAM

## 2023-02-19 PROCEDURE — 65270000029 HC RM PRIVATE

## 2023-02-19 RX ADMIN — SODIUM CHLORIDE, PRESERVATIVE FREE 10 ML: 5 INJECTION INTRAVENOUS at 13:31

## 2023-02-19 RX ADMIN — METHYLPREDNISOLONE SODIUM SUCCINATE 100 MG: 125 INJECTION, POWDER, FOR SOLUTION INTRAMUSCULAR; INTRAVENOUS at 10:09

## 2023-02-19 RX ADMIN — PAROXETINE HYDROCHLORIDE 20 MG: 20 TABLET, FILM COATED ORAL at 10:09

## 2023-02-19 RX ADMIN — CARVEDILOL 3.12 MG: 3.12 TABLET, FILM COATED ORAL at 18:37

## 2023-02-19 RX ADMIN — SODIUM CHLORIDE, PRESERVATIVE FREE 10 ML: 5 INJECTION INTRAVENOUS at 22:58

## 2023-02-19 RX ADMIN — SODIUM CHLORIDE, PRESERVATIVE FREE 10 ML: 5 INJECTION INTRAVENOUS at 06:30

## 2023-02-19 RX ADMIN — CARVEDILOL 3.12 MG: 3.12 TABLET, FILM COATED ORAL at 10:09

## 2023-02-19 RX ADMIN — FAMOTIDINE 20 MG: 20 TABLET, FILM COATED ORAL at 10:09

## 2023-02-19 RX ADMIN — MULTIPLE VITAMINS W/ MINERALS TAB 1 TABLET: TAB at 10:09

## 2023-02-19 NOTE — PROGRESS NOTES
End of Shift Note    Bedside shift change report given to Melissa Sibley RN (oncoming nurse) by Atiya Jaramillo RN (offgoing nurse). Report included the following information SBAR, Kardex, Intake/Output, MAR, and Recent Results    Shift worked:  Day     Shift summary and any significant changes:     Vitals stable, RA, up with assist     Concerns for physician to address:       Zone phone for oncoming shift:   [phone]       Activity:  Activity Level: Up with Assistance  Number times ambulated in hallways past shift: 0  Number of times OOB to chair past shift: 0    Cardiac:   Cardiac Monitoring: No      Cardiac Rhythm: Sinus Rhythm    Access:  Current line(s): PIV     Genitourinary:   Urinary status: oliguric    Respiratory:   O2 Device: None (Room air)  Chronic home O2 use?: NO  Incentive spirometer at bedside: N/A       GI:  Last Bowel Movement Date: 02/16/23  Current diet:  ADULT DIET Regular  DIET ONE TIME MESSAGE  Passing flatus: YES  Tolerating current diet: YES       Pain Management:   Patient states pain is manageable on current regimen: YES    Skin:  William Score: 15  Interventions: float heels, increase time out of bed, and PT/OT consult    Patient Safety:  Fall Score:  Total Score: 2  Interventions: bed/chair alarm, assistive device (walker, cane, etc), gripper socks, and pt to call before getting OOB       Length of Stay:  Expected LOS: 4d 16h  Actual LOS: [address], TOM

## 2023-02-19 NOTE — ACP (ADVANCE CARE PLANNING)
Advance Care Planning   Advance Care Planning Inpatient Note  [facility]  Spiritual Care Department    Today's Date: 2/19/2023  Unit: [facility]    Received request from patient and family for advance medical directive documentation of AMD completed earlier in the week, but never placed on her chart or scanned into EMR. Advance medical directive was not dated. Patricia Woo Upon review of chart and communication with care team, patient's decision making abilities are not in question. Patient and sister Komal Cotton  were present in the room during visit. Goals of ACP Conversation:  Discuss Advance Care planning documents  Update Advance Medical Directive     Health Care Decision Makers:      Primary Decision Maker: Carmen Wynne Sister - [phone]    Summary:  Completed New Documents        Advance Care Planning Documents (Patient Wishes) on file:  Healthcare Power of /Advance Directive appointment of Health care agent  Living Will/ Advance Directive     Assessment:    Komal Cotton presented an advance medical directive completed by her sister, Laura Martínez witnessed by her doctor and nurse. However, advance medical directive was not dated. Reviewed that document to insure documentation reflected her wishes. Made appropriate changes in end of life decisions; she has chosen comfort measures only if death is imminent. If unaware of self, surroundings, and unlikely to recover she opts for treatments for a period of 21 days with hope she will improve. However, she would not want a feeding tube. She would agree to a ventilator for only a short time to be decided by her sister Ja Solis. Returned original advance medical directive to patient with a copy for her sister. Copy of advance medical directive was placed on chart.         Interventions:  Provided education on documents for clarity and greater understanding  Assisted in the completion of documents according to patient's wishes at this time    Care Preferences Communicated:  No    Outcomes/Plan:  ACP Discussion Completed  New Advance Directive completed  Returned original document(s) to patient, as well as copies for distribution to appointed agents  Copy of advance medical directive placed on patient chart.      Sonja Sawyer [facility]   on 2/19/2023 at 12:00 PM

## 2023-02-19 NOTE — PROGRESS NOTES
ACP ASSESSMENT     Zach Abdullahi presented an advance medical directive completed by her sister, Glenn Adrian witnessed by her doctor and nurse. However, advance medical directive was not dated. Reviewed that document to insure documentation reflected her wishes. Made appropriate changes in end of life decisions; she has chosen comfort measures only if death is imminent. If unaware of self, surroundings, and unlikely to recover she opts for treatments for a period of 21 days with hope she will improve. However, she would not want a feeding tube. She would agree to a ventilator for only a short time to be decided by her sister Ajay Kerr. Returned original advance medical directive to patient with a copy for her sister. Copy of advance medical directive was placed on chart. Offered words of encouragement and assurance of prayer.        JOLIE Millan, [facility], Staff [address]    [address] Paging Service  [phone])

## 2023-02-20 ENCOUNTER — DOCUMENTATION ONLY (OUTPATIENT)
Dept: ONCOLOGY | Age: 63
End: 2023-02-20

## 2023-02-20 LAB
ALBUMIN SERPL-MCNC: 1.9 G/DL (ref 3.5–5)
ANION GAP SERPL CALC-SCNC: 7 MMOL/L (ref 5–15)
BUN SERPL-MCNC: 39 MG/DL (ref 6–20)
BUN/CREAT SERPL: 8 (ref 12–20)
CALCIUM SERPL-MCNC: 8.5 MG/DL (ref 8.5–10.1)
CHLORIDE SERPL-SCNC: 98 MMOL/L (ref 97–108)
CO2 SERPL-SCNC: 26 MMOL/L (ref 21–32)
CREAT SERPL-MCNC: 4.95 MG/DL (ref 0.55–1.02)
ERYTHROCYTE [DISTWIDTH] IN BLOOD BY AUTOMATED COUNT: 17.3 % (ref 11.5–14.5)
GLUCOSE SERPL-MCNC: 139 MG/DL (ref 65–100)
HCT VFR BLD AUTO: 25.4 % (ref 35–47)
HGB BLD-MCNC: 7.9 G/DL (ref 11.5–16)
MCH RBC QN AUTO: 28.9 PG (ref 26–34)
MCHC RBC AUTO-ENTMCNC: 31.1 G/DL (ref 30–36.5)
MCV RBC AUTO: 93 FL (ref 80–99)
NRBC # BLD: 0 K/UL (ref 0–0.01)
NRBC BLD-RTO: 0 PER 100 WBC
PHOSPHATE SERPL-MCNC: 2.7 MG/DL (ref 2.6–4.7)
PLATELET # BLD AUTO: 200 K/UL (ref 150–400)
PMV BLD AUTO: 10.3 FL (ref 8.9–12.9)
POTASSIUM SERPL-SCNC: 3.4 MMOL/L (ref 3.5–5.1)
RBC # BLD AUTO: 2.73 M/UL (ref 3.8–5.2)
SODIUM SERPL-SCNC: 131 MMOL/L (ref 136–145)
WBC # BLD AUTO: 16.8 K/UL (ref 3.6–11)

## 2023-02-20 PROCEDURE — 36415 COLL VENOUS BLD VENIPUNCTURE: CPT

## 2023-02-20 PROCEDURE — 90935 HEMODIALYSIS ONE EVALUATION: CPT

## 2023-02-20 PROCEDURE — 74011000250 HC RX REV CODE- 250: Performed by: STUDENT IN AN ORGANIZED HEALTH CARE EDUCATION/TRAINING PROGRAM

## 2023-02-20 PROCEDURE — 74011250636 HC RX REV CODE- 250/636: Performed by: INTERNAL MEDICINE

## 2023-02-20 PROCEDURE — 83735 ASSAY OF MAGNESIUM: CPT

## 2023-02-20 PROCEDURE — 84100 ASSAY OF PHOSPHORUS: CPT

## 2023-02-20 PROCEDURE — 65270000029 HC RM PRIVATE

## 2023-02-20 PROCEDURE — 85610 PROTHROMBIN TIME: CPT

## 2023-02-20 PROCEDURE — 99233 SBSQ HOSP IP/OBS HIGH 50: CPT | Performed by: STUDENT IN AN ORGANIZED HEALTH CARE EDUCATION/TRAINING PROGRAM

## 2023-02-20 PROCEDURE — 80069 RENAL FUNCTION PANEL: CPT

## 2023-02-20 PROCEDURE — 74011250637 HC RX REV CODE- 250/637: Performed by: STUDENT IN AN ORGANIZED HEALTH CARE EDUCATION/TRAINING PROGRAM

## 2023-02-20 PROCEDURE — 80076 HEPATIC FUNCTION PANEL: CPT

## 2023-02-20 PROCEDURE — 80048 BASIC METABOLIC PNL TOTAL CA: CPT

## 2023-02-20 PROCEDURE — 85027 COMPLETE CBC AUTOMATED: CPT

## 2023-02-20 PROCEDURE — 85025 COMPLETE CBC W/AUTO DIFF WBC: CPT

## 2023-02-20 RX ADMIN — CARVEDILOL 3.12 MG: 3.12 TABLET, FILM COATED ORAL at 18:10

## 2023-02-20 RX ADMIN — PAROXETINE HYDROCHLORIDE 20 MG: 20 TABLET, FILM COATED ORAL at 09:40

## 2023-02-20 RX ADMIN — CARVEDILOL 3.12 MG: 3.12 TABLET, FILM COATED ORAL at 09:40

## 2023-02-20 RX ADMIN — SODIUM CHLORIDE, PRESERVATIVE FREE 10 ML: 5 INJECTION INTRAVENOUS at 18:09

## 2023-02-20 RX ADMIN — SODIUM CHLORIDE, PRESERVATIVE FREE 10 ML: 5 INJECTION INTRAVENOUS at 05:08

## 2023-02-20 RX ADMIN — FAMOTIDINE 20 MG: 20 TABLET, FILM COATED ORAL at 09:40

## 2023-02-20 RX ADMIN — MULTIPLE VITAMINS W/ MINERALS TAB 1 TABLET: TAB at 09:40

## 2023-02-20 RX ADMIN — IRON SUCROSE 200 MG: 20 INJECTION, SOLUTION INTRAVENOUS at 16:28

## 2023-02-20 RX ADMIN — SODIUM CHLORIDE, PRESERVATIVE FREE 10 ML: 5 INJECTION INTRAVENOUS at 22:29

## 2023-02-20 NOTE — PROGRESS NOTES
Cancer [place] at [address] [facility] [address], [facility] [address] [facility]  W: [phone] F: [phone]  Reason for Visit:   Jean-Pierre Plaza is a 57 y.o. female with left breast cancer-ER 0%, VA 0%, HER2/radha negative. She was recently admitted to [facility] with pneumonia and subsequently was treated for septic shock in the ICU. She was discharged to SNF and returned for overall generalized weakness. Treatment History:   10/1/22: Carboplatin taxol and keytruda followed by ddAC and Keytruda     History of Present Illness:   Mrs. Joan Myers is a patient of Dr. Vicente Beal at [facility].  She is followed for treatment of left breast cancer. She was recently admitted to [facility] for sepsis r/t COVID-pneumonia, CHRISTA, NSTEMI, hemoptysis and anemia. Pertinent treatment history as follows: She had a screening mammogram on 7/20/2022 which showed a 1 cm irregular mass in the left breast at 3 o'clock position. Biopsy on 8/2/2022 showed invasive ductal carcinoma, grade 2-3, ER 0%, VA 0%, HER2/radha negative. Ki-67 85%. Declined genetic testing. Recent Keytruda : last dose 12/14/2022  Last Chemotherapy: 12/21/2022     She had a screening mammogram on 7/20/2022 which showed a 1 cm irregular mass in the left breast at 3 o'clock position. Biopsy on 8/2/2022 showed invasive ductal carcinoma, grade 2-3, ER 0%, VA 0%, HER2/radha negative. Ki-67 85%. Declined genetic testing. Patient has now returned to the ED with complaints of fever and shortness of breath. She was found to have a large left pleural effusion and CHRISTA. Patient seen at bedside in hallway bed in ED. Discussed plan for thoracentesis today. Will discuss further plan of care once she has been placed in her room given no privacy in hallway. Interval History:     2/16/2023: Patient is well-known to oncology practice given last admission.   Her primary oncologist is  Mariya Vaughn at [facility] and she has been treated in the past for breast cancer. Her last chemotherapy and immunotherapy was in December 2022. Last month she was admitted for pneumonia and recurrent malignant pleural effusions. Unfortunately she suffered a hemothorax after thoracentesis. Her condition continued to decline and she was transferred to the ICU for septic shock and was intubated for period of time. She experienced an CHRISTA related to hypotension and septic shock, she was followed by nephrology and however her renal function did not recover and she was started on hemodialysis at that time. Upon discharge she was transferred to SNF, she has been unable to follow-up with her primary oncology team.       Patient was readmitted from SNF for overall weakness. She has been receiving hemodialysis at [address] She was seen at bedside today. John Harry discussion regarding elevated LFTs with no clear cause at this point. Discussed with patient that short-term prognosis is poor and if we are unable to make improvements on her liver function then we will have to discuss comfort measures and hospice. She actually took this news rather well and participated in conversation regarding liver failure. 2/17/2023: Patient seen at bedside today, she was being wheeled out of the room to dialysis unit. Her sister was at bedside today, long conversation regarding elevated LFTs. We discussed concern for infiltrative malignancy to liver although would need liver biopsy to confirm this. At this time liver biopsy is not recommended given patient is high risk with elevated LFTs and INR as well as she is not a candidate for further systemic treatment at this time. The sister was also in agreement and did not wish to pursue invasive tests or procedures. We discussed in depth her elevated LFTs as another barrier to receiving systemic therapy.   She has been arranging with patient and assistance from the outpatient setting to formalize power of , etc.  She verbalized understanding agreement for consideration of hospice early next week if patient condition does not improve. She also reported that the patient shared with her yesterday she knows she may not recover is considering comfort measures and enjoying time with family. 2/20/2023: Patient seen at bedside today, she was curled in the fetal position and did not communicate much. She was aware that her Bola Noble remains essentially the same. We briefly discussed being comfortable and transition to hospice. She did not engage. Discussed with call Sister Mey Boyle and patient was in agreement. Discussion with Mey Boyle via phone, we discussed transition to hospice versus attempting to obtain MRI of the abdomen to further evaluate patient's liver and determine cause of liver failure. She is aware that if liver failure is not reversible patient's short-term prognosis is poor.   She wanted to discuss with patient at the bedside prior to test.      Past Medical History:   Diagnosis Date    Cancer [facility])     BREAST CANCER    GERD (gastroesophageal reflux disease)     HTN (hypertension) 07/18/2011    Psychiatric disorder     ANIXETY AND DEPRESSION    Thyroid disease     HYPOTHYROID      Past Surgical History:   Procedure Laterality Date    HX COLONOSCOPY      HX MYOMECTOMY  05/02/1996    x 1    HX WISDOM TEETH EXTRACTION      IR INSERT NON TUNL CVC OVER 5 YRS  1/9/2023    IR INSERT TUNL CVC W/O PORT OVER 5 YR  1/23/2023    IR THORACENTESIS/INSERT CHEST TUBE  1/5/2023      Social History     Tobacco Use    Smoking status: Never     Passive exposure: Never    Smokeless tobacco: Never   Substance Use Topics    Alcohol use: Never      Family History   Problem Relation Age of Onset    Heart Failure Mother     Cancer Father     Diabetes Sister     Kidney Disease Sister     Anesth Problems Neg Hx      Current Facility-Administered Medications   Medication Dose Route Frequency    heparin (porcine) 1,000 unit/mL injection 2,000 Units  2,000 Units InterCATHeter DIALYSIS PRN    And    heparin (porcine) 1,000 unit/mL injection 2,000 Units  2,000 Units InterCATHeter DIALYSIS PRN    famotidine (PEPCID) tablet 20 mg  20 mg Oral DAILY    dextrose 10% infusion 0-250 mL  0-250 mL IntraVENous PRN    carvediloL (COREG) tablet 3.125 mg  3.125 mg Oral BID WITH MEALS    multivitamin, tx-iron-ca-min (THERA-M w/ IRON) tablet 1 Tablet  1 Tablet Oral DAILY    PARoxetine (PAXIL) tablet 20 mg  20 mg Oral DAILY    sodium chloride (NS) flush 5-40 mL  5-40 mL IntraVENous Q8H    sodium chloride (NS) flush 5-40 mL  5-40 mL IntraVENous PRN    acetaminophen (TYLENOL) tablet 650 mg  650 mg Oral Q6H PRN    Or    acetaminophen (TYLENOL) suppository 650 mg  650 mg Rectal Q6H PRN    polyethylene glycol (MIRALAX) packet 17 g  17 g Oral DAILY PRN    ondansetron (ZOFRAN ODT) tablet 4 mg  4 mg Oral Q8H PRN    Or    ondansetron (ZOFRAN) injection 4 mg  4 mg IntraVENous Q6H PRN    albumin human 25% (BUMINATE) solution 25 g  25 g IntraVENous DIALYSIS PRN      Allergies   Allergen Reactions    Sulfa (Sulfonamide Antibiotics) Itching, Swelling and Unknown (comments)        Review of Systems: not obtained    Physical Exam:   Visit Vitals  /63   Pulse 73   Temp 98.5 °F (36.9 °C)   Resp 18   Ht 5' (1.524 m)   Wt 118 lb 14.4 oz (53.9 kg)   SpO2 99%   BMI 23.22 kg/m²         Results:     Lab Results   Component Value Date/Time    WBC 16.8 (H) 02/20/2023 01:15 PM    HGB 7.9 (L) 02/20/2023 01:15 PM    HCT 25.4 (L) 02/20/2023 01:15 PM    PLATELET 481 10/83/6123 01:15 PM    MCV 93.0 02/20/2023 01:15 PM    ABS.  NEUTROPHILS 9.9 (H) 02/19/2023 03:05 PM     Lab Results   Component Value Date/Time    Sodium 131 (L) 02/20/2023 01:15 PM    Potassium 3.4 (L) 02/20/2023 01:15 PM    Chloride 98 02/20/2023 01:15 PM    CO2 26 02/20/2023 01:15 PM    Glucose 139 (H) 02/20/2023 01:15 PM    BUN 39 (H) 02/20/2023 01:15 PM Creatinine 4.95 (H) 02/20/2023 01:15 PM    GFR est AA >60 09/26/2022 03:26 PM    GFR est non-AA >60 09/26/2022 03:26 PM    Calcium 8.5 02/20/2023 01:15 PM    Glucose (POC) 109 02/16/2023 03:11 PM     Lab Results   Component Value Date/Time    Bilirubin, total 10.1 (H) 02/19/2023 03:05 PM    ALT (SGPT) 318 (H) 02/19/2023 03:05 PM    Alk. phosphatase 707 (H) 02/19/2023 03:05 PM    Protein, total 5.7 (L) 02/19/2023 03:05 PM    Albumin 1.9 (L) 02/20/2023 01:15 PM    Globulin 3.8 02/19/2023 03:05 PM       External   Records reviewed and summarized above. Pathology report(s) reviewed above. Radiology report(s) reviewed above. MRI  9/2022    LEFT BREAST: Conglomerate mass enhancement in the left breast at 4-5 o'clock,  combined dimension 3.7 x 1.9 x 1.4 cm. This is slightly more extensive than the  sonographic and mammographic findings. No lymphadenopathy. Physical Exam  Constitutional:       Appearance: She is normal weight. HENT:      Head: Normocephalic. Eyes:      Pupils: Pupils are equal, round, and reactive to light. Abdominal:      Palpations: Abdomen is soft. Musculoskeletal:         General: Normal range of motion. Skin:     General: Skin is warm and dry. Neurological:      General: No focal deficit present. Mental Status: She is oriented to person, place, and time. Assessment/PLAN:     1) Left breast cancer  1 cm mass on mammogram  Palpated a lump  ER 0%, SC 0%, HER2/radha negative  Ki 67 was 85%. MRI breast with a 3.7 cm Left breast mass  [id number]  AJCC 8th ed-Stage IIA  Genetic testing negative  Recommended chemotherapy + Keytruda based on the KEYNOTE-522 trial (taxol, carboplatin and [place] followed by [facility] as preoperative treatment, followed by surgery, and then adjuvant pembrolizumab for another nine cycles (27 weeks) after surgery).      Recent admission and discharge from [facility] for COVID-pneumonia, sepsis, CHRISTA, NSTEMI and anemia    Last chemo with taxol 12/21/22. Last Keytruda 12/14/22. CT chest 1/3/2003: Increased moderately large left pleural effusion. Unchanged patchy bilateral  consolidation, left greater than right, compatible with pneumonia. Unlikely pneumonitis given CT results are consistent with COVID and viral pneumonia on admission     Repeat CT results noted from 1/8  1. Left chest tube. Interval substantially diminished size of loculated left  pleural effusion with small-moderate residual.  2. Interval demonstration of small-moderate layering right pleural effusion. 3. Substantial interval worsening of bilateral pulmonary groundglass  opacifications with crazy paving. Bibasilar dependent consolidations versus atelectasis are also shown. Low concern for pneumonitis given CT results concerning for viral etiologies vs ARDS and not interstitial disease during last admission     > Patient is no longer a candidate for treatment given severe decline in functional status     2) ESRD on HD   Overall worsening creatinine since last dose of Keytruda on 12/14  Previously Baseline creatinine less than 1  Repeat creatinine on 12/26 showed creatinine bumped to 2.91  Concern for immunotherapy related nephritis vs ATN from hypotension  Held off on steroids initially with concern for underlying infection and pneumonia  Started on full dose steroids 1/8 with no improvement to renal function   Receiving HD   Nephrology following     3) Elevated LFTs and Supratherapeutic INR    Experienced shock liver with septic shock during last admission, upon discharge LFTs and total bilirubin were trending down  T. bili on admission 11.2    Evaluated by GI with no clear cause for elevation in LFTs    CT abdomen does not show any concern for malignancy to liver    Would not pursue liver biopsy at this time to rule out infiltrative carcinoma as patient is functionally debilitated and not a candidate for further systemic therapy at this time.      Low concern for immunotherapy r/t liver damage as patient has not received immunotherapy in 2 months. Also, recently suffered from septic shock and shock liver.     > Started on full dose steroids to see if any improvement in LFTs on 2/15, so far no improvement    > Elevated INR r/t liver failure, s/p dose of vitamin K  > Continue to trend INR  > Appreciate Palliative Care input   > Ongoing goals of care discussions, hospice is very appropriate from a malignancy standpoint as well as now worsening liver failure. Sister was in agreement and will continue to address Monday if she shows no improvement to labs, discussed transition to hospice.   > Very poor short term prognosis   > Plan for goals of care discussion at bedside tomorrow with patient and her Sister Rohini Saravia. Can consider MRI of abdomen to further evaluate cause of liver failure. Discussed with Dr. Vitor Treadwell and Miranda Espinoza NP     We will continue to follow, plan for family meeting tomorrow morning with the sister at bedside. Signed By: Rose Parra NP      I have personally seen and evaluated the patient in conjunction with Pam Almeida NP. I find the patient's history and physical exam are consistent with the NP's documentation. I agree with the above assessment and plan, which I have modified as needed. Patient seen in dialysis suite. No new pain complaints. She is jaundiced.     Visit Vitals  /68   Pulse 73   Temp 98.1 °F (36.7 °C)   Resp 18   Ht 5' (1.524 m)   Wt 118 lb 14.4 oz (53.9 kg)   SpO2 99%   BMI 23.22 kg/m²     General: alert, cooperative, no distress   Mental  status: normal mood, behavior, speech, dress, motor activity, and thought processes, able to follow commands   HENT: NCAT   Neck: no visualized mass   Resp: no respiratory distress   Neuro: no gross deficits   Skin: no discoloration or lesions of concern on visible areas   Psychiatric: normal affect, consistent with stated mood, no evidence of hallucinations     Plan:    # Liver failure  - unclear cause. GI signed off -- suspected possible malignancy involvement. No response to corticosteroids, plan for MRI abdomen. If MRI unremarkable, consider liver bx. # ESRD on HD  - no improvement with corticosteroids. Now dialysis dependent.      Cal Lazaro MD    Attending [address] [place] [address]

## 2023-02-20 NOTE — PROGRESS NOTES
Patient know to me and admitted at [facility] with liver failure  Discussed with Dr. Rita Rose who is following the patient there  Consider MRI abdomen  Has  not responded to steroids. GI following  Consider Liver bx if MRI unrevealing.

## 2023-02-20 NOTE — PROGRESS NOTES
Problem: Falls - Risk of  Goal: *Absence of Falls  Description: Document Kristen Burris Fall Risk and appropriate interventions in the flowsheet. Outcome: Progressing Towards Goal  Note: Fall Risk Interventions:  Mobility Interventions: Bed/chair exit alarm              Elimination Interventions: Bed/chair exit alarm, Call light in reach              Problem: Patient Education: Go to Patient Education Activity  Goal: Patient/Family Education  Outcome: Progressing Towards Goal     Problem: Pressure Injury - Risk of  Goal: *Prevention of pressure injury  Description: Document William Scale and appropriate interventions in the flowsheet.   Outcome: Progressing Towards Goal  Note: Pressure Injury Interventions:  Sensory Interventions: Assess changes in LOC    Moisture Interventions: Absorbent underpads, Minimize layers    Activity Interventions: PT/OT evaluation, Increase time out of bed    Mobility Interventions: HOB 30 degrees or less, PT/OT evaluation    Nutrition Interventions: Document food/fluid/supplement intake    Friction and Shear Interventions: Apply protective barrier, creams and emollients, Minimize layers                Problem: Patient Education: Go to Patient Education Activity  Goal: Patient/Family Education  Outcome: Progressing Towards Goal     Problem: Patient Education: Go to Patient Education Activity  Goal: Patient/Family Education  Outcome: Progressing Towards Goal     Problem: Patient Education: Go to Patient Education Activity  Goal: Patient/Family Education  Outcome: Progressing Towards Goal

## 2023-02-20 NOTE — PROGRESS NOTES
2/20/23  1245pm CM appreciates from morning rounds and conversation with bedside nurse that pt is to have Oncology conversation today and discuss Hospice. CM awaiting Oncology note for review.         [address] [facility] RN,MSN  Care Manager  [phone]

## 2023-02-20 NOTE — PROGRESS NOTES
Palliative Medicine      Code Status: Full Code    Advance Care Planning:  Advance Care Planning 2/19/2023   Patient's Healthcare Decision Maker is: Named in scanned ACP document   Confirm Advance Directive Yes, on file   Patient Would Like to Complete Advance Directive -   Does the patient have other document types -     Patient / Family Encounter Documentation    Participants (names): Dinah Chavez, [facility], [facility]    Narrative: After reviewing chart and consulting Montana Wang NP Oncology, this writer opted to check in with patient later as she was received by NP in fetal position, speaking very softly. Psychosocial Issues Identified/ Resilience Factors:   Patient is single and has no children. She worked with [employer]. She has a nephew who lives with her. Her sister, Garrick Harden is closest to her and visits her almost every day. Caregiver [place]: TBD as discharge plans are being made. Does the caregiver feel confident administering medication? Does the caregiver need any help connecting with community resources? Does the caregiver feel confident assisting with activities of daily living? Goals of Care / Plan:   Patient's symptoms will be managed. Palliative team will continue to provide education and support as appropriate. Thank you for including Palliative Medicine in the care of Ms. Dinah Chavez.     [facility], [facility]  [phone] ([phone])

## 2023-02-20 NOTE — PROGRESS NOTES
Nephrology Progress Note  LT[facility], located within [facility] / [facility], [address]  [place], [address]  Phone - [phone]  Fax - [phone]                 Patient: Larissa Mccann                   YOB: 1960        Date- 2/20/2023                      Admit Date: 2/15/2023  CC: Follow up for saúl          IMPRESSION & PLAN:   Saúl due to ATN- MWF [facility], left chest permacath  Elevated LFTs  History of pneumothorax in the past requiring chest tube on left  Anemia of CKD  History of breast cancer  Hypoglycemia  Hypokalemia  Left-sided pleural effusion      PLAN-  Hd  today. Epogen for anemia     Subjective: Interval History:   2-20-23-- bp stable - no sob-- k low and na low    Objective:   Vitals:    02/19/23 1837 02/19/23 1843 02/19/23 1949 02/20/23 0800   BP: 132/72  139/79 133/71   Pulse: 70  71 76   Resp:   18 18   Temp:   97.7 °F (36.5 °C) 99 °F (37.2 °C)   TempSrc:       SpO2:   100% 99%   Weight:  53.9 kg (118 lb 14.4 oz)     Height:          02/19 0701 - 02/20 0700  In: 100 [P.O.:100]  Out: -     Last 3 Recorded Weights in this Encounter    02/15/23 0755 02/19/23 1843   Weight: 65 kg (143 lb 4.8 oz) 53.9 kg (118 lb 14.4 oz)        Physical exam:    GEN:  NAD  NECK:  Supple, no thyromegaly  RESP: Clear  b/l, no  wheezing,   CVS: RRR,S1,S2   NEURO: non focal, normal speech  HD Access: Left chest PermCath    Chart reviewed. Pertinent Notes reviewed. Data Review :  Recent Labs     02/19/23  1505 02/18/23  0615   * 136   K 3.7 3.1*   CL 96* 99   CO2 27 26   BUN 32* 13   CREA 4.30* 2.66*   * 106*   CA 8.6 8.6   MG 2.3 2.0   PHOS 2.3* 2.1*       Recent Labs     02/19/23  1505 02/18/23  0615   WBC 10.6 12.5*   HGB 8.4* 8.2*   HCT 26.8* 26.1*    225       No results for input(s): FE, TIBC, PSAT, FERR in the last 72 hours.    No results found for: HBA1C, QEQ8WBMB, UNY3ATOO   No results found for: Samara Gordon, MCA2, MCA3, MCAU, MCAU2, MCALPOCT  US Results (most recent):  Medication list  reviewed  Current Facility-Administered Medications   Medication Dose Route Frequency    famotidine (PEPCID) tablet 20 mg  20 mg Oral DAILY    dextrose 10% infusion 0-250 mL  0-250 mL IntraVENous PRN    carvediloL (COREG) tablet 3.125 mg  3.125 mg Oral BID WITH MEALS    multivitamin, tx-iron-ca-min (THERA-M w/ IRON) tablet 1 Tablet  1 Tablet Oral DAILY    PARoxetine (PAXIL) tablet 20 mg  20 mg Oral DAILY    sodium chloride (NS) flush 5-40 mL  5-40 mL IntraVENous Q8H    sodium chloride (NS) flush 5-40 mL  5-40 mL IntraVENous PRN    acetaminophen (TYLENOL) tablet 650 mg  650 mg Oral Q6H PRN    Or    acetaminophen (TYLENOL) suppository 650 mg  650 mg Rectal Q6H PRN    polyethylene glycol (MIRALAX) packet 17 g  17 g Oral DAILY PRN    ondansetron (ZOFRAN ODT) tablet 4 mg  4 mg Oral Q8H PRN    Or    ondansetron (ZOFRAN) injection 4 mg  4 mg IntraVENous Q6H PRN    albumin human 25% (BUMINATE) solution 25 g  25 g IntraVENous DIALYSIS PRN        Jonathan Padron MD  2/20/2023

## 2023-02-20 NOTE — PROCEDURES
Hemodialysis / [phone]    Vitals Pre Post Assessment Pre Post   BP BP: 122/67 (02/20/23 1300) 116/70 LOC See flow sheet See flow sheet   HR Pulse (Heart Rate): 66 (02/20/23 1300) 80 Lungs See flow sheet See flow sheet   Resp Resp Rate: 18 (02/20/23 1300) 18 Cardiac See flow sheet See flow sheet   Temp Temp: 98.5 °F (36.9 °C) (02/20/23 1300) 98.1 Skin See flow sheet See flow sheet   Weight    Edema See flow sheet See flow sheet   Tele status None ordered None ordered Pain Pain Intensity 1: 0 (02/20/23 0800)      Orders   Duration: Start: 1300 End: 1633 Total: 3.5hrs   Dialyzer: Dialyzer/Set Up Inspection: Revaclear (02/20/23 1300)   K Bath: Dialysate K (mEq/L): 4 (02/20/23 1300)   Ca Bath: Dialysate CA (mEq/L): 2.5 (02/20/23 1300)   Na: Dialysate NA (mEq/L): 138 (02/20/23 1300)   Bicarb: Dialysate HCO3 (mEq/L): 35 (02/20/23 1300)   Target Fluid Removal: Goal/Amount of Fluid to Remove (mL): 1000 mL (02/20/23 1300)     Access   Type & Location: L PC : Pre- Assessment: Dressing CDI. No s/s of infection. Both lumens aspirate & flush well. Running well at . SBAR received from Primary RN. Pt arrived to HD suite A&Ox4. Consent signed & on file OR Chronic consent applies. Each catheter limb disinfected per p&p, caps removed, hubs disinfected per p&p. Each lumen aspirated for blood return and flushed with Normal Saline per policy. Labs drawn per request/ order. VSS. Dialysis Tx initiated. Comments:   Dressing changed. No s/s of infection noted.                                      Labs   HBsAg (Antigen) / date: Neg 1/25/23                                              HBsAb (Antibody) / date: Susc 1/25/23   Source: Clinic   Obtained/Reviewed  Critical Results Called HGB   Date Value Ref Range Status   02/19/2023 8.4 (L) 11.5 - 16.0 g/dL Final     Potassium   Date Value Ref Range Status   02/19/2023 3.7 3.5 - 5.1 mmol/L Final     Calcium   Date Value Ref Range Status   02/19/2023 8.6 8.5 - 10.1 MG/DL Final BUN   Date Value Ref Range Status   02/19/2023 32 (H) 6 - 20 MG/DL Final     Creatinine   Date Value Ref Range Status   02/19/2023 4.30 (H) 0.55 - 1.02 MG/DL Final     Comment:     INVESTIGATED PER DELTA CHECK PROTOCOL        Meds Given   Name Dose Route   Heparin 1:1000 2.0 Albin@[employer]   Heparin 1:1000 2.0 Albin@[employer]   Venofer  200mg Ty@[employer]     Adequacy / Fluid    Total Liters Process: 69.0   Net Fluid Removed: 0      Comments   Time Out Done:   (Time) 1255   Admitting Diagnosis: Liver Injury   Consent obtained/signed: Informed Consent Verified: Yes (02/17/23 1132)   Machine / Katie Cortes # Machine Number: [id number] (02/20/23 1300)   Primary Nurse Rpt Pre: Phoebe Lennox, RN   Primary Nurse Rpt Post: Phoebe Lennox, RN   Pt Education: procedural   Care Plan: On going   [facility]     Tx Summary  1300:  L PC : Pre- Assessment: Dressing CDI. No s/s of infection. Both lumens aspirate & flush well. Running well at . SBAR received from Primary RN. Pt arrived to HD suite A&Ox4. Consent signed & on file OR Chronic consent applies. Each catheter limb disinfected per p&p, caps removed, hubs disinfected per p&p. Each lumen aspirated for blood return and flushed with Normal Saline per policy. Labs drawn per request/ order. VSS. Dialysis Tx initiated. 1345:  200ns given d/t bp dropping, goal reduced to pull 500mL  1357:  UF off d/t BP  1400:  Per verbal order from Dr Micheline Blank, do not pull any fluid; replace if necessary  1415:  300ns given d/t BP. UF remains off    **Pt tolerated tx well. No issues or complaints voiced. **    1633: Tx ended. VSS. Each dialysis catheter limb disinfected per p&p, all possible blood returned per p&p, and each dialysis hub disinfected per p&p. Each lumen flushed, post dialysis catheter Heparin dwell instilled per order, and caps applied. Bed locked and in the lowest position, call bell and belongings in reach. SBAR given to Primary, RN.  Patient is stable at time of their/ my departure. Post assessment: Dressing CDI. No changes. All Dialysis related medications have been reviewed. Comments:   Assessment performed by RN. Procedure and documentation observed and reviewed by Eugune Hatchet, RN.

## 2023-02-21 ENCOUNTER — APPOINTMENT (OUTPATIENT)
Dept: MRI IMAGING | Age: 63
DRG: 441 | End: 2023-02-21
Attending: STUDENT IN AN ORGANIZED HEALTH CARE EDUCATION/TRAINING PROGRAM
Payer: COMMERCIAL

## 2023-02-21 LAB
ALBUMIN SERPL-MCNC: 1.9 G/DL (ref 3.5–5)
ALBUMIN/GLOB SERPL: 0.5 (ref 1.1–2.2)
ALP SERPL-CCNC: 634 U/L (ref 45–117)
ALT SERPL-CCNC: 257 U/L (ref 12–78)
ANION GAP SERPL CALC-SCNC: 5 MMOL/L (ref 5–15)
AST SERPL-CCNC: 126 U/L (ref 15–37)
BASOPHILS # BLD: 0 K/UL (ref 0–0.1)
BASOPHILS NFR BLD: 0 % (ref 0–1)
BILIRUB DIRECT SERPL-MCNC: 5.5 MG/DL (ref 0–0.2)
BILIRUB SERPL-MCNC: 7.6 MG/DL (ref 0.2–1)
BUN SERPL-MCNC: 17 MG/DL (ref 6–20)
BUN/CREAT SERPL: 7 (ref 12–20)
CALCIUM SERPL-MCNC: 8.4 MG/DL (ref 8.5–10.1)
CHLORIDE SERPL-SCNC: 100 MMOL/L (ref 97–108)
CO2 SERPL-SCNC: 29 MMOL/L (ref 21–32)
CREAT SERPL-MCNC: 2.6 MG/DL (ref 0.55–1.02)
DIFFERENTIAL METHOD BLD: ABNORMAL
EOSINOPHIL # BLD: 0 K/UL (ref 0–0.4)
EOSINOPHIL NFR BLD: 0 % (ref 0–7)
ERYTHROCYTE [DISTWIDTH] IN BLOOD BY AUTOMATED COUNT: 17.2 % (ref 11.5–14.5)
GLOBULIN SER CALC-MCNC: 3.7 G/DL (ref 2–4)
GLUCOSE SERPL-MCNC: 96 MG/DL (ref 65–100)
HCT VFR BLD AUTO: 25.8 % (ref 35–47)
HGB BLD-MCNC: 7.9 G/DL (ref 11.5–16)
IMM GRANULOCYTES # BLD AUTO: 0 K/UL (ref 0–0.04)
IMM GRANULOCYTES NFR BLD AUTO: 0 % (ref 0–0.5)
INR PPP: 1.2 (ref 0.9–1.1)
LYMPHOCYTES # BLD: 1.8 K/UL (ref 0.8–3.5)
LYMPHOCYTES NFR BLD: 15 % (ref 12–49)
MAGNESIUM SERPL-MCNC: 1.9 MG/DL (ref 1.6–2.4)
MCH RBC QN AUTO: 28.7 PG (ref 26–34)
MCHC RBC AUTO-ENTMCNC: 30.6 G/DL (ref 30–36.5)
MCV RBC AUTO: 93.8 FL (ref 80–99)
MONOCYTES # BLD: 1.2 K/UL (ref 0–1)
MONOCYTES NFR BLD: 10 % (ref 5–13)
NEUTS SEG # BLD: 8.7 K/UL (ref 1.8–8)
NEUTS SEG NFR BLD: 75 % (ref 32–75)
NRBC # BLD: 0 K/UL (ref 0–0.01)
NRBC BLD-RTO: 0 PER 100 WBC
PHOSPHATE SERPL-MCNC: 1.6 MG/DL (ref 2.6–4.7)
PLATELET # BLD AUTO: 167 K/UL (ref 150–400)
PMV BLD AUTO: 10.4 FL (ref 8.9–12.9)
POTASSIUM SERPL-SCNC: 3.2 MMOL/L (ref 3.5–5.1)
PROT SERPL-MCNC: 5.6 G/DL (ref 6.4–8.2)
PROTHROMBIN TIME: 12.7 SEC (ref 9–11.1)
RBC # BLD AUTO: 2.75 M/UL (ref 3.8–5.2)
RBC MORPH BLD: ABNORMAL
SODIUM SERPL-SCNC: 134 MMOL/L (ref 136–145)
WBC # BLD AUTO: 11.7 K/UL (ref 3.6–11)

## 2023-02-21 PROCEDURE — 74181 MRI ABDOMEN W/O CONTRAST: CPT

## 2023-02-21 PROCEDURE — 65270000029 HC RM PRIVATE

## 2023-02-21 PROCEDURE — 97530 THERAPEUTIC ACTIVITIES: CPT

## 2023-02-21 PROCEDURE — 74011250636 HC RX REV CODE- 250/636: Performed by: NURSE PRACTITIONER

## 2023-02-21 PROCEDURE — 74011000250 HC RX REV CODE- 250: Performed by: STUDENT IN AN ORGANIZED HEALTH CARE EDUCATION/TRAINING PROGRAM

## 2023-02-21 PROCEDURE — 97535 SELF CARE MNGMENT TRAINING: CPT

## 2023-02-21 PROCEDURE — 74011250637 HC RX REV CODE- 250/637: Performed by: STUDENT IN AN ORGANIZED HEALTH CARE EDUCATION/TRAINING PROGRAM

## 2023-02-21 RX ORDER — CARVEDILOL 3.12 MG/1
3.12 TABLET ORAL 2 TIMES DAILY WITH MEALS
Qty: 60 TABLET | Refills: 0 | Status: SHIPPED | OUTPATIENT
Start: 2023-02-21 | End: 2023-03-23

## 2023-02-21 RX ADMIN — METHYLPREDNISOLONE SODIUM SUCCINATE 100 MG: 125 INJECTION, POWDER, FOR SOLUTION INTRAMUSCULAR; INTRAVENOUS at 10:26

## 2023-02-21 RX ADMIN — PAROXETINE HYDROCHLORIDE 20 MG: 20 TABLET, FILM COATED ORAL at 09:27

## 2023-02-21 RX ADMIN — SODIUM CHLORIDE, PRESERVATIVE FREE 10 ML: 5 INJECTION INTRAVENOUS at 14:18

## 2023-02-21 RX ADMIN — CARVEDILOL 3.12 MG: 3.12 TABLET, FILM COATED ORAL at 17:47

## 2023-02-21 RX ADMIN — SODIUM CHLORIDE, PRESERVATIVE FREE 10 ML: 5 INJECTION INTRAVENOUS at 05:02

## 2023-02-21 RX ADMIN — MULTIPLE VITAMINS W/ MINERALS TAB 1 TABLET: TAB at 09:27

## 2023-02-21 RX ADMIN — SODIUM CHLORIDE, PRESERVATIVE FREE 10 ML: 5 INJECTION INTRAVENOUS at 21:37

## 2023-02-21 RX ADMIN — CARVEDILOL 3.12 MG: 3.12 TABLET, FILM COATED ORAL at 09:27

## 2023-02-21 RX ADMIN — FAMOTIDINE 20 MG: 20 TABLET, FILM COATED ORAL at 09:27

## 2023-02-21 NOTE — PROGRESS NOTES
Physical Therapy    Pt chart reviewed prior to therapy services. Per interdisciplinary rounds, Oncology to discuss with patient and family the [address] again this date. Will defer and continue to follow as appropriate.     Umu Amor, PTA

## 2023-02-21 NOTE — PROGRESS NOTES
2/21/23 2:05pm ARTEMIO appreciates from MD that pt will not be deciding about hospice at this time. Pt is anticipated to d/c and make hospice decisions in the future. CM placed call to patient who is in agreement with therapy rec for SNF and is agreeable to return to [address] [place] [address]. CM placed call to [employer] to start Auth.      2/21/23 1120am CM received call from CCL inquiring which SNF pt came from. CM informed CCL that pt will need updated Therapy rec to submit for Auth with [employer] for pt to return to [address] [place] [address]&. CCL will reach out to Therapy to see pt today. CM awaiting results of morning ONC meeting with pt and pt sister r/t [address] and possible Hospice.         [facility] RN,MSN  Care Manager  [phone]

## 2023-02-21 NOTE — PROGRESS NOTES
Bedside and Verbal shift change report given to [address] [place] [address] (oncoming nurse) by Shankar Aguilera RN (offgoing nurse). Report included the following information SBAR, Kardex, and MAR.

## 2023-02-21 NOTE — PROGRESS NOTES
Problem: Falls - Risk of  Goal: *Absence of Falls  Description: Document Elijah Driscoll Fall Risk and appropriate interventions in the flowsheet. Outcome: Progressing Towards Goal  Note: Fall Risk Interventions:  Mobility Interventions: Bed/chair exit alarm              Elimination Interventions: Bed/chair exit alarm, Call light in reach              Problem: Patient Education: Go to Patient Education Activity  Goal: Patient/Family Education  Outcome: Progressing Towards Goal     Problem: Pressure Injury - Risk of  Goal: *Prevention of pressure injury  Description: Document William Scale and appropriate interventions in the flowsheet.   Outcome: Progressing Towards Goal  Note: Pressure Injury Interventions:  Sensory Interventions: Minimize linen layers, Keep linens dry and wrinkle-free, Pressure redistribution bed/mattress (bed type)    Moisture Interventions: Absorbent underpads, Apply protective barrier, creams and emollients, Minimize layers    Activity Interventions: Pressure redistribution bed/mattress(bed type), PT/OT evaluation, Increase time out of bed    Mobility Interventions: HOB 30 degrees or less, Pressure redistribution bed/mattress (bed type), PT/OT evaluation    Nutrition Interventions: Document food/fluid/supplement intake    Friction and Shear Interventions: Apply protective barrier, creams and emollients, HOB 30 degrees or less, Minimize layers                Problem: Patient Education: Go to Patient Education Activity  Goal: Patient/Family Education  Outcome: Progressing Towards Goal     Problem: Patient Education: Go to Patient Education Activity  Goal: Patient/Family Education  Outcome: Progressing Towards Goal     Problem: Patient Education: Go to Patient Education Activity  Goal: Patient/Family Education  Outcome: Progressing Towards Goal

## 2023-02-21 NOTE — PROGRESS NOTES
Problem: Self Care Deficits Care Plan (Adult)  Goal: *Acute Goals and Plan of Care (Insert Text)  Description: FUNCTIONAL STATUS PRIOR TO ADMISSION: At SNF rehab following complicated hospital admission/ medical course to include septic shock with CHRISTA/ initiation of HD, malignant pleural effusion, elevated LFTs with no known cause. Details of recent LOF at [facility] unclear. Of note, pt with h/o breast CA, last chemo Dec 2022. Occupational Therapy Goals:  Initiated 2/17/2023  1. Patient will perform grooming seated with good balance and supervision/set up within 7 days. 2. Patient will perform lower body dressing with moderate assistance  within 7 days. 3. Patient will perform toileting with moderate assistance  within 7 days. 4. Patient will perform UB dressing with supervision set up within 7 days. 5. Patient will transfer from bedside commode with contact guard assist using the least restrictive device and appropriate durable medical equipment within 7 days. Outcome: Progressing Towards Goal   OCCUPATIONAL THERAPY TREATMENT  Patient: Shilpi Davies (80 y.o. female)  Date: 2/21/2023  Diagnosis: Liver injury [S36.119A] <principal problem not specified>      Precautions:    Chart, occupational therapy assessment, plan of care, and goals were reviewed. ASSESSMENT  Patient continues with skilled OT services and is progressing towards goals. Patient received sidelying in the bed  agreeable to therapy today with encouragement. Patient mainly limited by poor tolerance for activity, dizziness with positional changes, and generalized weakness. Patient requires Dewey Loth for bed mobility and transfers today though able to progress over 2 attempts of sit>stands to CGAx2. Patient able to demo tailor sitting for sock management and voices/demo's motivation to progress back to w/c, chair and commode transfers.  Patient with dizziness that does not subside with standing limiting ability to transfer or take steps today. Patient very motivated though fatigues very quickly and reports she is performs better prior to HD if possible. Recommend return to SNF [facility] so patient can continue to progress towards functional goals. Current Level of Function Impacting Discharge (ADLs): CGA-Min A x2 sit>stand, poor tolerance for activity, working up to increased tolerance for multiple sessions/day    Other factors to consider for discharge: PLOF, motivated to progress         PLAN :  Patient continues to benefit from skilled intervention to address the above impairments. Continue treatment per established plan of care to address goals. Recommend with staff: there exer in bed, there act, participate in care as able    Recommend next OT session: Progress standing tolerance, chair transfer, commode transfer    Recommendation for discharge: (in order for the patient to meet his/her long term goals)  Therapy up to 5 days/week in SNF setting    This discharge recommendation:  Has been made in collaboration with the attending provider and/or case management    IF patient discharges home will need the following DME: TBD pending progress and dispo, patient working on standing and ADLs at [facility], if DC home would need w/c       SUBJECTIVE:   Patient stated I need to sit back down I'm tired.  re:after brief stand    OBJECTIVE DATA SUMMARY:   Cognitive/Behavioral Status:  Neurologic State: Alert  Orientation Level: Appropriate for age;Oriented X4  Cognition: Follows commands; Appropriate for age attention/concentration  Perception: Appears intact  Perseveration: No perseveration noted  Safety/Judgement: Awareness of environment; Insight into deficits    Functional Mobility and Transfers for ADLs:  Bed Mobility:  Rolling: Contact guard assistance; Additional time  Supine to Sit: Minimum assistance;Assist x2;Bed Modified (HOB raised)  Sit to Supine: Minimum assistance  Scooting: Contact guard assistance; Additional time    Transfers:  Sit to Stand: Minimum assistance          Balance:  Sitting: Impaired  Sitting - Static: Good (unsupported)  Sitting - Dynamic: Good (unsupported)  Standing: Impaired; With support  Standing - Static: Fair;Constant support (RW)  Standing - Dynamic : Not tested (fatigue and dizziness)    ADL Intervention:     Lower Body Dressing Assistance  Socks: Modified independent  Leg Crossed Method Used: Yes  Position Performed: Seated edge of bed  Cues: Verbal cues provided         Cognitive Retraining  Safety/Judgement: Awareness of environment; Insight into deficits      Pain:  Patient with no c/o pain    Activity Tolerance:   Poor, requires frequent rest breaks, and signs and symptoms of orthostatic hypotension    After treatment patient left in no apparent distress:   Supine in bed, Call bell within reach, Caregiver / family present, and Side rails x 3    COMMUNICATION/COLLABORATION:   The patients plan of care was discussed with: Physical therapy assistant, Registered nurse, and Case management.      Ana Nissen, OT  Time Calculation: 23 mins

## 2023-02-21 NOTE — PROGRESS NOTES
Hospitalist Progress Note    NAME:  Ventura Bradley   :  1960   MRN:  [id number]     Assessment / Plan:  Acute liver injury - minimally improved  Elevated lipase  AMS - resolved  Did have mental status changes. AMS was likely secondary to liver injury. Recent episode of shock liver in 2023.  - GI consulted, appreciate assistance. Has undergone recent full GI workup a few weeks ago that was unrevealing. No need for additional imaging per GI. Unlikely to be chemo related as patient has been off chemotherapy since December, and would be poor candidate for liver biopsy per Oncology. CT abdomen this admission without acute findings, no biliary dilation, no hepatic mass, no pancreatitis. - no other infectious etiology based on symptoms at this time, afebrile, WBC normal  - INR worsening, Vit K with Oncology 23  - trend INR   - LFTS  remain markedly elevated  - GI following, unknown etiology, no current GI complaints, imaging without obstructive process or pancreatitis, GI signed off  - Palliative consulted  - no significant improvement, [facility]  - per Oncology, would not pursue liver biopsy as she is debilitated and not a candidate for further systemic therapy  - possible MRI abdomen, will hold until [address] discussion, can also be done outpatient    Hx of breast cancer  Last chemotherapy and immunotherpay in Dec 2022.  - recent admission for pneumonia and recurrent malignant pleural effusion, complicated by hemothorax after thoracentesis  - Oncology consulted, appreciate assistance  - started steroids  - Palliative consulted by Oncology     ESRD  - continue MWF HD, Nephrology following, appreciate assistance     Elevated troponin  209 on admission. Last trop 300 José Miguel 10. Denies chest pain, SOB.  EKG with more prominent lateral wave inversions  - repeat EKG ordered     L basilar pleural effusion  Recent multifocal pneumonia, hemothorax, possible Keytruda pneumonitis  Currently satting well on room air. Imaging stable, denies SOB. Recent admission complicated by hemothorax.  - monitor respiratory status  - volume management with HD     Code Status: Full  Surrogate Decision MakerSkignsley Donovan (Sister)   [phone] (Mobile)     DVT Prophylaxis: holding with elevated INR  GI Prophylaxis: not indicated  ALEXANDRA: 2/21/23    Recommended Disposition: home vs hospice  Barriers: [address] discussion 2/21/23     Subjective:     Chief Complaint  Followup ALI    Subjective  Discussed with RN events overnight. No complaints today. Review of Systems:  14 point ROS reviewed with patient and negative except per above. Objective:     VITALS:   Last 24hrs VS reviewed since prior progress note. Most recent are:  Patient Vitals for the past 24 hrs:   Temp Pulse Resp BP SpO2   02/20/23 1810 98.1 °F (36.7 °C) 73 18 122/68 99 %   02/20/23 1633 98.1 °F (36.7 °C) 70 18 116/70 --   02/20/23 1615 -- 67 18 105/64 --   02/20/23 1600 -- 73 18 107/63 --   02/20/23 1545 -- (!) 108 18 100/73 --   02/20/23 1530 -- 72 18 (!) 121/91 --   02/20/23 1515 -- 67 18 102/64 --   02/20/23 1500 -- 67 18 102/60 --   02/20/23 1445 -- 65 18 100/62 --   02/20/23 1430 -- 64 18 101/62 --   02/20/23 1415 -- 71 18 (!) 60/45 --   02/20/23 1400 -- 73 18 (!) 81/58 --   02/20/23 1345 -- 64 18 119/71 --   02/20/23 1330 -- 72 18 100/66 --   02/20/23 1315 -- 71 18 110/70 --   02/20/23 1300 98.5 °F (36.9 °C) 66 18 122/67 --   02/20/23 0800 99 °F (37.2 °C) 76 18 133/71 99 %   02/19/23 1949 97.7 °F (36.5 °C) 71 18 139/79 100 %         Intake/Output Summary (Last 24 hours) at 2/20/2023 1932  Last data filed at 2/20/2023 1633  Gross per 24 hour   Intake 100 ml   Output 0 ml   Net 100 ml          I had a face to face encounter and independently examined this patient on 2/20/2023, as outlined below:    PHYSICAL EXAM:  General:          No acute distress, Answering questions appropriately  HEENT:           EOMI, icteric sclera.  MMM  Neck: Supple  Resp:               CTAB, non-labored, No wheezes or crackles  Cardio:            regular rate, normal rhythm, no murmurs, no JVD  GI:                   Soft, Non-tender, Non-distended, Normal bowel sounds. Extremities:     Warm, well perfused, no LE edema  Skin:                Warm, Dry, Pink, Intact. Neurologic:      Alert and Oriented x4, No focal defects    Reviewed most current lab test results and cultures  YES  Reviewed most current radiology test results   YES  Review and summation of old records today    NO  Reviewed patient's current orders and MAR    YES  PMH/SH reviewed - no change compared to H&P  ______________________________________________________________________    Procedures: see electronic medical records for all procedures/Xrays and details which were not copied into this note but were reviewed prior to creation of Plan. LABS:  I reviewed today's most current labs and imaging studies. Pertinent labs include:  Recent Labs     02/20/23  1315 02/19/23  1505 02/18/23  0615   WBC 16.8* 10.6 12.5*   HGB 7.9* 8.4* 8.2*   HCT 25.4* 26.8* 26.1*    219 225       Recent Labs     02/20/23  1315 02/19/23  1505 02/18/23  0615   * 134* 136   K 3.4* 3.7 3.1*   CL 98 96* 99   CO2 26 27 26   * 139* 106*   BUN 39* 32* 13   CREA 4.95* 4.30* 2.66*   CA 8.5 8.6 8.6   MG  --  2.3 2.0   PHOS 2.7 2.3* 2.1*   ALB 1.9* 1.9* 1.8*   TBILI  --  10.1* 10.1*   ALT  --  318* 373*   INR  --  1.3* 1.7*         Imaging/Diagnostics:  CT ABD PELV W CONT  Narrative: EXAM: CT ABD PELV W CONT    INDICATION: elevated lfts    COMPARISON: CT abdomen pelvis December 26, 2022     CONTRAST: 100 mL of Isovue-370. ORAL CONTRAST: None    TECHNIQUE:   Following the uneventful intravenous administration of contrast, thin axial  images were obtained through the abdomen and pelvis. Coronal and sagittal  reconstructions were generated.  CT dose reduction was achieved through use of a  standardized protocol tailored for this examination and automatic exposure  control for dose modulation. FINDINGS:   LOWER THORAX: Left-sided pleural effusion, with left basilar atelectasis. LIVER: No mass. BILIARY TREE: Gallbladder is within normal limits. CBD is not dilated. SPLEEN: within normal limits. PANCREAS: No mass or ductal dilatation. ADRENALS: Unremarkable. KIDNEYS: No mass, calculus, or hydronephrosis. STOMACH: Unremarkable. SMALL BOWEL: No dilatation or wall thickening. COLON: No dilatation or wall thickening. Colonic diverticulosis without evidence  of diverticulitis. APPENDIX: Unremarkable. PERITONEUM: No ascites or pneumoperitoneum. RETROPERITONEUM: No lymphadenopathy or aortic aneurysm. REPRODUCTIVE ORGANS: Multiple uterine fibroids  URINARY BLADDER: No mass or calculus. BONES: No destructive bone lesion. ABDOMINAL WALL: No mass or hernia. ADDITIONAL COMMENTS: N/A  Impression: 1. No acute findings in the abdomen or pelvis. Chronic and incidental findings  as detailed above. 2. Left pleural effusion, with left basilar atelectasis. XR CHEST PORT  Narrative: EXAM: Chest radiograph    INDICATION: Weakness and hemoptysis    COMPARISON: 1/17/2023    FINDINGS: A portable AP radiograph of the chest was obtained at 0 834 hours. The  patient is on a cardiac monitor. The Port-A-Cath terminates at the cavoatrial  junction. Dual lumen catheter extends to the right atrium. There is no  pneumothorax. There is a persistent vague left  basilar opacity that may  represent a combination of pleural thickening and subsegmental atelectasis. No  new finding. The cardiac and mediastinal contours and pulmonary vascularity are  stable. The bones and soft tissues are grossly within normal limits.    Impression: Stable chest.      Care Plan discussed with:    Comments   Patient y    Family      RN y    Care Manager     Consultant  y                     y Multidiciplinary team rounds were held today with , nursing, pharmacist and clinical coordinator. Patient's plan of care was discussed; medications were reviewed and discharge planning was addressed.      _    Signed: Krzysztof Funez MD

## 2023-02-21 NOTE — PROGRESS NOTES
Problem: Mobility Impaired (Adult and Pediatric)  Goal: *Acute Goals and Plan of Care (Insert Text)  Description: FUNCTIONAL STATUS PRIOR TO ADMISSION: Pt recently residing at [facility] following complicated hospital admission/ medical course to include septic shock with CHRISTA/ initiation of HD, malignant pleural effusion, elevated LFTs with no known cause. Details of recent LOF at SNF unclear. Of note, pt with h/o breast CA, last chemo Dec 2022    Physical Therapy Goals  Initiated 2/17/2023  1. Patient will move from supine to sit and sit to supine  in bed with minimal assistance/contact guard assist within 7 day(s). 2.  Patient will transfer from bed to chair and chair to bed with minimal assistance/contact guard assist using the least restrictive device within 7 day(s). 3.  Patient will perform sit to stand with minimal assistance/contact guard assist within 7 day(s). 4.  Patient will ambulate with minimal assistance/contact guard assist for at least 10 feet with the least restrictive device within 7 day(s). Outcome: Progressing Towards Goal   PHYSICAL THERAPY TREATMENT  Patient: Yaa Pascal (49 y.o. female)  Date: 2/21/2023  Diagnosis: Liver injury [S36.119A] <principal problem not specified>      Precautions:    Chart, physical therapy assessment, plan of care and goals were reviewed. ASSESSMENT  Patient continues with skilled PT services and is progressing towards goals. Pt was received in supine sleeping upon arrival however agreeable to participate with therapy with encouragement. Therapy session limited in overall activity tolerance due to her decreased strength/endurance and complaints of dizziness. Pt requires Mesha x2 with the [facility] raised to transition to sitting EOB and additional time to scoot her hips forward bilaterally to maintain her sitting balance. Pt reports dizziness however recovers with additional time while seated at the edge of bed.  Pt is motivated/engaged and agreeable to attempt x2 sit to stands this date with pt's goal of being able to return back to wheelchair transfers. Pt requires verbal cueing throughout for proper hand placements with transfers, RW stabilized during sit to stands, and Mesha to come to stand. Pt  shortly returns back to sitting due to her fatigue/dizziness, returned back in supine, and left in no discomfort with all needs met. Pt would continue to benefit from skilled PT services to maximize her PLOF and remains appropriate for SNR rehab upon discharge. Current Level of Function Impacting Discharge (mobility/balance): CGA-Mesha x2 with bed mobility, Mesha with sit to stand transfers    Other factors to consider for discharge: from SNF, fall risk, below baseline         PLAN :  Patient continues to benefit from skilled intervention to address the above impairments. Continue treatment per established plan of care. to address goals. Recommendation for discharge: (in order for the patient to meet his/her long term goals)  Therapy up to 5 days/week in SNF setting    This discharge recommendation:  Has been made in collaboration with the attending provider and/or case management    IF patient discharges home will need the following DME: to be determined (TBD)     SUBJECTIVE:   Patient stated I'm mad at ya'll.     OBJECTIVE DATA SUMMARY:   Critical Behavior:  Neurologic State: Alert  Orientation Level: Oriented X4  Cognition: Follows commands  Safety/Judgement: Fall prevention  Functional Mobility Training:  Bed Mobility:  Rolling: Contact guard assistance; Additional time  Supine to Sit: Minimum assistance;Assist x2;Bed Modified (HOB raised)  Sit to Supine: Minimum assistance  Scooting: Contact guard assistance; Additional time    Transfers:  Sit to Stand: Minimum assistance  Stand to Sit: Minimum assistance    Balance:  Sitting: Impaired  Sitting - Static: Good (unsupported)  Sitting - Dynamic: Good (unsupported)  Standing: Impaired; With support  Standing - Static: Fair;Constant support (RW)  Standing - Dynamic : Not tested (fatigue and dizziness)    Pain Rating:  No pain rating received from pt    Activity Tolerance:   Fair; limited due to dizziness    After treatment patient left in no apparent distress:   Supine in bed, Call bell within reach, Bed / chair alarm activated, Caregiver / family present, and Side rails x 3    COMMUNICATION/COLLABORATION:   The patients plan of care was discussed with: Occupational therapist, Registered nurse, and Case management.      Valdez Hester PTA   Time Calculation: 24 mins

## 2023-02-21 NOTE — DISCHARGE INSTRUCTIONS
HOSPITALIST DISCHARGE INSTRUCTIONS    NAME: Glenn Adrian   :  1960   MRN:  [id number]     Date/Time:  2023 2:02 PM    ADMIT DATE: 2/15/2023   DISCHARGE DATE: 2023     Attending Physician: Eulah Schlatter, MD    DISCHARGE DIAGNOSIS:  Acute liver injury - minimally improved  Elevated lipase  AMS  Breast cancer    Please follow up with oncologist to discuss the need for liver biopsy for your elevated liver enzymes and iron deposition seen on your liver/ spleen. Medications: Per above medication reconciliation. Pain Management: per above medications    Recommended diet: Cardiac Diet    Recommended activity: Activity as tolerated    Wound care: None    Indwelling devices:  None    Supplemental Oxygen: None    Required Lab work: Per SNF routine    Glucose management:  None    Code status: Full        Outside physician follow up: Your PMD         Skilled nursing facility/ SNF MD responsible for above on discharge. Information obtained by :  I understand that if any problems occur once I am at home I am to contact my physician. I understand and acknowledge receipt of the instructions indicated above.                                                                                                                                            Physician's or R.N.'s Signature                                                                  Date/Time                                                                                                                                              Patient or Repres

## 2023-02-21 NOTE — PROGRESS NOTES
-Please complete MRI History and Safety Screening Form   - Patient cannot be scanned until this form is completed, including signatures, and reviewed in MRI to ensure patient is SAFE and eligible for MRI. - CALL MRI when this has been successfully completed at [phone].

## 2023-02-21 NOTE — PROGRESS NOTES
Palliative Medicine SW Note    LCSW stopped in to offer patient support. She was awake and alert, lying on her right side under a colorful fleece blanket. She appeared comfortable, in no apparent distress. She smiled and when asked if she wanted anything, she requested fresh ice for her water bottle. This writer got the ice for her, and assisted her in plugging in her phone  and placing a pad in her patient belongings bag. Patient expressed looking forward to leaving the hospital to go to [facility]. Patient is very private and did not wish to discuss anything more at this time. Palliative team will continue to offer education and support as appropriate.     Misty Scott [facility]  Palliative Medicine [phone])

## 2023-02-21 NOTE — PROGRESS NOTES
0245: Attempted to draw morning labs. Patient refused. Will let day shift know so they can attempt to draw.

## 2023-02-21 NOTE — PROGRESS NOTES
Nephrology Progress Note  [facility], [address]el Maldonado, [address]  Phone - [phone]  Fax - [phone]                 Patient: Mello Vasquez                   YOB: 1960        Date- 2/21/2023                      Admit Date: 2/15/2023  CC: Follow up for CHRISTA         IMPRESSION & PLAN:   CHRISTA  due to ATN- MWF [facility], left chest permacath  Elevated LFTs  History of pneumothorax in the past requiring chest tube on left  Anemia of CKD  History of breast cancer  Hypoglycemia  Hypokalemia  Left-sided pleural effusion      PLAN-  NO HD TODAY  Epogen for anemia     Subjective: Interval History:   2-21-23-bp low on hd yesterday- no sob today  2-20-23-- bp stable - no sob-- k low and na low    Objective:   Vitals:    02/20/23 1633 02/20/23 1810 02/20/23 2103 02/21/23 0756   BP: 116/70 122/68 (!) 103/57 (!) 127/58   Pulse: 70 73 82 72   Resp: 18 18 17 18   Temp: 98.1 °F (36.7 °C) 98.1 °F (36.7 °C) 99.1 °F (37.3 °C) 99 °F (37.2 °C)   TempSrc:       SpO2:  99% 100% 100%   Weight:       Height:          No intake/output data recorded. Last 3 Recorded Weights in this Encounter    02/15/23 0755 02/19/23 1843   Weight: 65 kg (143 lb 4.8 oz) 53.9 kg (118 lb 14.4 oz)        Physical exam:    GEN:  nad  NECK:  Supple, no thyromegaly  RESP: clear  b/l, no  wheezing,   CVS: RRR,S1,S2   NEURO: non focal, normal speech  HD Access: Left chest PermCath    Chart reviewed. Pertinent Notes reviewed. Data Review :  Recent Labs     02/20/23  1315 02/19/23  1505   * 134*   K 3.4* 3.7   CL 98 96*   CO2 26 27   BUN 39* 32*   CREA 4.95* 4.30*   * 139*   CA 8.5 8.6   MG  --  2.3   PHOS 2.7 2.3*       Recent Labs     02/20/23  1315 02/19/23  1505   WBC 16.8* 10.6   HGB 7.9* 8.4*   HCT 25.4* 26.8*    219       No results for input(s): FE, TIBC, PSAT, FERR in the last 72 hours.    No results found for: HBA1C, GCV3IZFO, OXO4LNFY   No results found for: MCACR, MCA1, MCA2, MCA3, MCAU, MCAU2, MCALPOCT  US Results (most recent):  Medication list  reviewed  Current Facility-Administered Medications   Medication Dose Route Frequency    methylPREDNISolone (PF) (Solu-MEDROL) injection 100 mg  100 mg IntraVENous DAILY    heparin (porcine) 1,000 unit/mL injection 2,000 Units  2,000 Units InterCATHeter DIALYSIS PRN    And    heparin (porcine) 1,000 unit/mL injection 2,000 Units  2,000 Units InterCATHeter DIALYSIS PRN    epoetin tyler-epbx (RETACRIT) injection 20,000 Units  20,000 Units SubCUTAneous DIALYSIS MON, WED & FRI    famotidine (PEPCID) tablet 20 mg  20 mg Oral DAILY    dextrose 10% infusion 0-250 mL  0-250 mL IntraVENous PRN    carvediloL (COREG) tablet 3.125 mg  3.125 mg Oral BID WITH MEALS    multivitamin, tx-iron-ca-min (THERA-M w/ IRON) tablet 1 Tablet  1 Tablet Oral DAILY    PARoxetine (PAXIL) tablet 20 mg  20 mg Oral DAILY    sodium chloride (NS) flush 5-40 mL  5-40 mL IntraVENous Q8H    sodium chloride (NS) flush 5-40 mL  5-40 mL IntraVENous PRN    acetaminophen (TYLENOL) tablet 650 mg  650 mg Oral Q6H PRN    polyethylene glycol (MIRALAX) packet 17 g  17 g Oral DAILY PRN    ondansetron (ZOFRAN ODT) tablet 4 mg  4 mg Oral Q8H PRN    Or    ondansetron (ZOFRAN) injection 4 mg  4 mg IntraVENous Q6H PRN    albumin human 25% (BUMINATE) solution 25 g  25 g IntraVENous DIALYSIS PRN        Blaze Clark MD  2/21/2023

## 2023-02-22 LAB
ALBUMIN SERPL-MCNC: 2 G/DL (ref 3.5–5)
ALBUMIN/GLOB SERPL: 0.6 (ref 1.1–2.2)
ALP SERPL-CCNC: 593 U/L (ref 45–117)
ALT SERPL-CCNC: 246 U/L (ref 12–78)
ANION GAP SERPL CALC-SCNC: 7 MMOL/L (ref 5–15)
AST SERPL-CCNC: 118 U/L (ref 15–37)
BASOPHILS # BLD: 0 K/UL (ref 0–0.1)
BASOPHILS NFR BLD: 0 % (ref 0–1)
BILIRUB DIRECT SERPL-MCNC: 4.9 MG/DL (ref 0–0.2)
BILIRUB SERPL-MCNC: 6 MG/DL (ref 0.2–1)
BUN SERPL-MCNC: 27 MG/DL (ref 6–20)
BUN/CREAT SERPL: 8 (ref 12–20)
CALCIUM SERPL-MCNC: 8.5 MG/DL (ref 8.5–10.1)
CHLORIDE SERPL-SCNC: 100 MMOL/L (ref 97–108)
CO2 SERPL-SCNC: 29 MMOL/L (ref 21–32)
CREAT SERPL-MCNC: 3.42 MG/DL (ref 0.55–1.02)
DIFFERENTIAL METHOD BLD: ABNORMAL
EOSINOPHIL # BLD: 0 K/UL (ref 0–0.4)
EOSINOPHIL NFR BLD: 0 % (ref 0–7)
ERYTHROCYTE [DISTWIDTH] IN BLOOD BY AUTOMATED COUNT: 17.1 % (ref 11.5–14.5)
GLOBULIN SER CALC-MCNC: 3.5 G/DL (ref 2–4)
GLUCOSE SERPL-MCNC: 130 MG/DL (ref 65–100)
HBV SURFACE AB SER QL: NONREACTIVE
HBV SURFACE AB SER-ACNC: <3.1 MIU/ML
HBV SURFACE AG SER QL: <0.1 INDEX
HBV SURFACE AG SER QL: NEGATIVE
HCT VFR BLD AUTO: 23.3 % (ref 35–47)
HGB BLD-MCNC: 7 G/DL (ref 11.5–16)
IMM GRANULOCYTES # BLD AUTO: 0 K/UL (ref 0–0.04)
IMM GRANULOCYTES NFR BLD AUTO: 0 % (ref 0–0.5)
INR PPP: 1.2 (ref 0.9–1.1)
LYMPHOCYTES # BLD: 0.6 K/UL (ref 0.8–3.5)
LYMPHOCYTES NFR BLD: 4 % (ref 12–49)
MAGNESIUM SERPL-MCNC: 2 MG/DL (ref 1.6–2.4)
MCH RBC QN AUTO: 28.6 PG (ref 26–34)
MCHC RBC AUTO-ENTMCNC: 30 G/DL (ref 30–36.5)
MCV RBC AUTO: 95.1 FL (ref 80–99)
MONOCYTES # BLD: 1.7 K/UL (ref 0–1)
MONOCYTES NFR BLD: 11 % (ref 5–13)
NEUTS BAND NFR BLD MANUAL: 2 %
NEUTS SEG # BLD: 13.6 K/UL (ref 1.8–8)
NEUTS SEG NFR BLD: 83 % (ref 32–75)
NRBC # BLD: 0 K/UL (ref 0–0.01)
NRBC BLD-RTO: 0 PER 100 WBC
PHOSPHATE SERPL-MCNC: 2.3 MG/DL (ref 2.6–4.7)
PLATELET # BLD AUTO: 143 K/UL (ref 150–400)
PMV BLD AUTO: 10.4 FL (ref 8.9–12.9)
POTASSIUM SERPL-SCNC: 3.1 MMOL/L (ref 3.5–5.1)
PROT SERPL-MCNC: 5.5 G/DL (ref 6.4–8.2)
PROTHROMBIN TIME: 12.6 SEC (ref 9–11.1)
RBC # BLD AUTO: 2.45 M/UL (ref 3.8–5.2)
RBC MORPH BLD: ABNORMAL
SODIUM SERPL-SCNC: 136 MMOL/L (ref 136–145)
WBC # BLD AUTO: 15.9 K/UL (ref 3.6–11)
WBC MORPH BLD: ABNORMAL

## 2023-02-22 PROCEDURE — 65270000029 HC RM PRIVATE

## 2023-02-22 PROCEDURE — 85610 PROTHROMBIN TIME: CPT

## 2023-02-22 PROCEDURE — 74011250636 HC RX REV CODE- 250/636: Performed by: NURSE PRACTITIONER

## 2023-02-22 PROCEDURE — 85025 COMPLETE CBC W/AUTO DIFF WBC: CPT

## 2023-02-22 PROCEDURE — 74011250636 HC RX REV CODE- 250/636: Performed by: INTERNAL MEDICINE

## 2023-02-22 PROCEDURE — 87340 HEPATITIS B SURFACE AG IA: CPT

## 2023-02-22 PROCEDURE — 84100 ASSAY OF PHOSPHORUS: CPT

## 2023-02-22 PROCEDURE — 90935 HEMODIALYSIS ONE EVALUATION: CPT

## 2023-02-22 PROCEDURE — 74011000250 HC RX REV CODE- 250: Performed by: STUDENT IN AN ORGANIZED HEALTH CARE EDUCATION/TRAINING PROGRAM

## 2023-02-22 PROCEDURE — 80076 HEPATIC FUNCTION PANEL: CPT

## 2023-02-22 PROCEDURE — 86706 HEP B SURFACE ANTIBODY: CPT

## 2023-02-22 PROCEDURE — 99233 SBSQ HOSP IP/OBS HIGH 50: CPT | Performed by: STUDENT IN AN ORGANIZED HEALTH CARE EDUCATION/TRAINING PROGRAM

## 2023-02-22 PROCEDURE — 80048 BASIC METABOLIC PNL TOTAL CA: CPT

## 2023-02-22 PROCEDURE — 83735 ASSAY OF MAGNESIUM: CPT

## 2023-02-22 PROCEDURE — 74011250637 HC RX REV CODE- 250/637: Performed by: STUDENT IN AN ORGANIZED HEALTH CARE EDUCATION/TRAINING PROGRAM

## 2023-02-22 PROCEDURE — 36415 COLL VENOUS BLD VENIPUNCTURE: CPT

## 2023-02-22 RX ORDER — PREDNISONE 20 MG/1
60 TABLET ORAL
Status: DISCONTINUED | OUTPATIENT
Start: 2023-02-23 | End: 2023-02-22

## 2023-02-22 RX ORDER — PREDNISONE 20 MG/1
100 TABLET ORAL
Status: DISCONTINUED | OUTPATIENT
Start: 2023-02-23 | End: 2023-02-23 | Stop reason: HOSPADM

## 2023-02-22 RX ADMIN — EPOETIN ALFA-EPBX 20000 UNITS: 20000 INJECTION, SOLUTION INTRAVENOUS; SUBCUTANEOUS at 23:35

## 2023-02-22 RX ADMIN — METHYLPREDNISOLONE SODIUM SUCCINATE 100 MG: 125 INJECTION, POWDER, FOR SOLUTION INTRAMUSCULAR; INTRAVENOUS at 12:20

## 2023-02-22 RX ADMIN — SODIUM CHLORIDE, PRESERVATIVE FREE 10 ML: 5 INJECTION INTRAVENOUS at 23:20

## 2023-02-22 RX ADMIN — IRON SUCROSE 200 MG: 20 INJECTION, SOLUTION INTRAVENOUS at 12:20

## 2023-02-22 RX ADMIN — CARVEDILOL 3.12 MG: 3.12 TABLET, FILM COATED ORAL at 17:25

## 2023-02-22 RX ADMIN — SODIUM CHLORIDE, PRESERVATIVE FREE 10 ML: 5 INJECTION INTRAVENOUS at 05:00

## 2023-02-22 RX ADMIN — HEPARIN SODIUM 2000 UNITS: 1000 INJECTION, SOLUTION INTRAVENOUS; SUBCUTANEOUS at 10:47

## 2023-02-22 RX ADMIN — SODIUM CHLORIDE, PRESERVATIVE FREE 10 ML: 5 INJECTION INTRAVENOUS at 15:20

## 2023-02-22 RX ADMIN — PAROXETINE HYDROCHLORIDE 20 MG: 20 TABLET, FILM COATED ORAL at 12:21

## 2023-02-22 NOTE — PROGRESS NOTES
Nephrology Progress Note  [facility] / [facility] [address], Arturo Perezu, [address]  Phone - [phone]  Fax - [phone]                 Patient: Sukhi Bryant                   YOB: 1960        Date- 2/22/2023                      Admit Date: 2/15/2023  CC: Follow up for christa        IMPRESSION & PLAN:   CHRISTA  due to ATN- MWF [facility], left chest permacath  Hypokalemia  Abnormal LFTs  History of pneumothorax in the past requiring chest tube on left  Anemia of CKD  History of breast cancer  Hypoglycemia  Left-sided pleural effusion      PLAN-  Seen on hd today  4 k bath  Kcl 40 meq po after hd  No fluid removal on hd  Epogen for anemia     Subjective: Interval History:   2-22-23---bp stable- k low- seen on hd- no sob  2-21-23-bp low on hd yesterday- no sob today  2-20-23-- bp stable - no sob-- k low and na low    Objective:   Vitals:    02/22/23 0800 02/22/23 0815 02/22/23 0830 02/22/23 0845   BP: (!) 149/73 122/64 113/62 119/60   Pulse: 65 62 (!) 59 60   Resp: 18 18 18 18   Temp:       TempSrc:       SpO2:       Weight:       Height:          No intake/output data recorded. Last 3 Recorded Weights in this Encounter    02/15/23 0755 02/19/23 1843   Weight: 65 kg (143 lb 4.8 oz) 53.9 kg (118 lb 14.4 oz)        Physical exam:    GEN:  NAD  NECK:  Supple, no thyromegaly  RESP: clear  b/l, no  wheezing,   CVS: RRR,S1,S2   NEURO: non focal, normal speech  HD Access: Left chest PermCath    Chart reviewed. Pertinent Notes reviewed.      Data Review :  Recent Labs     02/22/23  0740 02/20/23  1315 02/20/23  1020 02/19/23  1505    131* 134* 134*   K 3.1* 3.4* 3.2* 3.7    98 100 96*   CO2 29 26 29 27   BUN 27* 39* 17 32*   CREA 3.42* 4.95* 2.60* 4.30*   * 139* 96 139*   CA 8.5 8.5 8.4* 8.6   MG 2.0  --  1.9 2.3   PHOS 2.3* 2.7 1.6* 2.3*       Recent Labs     02/22/23  0740 02/20/23  2330 02/20/23  1020   WBC 15.9* 16.8* 11.7*   HGB 7.0* 7.9* 7.9*   HCT 23.3* 25.4* 25.8*   * 200 167       No results for input(s): FE, TIBC, PSAT, FERR in the last 72 hours.    No results found for: HBA1C, FXY2IXOM, UOW6BARV   No results found for: MCACR, MCA1, MCA2, MCA3, MCAU, MCAU2, MCALPOCT  US Results (most recent):  Medication list  reviewed  Current Facility-Administered Medications   Medication Dose Route Frequency    methylPREDNISolone (PF) (Solu-MEDROL) injection 100 mg  100 mg IntraVENous DAILY    heparin (porcine) 1,000 unit/mL injection 2,000 Units  2,000 Units InterCATHeter DIALYSIS PRN    And    heparin (porcine) 1,000 unit/mL injection 2,000 Units  2,000 Units InterCATHeter DIALYSIS PRN    epoetin tyler-epbx (RETACRIT) injection 20,000 Units  20,000 Units SubCUTAneous DIALYSIS MON, WED & FRI    famotidine (PEPCID) tablet 20 mg  20 mg Oral DAILY    dextrose 10% infusion 0-250 mL  0-250 mL IntraVENous PRN    carvediloL (COREG) tablet 3.125 mg  3.125 mg Oral BID WITH MEALS    multivitamin, tx-iron-ca-min (THERA-M w/ IRON) tablet 1 Tablet  1 Tablet Oral DAILY    PARoxetine (PAXIL) tablet 20 mg  20 mg Oral DAILY    sodium chloride (NS) flush 5-40 mL  5-40 mL IntraVENous Q8H    sodium chloride (NS) flush 5-40 mL  5-40 mL IntraVENous PRN    acetaminophen (TYLENOL) tablet 650 mg  650 mg Oral Q6H PRN    polyethylene glycol (MIRALAX) packet 17 g  17 g Oral DAILY PRN    ondansetron (ZOFRAN ODT) tablet 4 mg  4 mg Oral Q8H PRN    Or    ondansetron (ZOFRAN) injection 4 mg  4 mg IntraVENous Q6H PRN    albumin human 25% (BUMINATE) solution 25 g  25 g IntraVENous DIALYSIS PRN        Carlos Andrade MD  2/22/2023

## 2023-02-22 NOTE — PROGRESS NOTES
Cancer [place] at [address]  W: [phone] F: [phone]    Reason for Visit:   Rosetta Gonzalez is a 57 y.o. female with left breast cancer-ER 0%, GA 0%, HER2/radha negative. Treatment History:   10/1/22: Carboplatin taxol and keytruda followed by [facility] and [facility]  Oncology Flowsheet 11/30/2022 12/7/2022 12/14/2022 12/21/2022   Day, Cycle Day 8, Cycle 3 Day 15, Cycle 3 Day 1, Cycle 4 Day 8, Cycle 4   CARBOplatin (PARAPLATIN) IV - - 565 mg -   PACLitaxeL 6 mg/mL (TAXOL) IV 80 mg/m2 = 134 mg 80 mg/m2 = 134 mg 80 mg/m2 = 134 mg 80 mg/m2 = 134 mg   pembrolizumab 25 mg/mL (KEYTRUDA) IV - - 200 mg -     Supportive Care Flowsheet 1/14/2023 1/19/2023 2/20/2023 2/22/2023   epoetin tyler-epbx (RETACRIT) SC 10,000 Units 10,000 Units - -   iron sucrose (VENOFER) IV - - 200 mg 200 mg     History of Present Illness:   Mrs. Cl Baron is a patient of Dr. Jeana Hayes at [facility].  She is followed for treatment of left breast cancer. She was recently admitted to [facility] for sepsis r/t COVID-pneumonia, CHRISTA, NSTEMI, hemoptysis and anemia. Pertinent treatment history as follows: She had a screening mammogram on 7/20/2022 which showed a 1 cm irregular mass in the left breast at 3 o'clock position. Biopsy on 8/2/2022 showed invasive ductal carcinoma, grade 2-3, ER 0%, GA 0%, HER2/radha negative. Ki-67 85%. Declined genetic testing. Recent Keytruda : last dose 12/14/2022  Last Chemotherapy: 12/21/2022     She had a screening mammogram on 7/20/2022 which showed a 1 cm irregular mass in the left breast at 3 o'clock position. Biopsy on 8/2/2022 showed invasive ductal carcinoma, grade 2-3, ER 0%, GA 0%, HER2/radha negative. Ki-67 85%. Declined genetic testing.      Interval History:     02/22/23    Lab Results   Component Value Date/Time    ALT (SGPT) 246 (H) 02/22/2023 07:40 AM    AST (SGOT) 118 (H) 02/22/2023 07:40 AM Alk. phosphatase 593 (H) 02/22/2023 07:40 AM    Bilirubin, direct 4.9 (H) 02/22/2023 07:40 AM    Bilirubin, total 6.0 (H) 02/22/2023 07:40 AM         The patient has severe liver injury that appears to be slowly improving. She had no improvement in past with solumedrol, however she seems to have some improvement over the last 3 days. On chart review the patient received solumedrol at about 1 mg/kg daily from 1/8-1/21 with no durable improvement as shown below. At the time the patient's course was complicated by hypotensive shock requiring vasopressors. On interview today, she is distraught. We discussed the results of her MRI of the abdomen that show iron deposition throughout the liver and spleen. The cause of this is unclear. She denies any focal pain complaints and is otherwise feeling fatigued and withdrawn.       Past Medical History:   Diagnosis Date    Cancer ([facility] [address]     BREAST CANCER    GERD (gastroesophageal reflux disease)     HTN (hypertension) 07/18/2011    Psychiatric disorder     ANIXETY AND DEPRESSION    Thyroid disease     HYPOTHYROID      Past Surgical History:   Procedure Laterality Date    HX COLONOSCOPY      HX MYOMECTOMY  05/02/1996    x 1    HX WISDOM TEETH EXTRACTION      IR INSERT NON TUNL CVC OVER 5 YRS  1/9/2023    IR INSERT TUNL CVC W/O PORT OVER 5 YR  1/23/2023    IR THORACENTESIS/INSERT CHEST TUBE  1/5/2023      Social History     Tobacco Use    Smoking status: Never     Passive exposure: Never    Smokeless tobacco: Never   Substance Use Topics    Alcohol use: Never      Family History   Problem Relation Age of Onset    Heart Failure Mother     Cancer Father     Diabetes Sister     Kidney Disease Sister     Anesth Problems Neg Hx      Current Facility-Administered Medications   Medication Dose Route Frequency    [START ON 2/23/2023] predniSONE (DELTASONE) tablet 60 mg  60 mg Oral DAILY WITH BREAKFAST    heparin (porcine) 1,000 unit/mL injection 2,000 Units  2,000 Units InterCATHeter DIALYSIS PRN    And    heparin (porcine) 1,000 unit/mL injection 2,000 Units  2,000 Units InterCATHeter DIALYSIS PRN    epoetin tyler-epbx (RETACRIT) injection 20,000 Units  20,000 Units SubCUTAneous DIALYSIS MON, WED & FRI    famotidine (PEPCID) tablet 20 mg  20 mg Oral DAILY    dextrose 10% infusion 0-250 mL  0-250 mL IntraVENous PRN    carvediloL (COREG) tablet 3.125 mg  3.125 mg Oral BID WITH MEALS    multivitamin, tx-iron-ca-min (THERA-M w/ IRON) tablet 1 Tablet  1 Tablet Oral DAILY    PARoxetine (PAXIL) tablet 20 mg  20 mg Oral DAILY    sodium chloride (NS) flush 5-40 mL  5-40 mL IntraVENous Q8H    sodium chloride (NS) flush 5-40 mL  5-40 mL IntraVENous PRN    acetaminophen (TYLENOL) tablet 650 mg  650 mg Oral Q6H PRN    polyethylene glycol (MIRALAX) packet 17 g  17 g Oral DAILY PRN    ondansetron (ZOFRAN ODT) tablet 4 mg  4 mg Oral Q8H PRN    Or    ondansetron (ZOFRAN) injection 4 mg  4 mg IntraVENous Q6H PRN    albumin human 25% (BUMINATE) solution 25 g  25 g IntraVENous DIALYSIS PRN      Allergies   Allergen Reactions    Sulfa (Sulfonamide Antibiotics) Itching, Swelling and Unknown (comments)            Physical Exam:   Visit Vitals  /73   Pulse 71   Temp 97.9 °F (36.6 °C)   Resp 18   Ht 5' (1.524 m)   Wt 118 lb 14.4 oz (53.9 kg)   SpO2 100%   BMI 23.22 kg/m²       General: alert, cooperative, no distress   Mental  status: normal mood, behavior, speech, dress, motor activity, and thought processes, able to follow commands   HENT: NCAT   Neck: no visualized mass   Resp: no respiratory distress   Neuro: no gross deficits   Skin: no discoloration or lesions of concern on visible areas   Psychiatric: normal affect, consistent with stated mood, no evidence of hallucinations         Results:     Lab Results   Component Value Date/Time    WBC 15.9 (H) 02/22/2023 07:40 AM    HGB 7.0 (L) 02/22/2023 07:40 AM    HCT 23.3 (L) 02/22/2023 07:40 AM    PLATELET 834 (L) 09/41/9544 07:40 AM    MCV 95.1 02/22/2023 07:40 AM    ABS. NEUTROPHILS 13.6 (H) 02/22/2023 07:40 AM     Lab Results   Component Value Date/Time    Sodium 136 02/22/2023 07:40 AM    Potassium 3.1 (L) 02/22/2023 07:40 AM    Chloride 100 02/22/2023 07:40 AM    CO2 29 02/22/2023 07:40 AM    Glucose 130 (H) 02/22/2023 07:40 AM    BUN 27 (H) 02/22/2023 07:40 AM    Creatinine 3.42 (H) 02/22/2023 07:40 AM    GFR est AA >60 09/26/2022 03:26 PM    GFR est non-AA >60 09/26/2022 03:26 PM    Calcium 8.5 02/22/2023 07:40 AM    Glucose (POC) 109 02/16/2023 03:11 PM     Lab Results   Component Value Date/Time    Bilirubin, total 6.0 (H) 02/22/2023 07:40 AM    ALT (SGPT) 246 (H) 02/22/2023 07:40 AM    Alk. phosphatase 593 (H) 02/22/2023 07:40 AM    Protein, total 5.5 (L) 02/22/2023 07:40 AM    Albumin 2.0 (L) 02/22/2023 07:40 AM    Globulin 3.5 02/22/2023 07:40 AM           Assessment/PLAN:     # History of early-stage breast cancer  -  was receiving neoadjuvant chemotherapy, held as a consequence of clinical deterioration and hospitalizations  Oncology Flowsheet 11/30/2022 12/7/2022 12/14/2022 12/21/2022   Day, Cycle Day 8, Cycle 3 Day 15, Cycle 3 Day 1, Cycle 4 Day 8, Cycle 4   CARBOplatin (PARAPLATIN) IV - - 565 mg -   PACLitaxeL 6 mg/mL (TAXOL) IV 80 mg/m2 = 134 mg 80 mg/m2 = 134 mg 80 mg/m2 = 134 mg 80 mg/m2 = 134 mg   pembrolizumab 25 mg/mL (KEYTRUDA) IV - - 200 mg -     Supportive Care Flowsheet 1/14/2023 1/19/2023 2/20/2023 2/22/2023   epoetin tyler-epbx (RETACRIT) SC 10,000 Units 10,000 Units - -   iron sucrose (VENOFER) IV - - 200 mg 200 mg       # ESRD on HD  -Now with end-stage renal disease receiving dialysis Monday Wednesday Friday  -Etiology thought to be a consequence of septic shock during last admission    # History of multifocal pneumonia, hemothorax, pleural effusions  -Resolved, saturating well on room air    # Acute on chronic liver injury  - Unclear cause, MRI shows extensive iron deposition in the liver and spleen.   No clear cirrhotic features. No clear features of malignant invasion.  - I discussed with the patient that liver biopsy would be useful in determining the cause of her liver failure. I also discussed that further treatment with chemotherapy would not be possible unless she had improvement in her liver function. # Presumed drug-induced liver injury  - Switch 100 mg of IV Solu-Medrol to 100 mg of oral prednisone. Plan for continuation of steroids in the outpatient setting with plan for slow taper after 1 week to 80 mg, followed by 60 mg the following week. She is to follow with Dr. Vitor Mcguire in the outpatient setting to discuss hospice and recheck liver function.      -Patient reported that she will think about getting liver biopsy and get back to us. Disposition:  No contraindication to discharge to rehab facility if patient does not want liver biopsy. We will continue steroids as detailed above. Outpatient follow-up with her primary oncologist immediately upon completion of rehabilitation. She was asked to call her primary oncologist with any questions or concerns.       Harish Middleton MD    Attending [address] [place] [address]

## 2023-02-22 NOTE — PROGRESS NOTES
Hospitalist Progress Note    NAME: Robbi Olivarez   :  1960   MRN:  [id number]       Assessment / Plan:  Acute liver injury - minimally improved  Elevated lipase  AMS - resolved    Did have mental status changes. AMS was likely secondary to liver injury. Recent episode of shock liver in 2023.  - GI consulted, appreciate assistance. Has undergone recent full GI workup a few weeks ago that was unrevealing. No need for additional imaging per GI. Unlikely to be chemo related as patient has been off chemotherapy since December, and would be poor candidate for liver biopsy per Oncology. CT abdomen this admission without acute findings, no biliary dilation, no hepatic mass, no pancreatitis. No other infectious etiology based on symptoms at this time, afebrile, WBC normal  INR improved  LFTS  improving gradually  GI following, unknown etiology, no current GI complaints, imaging without obstructive process or pancreatitis, GI signed off  Palliative consulted  MRI abdomen showed iron deposition in liver and spleen, will discuss about this findings with GI  Steroids per Onc  POatient to decide on hospice as outpatient     Hx of breast cancer  Last chemotherapy and immunotherpay in Dec 2022.  - recent admission for pneumonia and recurrent malignant pleural effusion, complicated by hemothorax after thoracentesis  - Oncology consulted, appreciate assistance  - started steroids  - Palliative consulted by Oncology  - further cancer treatment and possible biopsy per Onc outpatient     ESRD  - continue MWF HD, Nephrology following, appreciate assistance     Elevated troponin  209 on admission. Last trop 300 José Miguel 10. Denies chest pain, SOB. EKG with more prominent lateral wave inversions  - repeat EKG ordered     L basilar pleural effusion  Recent multifocal pneumonia, hemothorax, possible Keytruda pneumonitis  Currently satting well on room air. Imaging stable, denies SOB. Recent admission complicated by hemothorax.   - monitor respiratory status  - volume management with HD     Code Status: Full  Surrogate Decision Maker:  Lizz Obrien (Sister)   [phone] (Mobile)     DVT Prophylaxis: holding with elevated INR  GI Prophylaxis: not indicated  ALEXANDRA: 2/22/23     Recommended Disposition: SNF  Barriers:Work up for elevated LFTs, insurance auth for placement         Subjective:     Chief Complaint / Reason for Physician Visit  Seen and evaluated for ESRD  She denies any abd pain today  Seen after HD     Review of Systems:  Symptom Y/N Comments  Symptom Y/N Comments   Fever/Chills n   Chest Pain n    Poor Appetite n   Edema n    Cough n   Abdominal Pain n    Sputum n   Joint Pain n    SOB/MCCALL    Pruritis/Rash     Nausea/vomit    Tolerating PT/OT     Diarrhea    Tolerating Diet     Constipation    Other       Could NOT obtain due to:      Objective:     VITALS:   Last 24hrs VS reviewed since prior progress note.  Most recent are:  Patient Vitals for the past 24 hrs:   Temp Pulse Resp BP SpO2   02/22/23 1045 97.9 °F (36.6 °C) 71 18 137/73 --   02/22/23 1030 -- 63 18 124/63 --   02/22/23 1015 -- 66 18 125/61 --   02/22/23 1000 -- 64 18 (!) 114/58 --   02/22/23 0945 -- 66 18 122/61 --   02/22/23 0930 -- 63 18 (!) 114/57 --   02/22/23 0915 -- 62 18 113/64 --   02/22/23 0900 -- 61 18 (!) 108/59 --   02/22/23 0845 -- 60 18 119/60 --   02/22/23 0830 -- (!) 59 18 113/62 --   02/22/23 0815 -- 62 18 122/64 --   02/22/23 0800 -- 65 18 (!) 149/73 --   02/22/23 0745 -- 65 18 (!) 149/73 --   02/22/23 0742 98.5 °F (36.9 °C) 65 18 (!) 140/65 --   02/21/23 2115 -- -- -- -- 100 %   02/21/23 1943 98.9 °F (37.2 °C) 73 17 122/73 100 %   02/21/23 1747 -- 74 -- 121/71 --       Intake/Output Summary (Last 24 hours) at 2/22/2023 1613  Last data filed at 2/22/2023 1045  Gross per 24 hour   Intake --   Output -500 ml   Net 500 ml        I had a face to face encounter and independently examined this patient on 2/22/2023, as outlined below:  PHYSICAL EXAM:  General: Awake, No acute distress  EENT:  EOMI. Anicteric sclerae. MMM  Resp:  CTA bilaterally, no wheezing or rales. No accessory muscle use  CV:  Regular  rhythm,  No edema  GI:  Soft, Non distended, Non tender. +Bowel sounds  Neurologic:  Alert and oriented X 3, normal speech,   Psych:   Not anxious nor agitated  Skin:  No rashes. No jaundice    Reviewed most current lab test results and cultures  YES  Reviewed most current radiology test results   YES  Review and summation of old records today    NO  Reviewed patient's current orders and MAR    YES  PMH/SH reviewed - no change compared to H&P  ________________________________________________________________________  Care Plan discussed with:    Comments   Patient y    Family      RN y    Care Manager     Consultant                       y Multidiciplinary team rounds were held today with , nursing, pharmacist and clinical coordinator. Patient's plan of care was discussed; medications were reviewed and discharge planning was addressed. ________________________________________________________________________  Total NON critical care TIME:  37   Minutes    Total CRITICAL CARE TIME Spent:   Minutes non procedure based      Comments   >50% of visit spent in counseling and coordination of care x    ________________________________________________________________________  Brigida Garcia MD     Procedures: see electronic medical records for all procedures/Xrays and details which were not copied into this note but were reviewed prior to creation of Plan. LABS:  I reviewed today's most current labs and imaging studies.   Pertinent labs include:  Recent Labs     02/22/23  0740 02/20/23  1315 02/20/23  1020   WBC 15.9* 16.8* 11.7*   HGB 7.0* 7.9* 7.9*   HCT 23.3* 25.4* 25.8*   * 200 167     Recent Labs     02/22/23  0740 02/20/23  1315 02/20/23  1020    131* 134*   K 3.1* 3.4* 3.2*    98 100   CO2 29 26 29   * 139* 96   BUN 27* 39* 17   CREA 3.42* 4.95* 2.60*   CA 8.5 8.5 8.4*   MG 2.0  --  1.9   PHOS 2.3* 2.7 1.6*   ALB 2.0* 1.9* 1.9*   TBILI 6.0*  --  7.6*   *  --  257*   INR 1.2*  --  1.2*       Signed: Micaela Hogue MD

## 2023-02-22 NOTE — PROGRESS NOTES
Bedside and Verbal shift change report given to [address], LPN (oncoming nurse) by Wing Trujillo RN (offgoing nurse). Report included the following information SBAR, Kardex, and Intake/Output.

## 2023-02-22 NOTE — PROCEDURES
Hemodialysis / [phone]      Orders   Duration: Start: 3917 End: 1595 Total: 3 Hours   Dialyzer: Dialyzer/Set Up Inspection: Adolm Holstein (02/22/23 0742)   Shyann Huddleston Bath: Dialysate K (mEq/L): 4 (02/22/23 0742)   Ca Bath: Dialysate CA (mEq/L): 2.5 (02/22/23 0742)   Na: Dialysate NA (mEq/L): 138 (02/22/23 0742)   Bicarb: Dialysate HCO3 (mEq/L): 35 (02/22/23 0742)   Target Fluid Removal: Goal/Amount of Fluid to Remove (mL): 0 mL (02/22/23 0742)     Access   Type & Location: L PC   Comments: Pre- Assessment: Dressing CDI and dated 2/20/23. No s/s of infection. Both lumens aspirate & flush well. Running well at . Post assessment: Dressing CDI. No changes.      Labs   HBsAg (Antigen) / date: Negative  1/25/23                                             HBsAb (Antibody) / date: Susceptible 1/25/23   Source: Physician's Portal   Obtained/Reviewed  Critical Results Called HGB   Date Value Ref Range Status   02/20/2023 7.9 (L) 11.5 - 16.0 g/dL Final     Potassium   Date Value Ref Range Status   02/20/2023 3.4 (L) 3.5 - 5.1 mmol/L Final     Calcium   Date Value Ref Range Status   02/20/2023 8.5 8.5 - 10.1 MG/DL Final     BUN   Date Value Ref Range Status   02/20/2023 39 (H) 6 - 20 MG/DL Final     Creatinine   Date Value Ref Range Status   02/20/2023 4.95 (H) 0.55 - 1.02 MG/DL Final        Meds Given   Name Dose Route   heparin 2000 units and 2000 units  CVC dwell post HD               Adequacy / Fluid    Total Liters Process: 66.7 L   Net Fluid Removed: +500 ml      Comments   Time Out Done:   (Time) 0740   Admitting Diagnosis: Liver Injury   Consent obtained/signed: Informed Consent Verified: Yes (chronic dialysis, no consent needed) (02/22/23 5075)   Machine / RO # Machine Number: [id number] (02/22/23 6520)   Primary Nurse Rpt Pre: Job Duel, RN   Primary Nurse Rpt Post: Raul Hong RN   Pt Education: HD Treatment   Care Plan: Ongoing   Pts outpatient clinic: [facility]     Tx Summary   Comments: SBAR received from Primary RN. Pt arrived to HD [address]. Chronic consent applies. Pt agreeable to treatment. 5319: Each catheter limb disinfected per p&p, caps removed, hubs disinfected per p&p. Each lumen aspirated for blood return and flushed with Normal Saline per policy. Labs drawn per  order. VSS. Dialysis Tx initiated. 0840: Dr. Jeana Hunter at bedside. Orders received to give pt 500 ml NS during HD.     0930: 250 ml NS given. 1000: 250 ml NS given. **Patient tolerated treatment well without complaints or complications. All lines and connections remained intact and visible throughout entire treatment. **    1045: Tx ended. VSS. Each dialysis catheter limb disinfected per p&p, all possible blood returned per p&p, and each dialysis hub disinfected per p&p. Each lumen flushed, post dialysis catheter Heparin dwell instilled per order, and caps applied. Bed locked and in the lowest position, call bell and belongings in reach. SBAR given to Primary, RN. Patient is stable at time of their departure. All Dialysis related medications have been reviewed.

## 2023-02-22 NOTE — PROGRESS NOTES
Hospitalist Progress Note    NAME:  Kolton Call   :  1960   MRN:  [id number]     Assessment / Plan:  Acute liver injury - minimally improved  Elevated lipase  AMS - resolved  Did have mental status changes. AMS was likely secondary to liver injury. Recent episode of shock liver in 2023.  - GI consulted, appreciate assistance. Has undergone recent full GI workup a few weeks ago that was unrevealing. No need for additional imaging per GI. Unlikely to be chemo related as patient has been off chemotherapy since December, and would be poor candidate for liver biopsy per Oncology. CT abdomen this admission without acute findings, no biliary dilation, no hepatic mass, no pancreatitis. - no other infectious etiology based on symptoms at this time, afebrile, WBC normal  - INR worsening, Vit K with Oncology 23  - trend INR   - LFTS  remain markedly elevated  - GI following, unknown etiology, no current GI complaints, imaging without obstructive process or pancreatitis, GI signed off  - Palliative consulted  - no significant improvement, [facility]  - per Oncology, would not pursue liver biopsy as she is debilitated and not a candidate for further systemic therapy  - MRI abdomen, Onc to followup results as outpatient  - steroids per Onc  - patient to decide on hospice as outpatient    Hx of breast cancer  Last chemotherapy and immunotherpay in Dec 2022.  - recent admission for pneumonia and recurrent malignant pleural effusion, complicated by hemothorax after thoracentesis  - Oncology consulted, appreciate assistance  - started steroids  - Palliative consulted by Oncology  - further cancer treatment and possible biopsy per Onc outpatient     ESRD  - continue MWF HD, Nephrology following, appreciate assistance     Elevated troponin  209 on admission. Last trop 300 José Miguel 10. Denies chest pain, SOB.  EKG with more prominent lateral wave inversions  - repeat EKG ordered     L basilar pleural effusion  Recent multifocal pneumonia, hemothorax, possible Keytruda pneumonitis  Currently satting well on room air. Imaging stable, denies SOB. Recent admission complicated by hemothorax.  - monitor respiratory status  - volume management with HD     Code Status: Full  Surrogate Decision Maker:  Lizz Obrien (Sister)   [phone] (Mobile)     DVT Prophylaxis: holding with elevated INR  GI Prophylaxis: not indicated  ALEXANDRA: 2/22/23    Recommended Disposition: SNF  Barriers: insurance auth     Subjective:     Chief Complaint  Followup ALI    Subjective  Discussed with RN events overnight. Feels ok today. Review of Systems:  14 point ROS reviewed with patient and negative except per above. Objective:     VITALS:   Last 24hrs VS reviewed since prior progress note. Most recent are:  Patient Vitals for the past 24 hrs:   Temp Pulse Resp BP SpO2   02/21/23 1747 -- 74 -- 121/71 --   02/21/23 0756 99 °F (37.2 °C) 72 18 (!) 127/58 100 %   02/20/23 2103 99.1 °F (37.3 °C) 82 17 (!) 103/57 100 %       No intake or output data in the 24 hours ending 02/21/23 2051       I had a face to face encounter and independently examined this patient on 2/21/2023, as outlined below:    PHYSICAL EXAM:  General:          No acute distress, Answering questions appropriately  HEENT:           EOMI, icteric sclera. MMM  Neck:               Supple  Resp:               CTAB, non-labored, No wheezes or crackles  Cardio:            regular rate, normal rhythm, no murmurs, no JVD  GI:                   Soft, Non-tender, Non-distended, Normal bowel sounds. Extremities:     Warm, well perfused, no LE edema  Skin:                Warm, Dry, Pink, Intact.    Neurologic:      Alert and Oriented x4, No focal defects    Reviewed most current lab test results and cultures  YES  Reviewed most current radiology test results   YES  Review and summation of old records today    NO  Reviewed patient's current orders and [facility] YES  PMH/SH reviewed - no change compared to H&P  ______________________________________________________________________    Procedures: see electronic medical records for all procedures/Xrays and details which were not copied into this note but were reviewed prior to creation of Plan. LABS:  I reviewed today's most current labs and imaging studies. Pertinent labs include:  Recent Labs     02/20/23  1315 02/20/23  1020 02/19/23  1505   WBC 16.8* 11.7* 10.6   HGB 7.9* 7.9* 8.4*   HCT 25.4* 25.8* 26.8*    167 219       Recent Labs     02/20/23  1315 02/20/23  1020 02/19/23  1505   * 134* 134*   K 3.4* 3.2* 3.7   CL 98 100 96*   CO2 26 29 27   * 96 139*   BUN 39* 17 32*   CREA 4.95* 2.60* 4.30*   CA 8.5 8.4* 8.6   MG  --  1.9 2.3   PHOS 2.7 1.6* 2.3*   ALB 1.9* 1.9* 1.9*   TBILI  --  7.6* 10.1*   ALT  --  257* 318*   INR  --  1.2* 1.3*         Imaging/Diagnostics:  MRI ABD WO CONT  Narrative: EXAM:  MRI ABD WO CONT    INDICATION: Liver failure    COMPARISON: CT performed on 2/15/2023    TECHNIQUE: Coronal T2 and noncontrast T1; Axial T2, in/out of phase, MRCP,  diffusion-weighted imaging MRI of the abdomen . FINDINGS: Liver: Diffuse low T2 signal throughout the liver with loss of signal  on in phase imaging compatible with iron deposition. No focal liver lesions. Biliary tree: Gallbladder is contracted. Common bile duct measures 6 mm. No  focal lesions are identified. Pancreas: Unremarkable    Spleen: Diffuse T2 hypointensity in the spleen    Adrenal glands: Unremarkable    Kidneys: Unremarkable . Small amount of perinephric fluid bilaterally. Vasculature: Limited evaluation is unremarkable. Bowel: Unremarkable    Lymph nodes: No lymphadenopathy is noted    Miscellaneous: None  Impression: 1. Findings consistent with diffuse iron deposition in the liver and spleen. 2.  No acute pathology.       Care Plan discussed with:    Comments   Patient y    Family      RN y    Care Manager     Consultant  y                     y Multidiciplinary team rounds were held today with , nursing, pharmacist and clinical coordinator. Patient's plan of care was discussed; medications were reviewed and discharge planning was addressed.      _    Signed: Lori Teixeira MD

## 2023-02-22 NOTE — PROGRESS NOTES
2608: Attempted to draw morning labs. Patient refused. Will let day shift know so they can attempt to draw.

## 2023-02-22 NOTE — PROGRESS NOTES
2/22/23 2:10pm CM to bedside to speak with pt. Pt shares her sister was to call this CM. Pt shares she does not want to go to [facility] [place] or [place]. Pt shares she would like to go back to [facility] but is not sure how she would get transportation. CM placed call to sister who is trying to work with [facility] transportation. Reviewed [place] address and [facility] address with sister. Explained to sister that pt is medically ready for d/c, may have to be d/c to [facility] [place] or [place] tomorrow if not able to arrange with [facility] transportation. CM shares will return call to sister tomorrow for choice. CM has verified with [facility] Leon that facility will transport to [address] [place] [address] Av in [place]. (CM will need to submit for Auth when pt chooses new facility tomorrow)     2/22/23 12n CM spoke with pt about [facility] and [place] who provide dialysis transportation. Pt to call and speak with sister about choices. 2/22/23 0900 CM received communication from pt that she can not afford to pay for dialysis transportation while at [facility]. CM inquired about how pt was previously paying for transportation, did not receive clear response, understood that pt may have an outstanding bill with transportation. CM spoke with [facility] Leon, no options for [place] to provide no cost transportation for pt.         [address] [facility] Bethea RN,MSN  Care Manager  [phone]

## 2023-02-23 ENCOUNTER — TELEPHONE (OUTPATIENT)
Dept: ONCOLOGY | Age: 63
End: 2023-02-23

## 2023-02-23 VITALS
SYSTOLIC BLOOD PRESSURE: 114 MMHG | BODY MASS INDEX: 23.34 KG/M2 | OXYGEN SATURATION: 100 % | WEIGHT: 118.9 LBS | TEMPERATURE: 98.4 F | DIASTOLIC BLOOD PRESSURE: 67 MMHG | HEART RATE: 74 BPM | RESPIRATION RATE: 17 BRPM | HEIGHT: 60 IN

## 2023-02-23 PROCEDURE — 74011000250 HC RX REV CODE- 250: Performed by: STUDENT IN AN ORGANIZED HEALTH CARE EDUCATION/TRAINING PROGRAM

## 2023-02-23 PROCEDURE — 74011250637 HC RX REV CODE- 250/637: Performed by: STUDENT IN AN ORGANIZED HEALTH CARE EDUCATION/TRAINING PROGRAM

## 2023-02-23 PROCEDURE — 97530 THERAPEUTIC ACTIVITIES: CPT

## 2023-02-23 PROCEDURE — 74011636637 HC RX REV CODE- 636/637: Performed by: STUDENT IN AN ORGANIZED HEALTH CARE EDUCATION/TRAINING PROGRAM

## 2023-02-23 RX ORDER — PREDNISONE 20 MG/1
100 TABLET ORAL
Qty: 105 TABLET | Refills: 0 | Status: SHIPPED | OUTPATIENT
Start: 2023-02-24

## 2023-02-23 RX ADMIN — CARVEDILOL 3.12 MG: 3.12 TABLET, FILM COATED ORAL at 11:15

## 2023-02-23 RX ADMIN — PREDNISONE 100 MG: 20 TABLET ORAL at 11:15

## 2023-02-23 RX ADMIN — SODIUM CHLORIDE, PRESERVATIVE FREE 10 ML: 5 INJECTION INTRAVENOUS at 07:32

## 2023-02-23 RX ADMIN — PAROXETINE HYDROCHLORIDE 20 MG: 20 TABLET, FILM COATED ORAL at 11:16

## 2023-02-23 RX ADMIN — FAMOTIDINE 20 MG: 20 TABLET, FILM COATED ORAL at 11:15

## 2023-02-23 RX ADMIN — MULTIPLE VITAMINS W/ MINERALS TAB 1 TABLET: TAB at 11:15

## 2023-02-23 NOTE — PROGRESS NOTES
Comprehensive Nutrition Assessment    Type and Reason for Visit: Initial, RD nutrition re-screen/LOS    Nutrition Recommendations/Plan:   Continue regular diet  Discontinue Nepro  Add ensure clear TID     Malnutrition Assessment:  Malnutrition Status:  Insufficient data    Nutrition Assessment:    Patient medically noted for liver injury. PMH ESRD on HD and breast cancer. Chart reviewed for length of stay. Patient reports a fair appetite; ate about half of her lunch today. She has nepro ordered TID but multiple bottles at bedside. She states it makes her poop so she hasn't been consuming it. Agreeable to trial of ensure clear instead. She is unsure of weight loss. Discharge planning noted. Patient Vitals for the past 168 hrs:   % Diet Eaten   02/20/23 0800 51 - 75%   02/16/23 1758 76 - 100%     Nutrition Related Findings:    K+ 3.1, Phos 2.3, -159-   1+ edema BLE   Coreg, Famotidine, MVI, Paxil, Prednisone   Wound Type: None    Current Nutrition Intake & Therapies:  Average Meal Intake: 51-75%  Average Supplement Intake: Refusing to take  ADULT DIET Regular  ADULT ORAL NUTRITION SUPPLEMENT Breakfast, Lunch, Dinner; Clear Liquid  DIET ONE TIME MESSAGE    Anthropometric Measures:  Height: 5' (152.4 cm)  Ideal Body Weight (IBW): 100 lbs (45 kg)     Current Body Wt:  53.9 kg (118 lb 13.3 oz), 118.8 % IBW. Current BMI (kg/m2): 23.2                          BMI Category: Normal weight (BMI 18.5-24. 9)    Estimated Daily Nutrient Needs:  Energy Requirements Based On: Formula  Weight Used for Energy Requirements: Current  Energy (kcal/day): 1314 kcals (BMR x 1. 3AF)  Weight Used for Protein Requirements: Current  Protein (g/day): 70g (1.3 g/kg bw)  Method Used for Fluid Requirements: 1 ml/kcal  Fluid (ml/day): 1300 mL    Nutrition Diagnosis:   No nutrition diagnosis at this time     Nutrition Interventions:   Food and/or Nutrient Delivery: Continue current diet, Modify oral nutrition supplement  Nutrition Education/Counseling: No recommendations at this time  Coordination of Nutrition Care: Continue to monitor while inpatient       Goals:     Goals: PO intake 75% or greater, by next RD assessment       Nutrition Monitoring and Evaluation:   Behavioral-Environmental Outcomes: None identified  Food/Nutrient Intake Outcomes: Food and nutrient intake, Supplement intake  Physical Signs/Symptoms Outcomes: Biochemical data, Weight    Discharge Planning:    Continue current diet, Continue oral nutrition supplement    Kevin Vargas RD  Contact: ext [phone]

## 2023-02-23 NOTE — PROGRESS NOTES
Nephrology Progress Note  [facility], Camila Maldonado, [address]  Phone - [phone]  Fax - [phone]                 Patient: Jean-Pierre Plaza                   YOB: 1960        Date- 2/23/2023                      Admit Date: 2/15/2023  CC: Follow up for CHRISTA       IMPRESSION & PLAN:   CHRISTA  due to ATN- MWF [facility], left chest permacath  Hypokalemia  Abnormal LFTs  History of pneumothorax in the past requiring chest tube on left  Anemia of CKD  History of breast cancer  Hypoglycemia  Left-sided pleural effusion      PLAN-  NO HD TODAY  Epogen for anemia     Subjective: Interval History:   2-23-23-S/P HD yesterday- bp improved  2-22-23---bp stable- k low- seen on hd- no sob  2-21-23-bp low on hd yesterday- no sob today  2-20-23-- bp stable - no sob-- k low and na low    Objective:   Vitals:    02/22/23 1030 02/22/23 1045 02/22/23 2333 02/23/23 1110   BP: 124/63 137/73 133/70 114/67   Pulse: 63 71 71 74   Resp: 18 18 16 17   Temp:  97.9 °F (36.6 °C) 97.7 °F (36.5 °C) 98.4 °F (36.9 °C)   TempSrc:       SpO2:   100% 100%   Weight:       Height:          02/22 0701 - 02/23 0700  In: -   Out: -500     Last 3 Recorded Weights in this Encounter    02/15/23 0755 02/19/23 1843   Weight: 65 kg (143 lb 4.8 oz) 53.9 kg (118 lb 14.4 oz)        Physical exam:    GEN:  NAD  NECK:  Supple, no thyromegaly  RESP: clear b/l, no  wheezing,   CVS: RRR,S1,S2   NEURO: non focal, normal speech  HD Access: Left chest PermCath    Chart reviewed. Pertinent Notes reviewed.      Data Review :  Recent Labs     02/22/23  0740 02/20/23  1315    131*   K 3.1* 3.4*    98   CO2 29 26   BUN 27* 39*   CREA 3.42* 4.95*   * 139*   CA 8.5 8.5   MG 2.0  --    PHOS 2.3* 2.7       Recent Labs     02/22/23  0740 02/20/23  1315   WBC 15.9* 16.8*   HGB 7.0* 7.9*   HCT 23.3* 25.4*   * 200       No results for input(s): FE, TIBC, PSAT, FERR in the last 72 hours.    No results found for: HBA1C, BQN4ALXY, MLE3CHDK   No results found for: MCACR, MCA1, MCA2, MCA3, MCAU, MCAU2, MCALPOCT  US Results (most recent):  Medication list  reviewed  Current Facility-Administered Medications   Medication Dose Route Frequency    predniSONE (DELTASONE) tablet 100 mg  100 mg Oral DAILY WITH BREAKFAST    heparin (porcine) 1,000 unit/mL injection 2,000 Units  2,000 Units InterCATHeter DIALYSIS PRN    And    heparin (porcine) 1,000 unit/mL injection 2,000 Units  2,000 Units InterCATHeter DIALYSIS PRN    epoetin tyler-epbx (RETACRIT) injection 20,000 Units  20,000 Units SubCUTAneous DIALYSIS MON, WED & FRI    famotidine (PEPCID) tablet 20 mg  20 mg Oral DAILY    dextrose 10% infusion 0-250 mL  0-250 mL IntraVENous PRN    carvediloL (COREG) tablet 3.125 mg  3.125 mg Oral BID WITH MEALS    multivitamin, tx-iron-ca-min (THERA-M w/ IRON) tablet 1 Tablet  1 Tablet Oral DAILY    PARoxetine (PAXIL) tablet 20 mg  20 mg Oral DAILY    sodium chloride (NS) flush 5-40 mL  5-40 mL IntraVENous Q8H    sodium chloride (NS) flush 5-40 mL  5-40 mL IntraVENous PRN    acetaminophen (TYLENOL) tablet 650 mg  650 mg Oral Q6H PRN    polyethylene glycol (MIRALAX) packet 17 g  17 g Oral DAILY PRN    ondansetron (ZOFRAN ODT) tablet 4 mg  4 mg Oral Q8H PRN    Or    ondansetron (ZOFRAN) injection 4 mg  4 mg IntraVENous Q6H PRN    albumin human 25% (BUMINATE) solution 25 g  25 g IntraVENous DIALYSIS PRN        Ana Lopez MD  2/23/2023

## 2023-02-23 NOTE — DISCHARGE SUMMARY
Hospitalist Discharge Summary     Patient ID:  Robbi Olivarez  [id number]  57 y.o.  1960  2/15/2023    PCP on record: Maico Guzman MD    Admit date: 2/15/2023  Discharge date and time: 2/23/2023    DISCHARGE DIAGNOSIS:  Acute liver injury - minimally improved  Elevated lipase  AMS - resolved  History of breast Cancer  ESRD on HD  Breast cancer    CONSULTATIONS:  IP CONSULT TO GASTROENTEROLOGY  IP CONSULT TO ONCOLOGY  IP CONSULT TO PALLIATIVE CARE - PROVIDER    Excerpted HPI from H&P of Singh Molina MD:  Robbi Olivarez is a 57 y.o.   female with hx of breast cancer (last treatment Dec 2022), recent hospitalization with shock liver, hemothorax, ESRD, who presents with lethargy. Of note, patient AAOx4 but does not wish to engage in extended questioning. Patient notes feeling generally tired for the last week. No nausea, vomiting, fevers, chills, chest pain, SOB. States her bowel movements are different this week but does not elaborate. She is unsure if her bowel movements are bloody, stating she did not look. No diet changes, denies weight changes. States she was told to come to the hospital after labs, presumably taken at SNF, were \"off\". ______________________________________________________________________  DISCHARGE SUMMARY/HOSPITAL COURSE:  for full details see H&P, daily progress notes, labs, consult notes. Acute liver injury - minimally improved  Elevated lipase  AMS - resolved     Did have mental status changes. AMS was likely secondary to liver injury. Recent episode of shock liver in Jan 2023.  - GI consulted, appreciate assistance. Has undergone recent full GI workup a few weeks ago that was unrevealing. No need for additional imaging per GI. Unlikely to be chemo related as patient has been off chemotherapy since December, and would be poor candidate for liver biopsy per Oncology.  CT abdomen this admission without acute findings, no biliary dilation, no hepatic mass, no pancreatitis. No other infectious etiology based on symptoms at this time, afebrile, WBC normal  INR improved  LFTS  improving gradually  GI following, unknown etiology, no current GI complaints, imaging without obstructive process or pancreatitis, GI signed off  Palliative consulted  MRI abdomen showed iron deposition in liver and spleen, discussed again about liver biopsy today. She doesn't want liver biopsy at this moment. Wants to discuss with her oncologist as outpatient before deciding on this. Steroids tapering dose ordered per onc recommendation  POatient to decide on hospice as outpatient  Dc to snf today     Hx of breast cancer  Last chemotherapy and immunotherpay in Dec 2022.  - recent admission for pneumonia and recurrent malignant pleural effusion, complicated by hemothorax after thoracentesis  - Oncology consulted, appreciate assistance  - started steroids  - Palliative consulted by Oncology  - further cancer treatment and possible biopsy per Onc outpatient     ESRD  - continue MWF HD, Nephrology following, appreciate assistance     Elevated troponin  209 on admission. Last trop 300 José Miguel 10. Denies chest pain, SOB. EKG with more prominent lateral wave inversions     L basilar pleural effusion  Recent multifocal pneumonia, hemothorax, possible Keytruda pneumonitis  Currently satting well on room air. Imaging stable, denies SOB. Recent admission complicated by hemothorax.  - monitor respiratory status  - volume management with HD        _______________________________________________________________________  Patient seen and examined by me on discharge day. Pertinent Findings:  Gen:    Not in distress  Chest: Clear lungs  CVS:   Regular rhythm.   No edema  Abd:  Soft, not distended, not tender  Neuro:  Alert,   _______________________________________________________________________  DISCHARGE MEDICATIONS:   Current Discharge Medication List        START taking these medications    Details   predniSONE (DELTASONE) 20 mg tablet Take 5 Tablets by mouth daily (with breakfast). For 7 days, 4 tab daily for 7 days, 3 tab daily for 7 days, 2 tab daily for 7 days, 1 tab daiy for 7 days and stop  Qty: 105 Tablet, Refills: 0  Start date: 2/24/2023      epoetin tyler-epbx (RETACRIT) 20,000 unit/mL injection 1 mL by SubCUTAneous route DIALYSIS MON, WED & FRI for 30 days. Indications: anemia in chronic kidney disease  Qty: 12 mL, Refills: 0  Start date: 2/22/2023, End date: 3/24/2023           CONTINUE these medications which have CHANGED    Details   carvediloL (COREG) 3.125 mg tablet Take 1 Tablet by mouth two (2) times daily (with meals) for 30 days. Qty: 60 Tablet, Refills: 0  Start date: 2/21/2023, End date: 3/23/2023           CONTINUE these medications which have NOT CHANGED    Details   aspirin delayed-release 81 mg tablet Take 1 Tablet by mouth daily for 30 days. Qty: 30 Tablet, Refills: 0      OTHER,NON-FORMULARY, IV chemotherapy      famotidine (PEPCID) 40 mg/5 mL (8 mg/mL) suspension Take 2.5 mL by mouth two (2) times a day. Qty: 50 mL, Refills: 3    Associated Diagnoses: 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, unspecified estrogen receptor status (HCC)      ondansetron (ZOFRAN ODT) 8 mg disintegrating tablet Take 1 Tablet by mouth every eight (8) hours as needed for Nausea or Vomiting. Qty: 30 Tablet, Refills: 3    Associated Diagnoses: 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, unspecified estrogen receptor status (HCC)      lidocaine-prilocaine (EMLA) topical cream Apply  to affected area as needed for Pain. Apply a thick layer over port a cath 30-60 minutes prior to port access  Qty: 30 g, Refills: 3    Associated Diagnoses: 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, unspecified estrogen receptor status (HCC)      PARoxetine (PAXIL) 20 mg tablet Take 20 mg by mouth daily.       multivitamin, tx-iron-ca-min (THERA-M w/ IRON) 9 mg iron-400 mcg tab tablet Take 1 Tablet by mouth daily. Patient Follow Up Instructions:    Activity: Activity as tolerated  Diet: Cardiac Diet  Wound Care: None needed    Follow-up with    Follow-up Information       Follow up With Specialties Details Why Contact Info    Amna Talbert MD Gastroenterology Follow up in 2 week(s) for your liver disease [address]  [address] [facility]      Teodora Rubin MD Hematology and Oncology, Internal Medicine Physician, Hematology Physician, Oncology Follow up in 1 week(s) for your cancer treatment [address] [facility]  [address] [phone]      Ashvin Iyer MD Nephrology Follow up in 2 week(s) for your kidney health [address]  [address]  [facility]  [phone]      Benjamin Cai MD Family Medicine Follow up in 2 week(s) for routine post-hospitalization needs [address]  [address]  [phone]          ________________________________________________________________    Risk of deterioration: Moderate    Condition at Discharge:  Stable  __________________________________________________________________    Disposition  [facility]/LTC    ____________________________________________________________________    Code Status: Full Code  ___________________________________________________________________      Total time in minutes spent coordinating this discharge (includes going over instructions, follow-up, prescriptions, and preparing report for sign off to her PCP) :  36 minutes    Signed:  Yen Mullen MD

## 2023-02-23 NOTE — PROGRESS NOTES
Patient is being discharged at this time. Patient refused vitals and BP meds before discharge. She left the building via hospital to home transport.

## 2023-02-23 NOTE — PROGRESS NOTES
Problem: Mobility Impaired (Adult and Pediatric)  Goal: *Acute Goals and Plan of Care (Insert Text)  Description: FUNCTIONAL STATUS PRIOR TO ADMISSION: Pt recently residing at [facility] following complicated hospital admission/ medical course to include septic shock with CHRISTA/ initiation of HD, malignant pleural effusion, elevated LFTs with no known cause. Details of recent LOF at SNF unclear. Of note, pt with h/o breast CA, last chemo Dec 2022    Physical Therapy Goals  Initiated 2/17/2023  1. Patient will move from supine to sit and sit to supine  in bed with minimal assistance/contact guard assist within 7 day(s). 2.  Patient will transfer from bed to chair and chair to bed with minimal assistance/contact guard assist using the least restrictive device within 7 day(s). 3.  Patient will perform sit to stand with minimal assistance/contact guard assist within 7 day(s). 4.  Patient will ambulate with minimal assistance/contact guard assist for at least 10 feet with the least restrictive device within 7 day(s). Outcome: Progressing Towards Goal   PHYSICAL THERAPY TREATMENT  Patient: Violeta Russell (75 y.o. female)  Date: 2/23/2023  Diagnosis: Liver injury [S36.119A] <principal problem not specified>      Precautions:    Chart, physical therapy assessment, plan of care and goals were reviewed. ASSESSMENT  Patient continues with skilled PT services and is progressing towards goals. Pt was received in supine upon arrival reporting fatigue and abdominal discomfort, however agreeable to participate with encouragement. Requires Mesha to come to sitting edge of bed using bed railings as support, and with the head of bed slightly raised during transition. Once in sitting reports dizziness however recovers with additional time seated at the EOB, and SBP's stable throughout. Pt initially stood with CGA x2 pulling to stand with RW stabilized however quickly returns back to sitting due to fatigue.  Pt compliant with RW use however reports comfort without use of AD, and able to take sidesteps with HHA x2 from the bed to chair. Requires cues for proper hand placements and safety during descent. Pt continues to make steady progress in the acute setting with therapy services and would continue to benefit from additional skilled services to maximize her functional mobility. Remains appropriate for SNF upon discharge. Current Level of Function Impacting Discharge (mobility/balance): CGA-Mesha with bed mobility, CGA-Mesha x2 with transfers, sidesteps with HHA x2 from bed to chair    Other factors to consider for discharge: from SNF, below baseline, decreased strength/endurance, fall risk         PLAN :  Patient continues to benefit from skilled intervention to address the above impairments. Continue treatment per established plan of care. to address goals. Recommendation for discharge: (in order for the patient to meet his/her long term goals)  Therapy up to 5 days/week in SNF setting    This discharge recommendation:  Has been made in collaboration with the attending provider and/or case management    IF patient discharges home will need the following DME: to be determined (TBD)     SUBJECTIVE:   Patient stated I don't know it's whatever you think.     OBJECTIVE DATA SUMMARY:   Critical Behavior:  Neurologic State: Alert  Orientation Level: Oriented X4  Cognition: Follows commands  Safety/Judgement: Awareness of environment, Insight into deficits  Functional Mobility Training:  Bed Mobility:  Rolling: Contact guard assistance; Additional time  Supine to Sit: Minimum assistance;Assist x1;Bed Modified     Scooting: Contact guard assistance; Additional time     Transfers:  Sit to Stand: Contact guard assistance;Minimum assistance;Assist x2 (CGA with use of RW; Mesha x2 for HHA)  Stand to Sit: Minimum assistance;Assist x2        Bed to Chair: Minimum assistance;Assist x2    Balance:  Sitting: Intact  Sitting - Static: Good (unsupported)  Sitting - Dynamic: Good (unsupported)  Standing: Impaired; With support  Standing - Static: Fair;Constant support  Standing - Dynamic : Fair;Constant support    Pain Rating:  Pt did not quantify however reports abdominal pain prior to therapy; no increased complaints during treatment session    Activity Tolerance:   Fair    After treatment patient left in no apparent distress:   Sitting in chair, Call bell within reach, and Bed / chair alarm activated    COMMUNICATION/COLLABORATION:   The patients plan of care was discussed with: Occupational therapist, Registered nurse, and Case management.      Sofi Blank PTA   Time Calculation: 23 mins

## 2023-02-23 NOTE — TELEPHONE ENCOUNTER
Patient called requesting a call back from Dr. Arden Lemus to discuss getting a biopsy of her liver. As of today, the patient is at [facility], being seen by Dr. Wander Zaragoza.   Patient would like a call back to:  [phone]

## 2023-02-23 NOTE — PROGRESS NOTES
2/23/23 2:03pm CM notes d/c orders. CM spoke with NOLVIA Leon, pt will go to room 422A. Nurse to call report to [phone]. CM updated sister. CM updated pt. CM spoke with bedside nurse. CM placed call to hospital to home, arranged stretcher transport for 5pm. Bedside nurse updated on transport time. Pt cleared for d/c from CM standpoint. 2/23/23 1:05pm CM placed call to pt sister Parviz Finders who states she and pt have talked and pt is choosing to go back to [facility]. CM inquired about Dialysis transportation; sister shares she will provide the transportation until the [employer] account for transportation has been completed. ARTEMIO has sent PS message to MD inquiring about d/c timing.     Pt is accepted at [facility]  #[id number]  2/19/23-3/4/23  Next review 3/5/23  Review to ELIO Magana CM  (P) [phone]  (A) [phone]    Hoa Melo RN,MSN  Care Manager  [phone]

## 2023-02-23 NOTE — PROGRESS NOTES
Problem: Self Care Deficits Care Plan (Adult)  Goal: *Acute Goals and Plan of Care (Insert Text)  Description: FUNCTIONAL STATUS PRIOR TO ADMISSION: At SNF rehab following complicated hospital admission/ medical course to include septic shock with CHRISTA/ initiation of HD, malignant pleural effusion, elevated LFTs with no known cause. Details of recent LOF at [facility] unclear. Of note, pt with h/o breast CA, last chemo Dec 2022. Occupational Therapy Goals:  Initiated 2/17/2023  1. Patient will perform grooming seated with good balance and supervision/set up within 7 days. 2. Patient will perform lower body dressing with moderate assistance  within 7 days. 3. Patient will perform toileting with moderate assistance  within 7 days. 4. Patient will perform UB dressing with supervision set up within 7 days. 5. Patient will transfer from bedside commode with contact guard assist using the least restrictive device and appropriate durable medical equipment within 7 days. Outcome: Progressing Towards Goal   OCCUPATIONAL THERAPY TREATMENT  Patient: Shilpi Davies (40 y.o. female)  Date: 2/23/2023  Diagnosis: Liver injury [S36.119A] <principal problem not specified>      Precautions:    Chart, occupational therapy assessment, plan of care, and goals were reviewed. ASSESSMENT  Patient continues with skilled OT services and is progressing towards goals. Patient received in fetal position on R side in bed agreeable to OT tx session today. She continues to be motivated to progress with therapy and voices goals of getting back to rehab at [facility] and get back to her baseline. She continues to be limited by markedly decreased activity tolerance and strength but made improvements in bed mobility and functional ambulation today. She is somewhat resistant to safe RW education and continues to pull up on walker stating :she doesn't like to do it that way\".  Patient prefers to ambulate to recliner via HHA bilaterally and Min Ax2 for controlled descent. Agreeable to sit in chair at end of session for lunch. Continue to recommend SNF level rehab at [place]. Current Level of Function Impacting Discharge (ADLs): Min Ax2 transfer to chair, Mod I distal LB dressing    Other factors to consider for discharge: PLOF, needs rehab at [facility], decline from baseline         PLAN :  Patient continues to benefit from skilled intervention to address the above impairments. Continue treatment per established plan of care to address goals. Recommend with staff: OOB to recliner as tolerated. OOB to [facility] with assist x1 and RW for safety     Recommend next OT session: Progress OOB tolerance    Recommendation for discharge: (in order for the patient to meet his/her long term goals)  Therapy up to 5 days/week in SNF setting    This discharge recommendation:  Has been made in collaboration with the attending provider and/or case management    IF patient discharges home will need the following DME: Tbd pending progress       SUBJECTIVE:   Patient stated I want to go to [place] and we'll just figure out that dialysis stuff.  re: disposition and return to SNF at [address]:   Cognitive/Behavioral Status:  Neurologic State: Alert  Orientation Level: Oriented X4  Cognition: Follows commands  Perception: Appears intact  Perseveration: No perseveration noted  Safety/Judgement: Awareness of environment;Good awareness of safety precautions; Home safety; Insight into deficits    Functional Mobility and Transfers for ADLs:  Bed Mobility:  Rolling: Contact guard assistance; Additional time  Supine to Sit: Minimum assistance;Assist x1;Bed Modified  Scooting: Contact guard assistance; Additional time    Transfers:  Sit to Stand: Contact guard assistance;Minimum assistance;Assist x2 (CGA with use of RW; Mesha x2 for HHA)     Bed to Chair: Minimum assistance;Assist x2    Balance:  Sitting: Intact  Sitting - Static: Good (unsupported)  Sitting - Dynamic: Good (unsupported)  Standing: Impaired; With support  Standing - Static: Fair;Constant support  Standing - Dynamic : Fair;Constant support    ADL Intervention:       Lower Body Dressing Assistance  Socks: Modified independent  Leg Crossed Method Used: Yes  Position Performed: Seated edge of bed         Cognitive Retraining  Safety/Judgement: Awareness of environment;Good awareness of safety precautions; Home safety; Insight into deficits      Pain:  Patient with c/o stomach pain    Activity Tolerance:   Poor, requires frequent rest breaks, and fatigues quickly HR WNL and SPO2>90%    After treatment patient left in no apparent distress:   Sitting in chair, Call bell within reach, and Bed / chair alarm activated    COMMUNICATION/COLLABORATION:   The patients plan of care was discussed with: Physical therapy assistant and Registered nurse.      Praveena Astudillo OT  Time Calculation: 23 mins

## 2023-02-23 NOTE — PROGRESS NOTES
Contacted MD regarding changing port needle scheduled for today. Per MD does not need to be changed as she is scheduled to discharge tomorrow and will be removed at discharge.

## 2023-02-23 NOTE — PROGRESS NOTES
Verbal report called in to Priti, spoke with Nurse Irvin Mackey. normal... Well appearing, well nourished, awake, alert, oriented to person, place, time/situation and in no apparent distress.

## 2023-02-24 ENCOUNTER — DOCUMENTATION ONLY (OUTPATIENT)
Dept: ONCOLOGY | Age: 63
End: 2023-02-24

## 2023-02-24 ENCOUNTER — TELEPHONE (OUTPATIENT)
Dept: ONCOLOGY | Age: 63
End: 2023-02-24

## 2023-02-24 DIAGNOSIS — C50.012 BILATERAL MALIGNANT NEOPLASM INVOLVING BOTH NIPPLE AND AREOLA IN FEMALE, UNSPECIFIED ESTROGEN RECEPTOR STATUS (HCC): Primary | ICD-10-CM

## 2023-02-24 DIAGNOSIS — C50.011 BILATERAL MALIGNANT NEOPLASM INVOLVING BOTH NIPPLE AND AREOLA IN FEMALE, UNSPECIFIED ESTROGEN RECEPTOR STATUS (HCC): Primary | ICD-10-CM

## 2023-02-24 NOTE — TELEPHONE ENCOUNTER
Malia Beal called me with questions  Reviewed her MRI  Discussed getting iron profile, ferritin, HFE  If HFE not + then she agrees to a liver bx  Please reconsult GI/ Hepatology for their expertise.     Discussed with Dr. Danuta Garvin
79

## 2023-02-24 NOTE — PROGRESS NOTES
Informed by Dr. Hunter Orona that patient discharged to SNF  Please call the nursing home-  Need them to continue prednisone 100 mg daily  Protonix 40 mg daily  Check CMP daily  Send one time labs- iron profile, ferritin, hemochromatosis gene mutation      Alexandru Arita   Please work on this  Please get me an update on her labs and condition by next wednesday  Thank you!

## 2023-02-24 NOTE — TELEPHONE ENCOUNTER
1248:     [facility] 47  [phone] with Jimmy Mckeon on the [facility] wing where patient is staying   Provided the following updates per MD Rosario request:     Patient is to continue prednisone 100 mg daily  Protonix 40 mg daily  Check CMP daily  Send one time labs- iron profile, ferritin, hemochromatosis gene mutation    Amador verbalized understanding   Fax number to facility:  [address] created as well re-stating message

## 2023-03-01 ENCOUNTER — DOCUMENTATION ONLY (OUTPATIENT)
Dept: ONCOLOGY | Age: 63
End: 2023-03-01

## 2023-03-01 NOTE — PROGRESS NOTES
Sourav Luna NP at [address] and rehab. Reports she will get daily CMP labs, can be difficult some times but she will order them. She is working up elevated WBC, is 22 today. NP to fax over lab results.      Sade Elizabeth NP

## 2023-03-02 ENCOUNTER — DOCUMENTATION ONLY (OUTPATIENT)
Dept: ONCOLOGY | Age: 63
End: 2023-03-02

## 2023-03-03 NOTE — PROGRESS NOTES
Mc liver numbers are better  Please drop prednisone to 80 mg daily  Make sure she is taking pepcid  Check LFTs biweekly and fax labs to us  I would really like to see her as soon as possible- hopefully a week or 2 please

## 2023-03-07 ENCOUNTER — HOSPITAL ENCOUNTER (OUTPATIENT)
Dept: INFUSION THERAPY | Age: 63
End: 2023-03-07

## 2023-03-08 ENCOUNTER — HOSPITAL ENCOUNTER (OUTPATIENT)
Dept: LAB | Age: 63
Discharge: HOME | End: 2023-03-08

## 2023-03-08 DIAGNOSIS — C50.011 BILATERAL MALIGNANT NEOPLASM INVOLVING BOTH NIPPLE AND AREOLA IN FEMALE, UNSPECIFIED ESTROGEN RECEPTOR STATUS (HCC): ICD-10-CM

## 2023-03-08 DIAGNOSIS — C50.012 BILATERAL MALIGNANT NEOPLASM INVOLVING BOTH NIPPLE AND AREOLA IN FEMALE, UNSPECIFIED ESTROGEN RECEPTOR STATUS (HCC): ICD-10-CM

## 2023-03-08 LAB
ALBUMIN SERPL-MCNC: 2.6 G/DL (ref 3.5–5)
ALBUMIN/GLOB SERPL: 0.8 (ref 1.1–2.2)
ALP SERPL-CCNC: 341 U/L (ref 45–117)
ALT SERPL-CCNC: 68 U/L (ref 12–78)
ANION GAP SERPL CALC-SCNC: 4 MMOL/L (ref 5–15)
AST SERPL-CCNC: 30 U/L (ref 15–37)
BILIRUB SERPL-MCNC: 2.1 MG/DL (ref 0.2–1)
BUN SERPL-MCNC: 25 MG/DL (ref 6–20)
BUN/CREAT SERPL: 14 (ref 12–20)
CALCIUM SERPL-MCNC: 8.7 MG/DL (ref 8.5–10.1)
CHLORIDE SERPL-SCNC: 109 MMOL/L (ref 97–108)
CO2 SERPL-SCNC: 24 MMOL/L (ref 21–32)
CREAT SERPL-MCNC: 1.8 MG/DL (ref 0.55–1.02)
GLOBULIN SER CALC-MCNC: 3.3 G/DL (ref 2–4)
GLUCOSE SERPL-MCNC: 104 MG/DL (ref 65–100)
POTASSIUM SERPL-SCNC: 3 MMOL/L (ref 3.5–5.1)
PROT SERPL-MCNC: 5.9 G/DL (ref 6.4–8.2)
SODIUM SERPL-SCNC: 137 MMOL/L (ref 136–145)

## 2023-03-08 PROCEDURE — 80053 COMPREHEN METABOLIC PANEL: CPT

## 2023-03-08 PROCEDURE — 36415 COLL VENOUS BLD VENIPUNCTURE: CPT

## 2023-03-13 ENCOUNTER — NURSE NAVIGATOR (OUTPATIENT)
Dept: CASE MANAGEMENT | Age: 63
End: 2023-03-13

## 2023-03-13 NOTE — PROGRESS NOTES
[address]  Breast Navigator Encounter    Name:    Oksana Pinon  Age:    57 y.o. Diagnosis:     LEFT breast cancer    Returned patient's call. She is currently at a rehab. Says that they want to \"kick her out\"  because her insurance has run out. Is asking about Medicaid. Has insurance through work, but has not worked in several months. I told her I would have a  reach out to her. No longer on dialysis and they actually took our the catheter. Would like to move her appt with Dr. Vitor Mcguire up to next week if possible. She needs about two days notice to get a ride. I told her I would reach out to her staff and see if they can reschedule her for next week. This was a difficult conversation because the phone kept cutting in and out because of the location where she was. She was appreciative of the return call.                               Samantha Tsang, RN, BSN, [facility]  Oncology Breast Navigator     [address]  [address] [place] [address], Antelmo Machado  22.  W: [phone]  F: [phone]  [email or web address]  Good Help to Those in [facility]

## 2023-03-14 NOTE — PROGRESS NOTES
[employer]  Oncology Social Work  Encounter     Patient Name:  Olena Cervantes    Medical History: breast cancer    Advance Directives: on file    Narrative: Social work referral received from [place], [place]. Patient is currently in a SNF for rehab is will be discharging soon. Patient has questions about staying longer and applying for Medicaid. Called patient and left voicemail message requesting a return call. Referral/Handouts:    Insurance/Entitlements referral    Thank you,   Javan Woo LCSW

## 2023-03-17 ENCOUNTER — DOCUMENTATION ONLY (OUTPATIENT)
Dept: ONCOLOGY | Age: 63
End: 2023-03-17

## 2023-03-17 NOTE — PROGRESS NOTES
Radha Quinn please follow-up on the patient's status and her LFTs. Let me know as soon as he received updated LFTs.

## 2023-03-19 ENCOUNTER — DOCUMENTATION ONLY (OUTPATIENT)
Dept: ONCOLOGY | Age: 63
End: 2023-03-19

## 2023-03-20 NOTE — PROGRESS NOTES
LFTs 3/16 reviewed and better.  Drop prednisone to 60 mg daily, continue checking LFTs once a week- on thursdays as stat labs and have then fax it to us please

## 2023-03-23 ENCOUNTER — DOCUMENTATION ONLY (OUTPATIENT)
Dept: ONCOLOGY | Age: 63
End: 2023-03-23

## 2023-03-23 ENCOUNTER — HOSPITAL ENCOUNTER (OUTPATIENT)
Dept: INFUSION THERAPY | Age: 63
Discharge: HOME | End: 2023-03-23
Payer: COMMERCIAL

## 2023-03-23 ENCOUNTER — OFFICE VISIT (OUTPATIENT)
Dept: ONCOLOGY | Age: 63
End: 2023-03-23
Payer: COMMERCIAL

## 2023-03-23 ENCOUNTER — APPOINTMENT (OUTPATIENT)
Dept: INFUSION THERAPY | Age: 63
End: 2023-03-23

## 2023-03-23 ENCOUNTER — TELEPHONE (OUTPATIENT)
Dept: ONCOLOGY | Age: 63
End: 2023-03-23

## 2023-03-23 VITALS
HEART RATE: 88 BPM | TEMPERATURE: 97.8 F | SYSTOLIC BLOOD PRESSURE: 173 MMHG | WEIGHT: 127 LBS | BODY MASS INDEX: 24.8 KG/M2 | DIASTOLIC BLOOD PRESSURE: 93 MMHG | OXYGEN SATURATION: 98 % | RESPIRATION RATE: 18 BRPM

## 2023-03-23 VITALS
SYSTOLIC BLOOD PRESSURE: 173 MMHG | DIASTOLIC BLOOD PRESSURE: 90 MMHG | HEART RATE: 84 BPM | TEMPERATURE: 97.6 F | RESPIRATION RATE: 18 BRPM

## 2023-03-23 DIAGNOSIS — C50.012 BILATERAL MALIGNANT NEOPLASM INVOLVING BOTH NIPPLE AND AREOLA IN FEMALE, UNSPECIFIED ESTROGEN RECEPTOR STATUS (HCC): Primary | ICD-10-CM

## 2023-03-23 DIAGNOSIS — C50.011 BILATERAL MALIGNANT NEOPLASM INVOLVING BOTH NIPPLE AND AREOLA IN FEMALE, UNSPECIFIED ESTROGEN RECEPTOR STATUS (HCC): Primary | ICD-10-CM

## 2023-03-23 DIAGNOSIS — Z95.828 PORT-A-CATH IN PLACE: ICD-10-CM

## 2023-03-23 LAB
ALBUMIN SERPL-MCNC: 2.8 G/DL (ref 3.5–5)
ALBUMIN/GLOB SERPL: 0.8 (ref 1.1–2.2)
ALP SERPL-CCNC: 163 U/L (ref 45–117)
ALT SERPL-CCNC: 39 U/L (ref 12–78)
ANION GAP SERPL CALC-SCNC: 6 MMOL/L (ref 5–15)
AST SERPL-CCNC: 22 U/L (ref 15–37)
BASOPHILS # BLD: 0 K/UL (ref 0–0.1)
BASOPHILS NFR BLD: 0 % (ref 0–1)
BILIRUB SERPL-MCNC: 1.2 MG/DL (ref 0.2–1)
BUN SERPL-MCNC: 24 MG/DL (ref 6–20)
BUN/CREAT SERPL: 22 (ref 12–20)
CALCIUM SERPL-MCNC: 8.7 MG/DL (ref 8.5–10.1)
CHLORIDE SERPL-SCNC: 108 MMOL/L (ref 97–108)
CO2 SERPL-SCNC: 29 MMOL/L (ref 21–32)
CREAT SERPL-MCNC: 1.11 MG/DL (ref 0.55–1.02)
DIFFERENTIAL METHOD BLD: ABNORMAL
EOSINOPHIL # BLD: 0 K/UL (ref 0–0.4)
EOSINOPHIL NFR BLD: 0 % (ref 0–7)
ERYTHROCYTE [DISTWIDTH] IN BLOOD BY AUTOMATED COUNT: 22.8 % (ref 11.5–14.5)
GLOBULIN SER CALC-MCNC: 3.4 G/DL (ref 2–4)
GLUCOSE SERPL-MCNC: 214 MG/DL (ref 65–100)
HCT VFR BLD AUTO: 35.2 % (ref 35–47)
HGB BLD-MCNC: 10.5 G/DL (ref 11.5–16)
IMM GRANULOCYTES # BLD AUTO: 0.1 K/UL (ref 0–0.04)
IMM GRANULOCYTES NFR BLD AUTO: 1 % (ref 0–0.5)
LYMPHOCYTES # BLD: 0.4 K/UL (ref 0.8–3.5)
LYMPHOCYTES NFR BLD: 4 % (ref 12–49)
MCH RBC QN AUTO: 32.2 PG (ref 26–34)
MCHC RBC AUTO-ENTMCNC: 29.8 G/DL (ref 30–36.5)
MCV RBC AUTO: 108 FL (ref 80–99)
MONOCYTES # BLD: 0.3 K/UL (ref 0–1)
MONOCYTES NFR BLD: 3 % (ref 5–13)
NEUTS SEG # BLD: 9.2 K/UL (ref 1.8–8)
NEUTS SEG NFR BLD: 92 % (ref 32–75)
NRBC # BLD: 0.14 K/UL (ref 0–0.01)
NRBC BLD-RTO: 1.4 PER 100 WBC
PLATELET # BLD AUTO: 136 K/UL (ref 150–400)
PMV BLD AUTO: 11.4 FL (ref 8.9–12.9)
POTASSIUM SERPL-SCNC: 3.2 MMOL/L (ref 3.5–5.1)
PROT SERPL-MCNC: 6.2 G/DL (ref 6.4–8.2)
RBC # BLD AUTO: 3.26 M/UL (ref 3.8–5.2)
RBC MORPH BLD: ABNORMAL
RBC MORPH BLD: ABNORMAL
SODIUM SERPL-SCNC: 143 MMOL/L (ref 136–145)
WBC # BLD AUTO: 10 K/UL (ref 3.6–11)

## 2023-03-23 PROCEDURE — 80053 COMPREHEN METABOLIC PANEL: CPT

## 2023-03-23 PROCEDURE — 74011250636 HC RX REV CODE- 250/636: Performed by: NURSE PRACTITIONER

## 2023-03-23 PROCEDURE — 85025 COMPLETE CBC W/AUTO DIFF WBC: CPT

## 2023-03-23 PROCEDURE — 74011000250 HC RX REV CODE- 250: Performed by: NURSE PRACTITIONER

## 2023-03-23 PROCEDURE — 36591 DRAW BLOOD OFF VENOUS DEVICE: CPT

## 2023-03-23 PROCEDURE — 99215 OFFICE O/P EST HI 40 MIN: CPT | Performed by: INTERNAL MEDICINE

## 2023-03-23 RX ORDER — SODIUM CHLORIDE 0.9 % (FLUSH) 0.9 %
5-40 SYRINGE (ML) INJECTION AS NEEDED
OUTPATIENT
Start: 2023-04-20

## 2023-03-23 RX ORDER — SODIUM CHLORIDE 0.9 % (FLUSH) 0.9 %
5-40 SYRINGE (ML) INJECTION AS NEEDED
Status: CANCELLED | OUTPATIENT
Start: 2023-03-23

## 2023-03-23 RX ORDER — SODIUM CHLORIDE 9 MG/ML
5-40 INJECTION INTRAVENOUS AS NEEDED
Status: DISCONTINUED | OUTPATIENT
Start: 2023-03-23 | End: 2023-03-24 | Stop reason: HOSPADM

## 2023-03-23 RX ORDER — HEPARIN 100 UNIT/ML
500 SYRINGE INTRAVENOUS AS NEEDED
Status: DISCONTINUED | OUTPATIENT
Start: 2023-03-23 | End: 2023-03-24 | Stop reason: HOSPADM

## 2023-03-23 RX ORDER — SODIUM CHLORIDE 9 MG/ML
5-250 INJECTION, SOLUTION INTRAVENOUS AS NEEDED
Status: DISCONTINUED | OUTPATIENT
Start: 2023-03-23 | End: 2023-03-24 | Stop reason: HOSPADM

## 2023-03-23 RX ORDER — SODIUM CHLORIDE 9 MG/ML
5-250 INJECTION, SOLUTION INTRAVENOUS AS NEEDED
Status: CANCELLED | OUTPATIENT
Start: 2023-03-23

## 2023-03-23 RX ORDER — SODIUM CHLORIDE 9 MG/ML
5-40 INJECTION INTRAVENOUS AS NEEDED
OUTPATIENT
Start: 2023-04-20

## 2023-03-23 RX ORDER — SODIUM CHLORIDE 0.9 % (FLUSH) 0.9 %
5-40 SYRINGE (ML) INJECTION AS NEEDED
Status: DISCONTINUED | OUTPATIENT
Start: 2023-03-23 | End: 2023-03-24 | Stop reason: HOSPADM

## 2023-03-23 RX ORDER — HEPARIN 100 UNIT/ML
500 SYRINGE INTRAVENOUS AS NEEDED
Start: 2023-04-20

## 2023-03-23 RX ORDER — SODIUM CHLORIDE 9 MG/ML
5-250 INJECTION, SOLUTION INTRAVENOUS AS NEEDED
OUTPATIENT
Start: 2023-04-20

## 2023-03-23 RX ORDER — HEPARIN 100 UNIT/ML
500 SYRINGE INTRAVENOUS AS NEEDED
Status: CANCELLED
Start: 2023-03-23

## 2023-03-23 RX ORDER — SULFAMETHOXAZOLE AND TRIMETHOPRIM 800; 160 MG/1; MG/1
1 TABLET ORAL
Qty: 12 TABLET | Refills: 2 | Status: SHIPPED | OUTPATIENT
Start: 2023-03-24 | End: 2023-03-24

## 2023-03-23 RX ORDER — SODIUM CHLORIDE 9 MG/ML
5-40 INJECTION INTRAVENOUS AS NEEDED
Status: CANCELLED | OUTPATIENT
Start: 2023-03-23

## 2023-03-23 RX ADMIN — HEPARIN 500 UNITS: 100 SYRINGE at 15:35

## 2023-03-23 RX ADMIN — SODIUM CHLORIDE, PRESERVATIVE FREE 10 ML: 5 INJECTION INTRAVENOUS at 15:35

## 2023-03-23 NOTE — PROGRESS NOTES
[address] [place] [address]   Oncology Social Work  Encounter     Patient Name:  Mikel Pedraza    Medical History: Breast cancer     Advance Directives: Jt Cunningham Sister - [phone] is NOT ACTIVE    Narrative:     Writer joined [place]bert Worker (SW) Fernie Lockhartr met with patient to introduce self as new SW on patient's care team. Patient was in good mood, especially after Dr. Gerrie Phalen entered room to greet patient. Patient is currently residing at [facility]. She is in a wheelchair because she cannot walk long distances yet, as she reports. Patient is feeling some stress due to uncertainty from expected discharge from [facility], thought the exact date is unknown. Patient will get port flushed after today's visit before heading back to [facility], where she has physical therapy from 9-10am Fri 3/24 morning. She will have her first shower in awhile on Friday 3/24, as well. Barriers to Care: Insurance - patient's insurance is ending coverage of rehab stay. Plan:   1. Introduced self and role of this  in the [address] [place] [address] . 2.  Informed the patient of the [facility] and available resources there. 3.  Continue to meet with the patient when she returns to the clinic for ongoing assessment of the patients adjustment to her diagnosis and treatment. 4.  Ongoing psychosocial support as desired by patient.         Referral/Handouts:   Behavioral health referral (briefly discussed counseling through [facility])   Complementary therapies referral  Insurance/Entitlements referral (pt requested assistance with Medicaid application; will call to discuss options for support)

## 2023-03-23 NOTE — PROGRESS NOTES
OPIC Short Note                       Date: 2023    Name: George Nathan    MRN: [id number]         : 1960      1515 Pt admit to [facility] flush/labs ambulatory in stable condition. Assessment completed. No new concerns voiced. Ms. Denzel Brice vitals were reviewed prior to and after treatment. Patient Vitals for the past 12 hrs:   Temp Pulse Resp BP   23 1519 97.6 °F (36.4 °C) 84 18 (!) 173/90       Lab were obtained. Please review pending lab results in [address] [place] [address]. Medications given:   Medications Administered       0.9% sodium chloride injection 5-40 mL       Admin Date  2023 Action  Given Dose  10 mL Route  IntraVENous Administered By  Valdez Byrne RN              heparin (porcine) pf 500 Units       Admin Date  2023 Action  Given Dose  500 Units Route  InterCATHeter Administered By  Valdez Byrne RN             Port accessed with positive blood return. Port flushed, heparinized and de-accessed per protocol. Ms. Marky Yi tolerated the procedure, and had no complaints. Ms. Marky Yi was discharged from [place] [address] in stable condition.      Future Appointments   Date Time Provider Linnea Yuen   2023 10:15 AM Lilton Holstein,  N Roman Alex BS MATHEUS Washington RN  2023  4:14 PM

## 2023-03-23 NOTE — PROGRESS NOTES
Oncology Pharmacist Note        Gumaro Rizo is a  57 y. o.female  diagnosed with breast cancer . Currently on prednisone for immune mediated toxicities. Bactrim prophylaxis sent to pharmacy on file (DS tablets three times a week). Labs from 3/16 show Scr of 1.12 mg/dL - CrCL = 46 ml/min - doesn't need dose adjustments. Tbili - 1.7 mg/dL - no adjustments needed.       Sapphire Fairbanks, PHARMD, BCOP, BCPS    For Pharmacy Admin Tracking Only    Program: Medical Group  CPA in place: Yes  Recommendation Provided To: Patient/Caregiver: 1 via In person  Intervention Detail: New Rx: 1, reason: Needs Additional Therapy  Intervention Accepted By: Patient/Caregiver: 1    Time Spent (min): 15

## 2023-03-23 NOTE — PROGRESS NOTES
Discussed with Alex Melgoza NP at [facility] and rehab the plan of care for the patient. We discussed continuation of weekly labs. We will continue to decrease her prednisone 10 mg a week until she reaches 10 mg as her LFTs improve. She is now at 40 mg. Start her on bactrim m,w,f and protonix as well.      Tatiana Garg NP

## 2023-03-23 NOTE — PROGRESS NOTES
[facility] at [address], [address] [place]: [phone]  F: [phone]    Reason for Visit:   Jason De La Cruz is a 57 y.o. female with left breast cancer-ER 0%, SD 0%, HER2/radha negative. Treatment History:   10/1/22-12/2022: Carboplatin taxol and keytruda followed by ddAC and Keytruda -complicated by COVID-pneumonia, sepsis, multiorgan failure requiring hemodialysis, and delayed immune mediated hepatotoxicity requiring a break from chemotherapy for almost 3 months    History of Present Illness:   Patient is a 26-year-old female with left breast cancer in hosptial with COVID. She is Dr. Ankit Arrieta patient. She had a screening mammogram on 7/20/2022 which showed a 1 cm irregular mass in the left breast at 3 o'clock position. Biopsy on 8/2/2022 showed invasive ductal carcinoma, grade 2-3, ER 0%, SD 0%, HER2/radha negative. Ki-67 85%. She was started on neoadjuvant chemotherapy with Keytruda in October 2022. Following 4 cycles she has had a very rough course whereby she was admitted initially with COVID-pneumonia, multiorgan failure due to sepsis, required hemodialysis. She was then readmitted to [facility] in February with liver failure with no clear evidence of biliary blockage on imaging. She has been in rehab and has been on high-dose steroids which we have been tapering and monitoring her LFTs closely. Today she comes for follow-up. She states her hair has still not come back, however she feels no lumps or bumps, she feels like her leg strength is getting better but she does need assistance to walk, she is a half of hemodialysis.     No FH of breast, ovarian, pancreatic or Prostate malignancies  She works with special needs     Past Medical History:   Diagnosis Date    Cancer ([address]     BREAST CANCER    GERD (gastroesophageal reflux disease)     HTN (hypertension) 07/18/2011    Psychiatric disorder     ANIXETY AND DEPRESSION Thyroid disease     HYPOTHYROID      Past Surgical History:   Procedure Laterality Date    HX COLONOSCOPY      HX MYOMECTOMY  05/02/1996    x 1    HX WISDOM TEETH EXTRACTION      IR INSERT NON TUNL CVC OVER 5 YRS  1/9/2023    IR INSERT TUNL CVC W/O PORT OVER 5 YR  1/23/2023    IR THORACENTESIS/INSERT CHEST TUBE  1/5/2023      Social History     Tobacco Use    Smoking status: Never     Passive exposure: Never    Smokeless tobacco: Never   Substance Use Topics    Alcohol use: Never      Family History   Problem Relation Age of Onset    Heart Failure Mother     Cancer Father     Diabetes Sister     Kidney Disease Sister     Anesth Problems Neg Hx      Current Outpatient Medications   Medication Sig    predniSONE (DELTASONE) 20 mg tablet Take 5 Tablets by mouth daily (with breakfast). For 7 days, 4 tab daily for 7 days, 3 tab daily for 7 days, 2 tab daily for 7 days, 1 tab daiy for 7 days and stop    carvediloL (COREG) 3.125 mg tablet Take 1 Tablet by mouth two (2) times daily (with meals) for 30 days. epoetin tyler-epbx (RETACRIT) 20,000 unit/mL injection 1 mL by SubCUTAneous route DIALYSIS MON, WED & FRI for 30 days. Indications: anemia in chronic kidney disease    OTHER,NON-FORMULARY, IV chemotherapy    famotidine (PEPCID) 40 mg/5 mL (8 mg/mL) suspension Take 2.5 mL by mouth two (2) times a day. ondansetron (ZOFRAN ODT) 8 mg disintegrating tablet Take 1 Tablet by mouth every eight (8) hours as needed for Nausea or Vomiting.    lidocaine-prilocaine (EMLA) topical cream Apply  to affected area as needed for Pain. Apply a thick layer over port a cath 30-60 minutes prior to port access    PARoxetine (PAXIL) 20 mg tablet Take 20 mg by mouth daily. multivitamin, tx-iron-ca-min (THERA-M w/ IRON) 9 mg iron-400 mcg tab tablet Take 1 Tablet by mouth daily. [START ON 3/24/2023] trimethoprim-sulfamethoxazole (BACTRIM DS, SEPTRA DS) 160-800 mg per tablet Take 1 Tablet by mouth every Monday, Wednesday, Friday.      No current facility-administered medications for this visit. Facility-Administered Medications Ordered in Other Visits   Medication Dose Route Frequency    sodium chloride (NS) flush 5-40 mL  5-40 mL IntraVENous PRN    0.9% sodium chloride injection 5-40 mL  5-40 mL IntraVENous PRN    0.9% sodium chloride infusion  5-250 mL/hr IntraVENous PRN    heparin (porcine) pf 500 Units  500 Units InterCATHeter PRN    alteplase (CATHFLO) 2 mg in sterile water (preservative free) 2 mL injection  2 mg InterCATHeter PRN      Allergies   Allergen Reactions    Sulfa (Sulfonamide Antibiotics) Itching, Swelling and Unknown (comments)        Review of Systems: not obtained    Physical Exam:   Visit Vitals  BP (!) 173/93 (BP 1 Location: Right arm, BP Patient Position: Sitting)   Pulse 88   Temp 97.8 °F (36.6 °C)   Resp 18   Wt 127 lb (57.6 kg)   SpO2 98%   BMI 24.80 kg/m²       General appearance - alert, well appearing, in a wheelchair  Mental status - oriented to person, place, and time  Mouth - mucous membranes moist, pharynx normal without lesions  Neck - supple, no appreciable thyromegaly  Lymphatics - no palpable lymphadenopathy, no obvious masses on breast exam  Neurological - normal speech, no focal findings or movement disorder noted  Musculoskeletal - no joint tenderness, deformity or swelling  Extremities -she does have bilateral lower extremity edema 1+, nonpitting      ECOG PS:1-2      Results:     Lab Results   Component Value Date/Time    WBC 15.9 (H) 02/22/2023 07:40 AM    HGB 7.0 (L) 02/22/2023 07:40 AM    HCT 23.3 (L) 02/22/2023 07:40 AM    PLATELET 095 (L) 31/78/0431 07:40 AM    MCV 95.1 02/22/2023 07:40 AM    ABS.  NEUTROPHILS 13.6 (H) 02/22/2023 07:40 AM     Lab Results   Component Value Date/Time    Sodium 137 03/08/2023 11:14 AM    Potassium 3.0 (L) 03/08/2023 11:14 AM    Chloride 109 (H) 03/08/2023 11:14 AM    CO2 24 03/08/2023 11:14 AM    Glucose 104 (H) 03/08/2023 11:14 AM    BUN 25 (H) 03/08/2023 11:14 AM Creatinine 1.80 (H) 03/08/2023 11:14 AM    GFR est AA >60 09/26/2022 03:26 PM    GFR est non-AA >60 09/26/2022 03:26 PM    Calcium 8.7 03/08/2023 11:14 AM    Glucose (POC) 109 02/16/2023 03:11 PM     Lab Results   Component Value Date/Time    Bilirubin, total 2.1 (H) 03/08/2023 11:14 AM    ALT (SGPT) 68 03/08/2023 11:14 AM    Alk. phosphatase 341 (H) 03/08/2023 11:14 AM    Protein, total 5.9 (L) 03/08/2023 11:14 AM    Albumin 2.6 (L) 03/08/2023 11:14 AM    Globulin 3.3 03/08/2023 11:14 AM       External   Records reviewed and summarized above. Pathology report(s) reviewed above. Radiology report(s) reviewed above. MRI  9/2022    LEFT BREAST: Conglomerate mass enhancement in the left breast at 4-5 o'clock,  combined dimension 3.7 x 1.9 x 1.4 cm. This is slightly more extensive than the  sonographic and mammographic findings. No lymphadenopathy. Assessment/PLAN:     1) Left breast cancer  1 cm mass on mammogram  Palpated a lump  ER 0%, AK 0%, HER2/radha negative  Ki 67 was 85%. MRI breast with a 3.7 cm Left breast mass  [id number]  AJCC 8th ed-Stage IIA  Genetic testing negative  Recommended chemotherapy + Keytruda based on the KEYNOTE-522 trial (taxol, carboplatin and [place] followed by [facility] as preoperative treatment, followed by surgery, and then adjuvant pembrolizumab for another nine cycles (27 weeks) after surgery). seen today after 4 cycles of neoadjuvant chemotherapy with carboplatin paclitaxel and pembrolizumab, last received 12/21/2022. Unfortunately she had a series of complications which started with COVID-pneumonia, sepsis, multiorgan failure, transient need for hemodialysis, readmission for grade 4LFT elevation. Leda Engle is secondary to delayed immune toxicity from [facility] as it is evidenced by good response to steroids. LFTs are now grade 1. Clearly she cannot resume Keytruda again.   Her ECOG performance status is also borderline and I do not see her tolerating additional neoadjuvant chemotherapy. We discussed proceeding with disease reassessment and hoping that there is no distant disease would then encourage her to follow-up with Dr. Randy Holbrook to undergo surgical evaluation. Depending on her performance status we can revisit plans for completing adjuvant chemotherapy in the form of [facility] or just following her after surgery. 2) hx of L basilar pleural effusion  Cytology was negative for malignancy     3) ESKD  No longer on dialysis     4) acute liver injury  Grade IV , delayed immune mediated  Now on prednisone at 40 mg daily, now improved to grade 1, will need prolonged and careful steroid taper, we discussed managing side effects of prolonged steroid use    5) psychosocial and management of high risk disease as well as multiple complications from prior treatment  Ino has had an unfortunate series of events, slowly recovering from most, encouragement provided, met with social work      Plan:  -continue weekly cmp checks  -Prednisone taper 40 mg prednisone,continue to taper based on LFTs, will taper down to 30 mg next week, 20 mg the following and then will continue with 10 mg   -Scans ordered  -Bactrim m,w,f prophylaxis  -Protonix daily   -Vitamin D and calcium  -fu with Dr. Eladia Mora office-rest imaging for her  -port flush and labs every 4 weeks and see me in 4 weeks      Discussed plan with Dr. Randy Holbrook  I appreciate the opportunity to participate in Ms. [facility]. I personally saw and evaluated the patient and performed the key components of medical decision making. The history, physical exam, and documentation were performed by Mamie Ramos NP.   I reviewed and verified the above documentation and modified it as needed  Note was extensively edited as above    Signed By: Lizzeth Barnett MD

## 2023-03-23 NOTE — PROGRESS NOTES
Brittany Arenas is a 57 y.o. female    Chief Complaint   Patient presents with    Follow-up     left breast cancer-ER 0%, [place] 0%, HER2/radha negative. 1. Have you been to the ER, urgent care clinic since your last visit? Hospitalized since your last visit? No    2. Have you seen or consulted any other health care providers outside of the [address] [place] [address] since your last visit? Include any pap smears or colon screening.  No

## 2023-03-24 ENCOUNTER — TELEPHONE (OUTPATIENT)
Dept: ONCOLOGY | Age: 63
End: 2023-03-24

## 2023-03-24 NOTE — TELEPHONE ENCOUNTER
Writer outreached  at [address] [place] [address] where patient Khalida Figueroa is currently residing.  Roxanne connected writer to  ([facility]) Rony. Rony reports that they have been in conversation with sister Libra Perez about need to complete Medicaid application; they stated that the business office can also help to complete it. Rony stated that Larryhenry Perez can also arrange a time to sit down with business office to work on that and that was conveyed to patient/patient's sister. Writer and SW discussed patient's discharge planning. Discharge date is determined by insurance company not by [place] [address]. [place] team had discussed potential to stay for LTC, but it doesn't seem that patient will meet criteria to stay in long-term skilled care. In PT earlier that day, patient ambulated 75 feet with walker and is not needing much assistance with ADLs. The sense from SW is that patient likes being there and has been thriving since her arrival. She \"made a real turnaround\" and \"likes the environment. \" Writer and SW agree on concern for potential decline in patient's mood, and therefore possibly physical wellness, when discharged. Next review date from [address] is 3/29/23. Patient has been there 2 months and typical stay on this insurance is 2 weeks       Rony will call Libra Perez today to find out about Medicaid application and will call writer back to provide update.  remains available for further assistance as needed.      Marivel Joya, [address] [place] [address]    Medical Oncology

## 2023-03-24 NOTE — TELEPHONE ENCOUNTER
1240  Called pt HIPAA verified x2. Made pt aware Dr. Marilee Rodas reviewed her labs and her liver looks better. She is reducing her prednisone to 30mg daily and we will reassess her dosage next week once she has her labs drawn. I have faxed a letter to her facility with an update to have her medication dosage changes. Pt voiced understanding and has no questions or concerns at this time.

## 2023-03-24 NOTE — TELEPHONE ENCOUNTER
Called patient to follow-up on request for assistance re: Medicaid application. Patient was preparing to take a shower and writer advised patient to call back later when it's a better time for patient.  remains available for further assistance as needed.      Emily Gutierrez, [facility]    Medical Oncology

## 2023-03-27 ENCOUNTER — TELEPHONE (OUTPATIENT)
Dept: ONCOLOGY | Age: 63
End: 2023-03-27

## 2023-03-27 NOTE — TELEPHONE ENCOUNTER
[employer]  Oncology Social Work  Encounter     Patient Name:  Tammi Garnica    Medical History: Breast cancer     Advance Directives: Yes, sister Rachel Dee     Narrative: Patient outreached patient to follow up on patient's interest to complete Medicaid application and upcoming discharge plans from [facility]. Patient shared that she was in physical therapy and [place] and would return writer's call then when she is able to talk. Writer confirmed plans.      Barriers to Care: not discussed     Plan: speak later today     Referral/Handouts: referrals not discussed on call       Salina Blakely, E[address] [facility] [address] Worker  Medical Oncology

## 2023-03-27 NOTE — TELEPHONE ENCOUNTER
[employer]  Oncology Social Work  Encounter     Patient Jeffry Healy returned writer's call in regard to [facility] application inquiry. Writer informed patient that SW spoke with [address] [place] on Friday 3/24 and she informed this writer that patient's sister Rosemary Bah was provided with a Medicaid application and offered assistance from their business office. Patient indicated this information is not accurate and asked about the information provided that this writer could complete the application. Clarification was offered to patient that SW does not complete Medicaid applications but that writer would be able to either provide a referral to a Financial Counselor at [facility] for assistance completing the application at a scheduled appointment OR that writer could provide patient advocacy through a joint call with patient/patient's sister and SW at [facility] if patient feels that advocacy is needed in order to access the appropriate level of  support from their on-site staff. Patient seemed to indicate that she would reach out to the [facility] team for next steps. Writer reiterated to patient to reach out to SW if additional support would be helpful.  remains available for further assistance as needed.          Monserrat Mendoza, [facility]    Medical Oncology

## 2023-04-04 ENCOUNTER — TELEPHONE (OUTPATIENT)
Dept: ONCOLOGY | Age: 63
End: 2023-04-04

## 2023-04-04 NOTE — TELEPHONE ENCOUNTER
[facility] H[facility] x2. Provided pt with number to scheduling to have scans scheduled and possibly done before her OV on 4/20 with Dr. Jose Tabares. Also provided pt with number to [address] [place] [address] to see if they are able to assist her with applying for medicaid. [phone]). Pt voiced understanding and was appreciative of my call.

## 2023-04-19 ENCOUNTER — DOCUMENTATION ONLY (OUTPATIENT)
Dept: ONCOLOGY | Age: 63
End: 2023-04-19

## 2023-04-19 ENCOUNTER — TELEPHONE (OUTPATIENT)
Dept: ONCOLOGY | Age: 63
End: 2023-04-19

## 2023-04-19 NOTE — TELEPHONE ENCOUNTER
Writer received return call from [place] at [facility]. She reported that \"things are still up in the air a bit\", and noted that it was because the patient \"doesn't want to communicate with the team at [facility]. \" SW described attempting to work with patient and her sister on things such as Medicaid application and planning for eventual discharge and that the patient Jayla Degree not been cooperative. \" Director of Discharge Lacey joined call when writer inquired about their back-up plan at discharge if patient does not identify a place to go. They indicated that the will need to talk to the patient about using the shelter system or, if she has resources, to get a hotel room and access community-based social work services. Nelsy Backers indicated that the patient no longer meets medical criteria for skilled nursing but that if the Medicaid had been approved in time (still pending), they would have worked to find out if there were other options for the patient to stay longer. As it is, the last covered day is today and the patient must be discharged. Sense from team is that patient has delayed making any movements in part because she wants to stay there and did not want to plan to move out. Additionally, they indicated that patient's sister has not followed through on things she said she would do.

## 2023-04-19 NOTE — TELEPHONE ENCOUNTER
[address] to confer about discharge plan for patient. Call was not answered, writer left a discrete voicemail requesting a return call.         Signed By:    Dev Rey, [facility]  Oncology Social Worker

## 2023-04-19 NOTE — PROGRESS NOTES
Requested labs to be faxed from [facility] and rehab on 4/17. Not received. Requested lab results again on 4/19. Still have not received most recent lab results. Per report from Javan Diez NP. Patient has been decrease to 10 mg prednisone. Will continue to follow up.     Erik Freeman NP

## 2023-04-19 NOTE — TELEPHONE ENCOUNTER
Writer outreached patient after learning that patient was informed this week that she has to move out of [address] [place] [address] on Thursday 4/20/23. Patient let this writer know that she will be picked up shortly for physical therapy and will return the call later. Writer offered to call the  at [facility] to get more details about the plan for discharge and patient replied, \"you can do whatever you want. \" Writer voiced reassurance to patient that Alicia Buchanan will do what is possible to provide assistance. Writer will outreach EDWAR Uribe who writer previously spoke with as requested then by patient.  remains available for further assistance as needed.          Signed By:    Monserrat Mendoza, [facility] [facility]  Oncology Social Worker

## 2023-04-20 ENCOUNTER — APPOINTMENT (OUTPATIENT)
Dept: INFUSION THERAPY | Age: 63
End: 2023-04-20

## 2023-04-20 ENCOUNTER — HOSPITAL ENCOUNTER (OUTPATIENT)
Dept: INFUSION THERAPY | Age: 63
End: 2023-04-20

## 2023-04-20 ENCOUNTER — TELEPHONE (OUTPATIENT)
Dept: ONCOLOGY | Age: 63
End: 2023-04-20

## 2023-04-21 DIAGNOSIS — C50.012 BILATERAL MALIGNANT NEOPLASM INVOLVING BOTH NIPPLE AND AREOLA IN FEMALE, UNSPECIFIED ESTROGEN RECEPTOR STATUS (HCC): Primary | ICD-10-CM

## 2023-04-21 DIAGNOSIS — C50.011 BILATERAL MALIGNANT NEOPLASM INVOLVING BOTH NIPPLE AND AREOLA IN FEMALE, UNSPECIFIED ESTROGEN RECEPTOR STATUS (HCC): Primary | ICD-10-CM

## 2023-04-27 ENCOUNTER — DOCUMENTATION ONLY (OUTPATIENT)
Dept: ONCOLOGY | Age: 63
End: 2023-04-27

## 2023-04-27 DIAGNOSIS — C50.011 MALIGNANT NEOPLASM OF NIPPLE AND AREOLA, RIGHT FEMALE BREAST (HCC): Primary | ICD-10-CM

## 2023-04-27 NOTE — PROGRESS NOTES
Called and spoke with patient this afternoon. Patient reports she is doing okay. She is living with her sister for now. We discussed when she would be coming back. Informed next infusion day is 5/18 and she will have an office visit that day too. Also discussed her getting CT scans prior to that visit. She is going to call and try to get those scans scheduled. Patient agrees with plan. Instructed to call office with any questions or concerns.       Herlinda De Leon, NP

## 2023-04-28 DIAGNOSIS — C50.011 MALIGNANT NEOPLASM OF NIPPLE AND AREOLA, RIGHT FEMALE BREAST (HCC): Primary | ICD-10-CM

## 2023-05-01 ENCOUNTER — HOSPITAL ENCOUNTER (OUTPATIENT)
Dept: CT IMAGING | Age: 63
Discharge: HOME | End: 2023-05-01
Attending: NURSE PRACTITIONER
Payer: COMMERCIAL

## 2023-05-01 DIAGNOSIS — C50.011 BILATERAL MALIGNANT NEOPLASM INVOLVING BOTH NIPPLE AND AREOLA IN FEMALE, UNSPECIFIED ESTROGEN RECEPTOR STATUS (HCC): ICD-10-CM

## 2023-05-01 DIAGNOSIS — C50.012 BILATERAL MALIGNANT NEOPLASM INVOLVING BOTH NIPPLE AND AREOLA IN FEMALE, UNSPECIFIED ESTROGEN RECEPTOR STATUS (HCC): ICD-10-CM

## 2023-05-01 PROCEDURE — 71250 CT THORAX DX C-: CPT

## 2023-05-01 PROCEDURE — 74176 CT ABD & PELVIS W/O CONTRAST: CPT

## 2023-05-03 DIAGNOSIS — C50.011 BILATERAL MALIGNANT NEOPLASM INVOLVING BOTH NIPPLE AND AREOLA IN FEMALE, UNSPECIFIED ESTROGEN RECEPTOR STATUS (HCC): Primary | ICD-10-CM

## 2023-05-03 DIAGNOSIS — C50.012 BILATERAL MALIGNANT NEOPLASM INVOLVING BOTH NIPPLE AND AREOLA IN FEMALE, UNSPECIFIED ESTROGEN RECEPTOR STATUS (HCC): Primary | ICD-10-CM

## 2023-05-03 RX ORDER — HEPARIN SODIUM (PORCINE) LOCK FLUSH IV SOLN 100 UNIT/ML 100 UNIT/ML
500 SOLUTION INTRAVENOUS PRN
OUTPATIENT
Start: 2023-05-18

## 2023-05-03 RX ORDER — SODIUM CHLORIDE 0.9 % (FLUSH) 0.9 %
5-40 SYRINGE (ML) INJECTION PRN
OUTPATIENT
Start: 2023-05-18

## 2023-05-03 RX ORDER — SODIUM CHLORIDE 9 MG/ML
25 INJECTION, SOLUTION INTRAVENOUS PRN
OUTPATIENT
Start: 2023-05-18

## 2023-05-09 ENCOUNTER — CLINICAL DOCUMENTATION (OUTPATIENT)
Age: 63
End: 2023-05-09

## 2023-05-09 ENCOUNTER — TELEPHONE (OUTPATIENT)
Age: 63
End: 2023-05-09

## 2023-05-09 NOTE — TELEPHONE ENCOUNTER
1440:    Called patient and confirmed x2 identifiers   Informed per MD Castano request of the following:   CT clear   Make appointment with MD Jayjay Dodson for follow-up and next steps     Patient verbalized understanding and confirmed upcoming appointment     No further questions at this time

## 2023-05-12 ENCOUNTER — TELEPHONE (OUTPATIENT)
Facility: HOSPITAL | Age: 63
End: 2023-05-12

## 2023-05-12 NOTE — TELEPHONE ENCOUNTER
[address]  Breast Navigator Encounter    Name:    Azalia Ramírez  Age:    57 y.o. Diagnosis:    LEFT breast cancer    Called patient at the request of Dr. Kit Barillas. Recent CT Scan was good with no sign of metastatic disease. She would like for her to be seen by Dr. Telly Burdick to discuss surgery. I spoke to patient. Was discharged from [facility] and now staying with her sister. Feels like she has no energy, very fatigued. Feels like PT at the facility helped her, but she has declined a little since at home. I told her I would have someone from Dr. Osvaldo Giles office call her next week to set up an appointment. She would like first available; she said that she would go to either office. Will have to be sure that her sister can bring her. She sounds brighter on the phone. She was very appreciative of the call. I told her to expect a call next week with a date/time of an appt with Dr. Telly Burdick.                            Marry Infante RN, BSN, [facility]  Oncology Breast Navigator     [address]  200 [facility] 22.  W: [phone]  F: [phone]  [email or web address]  Good Help to Those in [facility]

## 2023-05-17 ENCOUNTER — OFFICE VISIT (OUTPATIENT)
Age: 63
End: 2023-05-17
Payer: COMMERCIAL

## 2023-05-17 ENCOUNTER — TELEPHONE (OUTPATIENT)
Age: 63
End: 2023-05-17

## 2023-05-17 DIAGNOSIS — C50.512 MALIGNANT NEOPLASM OF LOWER-OUTER QUADRANT OF LEFT FEMALE BREAST, UNSPECIFIED ESTROGEN RECEPTOR STATUS (HCC): Primary | ICD-10-CM

## 2023-05-17 PROCEDURE — 99214 OFFICE O/P EST MOD 30 MIN: CPT | Performed by: SURGERY

## 2023-05-17 PROCEDURE — 76642 ULTRASOUND BREAST LIMITED: CPT | Performed by: SURGERY

## 2023-05-18 ENCOUNTER — HOSPITAL ENCOUNTER (OUTPATIENT)
Facility: HOSPITAL | Age: 63
Setting detail: INFUSION SERIES
End: 2023-05-18
Payer: COMMERCIAL

## 2023-05-18 ENCOUNTER — APPOINTMENT (OUTPATIENT)
Dept: INFUSION THERAPY | Age: 63
End: 2023-05-18

## 2023-05-18 ENCOUNTER — OFFICE VISIT (OUTPATIENT)
Age: 63
End: 2023-05-18
Payer: COMMERCIAL

## 2023-05-18 ENCOUNTER — HOSPITAL ENCOUNTER (OUTPATIENT)
Dept: INFUSION THERAPY | Age: 63
End: 2023-05-18

## 2023-05-18 ENCOUNTER — CLINICAL DOCUMENTATION (OUTPATIENT)
Age: 63
End: 2023-05-18

## 2023-05-18 VITALS
OXYGEN SATURATION: 97 % | RESPIRATION RATE: 17 BRPM | BODY MASS INDEX: 24.02 KG/M2 | WEIGHT: 123 LBS | DIASTOLIC BLOOD PRESSURE: 71 MMHG | HEART RATE: 61 BPM | TEMPERATURE: 97.2 F | SYSTOLIC BLOOD PRESSURE: 161 MMHG

## 2023-05-18 VITALS
TEMPERATURE: 97.2 F | HEART RATE: 61 BPM | DIASTOLIC BLOOD PRESSURE: 67 MMHG | SYSTOLIC BLOOD PRESSURE: 161 MMHG | RESPIRATION RATE: 17 BRPM

## 2023-05-18 DIAGNOSIS — C50.012 BILATERAL MALIGNANT NEOPLASM INVOLVING BOTH NIPPLE AND AREOLA IN FEMALE, UNSPECIFIED ESTROGEN RECEPTOR STATUS (HCC): Primary | ICD-10-CM

## 2023-05-18 DIAGNOSIS — C50.011 BILATERAL MALIGNANT NEOPLASM INVOLVING BOTH NIPPLE AND AREOLA IN FEMALE, UNSPECIFIED ESTROGEN RECEPTOR STATUS (HCC): Primary | ICD-10-CM

## 2023-05-18 DIAGNOSIS — C50.011 MALIGNANT NEOPLASM OF NIPPLE AND AREOLA, RIGHT FEMALE BREAST (HCC): Primary | ICD-10-CM

## 2023-05-18 LAB
ALBUMIN SERPL-MCNC: 3 G/DL (ref 3.5–5)
ALBUMIN/GLOB SERPL: 0.9 (ref 1.1–2.2)
ALP SERPL-CCNC: 71 U/L (ref 45–117)
ALT SERPL-CCNC: 30 U/L (ref 12–78)
ANION GAP SERPL CALC-SCNC: 2 MMOL/L (ref 5–15)
AST SERPL-CCNC: 25 U/L (ref 15–37)
BASOPHILS # BLD: 0 K/UL (ref 0–0.1)
BASOPHILS NFR BLD: 0 % (ref 0–1)
BILIRUB SERPL-MCNC: 0.2 MG/DL (ref 0.2–1)
BUN SERPL-MCNC: 16 MG/DL (ref 6–20)
BUN/CREAT SERPL: 17 (ref 12–20)
CALCIUM SERPL-MCNC: 9.3 MG/DL (ref 8.5–10.1)
CHLORIDE SERPL-SCNC: 113 MMOL/L (ref 97–108)
CO2 SERPL-SCNC: 27 MMOL/L (ref 21–32)
CREAT SERPL-MCNC: 0.93 MG/DL (ref 0.55–1.02)
DIFFERENTIAL METHOD BLD: ABNORMAL
EOSINOPHIL # BLD: 0.1 K/UL (ref 0–0.4)
EOSINOPHIL NFR BLD: 1 % (ref 0–7)
ERYTHROCYTE [DISTWIDTH] IN BLOOD BY AUTOMATED COUNT: 14 % (ref 11.5–14.5)
GLOBULIN SER CALC-MCNC: 3.4 G/DL (ref 2–4)
GLUCOSE SERPL-MCNC: 94 MG/DL (ref 65–100)
HCT VFR BLD AUTO: 39.3 % (ref 35–47)
HGB BLD-MCNC: 12.1 G/DL (ref 11.5–16)
IMM GRANULOCYTES # BLD AUTO: 0.1 K/UL (ref 0–0.04)
IMM GRANULOCYTES NFR BLD AUTO: 1 % (ref 0–0.5)
LYMPHOCYTES # BLD: 1.9 K/UL (ref 0.8–3.5)
LYMPHOCYTES NFR BLD: 27 % (ref 12–49)
MCH RBC QN AUTO: 31.7 PG (ref 26–34)
MCHC RBC AUTO-ENTMCNC: 30.8 G/DL (ref 30–36.5)
MCV RBC AUTO: 102.9 FL (ref 80–99)
MONOCYTES # BLD: 0.9 K/UL (ref 0–1)
MONOCYTES NFR BLD: 12 % (ref 5–13)
NEUTS SEG # BLD: 4.1 K/UL (ref 1.8–8)
NEUTS SEG NFR BLD: 59 % (ref 32–75)
NRBC # BLD: 0 K/UL (ref 0–0.01)
NRBC BLD-RTO: 0 PER 100 WBC
PLATELET # BLD AUTO: 197 K/UL (ref 150–400)
PMV BLD AUTO: 9.8 FL (ref 8.9–12.9)
POTASSIUM SERPL-SCNC: 3.3 MMOL/L (ref 3.5–5.1)
PROT SERPL-MCNC: 6.4 G/DL (ref 6.4–8.2)
RBC # BLD AUTO: 3.82 M/UL (ref 3.8–5.2)
SODIUM SERPL-SCNC: 142 MMOL/L (ref 136–145)
WBC # BLD AUTO: 7 K/UL (ref 3.6–11)

## 2023-05-18 PROCEDURE — 36592 COLLECT BLOOD FROM PICC: CPT

## 2023-05-18 PROCEDURE — 80053 COMPREHEN METABOLIC PANEL: CPT

## 2023-05-18 PROCEDURE — 6360000002 HC RX W HCPCS: Performed by: INTERNAL MEDICINE

## 2023-05-18 PROCEDURE — 85025 COMPLETE CBC W/AUTO DIFF WBC: CPT

## 2023-05-18 PROCEDURE — 36591 DRAW BLOOD OFF VENOUS DEVICE: CPT

## 2023-05-18 PROCEDURE — 36415 COLL VENOUS BLD VENIPUNCTURE: CPT

## 2023-05-18 PROCEDURE — 2580000003 HC RX 258: Performed by: INTERNAL MEDICINE

## 2023-05-18 PROCEDURE — 99215 OFFICE O/P EST HI 40 MIN: CPT | Performed by: INTERNAL MEDICINE

## 2023-05-18 RX ORDER — SODIUM CHLORIDE 0.9 % (FLUSH) 0.9 %
5-40 SYRINGE (ML) INJECTION PRN
Status: DISCONTINUED | OUTPATIENT
Start: 2023-05-18 | End: 2023-05-19 | Stop reason: HOSPADM

## 2023-05-18 RX ORDER — PREDNISONE 1 MG/1
5 TABLET ORAL DAILY
Qty: 30 TABLET | Refills: 3 | Status: SHIPPED | OUTPATIENT
Start: 2023-05-18 | End: 2023-06-17

## 2023-05-18 RX ORDER — FUROSEMIDE 10 MG/ML
SOLUTION ORAL DAILY
COMMUNITY

## 2023-05-18 RX ORDER — SODIUM CHLORIDE 9 MG/ML
25 INJECTION, SOLUTION INTRAVENOUS PRN
Status: DISCONTINUED | OUTPATIENT
Start: 2023-05-18 | End: 2023-05-19 | Stop reason: HOSPADM

## 2023-05-18 RX ORDER — SODIUM CHLORIDE 9 MG/ML
25 INJECTION, SOLUTION INTRAVENOUS PRN
Status: CANCELLED | OUTPATIENT
Start: 2023-06-15

## 2023-05-18 RX ORDER — ASPIRIN 81 MG/1
TABLET ORAL
COMMUNITY
Start: 2016-08-01

## 2023-05-18 RX ORDER — HEPARIN SODIUM (PORCINE) LOCK FLUSH IV SOLN 100 UNIT/ML 100 UNIT/ML
500 SOLUTION INTRAVENOUS PRN
Status: CANCELLED | OUTPATIENT
Start: 2023-06-15

## 2023-05-18 RX ORDER — HEPARIN SODIUM (PORCINE) LOCK FLUSH IV SOLN 100 UNIT/ML 100 UNIT/ML
500 SOLUTION INTRAVENOUS PRN
Status: DISCONTINUED | OUTPATIENT
Start: 2023-05-18 | End: 2023-05-19 | Stop reason: HOSPADM

## 2023-05-18 RX ORDER — SODIUM CHLORIDE 0.9 % (FLUSH) 0.9 %
5-40 SYRINGE (ML) INJECTION PRN
Status: CANCELLED | OUTPATIENT
Start: 2023-06-15

## 2023-05-18 RX ADMIN — HEPARIN 500 UNITS: 100 SYRINGE at 13:30

## 2023-05-18 RX ADMIN — SODIUM CHLORIDE, PRESERVATIVE FREE 10 ML: 5 INJECTION INTRAVENOUS at 13:20

## 2023-05-18 RX ADMIN — SODIUM CHLORIDE, PRESERVATIVE FREE 10 ML: 5 INJECTION INTRAVENOUS at 13:15

## 2023-05-18 NOTE — PROGRESS NOTES
Valorie Sanchez is a 57 y.o. female    Chief Complaint   Patient presents with    Follow-up      left breast cancer-ER 0%, MD 0%, HER2/logan negative. 1. Have you been to the ER, urgent care clinic since your last visit? Hospitalized since your last visit? No    2. Have you seen or consulted any other health care providers outside of the [address] [place] [address] since your last visit? Include any pap smears or colon screening.  Yes,    PCP Judi Figueroa

## 2023-05-18 NOTE — PROGRESS NOTES
Called and left patient a detailed message to return my call when possible to get scheduled for surgery [phone]

## 2023-05-18 NOTE — PROGRESS NOTES
[facility] Progress Note    Date: May 18, 2023    Name: Rabia Flores    MRN: [id number]         : 1960      1306:  Pt arrived ambulatory and in no distress, for lab visit. Labs drawn via Right chest port, patient tolerated well. Vitals:    23 1300   BP: (!) 161/67   Pulse: 61   Resp: 17   Temp: 97.2 °F (36.2 °C)       Lab results were obtained and reviewed. No results found for this or any previous visit (from the past 12 hour(s)). Departed [facility] ambulatory and in no distress.     Future Appointments   Date Time Provider [place]   2023  1:45 PM Dee Jack  N Matthew [place]ton BS AMB   6/15/2023  2:00 PM G2 DENZEL FASTRACK [facility]   2023  2:00 PM H2 DENZEL FASTRACK [facility]   8/10/2023  1:30 PM H2 DENZEL FASTRACK [facility]   2023  1:30 PM H2 DENZEL FASTRACK RCHICB [address], RN  May 18, 2023

## 2023-05-18 NOTE — PROGRESS NOTES
Cancer [place] at [address], [address]: [phone]  F: [phone]          Reason for Visit:     Wilfred Suarez is a 57 y.o. female with left breast cancer-ER 0%, ID 0%, HER2/logan negative. Treatment History:      10/1/22-12/2022: Carboplatin taxol and keytruda followed by ddAC and Keytruda -complicated  by COVID-pneumonia, sepsis, multiorgan failure requiring hemodialysis, and delayed immune mediated hepatotoxicity requiring a break from chemotherapy for almost 3 months          History of Present Illness:     Patient is a 52-year-old female [facility] She is Dr. Dhaval Ordaz patient. She had a screening mammogram on 7/20/2022 which showed a 1 cm irregular mass in the left breast at 3 o'clock position. Biopsy on 8/2/2022 showed invasive ductal carcinoma, grade 2-3, ER 0%, ID 0%, HER2/logan negative. Ki-67 85%. She was started on neoadjuvant  chemotherapy with Keytruda in October 2022. Following 4 cycles she has had a very rough course whereby she was admitted initially with COVID-pneumonia, multiorgan failure due to sepsis, required hemodialysis. She was then readmitted to [facility] in February with liver failure with no clear evidence of biliary blockage on imaging. She has been in rehab and has been on high-dose steroids which we have been tapering and monitoring her LFTs closely. Off HD. Today she comes for follow-up. Still has some aches and pains. Some Post nasal drip. She has no HA, no nausea.        No FH of breast, ovarian, pancreatic or Prostate malignancies   She works with special needs             Review of Systems: not obtained          Physical Exam:     Visit Vitals        BP  (!) 173/93 (BP 1 Location: Right arm, BP Patient Position: Sitting)     Pulse  88     Temp  97.8 °F (36.6 °C)     Resp  18     Wt  127 lb (57.6 kg)     SpO2  98%        BMI

## 2023-05-23 ENCOUNTER — PREP FOR PROCEDURE (OUTPATIENT)
Age: 63
End: 2023-05-23

## 2023-05-23 DIAGNOSIS — C50.012 CARCINOMA OF NIPPLE AND AREOLA OF FEMALE BREAST, LEFT (HCC): Primary | ICD-10-CM

## 2023-05-24 ENCOUNTER — TELEPHONE (OUTPATIENT)
Age: 63
End: 2023-05-24

## 2023-05-24 DIAGNOSIS — C50.011 MALIGNANT NEOPLASM OF NIPPLE AND AREOLA, RIGHT FEMALE BREAST (HCC): Primary | ICD-10-CM

## 2023-05-24 RX ORDER — NYSTATIN 100000 [USP'U]/G
POWDER TOPICAL
Qty: 60 G | Refills: 0 | Status: SHIPPED | OUTPATIENT
Start: 2023-05-24

## 2023-05-24 NOTE — TELEPHONE ENCOUNTER
[facility] [place] at [address] [address]  [address] [address]: [phone]  F: [phone]    Medical Nutrition Therapy  Nutrition Referral:    Referral received regarding patient requesting an Ensure prescription. Called and spoke with her, explained that insurance will not cover unless it is through a feeding tube. Offered to provide with her samples and coupons.       Signed By: Tamera Beard, [employer] [facility]

## 2023-05-25 ENCOUNTER — TELEPHONE (OUTPATIENT)
Age: 63
End: 2023-05-25

## 2023-05-25 DIAGNOSIS — C50.011 MALIGNANT NEOPLASM OF NIPPLE AND AREOLA, RIGHT FEMALE BREAST (HCC): Primary | ICD-10-CM

## 2023-05-25 RX ORDER — PREDNISONE 1 MG/1
5 TABLET ORAL DAILY
Qty: 30 TABLET | Refills: 3 | Status: SHIPPED | OUTPATIENT
Start: 2023-05-25 | End: 2023-06-24

## 2023-05-25 NOTE — TELEPHONE ENCOUNTER
Oncology Pharmacist Note    Kushal Springer is a  57 y. o.female  diagnosed with breast cancer .        Re-sent prednisone prescription to [facility] per patient request.     Radha Carter, PHARMD, BCOP, [facility]    For Pharmacy Admin Tracking Only    Program: Medical Group  CPA in place:  Yes  Recommendation Provided To: Patient/Caregiver: 1 via Telephone  Intervention Detail: Refill(s) Provided  Intervention Accepted By: Patient/Caregiver: 1    Time Spent (min): 10

## 2023-05-26 ENCOUNTER — CLINICAL DOCUMENTATION (OUTPATIENT)
Age: 63
End: 2023-05-26

## 2023-05-26 NOTE — PROGRESS NOTES
Marcelino from [facility] PAT called stating that she had reached out to patient for her appointment, but she was unaware that she needed to come in before surgery. Marcelino asked if I could call patient to explain. I reached out to patient, I explained that it's protocol to have labs checked before surgery at [facility]. Patient understood and was appreciative that I called.

## 2023-05-31 ENCOUNTER — HOSPITAL ENCOUNTER (OUTPATIENT)
Facility: HOSPITAL | Age: 63
Setting detail: INFUSION SERIES
End: 2023-05-31
Payer: COMMERCIAL

## 2023-05-31 VITALS
SYSTOLIC BLOOD PRESSURE: 155 MMHG | BODY MASS INDEX: 23.75 KG/M2 | WEIGHT: 121.6 LBS | DIASTOLIC BLOOD PRESSURE: 70 MMHG | HEART RATE: 68 BPM | RESPIRATION RATE: 16 BRPM | TEMPERATURE: 98.3 F

## 2023-05-31 DIAGNOSIS — C50.011 MALIGNANT NEOPLASM OF NIPPLE AND AREOLA, RIGHT FEMALE BREAST (HCC): Primary | ICD-10-CM

## 2023-05-31 LAB
ALBUMIN SERPL-MCNC: 3.5 G/DL (ref 3.5–5)
ALBUMIN/GLOB SERPL: 1 (ref 1.1–2.2)
ALP SERPL-CCNC: 78 U/L (ref 45–117)
ALT SERPL-CCNC: 30 U/L (ref 12–78)
ANION GAP SERPL CALC-SCNC: 7 MMOL/L (ref 5–15)
AST SERPL-CCNC: 26 U/L (ref 15–37)
BILIRUB SERPL-MCNC: 0.3 MG/DL (ref 0.2–1)
BUN SERPL-MCNC: 20 MG/DL (ref 6–20)
BUN/CREAT SERPL: 21 (ref 12–20)
CALCIUM SERPL-MCNC: 9.3 MG/DL (ref 8.5–10.1)
CHLORIDE SERPL-SCNC: 109 MMOL/L (ref 97–108)
CO2 SERPL-SCNC: 25 MMOL/L (ref 21–32)
CREAT SERPL-MCNC: 0.96 MG/DL (ref 0.55–1.02)
GLOBULIN SER CALC-MCNC: 3.4 G/DL (ref 2–4)
GLUCOSE SERPL-MCNC: 103 MG/DL (ref 65–100)
POTASSIUM SERPL-SCNC: 4.2 MMOL/L (ref 3.5–5.1)
PROT SERPL-MCNC: 6.9 G/DL (ref 6.4–8.2)
SODIUM SERPL-SCNC: 141 MMOL/L (ref 136–145)

## 2023-05-31 PROCEDURE — 36591 DRAW BLOOD OFF VENOUS DEVICE: CPT

## 2023-05-31 PROCEDURE — 80053 COMPREHEN METABOLIC PANEL: CPT

## 2023-05-31 PROCEDURE — 36415 COLL VENOUS BLD VENIPUNCTURE: CPT

## 2023-05-31 PROCEDURE — 36592 COLLECT BLOOD FROM PICC: CPT

## 2023-05-31 RX ORDER — SODIUM CHLORIDE 0.9 % (FLUSH) 0.9 %
5-40 SYRINGE (ML) INJECTION PRN
Status: CANCELLED | OUTPATIENT
Start: 2023-06-15

## 2023-05-31 RX ORDER — SODIUM CHLORIDE 9 MG/ML
25 INJECTION, SOLUTION INTRAVENOUS PRN
Status: CANCELLED | OUTPATIENT
Start: 2023-06-15

## 2023-05-31 RX ORDER — HEPARIN SODIUM (PORCINE) LOCK FLUSH IV SOLN 100 UNIT/ML 100 UNIT/ML
500 SOLUTION INTRAVENOUS PRN
Status: CANCELLED | OUTPATIENT
Start: 2023-06-15

## 2023-05-31 NOTE — PROGRESS NOTES
[facility]                       Date: May 31, 2023    Name: Donya Owen    MRN: [id number]         : 1960      1345 Pt admit to [facility] for Labs ambulatory in stable condition. Assessment completed. No new concerns voiced. Port accessed w pos blood return noted - labs sent for processing. Ms. Smith Goltz vitals were reviewed and after treatment. Medications given:     Port flushed,  and de-accessed per protocol. Ms. Johana Wagner was discharged from [place] [address] in stable condition.  Pt aware of next appt    Future Appointments   Date Time Provider Eran Lino   6/15/2023  2:00 PM G2 DENZEL FASTRACK RCHICB [address] [place] [address]   2023  1:30 PM [address] [place] [address] PAT EXAM RM 1 [facility] [address] [place] [address]   2023  1:30 PM H2 DENZEL FASTRACK RCHICB [address] [place] [address]   2023  1:45 PM Romualdo Riedel,  [address] BS AMB   8/10/2023  1:30 PM H2 DENZEL FASTRACK RCHICB [address] [place] [address]   2023  1:30 PM H2 DENZEL FASTRACK RCHICB [address] [place] [address]       Effie Richey RN  May 31, 2023  1:57 PM

## 2023-06-01 DIAGNOSIS — C50.011 MALIGNANT NEOPLASM OF NIPPLE AND AREOLA, RIGHT FEMALE BREAST (HCC): ICD-10-CM

## 2023-06-05 ENCOUNTER — TELEPHONE (OUTPATIENT)
Facility: HOSPITAL | Age: 63
End: 2023-06-05

## 2023-06-05 NOTE — TELEPHONE ENCOUNTER
[address]  Breast Navigator Encounter    Name:    Caren Baez  Age:    57 y.o. Diagnosis:    LEFT breast cancer    Returned patient's call. She had LM stating that she had some surgery questions. Wants to know if she signs a release stating that she will not hold the hospital/Dr. Tigre Spencer responsible, can she drive home from the hospital?   I told her that she cannot do this and will not physically be alert enough to drive herself home. She had more specific questions about mag trace, mag seed, time to be at the hospital, etc.  I answered what I could but referred the rest to Dr. Sandra Younger. I spoke to Josiah Polo directly who will call the patient in the next few days. Ms. Varsha Link was appreciative.                               Kiara Escobedo, RN, BSN, [facility]  Oncology Breast Navigator     [address]  200 [facility], [address] [place] [address].  W: [phone]  F: [phone]  [email or web address]  Good Help to Those in [facility]

## 2023-06-06 ENCOUNTER — CLINICAL DOCUMENTATION (OUTPATIENT)
Age: 63
End: 2023-06-06

## 2023-06-06 NOTE — PROGRESS NOTES
Patient Surgery Information Sheet      Patient Name:  Yevette Harada  Surgery Date:  June 27, 2023    Type of Surgery:  LEFT BREAST MAGSEED GUIDED LUMPECTOMY LEFT BREAST SENTINEL NODE BIOPSY (WITH MAGTRACE INJECTION IN PRE-OP) PORT REMOVAL     Estimated arrival time 9:30  AM    Arrival time will be confirmed the afternoon before your surgery. Pre-procedure: MAGSEED ON 6/19/2023 at 10:30 am    Pre-Operative Testing Department will call to schedule pre-op testing appointment if needed before surgery    Hospital:  [address] [place] [address]  Address:  [address]iam [address], [address] [place] [address]  Check in location:  Through the main entrance of [facility] directly to the left at Patient Access    Pre-Operative Instructions: Will be given at the pre-op appointment. Special Instructions if needed:     No eating after midnight the night before, may have clear liquids 2 hours prior such as coffee, water, ginger ale or sprite.   Patient may shower the morning of, do not use any lotions, deodorants, powders, perfumes or makeup  Patient will need  the morning of surgery     POST OPERATIVE VISIT: 7/11/2023 AT 9:45 AM DR. Ferny Medina    Surgery Scheduler:  Sherita Richards

## 2023-06-06 NOTE — PROGRESS NOTES
Called patient to go over her pre-op instructions for her procedure. Patient had some questions regarding her surgery. She expressed that she doesn't have anyone to drive her to and from the hospital on the 27th she asked if it was possible for her to drive herself home. I expressed how important it is for her to have someone to take her home following surgery, and that she is not going to be able to drive. She stated that she is going to try her best to have a family member bring her on that day. I asked patient to call me with additional questions or concerns, she was satisfied.

## 2023-06-19 ENCOUNTER — HOSPITAL ENCOUNTER (OUTPATIENT)
Facility: HOSPITAL | Age: 63
Discharge: HOME | End: 2023-06-22
Attending: SURGERY
Payer: COMMERCIAL

## 2023-06-19 DIAGNOSIS — R92.8 ABNORMAL MAMMOGRAM: ICD-10-CM

## 2023-06-19 PROCEDURE — 2500000003 HC RX 250 WO HCPCS: Performed by: RADIOLOGY

## 2023-06-19 PROCEDURE — A4648 IMPLANTABLE TISSUE MARKER: HCPCS

## 2023-06-19 RX ORDER — LIDOCAINE HYDROCHLORIDE 10 MG/ML
5 INJECTION, SOLUTION INFILTRATION; PERINEURAL ONCE
Status: COMPLETED | OUTPATIENT
Start: 2023-06-19 | End: 2023-06-19

## 2023-06-19 RX ORDER — PANTOPRAZOLE SODIUM 40 MG/1
TABLET, DELAYED RELEASE ORAL
Qty: 30 TABLET | Refills: 0 | OUTPATIENT
Start: 2023-06-19

## 2023-06-19 RX ADMIN — LIDOCAINE HYDROCHLORIDE 5 ML: 10 INJECTION, SOLUTION INFILTRATION; PERINEURAL at 11:40

## 2023-06-20 ENCOUNTER — HOSPITAL ENCOUNTER (OUTPATIENT)
Facility: HOSPITAL | Age: 63
Discharge: HOME | End: 2023-06-23
Payer: COMMERCIAL

## 2023-06-20 VITALS
WEIGHT: 120.8 LBS | DIASTOLIC BLOOD PRESSURE: 76 MMHG | HEART RATE: 58 BPM | SYSTOLIC BLOOD PRESSURE: 151 MMHG | BODY MASS INDEX: 23.71 KG/M2 | HEIGHT: 60 IN | TEMPERATURE: 98.3 F

## 2023-06-20 LAB
ANION GAP SERPL CALC-SCNC: 8 MMOL/L (ref 5–15)
BASOPHILS # BLD: 0 K/UL (ref 0–0.1)
BASOPHILS NFR BLD: 1 % (ref 0–1)
BUN SERPL-MCNC: 17 MG/DL (ref 6–20)
BUN/CREAT SERPL: 16 (ref 12–20)
CALCIUM SERPL-MCNC: 10.3 MG/DL (ref 8.5–10.1)
CHLORIDE SERPL-SCNC: 107 MMOL/L (ref 97–108)
CO2 SERPL-SCNC: 27 MMOL/L (ref 21–32)
CREAT SERPL-MCNC: 1.07 MG/DL (ref 0.55–1.02)
DIFFERENTIAL METHOD BLD: ABNORMAL
EOSINOPHIL # BLD: 0.1 K/UL (ref 0–0.4)
EOSINOPHIL NFR BLD: 1 % (ref 0–7)
ERYTHROCYTE [DISTWIDTH] IN BLOOD BY AUTOMATED COUNT: 13 % (ref 11.5–14.5)
GLUCOSE SERPL-MCNC: 95 MG/DL (ref 65–100)
HCT VFR BLD AUTO: 44.5 % (ref 35–47)
HGB BLD-MCNC: 13.6 G/DL (ref 11.5–16)
IMM GRANULOCYTES # BLD AUTO: 0 K/UL (ref 0–0.04)
IMM GRANULOCYTES NFR BLD AUTO: 0 % (ref 0–0.5)
LYMPHOCYTES # BLD: 1.6 K/UL (ref 0.8–3.5)
LYMPHOCYTES NFR BLD: 24 % (ref 12–49)
MCH RBC QN AUTO: 30.7 PG (ref 26–34)
MCHC RBC AUTO-ENTMCNC: 30.6 G/DL (ref 30–36.5)
MCV RBC AUTO: 100.5 FL (ref 80–99)
MONOCYTES # BLD: 0.4 K/UL (ref 0–1)
MONOCYTES NFR BLD: 6 % (ref 5–13)
NEUTS SEG # BLD: 4.5 K/UL (ref 1.8–8)
NEUTS SEG NFR BLD: 68 % (ref 32–75)
NRBC # BLD: 0 K/UL (ref 0–0.01)
NRBC BLD-RTO: 0 PER 100 WBC
PLATELET # BLD AUTO: 293 K/UL (ref 150–400)
PMV BLD AUTO: 10 FL (ref 8.9–12.9)
POTASSIUM SERPL-SCNC: 3.9 MMOL/L (ref 3.5–5.1)
RBC # BLD AUTO: 4.43 M/UL (ref 3.8–5.2)
SODIUM SERPL-SCNC: 142 MMOL/L (ref 136–145)
WBC # BLD AUTO: 6.6 K/UL (ref 3.6–11)

## 2023-06-20 PROCEDURE — 80048 BASIC METABOLIC PNL TOTAL CA: CPT

## 2023-06-20 PROCEDURE — 36415 COLL VENOUS BLD VENIPUNCTURE: CPT

## 2023-06-20 PROCEDURE — 85025 COMPLETE CBC W/AUTO DIFF WBC: CPT

## 2023-06-20 RX ORDER — CARVEDILOL 6.25 MG/1
6.25 TABLET ORAL 2 TIMES DAILY WITH MEALS
COMMUNITY

## 2023-06-20 RX ORDER — PANTOPRAZOLE SODIUM 40 MG/1
40 TABLET, DELAYED RELEASE ORAL DAILY
COMMUNITY

## 2023-06-20 NOTE — PERIOP NOTE
[address] INSTRUCTIONS    Surgery Date:   6/27/23    Your surgeon's office or [facility] staff will call you between 4 PM- 8 PM the day before surgery with your arrival time. If your surgery is on a Monday, you will receive a call the preceding Friday. Please report to [facility] Patient Access/Admitting on the 1st floor. Bring your insurance card, photo identification, and any copayment ( if applicable). If you are going home the same day of your surgery, you must have a responsible adult to drive you home. You need to have a responsible adult to stay with you the first 24 hours after surgery and you should not drive a car for 24 hours following your surgery. Do NOT eat any solid foods after midnight the night before surgery including candy, mint or gum. You may drink clear liquids from midnight until 1 hour prior to your arrival. You may drink up to 12 ounces at one time every 4 hours. Please note special instructions, if applicable, below for medications. Do NOT drink alcohol or smoke 24 hours before surgery. STOP smoking for 14 days prior as it helps with breathing and healing after surgery. If you are being admitted to the hospital, please leave personal belongings/luggage in your car until you have an assigned hospital room number. Please wear comfortable clothes. Wear your glasses instead of contacts. We ask that all money, jewelry and valuables be left at home. Wear no make up, particularly mascara, the day of surgery. All body piercings, rings, and jewelry need to be removed and left at home. Please remove any nail polish or artifical nails from your fingernails. Please wear your hair loose or down. Please no pony-tails, buns, or any metal hair accessories. If you shower the morning of surgery, please do not apply any lotions or powders afterwards. You may wear deodorant, unless having breast surgery. Do not shave any body area within 24 hours of your surgery.   Please

## 2023-06-21 LAB
EKG ATRIAL RATE: 59 BPM
EKG DIAGNOSIS: NORMAL
EKG P AXIS: 32 DEGREES
EKG P-R INTERVAL: 132 MS
EKG Q-T INTERVAL: 430 MS
EKG QRS DURATION: 88 MS
EKG QTC CALCULATION (BAZETT): 425 MS
EKG R AXIS: -30 DEGREES
EKG T AXIS: 158 DEGREES
EKG VENTRICULAR RATE: 59 BPM

## 2023-06-26 ENCOUNTER — CLINICAL DOCUMENTATION (OUTPATIENT)
Age: 63
End: 2023-06-26

## 2023-06-26 RX ORDER — HYDROMORPHONE HYDROCHLORIDE 1 MG/ML
0.5 INJECTION, SOLUTION INTRAMUSCULAR; INTRAVENOUS; SUBCUTANEOUS EVERY 10 MIN PRN
Status: CANCELLED | OUTPATIENT
Start: 2023-06-26

## 2023-06-26 RX ORDER — OXYCODONE HYDROCHLORIDE 5 MG/1
5 TABLET ORAL
Status: CANCELLED | OUTPATIENT
Start: 2023-06-26 | End: 2023-06-27

## 2023-06-26 RX ORDER — ONDANSETRON 2 MG/ML
4 INJECTION INTRAMUSCULAR; INTRAVENOUS
Status: CANCELLED | OUTPATIENT
Start: 2023-06-26 | End: 2023-06-27

## 2023-06-26 RX ORDER — HYDROMORPHONE HYDROCHLORIDE 1 MG/ML
0.5 INJECTION, SOLUTION INTRAMUSCULAR; INTRAVENOUS; SUBCUTANEOUS EVERY 5 MIN PRN
Status: CANCELLED | OUTPATIENT
Start: 2023-06-26

## 2023-06-26 RX ORDER — HYDRALAZINE HYDROCHLORIDE 20 MG/ML
10 INJECTION INTRAMUSCULAR; INTRAVENOUS
Status: CANCELLED | OUTPATIENT
Start: 2023-06-26

## 2023-06-26 RX ORDER — PROCHLORPERAZINE EDISYLATE 5 MG/ML
5 INJECTION INTRAMUSCULAR; INTRAVENOUS
Status: CANCELLED | OUTPATIENT
Start: 2023-06-26 | End: 2023-06-27

## 2023-06-26 RX ORDER — SODIUM CHLORIDE 9 MG/ML
INJECTION, SOLUTION INTRAVENOUS PRN
Status: CANCELLED | OUTPATIENT
Start: 2023-06-26

## 2023-06-26 RX ORDER — SODIUM CHLORIDE 0.9 % (FLUSH) 0.9 %
5-40 SYRINGE (ML) INJECTION PRN
Status: CANCELLED | OUTPATIENT
Start: 2023-06-26

## 2023-06-26 RX ORDER — SODIUM CHLORIDE 0.9 % (FLUSH) 0.9 %
5-40 SYRINGE (ML) INJECTION EVERY 12 HOURS SCHEDULED
Status: CANCELLED | OUTPATIENT
Start: 2023-06-26

## 2023-06-27 ENCOUNTER — APPOINTMENT (OUTPATIENT)
Facility: HOSPITAL | Age: 63
End: 2023-06-27
Attending: SURGERY
Payer: COMMERCIAL

## 2023-06-27 ENCOUNTER — ANESTHESIA EVENT (OUTPATIENT)
Facility: HOSPITAL | Age: 63
End: 2023-06-27
Payer: COMMERCIAL

## 2023-06-27 ENCOUNTER — ANESTHESIA (OUTPATIENT)
Facility: HOSPITAL | Age: 63
End: 2023-06-27
Payer: COMMERCIAL

## 2023-06-27 ENCOUNTER — HOSPITAL ENCOUNTER (OUTPATIENT)
Facility: HOSPITAL | Age: 63
Setting detail: OUTPATIENT SURGERY
Discharge: HOME | End: 2023-06-27
Attending: SURGERY | Admitting: SURGERY
Payer: COMMERCIAL

## 2023-06-27 VITALS
OXYGEN SATURATION: 100 % | TEMPERATURE: 97.6 F | SYSTOLIC BLOOD PRESSURE: 151 MMHG | WEIGHT: 121 LBS | RESPIRATION RATE: 14 BRPM | BODY MASS INDEX: 23.75 KG/M2 | HEART RATE: 60 BPM | DIASTOLIC BLOOD PRESSURE: 76 MMHG | HEIGHT: 60 IN

## 2023-06-27 DIAGNOSIS — C50.012 CARCINOMA OF NIPPLE AND AREOLA OF FEMALE BREAST, LEFT (HCC): ICD-10-CM

## 2023-06-27 DIAGNOSIS — G89.18 PAIN FOLLOWING SURGERY OR PROCEDURE: Primary | ICD-10-CM

## 2023-06-27 PROCEDURE — C9290 INJ, BUPIVACAINE LIPOSOME: HCPCS | Performed by: SURGERY

## 2023-06-27 PROCEDURE — 3700000001 HC ADD 15 MINUTES (ANESTHESIA): Performed by: SURGERY

## 2023-06-27 PROCEDURE — C1713 ANCHOR/SCREW BN/BN,TIS/BN: HCPCS | Performed by: SURGERY

## 2023-06-27 PROCEDURE — 2580000003 HC RX 258: Performed by: ANESTHESIOLOGY

## 2023-06-27 PROCEDURE — 6370000000 HC RX 637 (ALT 250 FOR IP): Performed by: ANESTHESIOLOGY

## 2023-06-27 PROCEDURE — 19301 PARTIAL MASTECTOMY: CPT | Performed by: SURGERY

## 2023-06-27 PROCEDURE — 3600000013 HC SURGERY LEVEL 3 ADDTL 15MIN: Performed by: SURGERY

## 2023-06-27 PROCEDURE — 76098 X-RAY EXAM SURGICAL SPECIMEN: CPT

## 2023-06-27 PROCEDURE — 7100000000 HC PACU RECOVERY - FIRST 15 MIN: Performed by: SURGERY

## 2023-06-27 PROCEDURE — 2500000003 HC RX 250 WO HCPCS: Performed by: SURGERY

## 2023-06-27 PROCEDURE — 2500000003 HC RX 250 WO HCPCS: Performed by: NURSE ANESTHETIST, CERTIFIED REGISTERED

## 2023-06-27 PROCEDURE — 38525 BIOPSY/REMOVAL LYMPH NODES: CPT | Performed by: SURGERY

## 2023-06-27 PROCEDURE — 2709999900 HC NON-CHARGEABLE SUPPLY: Performed by: SURGERY

## 2023-06-27 PROCEDURE — 88307 TISSUE EXAM BY PATHOLOGIST: CPT

## 2023-06-27 PROCEDURE — 3600000003 HC SURGERY LEVEL 3 BASE: Performed by: SURGERY

## 2023-06-27 PROCEDURE — 7100000001 HC PACU RECOVERY - ADDTL 15 MIN: Performed by: SURGERY

## 2023-06-27 PROCEDURE — 6360000002 HC RX W HCPCS: Performed by: SURGERY

## 2023-06-27 PROCEDURE — 3700000000 HC ANESTHESIA ATTENDED CARE: Performed by: SURGERY

## 2023-06-27 PROCEDURE — 6360000002 HC RX W HCPCS: Performed by: NURSE ANESTHETIST, CERTIFIED REGISTERED

## 2023-06-27 RX ORDER — DEXAMETHASONE SODIUM PHOSPHATE 4 MG/ML
INJECTION, SOLUTION INTRA-ARTICULAR; INTRALESIONAL; INTRAMUSCULAR; INTRAVENOUS; SOFT TISSUE PRN
Status: DISCONTINUED | OUTPATIENT
Start: 2023-06-27 | End: 2023-06-27 | Stop reason: SDUPTHER

## 2023-06-27 RX ORDER — MIDAZOLAM HYDROCHLORIDE 1 MG/ML
INJECTION INTRAMUSCULAR; INTRAVENOUS PRN
Status: DISCONTINUED | OUTPATIENT
Start: 2023-06-27 | End: 2023-06-27 | Stop reason: SDUPTHER

## 2023-06-27 RX ORDER — SODIUM CHLORIDE 9 MG/ML
INJECTION, SOLUTION INTRAVENOUS PRN
Status: DISCONTINUED | OUTPATIENT
Start: 2023-06-27 | End: 2023-06-27 | Stop reason: HOSPADM

## 2023-06-27 RX ORDER — ACETAMINOPHEN 500 MG
1000 TABLET ORAL ONCE
Status: DISCONTINUED | OUTPATIENT
Start: 2023-06-27 | End: 2023-06-27 | Stop reason: HOSPADM

## 2023-06-27 RX ORDER — LIDOCAINE HYDROCHLORIDE 20 MG/ML
INJECTION, SOLUTION EPIDURAL; INFILTRATION; INTRACAUDAL; PERINEURAL PRN
Status: DISCONTINUED | OUTPATIENT
Start: 2023-06-27 | End: 2023-06-27 | Stop reason: SDUPTHER

## 2023-06-27 RX ORDER — SODIUM CHLORIDE 0.9 % (FLUSH) 0.9 %
5-40 SYRINGE (ML) INJECTION PRN
Status: DISCONTINUED | OUTPATIENT
Start: 2023-06-27 | End: 2023-06-27 | Stop reason: HOSPADM

## 2023-06-27 RX ORDER — MIDAZOLAM HYDROCHLORIDE 2 MG/2ML
2 INJECTION, SOLUTION INTRAMUSCULAR; INTRAVENOUS
Status: DISCONTINUED | OUTPATIENT
Start: 2023-06-27 | End: 2023-06-27 | Stop reason: HOSPADM

## 2023-06-27 RX ORDER — FENTANYL CITRATE 50 UG/ML
100 INJECTION, SOLUTION INTRAMUSCULAR; INTRAVENOUS
Status: DISCONTINUED | OUTPATIENT
Start: 2023-06-27 | End: 2023-06-27 | Stop reason: HOSPADM

## 2023-06-27 RX ORDER — ONDANSETRON 4 MG/1
4 TABLET, FILM COATED ORAL 3 TIMES DAILY PRN
Qty: 5 TABLET | Refills: 1 | Status: SHIPPED | OUTPATIENT
Start: 2023-06-27

## 2023-06-27 RX ORDER — SODIUM CHLORIDE 0.9 % (FLUSH) 0.9 %
5-40 SYRINGE (ML) INJECTION EVERY 12 HOURS SCHEDULED
Status: DISCONTINUED | OUTPATIENT
Start: 2023-06-27 | End: 2023-06-27 | Stop reason: HOSPADM

## 2023-06-27 RX ORDER — OXYCODONE HYDROCHLORIDE AND ACETAMINOPHEN 5; 325 MG/1; MG/1
1 TABLET ORAL ONCE
Status: COMPLETED | OUTPATIENT
Start: 2023-06-27 | End: 2023-06-27

## 2023-06-27 RX ORDER — FENTANYL CITRATE 50 UG/ML
INJECTION, SOLUTION INTRAMUSCULAR; INTRAVENOUS PRN
Status: DISCONTINUED | OUTPATIENT
Start: 2023-06-27 | End: 2023-06-27 | Stop reason: SDUPTHER

## 2023-06-27 RX ORDER — PROPOFOL 10 MG/ML
INJECTION, EMULSION INTRAVENOUS PRN
Status: DISCONTINUED | OUTPATIENT
Start: 2023-06-27 | End: 2023-06-27 | Stop reason: SDUPTHER

## 2023-06-27 RX ORDER — SODIUM CHLORIDE, SODIUM LACTATE, POTASSIUM CHLORIDE, CALCIUM CHLORIDE 600; 310; 30; 20 MG/100ML; MG/100ML; MG/100ML; MG/100ML
INJECTION, SOLUTION INTRAVENOUS CONTINUOUS
Status: DISCONTINUED | OUTPATIENT
Start: 2023-06-27 | End: 2023-06-27 | Stop reason: HOSPADM

## 2023-06-27 RX ORDER — LIDOCAINE HYDROCHLORIDE 10 MG/ML
1 INJECTION, SOLUTION INFILTRATION; PERINEURAL
Status: DISCONTINUED | OUTPATIENT
Start: 2023-06-27 | End: 2023-06-27 | Stop reason: HOSPADM

## 2023-06-27 RX ORDER — CEFAZOLIN SODIUM 1 G/3ML
INJECTION, POWDER, FOR SOLUTION INTRAMUSCULAR; INTRAVENOUS PRN
Status: DISCONTINUED | OUTPATIENT
Start: 2023-06-27 | End: 2023-06-27 | Stop reason: SDUPTHER

## 2023-06-27 RX ORDER — OXYCODONE HYDROCHLORIDE AND ACETAMINOPHEN 5; 325 MG/1; MG/1
1 TABLET ORAL EVERY 4 HOURS PRN
Qty: 20 TABLET | Refills: 0 | Status: SHIPPED | OUTPATIENT
Start: 2023-06-27 | End: 2023-07-04

## 2023-06-27 RX ORDER — ONDANSETRON 2 MG/ML
INJECTION INTRAMUSCULAR; INTRAVENOUS PRN
Status: DISCONTINUED | OUTPATIENT
Start: 2023-06-27 | End: 2023-06-27 | Stop reason: SDUPTHER

## 2023-06-27 RX ORDER — SCOLOPAMINE TRANSDERMAL SYSTEM 1 MG/1
1 PATCH, EXTENDED RELEASE TRANSDERMAL
Status: DISCONTINUED | OUTPATIENT
Start: 2023-06-27 | End: 2023-06-27 | Stop reason: HOSPADM

## 2023-06-27 RX ADMIN — DEXAMETHASONE SODIUM PHOSPHATE 8 MG: 4 INJECTION, SOLUTION INTRAMUSCULAR; INTRAVENOUS at 11:05

## 2023-06-27 RX ADMIN — LIDOCAINE HYDROCHLORIDE 60 MG: 20 INJECTION, SOLUTION EPIDURAL; INFILTRATION; INTRACAUDAL; PERINEURAL at 10:59

## 2023-06-27 RX ADMIN — SODIUM CHLORIDE, POTASSIUM CHLORIDE, SODIUM LACTATE AND CALCIUM CHLORIDE: 600; 310; 30; 20 INJECTION, SOLUTION INTRAVENOUS at 10:53

## 2023-06-27 RX ADMIN — PROPOFOL 20 MG: 10 INJECTION, EMULSION INTRAVENOUS at 10:54

## 2023-06-27 RX ADMIN — FENTANYL CITRATE 25 MCG: 50 INJECTION, SOLUTION INTRAMUSCULAR; INTRAVENOUS at 10:53

## 2023-06-27 RX ADMIN — PROPOFOL 100 MG: 10 INJECTION, EMULSION INTRAVENOUS at 10:59

## 2023-06-27 RX ADMIN — CEFAZOLIN 2 G: 330 INJECTION, POWDER, FOR SOLUTION INTRAMUSCULAR; INTRAVENOUS at 11:06

## 2023-06-27 RX ADMIN — FENTANYL CITRATE 50 MCG: 50 INJECTION, SOLUTION INTRAMUSCULAR; INTRAVENOUS at 10:59

## 2023-06-27 RX ADMIN — MIDAZOLAM 2 MG: 1 INJECTION INTRAMUSCULAR; INTRAVENOUS at 10:50

## 2023-06-27 RX ADMIN — FENTANYL CITRATE 25 MCG: 50 INJECTION, SOLUTION INTRAMUSCULAR; INTRAVENOUS at 10:51

## 2023-06-27 RX ADMIN — OXYCODONE HYDROCHLORIDE AND ACETAMINOPHEN 1 TABLET: 5; 325 TABLET ORAL at 12:56

## 2023-06-27 RX ADMIN — ONDANSETRON HYDROCHLORIDE 4 MG: 2 INJECTION, SOLUTION INTRAMUSCULAR; INTRAVENOUS at 11:45

## 2023-06-27 ASSESSMENT — ENCOUNTER SYMPTOMS: SHORTNESS OF BREATH: 1

## 2023-06-27 ASSESSMENT — PAIN - FUNCTIONAL ASSESSMENT
PAIN_FUNCTIONAL_ASSESSMENT: 0-10

## 2023-06-27 ASSESSMENT — PAIN DESCRIPTION - LOCATION: LOCATION: BREAST

## 2023-06-27 ASSESSMENT — PAIN DESCRIPTION - DESCRIPTORS: DESCRIPTORS: SORE

## 2023-06-28 ENCOUNTER — TELEPHONE (OUTPATIENT)
Age: 63
End: 2023-06-28

## 2023-06-28 ENCOUNTER — TELEPHONE (OUTPATIENT)
Facility: HOSPITAL | Age: 63
End: 2023-06-28

## 2023-07-13 ENCOUNTER — APPOINTMENT (OUTPATIENT)
Dept: INFUSION THERAPY | Age: 63
End: 2023-07-13

## 2023-07-17 ENCOUNTER — OFFICE VISIT (OUTPATIENT)
Age: 63
End: 2023-07-17

## 2023-07-17 VITALS — WEIGHT: 121 LBS | HEIGHT: 60 IN | BODY MASS INDEX: 23.75 KG/M2

## 2023-07-17 DIAGNOSIS — C50.011 MALIGNANT NEOPLASM OF NIPPLE AND AREOLA, RIGHT FEMALE BREAST (HCC): Primary | ICD-10-CM

## 2023-07-17 PROCEDURE — 99024 POSTOP FOLLOW-UP VISIT: CPT | Performed by: SURGERY

## 2023-07-17 NOTE — PROGRESS NOTES
HISTORY OF PRESENT ILLNESS  Gabriel Bender is a 57 y.o. female     HPI ESTABLISHED patient here for post op visit day # 20 LEFT breast lumpectomy. Patient states she has some swelling, hardness and occasional shooting pain in the outer quadrant of LEFT breast.           Breast history -    6/27/23-LEFT BREAST MAGSEED GUIDED (6/19, 1030) LUMPECTOMY LEFT BREAST SENTINEL NODE BIOPSY WITH Lashae Stone        Referring - Dr. Delgado Camera -  [facility]  7/20/2022 - BSmammogram 3D at [place] [address] - showed a 1cm irregular mass in the LEFT breast at 3 o'clock position. 8/2/2022 - LEFT breast core biopsy - invasive ductal carcinoma, grade 2-3, ER 0%, OK 0%, HER2/logan negative. 9/27/22 - port placement - Dr. Sonu Abreu - Dr. Marielos Zimmerman     Family history -   No FH of breast, ovarian, pancreatic or prostate malignancies  Patient declined genetic testing    Review of Systems   All other systems reviewed and are negative. Physical Exam  Vitals and nursing note reviewed. Chest:      Comments: Incisions healing well         ASSESSMENT and PLAN   Diagnosis Orders   1.  Malignant neoplasm of nipple and areola, right female breast ([address])          - healing well  - next step xrt  Rtc in 6 months

## 2023-07-18 ENCOUNTER — CLINICAL DOCUMENTATION (OUTPATIENT)
Age: 63
End: 2023-07-18

## 2023-07-18 ENCOUNTER — OFFICE VISIT (OUTPATIENT)
Age: 63
End: 2023-07-18
Payer: COMMERCIAL

## 2023-07-18 ENCOUNTER — HOSPITAL ENCOUNTER (OUTPATIENT)
Facility: HOSPITAL | Age: 63
Setting detail: INFUSION SERIES
End: 2023-07-18
Payer: COMMERCIAL

## 2023-07-18 VITALS — HEART RATE: 59 BPM | DIASTOLIC BLOOD PRESSURE: 78 MMHG | RESPIRATION RATE: 16 BRPM | SYSTOLIC BLOOD PRESSURE: 171 MMHG

## 2023-07-18 VITALS
OXYGEN SATURATION: 97 % | HEART RATE: 58 BPM | TEMPERATURE: 98.2 F | WEIGHT: 123 LBS | DIASTOLIC BLOOD PRESSURE: 78 MMHG | RESPIRATION RATE: 18 BRPM | SYSTOLIC BLOOD PRESSURE: 180 MMHG | BODY MASS INDEX: 24.02 KG/M2

## 2023-07-18 DIAGNOSIS — C50.011 MALIGNANT NEOPLASM OF NIPPLE AND AREOLA, RIGHT FEMALE BREAST (HCC): Primary | ICD-10-CM

## 2023-07-18 LAB
ALBUMIN SERPL-MCNC: 3.7 G/DL (ref 3.5–5)
ALBUMIN/GLOB SERPL: 1.1 (ref 1.1–2.2)
ALP SERPL-CCNC: 97 U/L (ref 45–117)
ALT SERPL-CCNC: 25 U/L (ref 12–78)
ANION GAP SERPL CALC-SCNC: 3 MMOL/L (ref 5–15)
AST SERPL-CCNC: 26 U/L (ref 15–37)
BASOPHILS # BLD: 0 K/UL (ref 0–0.1)
BASOPHILS NFR BLD: 0 % (ref 0–1)
BILIRUB SERPL-MCNC: 0.4 MG/DL (ref 0.2–1)
BUN SERPL-MCNC: 22 MG/DL (ref 6–20)
BUN/CREAT SERPL: 19 (ref 12–20)
CALCIUM SERPL-MCNC: 9.8 MG/DL (ref 8.5–10.1)
CHLORIDE SERPL-SCNC: 107 MMOL/L (ref 97–108)
CO2 SERPL-SCNC: 29 MMOL/L (ref 21–32)
CREAT SERPL-MCNC: 1.13 MG/DL (ref 0.55–1.02)
DIFFERENTIAL METHOD BLD: ABNORMAL
EOSINOPHIL # BLD: 0.1 K/UL (ref 0–0.4)
EOSINOPHIL NFR BLD: 1 % (ref 0–7)
ERYTHROCYTE [DISTWIDTH] IN BLOOD BY AUTOMATED COUNT: 12.7 % (ref 11.5–14.5)
GLOBULIN SER CALC-MCNC: 3.4 G/DL (ref 2–4)
GLUCOSE SERPL-MCNC: 106 MG/DL (ref 65–100)
HCT VFR BLD AUTO: 38.6 % (ref 35–47)
HGB BLD-MCNC: 11.9 G/DL (ref 11.5–16)
IMM GRANULOCYTES # BLD AUTO: 0 K/UL (ref 0–0.04)
IMM GRANULOCYTES NFR BLD AUTO: 0 % (ref 0–0.5)
LYMPHOCYTES # BLD: 1.3 K/UL (ref 0.8–3.5)
LYMPHOCYTES NFR BLD: 29 % (ref 12–49)
MCH RBC QN AUTO: 30.7 PG (ref 26–34)
MCHC RBC AUTO-ENTMCNC: 30.8 G/DL (ref 30–36.5)
MCV RBC AUTO: 99.7 FL (ref 80–99)
MONOCYTES # BLD: 0.3 K/UL (ref 0–1)
MONOCYTES NFR BLD: 7 % (ref 5–13)
NEUTS SEG # BLD: 2.9 K/UL (ref 1.8–8)
NEUTS SEG NFR BLD: 63 % (ref 32–75)
NRBC # BLD: 0 K/UL (ref 0–0.01)
NRBC BLD-RTO: 0 PER 100 WBC
PLATELET # BLD AUTO: 267 K/UL (ref 150–400)
PMV BLD AUTO: 9.5 FL (ref 8.9–12.9)
POTASSIUM SERPL-SCNC: 4.6 MMOL/L (ref 3.5–5.1)
PROT SERPL-MCNC: 7.1 G/DL (ref 6.4–8.2)
RBC # BLD AUTO: 3.87 M/UL (ref 3.8–5.2)
SODIUM SERPL-SCNC: 139 MMOL/L (ref 136–145)
WBC # BLD AUTO: 4.5 K/UL (ref 3.6–11)

## 2023-07-18 PROCEDURE — 36415 COLL VENOUS BLD VENIPUNCTURE: CPT

## 2023-07-18 PROCEDURE — 80053 COMPREHEN METABOLIC PANEL: CPT

## 2023-07-18 PROCEDURE — 36591 DRAW BLOOD OFF VENOUS DEVICE: CPT

## 2023-07-18 PROCEDURE — 2580000003 HC RX 258: Performed by: INTERNAL MEDICINE

## 2023-07-18 PROCEDURE — 85025 COMPLETE CBC W/AUTO DIFF WBC: CPT

## 2023-07-18 PROCEDURE — 99215 OFFICE O/P EST HI 40 MIN: CPT | Performed by: INTERNAL MEDICINE

## 2023-07-18 RX ORDER — HEPARIN 100 UNIT/ML
500 SYRINGE INTRAVENOUS PRN
Status: CANCELLED | OUTPATIENT
Start: 2023-08-15

## 2023-07-18 RX ORDER — SODIUM CHLORIDE 9 MG/ML
25 INJECTION, SOLUTION INTRAVENOUS PRN
Status: CANCELLED | OUTPATIENT
Start: 2023-08-15

## 2023-07-18 RX ORDER — PREDNISONE 5 MG/1
5 TABLET ORAL
Qty: 30 TABLET | Refills: 3 | Status: SHIPPED | OUTPATIENT
Start: 2023-07-19

## 2023-07-18 RX ORDER — SODIUM CHLORIDE 0.9 % (FLUSH) 0.9 %
5-40 SYRINGE (ML) INJECTION PRN
Status: CANCELLED | OUTPATIENT
Start: 2023-08-15

## 2023-07-18 RX ORDER — SODIUM CHLORIDE 0.9 % (FLUSH) 0.9 %
5-40 SYRINGE (ML) INJECTION PRN
Status: DISCONTINUED | OUTPATIENT
Start: 2023-07-18 | End: 2023-07-19 | Stop reason: HOSPADM

## 2023-07-18 RX ADMIN — SODIUM CHLORIDE, PRESERVATIVE FREE 10 ML: 5 INJECTION INTRAVENOUS at 14:10

## 2023-07-18 NOTE — PROGRESS NOTES
[facility] Port Flush Note    Date: July 18, 2023      Pt arrived ambulatory to [facility] for [place] + Lab Work in stable condition. Port accessed with positive blood return. Labs drawn and sent for processing. Port flushed and de- accessed per protocol. D/Cd from [facility] in no distress. Patient is aware of next scheduled [facility] appointment.         Future Appointments   Date Time Provider [address]   8/10/2023  1:30 PM H2 DENZEL CHAUDHARY [facility] PSYCHIATRIC [place]   9/7/2023  1:30 PM H2 DENZEL CHAUDHARY [facility]YCHIAT[facility] [place]   1/22/2024 10:45 AM Fanny Alegre MD [facility] BS AMB           Ally Cobb RN  July 18, 2023

## 2023-07-18 NOTE — PROGRESS NOTES
[employer]  Oncology Social Work  Encounter     Patient Name:      Medical History:     Advance Directives:    Narrative:     Barriers to Care:     Plan:    Referral/Handouts:   Transportation referral  Behavioral health referral  Complementary therapies referral  Insurance/Entitlements referral  Fertility referral  Financial/Medication assistance referral   Lodging/housing/Environmental Concerns referral  Hospice referral  Home Health referral  Caregiver Support referral  Support Groups referral  Nutrition referral  DME/Wig referral  Dependent Care referral  Legal referral  Palliative referral  Lymphedema referral  Cancer Rehab referral  Primary Care referral  [employer] referral

## 2023-07-18 NOTE — PROGRESS NOTES
Freida Benson is a 57 y.o. female    Chief Complaint   Patient presents with    Follow-up     left breast cancer-ER 0%, DE 0%, HER2/logan negative. 1. Have you been to the ER, urgent care clinic since your last visit? Hospitalized since your last visit? No    2. Have you seen or consulted any other health care providers outside of the [address] [place] [address] since your last visit? Include any pap smears or colon screening.  No
followed by surgery,  and then adjuvant pembrolizumab for another nine cycles (27 weeks) after surgery). seen today after 4 cycles of neoadjuvant chemotherapy with carboplatin paclitaxel and pembrolizumab, last received 12/21/2022. Unfortunately she had a series of complications which started with COVID-pneumonia, sepsis, multiorgan failure, transient need  for hemodialysis, readmission for grade 4LFT elevation. Silvia Lewis is secondary to delayed immune toxicity from [facility] as it is evidenced by good response to steroids. LFTs are now normal.  Clearly she cannot resume Keytruda again. Her ECOG performance  status is also borderline and I do not see her tolerating additional neoadjuvant chemotherapy. CT scans reviewed from 5/2023 and [facility]. Underwent a Lumpectomy and SLN on 6/27/2023  [id number]    Will continue with surveillance hereon        2) hx of L basilar pleural effusion   Cytology was negative for malignancy       3) ESKD  Resolved      4) acute liver injury   Grade IV , delayed immune mediated   Now on prednisone at 10 mg daily, now has normal LFTs  Taper down to 5 mg every other day    5) Neuropathy  Painful, on paxil so holding off on Cymbalta     6) psychosocial and management of high risk disease as well as multiple complications from prior treatment        Plan:   -Prednisone 5 mg  every other day for 1 month and then stop  - Port removal with Dr. Vina Hodgkins  - Refer to rad onc    -Protonix daily    -Vitamin D    -see me in 3 months, Mammogram per Dr. Vina Hodgkins  - will get scans if approved periodically for 2 years          Discussed plan with Dr. Vina Hodgkins   I appreciate the opportunity to participate in Ms. [facility].        Antione García MD, [phone] 3/4 [facility] Oncology associates

## 2023-08-10 ENCOUNTER — APPOINTMENT (OUTPATIENT)
Dept: INFUSION THERAPY | Age: 63
End: 2023-08-10

## 2023-08-10 ENCOUNTER — HOSPITAL ENCOUNTER (OUTPATIENT)
Facility: HOSPITAL | Age: 63
Setting detail: INFUSION SERIES
End: 2023-08-10
Payer: COMMERCIAL

## 2023-08-10 VITALS
RESPIRATION RATE: 18 BRPM | TEMPERATURE: 97 F | SYSTOLIC BLOOD PRESSURE: 143 MMHG | DIASTOLIC BLOOD PRESSURE: 76 MMHG | HEART RATE: 68 BPM

## 2023-08-10 DIAGNOSIS — C50.011 MALIGNANT NEOPLASM OF NIPPLE AND AREOLA, RIGHT FEMALE BREAST (HCC): ICD-10-CM

## 2023-08-10 LAB
ALBUMIN SERPL-MCNC: 3.6 G/DL (ref 3.5–5)
ALBUMIN/GLOB SERPL: 1.1 (ref 1.1–2.2)
ALP SERPL-CCNC: 106 U/L (ref 45–117)
ALT SERPL-CCNC: 28 U/L (ref 12–78)
ANION GAP SERPL CALC-SCNC: 5 MMOL/L (ref 5–15)
AST SERPL-CCNC: 21 U/L (ref 15–37)
BASOPHILS # BLD: 0 K/UL (ref 0–0.1)
BASOPHILS NFR BLD: 0 % (ref 0–1)
BILIRUB SERPL-MCNC: 0.4 MG/DL (ref 0.2–1)
BUN SERPL-MCNC: 29 MG/DL (ref 6–20)
BUN/CREAT SERPL: 25 (ref 12–20)
CALCIUM SERPL-MCNC: 9.3 MG/DL (ref 8.5–10.1)
CHLORIDE SERPL-SCNC: 108 MMOL/L (ref 97–108)
CO2 SERPL-SCNC: 27 MMOL/L (ref 21–32)
CREAT SERPL-MCNC: 1.17 MG/DL (ref 0.55–1.02)
DIFFERENTIAL METHOD BLD: ABNORMAL
EOSINOPHIL # BLD: 0 K/UL (ref 0–0.4)
EOSINOPHIL NFR BLD: 1 % (ref 0–7)
ERYTHROCYTE [DISTWIDTH] IN BLOOD BY AUTOMATED COUNT: 12.1 % (ref 11.5–14.5)
GLOBULIN SER CALC-MCNC: 3.4 G/DL (ref 2–4)
GLUCOSE SERPL-MCNC: 113 MG/DL (ref 65–100)
HCT VFR BLD AUTO: 34.1 % (ref 35–47)
HGB BLD-MCNC: 10.9 G/DL (ref 11.5–16)
IMM GRANULOCYTES # BLD AUTO: 0 K/UL (ref 0–0.04)
IMM GRANULOCYTES NFR BLD AUTO: 0 % (ref 0–0.5)
LYMPHOCYTES # BLD: 2.1 K/UL (ref 0.8–3.5)
LYMPHOCYTES NFR BLD: 43 % (ref 12–49)
MCH RBC QN AUTO: 30.5 PG (ref 26–34)
MCHC RBC AUTO-ENTMCNC: 32 G/DL (ref 30–36.5)
MCV RBC AUTO: 95.5 FL (ref 80–99)
MONOCYTES # BLD: 0.7 K/UL (ref 0–1)
MONOCYTES NFR BLD: 15 % (ref 5–13)
NEUTS SEG # BLD: 2 K/UL (ref 1.8–8)
NEUTS SEG NFR BLD: 41 % (ref 32–75)
NRBC # BLD: 0 K/UL (ref 0–0.01)
NRBC BLD-RTO: 0 PER 100 WBC
PLATELET # BLD AUTO: 291 K/UL (ref 150–400)
PMV BLD AUTO: 9.5 FL (ref 8.9–12.9)
POTASSIUM SERPL-SCNC: 3.9 MMOL/L (ref 3.5–5.1)
PROT SERPL-MCNC: 7 G/DL (ref 6.4–8.2)
RBC # BLD AUTO: 3.57 M/UL (ref 3.8–5.2)
RBC MORPH BLD: ABNORMAL
SODIUM SERPL-SCNC: 140 MMOL/L (ref 136–145)
WBC # BLD AUTO: 4.8 K/UL (ref 3.6–11)

## 2023-08-10 PROCEDURE — 36591 DRAW BLOOD OFF VENOUS DEVICE: CPT

## 2023-08-10 PROCEDURE — 80053 COMPREHEN METABOLIC PANEL: CPT

## 2023-08-10 PROCEDURE — 85025 COMPLETE CBC W/AUTO DIFF WBC: CPT

## 2023-08-10 PROCEDURE — 36415 COLL VENOUS BLD VENIPUNCTURE: CPT

## 2023-08-10 RX ORDER — SODIUM CHLORIDE 0.9 % (FLUSH) 0.9 %
5-40 SYRINGE (ML) INJECTION PRN
Status: CANCELLED | OUTPATIENT
Start: 2023-08-15

## 2023-08-10 RX ORDER — HEPARIN 100 UNIT/ML
500 SYRINGE INTRAVENOUS PRN
Status: CANCELLED | OUTPATIENT
Start: 2023-08-15

## 2023-08-10 RX ORDER — SODIUM CHLORIDE 9 MG/ML
25 INJECTION, SOLUTION INTRAVENOUS PRN
Status: CANCELLED | OUTPATIENT
Start: 2023-08-15

## 2023-08-22 ENCOUNTER — TELEPHONE (OUTPATIENT)
Age: 63
End: 2023-08-22

## 2023-09-01 ENCOUNTER — CLINICAL DOCUMENTATION (OUTPATIENT)
Age: 63
End: 2023-09-01

## 2023-09-01 NOTE — PROGRESS NOTES
Patient is scheduled for an appointment with Dr. Julia Lovett on 9/6/23 at 1:30 pm. Arrival time 1:00 pm outpatient registration. Patient has been notified of the appointment also, she was appreciative of the call.

## 2023-09-07 ENCOUNTER — APPOINTMENT (OUTPATIENT)
Dept: INFUSION THERAPY | Age: 63
End: 2023-09-07

## 2023-09-07 ENCOUNTER — HOSPITAL ENCOUNTER (OUTPATIENT)
Facility: HOSPITAL | Age: 63
Setting detail: INFUSION SERIES
End: 2023-09-07
Payer: COMMERCIAL

## 2023-09-07 VITALS
TEMPERATURE: 97.8 F | BODY MASS INDEX: 24.1 KG/M2 | HEART RATE: 67 BPM | DIASTOLIC BLOOD PRESSURE: 87 MMHG | SYSTOLIC BLOOD PRESSURE: 165 MMHG | RESPIRATION RATE: 18 BRPM | WEIGHT: 123.4 LBS

## 2023-09-07 DIAGNOSIS — C50.011 MALIGNANT NEOPLASM OF NIPPLE AND AREOLA, RIGHT FEMALE BREAST (HCC): Primary | ICD-10-CM

## 2023-09-07 LAB
ALBUMIN SERPL-MCNC: 3.6 G/DL (ref 3.5–5)
ALBUMIN/GLOB SERPL: 1 (ref 1.1–2.2)
ALP SERPL-CCNC: 118 U/L (ref 45–117)
ALT SERPL-CCNC: 24 U/L (ref 12–78)
ANION GAP SERPL CALC-SCNC: 9 MMOL/L (ref 5–15)
AST SERPL-CCNC: 22 U/L (ref 15–37)
BASOPHILS # BLD: 0 K/UL (ref 0–0.1)
BASOPHILS NFR BLD: 0 % (ref 0–1)
BILIRUB SERPL-MCNC: 0.3 MG/DL (ref 0.2–1)
BUN SERPL-MCNC: 21 MG/DL (ref 6–20)
BUN/CREAT SERPL: 18 (ref 12–20)
CALCIUM SERPL-MCNC: 9.7 MG/DL (ref 8.5–10.1)
CHLORIDE SERPL-SCNC: 109 MMOL/L (ref 97–108)
CO2 SERPL-SCNC: 28 MMOL/L (ref 21–32)
CREAT SERPL-MCNC: 1.18 MG/DL (ref 0.55–1.02)
DIFFERENTIAL METHOD BLD: ABNORMAL
EOSINOPHIL # BLD: 0.1 K/UL (ref 0–0.4)
EOSINOPHIL NFR BLD: 3 % (ref 0–7)
ERYTHROCYTE [DISTWIDTH] IN BLOOD BY AUTOMATED COUNT: 11.7 % (ref 11.5–14.5)
GLOBULIN SER CALC-MCNC: 3.7 G/DL (ref 2–4)
GLUCOSE SERPL-MCNC: 64 MG/DL (ref 65–100)
HCT VFR BLD AUTO: 35.9 % (ref 35–47)
HGB BLD-MCNC: 11.4 G/DL (ref 11.5–16)
IMM GRANULOCYTES # BLD AUTO: 0 K/UL (ref 0–0.04)
IMM GRANULOCYTES NFR BLD AUTO: 0 % (ref 0–0.5)
LYMPHOCYTES # BLD: 2.2 K/UL (ref 0.8–3.5)
LYMPHOCYTES NFR BLD: 46 % (ref 12–49)
MCH RBC QN AUTO: 30.4 PG (ref 26–34)
MCHC RBC AUTO-ENTMCNC: 31.8 G/DL (ref 30–36.5)
MCV RBC AUTO: 95.7 FL (ref 80–99)
MONOCYTES # BLD: 0.7 K/UL (ref 0–1)
MONOCYTES NFR BLD: 15 % (ref 5–13)
NEUTS SEG # BLD: 1.7 K/UL (ref 1.8–8)
NEUTS SEG NFR BLD: 36 % (ref 32–75)
NRBC # BLD: 0 K/UL (ref 0–0.01)
NRBC BLD-RTO: 0 PER 100 WBC
PLATELET # BLD AUTO: 276 K/UL (ref 150–400)
PMV BLD AUTO: 9.8 FL (ref 8.9–12.9)
POTASSIUM SERPL-SCNC: 4.2 MMOL/L (ref 3.5–5.1)
PROT SERPL-MCNC: 7.3 G/DL (ref 6.4–8.2)
RBC # BLD AUTO: 3.75 M/UL (ref 3.8–5.2)
SODIUM SERPL-SCNC: 146 MMOL/L (ref 136–145)
WBC # BLD AUTO: 4.7 K/UL (ref 3.6–11)

## 2023-09-07 PROCEDURE — 36415 COLL VENOUS BLD VENIPUNCTURE: CPT

## 2023-09-07 PROCEDURE — 2580000003 HC RX 258: Performed by: INTERNAL MEDICINE

## 2023-09-07 PROCEDURE — 36591 DRAW BLOOD OFF VENOUS DEVICE: CPT

## 2023-09-07 PROCEDURE — 80053 COMPREHEN METABOLIC PANEL: CPT

## 2023-09-07 PROCEDURE — 85025 COMPLETE CBC W/AUTO DIFF WBC: CPT

## 2023-09-07 RX ORDER — SODIUM CHLORIDE 9 MG/ML
25 INJECTION, SOLUTION INTRAVENOUS PRN
Status: CANCELLED | OUTPATIENT
Start: 2023-10-05

## 2023-09-07 RX ORDER — SODIUM CHLORIDE 0.9 % (FLUSH) 0.9 %
5-40 SYRINGE (ML) INJECTION PRN
Status: DISCONTINUED | OUTPATIENT
Start: 2023-09-07 | End: 2023-09-08 | Stop reason: HOSPADM

## 2023-09-07 RX ORDER — SODIUM CHLORIDE 0.9 % (FLUSH) 0.9 %
5-40 SYRINGE (ML) INJECTION PRN
Status: CANCELLED | OUTPATIENT
Start: 2023-10-05

## 2023-09-07 RX ORDER — HEPARIN 100 UNIT/ML
500 SYRINGE INTRAVENOUS PRN
Status: CANCELLED | OUTPATIENT
Start: 2023-10-05

## 2023-09-07 RX ADMIN — SODIUM CHLORIDE, PRESERVATIVE FREE 20 ML: 5 INJECTION INTRAVENOUS at 13:47

## 2023-09-07 NOTE — PROGRESS NOTES
[facility] Progress Note    Date: 2023    Name: Gogo Goldman    MRN: [id number]         : 1960      1330:  Pt arrived ambulatory and in no distress, for lab visit. Labs drawn via port, patient tolerated well. Vitals:    23 1330   BP: (!) 165/87   Pulse: 67   Resp: 18               Departed [facility] ambulatory and in no distress.     Future Appointments   Date Time Provider [address]   2023  2:30 PM [facility]   10/26/2023  1:30 PM JENNIFER Wang BS AMB   10/31/2023 10:30 AM Yamini Helms MD [address] BS AMB   2024 10:45 AM Fanny Díaz MD [facility] BS AMB       Moisés Thomas, RENETTA  2023

## 2023-09-14 ENCOUNTER — CLINICAL DOCUMENTATION (OUTPATIENT)
Age: 63
End: 2023-09-14

## 2023-09-14 NOTE — PROGRESS NOTES
Patient has an appointment with Dr. Eduardo Vasquez on 9/26/2023 at 2:30 pm. Patient has been notified of appointment and location also.

## 2023-09-20 ENCOUNTER — TELEPHONE (OUTPATIENT)
Age: 63
End: 2023-09-20

## 2023-09-20 ENCOUNTER — OFFICE VISIT (OUTPATIENT)
Age: 63
End: 2023-09-20

## 2023-09-20 ENCOUNTER — SOCIAL WORK (OUTPATIENT)
Age: 63
End: 2023-09-20

## 2023-09-20 DIAGNOSIS — C50.011 MALIGNANT NEOPLASM OF NIPPLE AND AREOLA, RIGHT FEMALE BREAST (HCC): Primary | ICD-10-CM

## 2023-09-20 PROCEDURE — 99024 POSTOP FOLLOW-UP VISIT: CPT | Performed by: SURGERY

## 2023-09-20 NOTE — PROGRESS NOTES
[address] [address]  Social Work Encounter    Name: Carol De Luna  Age: 57 y.o.  : 1960  Diagnosis: Left breast cancer    Encounter type:  []Initial Social Work Encounter  [x]Patient Initiated  []Social Work Follow-up  []Other:     Narrative:   Met with the patient this afternoon after a follow up appointment with her surgeon. The patient remained emotional throughout our conversation. SW provided active listening and emotional support. Pt explained that she recently went to her PCP that she has gone to for several years and was denied at registration after being told that the practice does not accept her insurance anymore. She said that she gave them her Medicaid card and was told that it was not accepted, then asked if her [place] insurance from her employer (pt has been on leave for several months and said she plans on retiring in January) would be accepted (which is was not). Pt was tearful, and said that she feels like everyone is leaving her, mentioning her sister, nephew, and pcp. Asked pt if she has other support, she said no. Pt shared that she feels comfortable with her PCP and trusts her, making it more difficult to establish care elsewhere. She also indicated concerns for her medication management. Pt seems to be struggling with isolation as well. Asked if she was a part of a [employer] community or any other groups or organizations. Pt said no but she has attended [employer] which she enjoys. Offered to send pt information about support groups. She would prefer to have support group options that are close to [address] [place] [address] or [facility]. Referrals/Handouts:  Provided pt with social work contact information. Will follow up with patient for support group information and will look into PCP options.      Interdisciplinary Team:  Surg-Onc: Estefania Urias MD  Med-Onc:  Rad-Onc:  Plastics:  : JOHN Melton  Nurse Navigator:       JOHN Melton  Social

## 2023-09-20 NOTE — TELEPHONE ENCOUNTER
Patient left a message saying that she is experiencing LEFT breast pain. I attempted to call back but she did not answer. I could not leave a voicemail because the mailbox is full.

## 2023-09-21 ENCOUNTER — TELEPHONE (OUTPATIENT)
Age: 63
End: 2023-09-21

## 2023-09-21 NOTE — TELEPHONE ENCOUNTER
Called the patient this afternoon to follow-up on the discussion yesterday afternoon. Pt did not answer, SW left voicemail for pt to call back when she is available    ADDENDUM:   Pt returned TOMASZ phone call. Made patient aware that SW double checked and made sure that her preferred PCP does not accept the pt's insurance, which looks like this is valid. Provided patient information (locations, dates, times, and facilitator contact info) for breast cancer support groups that are closer to her. One was in person at [address] [facility] and the other was virtual with [employer]. Also encouraged the pt to contact SW if she decides to attend the support groups offered at [address] [facility]. Asked pt if the PCP she has an upcoming appointment for is with [address] [place] [address]. She could not remember and said that she was looking for the piece of paper that she had wrote her information down on. Encouraged pt to contact TOMASZ if this PCP does not accept her insurance so that TOMASZ can provide pt a list of providers who are in network. Patient was very appreciative.

## 2023-09-26 ENCOUNTER — HOSPITAL ENCOUNTER (OUTPATIENT)
Facility: HOSPITAL | Age: 63
Discharge: HOME | End: 2023-09-29

## 2023-09-26 VITALS
HEART RATE: 59 BPM | DIASTOLIC BLOOD PRESSURE: 76 MMHG | WEIGHT: 124 LBS | HEIGHT: 60 IN | BODY MASS INDEX: 24.35 KG/M2 | SYSTOLIC BLOOD PRESSURE: 168 MMHG

## 2023-09-26 RX ORDER — ANASTROZOLE 1 MG/1
1 TABLET ORAL DAILY
COMMUNITY
End: 2023-09-26 | Stop reason: CLARIF

## 2023-09-26 RX ORDER — GABAPENTIN 300 MG/1
300 CAPSULE ORAL DAILY
COMMUNITY
End: 2023-09-26 | Stop reason: CLARIF

## 2023-09-26 NOTE — PROGRESS NOTES
pCR  No residual carcinoma, 0/5 LN, [id number]    We discussed the possible risks, benefits, indications for, and side effects from postlumpectomy radiation. She wishes to proceed with a 4 week course of radiation to the left breast and signed consent for treatment at [facility]. She will return for CT simulation in the near future with treatment to begin soon thereafter. She will work with my  to help with transportation and for insurance/billing concerns. She knows to call or return if she should have any additional questions or concerns. -     Thank you for the opportunity to participate in the care of Ms. Augusta Quinn. Please feel free to contact me with any questions or concerns. Ericka Anguiano MD  [facility]  [address]  P: 210 [facility]  1000 [facility] [address] [place] [address]  P: [address], 8050 [facility], [address] [place] [address]  P: [phone]    Time and Counseling: I spent a total of 70 minutes in care of this patient during today's encounter on 9/26/23.     Carbon Copy:  Alton Rodriguez MD

## 2023-10-03 ENCOUNTER — HOSPITAL ENCOUNTER (OUTPATIENT)
Facility: HOSPITAL | Age: 63
Discharge: HOME | End: 2023-10-06
Attending: RADIOLOGY

## 2023-10-04 ENCOUNTER — TELEPHONE (OUTPATIENT)
Age: 63
End: 2023-10-04

## 2023-10-04 NOTE — TELEPHONE ENCOUNTER
Pt called to notify she's unable to attend [facility] appt for tomorrow and would like to r/s. Call back number is [phone].

## 2023-10-06 ENCOUNTER — HOSPITAL ENCOUNTER (OUTPATIENT)
Facility: HOSPITAL | Age: 63
Discharge: HOME | End: 2023-10-09
Attending: RADIOLOGY
Payer: COMMERCIAL

## 2023-10-06 PROCEDURE — 77334 RADIATION TREATMENT AID(S): CPT

## 2023-10-06 PROCEDURE — 77307 TELETHX ISODOSE PLAN CPLX: CPT

## 2023-10-09 ENCOUNTER — TELEPHONE (OUTPATIENT)
Age: 63
End: 2023-10-09

## 2023-10-09 NOTE — TELEPHONE ENCOUNTER
Patient called and wanted to know if she should get the Covid and flu shot this year?        # [phone]

## 2023-10-10 NOTE — TELEPHONE ENCOUNTER
0915:    Returned call to patient and confirmed x2 identifiers   Informed patient she can have either     Patient verbalized understanding and wanted to discuss the following:    Having memory problems   PCP no longer see her due to her having Medicaid  Feels like she overwhelmed and does not know what to do next    Wished to speak with SW

## 2023-10-11 ENCOUNTER — HOSPITAL ENCOUNTER (OUTPATIENT)
Facility: HOSPITAL | Age: 63
Setting detail: INFUSION SERIES
End: 2023-10-11
Payer: COMMERCIAL

## 2023-10-11 ENCOUNTER — TELEPHONE (OUTPATIENT)
Age: 63
End: 2023-10-11

## 2023-10-11 ENCOUNTER — SOCIAL WORK (OUTPATIENT)
Facility: HOSPITAL | Age: 63
End: 2023-10-11

## 2023-10-11 VITALS
TEMPERATURE: 97.8 F | SYSTOLIC BLOOD PRESSURE: 146 MMHG | RESPIRATION RATE: 16 BRPM | DIASTOLIC BLOOD PRESSURE: 76 MMHG | HEART RATE: 60 BPM

## 2023-10-11 DIAGNOSIS — C50.011 MALIGNANT NEOPLASM OF NIPPLE AND AREOLA, RIGHT FEMALE BREAST (HCC): ICD-10-CM

## 2023-10-11 LAB
ALBUMIN SERPL-MCNC: 3.3 G/DL (ref 3.5–5)
ALBUMIN/GLOB SERPL: 0.9 (ref 1.1–2.2)
ALP SERPL-CCNC: 117 U/L (ref 45–117)
ALT SERPL-CCNC: 21 U/L (ref 12–78)
ANION GAP SERPL CALC-SCNC: 5 MMOL/L (ref 5–15)
AST SERPL-CCNC: 25 U/L (ref 15–37)
BASOPHILS # BLD: 0 K/UL (ref 0–0.1)
BASOPHILS NFR BLD: 1 % (ref 0–1)
BILIRUB SERPL-MCNC: 0.3 MG/DL (ref 0.2–1)
BUN SERPL-MCNC: 16 MG/DL (ref 6–20)
BUN/CREAT SERPL: 12 (ref 12–20)
CALCIUM SERPL-MCNC: 9.5 MG/DL (ref 8.5–10.1)
CHLORIDE SERPL-SCNC: 108 MMOL/L (ref 97–108)
CO2 SERPL-SCNC: 28 MMOL/L (ref 21–32)
CREAT SERPL-MCNC: 1.33 MG/DL (ref 0.55–1.02)
DIFFERENTIAL METHOD BLD: ABNORMAL
EOSINOPHIL # BLD: 0.1 K/UL (ref 0–0.4)
EOSINOPHIL NFR BLD: 3 % (ref 0–7)
ERYTHROCYTE [DISTWIDTH] IN BLOOD BY AUTOMATED COUNT: 11.6 % (ref 11.5–14.5)
GLOBULIN SER CALC-MCNC: 3.5 G/DL (ref 2–4)
GLUCOSE SERPL-MCNC: 93 MG/DL (ref 65–100)
HCT VFR BLD AUTO: 36.4 % (ref 35–47)
HGB BLD-MCNC: 11.7 G/DL (ref 11.5–16)
IMM GRANULOCYTES # BLD AUTO: 0 K/UL (ref 0–0.04)
IMM GRANULOCYTES NFR BLD AUTO: 0 % (ref 0–0.5)
LYMPHOCYTES # BLD: 1.9 K/UL (ref 0.8–3.5)
LYMPHOCYTES NFR BLD: 47 % (ref 12–49)
MCH RBC QN AUTO: 29.6 PG (ref 26–34)
MCHC RBC AUTO-ENTMCNC: 32.1 G/DL (ref 30–36.5)
MCV RBC AUTO: 92.2 FL (ref 80–99)
MONOCYTES # BLD: 0.4 K/UL (ref 0–1)
MONOCYTES NFR BLD: 9 % (ref 5–13)
NEUTS SEG # BLD: 1.6 K/UL (ref 1.8–8)
NEUTS SEG NFR BLD: 40 % (ref 32–75)
NRBC # BLD: 0 K/UL (ref 0–0.01)
NRBC BLD-RTO: 0 PER 100 WBC
PLATELET # BLD AUTO: 246 K/UL (ref 150–400)
PMV BLD AUTO: 9.6 FL (ref 8.9–12.9)
POTASSIUM SERPL-SCNC: 3.8 MMOL/L (ref 3.5–5.1)
PROT SERPL-MCNC: 6.8 G/DL (ref 6.4–8.2)
RBC # BLD AUTO: 3.95 M/UL (ref 3.8–5.2)
SODIUM SERPL-SCNC: 141 MMOL/L (ref 136–145)
WBC # BLD AUTO: 4.1 K/UL (ref 3.6–11)

## 2023-10-11 PROCEDURE — 36591 DRAW BLOOD OFF VENOUS DEVICE: CPT

## 2023-10-11 PROCEDURE — 85025 COMPLETE CBC W/AUTO DIFF WBC: CPT

## 2023-10-11 PROCEDURE — 36415 COLL VENOUS BLD VENIPUNCTURE: CPT

## 2023-10-11 PROCEDURE — 80053 COMPREHEN METABOLIC PANEL: CPT

## 2023-10-11 RX ORDER — HEPARIN 100 UNIT/ML
500 SYRINGE INTRAVENOUS PRN
OUTPATIENT
Start: 2023-10-29

## 2023-10-11 RX ORDER — SODIUM CHLORIDE 0.9 % (FLUSH) 0.9 %
5-40 SYRINGE (ML) INJECTION PRN
OUTPATIENT
Start: 2023-10-29

## 2023-10-11 RX ORDER — SODIUM CHLORIDE 9 MG/ML
25 INJECTION, SOLUTION INTRAVENOUS PRN
OUTPATIENT
Start: 2023-10-29

## 2023-10-11 ASSESSMENT — PAIN SCALES - GENERAL: PAINLEVEL_OUTOF10: 0

## 2023-10-11 NOTE — TELEPHONE ENCOUNTER
[employer]  Oncology Social Work  Encounter     Patient Name:  Freida Benson     Medical History: breast cancer     Advance Directives: The patient has appointed the following active healthcare agents:    Primary Decision MakerBetzyloretta Morgan - [phone]    Narrative: Writer outreached patient as referred by Joseph Rosenthal. Patient requested to speak to this writer, stating the following concerns:   - Having memory problems   - PCP no longer see her due to her having Medicaid  - Feels like she overwhelmed and does not know what to do next  Contact was made. Pt indicated she is still at [facility] center and will return writer's call. UPDATE 11:39 AM  Patient returned writer's call. She is having a difficult time right now, struggling with not having support from family, especially \"after all [she's] done for them. \"   Patient also shared about her experience at PCP's office, stating she presented her Medicaid card and was told that the office doesn't accept it and she would have to go somewhere else. Patient described that the PSR at that office was rude to her and she expressed that \"it is [place], so there is a lot of that still going on\" referring to discriminatory treatment because of pt's racial identity. I provided active listening and validated pt's experience and feelings of being mistreated. 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 I will inquire about whether or not pt can remain with her PCP since she has been with that provider for a significant period of time and patient does not wish to change providers. In the meantime, she scheduled a New Patient appt with Dr. Eric Siegel at [facility] for 11/10/23. Writer and patient also discussed engaging in other supportive services, specifically connecting with the [employer] ([phone]). Patient will contact that organization.  Our support groups aren't the best option

## 2023-10-12 ENCOUNTER — TELEPHONE (OUTPATIENT)
Age: 63
End: 2023-10-12

## 2023-10-12 NOTE — TELEPHONE ENCOUNTER
Writer outreached patient to follow up on questions related to her Medicaid and coverage at her former PCP's office. Call was unanswered, left discrete voicemail requesting return call.      Signed By:  Melvi Woods, [address] [place] [address]  Oncology Social Worker  [phone]

## 2023-10-12 NOTE — TELEPHONE ENCOUNTER
[employer]  Oncology Social Work  Encounter     Patient Name:  Arianne Yee     Medical History: breast cancer     Advance Directives: on file     Narrative: Writer outreached pt to provide her an update on the insurance issue with her previous PCP's office. Call answered, Pt identified x2. I informed her that the PCP's office definitely does not accept Medicaid and that pt can't use only one of her insurance plans (e.g. Cigna) and not the other (e.g. Medicaid). Her only options are to either not use insurance at all or to establish care with a new PCP who accepts her Medicaid coverage, which she uses to cover the costs not covered by her primary Cigna (e.g. co-pays). Patient verbalized understanding and appreciated the explanation. She is already scheduled to establish care with Dr. Antonio Godoy at [facility].     I also advised patient of her follow-up schedule with Dr. Blake Wooten. She is scheduled at 10:45 on 11/9 before her port flush appt at 11:30 that same day. Patient informed this writer that she is out of potassium and lasix that her PCP generally orders. She already confirmed with pharmacy that she has no refills. We agreed she would ask her cardiologist at today's appt if they can bridge dose until pt is established with new PCP. She will let me know if they won't and I will request this from MD Castano if needed. She is using [facility] DR FRANCINE PAZ on [address] [place] [address]    She appreciated support from this writer. Barriers to Care: has to establish care with new PCP     Plan:   Patient notify this writer if needs bridge dose of meds prescribed by former PCP. Ongoing psychosocial support as desired by patient. Referral/Handouts: Insurance/Entitlements referral  Primary Care referral     remains available for further assistance.      Signed By:  Harjinder Barnett, [address]   Oncology Social Worker  [phone]

## 2023-10-16 ENCOUNTER — HOSPITAL ENCOUNTER (OUTPATIENT)
Facility: HOSPITAL | Age: 63
Discharge: HOME | End: 2023-10-19
Attending: RADIOLOGY
Payer: COMMERCIAL

## 2023-10-16 LAB
RAD ONC ARIA COURSE FIRST TREATMENT DATE: NORMAL
RAD ONC ARIA COURSE ID: NORMAL
RAD ONC ARIA COURSE INTENT: NORMAL
RAD ONC ARIA COURSE LAST TREATMENT DATE: NORMAL
RAD ONC ARIA COURSE SESSION NUMBER: 1
RAD ONC ARIA COURSE START DATE: NORMAL
RAD ONC ARIA COURSE TREATMENT ELAPSED DAYS: 0
RAD ONC ARIA PLAN FRACTIONS TREATED TO DATE: 1
RAD ONC ARIA PLAN ID: NORMAL
RAD ONC ARIA PLAN PRESCRIBED DOSE PER FRACTION: 2.66 GY
RAD ONC ARIA PLAN PRIMARY REFERENCE POINT: NORMAL
RAD ONC ARIA PLAN TOTAL FRACTIONS PRESCRIBED: 16
RAD ONC ARIA PLAN TOTAL PRESCRIBED DOSE: 4256 CGY
RAD ONC ARIA REFERENCE POINT DOSAGE GIVEN TO DATE: 2.66 GY
RAD ONC ARIA REFERENCE POINT DOSAGE GIVEN TO DATE: 2.71 GY
RAD ONC ARIA REFERENCE POINT ID: NORMAL
RAD ONC ARIA REFERENCE POINT ID: NORMAL
RAD ONC ARIA REFERENCE POINT SESSION DOSAGE GIVEN: 2.66 GY
RAD ONC ARIA REFERENCE POINT SESSION DOSAGE GIVEN: 2.71 GY

## 2023-10-16 PROCEDURE — 77412 RADIATION TX DELIVERY LVL 3: CPT

## 2023-10-16 PROCEDURE — 77417 THER RADIOLOGY PORT IMAGE(S): CPT

## 2023-10-16 PROCEDURE — 77280 THER RAD SIMULAJ FIELD SMPL: CPT

## 2023-10-17 ENCOUNTER — HOSPITAL ENCOUNTER (OUTPATIENT)
Facility: HOSPITAL | Age: 63
Discharge: HOME | End: 2023-10-20
Attending: RADIOLOGY
Payer: COMMERCIAL

## 2023-10-17 LAB
RAD ONC ARIA COURSE FIRST TREATMENT DATE: NORMAL
RAD ONC ARIA COURSE ID: NORMAL
RAD ONC ARIA COURSE INTENT: NORMAL
RAD ONC ARIA COURSE LAST TREATMENT DATE: NORMAL
RAD ONC ARIA COURSE SESSION NUMBER: 2
RAD ONC ARIA COURSE START DATE: NORMAL
RAD ONC ARIA COURSE TREATMENT ELAPSED DAYS: 1
RAD ONC ARIA PLAN FRACTIONS TREATED TO DATE: 2
RAD ONC ARIA PLAN ID: NORMAL
RAD ONC ARIA PLAN PRESCRIBED DOSE PER FRACTION: 2.66 GY
RAD ONC ARIA PLAN PRIMARY REFERENCE POINT: NORMAL
RAD ONC ARIA PLAN TOTAL FRACTIONS PRESCRIBED: 16
RAD ONC ARIA PLAN TOTAL PRESCRIBED DOSE: 4256 CGY
RAD ONC ARIA REFERENCE POINT DOSAGE GIVEN TO DATE: 5.32 GY
RAD ONC ARIA REFERENCE POINT DOSAGE GIVEN TO DATE: 5.43 GY
RAD ONC ARIA REFERENCE POINT ID: NORMAL
RAD ONC ARIA REFERENCE POINT ID: NORMAL
RAD ONC ARIA REFERENCE POINT SESSION DOSAGE GIVEN: 2.66 GY
RAD ONC ARIA REFERENCE POINT SESSION DOSAGE GIVEN: 2.71 GY

## 2023-10-17 PROCEDURE — 77412 RADIATION TX DELIVERY LVL 3: CPT

## 2023-10-17 PROCEDURE — 77417 THER RADIOLOGY PORT IMAGE(S): CPT

## 2023-10-18 ENCOUNTER — TELEPHONE (OUTPATIENT)
Facility: HOSPITAL | Age: 63
End: 2023-10-18

## 2023-10-18 ENCOUNTER — SOCIAL WORK (OUTPATIENT)
Facility: HOSPITAL | Age: 63
End: 2023-10-18

## 2023-10-18 ENCOUNTER — HOSPITAL ENCOUNTER (OUTPATIENT)
Facility: HOSPITAL | Age: 63
Discharge: HOME | End: 2023-10-21
Attending: RADIOLOGY
Payer: COMMERCIAL

## 2023-10-18 LAB
RAD ONC ARIA COURSE FIRST TREATMENT DATE: NORMAL
RAD ONC ARIA COURSE ID: NORMAL
RAD ONC ARIA COURSE INTENT: NORMAL
RAD ONC ARIA COURSE LAST TREATMENT DATE: NORMAL
RAD ONC ARIA COURSE SESSION NUMBER: 3
RAD ONC ARIA COURSE START DATE: NORMAL
RAD ONC ARIA COURSE TREATMENT ELAPSED DAYS: 2
RAD ONC ARIA PLAN FRACTIONS TREATED TO DATE: 3
RAD ONC ARIA PLAN ID: NORMAL
RAD ONC ARIA PLAN PRESCRIBED DOSE PER FRACTION: 2.66 GY
RAD ONC ARIA PLAN PRIMARY REFERENCE POINT: NORMAL
RAD ONC ARIA PLAN TOTAL FRACTIONS PRESCRIBED: 16
RAD ONC ARIA PLAN TOTAL PRESCRIBED DOSE: 4256 CGY
RAD ONC ARIA REFERENCE POINT DOSAGE GIVEN TO DATE: 7.98 GY
RAD ONC ARIA REFERENCE POINT DOSAGE GIVEN TO DATE: 8.14 GY
RAD ONC ARIA REFERENCE POINT ID: NORMAL
RAD ONC ARIA REFERENCE POINT ID: NORMAL
RAD ONC ARIA REFERENCE POINT SESSION DOSAGE GIVEN: 2.66 GY
RAD ONC ARIA REFERENCE POINT SESSION DOSAGE GIVEN: 2.71 GY

## 2023-10-18 PROCEDURE — 77412 RADIATION TX DELIVERY LVL 3: CPT

## 2023-10-18 PROCEDURE — 77417 THER RADIOLOGY PORT IMAGE(S): CPT

## 2023-10-18 NOTE — PROGRESS NOTES
Called pt to perform risk assessment. Patient declined. Notes in [address].
Quality 431: Preventive Care And Screening: Unhealthy Alcohol Use - Screening: Patient screened for unhealthy alcohol use using a single question and scores less than 2 times per year
Detail Level: Detailed
Quality 131: Pain Assessment And Follow-Up: Pain assessment using a standardized tool is documented as negative, no follow-up plan required
Quality 130: Documentation Of Current Medications In The Medical Record: Current Medications Documented
Quality 226: Preventive Care And Screening: Tobacco Use: Screening And Cessation Intervention: Patient screened for tobacco and never smoked

## 2023-10-19 ENCOUNTER — HOSPITAL ENCOUNTER (OUTPATIENT)
Facility: HOSPITAL | Age: 63
Discharge: HOME | End: 2023-10-22
Attending: RADIOLOGY
Payer: COMMERCIAL

## 2023-10-19 LAB
RAD ONC ARIA COURSE FIRST TREATMENT DATE: NORMAL
RAD ONC ARIA COURSE ID: NORMAL
RAD ONC ARIA COURSE INTENT: NORMAL
RAD ONC ARIA COURSE LAST TREATMENT DATE: NORMAL
RAD ONC ARIA COURSE SESSION NUMBER: 4
RAD ONC ARIA COURSE START DATE: NORMAL
RAD ONC ARIA COURSE TREATMENT ELAPSED DAYS: 3
RAD ONC ARIA PLAN FRACTIONS TREATED TO DATE: 4
RAD ONC ARIA PLAN ID: NORMAL
RAD ONC ARIA PLAN PRESCRIBED DOSE PER FRACTION: 2.66 GY
RAD ONC ARIA PLAN PRIMARY REFERENCE POINT: NORMAL
RAD ONC ARIA PLAN TOTAL FRACTIONS PRESCRIBED: 16
RAD ONC ARIA PLAN TOTAL PRESCRIBED DOSE: 4256 CGY
RAD ONC ARIA REFERENCE POINT DOSAGE GIVEN TO DATE: 10.64 GY
RAD ONC ARIA REFERENCE POINT DOSAGE GIVEN TO DATE: 10.86 GY
RAD ONC ARIA REFERENCE POINT ID: NORMAL
RAD ONC ARIA REFERENCE POINT ID: NORMAL
RAD ONC ARIA REFERENCE POINT SESSION DOSAGE GIVEN: 2.66 GY
RAD ONC ARIA REFERENCE POINT SESSION DOSAGE GIVEN: 2.71 GY

## 2023-10-19 PROCEDURE — 77412 RADIATION TX DELIVERY LVL 3: CPT

## 2023-10-20 ENCOUNTER — HOSPITAL ENCOUNTER (OUTPATIENT)
Facility: HOSPITAL | Age: 63
Discharge: HOME | End: 2023-10-23
Attending: RADIOLOGY
Payer: COMMERCIAL

## 2023-10-20 LAB
RAD ONC ARIA COURSE FIRST TREATMENT DATE: NORMAL
RAD ONC ARIA COURSE ID: NORMAL
RAD ONC ARIA COURSE INTENT: NORMAL
RAD ONC ARIA COURSE LAST TREATMENT DATE: NORMAL
RAD ONC ARIA COURSE SESSION NUMBER: 5
RAD ONC ARIA COURSE START DATE: NORMAL
RAD ONC ARIA COURSE TREATMENT ELAPSED DAYS: 4
RAD ONC ARIA PLAN FRACTIONS TREATED TO DATE: 5
RAD ONC ARIA PLAN ID: NORMAL
RAD ONC ARIA PLAN PRESCRIBED DOSE PER FRACTION: 2.66 GY
RAD ONC ARIA PLAN PRIMARY REFERENCE POINT: NORMAL
RAD ONC ARIA PLAN TOTAL FRACTIONS PRESCRIBED: 16
RAD ONC ARIA PLAN TOTAL PRESCRIBED DOSE: 4256 CGY
RAD ONC ARIA REFERENCE POINT DOSAGE GIVEN TO DATE: 13.3 GY
RAD ONC ARIA REFERENCE POINT DOSAGE GIVEN TO DATE: 13.57 GY
RAD ONC ARIA REFERENCE POINT ID: NORMAL
RAD ONC ARIA REFERENCE POINT ID: NORMAL
RAD ONC ARIA REFERENCE POINT SESSION DOSAGE GIVEN: 2.66 GY
RAD ONC ARIA REFERENCE POINT SESSION DOSAGE GIVEN: 2.71 GY

## 2023-10-20 PROCEDURE — 77412 RADIATION TX DELIVERY LVL 3: CPT

## 2023-10-20 PROCEDURE — 77417 THER RADIOLOGY PORT IMAGE(S): CPT

## 2023-10-20 PROCEDURE — 77336 RADIATION PHYSICS CONSULT: CPT

## 2023-10-23 ENCOUNTER — HOSPITAL ENCOUNTER (OUTPATIENT)
Facility: HOSPITAL | Age: 63
Discharge: HOME | End: 2023-10-26
Attending: RADIOLOGY
Payer: COMMERCIAL

## 2023-10-23 LAB
RAD ONC ARIA COURSE FIRST TREATMENT DATE: NORMAL
RAD ONC ARIA COURSE ID: NORMAL
RAD ONC ARIA COURSE INTENT: NORMAL
RAD ONC ARIA COURSE LAST TREATMENT DATE: NORMAL
RAD ONC ARIA COURSE SESSION NUMBER: 6
RAD ONC ARIA COURSE START DATE: NORMAL
RAD ONC ARIA COURSE TREATMENT ELAPSED DAYS: 7
RAD ONC ARIA PLAN FRACTIONS TREATED TO DATE: 6
RAD ONC ARIA PLAN ID: NORMAL
RAD ONC ARIA PLAN PRESCRIBED DOSE PER FRACTION: 2.66 GY
RAD ONC ARIA PLAN PRIMARY REFERENCE POINT: NORMAL
RAD ONC ARIA PLAN TOTAL FRACTIONS PRESCRIBED: 16
RAD ONC ARIA PLAN TOTAL PRESCRIBED DOSE: 4256 CGY
RAD ONC ARIA REFERENCE POINT DOSAGE GIVEN TO DATE: 15.96 GY
RAD ONC ARIA REFERENCE POINT DOSAGE GIVEN TO DATE: 16.29 GY
RAD ONC ARIA REFERENCE POINT ID: NORMAL
RAD ONC ARIA REFERENCE POINT ID: NORMAL
RAD ONC ARIA REFERENCE POINT SESSION DOSAGE GIVEN: 2.66 GY
RAD ONC ARIA REFERENCE POINT SESSION DOSAGE GIVEN: 2.71 GY

## 2023-10-23 PROCEDURE — 77412 RADIATION TX DELIVERY LVL 3: CPT

## 2023-10-23 PROCEDURE — 77417 THER RADIOLOGY PORT IMAGE(S): CPT

## 2023-10-24 ENCOUNTER — HOSPITAL ENCOUNTER (OUTPATIENT)
Facility: HOSPITAL | Age: 63
Discharge: HOME | End: 2023-10-27
Attending: RADIOLOGY
Payer: COMMERCIAL

## 2023-10-24 LAB
RAD ONC ARIA COURSE FIRST TREATMENT DATE: NORMAL
RAD ONC ARIA COURSE ID: NORMAL
RAD ONC ARIA COURSE INTENT: NORMAL
RAD ONC ARIA COURSE LAST TREATMENT DATE: NORMAL
RAD ONC ARIA COURSE SESSION NUMBER: 7
RAD ONC ARIA COURSE START DATE: NORMAL
RAD ONC ARIA COURSE TREATMENT ELAPSED DAYS: 8
RAD ONC ARIA PLAN FRACTIONS TREATED TO DATE: 7
RAD ONC ARIA PLAN ID: NORMAL
RAD ONC ARIA PLAN PRESCRIBED DOSE PER FRACTION: 2.66 GY
RAD ONC ARIA PLAN PRIMARY REFERENCE POINT: NORMAL
RAD ONC ARIA PLAN TOTAL FRACTIONS PRESCRIBED: 16
RAD ONC ARIA PLAN TOTAL PRESCRIBED DOSE: 4256 CGY
RAD ONC ARIA REFERENCE POINT DOSAGE GIVEN TO DATE: 18.62 GY
RAD ONC ARIA REFERENCE POINT DOSAGE GIVEN TO DATE: 19 GY
RAD ONC ARIA REFERENCE POINT ID: NORMAL
RAD ONC ARIA REFERENCE POINT ID: NORMAL
RAD ONC ARIA REFERENCE POINT SESSION DOSAGE GIVEN: 2.66 GY
RAD ONC ARIA REFERENCE POINT SESSION DOSAGE GIVEN: 2.71 GY

## 2023-10-24 PROCEDURE — 77412 RADIATION TX DELIVERY LVL 3: CPT

## 2023-10-24 PROCEDURE — 77417 THER RADIOLOGY PORT IMAGE(S): CPT

## 2023-10-25 ENCOUNTER — HOSPITAL ENCOUNTER (OUTPATIENT)
Facility: HOSPITAL | Age: 63
Discharge: HOME | End: 2023-10-28
Attending: RADIOLOGY
Payer: COMMERCIAL

## 2023-10-25 LAB
RAD ONC ARIA COURSE FIRST TREATMENT DATE: NORMAL
RAD ONC ARIA COURSE ID: NORMAL
RAD ONC ARIA COURSE INTENT: NORMAL
RAD ONC ARIA COURSE LAST TREATMENT DATE: NORMAL
RAD ONC ARIA COURSE SESSION NUMBER: 8
RAD ONC ARIA COURSE START DATE: NORMAL
RAD ONC ARIA COURSE TREATMENT ELAPSED DAYS: 9
RAD ONC ARIA PLAN FRACTIONS TREATED TO DATE: 8
RAD ONC ARIA PLAN ID: NORMAL
RAD ONC ARIA PLAN PRESCRIBED DOSE PER FRACTION: 2.66 GY
RAD ONC ARIA PLAN PRIMARY REFERENCE POINT: NORMAL
RAD ONC ARIA PLAN TOTAL FRACTIONS PRESCRIBED: 16
RAD ONC ARIA PLAN TOTAL PRESCRIBED DOSE: 4256 CGY
RAD ONC ARIA REFERENCE POINT DOSAGE GIVEN TO DATE: 21.28 GY
RAD ONC ARIA REFERENCE POINT DOSAGE GIVEN TO DATE: 21.71 GY
RAD ONC ARIA REFERENCE POINT ID: NORMAL
RAD ONC ARIA REFERENCE POINT ID: NORMAL
RAD ONC ARIA REFERENCE POINT SESSION DOSAGE GIVEN: 2.66 GY
RAD ONC ARIA REFERENCE POINT SESSION DOSAGE GIVEN: 2.71 GY

## 2023-10-25 PROCEDURE — 77412 RADIATION TX DELIVERY LVL 3: CPT

## 2023-10-25 PROCEDURE — 77417 THER RADIOLOGY PORT IMAGE(S): CPT

## 2023-10-26 ENCOUNTER — HOSPITAL ENCOUNTER (OUTPATIENT)
Facility: HOSPITAL | Age: 63
Discharge: HOME | End: 2023-10-29
Attending: RADIOLOGY
Payer: COMMERCIAL

## 2023-10-26 LAB
RAD ONC ARIA COURSE FIRST TREATMENT DATE: NORMAL
RAD ONC ARIA COURSE ID: NORMAL
RAD ONC ARIA COURSE INTENT: NORMAL
RAD ONC ARIA COURSE LAST TREATMENT DATE: NORMAL
RAD ONC ARIA COURSE SESSION NUMBER: 9
RAD ONC ARIA COURSE START DATE: NORMAL
RAD ONC ARIA COURSE TREATMENT ELAPSED DAYS: 10
RAD ONC ARIA PLAN FRACTIONS TREATED TO DATE: 9
RAD ONC ARIA PLAN ID: NORMAL
RAD ONC ARIA PLAN PRESCRIBED DOSE PER FRACTION: 2.66 GY
RAD ONC ARIA PLAN PRIMARY REFERENCE POINT: NORMAL
RAD ONC ARIA PLAN TOTAL FRACTIONS PRESCRIBED: 16
RAD ONC ARIA PLAN TOTAL PRESCRIBED DOSE: 4256 CGY
RAD ONC ARIA REFERENCE POINT DOSAGE GIVEN TO DATE: 23.94 GY
RAD ONC ARIA REFERENCE POINT DOSAGE GIVEN TO DATE: 24.43 GY
RAD ONC ARIA REFERENCE POINT ID: NORMAL
RAD ONC ARIA REFERENCE POINT ID: NORMAL
RAD ONC ARIA REFERENCE POINT SESSION DOSAGE GIVEN: 2.66 GY
RAD ONC ARIA REFERENCE POINT SESSION DOSAGE GIVEN: 2.71 GY

## 2023-10-26 PROCEDURE — 77412 RADIATION TX DELIVERY LVL 3: CPT

## 2023-10-26 PROCEDURE — 77417 THER RADIOLOGY PORT IMAGE(S): CPT

## 2023-10-27 ENCOUNTER — HOSPITAL ENCOUNTER (OUTPATIENT)
Facility: HOSPITAL | Age: 63
Discharge: HOME | End: 2023-10-30
Attending: RADIOLOGY
Payer: COMMERCIAL

## 2023-10-27 LAB
RAD ONC ARIA COURSE FIRST TREATMENT DATE: NORMAL
RAD ONC ARIA COURSE ID: NORMAL
RAD ONC ARIA COURSE INTENT: NORMAL
RAD ONC ARIA COURSE LAST TREATMENT DATE: NORMAL
RAD ONC ARIA COURSE SESSION NUMBER: 10
RAD ONC ARIA COURSE START DATE: NORMAL
RAD ONC ARIA COURSE TREATMENT ELAPSED DAYS: 11
RAD ONC ARIA PLAN FRACTIONS TREATED TO DATE: 10
RAD ONC ARIA PLAN ID: NORMAL
RAD ONC ARIA PLAN PRESCRIBED DOSE PER FRACTION: 2.66 GY
RAD ONC ARIA PLAN PRIMARY REFERENCE POINT: NORMAL
RAD ONC ARIA PLAN TOTAL FRACTIONS PRESCRIBED: 16
RAD ONC ARIA PLAN TOTAL PRESCRIBED DOSE: 4256 CGY
RAD ONC ARIA REFERENCE POINT DOSAGE GIVEN TO DATE: 26.6 GY
RAD ONC ARIA REFERENCE POINT DOSAGE GIVEN TO DATE: 27.14 GY
RAD ONC ARIA REFERENCE POINT ID: NORMAL
RAD ONC ARIA REFERENCE POINT ID: NORMAL
RAD ONC ARIA REFERENCE POINT SESSION DOSAGE GIVEN: 2.66 GY
RAD ONC ARIA REFERENCE POINT SESSION DOSAGE GIVEN: 2.71 GY

## 2023-10-27 PROCEDURE — 77336 RADIATION PHYSICS CONSULT: CPT

## 2023-10-27 PROCEDURE — 77412 RADIATION TX DELIVERY LVL 3: CPT

## 2023-10-30 ENCOUNTER — HOSPITAL ENCOUNTER (OUTPATIENT)
Facility: HOSPITAL | Age: 63
Discharge: HOME | End: 2023-11-02
Attending: RADIOLOGY
Payer: COMMERCIAL

## 2023-10-30 LAB
RAD ONC ARIA COURSE FIRST TREATMENT DATE: NORMAL
RAD ONC ARIA COURSE ID: NORMAL
RAD ONC ARIA COURSE INTENT: NORMAL
RAD ONC ARIA COURSE LAST TREATMENT DATE: NORMAL
RAD ONC ARIA COURSE SESSION NUMBER: 11
RAD ONC ARIA COURSE START DATE: NORMAL
RAD ONC ARIA COURSE TREATMENT ELAPSED DAYS: 14
RAD ONC ARIA PLAN FRACTIONS TREATED TO DATE: 11
RAD ONC ARIA PLAN ID: NORMAL
RAD ONC ARIA PLAN PRESCRIBED DOSE PER FRACTION: 2.66 GY
RAD ONC ARIA PLAN PRIMARY REFERENCE POINT: NORMAL
RAD ONC ARIA PLAN TOTAL FRACTIONS PRESCRIBED: 16
RAD ONC ARIA PLAN TOTAL PRESCRIBED DOSE: 4256 CGY
RAD ONC ARIA REFERENCE POINT DOSAGE GIVEN TO DATE: 29.26 GY
RAD ONC ARIA REFERENCE POINT DOSAGE GIVEN TO DATE: 29.86 GY
RAD ONC ARIA REFERENCE POINT ID: NORMAL
RAD ONC ARIA REFERENCE POINT ID: NORMAL
RAD ONC ARIA REFERENCE POINT SESSION DOSAGE GIVEN: 2.66 GY
RAD ONC ARIA REFERENCE POINT SESSION DOSAGE GIVEN: 2.71 GY

## 2023-10-30 PROCEDURE — 77417 THER RADIOLOGY PORT IMAGE(S): CPT

## 2023-10-30 PROCEDURE — 77412 RADIATION TX DELIVERY LVL 3: CPT

## 2023-10-31 ENCOUNTER — HOSPITAL ENCOUNTER (OUTPATIENT)
Facility: HOSPITAL | Age: 63
Discharge: HOME | End: 2023-11-03
Attending: RADIOLOGY
Payer: COMMERCIAL

## 2023-10-31 LAB
RAD ONC ARIA COURSE FIRST TREATMENT DATE: NORMAL
RAD ONC ARIA COURSE ID: NORMAL
RAD ONC ARIA COURSE INTENT: NORMAL
RAD ONC ARIA COURSE LAST TREATMENT DATE: NORMAL
RAD ONC ARIA COURSE SESSION NUMBER: 12
RAD ONC ARIA COURSE START DATE: NORMAL
RAD ONC ARIA COURSE TREATMENT ELAPSED DAYS: 15
RAD ONC ARIA PLAN FRACTIONS TREATED TO DATE: 12
RAD ONC ARIA PLAN ID: NORMAL
RAD ONC ARIA PLAN PRESCRIBED DOSE PER FRACTION: 2.66 GY
RAD ONC ARIA PLAN PRIMARY REFERENCE POINT: NORMAL
RAD ONC ARIA PLAN TOTAL FRACTIONS PRESCRIBED: 16
RAD ONC ARIA PLAN TOTAL PRESCRIBED DOSE: 4256 CGY
RAD ONC ARIA REFERENCE POINT DOSAGE GIVEN TO DATE: 31.92 GY
RAD ONC ARIA REFERENCE POINT DOSAGE GIVEN TO DATE: 32.57 GY
RAD ONC ARIA REFERENCE POINT ID: NORMAL
RAD ONC ARIA REFERENCE POINT ID: NORMAL
RAD ONC ARIA REFERENCE POINT SESSION DOSAGE GIVEN: 2.66 GY
RAD ONC ARIA REFERENCE POINT SESSION DOSAGE GIVEN: 2.71 GY

## 2023-10-31 PROCEDURE — 77412 RADIATION TX DELIVERY LVL 3: CPT

## 2023-11-01 ENCOUNTER — HOSPITAL ENCOUNTER (OUTPATIENT)
Facility: HOSPITAL | Age: 63
Discharge: HOME | End: 2023-11-04
Attending: RADIOLOGY

## 2023-11-01 ENCOUNTER — HOSPITAL ENCOUNTER (OUTPATIENT)
Facility: HOSPITAL | Age: 63
Discharge: HOME | End: 2023-11-04
Attending: RADIOLOGY
Payer: COMMERCIAL

## 2023-11-01 LAB
RAD ONC ARIA COURSE FIRST TREATMENT DATE: NORMAL
RAD ONC ARIA COURSE ID: NORMAL
RAD ONC ARIA COURSE INTENT: NORMAL
RAD ONC ARIA COURSE LAST TREATMENT DATE: NORMAL
RAD ONC ARIA COURSE SESSION NUMBER: 13
RAD ONC ARIA COURSE START DATE: NORMAL
RAD ONC ARIA COURSE TREATMENT ELAPSED DAYS: 16
RAD ONC ARIA PLAN FRACTIONS TREATED TO DATE: 13
RAD ONC ARIA PLAN ID: NORMAL
RAD ONC ARIA PLAN PRESCRIBED DOSE PER FRACTION: 2.66 GY
RAD ONC ARIA PLAN PRIMARY REFERENCE POINT: NORMAL
RAD ONC ARIA PLAN TOTAL FRACTIONS PRESCRIBED: 16
RAD ONC ARIA PLAN TOTAL PRESCRIBED DOSE: 4256 CGY
RAD ONC ARIA REFERENCE POINT DOSAGE GIVEN TO DATE: 34.58 GY
RAD ONC ARIA REFERENCE POINT DOSAGE GIVEN TO DATE: 35.29 GY
RAD ONC ARIA REFERENCE POINT ID: NORMAL
RAD ONC ARIA REFERENCE POINT ID: NORMAL
RAD ONC ARIA REFERENCE POINT SESSION DOSAGE GIVEN: 2.66 GY
RAD ONC ARIA REFERENCE POINT SESSION DOSAGE GIVEN: 2.71 GY

## 2023-11-01 PROCEDURE — 77412 RADIATION TX DELIVERY LVL 3: CPT

## 2023-11-01 PROCEDURE — 77417 THER RADIOLOGY PORT IMAGE(S): CPT

## 2023-11-02 ENCOUNTER — HOSPITAL ENCOUNTER (OUTPATIENT)
Facility: HOSPITAL | Age: 63
Discharge: HOME | End: 2023-11-05
Attending: RADIOLOGY
Payer: COMMERCIAL

## 2023-11-02 ENCOUNTER — APPOINTMENT (OUTPATIENT)
Facility: HOSPITAL | Age: 63
End: 2023-11-02
Payer: COMMERCIAL

## 2023-11-02 LAB
RAD ONC ARIA COURSE FIRST TREATMENT DATE: NORMAL
RAD ONC ARIA COURSE ID: NORMAL
RAD ONC ARIA COURSE INTENT: NORMAL
RAD ONC ARIA COURSE LAST TREATMENT DATE: NORMAL
RAD ONC ARIA COURSE SESSION NUMBER: 14
RAD ONC ARIA COURSE START DATE: NORMAL
RAD ONC ARIA COURSE TREATMENT ELAPSED DAYS: 17
RAD ONC ARIA PLAN FRACTIONS TREATED TO DATE: 14
RAD ONC ARIA PLAN ID: NORMAL
RAD ONC ARIA PLAN PRESCRIBED DOSE PER FRACTION: 2.66 GY
RAD ONC ARIA PLAN PRIMARY REFERENCE POINT: NORMAL
RAD ONC ARIA PLAN TOTAL FRACTIONS PRESCRIBED: 16
RAD ONC ARIA PLAN TOTAL PRESCRIBED DOSE: 4256 CGY
RAD ONC ARIA REFERENCE POINT DOSAGE GIVEN TO DATE: 37.24 GY
RAD ONC ARIA REFERENCE POINT DOSAGE GIVEN TO DATE: 38 GY
RAD ONC ARIA REFERENCE POINT ID: NORMAL
RAD ONC ARIA REFERENCE POINT ID: NORMAL
RAD ONC ARIA REFERENCE POINT SESSION DOSAGE GIVEN: 2.66 GY
RAD ONC ARIA REFERENCE POINT SESSION DOSAGE GIVEN: 2.71 GY

## 2023-11-02 PROCEDURE — 77417 THER RADIOLOGY PORT IMAGE(S): CPT

## 2023-11-02 PROCEDURE — 77412 RADIATION TX DELIVERY LVL 3: CPT

## 2023-11-03 ENCOUNTER — HOSPITAL ENCOUNTER (OUTPATIENT)
Facility: HOSPITAL | Age: 63
Discharge: HOME | End: 2023-11-06
Attending: RADIOLOGY
Payer: COMMERCIAL

## 2023-11-03 LAB
RAD ONC ARIA COURSE FIRST TREATMENT DATE: NORMAL
RAD ONC ARIA COURSE ID: NORMAL
RAD ONC ARIA COURSE INTENT: NORMAL
RAD ONC ARIA COURSE LAST TREATMENT DATE: NORMAL
RAD ONC ARIA COURSE SESSION NUMBER: 15
RAD ONC ARIA COURSE START DATE: NORMAL
RAD ONC ARIA COURSE TREATMENT ELAPSED DAYS: 18
RAD ONC ARIA PLAN FRACTIONS TREATED TO DATE: 15
RAD ONC ARIA PLAN ID: NORMAL
RAD ONC ARIA PLAN PRESCRIBED DOSE PER FRACTION: 2.66 GY
RAD ONC ARIA PLAN PRIMARY REFERENCE POINT: NORMAL
RAD ONC ARIA PLAN TOTAL FRACTIONS PRESCRIBED: 16
RAD ONC ARIA PLAN TOTAL PRESCRIBED DOSE: 4256 CGY
RAD ONC ARIA REFERENCE POINT DOSAGE GIVEN TO DATE: 39.9 GY
RAD ONC ARIA REFERENCE POINT DOSAGE GIVEN TO DATE: 40.72 GY
RAD ONC ARIA REFERENCE POINT ID: NORMAL
RAD ONC ARIA REFERENCE POINT ID: NORMAL
RAD ONC ARIA REFERENCE POINT SESSION DOSAGE GIVEN: 2.66 GY
RAD ONC ARIA REFERENCE POINT SESSION DOSAGE GIVEN: 2.71 GY

## 2023-11-03 PROCEDURE — 77412 RADIATION TX DELIVERY LVL 3: CPT

## 2023-11-03 PROCEDURE — 77417 THER RADIOLOGY PORT IMAGE(S): CPT

## 2023-11-03 PROCEDURE — 77336 RADIATION PHYSICS CONSULT: CPT

## 2023-11-06 ENCOUNTER — HOSPITAL ENCOUNTER (OUTPATIENT)
Facility: HOSPITAL | Age: 63
Discharge: HOME | End: 2023-11-09
Attending: RADIOLOGY
Payer: COMMERCIAL

## 2023-11-06 LAB
RAD ONC ARIA COURSE FIRST TREATMENT DATE: NORMAL
RAD ONC ARIA COURSE ID: NORMAL
RAD ONC ARIA COURSE INTENT: NORMAL
RAD ONC ARIA COURSE LAST TREATMENT DATE: NORMAL
RAD ONC ARIA COURSE SESSION NUMBER: 16
RAD ONC ARIA COURSE START DATE: NORMAL
RAD ONC ARIA COURSE TREATMENT ELAPSED DAYS: 21
RAD ONC ARIA PLAN FRACTIONS TREATED TO DATE: 16
RAD ONC ARIA PLAN ID: NORMAL
RAD ONC ARIA PLAN PRESCRIBED DOSE PER FRACTION: 2.66 GY
RAD ONC ARIA PLAN PRIMARY REFERENCE POINT: NORMAL
RAD ONC ARIA PLAN TOTAL FRACTIONS PRESCRIBED: 16
RAD ONC ARIA PLAN TOTAL PRESCRIBED DOSE: 4256 CGY
RAD ONC ARIA REFERENCE POINT DOSAGE GIVEN TO DATE: 42.56 GY
RAD ONC ARIA REFERENCE POINT DOSAGE GIVEN TO DATE: 43.43 GY
RAD ONC ARIA REFERENCE POINT ID: NORMAL
RAD ONC ARIA REFERENCE POINT ID: NORMAL
RAD ONC ARIA REFERENCE POINT SESSION DOSAGE GIVEN: 2.66 GY
RAD ONC ARIA REFERENCE POINT SESSION DOSAGE GIVEN: 2.71 GY

## 2023-11-06 PROCEDURE — 77412 RADIATION TX DELIVERY LVL 3: CPT

## 2023-11-07 ENCOUNTER — HOSPITAL ENCOUNTER (OUTPATIENT)
Facility: HOSPITAL | Age: 63
Discharge: HOME | End: 2023-11-10
Attending: RADIOLOGY
Payer: COMMERCIAL

## 2023-11-07 LAB
RAD ONC ARIA COURSE FIRST TREATMENT DATE: NORMAL
RAD ONC ARIA COURSE ID: NORMAL
RAD ONC ARIA COURSE INTENT: NORMAL
RAD ONC ARIA COURSE LAST TREATMENT DATE: NORMAL
RAD ONC ARIA COURSE SESSION NUMBER: 17
RAD ONC ARIA COURSE START DATE: NORMAL
RAD ONC ARIA COURSE TREATMENT ELAPSED DAYS: 22
RAD ONC ARIA PLAN FRACTIONS TREATED TO DATE: 1
RAD ONC ARIA PLAN ID: NORMAL
RAD ONC ARIA PLAN PRESCRIBED DOSE PER FRACTION: 2.5 GY
RAD ONC ARIA PLAN PRIMARY REFERENCE POINT: NORMAL
RAD ONC ARIA PLAN TOTAL FRACTIONS PRESCRIBED: 4
RAD ONC ARIA PLAN TOTAL PRESCRIBED DOSE: 1000 CGY
RAD ONC ARIA REFERENCE POINT DOSAGE GIVEN TO DATE: 2.5 GY
RAD ONC ARIA REFERENCE POINT ID: NORMAL
RAD ONC ARIA REFERENCE POINT SESSION DOSAGE GIVEN: 2.5 GY

## 2023-11-07 PROCEDURE — 77321 SPECIAL TELETX PORT PLAN: CPT

## 2023-11-07 PROCEDURE — 77412 RADIATION TX DELIVERY LVL 3: CPT

## 2023-11-07 PROCEDURE — 77280 THER RAD SIMULAJ FIELD SMPL: CPT

## 2023-11-07 PROCEDURE — 77334 RADIATION TREATMENT AID(S): CPT

## 2023-11-08 ENCOUNTER — HOSPITAL ENCOUNTER (OUTPATIENT)
Facility: HOSPITAL | Age: 63
Discharge: HOME | End: 2023-11-11
Attending: RADIOLOGY
Payer: COMMERCIAL

## 2023-11-08 LAB
RAD ONC ARIA COURSE FIRST TREATMENT DATE: NORMAL
RAD ONC ARIA COURSE ID: NORMAL
RAD ONC ARIA COURSE INTENT: NORMAL
RAD ONC ARIA COURSE LAST TREATMENT DATE: NORMAL
RAD ONC ARIA COURSE SESSION NUMBER: 18
RAD ONC ARIA COURSE START DATE: NORMAL
RAD ONC ARIA COURSE TREATMENT ELAPSED DAYS: 23
RAD ONC ARIA PLAN FRACTIONS TREATED TO DATE: 2
RAD ONC ARIA PLAN ID: NORMAL
RAD ONC ARIA PLAN PRESCRIBED DOSE PER FRACTION: 2.5 GY
RAD ONC ARIA PLAN PRIMARY REFERENCE POINT: NORMAL
RAD ONC ARIA PLAN TOTAL FRACTIONS PRESCRIBED: 4
RAD ONC ARIA PLAN TOTAL PRESCRIBED DOSE: 1000 CGY
RAD ONC ARIA REFERENCE POINT DOSAGE GIVEN TO DATE: 5 GY
RAD ONC ARIA REFERENCE POINT ID: NORMAL
RAD ONC ARIA REFERENCE POINT SESSION DOSAGE GIVEN: 2.5 GY

## 2023-11-08 PROCEDURE — 77412 RADIATION TX DELIVERY LVL 3: CPT

## 2023-11-09 ENCOUNTER — OFFICE VISIT (OUTPATIENT)
Age: 63
End: 2023-11-09
Payer: COMMERCIAL

## 2023-11-09 ENCOUNTER — HOSPITAL ENCOUNTER (OUTPATIENT)
Facility: HOSPITAL | Age: 63
Setting detail: INFUSION SERIES
Discharge: HOME | End: 2023-11-09
Payer: COMMERCIAL

## 2023-11-09 ENCOUNTER — HOSPITAL ENCOUNTER (OUTPATIENT)
Facility: HOSPITAL | Age: 63
Discharge: HOME | End: 2023-11-12
Attending: RADIOLOGY
Payer: COMMERCIAL

## 2023-11-09 VITALS
SYSTOLIC BLOOD PRESSURE: 164 MMHG | TEMPERATURE: 97.8 F | DIASTOLIC BLOOD PRESSURE: 81 MMHG | RESPIRATION RATE: 18 BRPM | HEART RATE: 72 BPM

## 2023-11-09 VITALS
RESPIRATION RATE: 18 BRPM | HEIGHT: 60 IN | BODY MASS INDEX: 24.39 KG/M2 | HEART RATE: 60 BPM | TEMPERATURE: 97.8 F | OXYGEN SATURATION: 98 % | DIASTOLIC BLOOD PRESSURE: 84 MMHG | SYSTOLIC BLOOD PRESSURE: 151 MMHG | WEIGHT: 124.2 LBS

## 2023-11-09 DIAGNOSIS — C50.011 MALIGNANT NEOPLASM OF NIPPLE AND AREOLA, RIGHT FEMALE BREAST (HCC): Primary | ICD-10-CM

## 2023-11-09 LAB
ALBUMIN SERPL-MCNC: 3.7 G/DL (ref 3.5–5)
ALBUMIN/GLOB SERPL: 1 (ref 1.1–2.2)
ALP SERPL-CCNC: 137 U/L (ref 45–117)
ALT SERPL-CCNC: 21 U/L (ref 12–78)
ANION GAP SERPL CALC-SCNC: 4 MMOL/L (ref 5–15)
AST SERPL-CCNC: 22 U/L (ref 15–37)
BASOPHILS # BLD: 0 K/UL (ref 0–0.1)
BASOPHILS NFR BLD: 1 % (ref 0–1)
BILIRUB SERPL-MCNC: 0.3 MG/DL (ref 0.2–1)
BUN SERPL-MCNC: 17 MG/DL (ref 6–20)
BUN/CREAT SERPL: 17 (ref 12–20)
CALCIUM SERPL-MCNC: 9.5 MG/DL (ref 8.5–10.1)
CHLORIDE SERPL-SCNC: 104 MMOL/L (ref 97–108)
CO2 SERPL-SCNC: 29 MMOL/L (ref 21–32)
CREAT SERPL-MCNC: 1.01 MG/DL (ref 0.55–1.02)
DIFFERENTIAL METHOD BLD: ABNORMAL
EOSINOPHIL # BLD: 0.1 K/UL (ref 0–0.4)
EOSINOPHIL NFR BLD: 3 % (ref 0–7)
ERYTHROCYTE [DISTWIDTH] IN BLOOD BY AUTOMATED COUNT: 11.5 % (ref 11.5–14.5)
GLOBULIN SER CALC-MCNC: 3.6 G/DL (ref 2–4)
GLUCOSE SERPL-MCNC: 90 MG/DL (ref 65–100)
HCT VFR BLD AUTO: 37.5 % (ref 35–47)
HGB BLD-MCNC: 12.2 G/DL (ref 11.5–16)
IMM GRANULOCYTES # BLD AUTO: 0 K/UL (ref 0–0.04)
IMM GRANULOCYTES NFR BLD AUTO: 0 % (ref 0–0.5)
LYMPHOCYTES # BLD: 1.3 K/UL (ref 0.8–3.5)
LYMPHOCYTES NFR BLD: 37 % (ref 12–49)
MCH RBC QN AUTO: 29.4 PG (ref 26–34)
MCHC RBC AUTO-ENTMCNC: 32.5 G/DL (ref 30–36.5)
MCV RBC AUTO: 90.4 FL (ref 80–99)
MONOCYTES # BLD: 0.4 K/UL (ref 0–1)
MONOCYTES NFR BLD: 13 % (ref 5–13)
NEUTS SEG # BLD: 1.6 K/UL (ref 1.8–8)
NEUTS SEG NFR BLD: 46 % (ref 32–75)
NRBC # BLD: 0 K/UL (ref 0–0.01)
NRBC BLD-RTO: 0 PER 100 WBC
PLATELET # BLD AUTO: 225 K/UL (ref 150–400)
PMV BLD AUTO: 9.8 FL (ref 8.9–12.9)
POTASSIUM SERPL-SCNC: 4 MMOL/L (ref 3.5–5.1)
PROT SERPL-MCNC: 7.3 G/DL (ref 6.4–8.2)
RAD ONC ARIA COURSE FIRST TREATMENT DATE: NORMAL
RAD ONC ARIA COURSE ID: NORMAL
RAD ONC ARIA COURSE INTENT: NORMAL
RAD ONC ARIA COURSE LAST TREATMENT DATE: NORMAL
RAD ONC ARIA COURSE SESSION NUMBER: 19
RAD ONC ARIA COURSE START DATE: NORMAL
RAD ONC ARIA COURSE TREATMENT ELAPSED DAYS: 24
RAD ONC ARIA PLAN FRACTIONS TREATED TO DATE: 3
RAD ONC ARIA PLAN ID: NORMAL
RAD ONC ARIA PLAN PRESCRIBED DOSE PER FRACTION: 2.5 GY
RAD ONC ARIA PLAN PRIMARY REFERENCE POINT: NORMAL
RAD ONC ARIA PLAN TOTAL FRACTIONS PRESCRIBED: 4
RAD ONC ARIA PLAN TOTAL PRESCRIBED DOSE: 1000 CGY
RAD ONC ARIA REFERENCE POINT DOSAGE GIVEN TO DATE: 7.5 GY
RAD ONC ARIA REFERENCE POINT ID: NORMAL
RAD ONC ARIA REFERENCE POINT SESSION DOSAGE GIVEN: 2.5 GY
RBC # BLD AUTO: 4.15 M/UL (ref 3.8–5.2)
SODIUM SERPL-SCNC: 137 MMOL/L (ref 136–145)
WBC # BLD AUTO: 3.4 K/UL (ref 3.6–11)

## 2023-11-09 PROCEDURE — 77412 RADIATION TX DELIVERY LVL 3: CPT

## 2023-11-09 PROCEDURE — 2580000003 HC RX 258: Performed by: INTERNAL MEDICINE

## 2023-11-09 PROCEDURE — 80053 COMPREHEN METABOLIC PANEL: CPT

## 2023-11-09 PROCEDURE — 99214 OFFICE O/P EST MOD 30 MIN: CPT | Performed by: INTERNAL MEDICINE

## 2023-11-09 PROCEDURE — 36591 DRAW BLOOD OFF VENOUS DEVICE: CPT

## 2023-11-09 PROCEDURE — 85025 COMPLETE CBC W/AUTO DIFF WBC: CPT

## 2023-11-09 PROCEDURE — 36415 COLL VENOUS BLD VENIPUNCTURE: CPT

## 2023-11-09 RX ORDER — SODIUM CHLORIDE 9 MG/ML
25 INJECTION, SOLUTION INTRAVENOUS PRN
OUTPATIENT
Start: 2023-12-03

## 2023-11-09 RX ORDER — SODIUM CHLORIDE 0.9 % (FLUSH) 0.9 %
5-40 SYRINGE (ML) INJECTION PRN
Status: DISCONTINUED | OUTPATIENT
Start: 2023-11-09 | End: 2023-11-10 | Stop reason: HOSPADM

## 2023-11-09 RX ORDER — SODIUM CHLORIDE 0.9 % (FLUSH) 0.9 %
5-40 SYRINGE (ML) INJECTION PRN
OUTPATIENT
Start: 2023-12-03

## 2023-11-09 RX ORDER — HEPARIN 100 UNIT/ML
500 SYRINGE INTRAVENOUS PRN
OUTPATIENT
Start: 2023-12-03

## 2023-11-09 RX ADMIN — SODIUM CHLORIDE, PRESERVATIVE FREE 10 ML: 5 INJECTION INTRAVENOUS at 10:49

## 2023-11-09 RX ADMIN — SODIUM CHLORIDE, PRESERVATIVE FREE 20 ML: 5 INJECTION INTRAVENOUS at 10:56

## 2023-11-09 ASSESSMENT — PATIENT HEALTH QUESTIONNAIRE - PHQ9
SUM OF ALL RESPONSES TO PHQ QUESTIONS 1-9: 0
SUM OF ALL RESPONSES TO PHQ QUESTIONS 1-9: 0
1. LITTLE INTEREST OR PLEASURE IN DOING THINGS: 0
SUM OF ALL RESPONSES TO PHQ QUESTIONS 1-9: 0
SUM OF ALL RESPONSES TO PHQ9 QUESTIONS 1 & 2: 0
SUM OF ALL RESPONSES TO PHQ QUESTIONS 1-9: 0
2. FEELING DOWN, DEPRESSED OR HOPELESS: 0

## 2023-11-09 ASSESSMENT — PAIN SCALES - GENERAL: PAINLEVEL_OUTOF10: 0

## 2023-11-09 NOTE — PROGRESS NOTES
Called the patient because TOMASZ received a message from Cate Trinidad RN that the patient would like natural deodorant. Informed the patient that I have Lume at the [facility] office will be happy to bring her a tube on Tuesday 11/14. Patient reports she will complete treatment tomorrow 11/10 but must come to [facility] that day and will stop in to pick it up. She thanked SW for calling and for supporting her through treatment. Not*Patient left a thank you for this writer for the [employer].

## 2023-11-10 ENCOUNTER — HOSPITAL ENCOUNTER (OUTPATIENT)
Facility: HOSPITAL | Age: 63
Discharge: HOME | End: 2023-11-13
Attending: RADIOLOGY
Payer: COMMERCIAL

## 2023-11-10 ENCOUNTER — OFFICE VISIT (OUTPATIENT)
Age: 63
End: 2023-11-10
Payer: COMMERCIAL

## 2023-11-10 VITALS
BODY MASS INDEX: 26.24 KG/M2 | HEIGHT: 58 IN | SYSTOLIC BLOOD PRESSURE: 134 MMHG | OXYGEN SATURATION: 99 % | WEIGHT: 125 LBS | DIASTOLIC BLOOD PRESSURE: 73 MMHG | HEART RATE: 69 BPM | RESPIRATION RATE: 20 BRPM | TEMPERATURE: 98.1 F

## 2023-11-10 DIAGNOSIS — E07.9 THYROID DYSFUNCTION: ICD-10-CM

## 2023-11-10 DIAGNOSIS — K21.9 GASTROESOPHAGEAL REFLUX DISEASE WITHOUT ESOPHAGITIS: ICD-10-CM

## 2023-11-10 DIAGNOSIS — F41.1 GAD (GENERALIZED ANXIETY DISORDER): ICD-10-CM

## 2023-11-10 DIAGNOSIS — I10 ESSENTIAL HYPERTENSION: ICD-10-CM

## 2023-11-10 DIAGNOSIS — Z76.89 ENCOUNTER TO ESTABLISH CARE: Primary | ICD-10-CM

## 2023-11-10 PROBLEM — N18.31 STAGE 3A CHRONIC KIDNEY DISEASE (HCC): Status: ACTIVE | Noted: 2023-07-24

## 2023-11-10 PROBLEM — E83.119 HEMOCHROMATOSIS: Status: ACTIVE | Noted: 2023-07-24

## 2023-11-10 LAB
RAD ONC ARIA COURSE FIRST TREATMENT DATE: NORMAL
RAD ONC ARIA COURSE ID: NORMAL
RAD ONC ARIA COURSE INTENT: NORMAL
RAD ONC ARIA COURSE LAST TREATMENT DATE: NORMAL
RAD ONC ARIA COURSE SESSION NUMBER: 20
RAD ONC ARIA COURSE START DATE: NORMAL
RAD ONC ARIA COURSE TREATMENT ELAPSED DAYS: 25
RAD ONC ARIA PLAN FRACTIONS TREATED TO DATE: 4
RAD ONC ARIA PLAN ID: NORMAL
RAD ONC ARIA PLAN PRESCRIBED DOSE PER FRACTION: 2.5 GY
RAD ONC ARIA PLAN PRIMARY REFERENCE POINT: NORMAL
RAD ONC ARIA PLAN TOTAL FRACTIONS PRESCRIBED: 4
RAD ONC ARIA PLAN TOTAL PRESCRIBED DOSE: 1000 CGY
RAD ONC ARIA REFERENCE POINT DOSAGE GIVEN TO DATE: 10 GY
RAD ONC ARIA REFERENCE POINT ID: NORMAL
RAD ONC ARIA REFERENCE POINT SESSION DOSAGE GIVEN: 2.5 GY

## 2023-11-10 PROCEDURE — 3075F SYST BP GE 130 - 139MM HG: CPT | Performed by: STUDENT IN AN ORGANIZED HEALTH CARE EDUCATION/TRAINING PROGRAM

## 2023-11-10 PROCEDURE — 3078F DIAST BP <80 MM HG: CPT | Performed by: STUDENT IN AN ORGANIZED HEALTH CARE EDUCATION/TRAINING PROGRAM

## 2023-11-10 PROCEDURE — 77412 RADIATION TX DELIVERY LVL 3: CPT

## 2023-11-10 PROCEDURE — 99204 OFFICE O/P NEW MOD 45 MIN: CPT | Performed by: STUDENT IN AN ORGANIZED HEALTH CARE EDUCATION/TRAINING PROGRAM

## 2023-11-10 PROCEDURE — 77336 RADIATION PHYSICS CONSULT: CPT

## 2023-11-10 RX ORDER — NIFEDIPINE 30 MG/1
30 TABLET, FILM COATED, EXTENDED RELEASE ORAL DAILY
Qty: 90 TABLET | Refills: 1 | Status: SHIPPED | OUTPATIENT
Start: 2023-11-10

## 2023-11-10 RX ORDER — OFLOXACIN 3 MG/ML
SOLUTION/ DROPS OPHTHALMIC
COMMUNITY
Start: 2023-10-25

## 2023-11-10 RX ORDER — FUROSEMIDE 20 MG/1
20 TABLET ORAL DAILY
Qty: 90 TABLET | Refills: 1 | Status: SHIPPED | OUTPATIENT
Start: 2023-11-10

## 2023-11-10 RX ORDER — PREDNISOLONE ACETATE 10 MG/ML
SUSPENSION/ DROPS OPHTHALMIC
COMMUNITY
Start: 2023-10-25 | End: 2023-11-10 | Stop reason: ALTCHOICE

## 2023-11-10 RX ORDER — POTASSIUM CHLORIDE 1500 MG/1
TABLET, EXTENDED RELEASE ORAL
COMMUNITY
Start: 2023-10-13

## 2023-11-10 RX ORDER — PANTOPRAZOLE SODIUM 40 MG/1
40 TABLET, DELAYED RELEASE ORAL DAILY
Qty: 90 TABLET | Refills: 1 | Status: SHIPPED | OUTPATIENT
Start: 2023-11-10

## 2023-11-10 RX ORDER — FUROSEMIDE 40 MG/1
20 TABLET ORAL DAILY
COMMUNITY
Start: 2023-11-07 | End: 2023-11-10

## 2023-11-10 RX ORDER — CARVEDILOL 6.25 MG/1
6.25 TABLET ORAL 2 TIMES DAILY WITH MEALS
Qty: 180 TABLET | Refills: 1 | Status: SHIPPED | OUTPATIENT
Start: 2023-11-10

## 2023-11-10 SDOH — ECONOMIC STABILITY: FOOD INSECURITY: WITHIN THE PAST 12 MONTHS, YOU WORRIED THAT YOUR FOOD WOULD RUN OUT BEFORE YOU GOT MONEY TO BUY MORE.: NEVER TRUE

## 2023-11-10 SDOH — ECONOMIC STABILITY: INCOME INSECURITY: HOW HARD IS IT FOR YOU TO PAY FOR THE VERY BASICS LIKE FOOD, HOUSING, MEDICAL CARE, AND HEATING?: NOT HARD AT ALL

## 2023-11-10 SDOH — ECONOMIC STABILITY: FOOD INSECURITY: WITHIN THE PAST 12 MONTHS, THE FOOD YOU BOUGHT JUST DIDN'T LAST AND YOU DIDN'T HAVE MONEY TO GET MORE.: NEVER TRUE

## 2023-11-10 SDOH — ECONOMIC STABILITY: HOUSING INSECURITY
IN THE LAST 12 MONTHS, WAS THERE A TIME WHEN YOU DID NOT HAVE A STEADY PLACE TO SLEEP OR SLEPT IN A SHELTER (INCLUDING NOW)?: NO

## 2023-11-10 ASSESSMENT — PATIENT HEALTH QUESTIONNAIRE - PHQ9
SUM OF ALL RESPONSES TO PHQ QUESTIONS 1-9: 1
2. FEELING DOWN, DEPRESSED OR HOPELESS: 1
1. LITTLE INTEREST OR PLEASURE IN DOING THINGS: 0
SUM OF ALL RESPONSES TO PHQ QUESTIONS 1-9: 1
SUM OF ALL RESPONSES TO PHQ QUESTIONS 1-9: 1
SUM OF ALL RESPONSES TO PHQ9 QUESTIONS 1 & 2: 1
SUM OF ALL RESPONSES TO PHQ QUESTIONS 1-9: 1

## 2023-11-10 NOTE — PATIENT INSTRUCTIONS
For the sertraline: Take half a tablet for 2 weeks, then increase to a whole tablet    I have started you on an anti-depressant/anti-anxiety medication today. The most common side effect that you can experience is nausea during the first week but this will subside. If for some reason the nausea persists for more than two weeks or any other unwanted side effect, call our office to discuss changes in your therapy. If you experience any worsening depression or anxiety, stop this medication immediately and let us know. You must take this medication daily and not miss a dose to see any benefit. Be aware that you will not feel any difference in the way you feel until approximately two weeks after taking your pill. The full effect of your medication will be in effect at one month, at which time we will re-evaluate your progress. Remember, this is not an addictive medication, and I recommend that you take this for at least 6 months for best resolution of your symptoms.

## 2023-11-10 NOTE — PROGRESS NOTES
[place] [address] DEDE Springer. [place], [address] [place] [address]  [phone]        Establish Care Visit      Subjective     Chief Complaint   Patient presents with    Establish Care     Want to talk about medication          CC: Establish care  HPI: Tammi Childs is a 57 y.o. female presenting to the clinic today to establish care. Breast cancer:   Patient currently in treatment for breast cancer of the left breast. Did not tolerate chemotherapy. Undergoing her last radiation treatment today  Hypertension  Meds: coreg 6.25mg bid (ran out of this and has been taking the 3.125mg),  lasix 20mg,  nifedipine 30mg  Follows with cardiology  Hypothyroid  Diagnosed with hypothyroidism in the 90s. States that she never had to take medication for this  Last TSH was 0.36 11 months ago,     GERD  Takes pantoprazole and well controlled  Anxiety  Paroxetine 20mg daily, doesn't take it at all. Feels like her anxiety has been present recently. Does not have much of a support system. Has some stress in regards to wondering if the cancer is going to continue to progress          Review of systems:   A comprehensive review of systems was negative except as written in the HPI.     History    Allergies   Allergen Reactions    Iron Other (See Comments)     UNKNOWN    Sulfa Antibiotics Itching and Swelling     Other reaction(s): Unknown (comments)       Past Medical History:   Diagnosis Date    Breast cancer ([address])     Cancer ([address])     BREAST CANCER    Chemoprophylaxis     CHF (congestive heart failure) ([address])     COVID-19     GERD (gastroesophageal reflux disease)     HTN (hypertension) 07/18/2011    Neuropathy     Psychiatric disorder     ANIXETY AND DEPRESSION    Thyroid disease     HYPOTHYROID       Past Surgical History:   Procedure Laterality Date    BREAST LUMPECTOMY Left 6/27/2023    LEFT BREAST MAGSEED GUIDED (6/19, 1030) LUMPECTOMY LEFT BREAST SENTINEL NODE BIOPSY WITH Lucilla Belts

## 2023-11-20 ENCOUNTER — TELEPHONE (OUTPATIENT)
Age: 63
End: 2023-11-20

## 2023-11-20 NOTE — TELEPHONE ENCOUNTER
Patient dropped off a pre-op form to the office today for her upcoming cataract surgery on 11/30. She states that she forgot to bring this at her last visit. Reviewed patient's history. Patient is at low operative risk for her cataract surgery with topical anesthesia, and currently there are no major contraindications to her having surgery on 11/30.  Will have form faxed over    Joshua Castillo DO  11/20/23  2:28 PM

## 2023-11-21 ENCOUNTER — TELEPHONE (OUTPATIENT)
Age: 63
End: 2023-11-21

## 2023-11-27 ENCOUNTER — CLINICAL DOCUMENTATION (OUTPATIENT)
Age: 63
End: 2023-11-27

## 2023-11-27 NOTE — PROGRESS NOTES
Patient voicemail box is full. I will continue reaching out to patient to try to get her port removal scheduled. [phone]

## 2023-11-28 ENCOUNTER — PREP FOR PROCEDURE (OUTPATIENT)
Age: 63
End: 2023-11-28

## 2023-11-28 DIAGNOSIS — C50.011 MALIGNANT NEOPLASM OF NIPPLE AND AREOLA OF FEMALE BREAST, RIGHT (HCC): ICD-10-CM

## 2023-11-29 ENCOUNTER — TELEPHONE (OUTPATIENT)
Age: 63
End: 2023-11-29

## 2023-11-29 NOTE — TELEPHONE ENCOUNTER
2:29pm: pt verified x2, informed pt paperwork has been faxed and emailed. Pt request it sent to two other emails. Informed pt we would do this and call back if she needs anything else.

## 2023-11-29 NOTE — TELEPHONE ENCOUNTER
9:15am: pt verified x2, pt called regarding disability paperwork. Confirmed with pt will work on this and get it faxed over. No further questions.

## 2023-11-30 ENCOUNTER — CLINICAL DOCUMENTATION (OUTPATIENT)
Age: 63
End: 2023-11-30

## 2023-11-30 ENCOUNTER — TELEPHONE (OUTPATIENT)
Age: 63
End: 2023-11-30

## 2023-11-30 ENCOUNTER — PREP FOR PROCEDURE (OUTPATIENT)
Age: 63
End: 2023-11-30

## 2023-11-30 DIAGNOSIS — C50.011 MALIGNANT NEOPLASM OF NIPPLE OR AREOLA OF FEMALE BREAST, RIGHT (HCC): Primary | ICD-10-CM

## 2023-11-30 NOTE — PROGRESS NOTES
Patient Surgery Information Sheet      Patient Name:  Cherise Shoulder  Surgery Date:  January 2, 2024    Type of Surgery:  PORT REMOVAL     Estimated arrival time 7:00 AM    Arrival time will be confirmed the afternoon before your surgery. Pre-procedure: PORT REMOVAL     Pre-Operative Testing Department will call to schedule pre-op testing appointment if needed before surgery    Hospital:  [address] [place] [address]  Address:  [address] [place] [address], [address]   Check in location:  Through the main entrance of [facility] directly to the left at Patient Access    Pre-Operative Instructions: Will be given at the pre-op appointment.     Special Instructions if needed:     NPO (nothing by mouth) or drinking after midnight the night before Surgery  Patient may shower the morning of, do not use any lotions, deodorant, powders, perfumes or makeup  Patient will need  the morning of surgery     POST OPERATIVE VISIT: 1/23/2024 at 9:15 am Gregorio Mchugh    Surgery Scheduler:   Wing Dunn

## 2023-11-30 NOTE — TELEPHONE ENCOUNTER
R/t call, pt stated concerns have already been addressed and pt has no further questions or concerns at this time.

## 2023-12-01 ENCOUNTER — TELEPHONE (OUTPATIENT)
Age: 63
End: 2023-12-01

## 2023-12-01 DIAGNOSIS — I10 ESSENTIAL HYPERTENSION: ICD-10-CM

## 2023-12-01 NOTE — TELEPHONE ENCOUNTER
Patient states that she went to  her medication NIFEdipine (ADALAT CC) 30 MG extended release tablet on 11/2 it was just 30 pills it should have been 90 so she has ran out please give her the rest of the medication she can be reached @ [phone]

## 2023-12-01 NOTE — TELEPHONE ENCOUNTER
Called patient and verified identity. Patient states there was a mixup at the pharmacy and she was not given enough pills. She has called the pharmacy and they will give her the correct number of pills tomorrow. Patient also states that she is having some swelling in her legs. Will have her come in on Wednesday at 1030.     Erwin Michaud,   12/01/23  4:10 PM

## 2023-12-05 ENCOUNTER — TELEPHONE (OUTPATIENT)
Age: 63
End: 2023-12-05

## 2023-12-05 NOTE — TELEPHONE ENCOUNTER
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 Narrative:   Rec'd call from the patient this morning. She asked to have her appointment information for her port removal so that she can prepare for her transportation arrangements. Let pt know that SW saw that her port removal is scheduled for 1/2/24 and it states that she should arrive by 7:00am. Pt asked about her post-op appointment as well on 1/22 to ensure she had the time correct. Confirmed this for the patient. She was appreciative. Encouraged her to reach out with any further questions or concerns. Barriers to Care:     Plan:  Ongoing psychosocial support as desired by the patient. SW remains available for further support and assistance.      Referral/Handouts:         JOHN Leonardo  [facility]     [address] [place] [address]  W: [phone]  [email or web address]   Good Help to Those in [facility] SPECIALTY [facility]

## 2023-12-06 ENCOUNTER — OFFICE VISIT (OUTPATIENT)
Age: 63
End: 2023-12-06
Payer: COMMERCIAL

## 2023-12-06 ENCOUNTER — TELEPHONE (OUTPATIENT)
Facility: HOSPITAL | Age: 63
End: 2023-12-06

## 2023-12-06 VITALS
TEMPERATURE: 98.1 F | SYSTOLIC BLOOD PRESSURE: 122 MMHG | OXYGEN SATURATION: 99 % | HEIGHT: 58 IN | HEART RATE: 61 BPM | DIASTOLIC BLOOD PRESSURE: 61 MMHG | WEIGHT: 126.4 LBS | BODY MASS INDEX: 26.53 KG/M2 | RESPIRATION RATE: 20 BRPM

## 2023-12-06 DIAGNOSIS — R60.0 BILATERAL LEG EDEMA: Primary | ICD-10-CM

## 2023-12-06 PROCEDURE — 1123F ACP DISCUSS/DSCN MKR DOCD: CPT | Performed by: STUDENT IN AN ORGANIZED HEALTH CARE EDUCATION/TRAINING PROGRAM

## 2023-12-06 PROCEDURE — 99213 OFFICE O/P EST LOW 20 MIN: CPT | Performed by: STUDENT IN AN ORGANIZED HEALTH CARE EDUCATION/TRAINING PROGRAM

## 2023-12-06 PROCEDURE — 3078F DIAST BP <80 MM HG: CPT | Performed by: STUDENT IN AN ORGANIZED HEALTH CARE EDUCATION/TRAINING PROGRAM

## 2023-12-06 PROCEDURE — 3074F SYST BP LT 130 MM HG: CPT | Performed by: STUDENT IN AN ORGANIZED HEALTH CARE EDUCATION/TRAINING PROGRAM

## 2023-12-06 RX ORDER — PREDNISOLONE ACETATE 10 MG/ML
SUSPENSION/ DROPS OPHTHALMIC
COMMUNITY
Start: 2023-12-02

## 2023-12-06 RX ORDER — KETOROLAC TROMETHAMINE 5 MG/ML
SOLUTION OPHTHALMIC
COMMUNITY
Start: 2023-12-02

## 2023-12-06 ASSESSMENT — PATIENT HEALTH QUESTIONNAIRE - PHQ9
SUM OF ALL RESPONSES TO PHQ9 QUESTIONS 1 & 2: 0
SUM OF ALL RESPONSES TO PHQ QUESTIONS 1-9: 0
1. LITTLE INTEREST OR PLEASURE IN DOING THINGS: 0
SUM OF ALL RESPONSES TO PHQ QUESTIONS 1-9: 0
SUM OF ALL RESPONSES TO PHQ QUESTIONS 1-9: 0
2. FEELING DOWN, DEPRESSED OR HOPELESS: 0
SUM OF ALL RESPONSES TO PHQ QUESTIONS 1-9: 0

## 2023-12-06 NOTE — TELEPHONE ENCOUNTER
[facility]  Breast Navigator Encounter    Name:    Esteban Guy  Age:    70 y.o. Diagnosis:    RIGHT breast cancer    Returned patient's call. She has a port flush scheduled in [facility] tomorrow. Has port removal on 1/2/2024. Is asking if she still needs to come for this appointment. I told her I did not think she needed to come since the port was being removed in three weeks or so, but I told her I would check with Dr. Le Gilliam office. I checked with Angelina Girard NP. She confirmed that the patient did not need to come tomorrow. I called her back to let her know that she did not need to keep the appt tomorrow. This will be cancelled by [facility]. She was very appreciative. She is overall feeling much better.                                 Akilah Smith, RN, BSN, [facility]  Oncology Breast Navigator     [address] [place], [address] [facility]  W: [phone]  F: [phone]  [email or web address]  Good Help to Those in [facility] SPECIALTY [facility]

## 2023-12-06 NOTE — PROGRESS NOTES
[place] [id number] JOSE Genao. [place], [address] [place] [address]  [phone]    Office Visit      Assessment and Plan     1. Bilateral leg edema  Chronic, stable. On exam today, patient does not have significant leg edema. Suspect that patient's leg edema secondary to weekend vein valves causing gravity dependent edema. Informed patient that she should keep her legs propped up as often as possible as well as wear compression socks when ambulating. Although less likely, nifedipine may lead to some of the leg swelling; however, patient had improved swelling after switching from amlodipine to nifedipine. Patient also requesting for me to complete a letter on her behalf to explain her absences from work due to her health issues    Follow-up as scheduled        Discussed the expected course, resolution and complications of the diagnosis(es) in detail. Medication risks, benefits, costs, interactions, and alternatives were discussed as indicated. Patient to contact the office if their condition worsens, changes or fails to improve. Pt verbalized understanding with the diagnosis(es) and plan. Stephanie Fermin,     12/06/23   2:11 PM        Subjective     CC:   Chief Complaint   Patient presents with    Leg Swelling     Swollen for the past two weeks , no pain      HPI: Leandro Vyas is a 70 y.o. female presenting to the clinic today for evaluation and/or follow-up of the following concerns:    Leg Swelling  Patient reports that she has had some edema of her bilateral lower legs for 2 weeks. Denies any calf pain. States that she does not report any significant inciting event. Has been compliant with all of her medications. Currently undergoing chemotherapy and tolerating this well. Denies any recent accident or trauma.   States that the leg swelling gets worse as the day goes on          Review of systems:   A comprehensive review of systems was negative except

## 2023-12-07 ENCOUNTER — TELEPHONE (OUTPATIENT)
Age: 63
End: 2023-12-07

## 2023-12-07 NOTE — PROGRESS NOTES
CT is clear- Joyce please let her know that next step is for her to see Dr. Karyle Master could you please have her see Dr. Jayjay Dodson? 80

## 2023-12-08 ENCOUNTER — TELEPHONE (OUTPATIENT)
Age: 63
End: 2023-12-08

## 2023-12-08 NOTE — TELEPHONE ENCOUNTER
Called patient and verified identity. Read patient the letter that I wrote on her behalf for work.  Patient stated that the letter was just right and that she will be by to pick it up today    Debra Benitez,   12/08/23  12:08 PM

## 2023-12-26 ENCOUNTER — HOSPITAL ENCOUNTER (OUTPATIENT)
Facility: HOSPITAL | Age: 63
Discharge: HOME | End: 2023-12-29

## 2023-12-26 VITALS
WEIGHT: 123.4 LBS | HEIGHT: 60 IN | TEMPERATURE: 98.4 F | BODY MASS INDEX: 24.23 KG/M2 | HEART RATE: 69 BPM | SYSTOLIC BLOOD PRESSURE: 146 MMHG | DIASTOLIC BLOOD PRESSURE: 67 MMHG

## 2023-12-26 NOTE — PERIOP NOTE
CALLED TO CARDIOLOGY/DR. VERITO GREGORIO/[phone] AND REQ RECENT NOTES AND EKG FROM 10/2023 TO BE FAXED. NOTES AND EKG REC'D. EKG WAS NOT INTERPRETED AND INTERPRETATION NOT SEEN IN NOTES. CALLED BACK TO OFFICE AND REQ INTERPRETATION TO BE PUT ON EKG AND FOR IT TO BE REFAXED. 12/27/23 AT 1026:  CALLED TO CARDIOLOGY/DR. GREGORIO/[phone] AND REQ EKG BE REFAXED AFTER BEING INTERPRETED. NO INTERPRETATION IS NOTED IN CARDIAC NOTES THAT WERE FAXED. EKG AND NOTES ARE FROM 10/12/23. REC'D EKG THAT IS NOW SIGNED AND INTERPRETED WITH UNCHANGED FROM EKG DONE 10/2022. EKG AND CARDIAC NOTES WITH THAT INTERPRETATION FAXED TO SURGEON'S OFFICE.

## 2023-12-26 NOTE — PERIOP NOTE
[address] INSTRUCTIONS    Surgery Date:   1/2/24    Your surgeon's office or [facility] staff will call you between 4 PM- 8 PM the day before surgery with your arrival time. If your surgery is on a Monday, you will receive a call the preceding Friday. If your surgeon's office has given you, your arrival time then go by that time. Please report to [facility] Patient Access/Admitting on the 1st floor. Bring your insurance card, photo identification, and any copayment ( if applicable). If you are going home the same day of your surgery, you must have a responsible adult to drive you home. You need to have a responsible adult to stay with you the first 24 hours after surgery and you should not drive a car for 24 hours following your surgery. If you are being admitted to the hospital, please leave personal belongings/luggage in your car until you have an assigned hospital room number. Do NOT drink alcohol or smoke 24 hours before surgery. STOP smoking for 14 days prior as it helps with breathing and healing after surgery. Please wear comfortable clothes. Wear your glasses instead of contacts. We ask that all money, jewelry and valuables be left at home. Wear no make up, particularly mascara, the day of surgery. All body piercings, rings, and jewelry need to be removed and left at home. Remove all nail polish except for clear. Please wear your hair loose or down, no pony-tails, buns, or any metal hair accessories. You may wear deodorant, unless having breast surgery. Do not shave any body area within 24 hours of your surgery. Please follow all instructions to avoid any potential surgical cancellation. Should your physical condition change, (i.e. fever, cold, flu, etc.) please notify your surgeon as soon as possible. It is important to be on time. If a situation occurs where you may be delayed, please call:  [phone] / [phone] on the day of surgery.   The Preadmission

## 2024-01-02 ENCOUNTER — ANESTHESIA (OUTPATIENT)
Facility: HOSPITAL | Age: 64
End: 2024-01-02
Payer: COMMERCIAL

## 2024-01-02 ENCOUNTER — HOSPITAL ENCOUNTER (OUTPATIENT)
Facility: HOSPITAL | Age: 64
Setting detail: OUTPATIENT SURGERY
Discharge: HOME | End: 2024-01-02
Attending: SURGERY | Admitting: SURGERY
Payer: COMMERCIAL

## 2024-01-02 ENCOUNTER — ANESTHESIA EVENT (OUTPATIENT)
Facility: HOSPITAL | Age: 64
End: 2024-01-02
Payer: COMMERCIAL

## 2024-01-02 VITALS
WEIGHT: 124 LBS | TEMPERATURE: 97.2 F | BODY MASS INDEX: 24.35 KG/M2 | HEIGHT: 60 IN | HEART RATE: 66 BPM | RESPIRATION RATE: 17 BRPM | SYSTOLIC BLOOD PRESSURE: 109 MMHG | DIASTOLIC BLOOD PRESSURE: 85 MMHG | OXYGEN SATURATION: 96 %

## 2024-01-02 DIAGNOSIS — C50.011 MALIGNANT NEOPLASM OF NIPPLE OR AREOLA OF FEMALE BREAST, RIGHT (HCC): ICD-10-CM

## 2024-01-02 PROCEDURE — 36590 REMOVAL TUNNELED CV CATH: CPT | Performed by: SURGERY

## 2024-01-02 PROCEDURE — 3600000002 HC SURGERY LEVEL 2 BASE: Performed by: SURGERY

## 2024-01-02 PROCEDURE — 3700000000 HC ANESTHESIA ATTENDED CARE: Performed by: SURGERY

## 2024-01-02 PROCEDURE — 6360000002 HC RX W HCPCS: Performed by: SURGERY

## 2024-01-02 PROCEDURE — 3700000001 HC ADD 15 MINUTES (ANESTHESIA): Performed by: SURGERY

## 2024-01-02 PROCEDURE — 2500000003 HC RX 250 WO HCPCS: Performed by: NURSE ANESTHETIST, CERTIFIED REGISTERED

## 2024-01-02 PROCEDURE — 7100000000 HC PACU RECOVERY - FIRST 15 MIN: Performed by: SURGERY

## 2024-01-02 PROCEDURE — 3600000012 HC SURGERY LEVEL 2 ADDTL 15MIN: Performed by: SURGERY

## 2024-01-02 PROCEDURE — 6360000002 HC RX W HCPCS: Performed by: NURSE ANESTHETIST, CERTIFIED REGISTERED

## 2024-01-02 PROCEDURE — 2500000003 HC RX 250 WO HCPCS: Performed by: SURGERY

## 2024-01-02 PROCEDURE — 2580000003 HC RX 258: Performed by: NURSE ANESTHETIST, CERTIFIED REGISTERED

## 2024-01-02 PROCEDURE — 2709999900 HC NON-CHARGEABLE SUPPLY: Performed by: SURGERY

## 2024-01-02 PROCEDURE — 7100000001 HC PACU RECOVERY - ADDTL 15 MIN: Performed by: SURGERY

## 2024-01-02 RX ORDER — SODIUM CHLORIDE 9 MG/ML
INJECTION, SOLUTION INTRAVENOUS PRN
Status: CANCELLED | OUTPATIENT
Start: 2024-01-02

## 2024-01-02 RX ORDER — ONDANSETRON 2 MG/ML
4 INJECTION INTRAMUSCULAR; INTRAVENOUS
Status: DISCONTINUED | OUTPATIENT
Start: 2024-01-02 | End: 2024-01-02 | Stop reason: HOSPADM

## 2024-01-02 RX ORDER — SODIUM CHLORIDE, SODIUM LACTATE, POTASSIUM CHLORIDE, CALCIUM CHLORIDE 600; 310; 30; 20 MG/100ML; MG/100ML; MG/100ML; MG/100ML
INJECTION, SOLUTION INTRAVENOUS CONTINUOUS
Status: CANCELLED | OUTPATIENT
Start: 2024-01-02

## 2024-01-02 RX ORDER — METOCLOPRAMIDE HYDROCHLORIDE 5 MG/ML
INJECTION INTRAMUSCULAR; INTRAVENOUS PRN
Status: DISCONTINUED | OUTPATIENT
Start: 2024-01-02 | End: 2024-01-02 | Stop reason: SDUPTHER

## 2024-01-02 RX ORDER — SODIUM CHLORIDE 0.9 % (FLUSH) 0.9 %
5-40 SYRINGE (ML) INJECTION PRN
Status: DISCONTINUED | OUTPATIENT
Start: 2024-01-02 | End: 2024-01-02 | Stop reason: HOSPADM

## 2024-01-02 RX ORDER — DEXAMETHASONE SODIUM PHOSPHATE 4 MG/ML
INJECTION, SOLUTION INTRA-ARTICULAR; INTRALESIONAL; INTRAMUSCULAR; INTRAVENOUS; SOFT TISSUE PRN
Status: DISCONTINUED | OUTPATIENT
Start: 2024-01-02 | End: 2024-01-02 | Stop reason: SDUPTHER

## 2024-01-02 RX ORDER — FENTANYL CITRATE 50 UG/ML
25 INJECTION, SOLUTION INTRAMUSCULAR; INTRAVENOUS EVERY 5 MIN PRN
Status: DISCONTINUED | OUTPATIENT
Start: 2024-01-02 | End: 2024-01-02 | Stop reason: HOSPADM

## 2024-01-02 RX ORDER — HYDROMORPHONE HYDROCHLORIDE 1 MG/ML
0.5 INJECTION, SOLUTION INTRAMUSCULAR; INTRAVENOUS; SUBCUTANEOUS EVERY 5 MIN PRN
Status: DISCONTINUED | OUTPATIENT
Start: 2024-01-02 | End: 2024-01-02 | Stop reason: HOSPADM

## 2024-01-02 RX ORDER — SODIUM CHLORIDE 9 MG/ML
INJECTION, SOLUTION INTRAVENOUS PRN
Status: DISCONTINUED | OUTPATIENT
Start: 2024-01-02 | End: 2024-01-02 | Stop reason: HOSPADM

## 2024-01-02 RX ORDER — SODIUM CHLORIDE, SODIUM LACTATE, POTASSIUM CHLORIDE, CALCIUM CHLORIDE 600; 310; 30; 20 MG/100ML; MG/100ML; MG/100ML; MG/100ML
INJECTION, SOLUTION INTRAVENOUS CONTINUOUS PRN
Status: DISCONTINUED | OUTPATIENT
Start: 2024-01-02 | End: 2024-01-02 | Stop reason: SDUPTHER

## 2024-01-02 RX ORDER — PROPOFOL 10 MG/ML
INJECTION, EMULSION INTRAVENOUS CONTINUOUS PRN
Status: DISCONTINUED | OUTPATIENT
Start: 2024-01-02 | End: 2024-01-02 | Stop reason: SDUPTHER

## 2024-01-02 RX ORDER — ONDANSETRON 2 MG/ML
INJECTION INTRAMUSCULAR; INTRAVENOUS PRN
Status: DISCONTINUED | OUTPATIENT
Start: 2024-01-02 | End: 2024-01-02 | Stop reason: SDUPTHER

## 2024-01-02 RX ORDER — SODIUM CHLORIDE 0.9 % (FLUSH) 0.9 %
5-40 SYRINGE (ML) INJECTION EVERY 12 HOURS SCHEDULED
Status: DISCONTINUED | OUTPATIENT
Start: 2024-01-02 | End: 2024-01-02 | Stop reason: HOSPADM

## 2024-01-02 RX ORDER — FENTANYL CITRATE 50 UG/ML
100 INJECTION, SOLUTION INTRAMUSCULAR; INTRAVENOUS
Status: CANCELLED | OUTPATIENT
Start: 2024-01-02 | End: 2024-01-03

## 2024-01-02 RX ORDER — PROCHLORPERAZINE EDISYLATE 5 MG/ML
5 INJECTION INTRAMUSCULAR; INTRAVENOUS
Status: DISCONTINUED | OUTPATIENT
Start: 2024-01-02 | End: 2024-01-02 | Stop reason: HOSPADM

## 2024-01-02 RX ORDER — FAMOTIDINE 10 MG/ML
INJECTION, SOLUTION INTRAVENOUS PRN
Status: DISCONTINUED | OUTPATIENT
Start: 2024-01-02 | End: 2024-01-02 | Stop reason: SDUPTHER

## 2024-01-02 RX ORDER — OXYCODONE HYDROCHLORIDE 5 MG/1
5 TABLET ORAL
Status: DISCONTINUED | OUTPATIENT
Start: 2024-01-02 | End: 2024-01-02 | Stop reason: HOSPADM

## 2024-01-02 RX ORDER — MIDAZOLAM HYDROCHLORIDE 1 MG/ML
INJECTION INTRAMUSCULAR; INTRAVENOUS PRN
Status: DISCONTINUED | OUTPATIENT
Start: 2024-01-02 | End: 2024-01-02 | Stop reason: SDUPTHER

## 2024-01-02 RX ORDER — SODIUM CHLORIDE 0.9 % (FLUSH) 0.9 %
5-40 SYRINGE (ML) INJECTION EVERY 12 HOURS SCHEDULED
Status: CANCELLED | OUTPATIENT
Start: 2024-01-02

## 2024-01-02 RX ORDER — LIDOCAINE HYDROCHLORIDE 10 MG/ML
1 INJECTION, SOLUTION INFILTRATION; PERINEURAL
Status: CANCELLED | OUTPATIENT
Start: 2024-01-02 | End: 2024-01-03

## 2024-01-02 RX ORDER — ACETAMINOPHEN 500 MG
1000 TABLET ORAL ONCE
Status: CANCELLED | OUTPATIENT
Start: 2024-01-02 | End: 2024-01-02

## 2024-01-02 RX ORDER — HYDRALAZINE HYDROCHLORIDE 20 MG/ML
10 INJECTION INTRAMUSCULAR; INTRAVENOUS
Status: DISCONTINUED | OUTPATIENT
Start: 2024-01-02 | End: 2024-01-02 | Stop reason: HOSPADM

## 2024-01-02 RX ORDER — LIDOCAINE HYDROCHLORIDE 20 MG/ML
INJECTION, SOLUTION EPIDURAL; INFILTRATION; INTRACAUDAL; PERINEURAL PRN
Status: DISCONTINUED | OUTPATIENT
Start: 2024-01-02 | End: 2024-01-02 | Stop reason: SDUPTHER

## 2024-01-02 RX ORDER — SODIUM CHLORIDE 0.9 % (FLUSH) 0.9 %
5-40 SYRINGE (ML) INJECTION PRN
Status: CANCELLED | OUTPATIENT
Start: 2024-01-02

## 2024-01-02 RX ORDER — MIDAZOLAM HYDROCHLORIDE 2 MG/2ML
2 INJECTION, SOLUTION INTRAMUSCULAR; INTRAVENOUS
Status: CANCELLED | OUTPATIENT
Start: 2024-01-02 | End: 2024-01-03

## 2024-01-02 RX ADMIN — LIDOCAINE HYDROCHLORIDE 40 MG: 20 INJECTION, SOLUTION EPIDURAL; INFILTRATION; INTRACAUDAL; PERINEURAL at 09:06

## 2024-01-02 RX ADMIN — METOCLOPRAMIDE 5 MG: 5 INJECTION, SOLUTION INTRAMUSCULAR; INTRAVENOUS at 08:55

## 2024-01-02 RX ADMIN — PROPOFOL 30 MG: 10 INJECTION, EMULSION INTRAVENOUS at 09:06

## 2024-01-02 RX ADMIN — PROPOFOL 30 MG: 10 INJECTION, EMULSION INTRAVENOUS at 09:24

## 2024-01-02 RX ADMIN — MIDAZOLAM 2 MG: 1 INJECTION INTRAMUSCULAR; INTRAVENOUS at 08:55

## 2024-01-02 RX ADMIN — SODIUM CHLORIDE, POTASSIUM CHLORIDE, SODIUM LACTATE AND CALCIUM CHLORIDE: 600; 310; 30; 20 INJECTION, SOLUTION INTRAVENOUS at 08:40

## 2024-01-02 RX ADMIN — ONDANSETRON HYDROCHLORIDE 4 MG: 2 INJECTION, SOLUTION INTRAMUSCULAR; INTRAVENOUS at 09:22

## 2024-01-02 RX ADMIN — FAMOTIDINE 20 MG: 10 INJECTION, SOLUTION INTRAVENOUS at 08:55

## 2024-01-02 RX ADMIN — DEXAMETHASONE SODIUM PHOSPHATE 4 MG: 4 INJECTION, SOLUTION INTRAMUSCULAR; INTRAVENOUS at 09:25

## 2024-01-02 RX ADMIN — PROPOFOL 30 MG: 10 INJECTION, EMULSION INTRAVENOUS at 09:16

## 2024-01-02 RX ADMIN — PROPOFOL 100 MCG/KG/MIN: 10 INJECTION, EMULSION INTRAVENOUS at 09:08

## 2024-01-02 ASSESSMENT — PAIN - FUNCTIONAL ASSESSMENT
PAIN_FUNCTIONAL_ASSESSMENT: 0-10

## 2024-01-02 ASSESSMENT — ENCOUNTER SYMPTOMS: SHORTNESS OF BREATH: 1

## 2024-01-02 ASSESSMENT — PAIN DESCRIPTION - DESCRIPTORS: DESCRIPTORS: SORE

## 2024-01-02 NOTE — H&P
HISTORY OF PRESENT ILLNESS  Latoya Blake is a 62 y.o. female      HPI ESTABLISHED patient here for follow up appointment.  She complains of LEFT breast pain.  She said that the surgery pain had subsided for about a week until last night all the sudden it started again and that it is worse then it had been.  She describes it as a spontaneous stabbing pain.     Denies any breast changes.      Breast history     Referring - Dr. Montoya - [facility]  7/20/2022 - BSmammogram 3D at [facility] - showed a 1cm irregular mass in the LEFT breast at 3 o'clock position.       8/2/2022 - LEFT breast core biopsy - invasive ductal carcinoma, grade 2-3, ER 0%, MD 0%, HER2/logan negative.       9/27/22 - port placement - Dr. Cruz  Neoadjuvant chemotherapy - Dr. Castano     6/27/23-LEFT BREAST MAGSEED GUIDED (6/19, 1030) LUMPECTOMY LEFT BREAST SENTINEL NODE BIOPSY WITH MAGTRACE INJECTION PORT REMOVAL     Review of Systems   All other systems reviewed and are negative.           Physical Exam  Vitals and nursing note reviewed.   Chest:   Breasts:     Right: No swelling, bleeding, inverted nipple, mass, nipple discharge, skin change or tenderness.      Left: Tenderness present. No swelling, bleeding, inverted nipple, mass, nipple discharge or skin change.      Comments: Xrt changes left breast   Lymphadenopathy:      Upper Body:      Right upper body: No axillary adenopathy.      Left upper body: No axillary adenopathy.               ASSESSMENT and PLAN    Diagnosis Orders   1. Malignant neoplasm of nipple and areola, right female breast (HCC)             Port removal

## 2024-01-02 NOTE — ANESTHESIA PRE PROCEDURE
03/23/2023 03:38 PM    AST 22 11/09/2023 10:56 AM    ALT 21 11/09/2023 10:56 AM       POC Tests: No results for input(s): \"POCGLU\", \"POCNA\", \"POCK\", \"POCCL\", \"POCBUN\", \"POCHEMO\", \"POCHCT\" in the last 72 hours.    Coags:   Lab Results   Component Value Date/Time    PROTIME 12.6 02/22/2023 07:40 AM    INR 1.2 02/22/2023 07:40 AM    APTT 53.8 01/16/2023 02:33 PM       HCG (If Applicable): No results found for: \"PREGTESTUR\", \"PREGSERUM\", \"HCG\", \"HCGQUANT\"     ABGs: No results found for: \"PHART\", \"PO2ART\", \"PTG7EOG\", \"LMK9VOI\", \"BEART\", \"Q3JTUPHM\"     Type & Screen (If Applicable):  No results found for: \"LABABO\", \"LABRH\"    Drug/Infectious Status (If Applicable):  Lab Results   Component Value Date/Time    HEPCAB NONREACTIVE 01/11/2023 02:45 PM       COVID-19 Screening (If Applicable):   Lab Results   Component Value Date/Time    COVID19 Not detected 01/03/2023 09:32 PM    COVID19 Detected 12/26/2022 03:51 PM           Anesthesia Evaluation    Airway: Mallampati: II  TM distance: >3 FB   Neck ROM: full  Mouth opening: > = 3 FB   Dental:          Pulmonary: breath sounds clear to auscultation  (+)   shortness of breath:                                    Cardiovascular:    (+) hypertension:, CHF:        Rhythm: regular  Rate: normal                    Neuro/Psych:               GI/Hepatic/Renal:   (+) GERD:          Endo/Other:                     Abdominal:             Vascular:          Other Findings:             Anesthesia Plan      MAC     ASA 3       Induction: intravenous.    MIPS: Prophylactic antiemetics administered.  Anesthetic plan and risks discussed with patient.      Plan discussed with CRNA.    Attending anesthesiologist reviewed and agrees with Preprocedure content                Liliana Kilpatrick DO   1/2/2024

## 2024-01-02 NOTE — BRIEF OP NOTE
Brief Postoperative Note      Patient: Latoya Blake  YOB: 1960  MRN: [id number]    Date of Procedure: 1/2/2024    Pre-Op Diagnosis Codes:     * Malignant neoplasm of nipple and areola of female breast, right (HCC) [C50.011]    Post-Op Diagnosis: Same       Procedure(s):  PORT REMOVAL    Surgeon(s):  Fanny Cruz MD    Assistant:  Surgical Assistant: Eric Schafer    Anesthesia: Monitor Anesthesia Care    Estimated Blood Loss (mL): Minimal    Complications: None    Specimens:   * No specimens in log *    Implants:  * No implants in log *      Drains: * No LDAs found *    Findings: port removed       Electronically signed by Fanny Cruz MD on 1/2/2024 at 9:31 AM

## 2024-01-02 NOTE — ANESTHESIA POSTPROCEDURE EVALUATION
Department of Anesthesiology  Postprocedure Note    Patient: Latoya Blake  MRN: [id number]  YOB: 1960  Date of evaluation: 1/2/2024    Procedure Summary       Date: 01/02/24 Room / Location: [facility] ASU A4 / [facility] AMBULATORY OR    Anesthesia Start: 0901 Anesthesia Stop: 1002    Procedure: PORT REMOVAL (Bilateral: Chest) Diagnosis:       Malignant neoplasm of nipple and areola of female breast, right (HCC)      (Malignant neoplasm of nipple and areola of female breast, right (HCC) [C50.011])    Surgeons: Fanny Cruz MD Responsible Provider: Liliana Kilpatrick DO    Anesthesia Type: MAC ASA Status: 3            Anesthesia Type: No value filed.    Arlyn Phase I: Arlyn Score: 8    Arlyn Phase II:      Anesthesia Post Evaluation    Patient location during evaluation: PACU  Level of consciousness: awake  Airway patency: patent  Nausea & Vomiting: no nausea  Cardiovascular status: hemodynamically stable  Respiratory status: acceptable  Hydration status: stable  Multimodal analgesia pain management approach  Pain management: adequate    No notable events documented.

## 2024-01-02 NOTE — DISCHARGE INSTRUCTIONS
You may shower, but no hot tubs, swimming pools, or baths until your incision is healed.  No heavy lifting with the affected extremity (nothing greater than 5 pounds), and limit its use for the next 4-5 days.  You may use an ice pack for comfort for the next couple of days, but do not place ice directly on the skin.  Rather, use a towel or clothing to serve as a barrier between skin and ice to prevent injury.  If I placed a drain, follow the drain instructions provided, especially as you keep a record of the drain output.  Follow medication instructions carefully. No narcotics given for port removal. May take otc tylenol or ibuprofen    You will have bruising and swelling  Watch for signs of infection as listed below.  Redness  Swelling  Drainage from the incision or from your nipple that appears infected  Fever over 101.5 degrees for consecutive readings, or over 99.5 if you are currently undergoing chemotherapy.  Call our office (number is below) for a follow-up appointment.  If you have any problems, our phone number is [phone]

## 2024-01-02 NOTE — OP NOTE
[facility]  OPERATIVE REPORT    Name:  GEOVANI STRINGER  MR#:  [id number]  :  1960  ACCOUNT #:  [id number]  DATE OF SERVICE:  2024    PREOPERATIVE DIAGNOSIS:  Right breast cancer.    POSTOPERATIVE DIAGNOSIS:  Right breast cancer.    PROCEDURE PERFORMED:  Port removal.    SURGEON:  Brian Grier MD    ASSISTANT:  Eric Schafer.    ANESTHESIA:  MAC.    COMPLICATIONS:  No complications.    SPECIMENS REMOVED:  No specimens.    IMPLANTS:  none    DRAINS:  No drains.    ESTIMATED BLOOD LOSS:  Minimal.    FINDINGS:  Port removed intact.    INDICATIONS:  A 63-year-old female who needed her port removed after adjuvant therapy.    PROCEDURE:  The patient was seen in the preop holding area where surgical site was marked by surgeon.  Informed consent was obtained.  She was taken to the operating room and laid in supine position where MAC anesthesia was induced.  Right chest wall prepped and draped in the usual fashion and a time-out was performed.  Attention was turned to the right IJ port where 20 mL of local anesthetic was injected around the port site.  A 15-blade was used to open the prior scar.  Bovie cautery was used to dissect through the port capsule.  The Prolene suture on either side was cut, and the port was removed intact.  The port tract was oversewn with 3-0 Vicryl, and the incision was closed with interrupted 3-0 Vicryl and 4-0 subcuticular Monocryl.  All sponge, needle, and instrument counts were correct.  The patient went to Recovery in stable condition.      BRIAN GRIER MD      [id number]  D:  2024 9:35  T:  2024 11:42  JOB #:  [id number]

## 2024-01-09 ENCOUNTER — HOSPITAL ENCOUNTER (OUTPATIENT)
Facility: HOSPITAL | Age: 64
Discharge: HOME | End: 2024-01-12
Attending: RADIOLOGY

## 2024-01-09 VITALS
SYSTOLIC BLOOD PRESSURE: 146 MMHG | RESPIRATION RATE: 18 BRPM | WEIGHT: 126 LBS | DIASTOLIC BLOOD PRESSURE: 62 MMHG | HEIGHT: 60 IN | BODY MASS INDEX: 24.74 KG/M2 | HEART RATE: 57 BPM

## 2024-01-09 DIAGNOSIS — C50.012 MALIGNANT NEOPLASM INVOLVING BOTH NIPPLE AND AREOLA OF LEFT BREAST IN FEMALE, ESTROGEN RECEPTOR NEGATIVE (HCC): Primary | Chronic | ICD-10-CM

## 2024-01-09 DIAGNOSIS — Z17.1 MALIGNANT NEOPLASM INVOLVING BOTH NIPPLE AND AREOLA OF LEFT BREAST IN FEMALE, ESTROGEN RECEPTOR NEGATIVE (HCC): Primary | Chronic | ICD-10-CM

## 2024-01-09 PROBLEM — C50.512 MALIGNANT NEOPLASM OF LOWER-OUTER QUADRANT OF LEFT BREAST OF FEMALE, ESTROGEN RECEPTOR NEGATIVE (HCC): Status: ACTIVE | Noted: 2023-11-28

## 2024-01-09 NOTE — PROGRESS NOTES
Alert and oriented.  Psychiatric: Cooperative to commands and responds to questions appropriately.  Breast: Right breast and axilla WNL. Left breast and axillary incisions well healed. No erythema or masses or other concerning findings. No lymphedema in either arm, no lymphadenopathy. Good shoulder ROM.    Imaging   Imaging reports were reviewed as detailed in her history above    Assessment & Plan   Ms. Blake is a 63 y.o. female with left breast invasive ductal carcinoma, grade 2-3, ER 0%, [place] 0%, HER2/logan negative.    NAC 10/1/22-12/2022: Carboplatin taxol and keytruda followed by ddAC and Keytruda     6/27/23- Left breast lumpectomy and SLN- pCR  No residual carcinoma, 0/5 LN, ypT0N0    She completed 5256cGy to the left breast on 11/10/23 and doing well at this time.    Will alternate seeing her providers and return in 1 year sooner if needed.    She is seeing Dr. Flores this Thursday to check out the throat complaint.    She knows to contact me with any questions or concerns.    Richard Silva MD  [facility]  [facility]  [address] [place] [address]  P: [phone]  [facility] [address], [place] [address]  P: [phone]  [facility]  [facility] [facility], [address], [place] [address]  P: [phone]    Time and Counseling: I spent a total of 25 minutes in care of this patient during today's encounter on 1/8/24.    Carbon Copy:  Fanny Reza MD

## 2024-01-22 ENCOUNTER — OFFICE VISIT (OUTPATIENT)
Age: 64
End: 2024-01-22

## 2024-01-22 ENCOUNTER — TELEPHONE (OUTPATIENT)
Age: 64
End: 2024-01-22

## 2024-01-22 VITALS — BODY MASS INDEX: 24.74 KG/M2 | WEIGHT: 126 LBS | HEIGHT: 60 IN

## 2024-01-22 DIAGNOSIS — Z17.1 MALIGNANT NEOPLASM OF LOWER-OUTER QUADRANT OF LEFT BREAST OF FEMALE, ESTROGEN RECEPTOR NEGATIVE (HCC): Primary | ICD-10-CM

## 2024-01-22 DIAGNOSIS — C50.512 MALIGNANT NEOPLASM OF LOWER-OUTER QUADRANT OF LEFT BREAST OF FEMALE, ESTROGEN RECEPTOR NEGATIVE (HCC): Primary | ICD-10-CM

## 2024-01-22 PROCEDURE — 99024 POSTOP FOLLOW-UP VISIT: CPT | Performed by: SURGERY

## 2024-01-22 RX ORDER — LOSARTAN POTASSIUM 25 MG/1
25 TABLET ORAL DAILY
COMMUNITY
Start: 2024-01-12 | End: 2025-01-11

## 2024-01-22 NOTE — PROGRESS NOTES
HISTORY OF PRESENT ILLNESS  Latoya Blake is a 63 y.o. female     HPI ESTABLISHED patient here for 6 month follow up visit for LEFT breast cancer. Pt mentioned some sharp pain from time to time.         Breast history     Referring - Dr. Montoya - [facility]  7/20/2022 - BSmammogram 3D at [facility] - showed a 1cm irregular mass in the LEFT breast at 3 o'clock position.       8/2/2022 - LEFT breast core biopsy - invasive ductal carcinoma, grade 2-3, ER 0%, OR 0%, HER2/logan negative.       9/27/22 - port placement - Dr. Cruz  Neoadjuvant chemotherapy - Dr. Castano     6/27/23-LEFT BREAST MAGSEED GUIDED (6/19, 1030) LUMPECTOMY LEFT BREAST SENTINEL NODE BIOPSY WITH MAGTRACE INJECTION PORT REMOVAL           Review of Systems   All other systems reviewed and are negative.        Physical Exam  Vitals and nursing note reviewed.   Chest:   Breasts:     Right: No swelling, bleeding, inverted nipple, mass, nipple discharge, skin change or tenderness.      Left: No swelling, bleeding, inverted nipple, mass, nipple discharge, skin change or tenderness.   Lymphadenopathy:      Upper Body:      Right upper body: No axillary adenopathy.      Left upper body: No axillary adenopathy.            ASSESSMENT and PLAN   Diagnosis Orders   1. Malignant neoplasm of lower-outer quadrant of left breast of female, estrogen receptor negative (HCC)  CORA RABIA DIGITAL DIAGNOSTIC BILATERAL      - no evidence local recurrence  - mammograms 5/2024  - rtc with np 6 months

## 2024-01-26 ENCOUNTER — OFFICE VISIT (OUTPATIENT)
Age: 64
End: 2024-01-26
Payer: COMMERCIAL

## 2024-01-26 VITALS
DIASTOLIC BLOOD PRESSURE: 53 MMHG | BODY MASS INDEX: 24.42 KG/M2 | HEIGHT: 60 IN | SYSTOLIC BLOOD PRESSURE: 114 MMHG | TEMPERATURE: 97.5 F | RESPIRATION RATE: 18 BRPM | HEART RATE: 63 BPM | WEIGHT: 124.4 LBS

## 2024-01-26 DIAGNOSIS — R60.0 BILATERAL LEG EDEMA: ICD-10-CM

## 2024-01-26 DIAGNOSIS — I10 ESSENTIAL HYPERTENSION: Primary | ICD-10-CM

## 2024-01-26 PROCEDURE — 99213 OFFICE O/P EST LOW 20 MIN: CPT | Performed by: STUDENT IN AN ORGANIZED HEALTH CARE EDUCATION/TRAINING PROGRAM

## 2024-01-26 PROCEDURE — 1123F ACP DISCUSS/DSCN MKR DOCD: CPT | Performed by: STUDENT IN AN ORGANIZED HEALTH CARE EDUCATION/TRAINING PROGRAM

## 2024-01-26 PROCEDURE — 3078F DIAST BP <80 MM HG: CPT | Performed by: STUDENT IN AN ORGANIZED HEALTH CARE EDUCATION/TRAINING PROGRAM

## 2024-01-26 PROCEDURE — 3074F SYST BP LT 130 MM HG: CPT | Performed by: STUDENT IN AN ORGANIZED HEALTH CARE EDUCATION/TRAINING PROGRAM

## 2024-01-26 ASSESSMENT — PATIENT HEALTH QUESTIONNAIRE - PHQ9
SUM OF ALL RESPONSES TO PHQ QUESTIONS 1-9: 0
SUM OF ALL RESPONSES TO PHQ9 QUESTIONS 1 & 2: 0
2. FEELING DOWN, DEPRESSED OR HOPELESS: 0
SUM OF ALL RESPONSES TO PHQ QUESTIONS 1-9: 0
1. LITTLE INTEREST OR PLEASURE IN DOING THINGS: 0

## 2024-01-26 NOTE — PATIENT INSTRUCTIONS
For the kidney function: Take your medications as prescribed, keep your blood pressure down, watch your sugars (cakes, cookies), avoid dark sodas     For your hair, skin, nails: Take your vitamins and try collagen rich foods (low sodium chicken broth and jello)    You can get your RSV vaccine now

## 2024-01-26 NOTE — PROGRESS NOTES
Chief Complaint   Patient presents with    Follow-up        \"Have you been to the ER, urgent care clinic since your last visit?  Hospitalized since your last visit?\"    NO    “Have you seen or consulted any other health care providers outside of [facility] since your last visit?”    NO    “Have you had a colorectal cancer screening such as a colonoscopy/FIT/Cologuard?    NO      “Have you had a pap smear?”    NO           Vitals:    24 1105   BP: (!) 114/53   Pulse: 63   Resp: 18   Temp: 97.5 °F (36.4 °C)        Health Maintenance Due   Topic Date Due    Pneumococcal 65+ years Vaccine (1 - PCV) Never done    HIV screen  Never done    DTaP/Tdap/Td vaccine (1 - Tdap) Never done    Shingles vaccine (1 of 2) Never done    Lipids  Never done    Colorectal Cancer Screen  Never done    DEXA (modify frequency per FRAX score)  Never done    Cervical cancer screen  2015    Respiratory Syncytial Virus (RSV) Pregnant or age 60 yrs+ (1 - 1-dose 60+ series) Never done    COVID-19 Vaccine (3 - Pfizer risk series) 2021        The patient, Latoya Blake, identity was verified by name and .

## 2024-01-26 NOTE — PROGRESS NOTES
[facility]  [address] [place] [address] [phone]    Office Visit      Assessment and Plan     1. Essential hypertension  Chronic, controlled. Continue current regimen as described in the HPI and reconciled medication list'    2. Bilateral leg edema  Chronic, resolved.        Return in about 6 months (around 2024) for Follow-up.         Discussed the expected course, resolution and complications of the diagnosis(es) in detail.  Medication risks, benefits, costs, interactions, and alternatives were discussed as indicated.  Patient to contact the office if their condition worsens, changes or fails to improve. Pt verbalized understanding with the diagnosis(es) and plan.           Carolina Mcdowell,     24   4:47 PM        Subjective     CC:   Chief Complaint   Patient presents with    Follow-up     HPI: Latoya Blake is a 63 y.o. female presenting to the clinic today for evaluation and/or follow-up of the following concerns:    Hypertension  BP from home lo/76, 119/73, 130/68, 117/74, 107/63, 111/70  Has been compliant with the Coreg 6.25 mg twice daily, Lasix 20 mg daily, nifedipine 30 mg daily, and losartan 25 mg daily  States that she is doing well with his current regimen without any issue  Patient also brings in recent labs completed on .  Creatinine at that time was 1.21 with a GFR of 50.  CBC was WNL.  Vitamin D 52  Leg edema has improved        Review of systems:   A comprehensive review of systems was negative except as written in the HPI.    History  Patient's allergies, medical, surgical, social, and family hx reviewed and available in chart. Updates made where appropriate.       Objective     BP (!) 114/53 (Site: Left Upper Arm, Position: Sitting, Cuff Size: Small Adult)   Pulse 63   Temp 97.5 °F (36.4 °C) (Tympanic)   Resp 18   Ht 1.524 m (5')   Wt 56.4 kg (124 lb 6.4 oz)   BMI 24.30 kg/m²   Physical

## 2024-01-30 ENCOUNTER — TELEPHONE (OUTPATIENT)
Age: 64
End: 2024-01-30

## 2024-01-30 NOTE — TELEPHONE ENCOUNTER
Patient would like to know if  spoke to  regarding changing her medication  losartan (COZAAR) 25 MG tablet she can be reached @ [phone]

## 2024-01-30 NOTE — TELEPHONE ENCOUNTER
Called patient.  It appears that the nephrologist increased patient's losartan to 50 mg today.  Patient is confused over aware of the dose change.  She states that she was also informed to stop the nifedipine.  Discussed that Dr. Trevino may have increase losartan for better renal protection.  Of note, patient's blood pressure has been difficult to control without a calcium channel blocker in the past.      Informed patient to try the increased dose of losartan and monitor her blood pressure for at least 2 weeks.  If she notices that her blood pressure remains high, may need to consider adding back a calcium channel blocker but she will discuss it with myself or Dr. Trevino first.

## 2024-02-01 ENCOUNTER — TELEPHONE (OUTPATIENT)
Age: 64
End: 2024-02-01

## 2024-02-15 ENCOUNTER — TELEPHONE (OUTPATIENT)
Age: 64
End: 2024-02-15

## 2024-02-15 NOTE — TELEPHONE ENCOUNTER
Patient is requesting a RX refill  carvedilol (COREG) 6.25 MG tablet she can be reached @ [phone]

## 2024-02-16 ENCOUNTER — OFFICE VISIT (OUTPATIENT)
Age: 64
End: 2024-02-16
Payer: COMMERCIAL

## 2024-02-16 VITALS
HEART RATE: 58 BPM | WEIGHT: 127.4 LBS | RESPIRATION RATE: 18 BRPM | HEIGHT: 60 IN | BODY MASS INDEX: 25.01 KG/M2 | TEMPERATURE: 97.7 F | SYSTOLIC BLOOD PRESSURE: 114 MMHG | OXYGEN SATURATION: 98 % | DIASTOLIC BLOOD PRESSURE: 52 MMHG

## 2024-02-16 DIAGNOSIS — J06.9 VIRAL URI: Primary | ICD-10-CM

## 2024-02-16 PROCEDURE — 99213 OFFICE O/P EST LOW 20 MIN: CPT | Performed by: STUDENT IN AN ORGANIZED HEALTH CARE EDUCATION/TRAINING PROGRAM

## 2024-02-16 PROCEDURE — 3078F DIAST BP <80 MM HG: CPT | Performed by: STUDENT IN AN ORGANIZED HEALTH CARE EDUCATION/TRAINING PROGRAM

## 2024-02-16 PROCEDURE — 3074F SYST BP LT 130 MM HG: CPT | Performed by: STUDENT IN AN ORGANIZED HEALTH CARE EDUCATION/TRAINING PROGRAM

## 2024-02-16 PROCEDURE — 1123F ACP DISCUSS/DSCN MKR DOCD: CPT | Performed by: STUDENT IN AN ORGANIZED HEALTH CARE EDUCATION/TRAINING PROGRAM

## 2024-02-16 RX ORDER — LOSARTAN POTASSIUM 50 MG/1
50 TABLET ORAL DAILY
COMMUNITY
Start: 2024-01-30

## 2024-02-16 NOTE — PROGRESS NOTES
Chief Complaint   Patient presents with    scratching in throat     For the past two weeks        \"Have you been to the ER, urgent care clinic since your last visit?  Hospitalized since your last visit?\"    NO    “Have you seen or consulted any other health care providers outside of [facility] since your last visit?”    NO    “Have you had a colorectal cancer screening such as a colonoscopy/FIT/Cologuard?    NO      “Have you had a pap smear?”    NO           Vitals:    24 1422   BP: (!) 114/52   Pulse: 58   Resp: 18   Temp: 97.7 °F (36.5 °C)   SpO2: 98%        Health Maintenance Due   Topic Date Due    Pneumococcal 65+ years Vaccine (1 - PCV) Never done    HIV screen  Never done    DTaP/Tdap/Td vaccine (1 - Tdap) Never done    Shingles vaccine (1 of 2) Never done    Lipids  Never done    Colorectal Cancer Screen  Never done    DEXA (modify frequency per FRAX score)  Never done    Cervical cancer screen  2015    Respiratory Syncytial Virus (RSV) Pregnant or age 60 yrs+ (1 - 1-dose 60+ series) Never done    COVID-19 Vaccine (3 - Pfizer risk series) 2021        The patient, Latoya Blake, identity was verified by name and .

## 2024-02-16 NOTE — PATIENT INSTRUCTIONS
For congestion: Take 1 tablet of Mucinex also known as guaifenesin twice a day for at least 5 days.  Make sure to take this with a full glass of water    To help with the cough, try to stay in a humidified environment if possible.  Honey can also help with the sore throat.    For the discomfort, you can take Tylenol as needed.  If you notice any worsening shortness of breath or worsening congestion, please come on back

## 2024-02-16 NOTE — PROGRESS NOTES
[facility]  [address] [place] [address] [phone]    Office Visit      Assessment and Plan     1. Viral URI  Acute, uncomplicated. No signs of lower respiratory infection, and symptoms are improving.    Patient Instructions   For congestion: Take 1 tablet of Mucinex also known as guaifenesin twice a day for at least 5 days.  Make sure to take this with a full glass of water    To help with the cough, try to stay in a humidified environment if possible.  Honey can also help with the sore throat.    For the discomfort, you can take Tylenol as needed.  If you notice any worsening shortness of breath or worsening congestion, please come on back          Return in about 3 months (around 5/16/2024) for Follow-up.         Discussed the expected course, resolution and complications of the diagnosis(es) in detail.  Medication risks, benefits, costs, interactions, and alternatives were discussed as indicated.  Patient to contact the office if their condition worsens, changes or fails to improve. Pt verbalized understanding with the diagnosis(es) and plan.           Carolina Mcdowell DO    02/16/24   6:46 PM        Subjective     CC:   Chief Complaint   Patient presents with    scratching in throat     For the past two weeks       Pt  has a past medical history of Cancer (HCC), Chemoprophylaxis, CHF (congestive heart failure) (HCC), COVID-19, GERD (gastroesophageal reflux disease), History of blood transfusion, HTN (hypertension), Neuropathy, Psychiatric disorder, and Thyroid disease.    HPI: Latoya Blake is a 63 y.o. female presenting to the clinic today for evaluation and/or follow-up of the following concerns:      Upper Respiratory Infection  Patient complains of symptoms of a URI. Symptoms include congestion, cough, and sore throat. Onset of symptoms was 2 weeks ago, gradually improving since that time. She also c/o nasal congestion and productive cough

## 2024-05-08 NOTE — PROGRESS NOTES
[facility]   [address], [address] [place] [id number]   W: [phone]  F: [phone]     Reason for Visit:   Latoya Blake is a 62 y.o. female with left breast cancer-ER 0%, [place] 0%, HER2/logan negative.       Treatment History:   10/1/22-12/2022: Carboplatin taxol and keytruda followed by ddAC and Keytruda -complicated  by COVID-pneumonia, sepsis, multiorgan failure requiring hemodialysis, and delayed immune mediated hepatotoxicity requiring a break from chemotherapy for almost 3 months  6/27/2023: Lumpectomy and SLN- PCR  11/2023: Completes RT           History of Present Illness:     Patient is a 63 y.o.  female with left breast cancer.  She had a screening mammogram on 7/20/2022 which showed a 1 cm irregular mass in the left breast at 3 o'clock position.  Biopsy on 8/2/2022 showed invasive ductal carcinoma, grade 2-3, ER 0%, FL 0%, HER2/logan negative.  Ki-67 85%.  She was started on neoadjuvant  chemotherapy with Keytruda in October 2022.  Following 4 cycles she has had a very rough course whereby she was admitted initially with COVID-pneumonia, multiorgan failure due to sepsis, required hemodialysis.  She was then readmitted to [facility] in February with liver failure with no clear evidence of biliary blockage on imaging.   She has been in rehab and has been on high-dose steroids which we have been tapering and monitoring her LFTs closely. Off HD.   Eventually had a lumpectomy and now on surveillance.    She is here for follow up. She has some scar tissue to her left lumpectomy site. Otherwise denies any breast pain, skin hyperpigmentation, nipple drainage. Appetite is fair. She reports having her mammogram this year although records not in her chart.     No FH of breast, ovarian, pancreatic or Prostate malignancies  She works with special needs       Review of systems was obtained and pertinent findings reviewed above. Past medical

## 2024-05-09 ENCOUNTER — OFFICE VISIT (OUTPATIENT)
Age: 64
End: 2024-05-09
Payer: MEDICARE

## 2024-05-09 VITALS
HEART RATE: 55 BPM | OXYGEN SATURATION: 98 % | SYSTOLIC BLOOD PRESSURE: 125 MMHG | TEMPERATURE: 98.2 F | BODY MASS INDEX: 23.83 KG/M2 | DIASTOLIC BLOOD PRESSURE: 71 MMHG | RESPIRATION RATE: 16 BRPM | WEIGHT: 122 LBS

## 2024-05-09 DIAGNOSIS — C50.012 MALIGNANT NEOPLASM OF NIPPLE AND AREOLA, LEFT FEMALE BREAST (HCC): Primary | ICD-10-CM

## 2024-05-09 PROCEDURE — 99213 OFFICE O/P EST LOW 20 MIN: CPT

## 2024-05-09 PROCEDURE — 3078F DIAST BP <80 MM HG: CPT

## 2024-05-09 PROCEDURE — 3074F SYST BP LT 130 MM HG: CPT

## 2024-05-09 PROCEDURE — 1123F ACP DISCUSS/DSCN MKR DOCD: CPT

## 2024-05-09 NOTE — PROGRESS NOTES
Latoya Blake is a 63 y.o. female    Chief Complaint   Patient presents with    Follow-up     left breast cancer-ER 0%, [place] 0%, HER2/logan negative.       1. Have you been to the ER, urgent care clinic since your last visit?  Hospitalized since your last visit?No    2. Have you seen or consulted any other health care providers outside of the [facility] since your last visit?  Include any pap smears or colon screening. No

## 2024-05-13 ENCOUNTER — OFFICE VISIT (OUTPATIENT)
Age: 64
End: 2024-05-13
Payer: MEDICARE

## 2024-05-13 VITALS
BODY MASS INDEX: 24.23 KG/M2 | OXYGEN SATURATION: 97 % | RESPIRATION RATE: 20 BRPM | SYSTOLIC BLOOD PRESSURE: 121 MMHG | DIASTOLIC BLOOD PRESSURE: 54 MMHG | TEMPERATURE: 98.2 F | HEIGHT: 60 IN | WEIGHT: 123.4 LBS | HEART RATE: 73 BPM

## 2024-05-13 DIAGNOSIS — K21.9 GASTROESOPHAGEAL REFLUX DISEASE WITHOUT ESOPHAGITIS: ICD-10-CM

## 2024-05-13 DIAGNOSIS — Z00.00 MEDICARE ANNUAL WELLNESS VISIT, INITIAL: Primary | ICD-10-CM

## 2024-05-13 PROCEDURE — 1123F ACP DISCUSS/DSCN MKR DOCD: CPT | Performed by: STUDENT IN AN ORGANIZED HEALTH CARE EDUCATION/TRAINING PROGRAM

## 2024-05-13 PROCEDURE — 3078F DIAST BP <80 MM HG: CPT | Performed by: STUDENT IN AN ORGANIZED HEALTH CARE EDUCATION/TRAINING PROGRAM

## 2024-05-13 PROCEDURE — G0438 PPPS, INITIAL VISIT: HCPCS | Performed by: STUDENT IN AN ORGANIZED HEALTH CARE EDUCATION/TRAINING PROGRAM

## 2024-05-13 PROCEDURE — 3074F SYST BP LT 130 MM HG: CPT | Performed by: STUDENT IN AN ORGANIZED HEALTH CARE EDUCATION/TRAINING PROGRAM

## 2024-05-13 SDOH — ECONOMIC STABILITY: FOOD INSECURITY: WITHIN THE PAST 12 MONTHS, YOU WORRIED THAT YOUR FOOD WOULD RUN OUT BEFORE YOU GOT MONEY TO BUY MORE.: NEVER TRUE

## 2024-05-13 SDOH — ECONOMIC STABILITY: FOOD INSECURITY: WITHIN THE PAST 12 MONTHS, THE FOOD YOU BOUGHT JUST DIDN'T LAST AND YOU DIDN'T HAVE MONEY TO GET MORE.: NEVER TRUE

## 2024-05-13 SDOH — ECONOMIC STABILITY: INCOME INSECURITY: HOW HARD IS IT FOR YOU TO PAY FOR THE VERY BASICS LIKE FOOD, HOUSING, MEDICAL CARE, AND HEATING?: NOT HARD AT ALL

## 2024-05-13 ASSESSMENT — PATIENT HEALTH QUESTIONNAIRE - PHQ9
2. FEELING DOWN, DEPRESSED OR HOPELESS: NOT AT ALL
SUM OF ALL RESPONSES TO PHQ QUESTIONS 1-9: 2
SUM OF ALL RESPONSES TO PHQ9 QUESTIONS 1 & 2: 2
1. LITTLE INTEREST OR PLEASURE IN DOING THINGS: MORE THAN HALF THE DAYS
SUM OF ALL RESPONSES TO PHQ QUESTIONS 1-9: 2

## 2024-05-13 ASSESSMENT — LIFESTYLE VARIABLES
HOW MANY STANDARD DRINKS CONTAINING ALCOHOL DO YOU HAVE ON A TYPICAL DAY: PATIENT DOES NOT DRINK
HOW OFTEN DO YOU HAVE A DRINK CONTAINING ALCOHOL: NEVER

## 2024-05-13 NOTE — PROGRESS NOTES
Chief Complaint   Patient presents with    Medicare AWV        \"Have you been to the ER, urgent care clinic since your last visit?  Hospitalized since your last visit?\"    NO    “Have you seen or consulted any other health care providers outside of [facility] since your last visit?”    NO    “Have you had a colorectal cancer screening such as a colonoscopy/FIT/Cologuard?    YES - Type: Colonoscopy - Where:  Patient doesn't know where Nurse/CMA to request most recent records if not in the chart     No colonoscopy on file  No cologuard on file  No FIT/FOBT on file   No flexible sigmoidoscopy on file         “Have you had a pap smear?”    NO    Date of last Cervical Cancer screen (HPV or PAP): 2010            Vitals:    24 1603   BP: (!) 106/93   Pulse: 73   Resp: 20   Temp: 98.2 °F (36.8 °C)   SpO2: 97%        Health Maintenance Due   Topic Date Due    Pneumococcal 65+ years Vaccine (1 of 2 - PCV) Never done    HIV screen  Never done    DTaP/Tdap/Td vaccine (1 - Tdap) Never done    Shingles vaccine (1 of 2) Never done    Lipids  Never done    Colorectal Cancer Screen  Never done    DEXA (modify frequency per FRAX score)  Never done    Cervical cancer screen  2015    Respiratory Syncytial Virus (RSV) Pregnant or age 60 yrs+ (1 - 1-dose 60+ series) Never done    COVID-19 Vaccine (3 - Pfizer risk series) 2021    Annual Wellness Visit (Medicare Advantage)  Never done        The patient, Latoya Blake, identity was verified by name and .

## 2024-05-13 NOTE — PROGRESS NOTES
[facility]  [address] [place] [address] [phone]    Medicare Annual Wellness Visit    Latoya Blake is here for Medicare AWV    Assessment & Plan   Medicare annual wellness visit, initial  Gastroesophageal reflux disease without esophagitis     Recommendations for Preventive Services Due: see orders and patient instructions/AVS.  Recommended screening schedule for the next 5-10 years is provided to the patient in written form: see Patient Instructions/AVS.  -Patient states that she will get her Prevnar, shingles, and tetanus shot at her pharmacy  -Reports that she has had her DEXA scan completed, will try to locate these records  -Patient will return for pap  -Colonoscopy last completed in 2021, patient will have these records sent to us  -Discussed with patient that her symptoms of slight dysphagia is likely due to her GERD complicated by recent anxiety.  Continue Protonix       RTC in 3 months for pap     Subjective   The following acute and/or chronic problems were also addressed today:  Swallowing issues: Feels like food gets stuck mid-chest for the past few weeks. States her friend recently passed away from esophageal cancer       Patient's complete Health Risk Assessment and screening values have been reviewed and are found in Flowsheets. The following problems were reviewed today and where indicated follow up appointments were made and/or referrals ordered.    Positive Risk Factor Screenings with Interventions:    Fall Risk:  Do you feel unsteady or are you worried about falling? : (!) yes  2 or more falls in past year?: no  Fall with injury in past year?: no     Interventions:    Reviewed medications, home hazards, visual acuity, and co-morbidities that can increase risk for falls            General HRA Questions:  Select all that apply: (!) New or Increased Pain, Loneliness, Social Isolation, Stress, New or Increased Fatigue  Pain

## 2024-05-16 DIAGNOSIS — I10 ESSENTIAL HYPERTENSION: ICD-10-CM

## 2024-05-17 RX ORDER — FUROSEMIDE 20 MG/1
20 TABLET ORAL DAILY
Qty: 90 TABLET | Refills: 1 | Status: SHIPPED | OUTPATIENT
Start: 2024-05-17

## 2024-05-17 NOTE — TELEPHONE ENCOUNTER
Last appointment: 5/13/24  Next appointment: 8/16/24  Previous refill encounter(s): 11/10/23 #90 with 1 refill    Requested Prescriptions     Pending Prescriptions Disp Refills    furosemide (LASIX) 20 MG tablet [Pharmacy Med Name: Furosemide 20 MG Oral Tablet] 90 tablet 1     Sig: Take 1 tablet by mouth once daily         For Pharmacy Admin Tracking Only    Program: Medication Refill  CPA in place:    Recommendation Provided To:   Intervention Detail: New Rx: 1, reason: Patient Preference  Intervention Accepted By:   Gap Closed?:    Time Spent (min): 5

## 2024-06-10 ENCOUNTER — TELEPHONE (OUTPATIENT)
Age: 64
End: 2024-06-10

## 2024-06-10 NOTE — TELEPHONE ENCOUNTER
Patient LVM. Stated having a few questions. Would like for someone from the team to call her back.    #[phone]

## 2024-06-11 NOTE — TELEPHONE ENCOUNTER
11:34am: left  informed pt that I was following up on questions, informed her to call office back. Provided office number on .

## 2024-07-03 ENCOUNTER — TELEPHONE (OUTPATIENT)
Age: 64
End: 2024-07-03

## 2024-07-03 NOTE — TELEPHONE ENCOUNTER
Patient requesting a call back at [phone]. Patient experiencing sore throat, chills feeling cold, body aches, started Monday.

## 2024-07-05 ENCOUNTER — TELEMEDICINE (OUTPATIENT)
Age: 64
End: 2024-07-05
Payer: MEDICARE

## 2024-07-05 DIAGNOSIS — N18.31 STAGE 3A CHRONIC KIDNEY DISEASE (HCC): ICD-10-CM

## 2024-07-05 DIAGNOSIS — U07.1 COVID-19: Primary | ICD-10-CM

## 2024-07-05 PROCEDURE — 99442 PR PHYS/QHP TELEPHONE EVALUATION 11-20 MIN: CPT | Performed by: STUDENT IN AN ORGANIZED HEALTH CARE EDUCATION/TRAINING PROGRAM

## 2024-07-05 RX ORDER — GUAIFENESIN/DEXTROMETHORPHAN 100-10MG/5
5 SYRUP ORAL 3 TIMES DAILY PRN
Qty: 120 ML | Refills: 0 | Status: SHIPPED | OUTPATIENT
Start: 2024-07-05 | End: 2024-07-15

## 2024-07-05 ASSESSMENT — PATIENT HEALTH QUESTIONNAIRE - PHQ9
SUM OF ALL RESPONSES TO PHQ QUESTIONS 1-9: 0
SUM OF ALL RESPONSES TO PHQ9 QUESTIONS 1 & 2: 0
2. FEELING DOWN, DEPRESSED OR HOPELESS: NOT AT ALL
1. LITTLE INTEREST OR PLEASURE IN DOING THINGS: NOT AT ALL

## 2024-07-05 NOTE — PROGRESS NOTES
[facility]  [address] [place] [address] [phone]    Phone (Audio Only) Visit      Latoya Blake is a 63 y.o. female evaluated via audio-only technology on 7/5/2024 for Follow-up (ER visit URI 7/3/2024)  .      Assessment & Plan:   COVID-19  -     nirmatrelvir/ritonavir 300/100 (PAXLOVID) 20 x 150 MG & 10 x 100MG TBPK; Take 3 tablets (two 150 mg nirmatrelvir and one 100 mg ritonavir tablets) by mouth every 12 hours for 5 days., Disp-30 tablet, R-0Normal  -     guaiFENesin-dextromethorphan (ROBITUSSIN DM) 100-10 MG/5ML syrup; Take 5 mLs by mouth 3 times daily as needed for Cough, Disp-120 mL, R-0Normal  Stage 3a chronic kidney disease (HCC)  Discussed risk and benefits of Paxlovid treatment with patient and we will go ahead and send in Paxlovid.  Patient's last GFR was above the cutoff for renal dosing and therefore will provide standard dosing.  Medications reviewed with patient and any interactions were noted.  Will also provide Robitussin DM for patient to take for her cough.  Warning signs and symptoms to follow-up were discussed with patient as well  RTC as scheduled    Subjective:   COVID-19  -Went to [place] and came back home with URI symptoms on Sunday  -Tested postive for Covid on Wednesday. Has been tking tylenol for her symptoms  -No shortness of breath but does have congestion  -Feels okay in the morning, but tends to feel worse throughout the day  Prior to Admission medications    Medication Sig Start Date End Date Taking? Authorizing Provider   nirmatrelvir/ritonavir 300/100 (PAXLOVID) 20 x 150 MG & 10 x 100MG TBPK Take 3 tablets (two 150 mg nirmatrelvir and one 100 mg ritonavir tablets) by mouth every 12 hours for 5 days. 7/5/24 7/10/24 Yes Carolina Mcdowell,    guaiFENesin-dextromethorphan (ROBITUSSIN DM) 100-10 MG/5ML syrup Take 5 mLs by mouth 3 times daily as needed for Cough 7/5/24 7/15/24 Yes Carolina Mcdowell, DO   furosemide

## 2024-07-05 NOTE — PROGRESS NOTES
Chief Complaint   Patient presents with    Follow-up     ER visit URI 7/3/2024        \"Have you been to the ER, urgent care clinic since your last visit?  Hospitalized since your last visit?\"    YES - When: approximately 3 days ago.  [facility]".    “Have you seen or consulted any other health care providers outside of [facility] since your last visit?”    NO    “Have you had a colorectal cancer screening such as a colonoscopy/FIT/Cologuard?    NO    No colonoscopy on file  No cologuard on file  No FIT/FOBT on file   No flexible sigmoidoscopy on file         “Have you had a pap smear?”    NO    Date of last Cervical Cancer screen (HPV or PAP): 2010            There were no vitals filed for this visit.     Health Maintenance Due   Topic Date Due    Pneumococcal 65+ years Vaccine (1 of 2 - PCV) Never done    HIV screen  Never done    DTaP/Tdap/Td vaccine (1 - Tdap) Never done    Shingles vaccine (1 of 2) Never done    Lipids  Never done    Colorectal Cancer Screen  Never done    DEXA (modify frequency per FRAX score)  Never done    Cervical cancer screen  2015    Respiratory Syncytial Virus (RSV) Pregnant or age 60 yrs+ (1 - 1-dose 60+ series) Never done    COVID-19 Vaccine (3 - Pfizer risk series) 2021        The patient, Latoya Blake, identity was verified by name and .

## 2024-07-08 ENCOUNTER — TELEPHONE (OUTPATIENT)
Age: 64
End: 2024-07-08

## 2024-07-09 NOTE — TELEPHONE ENCOUNTER
Called patient and verified identity via date of birth.  Patient states that she has started on the Paxlovid for her current case of COVID.  On the first day had some diarrhea and got scared because she saw that the Paxlovid was for the standard dosing.  Per patient's last renal function, patient is safe to take the standard dosing.  Patient reports that she does feel better than she did prior to starting the medication but is just  anxious.  Discussed with patient that if her hydration status has not been great and she has had some diarrhea, would recommend drinking fluids with electrolytes and reducing the Paxlovid to 150 mg tablet and 100 mg tablet twice daily for the remaining 2 days.  Patient verbalized understanding and was in agreement with this plan.

## 2024-08-16 ENCOUNTER — OFFICE VISIT (OUTPATIENT)
Age: 64
End: 2024-08-16
Payer: MEDICARE

## 2024-08-16 VITALS
HEART RATE: 57 BPM | DIASTOLIC BLOOD PRESSURE: 62 MMHG | WEIGHT: 122.8 LBS | HEIGHT: 60 IN | SYSTOLIC BLOOD PRESSURE: 123 MMHG | RESPIRATION RATE: 20 BRPM | OXYGEN SATURATION: 96 % | BODY MASS INDEX: 24.11 KG/M2 | TEMPERATURE: 97.2 F

## 2024-08-16 DIAGNOSIS — N18.31 STAGE 3A CHRONIC KIDNEY DISEASE (HCC): ICD-10-CM

## 2024-08-16 DIAGNOSIS — I10 ESSENTIAL HYPERTENSION: ICD-10-CM

## 2024-08-16 DIAGNOSIS — K21.9 GASTROESOPHAGEAL REFLUX DISEASE WITHOUT ESOPHAGITIS: ICD-10-CM

## 2024-08-16 DIAGNOSIS — I10 ESSENTIAL HYPERTENSION: Primary | ICD-10-CM

## 2024-08-16 DIAGNOSIS — E07.9 THYROID DYSFUNCTION: ICD-10-CM

## 2024-08-16 LAB
COMMENT:: NORMAL
SPECIMEN HOLD: NORMAL

## 2024-08-16 PROCEDURE — 1123F ACP DISCUSS/DSCN MKR DOCD: CPT | Performed by: STUDENT IN AN ORGANIZED HEALTH CARE EDUCATION/TRAINING PROGRAM

## 2024-08-16 PROCEDURE — 3078F DIAST BP <80 MM HG: CPT | Performed by: STUDENT IN AN ORGANIZED HEALTH CARE EDUCATION/TRAINING PROGRAM

## 2024-08-16 PROCEDURE — 99214 OFFICE O/P EST MOD 30 MIN: CPT | Performed by: STUDENT IN AN ORGANIZED HEALTH CARE EDUCATION/TRAINING PROGRAM

## 2024-08-16 PROCEDURE — 3074F SYST BP LT 130 MM HG: CPT | Performed by: STUDENT IN AN ORGANIZED HEALTH CARE EDUCATION/TRAINING PROGRAM

## 2024-08-16 NOTE — PATIENT INSTRUCTIONS
It was nice seeing you today!     For this visit, I have ordered lab work to be completed. If you have not already, I recommend signing up for [employer] for easier access to these results. When the labs are completed, the results are automatically released to you. You may see the results before I have a chance to review them. I will send you a message once I have interpreted them. If you do not have MyChart, you can expect to receive a letter in the mail with my interpretation included as well.

## 2024-08-16 NOTE — PROGRESS NOTES
[facility]  [address] [place] [address] [phone]    Office Visit      Assessment and Plan     1. Essential hypertension  Chronic, controlled. Continue current regimen as described in the HPI and reconciled medication list    - Lipid Panel; Future    2. Stage 3a chronic kidney disease (HCC)  Chronic, stable.  Reordered patient's lab for nephrology.  Will have these CC to Dr. Trevino  - CBC; Future  - Vitamin D 25 Hydroxy; Future  - Comprehensive Metabolic Panel; Future  - PTH, Intact; Future  - Microalbumin / Creatinine Urine Ratio; Future    3. Gastroesophageal reflux disease without esophagitis  Chronic, uncontrolled.  Suspect that patient's intermittent dysphagia is due to her acid reflux.  Discussed that after these episodes she should try to take a Tums to see if this helps his symptoms.  May need to adjust antiacids if improved with this intervention    4. Thyroid dysfunction  Chronic, stable.  - TSH; Future  - T4, Free; Future            Return in about 4 months (around 12/16/2024) for Follow-up.         Discussed the expected course, resolution and complications of the diagnosis(es) in detail.  Medication risks, benefits, costs, interactions, and alternatives were discussed as indicated.  Patient to contact the office if their condition worsens, changes or fails to improve. Pt verbalized understanding with the diagnosis(es) and plan.           Carolina Mcdowell,     08/16/24   4:53 PM        Subjective     CC:   Chief Complaint   Patient presents with    Follow-up     HPI: Latoya Blake is a 63 y.o. female presenting to the clinic today for evaluation and/or follow-up of the following concerns:    Hypertension  Blood pressure today is at goal of 123/62.  Patient reports compliance with her current regimen.  Leg edema has improved.  Patient also has labs from nephrology which she needs to be ordered.  Will order those today.    Patient also

## 2024-08-16 NOTE — PROGRESS NOTES
No chief complaint on file.      \"Have you been to the ER, urgent care clinic since your last visit?  Hospitalized since your last visit?\"    NO    “Have you seen or consulted any other health care providers outside of [facility] since your last visit?”    NO        “Have you had a colorectal cancer screening such as a colonoscopy/FIT/Cologuard?    NO    No colonoscopy on file  No cologuard on file  No FIT/FOBT on file   No flexible sigmoidoscopy on file         Click Here for Release of Records Request     No results found for this visit on 24.   Vitals:    24 1506   BP: 123/62   Site: Right Upper Arm   Position: Sitting   Cuff Size: Large Adult   Pulse: 57   Resp: 20   Temp: 97.2 °F (36.2 °C)   TempSrc: Temporal   SpO2: 96%   Weight: 55.7 kg (122 lb 12.8 oz)   Height: 1.524 m (5')      Health Maintenance Due   Topic Date Due    Pneumococcal 65+ years Vaccine (1 of 2 - PCV) Never done    HIV screen  Never done    DTaP/Tdap/Td vaccine (1 - Tdap) Never done    Shingles vaccine (1 of 2) Never done    Lipids  Never done    Colorectal Cancer Screen  Never done    DEXA (modify frequency per FRAX score)  Never done    Respiratory Syncytial Virus (RSV) Pregnant or age 60 yrs+ (1 - 1-dose 60+ series) Never done    COVID-19 Vaccine (3 - Pfizer risk series) 2021    Flu vaccine (1) 2024        The patient, Latoya Blake, identity was verified by name and .

## 2024-08-17 LAB
25(OH)D3 SERPL-MCNC: 34.6 NG/ML (ref 30–100)
ALBUMIN SERPL-MCNC: 4.1 G/DL (ref 3.5–5)
ALBUMIN/GLOB SERPL: 1.1 (ref 1.1–2.2)
ALP SERPL-CCNC: 133 U/L (ref 45–117)
ALT SERPL-CCNC: 23 U/L (ref 12–78)
ANION GAP SERPL CALC-SCNC: 7 MMOL/L (ref 5–15)
AST SERPL-CCNC: 22 U/L (ref 15–37)
BILIRUB SERPL-MCNC: 0.4 MG/DL (ref 0.2–1)
BUN SERPL-MCNC: 15 MG/DL (ref 6–20)
BUN/CREAT SERPL: 13 (ref 12–20)
CALCIUM SERPL-MCNC: 10 MG/DL (ref 8.5–10.1)
CALCIUM SERPL-MCNC: 9.9 MG/DL (ref 8.5–10.1)
CHLORIDE SERPL-SCNC: 104 MMOL/L (ref 97–108)
CHOLEST SERPL-MCNC: 213 MG/DL
CO2 SERPL-SCNC: 27 MMOL/L (ref 21–32)
CREAT SERPL-MCNC: 1.13 MG/DL (ref 0.55–1.02)
CREAT UR-MCNC: 31.6 MG/DL
ERYTHROCYTE [DISTWIDTH] IN BLOOD BY AUTOMATED COUNT: 12.7 % (ref 11.5–14.5)
GLOBULIN SER CALC-MCNC: 3.6 G/DL (ref 2–4)
GLUCOSE SERPL-MCNC: 72 MG/DL (ref 65–100)
HCT VFR BLD AUTO: 39.4 % (ref 35–47)
HDLC SERPL-MCNC: 68 MG/DL
HDLC SERPL: 3.1 (ref 0–5)
HGB BLD-MCNC: 12.3 G/DL (ref 11.5–16)
LDLC SERPL CALC-MCNC: 113.2 MG/DL (ref 0–100)
MCH RBC QN AUTO: 29.4 PG (ref 26–34)
MCHC RBC AUTO-ENTMCNC: 31.2 G/DL (ref 30–36.5)
MCV RBC AUTO: 94 FL (ref 80–99)
MICROALBUMIN UR-MCNC: <0.5 MG/DL
MICROALBUMIN/CREAT UR-RTO: <16 MG/G (ref 0–30)
NRBC # BLD: 0 K/UL (ref 0–0.01)
NRBC BLD-RTO: 0 PER 100 WBC
PLATELET # BLD AUTO: 280 K/UL (ref 150–400)
PMV BLD AUTO: 9.9 FL (ref 8.9–12.9)
POTASSIUM SERPL-SCNC: 4.2 MMOL/L (ref 3.5–5.1)
PROT SERPL-MCNC: 7.7 G/DL (ref 6.4–8.2)
PTH-INTACT SERPL-MCNC: 82.2 PG/ML (ref 18.4–88)
RBC # BLD AUTO: 4.19 M/UL (ref 3.8–5.2)
SODIUM SERPL-SCNC: 138 MMOL/L (ref 136–145)
T4 FREE SERPL-MCNC: 1.1 NG/DL (ref 0.8–1.5)
TRIGL SERPL-MCNC: 159 MG/DL
TSH SERPL DL<=0.05 MIU/L-ACNC: 0.55 UIU/ML (ref 0.36–3.74)
VLDLC SERPL CALC-MCNC: 31.8 MG/DL
WBC # BLD AUTO: 5.3 K/UL (ref 3.6–11)

## 2024-08-18 RX ORDER — CARVEDILOL 6.25 MG/1
6.25 TABLET ORAL 2 TIMES DAILY
Qty: 180 TABLET | Refills: 1 | Status: SHIPPED | OUTPATIENT
Start: 2024-08-18

## 2024-08-30 DIAGNOSIS — K21.9 GASTROESOPHAGEAL REFLUX DISEASE WITHOUT ESOPHAGITIS: ICD-10-CM

## 2024-08-30 RX ORDER — PANTOPRAZOLE SODIUM 40 MG/1
40 TABLET, DELAYED RELEASE ORAL DAILY
Qty: 90 TABLET | Refills: 1 | Status: SHIPPED | OUTPATIENT
Start: 2024-08-30

## 2024-09-26 ENCOUNTER — TELEPHONE (OUTPATIENT)
Age: 64
End: 2024-09-26

## 2024-09-26 NOTE — TELEPHONE ENCOUNTER
Called patient back. Patient states that her last 3 bowel movement have been really dark. On Sunday, she drank a green smoothie so thought that was why. Yesterday and today, stool were dark. Not sticky. Last week some pepto bismol, but not recently. No pain. No bright red blood. No diarrhea or constipation. Some lightheadedness off and on, but not weak.       Discussed with patient that I would recommend for her to go to an urgent care over the weekend if her symptoms worsen.  Discussed that I would like to see him next week for an occult blood test and to check her blood counts.  Patient states that she will be going out of town and therefore will not be here during that time.  Scheduled patient for October 7 but informed her to seek medical attention if any symptoms worsen in the meantime    Dr. Carolina Mcdowell, DO  09/26/24  12:55 PM

## 2024-11-04 ENCOUNTER — TELEPHONE (OUTPATIENT)
Age: 64
End: 2024-11-04

## 2024-11-04 NOTE — TELEPHONE ENCOUNTER
Patient LVM. Stated experiencing pain in her left breast. Pt stated she is trying to stay calm but is bothering.    #[phone]

## 2024-11-05 ENCOUNTER — TELEPHONE (OUTPATIENT)
Facility: HOSPITAL | Age: 64
End: 2024-11-05

## 2024-11-05 ENCOUNTER — TELEPHONE (OUTPATIENT)
Age: 64
End: 2024-11-05

## 2024-11-05 ENCOUNTER — OFFICE VISIT (OUTPATIENT)
Age: 64
End: 2024-11-05
Payer: MEDICARE

## 2024-11-05 VITALS — WEIGHT: 122 LBS | BODY MASS INDEX: 23.95 KG/M2 | HEIGHT: 60 IN

## 2024-11-05 DIAGNOSIS — Z17.1 MALIGNANT NEOPLASM OF LOWER-OUTER QUADRANT OF LEFT BREAST OF FEMALE, ESTROGEN RECEPTOR NEGATIVE (HCC): Primary | ICD-10-CM

## 2024-11-05 DIAGNOSIS — Z98.890 S/P LUMPECTOMY OF BREAST: ICD-10-CM

## 2024-11-05 DIAGNOSIS — Z92.3 S/P RADIATION THERAPY: ICD-10-CM

## 2024-11-05 DIAGNOSIS — Z85.3 HISTORY OF BREAST CANCER IN FEMALE: ICD-10-CM

## 2024-11-05 DIAGNOSIS — C50.512 MALIGNANT NEOPLASM OF LOWER-OUTER QUADRANT OF LEFT BREAST OF FEMALE, ESTROGEN RECEPTOR NEGATIVE (HCC): Primary | ICD-10-CM

## 2024-11-05 PROCEDURE — 1123F ACP DISCUSS/DSCN MKR DOCD: CPT | Performed by: NURSE PRACTITIONER

## 2024-11-05 PROCEDURE — 99213 OFFICE O/P EST LOW 20 MIN: CPT | Performed by: NURSE PRACTITIONER

## 2024-11-05 NOTE — TELEPHONE ENCOUNTER
HIPAA x2  Called pt to let her know that per Angeles Castellano NP scar tissue can cause some discomfort where lumpectomy was performed and that she would request that she reach out to Dr. Nixon office but we will also do an exam on Firday in office   She can take tylenol or use heating pad to help with comfort. Pt stated she will call Dr. Nixon office and follow up with them. Pt verbalized understanding and was appreciative of my call.

## 2024-11-05 NOTE — TELEPHONE ENCOUNTER
Rec'd phone call from the patient this afternoon. She has an appointment with Paula today but was calling to ask Dr. Cruz's first name so that she can make sure that she is in network with her new insurance. Gave patient this information. She was appreciative.       JOHN Robles  [facility]     [facility], [facility], & [facility]  W: [phone]  [email or web address]   Good Help to Those in Need®

## 2024-11-05 NOTE — TELEPHONE ENCOUNTER
HIPAA x2  SW pt she states she's having pain in left breast.  States pain has been going on for about a month, located under incision where mass was removed, hasn't taken any pain medication, pain is sharp at times, dull ache at others. Will update the team with this information. Pt has appt on 11/8. Pt verbalized understanding and was appreciative of my call.

## 2024-11-05 NOTE — TELEPHONE ENCOUNTER
[facility]  Breast Navigator Encounter    Name:    Latoya Blake  Age:    63 y.o.  Diagnosis:    LEFT breast cancer    Returned patient's call.  She is panicked because late in the summer she developed LEFT chest or breast pain.  Thought it might be her heart so she saw her cardiologist.  They ordered a Holter monitor, but she has not worn this yet.  Feels like the pain is near the surgical incision.    She was not aware that she should follow-up with Dr. Cruz/AURELIA Mitchell for exams; she thought they were just for surgery, and Dr. Castano was the only one she should follow-up with.  I told her that she should see Dr. Castano but also rotate between her and Dr. Cruz or AURELIA Mitchell at [facility].  I told her for the breast pain she should see someone at [facility].  I provided the phone number, and she is going to give the office a call and schedule an appointment.     Reassured her that most of the time breast pain is not an indication of something serious.  She also had a normal mammogram in August which is reassuring.  She felt much better after the conversation and will call to get the appointment.                             Rosio Caceres RN, BSN, CBCN  Oncology Breast Navigator     [facility]  [address] [place] [address]  [id number]  W: [phone]  F: [phone]  [email or web address]  Good Help to Those in Need®

## 2024-11-05 NOTE — PROGRESS NOTES
HISTORY OF PRESENT ILLNESS  Latoya Blake is a 63 y.o. female     HPI Established patient presents for follow-up for LEFT breast cancer.  Reports LEFT breast pain x 1 week.  Reports that it is a sharp shooting pain and a dull ache at times.  Mostly in her lateral breast.  Denies associate symptoms - breast mass, skin changes and nipple discharge.           Breast history -   Referring - Dr. Montoya - [facility]   2022 - BSmammogram 3D at [facility] - showed a 1cm irregular mass in the LEFT breast at 3 o'clock position.     2022 - LEFT breast core biopsy - invasive ductal carcinoma, grade 2-3, ER 0%, RI 0%, HER2/logan negative.     22 - port placement - Dr. Cruz   Neoadjuvant chemotherapy - Dr. 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 Cruz  PCR - pT0pN0  2023 - Completed XRT         Family history -   Denies family history of breast and ovarian cancer        OB History          2    Para        Term                AB   2    Living             SAB        IAB        Ectopic        Molar        Multiple        Live Births              Obstetric Comments   Menarche , LMP 41, # of children 0, age of 1st delivery na, Hysterectomy/oophorectomy no/no, Breast bx yes, history of breast feeding na, BCP yes, Hormone therapy yes                             Past Surgical History:   Procedure Laterality Date    BREAST LUMPECTOMY Left 2023    LEFT BREAST MAGSEED GUIDED (, ) LUMPECTOMY LEFT BREAST SENTINEL NODE BIOPSY WITH MAGTRACE INJECTION PORT REMOVAL performed by Fanny Cruz MD at [facility] AMBULATORY OR    BREAST SURGERY Left     BIOPSY    COLONOSCOPY      EYE SURGERY Bilateral     CATARACTS    IR NONTUNNELED VASCULAR CATHETER

## 2024-11-08 ENCOUNTER — OFFICE VISIT (OUTPATIENT)
Age: 64
End: 2024-11-08
Payer: MEDICARE

## 2024-11-08 VITALS
OXYGEN SATURATION: 97 % | HEART RATE: 59 BPM | WEIGHT: 124 LBS | BODY MASS INDEX: 24.22 KG/M2 | TEMPERATURE: 98.1 F | DIASTOLIC BLOOD PRESSURE: 67 MMHG | SYSTOLIC BLOOD PRESSURE: 107 MMHG | RESPIRATION RATE: 18 BRPM

## 2024-11-08 DIAGNOSIS — Z17.1 MALIGNANT NEOPLASM INVOLVING BOTH NIPPLE AND AREOLA OF LEFT BREAST IN FEMALE, ESTROGEN RECEPTOR NEGATIVE (HCC): Primary | Chronic | ICD-10-CM

## 2024-11-08 DIAGNOSIS — C50.012 MALIGNANT NEOPLASM OF NIPPLE AND AREOLA, LEFT FEMALE BREAST (HCC): Primary | ICD-10-CM

## 2024-11-08 DIAGNOSIS — C50.012 MALIGNANT NEOPLASM INVOLVING BOTH NIPPLE AND AREOLA OF LEFT BREAST IN FEMALE, ESTROGEN RECEPTOR NEGATIVE (HCC): Primary | Chronic | ICD-10-CM

## 2024-11-08 PROCEDURE — 1123F ACP DISCUSS/DSCN MKR DOCD: CPT | Performed by: INTERNAL MEDICINE

## 2024-11-08 PROCEDURE — 99214 OFFICE O/P EST MOD 30 MIN: CPT | Performed by: INTERNAL MEDICINE

## 2024-11-08 PROCEDURE — 3074F SYST BP LT 130 MM HG: CPT | Performed by: INTERNAL MEDICINE

## 2024-11-08 PROCEDURE — 3078F DIAST BP <80 MM HG: CPT | Performed by: INTERNAL MEDICINE

## 2024-11-08 NOTE — PROGRESS NOTES
Latoya Blake is a 63 y.o. female    Chief Complaint   Patient presents with    Follow-up      left breast cancer-ER 0%, MN 0%, HER2/logan negative.       1. Have you been to the ER, urgent care clinic since your last visit?  Hospitalized since your last visit?No    2. Have you seen or consulted any other health care providers outside of the [facility] since your last visit?  Include any pap smears or colon screening. No      
Left breast tissue, lumpectomy:        Benign breast tissue with previous biopsy site changes.   No evidence of residual carcinoma.   All margins negative for carcinoma.   See synoptic report.     INVASIVE CARCINOMA OF THE BREAST: Resection    SPECIMEN       Procedure: Excision (less than total mastectomy)       Specimen Laterality: Left    TUMOR       Histologic Type: No residual invasive carcinoma       Tumor Size: 10 mm (as reported on outside institution original   diagnostic biopsy)       Ductal Carcinoma In Situ (DCIS): Not identified    Tumor Extent       Lymphatic and / or Vascular Invasion: Not identified       Treatment Effect in the Breast: No residual invasive carcinoma is         present in the breast after presurgical therapy    MARGINS       Margin Status for Invasive Carcinoma: Not applicable (residual         invasive carcinoma in specimen is absent)       Margin Status for DCIS: Not applicable (no DCIS in specimen)    REGIONAL LYMPH NODES       Regional Lymph Node Status: Regional lymph nodes present           Regional Lymph Node Status: All regional lymph nodes negative             for tumor           Total Number of Lymph Nodes Examined (sentinel and non-             sentinel): 5           Number of [place] Nodes Examined: 5    DISTANT METASTASIS       Distant Site(s) Involved: Not applicable    pTNM CLASSIFICATION (AJCC 8th Edition)       Modified Classification: y       pT Category: pT0       pN Category: pN0       N Suffix: (sn)    SPECIAL STUDIES       Breast Biomarker Testing Performed on Previous Biopsy: Estrogen         Receptor (ER), Progesterone Receptor (PgR), HER2 (by         immunohistochemistry), Ki-67           Estrogen Receptor (ER) Status: Negative           Progesterone Receptor (PgR) Status: Negative           HER2 (by immunohistochemistry): Negative (Score 1+)           Ki-67 Percentage of Positive Nuclei: 85%           Testing Performed on Case Number: outside institution

## 2024-11-11 ENCOUNTER — TELEPHONE (OUTPATIENT)
Age: 64
End: 2024-11-11

## 2024-11-11 NOTE — TELEPHONE ENCOUNTER
Patient would like to see  soon regarding severe breast pain please give her a call @ [phone]

## 2024-11-11 NOTE — TELEPHONE ENCOUNTER
Patient would like to see  soon regarding severe breast pain please give her a call @ [phone] Patient requesting a sooner appointment than 12/16/24 for breast pain.

## 2024-11-12 ENCOUNTER — TELEPHONE (OUTPATIENT)
Age: 64
End: 2024-11-12

## 2024-11-12 DIAGNOSIS — N63.10 MASS OF RIGHT BREAST, UNSPECIFIED QUADRANT: Primary | ICD-10-CM

## 2024-11-12 DIAGNOSIS — N64.59 OTHER SIGNS AND SYMPTOMS IN BREAST: ICD-10-CM

## 2024-11-12 NOTE — TELEPHONE ENCOUNTER
Yi Griffin from [facility] P# [phone], F# [phone], she states patient was diagnosed breast cancer in left breast and now has lump in RIGHT breast as well, they are requesting an order and referral for Diagnostic Ultrasound and mammogram of RIGHT breast be faxed to them please

## 2024-11-12 NOTE — TELEPHONE ENCOUNTER
Rec'd phone call from the patient this morning. She expressed concern because after seeing Paula last week, she had an appointment with her medical oncologist (3 days later) and states that there is a lump under her right armpit, unsure of what to do. Pt requested to speak with Paula. SW messaged NP requesting that she call the patient to check in.     ADDENDUM:  Message from NP states that pt needs to be seen by Dr. Cruz for evaluation b/c of new lump. Called the pt to make her aware and gave her the number to call and schedule. She was appreciative.       Helena Nuñez [facility] [place]     [place] [facility], [facility], & [facility]  W: [phone]  [email or web address]   Good Help to Those in Need®

## 2024-11-13 ENCOUNTER — TELEPHONE (OUTPATIENT)
Age: 64
End: 2024-11-13

## 2024-11-13 NOTE — TELEPHONE ENCOUNTER
I have ordered patient's mammogram and ultrasound.  She has also followed up with her oncologist.  Will follow-up with patient once those results return

## 2024-11-13 NOTE — TELEPHONE ENCOUNTER
Moreno with [facility] states that  need to put in an order for a ( l ) breast ultrasound patient has a knot in her breast Moreno can be reached @ [phone]

## 2024-11-18 ENCOUNTER — SOCIAL WORK (OUTPATIENT)
Age: 64
End: 2024-11-18

## 2024-11-18 ENCOUNTER — OFFICE VISIT (OUTPATIENT)
Age: 64
End: 2024-11-18
Payer: MEDICARE

## 2024-11-18 VITALS — HEIGHT: 60 IN | BODY MASS INDEX: 23.95 KG/M2 | WEIGHT: 122 LBS

## 2024-11-18 DIAGNOSIS — L72.3 SEBACEOUS CYST OF RIGHT AXILLA: Primary | ICD-10-CM

## 2024-11-18 PROCEDURE — 1123F ACP DISCUSS/DSCN MKR DOCD: CPT | Performed by: SURGERY

## 2024-11-18 PROCEDURE — 99214 OFFICE O/P EST MOD 30 MIN: CPT | Performed by: SURGERY

## 2024-11-18 PROCEDURE — 76642 ULTRASOUND BREAST LIMITED: CPT | Performed by: SURGERY

## 2024-11-18 NOTE — PROGRESS NOTES
HISTORY OF PRESENT ILLNESS  Latoya Blake is a 63 y.o. female     HPI ESTABLISHED patient here for RIGHT axillary mass. Friday she saw Dr. Castano , during exam the provider noticed a mass in RIGHT axillary area with some  discomfort.          Breast history -   Referring - Dr. Montoya - [facility]   7/20/2022 - BSmammogram 3D at [facility] - showed a 1cm irregular mass in the LEFT breast at 3 o'clock position.     8/2/2022 - LEFT breast core biopsy - invasive ductal carcinoma, grade 2-3, ER 0%, [place] 0%, HER2/logan negative.     9/27/22 - port placement - Dr. Cruz   Neoadjuvant chemotherapy - Dr. 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 Cruz  PCR - pT0pN0  11/2023 - Completed XRT            Family history -   Denies family history of breast and ovarian cancer      Review of Systems   All other systems reviewed and are negative.        Physical Exam  Vitals and nursing note reviewed.   Chest:   Breasts:     Right: No swelling, bleeding, inverted nipple, mass, nipple discharge, skin change or tenderness.      Left: No swelling, bleeding, inverted nipple, mass, nipple discharge, skin change or tenderness.       Lymphadenopathy:      Upper Body:      Right upper body: No axillary adenopathy.      Left upper body: No axillary adenopathy.     BREAST ULTRASOUND  Indication: Right axillary mass   Technique:  The area was scanned using a high-frequency linear-array near-field transducer  Findings: No abnormal mass, lesion, or shadowing noted.  No cysts. Subcentimeter sebaceous cyst  Impression: sebaceous cyst  Disposition: No worrisome finding on ultrasound       ASSESSMENT and PLAN   Diagnosis Orders   1. Sebaceous cyst of right axilla          - sebaceous cyst  - will

## 2024-11-18 NOTE — PROGRESS NOTES
Referral rec'd from team this afternoon. Pt was interested in having information on support groups.     Met with the patient prior to her appointment this afternoon. She would find it beneficial to talk with someone who has been through similar experiences she has had regarding her breast cancer journey.    TOMASZ invited the pt to join the survivorship support group on Tuesday, Nov 26th. Also discussed the [facility] help line support with the patient. She was very appreciative for this information, and plans to join the survivorship group virtually on 11/26. TOMASZ printed out the patient the Zoom link and meeting ID for the pt (she plans to use a computer at the H[facility] to join).       JOHN Robles  [facility]     [facility], [facility], & [facility]  W: [phone]  [email or web address]   Good Help to Those in Need®

## 2024-12-16 ENCOUNTER — OFFICE VISIT (OUTPATIENT)
Age: 64
End: 2024-12-16
Payer: MEDICARE

## 2024-12-16 VITALS
WEIGHT: 126 LBS | SYSTOLIC BLOOD PRESSURE: 110 MMHG | OXYGEN SATURATION: 99 % | HEIGHT: 60 IN | BODY MASS INDEX: 24.74 KG/M2 | RESPIRATION RATE: 20 BRPM | HEART RATE: 66 BPM | TEMPERATURE: 98 F | DIASTOLIC BLOOD PRESSURE: 64 MMHG

## 2024-12-16 DIAGNOSIS — N18.31 STAGE 3A CHRONIC KIDNEY DISEASE (HCC): ICD-10-CM

## 2024-12-16 DIAGNOSIS — I10 ESSENTIAL HYPERTENSION: Primary | ICD-10-CM

## 2024-12-16 PROCEDURE — 99214 OFFICE O/P EST MOD 30 MIN: CPT | Performed by: STUDENT IN AN ORGANIZED HEALTH CARE EDUCATION/TRAINING PROGRAM

## 2024-12-16 PROCEDURE — 1123F ACP DISCUSS/DSCN MKR DOCD: CPT | Performed by: STUDENT IN AN ORGANIZED HEALTH CARE EDUCATION/TRAINING PROGRAM

## 2024-12-16 PROCEDURE — 3074F SYST BP LT 130 MM HG: CPT | Performed by: STUDENT IN AN ORGANIZED HEALTH CARE EDUCATION/TRAINING PROGRAM

## 2024-12-16 PROCEDURE — 1159F MED LIST DOCD IN RCRD: CPT | Performed by: STUDENT IN AN ORGANIZED HEALTH CARE EDUCATION/TRAINING PROGRAM

## 2024-12-16 PROCEDURE — 1160F RVW MEDS BY RX/DR IN RCRD: CPT | Performed by: STUDENT IN AN ORGANIZED HEALTH CARE EDUCATION/TRAINING PROGRAM

## 2024-12-16 PROCEDURE — 1126F AMNT PAIN NOTED NONE PRSNT: CPT | Performed by: STUDENT IN AN ORGANIZED HEALTH CARE EDUCATION/TRAINING PROGRAM

## 2024-12-16 PROCEDURE — G2211 COMPLEX E/M VISIT ADD ON: HCPCS | Performed by: STUDENT IN AN ORGANIZED HEALTH CARE EDUCATION/TRAINING PROGRAM

## 2024-12-16 PROCEDURE — 3078F DIAST BP <80 MM HG: CPT | Performed by: STUDENT IN AN ORGANIZED HEALTH CARE EDUCATION/TRAINING PROGRAM

## 2024-12-16 ASSESSMENT — PATIENT HEALTH QUESTIONNAIRE - PHQ9
3. TROUBLE FALLING OR STAYING ASLEEP: MORE THAN HALF THE DAYS
SUM OF ALL RESPONSES TO PHQ QUESTIONS 1-9: 9
2. FEELING DOWN, DEPRESSED OR HOPELESS: MORE THAN HALF THE DAYS
4. FEELING TIRED OR HAVING LITTLE ENERGY: MORE THAN HALF THE DAYS
7. TROUBLE CONCENTRATING ON THINGS, SUCH AS READING THE NEWSPAPER OR WATCHING TELEVISION: MORE THAN HALF THE DAYS
8. MOVING OR SPEAKING SO SLOWLY THAT OTHER PEOPLE COULD HAVE NOTICED. OR THE OPPOSITE, BEING SO FIGETY OR RESTLESS THAT YOU HAVE BEEN MOVING AROUND A LOT MORE THAN USUAL: NOT AT ALL
5. POOR APPETITE OR OVEREATING: NOT AT ALL
1. LITTLE INTEREST OR PLEASURE IN DOING THINGS: SEVERAL DAYS
10. IF YOU CHECKED OFF ANY PROBLEMS, HOW DIFFICULT HAVE THESE PROBLEMS MADE IT FOR YOU TO DO YOUR WORK, TAKE CARE OF THINGS AT HOME, OR GET ALONG WITH OTHER PEOPLE: SOMEWHAT DIFFICULT
SUM OF ALL RESPONSES TO PHQ QUESTIONS 1-9: 9
SUM OF ALL RESPONSES TO PHQ9 QUESTIONS 1 & 2: 3
6. FEELING BAD ABOUT YOURSELF - OR THAT YOU ARE A FAILURE OR HAVE LET YOURSELF OR YOUR FAMILY DOWN: NOT AT ALL

## 2024-12-16 NOTE — PROGRESS NOTES
Chief Complaint   Patient presents with    Hypertension       \"Have you been to the ER, urgent care clinic since your last visit?  Hospitalized since your last visit?\"    NO    “Have you seen or consulted any other health care providers outside of [facility] since your last visit?”    NO        “Have you had a colorectal cancer screening such as a colonoscopy/FIT/Cologuard?    NO    No colonoscopy on file  No cologuard on file  No FIT/FOBT on file   No flexible sigmoidoscopy on file         Click Here for Release of Records Request     No results found for this visit on 24.   Vitals:    24 1103   BP: 110/64   Site: Right Upper Arm   Position: Sitting   Cuff Size: Large Adult   Pulse: 66   Resp: 20   Temp: 98 °F (36.7 °C)   TempSrc: Temporal   SpO2: 99%   Weight: 57.2 kg (126 lb)   Height: 1.524 m (5')      Health Maintenance Due   Topic Date Due    Shingles vaccine (1 of 2) Never done    Colorectal Cancer Screen  Never done    DEXA (modify frequency per FRAX score)  Never done        The patient, Latoya Blake, identity was verified by name and .

## 2024-12-16 NOTE — PROGRESS NOTES
[facility]  [address] [place] [address] [phone]    Office Visit      Assessment and Plan     1. Essential hypertension  Chronic, controlled. Continue current regimen as described in the HPI and reconciled medication list      2. Stage 3a chronic kidney disease (HCC)  Chronic, stable.  Creatinine has been stable per last set of labs                No follow-ups on file.         Discussed the expected course, resolution and complications of the diagnosis(es) in detail.  Medication risks, benefits, costs, interactions, and alternatives were discussed as indicated.  Patient to contact the office if their condition worsens, changes or fails to improve. Pt verbalized understanding with the diagnosis(es) and plan.           Carolina Mcdowell,     12/16/24   1:45 PM        Subjective     CC:   Chief Complaint   Patient presents with    Hypertension     HPI: Latoya Blake is a 64 y.o. female presenting to the clinic today for evaluation and/or follow-up of the following concerns:      Breast      Hypertension  Blood pressure today is at goal of 110/64.  Patient reports compliance with her current regimen.  Leg edema has improved.    Meds: Losartan 50 mg, Lasix 20 mg, Coreg 6.25 mg twice daily.  Patient states that she is no longer on nifedipine    Breast pain  Patient states that she had an ultrasound done at the office of her breast specialist and states that she was told that the concerning lump did not appear to be cancerous in origin.  Patient has not had any pain since      Review of systems:   A comprehensive review of systems was negative except as written in the HPI.    History  Patient's allergies, medical, surgical, social, and family hx reviewed and available in chart. Updates made where appropriate.       Objective     /64 (Site: Right Upper Arm, Position: Sitting, Cuff Size: Large Adult)   Pulse 66   Temp 98 °F (36.7 °C) (Temporal)

## 2025-01-15 DIAGNOSIS — I10 ESSENTIAL HYPERTENSION: ICD-10-CM

## 2025-01-15 RX ORDER — FUROSEMIDE 20 MG/1
20 TABLET ORAL DAILY
Qty: 90 TABLET | Refills: 1 | Status: SHIPPED | OUTPATIENT
Start: 2025-01-15

## 2025-01-15 NOTE — TELEPHONE ENCOUNTER
Last appointment: 12/16/24  Next appointment: 6/11/25  Previous refill encounter(s): 5/17/24 #90 with 1 refill    Requested Prescriptions     Pending Prescriptions Disp Refills    furosemide (LASIX) 20 MG tablet [Pharmacy Med Name: Furosemide 20 MG Oral Tablet] 90 tablet 1     Sig: Take 1 tablet by mouth once daily         For Pharmacy Admin Tracking Only    Program: Medication Refill  CPA in place:    Recommendation Provided To:   Intervention Detail: New Rx: 1, reason: Patient Preference  Intervention Accepted By:   Gap Closed?:    Time Spent (min): 5

## 2025-01-23 ENCOUNTER — TELEPHONE (OUTPATIENT)
Age: 65
End: 2025-01-23

## 2025-01-23 NOTE — TELEPHONE ENCOUNTER
Patient would like for  regarding a test her cardiologist wants her to have she can be reached @ [phone]

## 2025-02-05 DIAGNOSIS — I10 ESSENTIAL HYPERTENSION: ICD-10-CM

## 2025-02-05 RX ORDER — CARVEDILOL 6.25 MG/1
6.25 TABLET ORAL 2 TIMES DAILY
Qty: 180 TABLET | Refills: 0 | Status: SHIPPED | OUTPATIENT
Start: 2025-02-05

## 2025-02-07 ENCOUNTER — OFFICE VISIT (OUTPATIENT)
Age: 65
End: 2025-02-07
Payer: MEDICARE

## 2025-02-07 VITALS
HEIGHT: 60 IN | TEMPERATURE: 98 F | RESPIRATION RATE: 20 BRPM | OXYGEN SATURATION: 98 % | DIASTOLIC BLOOD PRESSURE: 62 MMHG | WEIGHT: 127.2 LBS | HEART RATE: 61 BPM | SYSTOLIC BLOOD PRESSURE: 104 MMHG | BODY MASS INDEX: 24.97 KG/M2

## 2025-02-07 DIAGNOSIS — N18.31 STAGE 3A CHRONIC KIDNEY DISEASE (HCC): ICD-10-CM

## 2025-02-07 DIAGNOSIS — F41.9 ANXIETY: ICD-10-CM

## 2025-02-07 DIAGNOSIS — R07.89 OTHER CHEST PAIN: Primary | ICD-10-CM

## 2025-02-07 DIAGNOSIS — C50.012 MALIGNANT NEOPLASM OF NIPPLE AND AREOLA, LEFT FEMALE BREAST (HCC): ICD-10-CM

## 2025-02-07 PROBLEM — Z17.1 MALIGNANT NEOPLASM OF LOWER-OUTER QUADRANT OF LEFT BREAST OF FEMALE, ESTROGEN RECEPTOR NEGATIVE (HCC): Status: RESOLVED | Noted: 2023-11-28 | Resolved: 2025-02-07

## 2025-02-07 PROBLEM — C50.512 MALIGNANT NEOPLASM OF LOWER-OUTER QUADRANT OF LEFT BREAST OF FEMALE, ESTROGEN RECEPTOR NEGATIVE (HCC): Status: RESOLVED | Noted: 2023-11-28 | Resolved: 2025-02-07

## 2025-02-07 PROCEDURE — 1123F ACP DISCUSS/DSCN MKR DOCD: CPT | Performed by: STUDENT IN AN ORGANIZED HEALTH CARE EDUCATION/TRAINING PROGRAM

## 2025-02-07 PROCEDURE — 99214 OFFICE O/P EST MOD 30 MIN: CPT | Performed by: STUDENT IN AN ORGANIZED HEALTH CARE EDUCATION/TRAINING PROGRAM

## 2025-02-07 PROCEDURE — 1160F RVW MEDS BY RX/DR IN RCRD: CPT | Performed by: STUDENT IN AN ORGANIZED HEALTH CARE EDUCATION/TRAINING PROGRAM

## 2025-02-07 PROCEDURE — 1126F AMNT PAIN NOTED NONE PRSNT: CPT | Performed by: STUDENT IN AN ORGANIZED HEALTH CARE EDUCATION/TRAINING PROGRAM

## 2025-02-07 PROCEDURE — 1159F MED LIST DOCD IN RCRD: CPT | Performed by: STUDENT IN AN ORGANIZED HEALTH CARE EDUCATION/TRAINING PROGRAM

## 2025-02-07 PROCEDURE — G2211 COMPLEX E/M VISIT ADD ON: HCPCS | Performed by: STUDENT IN AN ORGANIZED HEALTH CARE EDUCATION/TRAINING PROGRAM

## 2025-02-07 PROCEDURE — 3074F SYST BP LT 130 MM HG: CPT | Performed by: STUDENT IN AN ORGANIZED HEALTH CARE EDUCATION/TRAINING PROGRAM

## 2025-02-07 PROCEDURE — 3078F DIAST BP <80 MM HG: CPT | Performed by: STUDENT IN AN ORGANIZED HEALTH CARE EDUCATION/TRAINING PROGRAM

## 2025-02-07 SDOH — ECONOMIC STABILITY: FOOD INSECURITY: WITHIN THE PAST 12 MONTHS, YOU WORRIED THAT YOUR FOOD WOULD RUN OUT BEFORE YOU GOT MONEY TO BUY MORE.: NEVER TRUE

## 2025-02-07 SDOH — ECONOMIC STABILITY: FOOD INSECURITY: WITHIN THE PAST 12 MONTHS, THE FOOD YOU BOUGHT JUST DIDN'T LAST AND YOU DIDN'T HAVE MONEY TO GET MORE.: NEVER TRUE

## 2025-02-07 ASSESSMENT — PATIENT HEALTH QUESTIONNAIRE - PHQ9
1. LITTLE INTEREST OR PLEASURE IN DOING THINGS: MORE THAN HALF THE DAYS
SUM OF ALL RESPONSES TO PHQ QUESTIONS 1-9: 10
SUM OF ALL RESPONSES TO PHQ QUESTIONS 1-9: 10
4. FEELING TIRED OR HAVING LITTLE ENERGY: NEARLY EVERY DAY
5. POOR APPETITE OR OVEREATING: NOT AT ALL
7. TROUBLE CONCENTRATING ON THINGS, SUCH AS READING THE NEWSPAPER OR WATCHING TELEVISION: SEVERAL DAYS
3. TROUBLE FALLING OR STAYING ASLEEP: NEARLY EVERY DAY
SUM OF ALL RESPONSES TO PHQ QUESTIONS 1-9: 10
SUM OF ALL RESPONSES TO PHQ QUESTIONS 1-9: 10
10. IF YOU CHECKED OFF ANY PROBLEMS, HOW DIFFICULT HAVE THESE PROBLEMS MADE IT FOR YOU TO DO YOUR WORK, TAKE CARE OF THINGS AT HOME, OR GET ALONG WITH OTHER PEOPLE: SOMEWHAT DIFFICULT
8. MOVING OR SPEAKING SO SLOWLY THAT OTHER PEOPLE COULD HAVE NOTICED. OR THE OPPOSITE, BEING SO FIGETY OR RESTLESS THAT YOU HAVE BEEN MOVING AROUND A LOT MORE THAN USUAL: NOT AT ALL
9. THOUGHTS THAT YOU WOULD BE BETTER OFF DEAD, OR OF HURTING YOURSELF: NOT AT ALL
6. FEELING BAD ABOUT YOURSELF - OR THAT YOU ARE A FAILURE OR HAVE LET YOURSELF OR YOUR FAMILY DOWN: NOT AT ALL
2. FEELING DOWN, DEPRESSED OR HOPELESS: SEVERAL DAYS
SUM OF ALL RESPONSES TO PHQ9 QUESTIONS 1 & 2: 3

## 2025-02-07 NOTE — PROGRESS NOTES
Chief Complaint   Patient presents with    Follow-up     Patient would like to go over test results from Cardiology       \"Have you been to the ER, urgent care clinic since your last visit?  Hospitalized since your last visit?\"    NO    “Have you seen or consulted any other health care providers outside of [facility] since your last visit?”    NO        “Have you had a colorectal cancer screening such as a colonoscopy/FIT/Cologuard?    NO    No colonoscopy on file  No cologuard on file  No FIT/FOBT on file   No flexible sigmoidoscopy on file         Click Here for Release of Records Request     No results found for this visit on 25.   Vitals:    25 1119   BP: 104/62   Site: Right Upper Arm   Position: Sitting   Cuff Size: Large Adult   Pulse: 61   Resp: 20   Temp: 98 °F (36.7 °C)   TempSrc: Temporal   SpO2: 98%   Weight: 57.7 kg (127 lb 3.2 oz)   Height: 1.524 m (5')      Health Maintenance Due   Topic Date Due    Shingles vaccine (1 of 2) Never done    Colorectal Cancer Screen  Never done    DEXA (modify frequency per FRAX score)  Never done        The patient, Latoya Blake, identity was verified by name and .   
in detail.  Medication risks, benefits, costs, interactions, and alternatives were discussed as indicated.  Patient to contact the office if their condition worsens, changes or fails to improve. Pt verbalized understanding with the diagnosis(es) and plan.           Carolina Mcdowell,     02/07/25   2:21 PM        Subjective     CC:   Chief Complaint   Patient presents with    Follow-up     Patient would like to go over test results from Cardiology       Pt  has a past medical history of Cancer (HCC), Chemoprophylaxis, CHF (congestive heart failure) (HCC), COVID-19, GERD (gastroesophageal reflux disease), History of blood transfusion, HTN (hypertension), Neuropathy, Psychiatric disorder, and Thyroid disease.    HPI: Latoya Blake is a 64 y.o. female presenting to the clinic today for evaluation and/or follow-up of the following concerns:      Patient presenting today to discuss her cardiology testing.  Patient states that she received a call from cardiologist but did not understand everything that was going on.  States that she was told that she needs either catheterization or some sort of CT scan.  Patient states that she does have some intermittent chest pain but feels like it is more like indigestion.  No pain currently    Patient had a stress test completed on 2/4/2025 which showed a small zone of anterior lateral wall ischemia but with normal wall motion and overall low risk.  Resting EF was 67% and stress EF was 86%.  Overall study was low risk.  Patient was also seen by oncology on 1/22/2025 and an ultrasound was ordered for venous insufficiency.  Recommended compression stockings.  Patient was to return to office in 1 year      Review of systems:   A comprehensive review of systems was negative except as written in the HPI.    History  Patient's allergies, medical, surgical, social, and family hx reviewed and available in chart. Updates made where appropriate.           Objective     /62 (Site: Right Upper

## 2025-02-20 DIAGNOSIS — K21.9 GASTROESOPHAGEAL REFLUX DISEASE WITHOUT ESOPHAGITIS: ICD-10-CM

## 2025-02-20 RX ORDER — PANTOPRAZOLE SODIUM 40 MG/1
40 TABLET, DELAYED RELEASE ORAL DAILY
Qty: 90 TABLET | Refills: 1 | Status: SHIPPED | OUTPATIENT
Start: 2025-02-20

## 2025-03-04 ENCOUNTER — TELEPHONE (OUTPATIENT)
Age: 65
End: 2025-03-04

## 2025-03-04 NOTE — TELEPHONE ENCOUNTER
Called the patient yesterday to share information on the [employer] support group beginning on 3/11. Pt returned call yesterday afternoon, SW called back this morning.     Made pt aware of group starting on Tuesday at [place]. Pt was occupied at the time of our call having a procedure completed. Pt requested sw text her the info, sw unable to do this. Offered to mail pt information, she accepted. Confirmed the pt's address and placed group information in mailing envelope in outgoing mail for delivery.       JOHN Robles  [facility]     [facility], [facility], & [facility]  W: [phone]  [email or web address]   Good Help to Those in Need®

## 2025-03-10 ENCOUNTER — PATIENT MESSAGE (OUTPATIENT)
Age: 65
End: 2025-03-10

## 2025-03-10 ENCOUNTER — TELEPHONE (OUTPATIENT)
Age: 65
End: 2025-03-10

## 2025-03-10 NOTE — TELEPHONE ENCOUNTER
Pt requested the Zoom link be texted to her, SW sent this to pt's phone via [employer].   no

## 2025-03-10 NOTE — TELEPHONE ENCOUNTER
Rec'd phone call from the patient this afternoon. She had questions regarding the details for the Cancer Survivorship support group. SW provided assistance and sent the patient the Zoom link via [employer]/text message as the pt requested. She was very appreciative.       JOHN Robles  [facility]     [facility], [facility], & [facility]  W: [phone]  [email or web address]   Good Help to Those in Need®

## 2025-03-26 ENCOUNTER — TELEPHONE (OUTPATIENT)
Age: 65
End: 2025-03-26

## 2025-03-26 NOTE — TELEPHONE ENCOUNTER
Rec'd call from pt. She had questions regarding the survivorship support group and attending in person next week, as she has arranged transportation. Discussed pt's questions. Pt satisfied.       JOHN Robles  [facility]     [facility], [facility], & [facility]  W: [phone]  [email or web address]   Good Help to Those in Need®

## 2025-05-01 DIAGNOSIS — I10 ESSENTIAL HYPERTENSION: ICD-10-CM

## 2025-05-01 RX ORDER — CARVEDILOL 6.25 MG/1
6.25 TABLET ORAL 2 TIMES DAILY
Qty: 180 TABLET | Refills: 0 | Status: SHIPPED | OUTPATIENT
Start: 2025-05-01

## 2025-05-07 ENCOUNTER — OFFICE VISIT (OUTPATIENT)
Age: 65
End: 2025-05-07
Payer: MEDICARE

## 2025-05-07 VITALS
WEIGHT: 126 LBS | BODY MASS INDEX: 24.61 KG/M2 | RESPIRATION RATE: 19 BRPM | OXYGEN SATURATION: 97 % | DIASTOLIC BLOOD PRESSURE: 68 MMHG | HEART RATE: 56 BPM | SYSTOLIC BLOOD PRESSURE: 124 MMHG | TEMPERATURE: 97.5 F

## 2025-05-07 DIAGNOSIS — C50.012 MALIGNANT NEOPLASM OF NIPPLE AND AREOLA, LEFT FEMALE BREAST (HCC): Primary | ICD-10-CM

## 2025-05-07 PROCEDURE — 1159F MED LIST DOCD IN RCRD: CPT

## 2025-05-07 PROCEDURE — 3078F DIAST BP <80 MM HG: CPT

## 2025-05-07 PROCEDURE — 1125F AMNT PAIN NOTED PAIN PRSNT: CPT

## 2025-05-07 PROCEDURE — 99213 OFFICE O/P EST LOW 20 MIN: CPT

## 2025-05-07 PROCEDURE — 1160F RVW MEDS BY RX/DR IN RCRD: CPT

## 2025-05-07 PROCEDURE — 1123F ACP DISCUSS/DSCN MKR DOCD: CPT

## 2025-05-07 PROCEDURE — 3074F SYST BP LT 130 MM HG: CPT

## 2025-05-07 NOTE — PROGRESS NOTES
Latoya Blake is a 64 y.o. 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 Have you been to the ER, urgent care clinic since your last visit?  Hospitalized since your last visit?No    2. Have you seen or consulted any other health care providers outside of the [facility] since your last visit?  Include any pap smears or colon screening. No    
lymph node #2, excision:        One lymph node with no pathologic abnormality (0/1).     3. Left axillary sentinel lymph node #3, excision:        Two lymph nodes with no pathologic abnormality (0/2).     4. Left breast tissue, lumpectomy:        Benign breast tissue with previous biopsy site changes.   No evidence of residual carcinoma.   All margins negative for carcinoma.   See synoptic report.     INVASIVE CARCINOMA OF THE BREAST: Resection    SPECIMEN       Procedure: Excision (less than total mastectomy)       Specimen Laterality: Left    TUMOR       Histologic Type: No residual invasive carcinoma       Tumor Size: 10 mm (as reported on outside institution original   diagnostic biopsy)       Ductal Carcinoma In Situ (DCIS): Not identified    Tumor Extent       Lymphatic and / or Vascular Invasion: Not identified       Treatment Effect in the Breast: No residual invasive carcinoma is         present in the breast after presurgical therapy    MARGINS       Margin Status for Invasive Carcinoma: Not applicable (residual         invasive carcinoma in specimen is absent)       Margin Status for DCIS: Not applicable (no DCIS in specimen)    REGIONAL LYMPH NODES       Regional Lymph Node Status: Regional lymph nodes present           Regional Lymph Node Status: All regional lymph nodes negative             for tumor           Total Number of Lymph Nodes Examined (sentinel and non-             sentinel): 5           Number of [place] Nodes Examined: 5    DISTANT METASTASIS       Distant Site(s) Involved: Not applicable    pTNM CLASSIFICATION (AJCC 8th Edition)       Modified Classification: y       pT Category: pT0       pN Category: pN0       N Suffix: (sn)    SPECIAL STUDIES       Breast Biomarker Testing Performed on Previous Biopsy: Estrogen         Receptor (ER), Progesterone Receptor (PgR), HER2 (by         immunohistochemistry), Ki-67           Estrogen Receptor (ER) Status: Negative           Progesterone

## 2025-05-09 ENCOUNTER — TELEPHONE (OUTPATIENT)
Age: 65
End: 2025-05-09

## 2025-05-09 ENCOUNTER — CLINICAL DOCUMENTATION (OUTPATIENT)
Age: 65
End: 2025-05-09

## 2025-05-09 NOTE — TELEPHONE ENCOUNTER
5853 HIPAA x2  Called pt and made her aware we received her mammo report, Kavin, NP reviewed and everything looks good/negative.  Pt voiced understanding and was appreciative of my call.

## 2025-05-09 NOTE — PROGRESS NOTES
Reviewed Mammogram from 2/26/2025 completed at [facility].   Mammo is negative/good.   Please make patient aware.

## 2025-05-19 ENCOUNTER — TELEPHONE (OUTPATIENT)
Age: 65
End: 2025-05-19

## 2025-05-19 ENCOUNTER — OFFICE VISIT (OUTPATIENT)
Age: 65
End: 2025-05-19
Payer: MEDICARE

## 2025-05-19 VITALS
HEIGHT: 60 IN | DIASTOLIC BLOOD PRESSURE: 58 MMHG | HEART RATE: 78 BPM | SYSTOLIC BLOOD PRESSURE: 122 MMHG | WEIGHT: 126 LBS | TEMPERATURE: 96.8 F | BODY MASS INDEX: 24.74 KG/M2

## 2025-05-19 DIAGNOSIS — Z17.1 MALIGNANT NEOPLASM INVOLVING BOTH NIPPLE AND AREOLA OF LEFT BREAST IN FEMALE, ESTROGEN RECEPTOR NEGATIVE (HCC): Primary | Chronic | ICD-10-CM

## 2025-05-19 DIAGNOSIS — C50.012 MALIGNANT NEOPLASM INVOLVING BOTH NIPPLE AND AREOLA OF LEFT BREAST IN FEMALE, ESTROGEN RECEPTOR NEGATIVE (HCC): Primary | Chronic | ICD-10-CM

## 2025-05-19 PROCEDURE — 3078F DIAST BP <80 MM HG: CPT | Performed by: SURGERY

## 2025-05-19 PROCEDURE — 1123F ACP DISCUSS/DSCN MKR DOCD: CPT | Performed by: SURGERY

## 2025-05-19 PROCEDURE — 99213 OFFICE O/P EST LOW 20 MIN: CPT | Performed by: SURGERY

## 2025-05-19 PROCEDURE — 3074F SYST BP LT 130 MM HG: CPT | Performed by: SURGERY

## 2025-05-19 NOTE — PROGRESS NOTES
HISTORY OF PRESENT ILLNESS  Latoya Blake is a 64 y.o. female     HPI ESTABLISHED patient here for follow up left breast cancer.    Breast history -   Referring - Dr. Montoya - [facility]   7/20/2022 - BSmammogram 3D at [facility] - showed a 1cm irregular mass in the LEFT breast at 3 o'clock position.     8/2/2022 - LEFT breast core biopsy - invasive ductal carcinoma, grade 2-3, ER 0%, UT 0%, HER2/logan negative.     9/27/22 - port placement - Dr. Cruz   Neoadjuvant chemotherapy - Dr. 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 Cruz  PCR - pT0pN0  11/2023 - Completed XRT         Review of Systems   All other systems reviewed and are negative.        Physical Exam  Vitals and nursing note reviewed.   Chest:   Breasts:     Right: No swelling, bleeding, inverted nipple, mass, nipple discharge, skin change or tenderness.      Left: No swelling, bleeding, inverted nipple, mass, nipple discharge, skin change or tenderness.       Lymphadenopathy:      Upper Body:      Right upper body: No axillary adenopathy.      Left upper body: No axillary adenopathy.            ASSESSMENT and PLAN   Diagnosis Orders   1. Malignant neoplasm involving both nipple and areola of left breast in female, estrogen receptor negative (HCC)          - no evidence local recurrence  Mammograms due 8/2025  20 minutes was spent with patient on counseling and coordination of care.

## 2025-06-09 ENCOUNTER — TELEPHONE (OUTPATIENT)
Age: 65
End: 2025-06-09

## 2025-06-09 NOTE — TELEPHONE ENCOUNTER
Contacted patient regarding upcoming Medicare wellness appointment and completion of HRA questionnaire. Patient states will complete in office.

## 2025-06-11 ENCOUNTER — OFFICE VISIT (OUTPATIENT)
Age: 65
End: 2025-06-11
Payer: MEDICARE

## 2025-06-11 VITALS
HEIGHT: 60 IN | WEIGHT: 128.4 LBS | RESPIRATION RATE: 20 BRPM | HEART RATE: 55 BPM | BODY MASS INDEX: 25.21 KG/M2 | OXYGEN SATURATION: 99 % | TEMPERATURE: 97.8 F | SYSTOLIC BLOOD PRESSURE: 118 MMHG | DIASTOLIC BLOOD PRESSURE: 62 MMHG

## 2025-06-11 DIAGNOSIS — I10 ESSENTIAL HYPERTENSION: ICD-10-CM

## 2025-06-11 DIAGNOSIS — Z78.0 POST-MENOPAUSAL: ICD-10-CM

## 2025-06-11 DIAGNOSIS — N18.31 STAGE 3A CHRONIC KIDNEY DISEASE (HCC): ICD-10-CM

## 2025-06-11 DIAGNOSIS — L65.9 HAIR LOSS: ICD-10-CM

## 2025-06-11 DIAGNOSIS — Z00.00 MEDICARE ANNUAL WELLNESS VISIT, SUBSEQUENT: Primary | ICD-10-CM

## 2025-06-11 PROBLEM — J94.2 HEMOTHORAX ON LEFT: Status: RESOLVED | Noted: 2023-01-05 | Resolved: 2025-06-11

## 2025-06-11 PROBLEM — J18.9 PNEUMONIA: Status: RESOLVED | Noted: 2022-12-26 | Resolved: 2025-06-11

## 2025-06-11 PROBLEM — D62 ACUTE BLOOD LOSS ANEMIA: Status: RESOLVED | Noted: 2023-01-05 | Resolved: 2025-06-11

## 2025-06-11 PROBLEM — U07.1 COVID-19: Status: RESOLVED | Noted: 2022-12-26 | Resolved: 2025-06-11

## 2025-06-11 PROBLEM — J15.211 STAPHYLOCOCCUS AUREUS PNEUMONIA (HCC): Status: RESOLVED | Noted: 2023-01-04 | Resolved: 2025-06-11

## 2025-06-11 PROCEDURE — 3074F SYST BP LT 130 MM HG: CPT | Performed by: STUDENT IN AN ORGANIZED HEALTH CARE EDUCATION/TRAINING PROGRAM

## 2025-06-11 PROCEDURE — 3078F DIAST BP <80 MM HG: CPT | Performed by: STUDENT IN AN ORGANIZED HEALTH CARE EDUCATION/TRAINING PROGRAM

## 2025-06-11 PROCEDURE — 99214 OFFICE O/P EST MOD 30 MIN: CPT | Performed by: STUDENT IN AN ORGANIZED HEALTH CARE EDUCATION/TRAINING PROGRAM

## 2025-06-11 PROCEDURE — 1160F RVW MEDS BY RX/DR IN RCRD: CPT | Performed by: STUDENT IN AN ORGANIZED HEALTH CARE EDUCATION/TRAINING PROGRAM

## 2025-06-11 PROCEDURE — 1126F AMNT PAIN NOTED NONE PRSNT: CPT | Performed by: STUDENT IN AN ORGANIZED HEALTH CARE EDUCATION/TRAINING PROGRAM

## 2025-06-11 PROCEDURE — 1159F MED LIST DOCD IN RCRD: CPT | Performed by: STUDENT IN AN ORGANIZED HEALTH CARE EDUCATION/TRAINING PROGRAM

## 2025-06-11 PROCEDURE — G0439 PPPS, SUBSEQ VISIT: HCPCS | Performed by: STUDENT IN AN ORGANIZED HEALTH CARE EDUCATION/TRAINING PROGRAM

## 2025-06-11 PROCEDURE — 1123F ACP DISCUSS/DSCN MKR DOCD: CPT | Performed by: STUDENT IN AN ORGANIZED HEALTH CARE EDUCATION/TRAINING PROGRAM

## 2025-06-11 SDOH — ECONOMIC STABILITY: FOOD INSECURITY: WITHIN THE PAST 12 MONTHS, YOU WORRIED THAT YOUR FOOD WOULD RUN OUT BEFORE YOU GOT MONEY TO BUY MORE.: NEVER TRUE

## 2025-06-11 SDOH — ECONOMIC STABILITY: FOOD INSECURITY: WITHIN THE PAST 12 MONTHS, THE FOOD YOU BOUGHT JUST DIDN'T LAST AND YOU DIDN'T HAVE MONEY TO GET MORE.: NEVER TRUE

## 2025-06-11 ASSESSMENT — PATIENT HEALTH QUESTIONNAIRE - PHQ9
SUM OF ALL RESPONSES TO PHQ QUESTIONS 1-9: 0
SUM OF ALL RESPONSES TO PHQ QUESTIONS 1-9: 0
1. LITTLE INTEREST OR PLEASURE IN DOING THINGS: NOT AT ALL
SUM OF ALL RESPONSES TO PHQ QUESTIONS 1-9: 0
SUM OF ALL RESPONSES TO PHQ QUESTIONS 1-9: 0
2. FEELING DOWN, DEPRESSED OR HOPELESS: NOT AT ALL

## 2025-06-11 NOTE — PROGRESS NOTES
Chief Complaint   Patient presents with    Medicare AWV     Patient states she has no concerns today       \"Have you been to the ER, urgent care clinic since your last visit?  Hospitalized since your last visit?\"    NO    “Have you seen or consulted any other health care providers outside of [facility] since your last visit?”    NO        “Have you had a colorectal cancer screening such as a colonoscopy/FIT/Cologuard?    NO    No colonoscopy on file  No cologuard on file  No FIT/FOBT on file   No flexible sigmoidoscopy on file         Click Here for Release of Records Request     No results found for this visit on 25.   Vitals:    25 1116   BP: 118/62   Pulse: 55   Resp: 20   Temp: 97.8 °F (36.6 °C)   TempSrc: Temporal   SpO2: 99%   Weight: 58.2 kg (128 lb 6.4 oz)   Height: 1.524 m (5')      Health Maintenance Due   Topic Date Due    Colorectal Cancer Screen  Never done    DEXA (modify frequency per FRAX score)  Never done    Annual Wellness Visit (Medicare Advantage)  2025        The patient, Latoya Blake, identity was verified by name and .

## 2025-06-11 NOTE — PROGRESS NOTES
[facility]  [address] [place] [address] [phone]      Medicare Annual Wellness Visit    Latoya Blake is here for Medicare AWV (Patient states she has no concerns today)     Diagnosis Orders   1. Medicare annual wellness visit, subsequent        2. Essential hypertension        3. Hair loss        4. Post-menopausal  DEXA Bone Density Axial Skeleton          Assessment & Plan  Hair loss  -Status: Chronic, previously treated but discontinued.  Tried various treatments, including topical minoxidil, discontinued due to respiratory infections.  --Treatment plan: Advised to try minoxidil again, let it dry before going out in cold weather. Discussed potential side effects, including unwanted hair growth.    CKD 3a  -Status: Chronic, under care but needs follow-up.  Under Dr. Trevino's care, last visit over a year ago.  --Treatment plan: Advised to contact Dr. Trevino's office for appointment with another provider. Discussed finding a new nephrologist if uncomfortable with Dr. Trevino. Encouraged follow-up for continuity of care.    Health maintenance  -Status: Stable.  Blood pressure 118/62, pulse 55, oxygen saturation 99%. Up to date on most preventative screenings; DEXA scan to be ordered.    HTN  Chronic, controlled. Continue current regimen as described in the HPI and reconciled medication list      Follow-up mammogram  -Status: Due for follow-up.  Diagnostic mammogram on 02/26/2025.  --Treatment plan: Advised to schedule and complete follow-up mammogram in August 2025.    Follow-up: August 2025 for mammogram.  6 months      Results       No follow-ups on file.     Subjective     History of Present Illness  The patient presents for a Medicare annual wellness visit.    She reports overall good health, with occasional mild pain episodes that have resolved. She is experiencing hair loss and seeks a solution. She discontinued various topical treatments due to

## 2025-07-06 DIAGNOSIS — I10 ESSENTIAL HYPERTENSION: ICD-10-CM

## 2025-07-08 RX ORDER — FUROSEMIDE 20 MG/1
20 TABLET ORAL DAILY
Qty: 90 TABLET | Refills: 1 | Status: SHIPPED | OUTPATIENT
Start: 2025-07-08

## 2025-07-08 NOTE — TELEPHONE ENCOUNTER
Last appointment: 6/11/25  Next appointment: 12/15/25  Previous refill encounter(s): 1/15/25 #90 with 1 refill    Requested Prescriptions     Pending Prescriptions Disp Refills    furosemide (LASIX) 20 MG tablet [Pharmacy Med Name: Furosemide 20 MG Oral Tablet] 90 tablet 1     Sig: Take 1 tablet by mouth once daily         For Pharmacy Admin Tracking Only    Program: Medication Refill  CPA in place:    Recommendation Provided To:   Intervention Detail: New Rx: 1, reason: Patient Preference  Intervention Accepted By:   Gap Closed?:    Time Spent (min): 5

## 2025-07-26 DIAGNOSIS — I10 ESSENTIAL HYPERTENSION: ICD-10-CM

## 2025-07-28 RX ORDER — CARVEDILOL 6.25 MG/1
6.25 TABLET ORAL 2 TIMES DAILY
Qty: 180 TABLET | Refills: 1 | Status: SHIPPED | OUTPATIENT
Start: 2025-07-28

## 2025-07-28 NOTE — TELEPHONE ENCOUNTER
Last appointment: 6/11/25  Next appointment: 12/15/25  Previous refill encounter(s): 5/1/25 #180    Requested Prescriptions     Pending Prescriptions Disp Refills    carvedilol (COREG) 6.25 MG tablet [Pharmacy Med Name: Carvedilol 6.25 MG Oral Tablet] 180 tablet 1     Sig: Take 1 tablet by mouth twice daily         For Pharmacy Admin Tracking Only    Program: Medication Refill  CPA in place:    Recommendation Provided To:   Intervention Detail: New Rx: 1, reason: Patient Preference  Intervention Accepted By:   Gap Closed?:    Time Spent (min): 5

## 2025-08-08 ENCOUNTER — TELEPHONE (OUTPATIENT)
Age: 65
End: 2025-08-08

## 2025-08-16 DIAGNOSIS — K21.9 GASTROESOPHAGEAL REFLUX DISEASE WITHOUT ESOPHAGITIS: ICD-10-CM

## 2025-08-18 RX ORDER — PANTOPRAZOLE SODIUM 40 MG/1
40 TABLET, DELAYED RELEASE ORAL DAILY
Qty: 90 TABLET | Refills: 1 | Status: SHIPPED | OUTPATIENT
Start: 2025-08-18

## 2025-08-25 ENCOUNTER — CLINICAL DOCUMENTATION (OUTPATIENT)
Age: 65
End: 2025-08-25

## 2025-09-05 ENCOUNTER — OFFICE VISIT (OUTPATIENT)
Age: 65
End: 2025-09-05
Payer: MEDICARE

## 2025-09-05 VITALS
RESPIRATION RATE: 18 BRPM | BODY MASS INDEX: 21.79 KG/M2 | SYSTOLIC BLOOD PRESSURE: 118 MMHG | HEIGHT: 65 IN | WEIGHT: 130.8 LBS | TEMPERATURE: 98 F | HEART RATE: 59 BPM | DIASTOLIC BLOOD PRESSURE: 61 MMHG | OXYGEN SATURATION: 99 %

## 2025-09-05 DIAGNOSIS — G47.00 INSOMNIA, UNSPECIFIED TYPE: ICD-10-CM

## 2025-09-05 DIAGNOSIS — E55.9 VITAMIN D DEFICIENCY: ICD-10-CM

## 2025-09-05 DIAGNOSIS — E78.5 HYPERLIPIDEMIA, UNSPECIFIED HYPERLIPIDEMIA TYPE: ICD-10-CM

## 2025-09-05 DIAGNOSIS — M62.838 MUSCLE SPASMS OF LOWER EXTREMITY: ICD-10-CM

## 2025-09-05 DIAGNOSIS — N18.31 STAGE 3A CHRONIC KIDNEY DISEASE (HCC): Primary | ICD-10-CM

## 2025-09-05 DIAGNOSIS — K21.9 GASTROESOPHAGEAL REFLUX DISEASE WITHOUT ESOPHAGITIS: ICD-10-CM

## 2025-09-05 DIAGNOSIS — I10 ESSENTIAL HYPERTENSION: ICD-10-CM

## 2025-09-05 DIAGNOSIS — D50.9 IRON DEFICIENCY ANEMIA, UNSPECIFIED IRON DEFICIENCY ANEMIA TYPE: ICD-10-CM

## 2025-09-05 PROCEDURE — 1160F RVW MEDS BY RX/DR IN RCRD: CPT | Performed by: STUDENT IN AN ORGANIZED HEALTH CARE EDUCATION/TRAINING PROGRAM

## 2025-09-05 PROCEDURE — 3078F DIAST BP <80 MM HG: CPT | Performed by: STUDENT IN AN ORGANIZED HEALTH CARE EDUCATION/TRAINING PROGRAM

## 2025-09-05 PROCEDURE — 3074F SYST BP LT 130 MM HG: CPT | Performed by: STUDENT IN AN ORGANIZED HEALTH CARE EDUCATION/TRAINING PROGRAM

## 2025-09-05 PROCEDURE — 1159F MED LIST DOCD IN RCRD: CPT | Performed by: STUDENT IN AN ORGANIZED HEALTH CARE EDUCATION/TRAINING PROGRAM

## 2025-09-05 PROCEDURE — 1126F AMNT PAIN NOTED NONE PRSNT: CPT | Performed by: STUDENT IN AN ORGANIZED HEALTH CARE EDUCATION/TRAINING PROGRAM

## 2025-09-05 PROCEDURE — 1123F ACP DISCUSS/DSCN MKR DOCD: CPT | Performed by: STUDENT IN AN ORGANIZED HEALTH CARE EDUCATION/TRAINING PROGRAM

## 2025-09-05 PROCEDURE — 99214 OFFICE O/P EST MOD 30 MIN: CPT | Performed by: STUDENT IN AN ORGANIZED HEALTH CARE EDUCATION/TRAINING PROGRAM

## 2025-09-05 RX ORDER — PANTOPRAZOLE SODIUM 40 MG/1
40 TABLET, DELAYED RELEASE ORAL DAILY
Qty: 90 TABLET | Refills: 1 | Status: SHIPPED | OUTPATIENT
Start: 2025-09-05

## 2025-09-05 RX ORDER — LOSARTAN POTASSIUM 50 MG/1
50 TABLET ORAL DAILY
Qty: 90 TABLET | Refills: 1 | Status: SHIPPED | OUTPATIENT
Start: 2025-09-05

## 2025-09-05 RX ORDER — ROSUVASTATIN CALCIUM 10 MG/1
10 TABLET, COATED ORAL DAILY
Qty: 90 TABLET | Refills: 1 | Status: SHIPPED | OUTPATIENT
Start: 2025-09-05

## 2025-09-05 RX ORDER — CARVEDILOL 6.25 MG/1
6.25 TABLET ORAL 2 TIMES DAILY
Qty: 180 TABLET | Refills: 1 | Status: SHIPPED | OUTPATIENT
Start: 2025-09-05

## 2025-09-07 LAB
ALBUMIN/CREAT UR: <4 MG/G CREAT (ref 0–29)
CREAT UR-MCNC: 68.8 MG/DL
MICROALBUMIN UR-MCNC: <3 UG/ML

## (undated) DEVICE — PROBE DTECT MARGIN F/LUMPECTOMY

## (undated) DEVICE — INTENT OT USE PROVIDES A STERILE INTERFACE BETWEEN THE OPERATING ROOM SURGICAL LAMPS (NON-STERILE) AND THE SURGEON OR STAFF WORKING IN THE STERILE FIELD.: Brand: ASPEN® ALC PLUS LIGHT HANDLE COVER

## (undated) DEVICE — PREP SKN CHLRAPRP APL 26ML STR --

## (undated) DEVICE — STRIP,CLOSURE,WOUND,MEDI-STRIP,1/2X4: Brand: MEDLINE

## (undated) DEVICE — BASIN ST MAJOR-NO CAUTERY: Brand: MEDLINE INDUSTRIES, INC.

## (undated) DEVICE — SOL INJ SOD CL 0.9% 500ML BG --

## (undated) DEVICE — SYRINGE MED 10ML LUERLOCK TIP W/O SFTY DISP

## (undated) DEVICE — 3M™ TEGADERM™ TRANSPARENT FILM DRESSING FRAME STYLE, 1624W, 2-3/8 IN X 2-3/4 IN (6 CM X 7 CM), 100/CT 4CT/CASE: Brand: 3M™ TEGADERM™

## (undated) DEVICE — PAD,NON-ADHERENT,W/AD,3X4,ST,LF,1/PK: Brand: MEDLINE

## (undated) DEVICE — C-ARM: Brand: UNBRANDED

## (undated) DEVICE — SUTURE MCRYL SZ 4-0 L27IN ABSRB UD L19MM PS-2 1/2 CIR PRIM Y426H

## (undated) DEVICE — PROVE COVER: Brand: UNBRANDED

## (undated) DEVICE — HYPODERMIC SAFETY NEEDLE: Brand: MONOJECT

## (undated) DEVICE — SPONGE GZ W4XL4IN COT 12 PLY TYP VII WVN C FLD DSGN STERILE

## (undated) DEVICE — HANDLE LT SNAP ON ULT DURABLE LENS FOR TRUMPF ALC DISPOSABLE

## (undated) DEVICE — DECANTER BAG 9": Brand: MEDLINE INDUSTRIES, INC.

## (undated) DEVICE — GLOVE SURG SZ 6 L12IN FNGR THK79MIL GRN LTX FREE

## (undated) DEVICE — LIQUIBAND RAPID ADHESIVE 36/CS 0.8ML: Brand: MEDLINE

## (undated) DEVICE — SYR 10ML LUER LOK 1/5ML GRAD --

## (undated) DEVICE — TUBING, SUCTION, 1/4" X 12', STRAIGHT: Brand: MEDLINE

## (undated) DEVICE — SOLUTION IRRIG 1000ML 0.9% SOD CHL USP POUR PLAS BTL

## (undated) DEVICE — DRAPE,LAPAROTOMY,T,PEDI,STERILE: Brand: MEDLINE

## (undated) DEVICE — PACK,BASIC,SIRUS,V: Brand: MEDLINE

## (undated) DEVICE — SUT PROL 3-0 36IN SH DA BLU --

## (undated) DEVICE — DERMABOND SKIN ADH 0.7ML -- DERMABOND ADVANCED 12/BX

## (undated) DEVICE — PENCIL SMK EVAC L10FT DIA95MM TBNG NONSTICK W ADPT TO 22MM

## (undated) DEVICE — SUTURE VCRL SZ 3-0 L27IN ABSRB UD L26MM SH 1/2 CIR J416H

## (undated) DEVICE — INSULATED BLADE ELECTRODE: Brand: EDGE

## (undated) DEVICE — GARMENT,MEDLINE,DVT,INT,CALF,MED, GEN2: Brand: MEDLINE

## (undated) DEVICE — 3M™ TEGADERM™ TRANSPARENT FILM DRESSING FRAME STYLE, 1626W, 4 IN X 4-3/4 IN (10 CM X 12 CM), 50/CT 4CT/CASE: Brand: 3M™ TEGADERM™

## (undated) DEVICE — SUTURE PERMAHAND SZ 2-0 L30IN NONABSORBABLE BLK SILK W/O A305H

## (undated) DEVICE — ELECTRODE PT RET AD L9FT HI MOIST COND ADH HYDRGEL CORDED

## (undated) DEVICE — SUTURE VCRL SZ 3-0 L27IN ABSRB VLT L26MM SH 1/2 CIR J316H

## (undated) DEVICE — MINOR BASIN -SMH: Brand: MEDLINE INDUSTRIES, INC.

## (undated) DEVICE — YANKAUER,TAPERED BULBOUS TIP,W/O VENT: Brand: MEDLINE

## (undated) DEVICE — BLADE ES ELASTOMERIC COAT INSUL DURABLE BEND UPTO 90DEG

## (undated) DEVICE — SYRINGE MED 10ML TRNSLUC BRL PLUNG BLK MRK POLYPR CTRL

## (undated) DEVICE — 1010 S-DRAPE TOWEL DRAPE 10/BX: Brand: STERI-DRAPE™

## (undated) DEVICE — SUTURE PERMA-HAND SZ 2-0 L30IN NONABSORBABLE BLK L26MM SH K833H

## (undated) DEVICE — APPLICATOR MEDICATED 26 CC SOLUTION HI LT ORNG CHLORAPREP

## (undated) DEVICE — CHEST PACK: Brand: MEDLINE INDUSTRIES, INC.

## (undated) DEVICE — COVER US PRB W12XL244CM FLD IORT STR TIP

## (undated) DEVICE — DRAPE,CHEST,FENES,15X10,STERIL: Brand: MEDLINE

## (undated) DEVICE — REM POLYHESIVE ADULT PATIENT RETURN ELECTRODE: Brand: VALLEYLAB

## (undated) DEVICE — GLOVE SURG 6 11.1IN BEAD CUF LIGHT BRN SENSICARE LTX FREE

## (undated) DEVICE — TOWEL SURG W17XL27IN STD BLU COT NONFENESTRATED PREWASHED

## (undated) DEVICE — Device

## (undated) DEVICE — XEROFORM 5X9 PK